# Patient Record
Sex: FEMALE | Race: BLACK OR AFRICAN AMERICAN | Employment: UNEMPLOYED
[De-identification: names, ages, dates, MRNs, and addresses within clinical notes are randomized per-mention and may not be internally consistent; named-entity substitution may affect disease eponyms.]

---

## 2017-03-06 ENCOUNTER — OFFICE VISIT (OUTPATIENT)
Dept: FAMILY MEDICINE CLINIC | Facility: CLINIC | Age: 52
End: 2017-03-06

## 2017-03-06 VITALS
BODY MASS INDEX: 24.92 KG/M2 | DIASTOLIC BLOOD PRESSURE: 114 MMHG | SYSTOLIC BLOOD PRESSURE: 174 MMHG | HEIGHT: 62 IN | HEART RATE: 73 BPM | WEIGHT: 135.4 LBS

## 2017-03-06 DIAGNOSIS — J45.909 UNCOMPLICATED ASTHMA, UNSPECIFIED ASTHMA SEVERITY: ICD-10-CM

## 2017-03-06 DIAGNOSIS — F41.9 ANXIETY: ICD-10-CM

## 2017-03-06 DIAGNOSIS — E78.5 DYSLIPIDEMIA: ICD-10-CM

## 2017-03-06 DIAGNOSIS — I25.119 CORONARY ARTERY DISEASE INVOLVING NATIVE CORONARY ARTERY OF NATIVE HEART WITH ANGINA PECTORIS (HCC): ICD-10-CM

## 2017-03-06 DIAGNOSIS — Z13.9 SCREENING: ICD-10-CM

## 2017-03-06 DIAGNOSIS — I10 ESSENTIAL HYPERTENSION: Primary | ICD-10-CM

## 2017-03-06 DIAGNOSIS — J01.10 ACUTE FRONTAL SINUSITIS, RECURRENCE NOT SPECIFIED: ICD-10-CM

## 2017-03-06 DIAGNOSIS — Z12.11 COLON CANCER SCREENING: ICD-10-CM

## 2017-03-06 PROCEDURE — 99204 OFFICE O/P NEW MOD 45 MIN: CPT | Performed by: FAMILY MEDICINE

## 2017-03-06 RX ORDER — PREDNISONE 50 MG/1
50 TABLET ORAL DAILY
Qty: 4 TABLET | Refills: 0 | Status: CANCELLED | OUTPATIENT
Start: 2017-03-06

## 2017-03-06 RX ORDER — LORAZEPAM 0.5 MG/1
0.5 TABLET ORAL 3 TIMES DAILY PRN
Qty: 60 TABLET | Refills: 1 | Status: CANCELLED | OUTPATIENT
Start: 2017-03-06

## 2017-03-06 RX ORDER — BUSPIRONE HYDROCHLORIDE 15 MG/1
15 TABLET ORAL 3 TIMES DAILY
Qty: 90 TABLET | Refills: 5 | Status: SHIPPED | OUTPATIENT
Start: 2017-03-06 | End: 2017-08-24 | Stop reason: SDUPTHER

## 2017-03-06 RX ORDER — OMEPRAZOLE 20 MG/1
20 CAPSULE, DELAYED RELEASE ORAL DAILY
Qty: 30 CAPSULE | Refills: 11 | Status: SHIPPED | OUTPATIENT
Start: 2017-03-06 | End: 2018-02-20 | Stop reason: SDUPTHER

## 2017-03-06 RX ORDER — METOPROLOL TARTRATE 50 MG/1
50 TABLET, FILM COATED ORAL 2 TIMES DAILY
Qty: 60 TABLET | Refills: 3 | Status: SHIPPED | OUTPATIENT
Start: 2017-03-06 | End: 2017-03-15 | Stop reason: ALTCHOICE

## 2017-03-06 RX ORDER — CLOPIDOGREL BISULFATE 75 MG/1
TABLET ORAL
Qty: 90 TABLET | Refills: 3 | Status: SHIPPED | OUTPATIENT
Start: 2017-03-06 | End: 2017-12-06 | Stop reason: SDUPTHER

## 2017-03-06 RX ORDER — BUPROPION HYDROCHLORIDE 300 MG/1
300 TABLET ORAL EVERY MORNING
Qty: 30 TABLET | Refills: 11 | Status: SHIPPED | OUTPATIENT
Start: 2017-03-06 | End: 2018-02-20 | Stop reason: SDUPTHER

## 2017-03-06 RX ORDER — ATORVASTATIN CALCIUM 80 MG/1
80 TABLET, FILM COATED ORAL DAILY
Qty: 30 TABLET | Refills: 3 | Status: SHIPPED | OUTPATIENT
Start: 2017-03-06 | End: 2017-09-19 | Stop reason: SDUPTHER

## 2017-03-06 RX ORDER — AMLODIPINE BESYLATE 10 MG/1
10 TABLET ORAL DAILY
Qty: 30 TABLET | Refills: 3 | Status: SHIPPED | OUTPATIENT
Start: 2017-03-06 | End: 2017-09-19 | Stop reason: SDUPTHER

## 2017-03-06 RX ORDER — HYDROCHLOROTHIAZIDE 25 MG/1
25 TABLET ORAL DAILY
COMMUNITY
End: 2017-03-06 | Stop reason: SDUPTHER

## 2017-03-06 RX ORDER — FUROSEMIDE 20 MG/1
20 TABLET ORAL DAILY
Qty: 90 TABLET | Refills: 3 | Status: CANCELLED | OUTPATIENT
Start: 2017-03-06

## 2017-03-06 RX ORDER — HYDROCHLOROTHIAZIDE 25 MG/1
25 TABLET ORAL DAILY
Qty: 30 TABLET | Refills: 3 | Status: SHIPPED | OUTPATIENT
Start: 2017-03-06 | End: 2019-05-08 | Stop reason: SDUPTHER

## 2017-03-06 RX ORDER — IBUPROFEN 800 MG/1
TABLET ORAL
Qty: 42 TABLET | Refills: 1 | Status: SHIPPED | OUTPATIENT
Start: 2017-03-06 | End: 2017-07-11 | Stop reason: SDUPTHER

## 2017-03-06 RX ORDER — CLOBETASOL PROPIONATE 0.5 MG/G
OINTMENT TOPICAL
Qty: 15 G | Refills: 2 | Status: SHIPPED | OUTPATIENT
Start: 2017-03-06 | End: 2017-07-11 | Stop reason: SDUPTHER

## 2017-03-06 RX ORDER — ALBUTEROL SULFATE 90 UG/1
2 AEROSOL, METERED RESPIRATORY (INHALATION) EVERY 6 HOURS PRN
Qty: 3 INHALER | Refills: 3 | Status: SHIPPED | OUTPATIENT
Start: 2017-03-06 | End: 2018-02-20 | Stop reason: SDUPTHER

## 2017-03-06 RX ORDER — LORATADINE 10 MG/1
10 TABLET ORAL DAILY
Qty: 30 TABLET | Refills: 0 | Status: SHIPPED | OUTPATIENT
Start: 2017-03-06 | End: 2017-03-15 | Stop reason: ALTCHOICE

## 2017-03-06 RX ORDER — LABETALOL 100 MG/1
200 TABLET, FILM COATED ORAL 2 TIMES DAILY
Qty: 90 TABLET | Refills: 3 | Status: CANCELLED | OUTPATIENT
Start: 2017-03-06

## 2017-03-15 ENCOUNTER — NURSE ONLY (OUTPATIENT)
Dept: FAMILY MEDICINE CLINIC | Age: 52
End: 2017-03-15

## 2017-03-15 ENCOUNTER — TELEPHONE (OUTPATIENT)
Dept: FAMILY MEDICINE CLINIC | Age: 52
End: 2017-03-15

## 2017-03-15 VITALS — HEART RATE: 66 BPM | SYSTOLIC BLOOD PRESSURE: 142 MMHG | DIASTOLIC BLOOD PRESSURE: 97 MMHG

## 2017-03-15 DIAGNOSIS — J01.10 ACUTE FRONTAL SINUSITIS, RECURRENCE NOT SPECIFIED: Primary | ICD-10-CM

## 2017-03-15 DIAGNOSIS — I10 ESSENTIAL HYPERTENSION: Primary | ICD-10-CM

## 2017-03-15 RX ORDER — CARVEDILOL 25 MG/1
25 TABLET ORAL 2 TIMES DAILY WITH MEALS
Qty: 60 TABLET | Refills: 3 | Status: SHIPPED | OUTPATIENT
Start: 2017-03-15 | End: 2017-08-24 | Stop reason: SDUPTHER

## 2017-03-15 RX ORDER — BENZOCAINE/MENTHOL 6 MG-10 MG
LOZENGE MUCOUS MEMBRANE
Qty: 1 EACH | Refills: 0 | Status: SHIPPED | OUTPATIENT
Start: 2017-03-15

## 2017-03-15 RX ORDER — CETIRIZINE HYDROCHLORIDE 10 MG/1
10 TABLET ORAL DAILY
Qty: 30 TABLET | Refills: 0 | Status: SHIPPED | OUTPATIENT
Start: 2017-03-15 | End: 2017-08-24 | Stop reason: SDUPTHER

## 2017-04-11 DIAGNOSIS — J01.10 ACUTE FRONTAL SINUSITIS, RECURRENCE NOT SPECIFIED: ICD-10-CM

## 2017-04-11 RX ORDER — ALCOHOL 62 ML/100ML
LIQUID TOPICAL
Qty: 30 TABLET | Refills: 0 | Status: SHIPPED | OUTPATIENT
Start: 2017-04-11 | End: 2017-05-04 | Stop reason: ALTCHOICE

## 2017-05-04 ENCOUNTER — OFFICE VISIT (OUTPATIENT)
Dept: FAMILY MEDICINE CLINIC | Age: 52
End: 2017-05-04
Payer: COMMERCIAL

## 2017-05-04 VITALS
TEMPERATURE: 98.1 F | HEART RATE: 80 BPM | OXYGEN SATURATION: 100 % | WEIGHT: 139.4 LBS | SYSTOLIC BLOOD PRESSURE: 172 MMHG | BODY MASS INDEX: 25.5 KG/M2 | DIASTOLIC BLOOD PRESSURE: 98 MMHG

## 2017-05-04 DIAGNOSIS — I10 ESSENTIAL HYPERTENSION: Primary | ICD-10-CM

## 2017-05-04 DIAGNOSIS — E78.5 DYSLIPIDEMIA: ICD-10-CM

## 2017-05-04 DIAGNOSIS — J45.909 UNCOMPLICATED ASTHMA, UNSPECIFIED ASTHMA SEVERITY: ICD-10-CM

## 2017-05-04 DIAGNOSIS — F17.200 SMOKING: ICD-10-CM

## 2017-05-04 PROCEDURE — 99214 OFFICE O/P EST MOD 30 MIN: CPT | Performed by: FAMILY MEDICINE

## 2017-05-04 PROCEDURE — 90471 IMMUNIZATION ADMIN: CPT | Performed by: FAMILY MEDICINE

## 2017-05-04 PROCEDURE — 90732 PPSV23 VACC 2 YRS+ SUBQ/IM: CPT | Performed by: FAMILY MEDICINE

## 2017-05-04 RX ORDER — LISINOPRIL 20 MG/1
20 TABLET ORAL DAILY
Qty: 30 TABLET | Refills: 3 | Status: SHIPPED | OUTPATIENT
Start: 2017-05-04 | End: 2017-05-24 | Stop reason: SDUPTHER

## 2017-05-04 RX ORDER — NICOTINE 21 MG/24HR
1 PATCH, TRANSDERMAL 24 HOURS TRANSDERMAL EVERY 24 HOURS
Qty: 30 PATCH | Refills: 3 | Status: SHIPPED | OUTPATIENT
Start: 2017-05-04 | End: 2021-02-17

## 2017-05-04 RX ORDER — METOPROLOL TARTRATE 50 MG/1
TABLET, FILM COATED ORAL
Refills: 0 | COMMUNITY
Start: 2017-04-11 | End: 2017-05-04 | Stop reason: ALTCHOICE

## 2017-05-04 RX ORDER — ACETAMINOPHEN/DIPHENHYDRAMINE 500MG-25MG
TABLET ORAL
Refills: 0 | COMMUNITY
Start: 2017-04-11 | End: 2017-05-04 | Stop reason: SDUPTHER

## 2017-05-24 ENCOUNTER — OFFICE VISIT (OUTPATIENT)
Dept: FAMILY MEDICINE CLINIC | Age: 52
End: 2017-05-24
Payer: COMMERCIAL

## 2017-05-24 VITALS
TEMPERATURE: 97.6 F | DIASTOLIC BLOOD PRESSURE: 107 MMHG | HEIGHT: 61 IN | SYSTOLIC BLOOD PRESSURE: 156 MMHG | HEART RATE: 79 BPM | RESPIRATION RATE: 18 BRPM | BODY MASS INDEX: 26.43 KG/M2 | WEIGHT: 140 LBS

## 2017-05-24 DIAGNOSIS — F41.9 ANXIETY: ICD-10-CM

## 2017-05-24 DIAGNOSIS — Z12.11 COLON CANCER SCREENING: ICD-10-CM

## 2017-05-24 DIAGNOSIS — I10 ESSENTIAL HYPERTENSION: Primary | ICD-10-CM

## 2017-05-24 DIAGNOSIS — R41.3 MEMORY IMPAIRMENT: ICD-10-CM

## 2017-05-24 LAB
CONTROL: PRESENT
HEMOCCULT STL QL: NEGATIVE

## 2017-05-24 PROCEDURE — 99214 OFFICE O/P EST MOD 30 MIN: CPT | Performed by: FAMILY MEDICINE

## 2017-05-24 RX ORDER — ESCITALOPRAM OXALATE 20 MG/1
20 TABLET ORAL DAILY
Qty: 30 TABLET | Refills: 3 | Status: SHIPPED | OUTPATIENT
Start: 2017-05-24 | End: 2017-07-26 | Stop reason: SINTOL

## 2017-05-24 RX ORDER — LISINOPRIL 30 MG/1
30 TABLET ORAL DAILY
Qty: 30 TABLET | Refills: 3 | Status: SHIPPED | OUTPATIENT
Start: 2017-05-24 | End: 2017-08-31 | Stop reason: SDUPTHER

## 2017-07-12 RX ORDER — CLOBETASOL PROPIONATE 0.5 MG/G
OINTMENT TOPICAL
Qty: 15 G | Refills: 2 | Status: SHIPPED | OUTPATIENT
Start: 2017-07-12 | End: 2017-08-24 | Stop reason: SDUPTHER

## 2017-07-12 RX ORDER — IBUPROFEN 800 MG/1
TABLET ORAL
Qty: 42 TABLET | Refills: 1 | Status: SHIPPED | OUTPATIENT
Start: 2017-07-12 | End: 2017-08-19 | Stop reason: SDUPTHER

## 2017-07-26 ENCOUNTER — OFFICE VISIT (OUTPATIENT)
Dept: FAMILY MEDICINE CLINIC | Age: 52
End: 2017-07-26
Payer: COMMERCIAL

## 2017-07-26 ENCOUNTER — HOSPITAL ENCOUNTER (OUTPATIENT)
Age: 52
Discharge: HOME | End: 2017-07-26
Payer: COMMERCIAL

## 2017-07-26 VITALS
WEIGHT: 141 LBS | TEMPERATURE: 98.8 F | OXYGEN SATURATION: 100 % | HEIGHT: 61 IN | HEART RATE: 67 BPM | DIASTOLIC BLOOD PRESSURE: 96 MMHG | SYSTOLIC BLOOD PRESSURE: 171 MMHG | RESPIRATION RATE: 17 BRPM | BODY MASS INDEX: 26.62 KG/M2

## 2017-07-26 DIAGNOSIS — F41.9 ANXIETY: ICD-10-CM

## 2017-07-26 DIAGNOSIS — M54.2 NECK PAIN: ICD-10-CM

## 2017-07-26 DIAGNOSIS — R41.3 MEMORY IMPAIRMENT: ICD-10-CM

## 2017-07-26 DIAGNOSIS — G44.40 OTHER DRUG INDUCED HEADACHE: ICD-10-CM

## 2017-07-26 DIAGNOSIS — I10 ESSENTIAL HYPERTENSION: Primary | ICD-10-CM

## 2017-07-26 LAB — TSH SERPL DL<=0.05 MIU/L-ACNC: 1.45 MIU/L (ref 0.3–5)

## 2017-07-26 PROCEDURE — 84443 ASSAY THYROID STIM HORMONE: CPT

## 2017-07-26 PROCEDURE — 36415 COLL VENOUS BLD VENIPUNCTURE: CPT

## 2017-07-26 PROCEDURE — 99214 OFFICE O/P EST MOD 30 MIN: CPT | Performed by: FAMILY MEDICINE

## 2017-07-26 RX ORDER — METOPROLOL TARTRATE 50 MG/1
TABLET, FILM COATED ORAL
COMMUNITY
Start: 2017-07-11 | End: 2017-08-24 | Stop reason: ALTCHOICE

## 2017-07-26 RX ORDER — LIDOCAINE 40 MG/G
CREAM TOPICAL
Qty: 1 TUBE | Refills: 3 | Status: SHIPPED | OUTPATIENT
Start: 2017-07-26

## 2017-08-22 RX ORDER — IBUPROFEN 800 MG/1
TABLET ORAL
Qty: 90 TABLET | Refills: 0 | Status: SHIPPED | OUTPATIENT
Start: 2017-08-22 | End: 2017-09-19 | Stop reason: SDUPTHER

## 2017-08-24 ENCOUNTER — OFFICE VISIT (OUTPATIENT)
Dept: FAMILY MEDICINE CLINIC | Age: 52
End: 2017-08-24
Payer: COMMERCIAL

## 2017-08-24 ENCOUNTER — HOSPITAL ENCOUNTER (OUTPATIENT)
Age: 52
Discharge: HOME | End: 2017-08-24
Payer: COMMERCIAL

## 2017-08-24 VITALS
OXYGEN SATURATION: 10 % | BODY MASS INDEX: 26.35 KG/M2 | TEMPERATURE: 97.5 F | SYSTOLIC BLOOD PRESSURE: 163 MMHG | RESPIRATION RATE: 20 BRPM | HEIGHT: 62 IN | WEIGHT: 143.2 LBS | DIASTOLIC BLOOD PRESSURE: 106 MMHG | HEART RATE: 78 BPM

## 2017-08-24 DIAGNOSIS — I10 ESSENTIAL HYPERTENSION: ICD-10-CM

## 2017-08-24 DIAGNOSIS — F41.9 ANXIETY: ICD-10-CM

## 2017-08-24 DIAGNOSIS — J01.10 ACUTE FRONTAL SINUSITIS, RECURRENCE NOT SPECIFIED: ICD-10-CM

## 2017-08-24 DIAGNOSIS — I10 ESSENTIAL HYPERTENSION: Primary | ICD-10-CM

## 2017-08-24 DIAGNOSIS — Z13.9 SCREENING FOR CONDITION: ICD-10-CM

## 2017-08-24 DIAGNOSIS — R21 RASH: ICD-10-CM

## 2017-08-24 DIAGNOSIS — R41.3 MEMORY IMPAIRMENT: ICD-10-CM

## 2017-08-24 LAB
CHOLESTEROL/HDL RATIO: 6.2
CHOLESTEROL: 267 MG/DL
FOLATE: 5.5 NG/ML
HDLC SERPL-MCNC: 43 MG/DL
HEPATITIS C ANTIBODY: NONREACTIVE
HIV AG/AB: NONREACTIVE
LDL CHOLESTEROL: 198 MG/DL (ref 0–130)
TRIGL SERPL-MCNC: 129 MG/DL
VITAMIN B-12: 366 PG/ML (ref 211–946)
VLDLC SERPL CALC-MCNC: ABNORMAL MG/DL (ref 1–30)

## 2017-08-24 PROCEDURE — 82607 VITAMIN B-12: CPT

## 2017-08-24 PROCEDURE — 99214 OFFICE O/P EST MOD 30 MIN: CPT | Performed by: FAMILY MEDICINE

## 2017-08-24 PROCEDURE — 87389 HIV-1 AG W/HIV-1&-2 AB AG IA: CPT

## 2017-08-24 PROCEDURE — 83036 HEMOGLOBIN GLYCOSYLATED A1C: CPT

## 2017-08-24 PROCEDURE — 80061 LIPID PANEL: CPT

## 2017-08-24 PROCEDURE — 36415 COLL VENOUS BLD VENIPUNCTURE: CPT

## 2017-08-24 PROCEDURE — 86803 HEPATITIS C AB TEST: CPT

## 2017-08-24 PROCEDURE — 82746 ASSAY OF FOLIC ACID SERUM: CPT

## 2017-08-24 RX ORDER — ALPRAZOLAM 0.25 MG/1
0.25 TABLET ORAL NIGHTLY PRN
Qty: 10 TABLET | Refills: 0 | Status: SHIPPED | OUTPATIENT
Start: 2017-08-24 | End: 2017-11-07 | Stop reason: SDUPTHER

## 2017-08-24 RX ORDER — CETIRIZINE HYDROCHLORIDE 10 MG/1
10 TABLET ORAL DAILY
Qty: 30 TABLET | Refills: 2 | Status: SHIPPED | OUTPATIENT
Start: 2017-08-24 | End: 2017-11-18 | Stop reason: SDUPTHER

## 2017-08-24 RX ORDER — CLOBETASOL PROPIONATE 0.5 MG/G
OINTMENT TOPICAL
Qty: 15 G | Refills: 2 | Status: SHIPPED | OUTPATIENT
Start: 2017-08-24 | End: 2018-02-23 | Stop reason: SDUPTHER

## 2017-08-24 RX ORDER — BUSPIRONE HYDROCHLORIDE 15 MG/1
15 TABLET ORAL 3 TIMES DAILY
Qty: 90 TABLET | Refills: 5 | Status: SHIPPED | OUTPATIENT
Start: 2017-08-24 | End: 2018-01-17 | Stop reason: SDUPTHER

## 2017-08-24 RX ORDER — CARVEDILOL 25 MG/1
25 TABLET ORAL 2 TIMES DAILY WITH MEALS
Qty: 60 TABLET | Refills: 3 | Status: SHIPPED | OUTPATIENT
Start: 2017-08-24 | End: 2017-11-07 | Stop reason: ALTCHOICE

## 2017-08-25 LAB
ESTIMATED AVERAGE GLUCOSE: 103 MG/DL
HBA1C MFR BLD: 5.2 % (ref 4–6)

## 2017-08-28 DIAGNOSIS — E78.2 MIXED HYPERLIPIDEMIA: Primary | ICD-10-CM

## 2017-08-28 RX ORDER — EZETIMIBE 10 MG/1
10 TABLET ORAL DAILY
Qty: 30 TABLET | Refills: 3 | Status: SHIPPED | OUTPATIENT
Start: 2017-08-28 | End: 2019-05-08 | Stop reason: SDUPTHER

## 2017-08-29 ENCOUNTER — OFFICE VISIT (OUTPATIENT)
Dept: BEHAVIORAL/MENTAL HEALTH CLINIC | Age: 52
End: 2017-08-29
Payer: COMMERCIAL

## 2017-08-29 DIAGNOSIS — F31.61 BIPOLAR I DISORDER, MOST RECENT EPISODE MIXED, MILD (HCC): Primary | ICD-10-CM

## 2017-08-29 PROCEDURE — 90832 PSYTX W PT 30 MINUTES: CPT | Performed by: SOCIAL WORKER

## 2017-08-31 ENCOUNTER — NURSE ONLY (OUTPATIENT)
Dept: FAMILY MEDICINE CLINIC | Age: 52
End: 2017-08-31

## 2017-08-31 VITALS — SYSTOLIC BLOOD PRESSURE: 158 MMHG | DIASTOLIC BLOOD PRESSURE: 105 MMHG | HEART RATE: 75 BPM

## 2017-08-31 DIAGNOSIS — I10 ESSENTIAL HYPERTENSION: Primary | ICD-10-CM

## 2017-08-31 RX ORDER — LISINOPRIL 40 MG/1
40 TABLET ORAL DAILY
Qty: 30 TABLET | Refills: 3 | Status: SHIPPED | OUTPATIENT
Start: 2017-08-31 | End: 2018-01-13 | Stop reason: SDUPTHER

## 2017-09-06 ENCOUNTER — INITIAL CONSULT (OUTPATIENT)
Dept: BEHAVIORAL/MENTAL HEALTH CLINIC | Age: 52
End: 2017-09-06
Payer: COMMERCIAL

## 2017-09-06 ENCOUNTER — NURSE ONLY (OUTPATIENT)
Dept: FAMILY MEDICINE CLINIC | Age: 52
End: 2017-09-06
Payer: COMMERCIAL

## 2017-09-06 VITALS — DIASTOLIC BLOOD PRESSURE: 111 MMHG | HEART RATE: 58 BPM | SYSTOLIC BLOOD PRESSURE: 172 MMHG

## 2017-09-06 DIAGNOSIS — F31.61 BIPOLAR I DISORDER, MOST RECENT EPISODE MIXED, MILD (HCC): Primary | ICD-10-CM

## 2017-09-06 DIAGNOSIS — I10 ESSENTIAL HYPERTENSION: Primary | ICD-10-CM

## 2017-09-06 PROCEDURE — 99211 OFF/OP EST MAY X REQ PHY/QHP: CPT | Performed by: FAMILY MEDICINE

## 2017-09-06 PROCEDURE — 90834 PSYTX W PT 45 MINUTES: CPT | Performed by: SOCIAL WORKER

## 2017-09-19 DIAGNOSIS — I10 ESSENTIAL HYPERTENSION: ICD-10-CM

## 2017-09-19 DIAGNOSIS — J01.10 ACUTE FRONTAL SINUSITIS, RECURRENCE NOT SPECIFIED: ICD-10-CM

## 2017-09-19 DIAGNOSIS — E78.5 DYSLIPIDEMIA: ICD-10-CM

## 2017-09-19 RX ORDER — ACETAMINOPHEN/DIPHENHYDRAMINE 500MG-25MG
TABLET ORAL
Qty: 30 TABLET | Refills: 3 | Status: SHIPPED | OUTPATIENT
Start: 2017-09-19 | End: 2018-01-17 | Stop reason: SDUPTHER

## 2017-09-19 RX ORDER — IBUPROFEN 800 MG/1
TABLET ORAL
Qty: 90 TABLET | Refills: 0 | Status: SHIPPED | OUTPATIENT
Start: 2017-09-19 | End: 2017-11-18 | Stop reason: SDUPTHER

## 2017-09-19 RX ORDER — ATORVASTATIN CALCIUM 80 MG/1
TABLET, FILM COATED ORAL
Qty: 30 TABLET | Refills: 3 | Status: SHIPPED | OUTPATIENT
Start: 2017-09-19 | End: 2018-01-17 | Stop reason: SDUPTHER

## 2017-09-19 RX ORDER — METOPROLOL TARTRATE 50 MG/1
TABLET, FILM COATED ORAL
Qty: 60 TABLET | Refills: 3 | Status: SHIPPED | OUTPATIENT
Start: 2017-09-19 | End: 2017-12-06 | Stop reason: SDUPTHER

## 2017-09-19 RX ORDER — AMLODIPINE BESYLATE 10 MG/1
TABLET ORAL
Qty: 30 TABLET | Refills: 3 | Status: SHIPPED | OUTPATIENT
Start: 2017-09-19 | End: 2018-02-20 | Stop reason: SDUPTHER

## 2017-10-06 ENCOUNTER — OFFICE VISIT (OUTPATIENT)
Dept: NEUROLOGY | Age: 52
End: 2017-10-06
Payer: COMMERCIAL

## 2017-10-06 VITALS
HEART RATE: 60 BPM | BODY MASS INDEX: 27.68 KG/M2 | WEIGHT: 150.4 LBS | SYSTOLIC BLOOD PRESSURE: 161 MMHG | HEIGHT: 62 IN | DIASTOLIC BLOOD PRESSURE: 99 MMHG

## 2017-10-06 DIAGNOSIS — R41.3 MEMORY PROBLEM: Primary | ICD-10-CM

## 2017-10-06 DIAGNOSIS — F39 AFFECTIVE DISORDER (HCC): ICD-10-CM

## 2017-10-06 DIAGNOSIS — E78.5 DYSLIPIDEMIA: ICD-10-CM

## 2017-10-06 DIAGNOSIS — Z86.73 HISTORY OF STROKE: ICD-10-CM

## 2017-10-06 PROCEDURE — 99204 OFFICE O/P NEW MOD 45 MIN: CPT | Performed by: PSYCHIATRY & NEUROLOGY

## 2017-10-06 NOTE — PROGRESS NOTES
SYSTEMS  Constitutional Weight changes: absent, Fevers : absent, Fatigue: present; Any recent hospitalizations:  absent.    HEENT Ears: normal, Eyes: glasses, Visual disturbance: absent   Reespiratory Shortness of breath: present, Cough: absent   Cardivascular Chest pain: present, Leg swelling :present   GI Constipation: absent, Diarrhea: absent, Swallowing change: absent    Urinary frequency: absent, Urinary urgency: absent, Urinary incontinence: absent   Musculoskeletal Neck pain: present, Back pain: absent, Stiffness: present, Muscle pain: absent, Joint pain: present   Dermatological Hair loss: present, Skin changes: absent   Neurological Memory loss: present, Confusion: present, Seizures: absent; Trouble walking or imbalance: present, Dizziness: absent, Weakness: present, Numbness present, Tremor: absent, Spasm: absent, Speech difficulty: absent, Headache: absent, Light sensitivity: absent   Psychiatric Anxiety: present, Depression  present, Suicidal ideations absent   Hematologic Abnormal bleeding: absent, Anemia: absent, Clotting disorder: absent, Lymph gland changes: absent     Current Outpatient Prescriptions on File Prior to Visit   Medication Sig Dispense Refill    metoprolol tartrate (LOPRESSOR) 50 MG tablet take 1 tablet by mouth twice a day 60 tablet 3    ibuprofen (ADVIL;MOTRIN) 800 MG tablet take 1 tablet by mouth every 8 hours if needed for pain 90 tablet 0    amLODIPine (NORVASC) 10 MG tablet take 1 tablet by mouth once daily 30 tablet 3    RA ASPIRIN EC 81 MG EC tablet take 1 tablet by mouth once daily 30 tablet 3    atorvastatin (LIPITOR) 80 MG tablet take 1 tablet by mouth once daily 30 tablet 3    lisinopril (PRINIVIL;ZESTRIL) 40 MG tablet Take 1 tablet by mouth daily 30 tablet 3    ezetimibe (ZETIA) 10 MG tablet Take 1 tablet by mouth daily 30 tablet 3    cetirizine (ZYRTEC ALLERGY) 10 MG tablet Take 1 tablet by mouth daily 30 tablet 2    busPIRone (BUSPAR) 15 MG tablet Take 1 tablet by mouth 3 times daily 90 tablet 5    clobetasol (TEMOVATE) 0.05 % ointment apply to affected area twice a day 15 g 2    carvedilol (COREG) 25 MG tablet Take 1 tablet by mouth 2 times daily (with meals) 60 tablet 3    lidocaine (LMX) 4 % cream Apply 2g topically as needed. 1 Tube 3    nicotine (NICODERM CQ) 14 MG/24HR Place 1 patch onto the skin every 24 hours 30 patch 3    Blood Pressure Monitor MISC Use daily to take BP 1 each 0    hydrochlorothiazide (HYDRODIURIL) 25 MG tablet Take 1 tablet by mouth daily 30 tablet 3    clopidogrel (PLAVIX) 75 MG tablet TAKE 1 TABLET BY MOUTH DAILY 90 tablet 3    omeprazole (PRILOSEC) 20 MG delayed release capsule Take 1 capsule by mouth Daily 30 capsule 11    albuterol sulfate HFA (PROVENTIL HFA) 108 (90 BASE) MCG/ACT inhaler Inhale 2 puffs into the lungs every 6 hours as needed for Wheezing 3 Inhaler 3    buPROPion (WELLBUTRIN XL) 300 MG extended release tablet Take 1 tablet by mouth every morning 30 tablet 11    mometasone-formoterol (DULERA) 100-5 MCG/ACT inhaler Inhale 2 puffs into the lungs 2 times daily 1 Inhaler 11     No current facility-administered medications on file prior to visit. Allergies: Nicole Cuenca is allergic to lipitor [atorvastatin]; prozac [fluoxetine]; and zoloft [sertraline hcl]. Past Medical History:   Diagnosis Date    Anemia     Asthma     CAD (coronary artery disease)     Depression 10/20/2014    High cholesterol     Hypertension     MI, old     Osteoarthritis     Pneumonia     Sleep apnea     Stroke ([facility] [address] 9/27/2014       Past Surgical History:   Procedure Laterality Date    APPENDECTOMY      CORONARY ANGIOPLASTY WITH STENT PLACEMENT  2004    HYSTERECTOMY      TONSILLECTOMY      TUBAL LIGATION       Social History: Nicole Cuenca  reports that she has been smoking Cigarettes. She has a 12.50 pack-year smoking history. She has never used smokeless tobacco. She reports that she drinks alcohol.  She reports that she does not use illicit drugs. Family History   Problem Relation Age of Onset    High Blood Pressure Mother        On exam: Blood pressure (!) 161/99, pulse 60, height 5' 2\" (1.575 m), weight 150 lb 6.4 oz (68.2 kg), currently breastfeeding. GENERAL  Appears comfortable and in no distress   HEENT  NC/ AT   NECK  Supple and no bruits heard   MENTAL STATUS:  Alert, oriented, intact memory, no confusion, normal speech, normal language, no hallucination or delusion   CRANIAL NERVES: II     -      Visual fields intact to confrontation  III,IV,VI -  EOMs full, no afferent defect, no                      BALBINA, no ptosis  V     -     Normal facial sensation  VII    -     Normal facial symmetry  VIII   -     Intact hearing  IX,X -     Symmetrical palate  XI    -     Symmetrical shoulder shrug  XII   -     Midline tongue, no atrophy    MOTOR FUNCTION:  significant for good strength of grade 5/5 in bilateral proximal and distal muscle groups of both upper and lower extremities with normal bulk, normal tone and no involuntary movements, no tremor   SENSORY FUNCTION:  Normal touch, normal pin, normal vibration, normal proprioception   CEREBELLAR FUNCTION:  Intact fine motor control over upper limbs   REFLEX FUNCTION:  Symmetric, no perverted reflex, no Babinski sign   STATION and GAIT  Normal station, normal gait        10/6/2017  Maximum score 5 Score 4 What is the year, season, date, day, month   Maximum score 5 Score 4 Where are we: State, county, town, hospital, floor? Maximum score 3 Score 3 Name 3 objects: ball, pen, car. Ask patient to repeat 3 objects. Maximum score 5 Score 3 Serial sevens from 100:93, 86, 79, 72, 65  But could spell word backwards   Maximum score 3 Score 1 Recall 3 objects   Maximum score 2 Score 2 Name a pencil and watch. Maximum score 1 Score 1 Repeat the following: No ifs ands or buts.      Maximum score 3 Score 3 Follow 3 stage command take a paper in your hand, fold it in

## 2017-10-06 NOTE — LETTER
shopping, money management and laundry etc.,  Patient's daughters had been helping her out. Patient's daughter also stated that patient has lost 3 sets of house keys in recent past and it is out of ordinary for her. Patient's family members also stated that her \"long-term memory is okay\". Patient has had good days and bad days and she has been having slowly progressing problems with memory and able to focus. Patient admits to having anxiety and depression and she has been on BuSpar and Wellbutrin. Patient has not had any testing done for ongoing memory problems. Denies family history of dementia. REVIEW OF SYSTEMS  Constitutional Weight changes: absent, Fevers : absent, Fatigue: present; Any recent hospitalizations:  absent.    HEENT Ears: normal, Eyes: glasses, Visual disturbance: absent   Reespiratory Shortness of breath: present, Cough: absent   Cardivascular Chest pain: present, Leg swelling :present   GI Constipation: absent, Diarrhea: absent, Swallowing change: absent    Urinary frequency: absent, Urinary urgency: absent, Urinary incontinence: absent   Musculoskeletal Neck pain: present, Back pain: absent, Stiffness: present, Muscle pain: absent, Joint pain: present   Dermatological Hair loss: present, Skin changes: absent   Neurological Memory loss: present, Confusion: present, Seizures: absent; Trouble walking or imbalance: present, Dizziness: absent, Weakness: present, Numbness present, Tremor: absent, Spasm: absent, Speech difficulty: absent, Headache: absent, Light sensitivity: absent   Psychiatric Anxiety: present, Depression  present, Suicidal ideations absent   Hematologic Abnormal bleeding: absent, Anemia: absent, Clotting disorder: absent, Lymph gland changes: absent     Current Outpatient Prescriptions on File Prior to Visit   Medication Sig Dispense Refill    metoprolol tartrate (LOPRESSOR) 50 MG tablet take 1 tablet by mouth twice a day 60 tablet 3 Past Medical History:   Diagnosis Date    Anemia     Asthma     CAD (coronary artery disease)     Depression 10/20/2014    High cholesterol     Hypertension     MI, old     Osteoarthritis     Pneumonia     Sleep apnea     Stroke ([facility] [address] 9/27/2014       Past Surgical History:   Procedure Laterality Date    APPENDECTOMY      CORONARY ANGIOPLASTY WITH STENT PLACEMENT  2004    HYSTERECTOMY      TONSILLECTOMY      TUBAL LIGATION       Social History: Marialuisa Briones  reports that she has been smoking Cigarettes. She has a 12.50 pack-year smoking history. She has never used smokeless tobacco. She reports that she drinks alcohol. She reports that she does not use illicit drugs. Family History   Problem Relation Age of Onset    High Blood Pressure Mother        On exam: Blood pressure (!) 161/99, pulse 60, height 5' 2\" (1.575 m), weight 150 lb 6.4 oz (68.2 kg), currently breastfeeding.     GENERAL  Appears comfortable and in no distress   HEENT  NC/ AT   NECK  Supple and no bruits heard   MENTAL STATUS:  Alert, oriented, intact memory, no confusion, normal speech, normal language, no hallucination or delusion   CRANIAL NERVES: II     -      Visual fields intact to confrontation  III,IV,VI -  EOMs full, no afferent defect, no                      BALBINA, no ptosis  V     -     Normal facial sensation  VII    -     Normal facial symmetry  VIII   -     Intact hearing  IX,X -     Symmetrical palate  XI    -     Symmetrical shoulder shrug  XII   -     Midline tongue, no atrophy    MOTOR FUNCTION:  significant for good strength of grade 5/5 in bilateral proximal and distal muscle groups of both upper and lower extremities with normal bulk, normal tone and no involuntary movements, no tremor   SENSORY FUNCTION:  Normal touch, normal pin, normal vibration, normal proprioception   CEREBELLAR FUNCTION:  Intact fine motor control over upper limbs   REFLEX FUNCTION:  Symmetric, no perverted reflex, no Babinski sign If you have questions, please do not hesitate to call me. I look forward to following Cindi along with you.     Sincerely,        Linette Ordonez MD

## 2017-10-06 NOTE — MR AVS SNAPSHOT
buPROPion (WELLBUTRIN XL) 300 MG extended release tablet Take 1 tablet by mouth every morning    mometasone-formoterol (DULERA) 100-5 MCG/ACT inhaler Inhale 2 puffs into the lungs 2 times daily      Allergies              Lipitor [Atorvastatin] Other (See Comments)    Muscle cramping in legs    Prozac [Fluoxetine] Other (See Comments)    Mood swings    Zoloft [Sertraline Hcl] Other (See Comments)    Anger and mood swings          Additional Information        Basic Information     Date Of Birth Sex Race Ethnicity Preferred Language    1965 Female Black Non-/Non  English      Problem List as of 10/6/2017  Date Reviewed: 9/6/2017                Essential hypertension    Neck pain    Memory impairment    Acute frontal sinusitis    Dyslipidemia    Anxiety    CAD (coronary artery disease)    Asthma    MI, old    Depression    Smoking    Dementia    [facility]    HTN (hypertension)      Immunizations as of 10/6/2017     Name Date    Pneumococcal Polysaccharide ([id number]) 5/4/2017      Preventive Care        Date Due    Tetanus Combination Vaccine (1 - Tdap) 9/6/1983    Yearly Flu Vaccine (1) 9/1/2017    Mammograms are recommended every 2 years for low/average risk patients aged 48 - 69, and every year for high risk patients per updated national guidelines. However these guidelines can be individualized by your provider. 3/30/2018    Colon Cancer Stool Test 5/24/2018    Pap Smear 11/3/2018    Cholesterol Screening 8/24/2022            [employer] Signup           Our records indicate that you have an active [employer] account. You can view your After Visit Summary by going to [email or web address] and logging in with your [employer] username and password.       If you don't have a [employer] username and password but a parent or guardian has access to your record, the parent or guardian should login with their own [address] and password and access your record to

## 2017-10-06 NOTE — LETTER
 ibuprofen (ADVIL;MOTRIN) 800 MG tablet take 1 tablet by mouth every 8 hours if needed for pain 90 tablet 0    amLODIPine (NORVASC) 10 MG tablet take 1 tablet by mouth once daily 30 tablet 3    RA ASPIRIN EC 81 MG EC tablet take 1 tablet by mouth once daily 30 tablet 3    atorvastatin (LIPITOR) 80 MG tablet take 1 tablet by mouth once daily 30 tablet 3    lisinopril (PRINIVIL;ZESTRIL) 40 MG tablet Take 1 tablet by mouth daily 30 tablet 3    ezetimibe (ZETIA) 10 MG tablet Take 1 tablet by mouth daily 30 tablet 3    cetirizine (ZYRTEC ALLERGY) 10 MG tablet Take 1 tablet by mouth daily 30 tablet 2    busPIRone (BUSPAR) 15 MG tablet Take 1 tablet by mouth 3 times daily 90 tablet 5    clobetasol (TEMOVATE) 0.05 % ointment apply to affected area twice a day 15 g 2    carvedilol (COREG) 25 MG tablet Take 1 tablet by mouth 2 times daily (with meals) 60 tablet 3    lidocaine (LMX) 4 % cream Apply 2g topically as needed. 1 Tube 3    nicotine (NICODERM CQ) 14 MG/24HR Place 1 patch onto the skin every 24 hours 30 patch 3    Blood Pressure Monitor MISC Use daily to take BP 1 each 0    hydrochlorothiazide (HYDRODIURIL) 25 MG tablet Take 1 tablet by mouth daily 30 tablet 3    clopidogrel (PLAVIX) 75 MG tablet TAKE 1 TABLET BY MOUTH DAILY 90 tablet 3    omeprazole (PRILOSEC) 20 MG delayed release capsule Take 1 capsule by mouth Daily 30 capsule 11    albuterol sulfate HFA (PROVENTIL HFA) 108 (90 BASE) MCG/ACT inhaler Inhale 2 puffs into the lungs every 6 hours as needed for Wheezing 3 Inhaler 3    buPROPion (WELLBUTRIN XL) 300 MG extended release tablet Take 1 tablet by mouth every morning 30 tablet 11    mometasone-formoterol (DULERA) 100-5 MCG/ACT inhaler Inhale 2 puffs into the lungs 2 times daily 1 Inhaler 11     No current facility-administered medications on file prior to visit. Allergies: Jose Goldman is allergic to lipitor [atorvastatin]; prozac [fluoxetine]; and zoloft [sertraline hcl]. Past Medical History:   Diagnosis Date    Anemia     Asthma     CAD (coronary artery disease)     Depression 10/20/2014    High cholesterol     Hypertension     MI, old     Osteoarthritis     Pneumonia     Sleep apnea     St[facility] ([facility] [address] 9/27/2014       Past Surgical History:   Procedure Laterality Date    APPENDECTOMY      CORONARY ANGIOPLASTY WITH STENT PLACEMENT  2004    HYSTERECTOMY      TONSILLECTOMY      TUBAL LIGATION       Social History: Lloyd Wong  reports that she has been smoking Cigarettes. She has a 12.50 pack-year smoking history. She has never used smokeless tobacco. She reports that she drinks alcohol. She reports that she does not use illicit drugs. Family History   Problem Relation Age of Onset    High Blood Pressure Mother        On exam: Blood pressure (!) 161/99, pulse 60, height 5' 2\" (1.575 m), weight 150 lb 6.4 oz (68.2 kg), currently breastfeeding.     GENERAL  Appears comfortable and in no distress   HEENT  NC/ AT   NECK  Supple and no bruits heard   MENTAL STATUS:  Alert, oriented, intact memory, no confusion, normal speech, normal language, no hallucination or delusion   CRANIAL NERVES: II     -      Visual fields intact to confrontation  III,IV,VI -  EOMs full, no afferent defect, no                      BALBINA, no ptosis  V     -     Normal facial sensation  VII    -     Normal facial symmetry  VIII   -     Intact hearing  IX,X -     Symmetrical palate  XI    -     Symmetrical shoulder shrug  XII   -     Midline tongue, no atrophy    MOTOR FUNCTION:  significant for good strength of grade 5/5 in bilateral proximal and distal muscle groups of both upper and lower extremities with normal bulk, normal tone and no involuntary movements, no tremor   SENSORY FUNCTION:  Normal touch, normal pin, normal vibration, normal proprioception   CEREBELLAR FUNCTION:  Intact fine motor control over upper limbs   REFLEX FUNCTION:  Symmetric, no perverted reflex, no Babinski sign

## 2017-10-25 ENCOUNTER — HOSPITAL ENCOUNTER (OUTPATIENT)
Dept: MRI IMAGING | Age: 52
Discharge: HOME | End: 2017-10-25
Payer: COMMERCIAL

## 2017-10-25 ENCOUNTER — HOSPITAL ENCOUNTER (OUTPATIENT)
Dept: GENERAL RADIOLOGY | Age: 52
Discharge: HOME | End: 2017-10-25
Payer: COMMERCIAL

## 2017-10-25 DIAGNOSIS — Z86.73 HISTORY OF STROKE: ICD-10-CM

## 2017-10-25 DIAGNOSIS — R41.3 MEMORY PROBLEM: ICD-10-CM

## 2017-10-25 DIAGNOSIS — M54.2 NECK PAIN: ICD-10-CM

## 2017-10-25 PROCEDURE — 72050 X-RAY EXAM NECK SPINE 4/5VWS: CPT

## 2017-10-25 PROCEDURE — 2580000003 HC RX 258: Performed by: PSYCHIATRY & NEUROLOGY

## 2017-10-25 PROCEDURE — 70553 MRI BRAIN STEM W/O & W/DYE: CPT

## 2017-10-25 PROCEDURE — 6360000004 HC RX CONTRAST MEDICATION: Performed by: PSYCHIATRY & NEUROLOGY

## 2017-10-25 PROCEDURE — A9579 GAD-BASE MR CONTRAST NOS,1ML: HCPCS | Performed by: PSYCHIATRY & NEUROLOGY

## 2017-10-25 RX ORDER — SODIUM CHLORIDE 0.9 % (FLUSH) 0.9 %
10 SYRINGE (ML) INJECTION PRN
Status: DISCONTINUED | OUTPATIENT
Start: 2017-10-25 | End: 2017-10-28 | Stop reason: HOSPADM

## 2017-10-25 RX ADMIN — Medication 10 ML: at 14:09

## 2017-10-25 RX ADMIN — GADOTERIDOL 15 ML: 279.3 INJECTION, SOLUTION INTRAVENOUS at 14:08

## 2017-11-07 ENCOUNTER — OFFICE VISIT (OUTPATIENT)
Dept: FAMILY MEDICINE CLINIC | Age: 52
End: 2017-11-07
Payer: COMMERCIAL

## 2017-11-07 VITALS
OXYGEN SATURATION: 98 % | WEIGHT: 151.6 LBS | HEART RATE: 59 BPM | DIASTOLIC BLOOD PRESSURE: 97 MMHG | SYSTOLIC BLOOD PRESSURE: 155 MMHG | BODY MASS INDEX: 27.9 KG/M2 | HEIGHT: 62 IN

## 2017-11-07 DIAGNOSIS — I63.40 CEREBROVASCULAR ACCIDENT (CVA) DUE TO EMBOLISM OF CEREBRAL ARTERY (HCC): ICD-10-CM

## 2017-11-07 DIAGNOSIS — I10 ESSENTIAL HYPERTENSION: Primary | ICD-10-CM

## 2017-11-07 DIAGNOSIS — F41.9 ANXIETY: ICD-10-CM

## 2017-11-07 DIAGNOSIS — R41.3 MEMORY PROBLEM: ICD-10-CM

## 2017-11-07 DIAGNOSIS — M50.30 DDD (DEGENERATIVE DISC DISEASE), CERVICAL: ICD-10-CM

## 2017-11-07 PROCEDURE — G8417 CALC BMI ABV UP PARAM F/U: HCPCS | Performed by: FAMILY MEDICINE

## 2017-11-07 PROCEDURE — 99214 OFFICE O/P EST MOD 30 MIN: CPT | Performed by: FAMILY MEDICINE

## 2017-11-07 PROCEDURE — G8598 ASA/ANTIPLAT THER USED: HCPCS | Performed by: FAMILY MEDICINE

## 2017-11-07 PROCEDURE — 3014F SCREEN MAMMO DOC REV: CPT | Performed by: FAMILY MEDICINE

## 2017-11-07 PROCEDURE — G8484 FLU IMMUNIZE NO ADMIN: HCPCS | Performed by: FAMILY MEDICINE

## 2017-11-07 PROCEDURE — G8427 DOCREV CUR MEDS BY ELIG CLIN: HCPCS | Performed by: FAMILY MEDICINE

## 2017-11-07 PROCEDURE — 3017F COLORECTAL CA SCREEN DOC REV: CPT | Performed by: FAMILY MEDICINE

## 2017-11-07 PROCEDURE — 4004F PT TOBACCO SCREEN RCVD TLK: CPT | Performed by: FAMILY MEDICINE

## 2017-11-07 RX ORDER — ALPRAZOLAM 0.25 MG/1
0.25 TABLET ORAL NIGHTLY PRN
Qty: 10 TABLET | Refills: 0 | Status: SHIPPED | OUTPATIENT
Start: 2017-11-07 | End: 2017-12-06 | Stop reason: SDUPTHER

## 2017-11-07 RX ORDER — SPIRONOLACTONE 50 MG/1
50 TABLET, FILM COATED ORAL DAILY
Qty: 30 TABLET | Refills: 3 | Status: SHIPPED | OUTPATIENT
Start: 2017-11-07 | End: 2017-12-06 | Stop reason: SDUPTHER

## 2017-11-07 ASSESSMENT — ENCOUNTER SYMPTOMS
ABDOMINAL PAIN: 0
COUGH: 0
BACK PAIN: 0
RHINORRHEA: 0
WHEEZING: 0
SINUS PRESSURE: 0
PHOTOPHOBIA: 0
CONSTIPATION: 0
SHORTNESS OF BREATH: 0

## 2017-11-07 NOTE — PROGRESS NOTES
Visit Information    Have you changed or started any medications since your last visit including any over-the-counter medicines, vitamins, or herbal medicines? no   Have you stopped taking any of your medications? Is so, why? -  no  Are you having any side effects from any of your medications? - no    Have you seen any other physician or provider since your last visit? Yes neuro  Have you had any other diagnostic tests since your last visit? yes   Have you been seen in the emergency room and/or had an admission in a hospital since we last saw you?  no   Have you had your routine dental cleaning in the past 6 months?  no     Do you have an active MyChart account? If no, what is the barrier?   Yes    Patient Care Team:  Sabina Driscoll MD as PCP - General (Family Medicine)    Medical History Review  Past Medical, Family, and Social History reviewed and does contribute to the patient presenting condition    Health Maintenance   Topic Date Due    DTaP/Tdap/Td vaccine (1 - Tdap) 09/06/1983    Flu vaccine (1) 09/01/2017    Breast cancer screen  03/30/2018    Colon Cancer Screen FIT/FOBT  05/24/2018    Cervical cancer screen  11/03/2018    Lipid screen  08/24/2022    Pneumococcal med risk  Completed    Hepatitis C screen  Completed    HIV screen  Completed

## 2017-11-07 NOTE — PROGRESS NOTES
Chief Complaint   Patient presents with    Results         Jerry Chandler  here today for follow up on chronic medical problems, go over labs and/or diagnostic studies, and medication refills. Results      HPI: Patient is here for follow-up on hypertension which is uncontrolled, patient is on multiple medications. Patient reported she is taking metoprolol and not coreg  as metoprolol is helping her more with palpitations. She is on multiple medications. Her blood pressure is still uncontrolled improved than before. She denies any chest pain, shortness of breath. She was started on Xanax also to help  with her anxiety and hypertension.      -She was referred to neurologist for memory problems, they're planning to do MRI and EEG. -She had cervical x-rays done, which showed degenerative disc disease with mild foraminal stenosis. Discussed with patient need referral to physical therapist              BP (!) 155/97   Pulse 59   Ht 5' 2\" (1.575 m)   Wt 151 lb 9.6 oz (68.8 kg)   LMP  (LMP Unknown)   SpO2 98% Comment: ra @ rest  Breastfeeding? No   BMI 27.73 kg/m²   Body mass index is 27.73 kg/m². Wt Readings from Last 3 Encounters:   11/07/17 151 lb 9.6 oz (68.8 kg)   10/06/17 150 lb 6.4 oz (68.2 kg)   08/24/17 143 lb 3.2 oz (65 kg)        []Negative depression screening. No flowsheet data found. []1-4 = Minimal depression   []5-9 = Mild depression   []10-14 = Moderate depression   []15-19 = Moderately severe depression   []20-27 = Severe depression    Discussed testing with the patient and all questions fully answered.     Hospital Outpatient Visit on 08/24/2017   Component Date Value Ref Range Status    Vitamin B-12 08/24/2017 366  211 - 946 pg/mL Final    Folate 08/24/2017 5.5  >4.8 ng/mL Final    Hemoglobin A1C 08/25/2017 5.2  4.0 - 6.0 % Final    Estimated Avg Glucose 08/25/2017 103  mg/dL Final    Comment: The ADA and AACC recommend providing the estimated average glucose result to permit better patient understanding of their HBA1c result. Performed at [facility] [id number] [place] [address], [address] [place] [address] [phone]      Cholesterol 08/24/2017 267* <200 mg/dL Final    Comment:    Cholesterol Guidelines:      <200  Desirable   200-240  Borderline      >240  Undesirable         HDL 08/24/2017 43  >40 mg/dL Final    Comment:    HDL Guidelines:    <40     Undesirable   40-59    Borderline    >59     Desirable         LDL Cholesterol 08/24/2017 198* 0 - 130 mg/dL Final    Comment:    LDL Guidelines:     <100    Desirable   100-129   Near to/above Desirable   130-159   Borderline      >159   Undesirable     Direct (measured) LDL and calculated LDL are not interchangeable tests.  Chol/HDL Ratio 08/24/2017 6.2* <5 Final    Triglycerides 08/24/2017 129  <150 mg/dL Final    Comment:    Triglyceride Guidelines:     <150   Desirable   150-199  Borderline   200-499  High     >499   Very high   Based on AHA Guidelines for fasting triglyceride, October 2012. Performed at [facility]: JUL[address]   [phone]      VLDL 08/24/2017 NOT REPORTED  1 - 30 mg/dL Final    HIV Ag/Ab 08/24/2017 NONREACTIVE  NR Final    Comment:       No laboratory evidence of HIV infection. If acute HIV infection is suspected,   consider testing for HIV-1 RNA. This is an FDA approved immunoassay that detects HIV-1 and HIV-2 antibodies and   HIV-1 p24 antigen to screen for infection with HIV-1 or HIV-2. Performed at [address] [phone]      Hepatitis C Ab 08/24/2017 NONREACTIVE  NR Final    Comment:       The hepatitis C procedure used in our laboratory is a Chemiluminescent test   specific for three recombinant HCV antigens. A negative anti-HCV result   indicates that the antibodies to hepatitis C virus are not present at this   time.   Individuals with reactive anti-HCV should be considered infected and infectious   until proven otherwise. Confirmation of all equivocal or reactive results is   recommended by ordering HCV RNA by PCR.   Performed at [facility] [address] [facility], [address] [place] [address] [phone]           Most recent labs reviewed:     Lab Results   Component Value Date    WBC 7.2 06/10/2016    HGB 14.8 06/10/2016    HCT 44.1 06/10/2016    MCV 91.6 06/10/2016     06/10/2016       Lab Results   Component Value Date     06/10/2016    K 3.6 06/10/2016     06/10/2016    CO2 22 06/10/2016    BUN 10 06/10/2016    CREATININE 0.81 06/10/2016    GLUCOSE 86 06/10/2016    CALCIUM 9.7 06/10/2016        Lab Results   Component Value Date    ALT 15 03/07/2016    AST 17 03/07/2016    ALKPHOS 46 03/07/2016    BILITOT 0.28 (L) 03/07/2016       Lab Results   Component Value Date    TSH 1.45 07/26/2017       Lab Results   Component Value Date    CHOL 267 (H) 08/24/2017    CHOL 221 (H) 03/07/2016    CHOL 223 (H) 09/28/2014     Lab Results   Component Value Date    TRIG 129 08/24/2017    TRIG 115 03/07/2016    TRIG 121 09/28/2014     Lab Results   Component Value Date    HDL 43 08/24/2017    HDL 38 (L) 03/07/2016    HDL 34 (L) 09/28/2014     Lab Results   Component Value Date    LDLCHOLESTEROL 198 (H) 08/24/2017    LDLCHOLESTEROL 160 (H) 03/07/2016    LDLCHOLESTEROL 165 (H) 09/28/2014     Lab Results   Component Value Date    VLDL NOT REPORTED 08/24/2017    VLDL NOT REPORTED 03/07/2016    VLDL NOT REPORTED 09/28/2014     Lab Results   Component Value Date    CHOLHDLRATIO 6.2 (H) 08/24/2017    CHOLHDLRATIO 5.8 (H) 03/07/2016    CHOLHDLRATIO 6.6 (H) 09/28/2014       Lab Results   Component Value Date    LABA1C 5.2 08/24/2017       Lab Results   Component Value Date    EJTAZUEZ07 366 08/24/2017       Lab Results   Component Value Date    FOLATE 5.5 08/24/2017       No results found for: IRON, TIBC, FERRITIN    Lab Results   Component Value Date    VITD25 <5.0 (L) 03/07/2016             Current Outpatient Prescriptions Pulse:    SpO2:      GENERAL:  Patient is a well-developed, well-nourished female  in no acute distress, alert and oriented x3, appropriate and pleasant conversation. HEAD: Normocephalic, atraumatic. EYES: Pupils equal, round and reactive to light and accommodation, extraocular   movements intact. ENT: Moist mucous membranes. No erythema is noted. NECK: Supple. No masses. No lymphadenopathy. CARDIOVASCULAR: Regular rate and rhythm. PULMONARY: Lungs are clear to auscultation bilaterally. ABDOMEN: Soft, nontender, nondistended. Positive bowel sounds. MUSCULOSKELETAL: Strength 5/5 bilaterally in all extremities. No tenderness to   palpation of the ribs, long bones, or spine. NEUROLOGIC: Cranial nerves II through XII grossly intact. No focal deficits are noted. ASSESSMENT AND PLAN      1. Essential hypertension  -Blood pressure is still uncontrolled, discontinued Coreg) continue Lopressor, started on spironolactone, discussed about the high potassium. We will check a BMP in 2 weeks. A renal scan to rule out renal artery stenosis. Follow-up in one month  - spironolactone (ALDACTONE) 50 MG tablet; Take 1 tablet by mouth daily  Dispense: 30 tablet; Refill: 3  - Basic Metabolic Panel; Future  - [place] DUPLEX ABD/PEL VASC STUDY,COMPLETE    2. DDD (degenerative disc disease), cervical  -X-ray showing degenerative disc disease, referral to physical therapy and NSAIDs for pain control  - [address]    3. Memory problem  -Continue to follow with neurologist    4. Anxiety  -Xanax as needed  - ALPRAZolam (XANAX) 0.25 MG tablet; Take 1 tablet by mouth nightly as needed for Sleep . Dispense: 10 tablet; Refill: 0    5. Cerebrovascular accident (CVA) due to embolism of cerebral artery ([facility] [address]  -Discussed with patient we'll need to control blood pressure, be compliant with medications.         Orders Placed This Encounter   Procedures    Basic Metabolic Panel     Standing Status:   Future Standing Expiration Date:   11/7/2018   [facility]     Referral Priority:   Routine     Referral Type:   Eval and Treat     Referral Reason:   Specialty Services Required     Requested Specialty:   Physical Therapy     Number of Visits Requested:   1    [place] DUPLEX ABD/PEL VASC STUDY,COMPLETE     To rule out renal artery stenosis due to uncontrolled HTN         Medications Discontinued During This Encounter   Medication Reason    carvedilol (COREG) 25 MG tablet Alternate therapy    ALPRAZolam (XANAX) 0.25 MG tablet Reorder       Fernando Donnelly received counseling on the following healthy behaviors: nutrition, exercise, medication adherence and tobacco cessation  Reviewed prior labs and health maintenance  Continue current medications, diet and exercise. Discussed use, benefit, and side effects of prescribed medications. Barriers to medication compliance addressed. Patient given educational materials - see patient instructions  Was a self-tracking handout given in paper form or via [employer]t? Yes    Requested Prescriptions     Signed Prescriptions Disp Refills    spironolactone (ALDACTONE) 50 MG tablet 30 tablet 3     Sig: Take 1 tablet by mouth daily    ALPRAZolam (XANAX) 0.25 MG tablet 10 tablet 0     Sig: Take 1 tablet by mouth nightly as needed for Sleep . All patient questions answered. Patient voiced understanding. Quality Measures    Body mass index is 27.73 kg/m². Elevated. Weight control planned discussed Healthy diet and regular exercise. BP: (!) 155/97 Blood pressure is high. Treatment plan consists of Weight Reduction, DASH Eating Plan, Dietary Sodium Restriction, Increased Physical Activity, Moderation in Alcohol Consumption and Antihypertensive Medication Increased. Lab Results   Component Value Date    LDLCHOLESTEROL 198 (H) 08/24/2017    (goal LDL reduction with dx if diabetes is 50% LDL reduction)      No flowsheet data found.   Interpretation of Total Score

## 2017-11-09 DIAGNOSIS — I10 ESSENTIAL HYPERTENSION: Primary | ICD-10-CM

## 2017-11-18 DIAGNOSIS — J01.10 ACUTE FRONTAL SINUSITIS, RECURRENCE NOT SPECIFIED: ICD-10-CM

## 2017-11-18 DIAGNOSIS — R21 RASH: ICD-10-CM

## 2017-11-20 ENCOUNTER — OFFICE VISIT (OUTPATIENT)
Dept: NEUROLOGY | Age: 52
End: 2017-11-20
Payer: COMMERCIAL

## 2017-11-20 VITALS
BODY MASS INDEX: 28.12 KG/M2 | DIASTOLIC BLOOD PRESSURE: 86 MMHG | WEIGHT: 152.8 LBS | SYSTOLIC BLOOD PRESSURE: 131 MMHG | HEART RATE: 79 BPM | HEIGHT: 62 IN

## 2017-11-20 DIAGNOSIS — R41.3 MEMORY IMPAIRMENT: Primary | ICD-10-CM

## 2017-11-20 DIAGNOSIS — Z86.73 HISTORY OF STROKE: ICD-10-CM

## 2017-11-20 PROCEDURE — G8417 CALC BMI ABV UP PARAM F/U: HCPCS | Performed by: NURSE PRACTITIONER

## 2017-11-20 PROCEDURE — G8598 ASA/ANTIPLAT THER USED: HCPCS | Performed by: NURSE PRACTITIONER

## 2017-11-20 PROCEDURE — 3014F SCREEN MAMMO DOC REV: CPT | Performed by: NURSE PRACTITIONER

## 2017-11-20 PROCEDURE — G8484 FLU IMMUNIZE NO ADMIN: HCPCS | Performed by: NURSE PRACTITIONER

## 2017-11-20 PROCEDURE — 3017F COLORECTAL CA SCREEN DOC REV: CPT | Performed by: NURSE PRACTITIONER

## 2017-11-20 PROCEDURE — 99214 OFFICE O/P EST MOD 30 MIN: CPT | Performed by: NURSE PRACTITIONER

## 2017-11-20 PROCEDURE — G8427 DOCREV CUR MEDS BY ELIG CLIN: HCPCS | Performed by: NURSE PRACTITIONER

## 2017-11-20 PROCEDURE — 4004F PT TOBACCO SCREEN RCVD TLK: CPT | Performed by: NURSE PRACTITIONER

## 2017-11-20 RX ORDER — IBUPROFEN 800 MG/1
TABLET ORAL
Qty: 90 TABLET | Refills: 0 | Status: SHIPPED | OUTPATIENT
Start: 2017-11-20 | End: 2017-11-22 | Stop reason: SDUPTHER

## 2017-11-20 NOTE — TELEPHONE ENCOUNTER
Please Approve or Refuse.        Next Visit Date:  Visit date not found    Hemoglobin A1C (%)   Date Value   08/24/2017 5.2             ( goal A1C is < 7)   No results found for: LABMICR  LDL Cholesterol (mg/dL)   Date Value   08/24/2017 198 (H)       (goal LDL is <100)   AST (U/L)   Date Value   03/07/2016 17     ALT (U/L)   Date Value   03/07/2016 15     BUN (mg/dL)   Date Value   06/10/2016 10     BP Readings from Last 3 Encounters:   11/07/17 (!) 155/97   10/06/17 (!) 161/99   09/06/17 (!) 172/111          (goal 120/80)        Patient Active Problem List:     Stroke ([facility] [address]     HTN (hypertension)     Numbness     CAD (coronary artery disease)     Asthma     MI, old     Depression     Smoking     Dementia     Anxiety     Dyslipidemia     Acute frontal sinusitis     Memory impairment     Essential hypertension     Neck pain     Memory problem     History of stroke     DDD (degenerative disc disease), cervical

## 2017-11-20 NOTE — PROGRESS NOTES
[facility]  [facility], [address] [place] [address], [address] [facility]  Dept: [phone]  Dept Fax: [phone]                            MD Ino Martinez MD Pasco Desanctis, MD Ahmed B. Loria Rook, MD Hershall Barbara, MD Christin Legions, CNP      11/20/2017    HPI:      Your patient, John Cesar returns for continuing neurologic care. Patient is a 25-year-old woman seen on October 6, 2017 for her initial rotation with Dr. Cindy Mays. She had complained of progressive memory impairments, which had gotten worse over the past year. She had a stroke in 2014 with left-sided numbness. She currently is on aspirin 81 mg and atorvastatin 80 mg for secondary stroke prevention. Patient has no difficulty with long-term memory, but significant trouble with finding words and remembering her family members names, and trouble following instructions. Patient currently lives with her daughter, but is independent in activities such as housework, shopping, money management, and laundry. Since her last visit she had an MRI of the brain showing a rounded area of increased T2 signal and decreased T1/FLAIR signal in the anterior inferior medial thalamus. It was nonspecific and may represent a prominent vascular space or glial cyst.  There was not significant atrophy or impressive small vessel ischemic disease. Patient was supposed to have an EEG, however, this has not yet been performed. Patient also had laboratory testing including a sed rate 5, vitamin B12 366, folate 5.5, and a negative, TSH 1.45, and hemoglobin A1c 5.2.                 Past Medical History:   Diagnosis Date    Anemia     Asthma     CAD (coronary artery disease)     Depression 10/20/2014    High cholesterol     Hypertension     MI, old     Osteoarthritis     Pneumonia     Sleep apnea     Stroke [facility]) 9/27/2014         Past Surgical History:   Procedure Laterality Date    APPENDECTOMY      CORONARY ANGIOPLASTY WITH STENT PLACEMENT  2004    HYSTERECTOMY      TONSILLECTOMY      TUBAL LIGATION         Family History   Problem Relation Age of Onset    High Blood Pressure Mother        Social History   Substance Use Topics    Smoking status: Current Every Day Smoker     Packs/day: 0.50     Years: 25.00     Types: Cigarettes    Smokeless tobacco: Never Used    Alcohol use 0.0 oz/week      Comment: occasionally                               REVIEW OF SYSTEMS    CONSTITUTIONAL Weight: present, Appetite: absent, Fatigue: present      HEENT Ears: normal, Visual disturbance: absent   RESPIRATORY Shortness of breath: present, Cough: absent   CARDIOVASCULAR Chest pain: present, Leg swelling :present      GI Constipation: absent, Diarrhea: absent, Swallowing change: absent       Urinary frequency: absent, Urinary urgency: present, Urinary incontinence: absent   MUSCULOSKELETAL Neck pain: present, Back pain: present, Stiffness: present, Muscle pain: present, Joint pain: present Restless legs: absent   DERMATOLOGIC Hair loss: present, Skin changes: absent   NEUROLOGIC Memory loss: present, Confusion: present, Seizures: absent Trouble walking or imbalance: absent, Dizziness: absent, Weakness: absent, Numbness: present Tremor: absent, Spasm: absent, Speech difficulty: absent, Headache: present, Light sensitivity: absent   PSYCHIATRIC Anxiety: present, Hallucination: absent, Mood disorder: present   HEMATOLOGIC Abnormal bleeding: absent, Anemia: absent, Clotting disorder: absent, Lymph gland changes: absent           Allergies   Allergen Reactions    Lipitor [Atorvastatin] Other (See Comments)     Muscle cramping in legs    Prozac [Fluoxetine] Other (See Comments)     Mood swings    Zoloft [Sertraline Hcl] Other (See Comments)     Anger and mood swings            Current Outpatient Prescriptions   Medication Sig Dispense Refill    ibuprofen (ADVIL;MOTRIN) 800 MG tablet take 1 tablet by mouth every 8 hours if needed for pain 90 tablet 0    RA ALLERGY RELIEF 10 MG tablet take 1 tablet by mouth once daily 30 tablet 2    spironolactone (ALDACTONE) 50 MG tablet Take 1 tablet by mouth daily 30 tablet 3    ALPRAZolam (XANAX) 0.25 MG tablet Take 1 tablet by mouth nightly as needed for Sleep . 10 tablet 0    metoprolol tartrate (LOPRESSOR) 50 MG tablet take 1 tablet by mouth twice a day 60 tablet 3    amLODIPine (NORVASC) 10 MG tablet take 1 tablet by mouth once daily 30 tablet 3    RA ASPIRIN EC 81 MG EC tablet take 1 tablet by mouth once daily 30 tablet 3    atorvastatin (LIPITOR) 80 MG tablet take 1 tablet by mouth once daily 30 tablet 3    lisinopril (PRINIVIL;ZESTRIL) 40 MG tablet Take 1 tablet by mouth daily 30 tablet 3    ezetimibe (ZETIA) 10 MG tablet Take 1 tablet by mouth daily 30 tablet 3    busPIRone (BUSPAR) 15 MG tablet Take 1 tablet by mouth 3 times daily 90 tablet 5    clobetasol (TEMOVATE) 0.05 % ointment apply to affected area twice a day 15 g 2    lidocaine (LMX) 4 % cream Apply 2g topically as needed.  1 Tube 3    nicotine (NICODERM CQ) 14 MG/24HR Place 1 patch onto the skin every 24 hours 30 patch 3    Blood Pressure Monitor MISC Use daily to take BP 1 each 0    hydrochlorothiazide (HYDRODIURIL) 25 MG tablet Take 1 tablet by mouth daily 30 tablet 3    clopidogrel (PLAVIX) 75 MG tablet TAKE 1 TABLET BY MOUTH DAILY 90 tablet 3    omeprazole (PRILOSEC) 20 MG delayed release capsule Take 1 capsule by mouth Daily 30 capsule 11    albuterol sulfate HFA (PROVENTIL HFA) 108 (90 BASE) MCG/ACT inhaler Inhale 2 puffs into the lungs every 6 hours as needed for Wheezing 3 Inhaler 3    buPROPion (WELLBUTRIN XL) 300 MG extended release tablet Take 1 tablet by mouth every morning 30 tablet 11    mometasone-formoterol (DULERA) 100-5 MCG/ACT inhaler Inhale 2 puffs into the lungs on atorvastatin and aspirin 81 mg for secondary stroke prevention            Signed: Josselyn Stephens, CNP

## 2017-11-20 NOTE — LETTER
November 20, 2017     Laureano Mcallister MD  [address] [facility] Her[address] GAFFNEY [facility] [address]  [address] [facility] [address]    Patient: Farrah Hong  MR Number: [id number]  YOB: 1965  Date of Visit: 11/20/2017    Dear Dr. Laureano Mcallister:        [facility]  [facility], [address] [place] [address], [address] [place] [address]  Dept: [phone]  Dept Fax: [phone]                            MD Delia Graham MD Ellie Sands, MD Ahmed B. Charlanne Sou, MD Renford Mccallum, MD Ernesto Pares, CNP      11/20/2017    HPI:      Your patient, Farrah Hong returns for continuing neurologic care. Patient is a 41-year-old woman seen on October 6, 2017 for her initial rotation with Dr. Kyree Snyder. She had complained of progressive memory impairments, which had gotten worse over the past year. She had a stroke in 2014 with left-sided numbness. She currently is on aspirin 81 mg and atorvastatin 80 mg for secondary stroke prevention. Patient has no difficulty with long-term memory, but significant trouble with finding words and remembering her family members names, and trouble following instructions. Patient currently lives with her daughter, but is independent in activities such as housework, shopping, money management, and laundry. Since her last visit she had an MRI of the brain showing a rounded area of increased T2 signal and decreased T1/FLAIR signal in the anterior inferior medial thalamus. It was nonspecific and may represent a prominent vascular space or glial cyst.  There was not significant atrophy or impressive small vessel ischemic disease. Patient was supposed to have an EEG, however, this has not yet been performed. Patient also had laboratory testing including a sed rate 5, vitamin B12 366, folate 5.5, and a negative, TSH 1.45, and hemoglobin A1c 5.2. Past Medical History:   Diagnosis Date    Anemia     Asthma     CAD (coronary artery disease)     Depression 10/20/2014    High cholesterol     Hypertension     MI, old     Osteoarthritis     Pneumonia     Sleep apnea     Stroke ([facility] [address] 9/27/2014         Past Surgical History:   Procedure Laterality Date    APPENDECTOMY      CORONARY ANGIOPLASTY WITH STENT PLACEMENT  2004    HYSTERECTOMY      TONSILLECTOMY      TUBAL LIGATION         Family History   Problem Relation Age of Onset    High Blood Pressure Mother        Social History   Substance Use Topics    Smoking status: Current Every Day Smoker     Packs/day: 0.50     Years: 25.00     Types: Cigarettes    Smokeless tobacco: Never Used    Alcohol use 0.0 oz/week      Comment: occasionally                               REVIEW OF SYSTEMS    CONSTITUTIONAL Weight: present, Appetite: absent, Fatigue: present      HEENT Ears: normal, Visual disturbance: absent   RESPIRATORY Shortness of breath: present, Cough: absent   CARDIOVASCULAR Chest pain: present, Leg swelling :present      GI Constipation: absent, Diarrhea: absent, Swallowing change: absent       Urinary frequency: absent, Urinary urgency: present, Urinary incontinence: absent   MUSCULOSKELETAL Neck pain: present, Back pain: present, Stiffness: present, Muscle pain: present, Joint pain: present Restless legs: absent   DERMATOLOGIC Hair loss: present, Skin changes: absent   NEUROLOGIC Memory loss: present, Confusion: present, Seizures: absent Trouble walking or imbalance: absent, Dizziness: absent, Weakness: absent, Numbness: present Tremor: absent, Spasm: absent, Speech difficulty: absent, Headache: present, Light sensitivity: absent   PSYCHIATRIC Anxiety: present, Hallucination: absent, Mood disorder: present   HEMATOLOGIC Abnormal bleeding: absent, Anemia: absent, Clotting disorder: absent, Lymph gland changes: absent           Allergies   Allergen Reactions  Lipitor [Atorvastatin] Other (See Comments)     Muscle cramping in legs    Prozac [Fluoxetine] Other (See Comments)     Mood swings    Zoloft [Sertraline Hcl] Other (See Comments)     Anger and mood swings            Current Outpatient Prescriptions   Medication Sig Dispense Refill    ibuprofen (ADVIL;MOTRIN) 800 MG tablet take 1 tablet by mouth every 8 hours if needed for pain 90 tablet 0    RA ALLERGY RELIEF 10 MG tablet take 1 tablet by mouth once daily 30 tablet 2    spironolactone (ALDACTONE) 50 MG tablet Take 1 tablet by mouth daily 30 tablet 3    ALPRAZolam (XANAX) 0.25 MG tablet Take 1 tablet by mouth nightly as needed for Sleep . 10 tablet 0    metoprolol tartrate (LOPRESSOR) 50 MG tablet take 1 tablet by mouth twice a day 60 tablet 3    amLODIPine (NORVASC) 10 MG tablet take 1 tablet by mouth once daily 30 tablet 3    RA ASPIRIN EC 81 MG EC tablet take 1 tablet by mouth once daily 30 tablet 3    atorvastatin (LIPITOR) 80 MG tablet take 1 tablet by mouth once daily 30 tablet 3    lisinopril (PRINIVIL;ZESTRIL) 40 MG tablet Take 1 tablet by mouth daily 30 tablet 3    ezetimibe (ZETIA) 10 MG tablet Take 1 tablet by mouth daily 30 tablet 3    busPIRone (BUSPAR) 15 MG tablet Take 1 tablet by mouth 3 times daily 90 tablet 5    clobetasol (TEMOVATE) 0.05 % ointment apply to affected area twice a day 15 g 2    lidocaine (LMX) 4 % cream Apply 2g topically as needed.  1 Tube 3    nicotine (NICODERM CQ) 14 MG/24HR Place 1 patch onto the skin every 24 hours 30 patch 3    Blood Pressure Monitor MISC Use daily to take BP 1 each 0    hydrochlorothiazide (HYDRODIURIL) 25 MG tablet Take 1 tablet by mouth daily 30 tablet 3    clopidogrel (PLAVIX) 75 MG tablet TAKE 1 TABLET BY MOUTH DAILY 90 tablet 3    omeprazole (PRILOSEC) 20 MG delayed release capsule Take 1 capsule by mouth Daily 30 capsule 11    albuterol sulfate HFA (PROVENTIL HFA) 108 (90 BASE) MCG/ACT inhaler Inhale 2 puffs into the lungs every 6 hours as needed for Wheezing 3 Inhaler 3    buPROPion (WELLBUTRIN XL) 300 MG extended release tablet Take 1 tablet by mouth every morning 30 tablet 11    mometasone-formoterol (DULERA) 100-5 MCG/ACT inhaler Inhale 2 puffs into the lungs 2 times daily 1 Inhaler 11     No current facility-administered medications for this visit. PHYSICAL EXAMINATION       There were no vitals filed for this visit. .                                                                                                    General Appearance:  Alert, cooperative, no signs of distress, appears stated age   Head:  Normocephalic, no signs of trauma   Eyes:  Conjunctiva/corneas clear;  eyelids intact   Ears:  Normal external ear and canals   Nose: Nares normal, mucosa normal, no drainage    Throat: Lips and tongue normal; teeth normal;  gums normal   Neck: Supple, intact flexion, extension and rotation;   trachea midline;  no adenopathy;   thyroid: not enlarged;   no carotid pulse abnormality   Back:   Symmetric, no curvature, ROM adequate   Lungs:   Respirations unlabored   Heart:  Regular rate and rhythm           Extremities: Extremities normal, no cyanosis, no edema   Pulses: Symmetric over head and neck   Skin: Skin color, texture normal, no rashes, no lesions                                             NEUROLOGIC EXAMINATION    Neurologic Exam     Mental Status   Oriented to person, place, and time. Oriented to person. Oriented to place. Oriented to city. Disoriented to date. Oriented to year, month, day and season. Attention: normal.   Speech: speech is normal   Level of consciousness: alert  Normal comprehension. Cranial Nerves     CN II   Visual fields full to confrontation. CN III, IV, VI   Pupils are equal, round, and reactive to light.   Extraocular motions are normal.     CN V Facial sensation intact. CN VII   Facial expression full, symmetric. CN VIII   CN VIII normal.     CN IX, X   CN IX normal.     CN XI   CN XI normal.     CN XII   CN XII normal.     Motor Exam   Muscle bulk: normal  Overall muscle tone: normal  Right arm pronator drift: absent  Left arm pronator drift: absent    Strength   Strength 5/5 throughout. Sensory Exam   Light touch normal.   Vibration normal.   Pinprick normal.     Gait, Coordination, and Reflexes     Gait  Gait: normal    Coordination   Finger to nose coordination: normal    Tremor   Resting tremor: absent    Reflexes   Right brachioradialis: 2+  Left brachioradialis: 2+  Right biceps: 2+  Left biceps: 2+  Right triceps: 2+  Left triceps: 2+  Right patellar: 2+  Left patellar: 2+  Right achilles: 2+  Left achilles: 2+  Right plantar: normal  Left plantar: normal         11/20/2017  Maximum score 5 Score 4 What is the year, season, date, day, month   Maximum score 5 Score 5 Where are we: State, county, town, hospital, floor? Maximum score 3 Score 3 Name 3 objects: apple, table,zakiya. Ask patient to repeat 3 objects. Maximum score 5 Score 0 Serial sevens from 100:93, 86, 79, 72, 65  (or) spell WORLD backwards   Maximum score 3 Score 2 Recall 3 objects   Maximum score 2 Score 2 Name a pencil and watch. Maximum score 1 Score 1 Repeat the following: No ifs ands or buts.      Maximum score 3 Score 3 Follow 3 stage command take a paper in your hand, fold it in half, and put it on the table. Maximum score 1  Score 1 Read and obey the following: \"Close your eyes\"     Maximum score 1 Score 1 Write a sentence. Maximum score 1 Score 1 Copy a design below. Max 30   Patients score 23        ASSESSMENT/PLAN:       In summary, your patient, Lasha Charlton exhibits the following, with associated plan:    1. Memory impairment, which has been progressive  1. EEG  2.  We will proceed with formal neuropsychologic testing prior to initiating medications for her memory. 3. Patient and her daughter will seek activities to stimulate her memory, such as crossword puzzles  4. She will return for reevaluation once neuropsychologic testing is completed, which should be 2-3 months. 2. History of stroke  1. She will be maintained on atorvastatin and aspirin 81 mg for secondary stroke prevention            Signed: Shahida Verdugo CNP        If you have questions, please do not hesitate to call me. I look forward to following Cindi along with you.     Sincerely,        Eduin Cruz CNP

## 2017-11-21 ENCOUNTER — TELEPHONE (OUTPATIENT)
Dept: NEUROLOGY | Age: 52
End: 2017-11-21

## 2017-11-21 NOTE — TELEPHONE ENCOUNTER
There are no providers in the local area that accept [facility] for neuropsych testing. Per [facility] OK but the patient still needs to have the EEG done. LMOM for patient.   KS

## 2017-12-06 ENCOUNTER — HOSPITAL ENCOUNTER (OUTPATIENT)
Age: 52
Discharge: HOME | End: 2017-12-06
Payer: COMMERCIAL

## 2017-12-06 ENCOUNTER — OFFICE VISIT (OUTPATIENT)
Dept: FAMILY MEDICINE CLINIC | Age: 52
End: 2017-12-06
Payer: COMMERCIAL

## 2017-12-06 VITALS
DIASTOLIC BLOOD PRESSURE: 90 MMHG | BODY MASS INDEX: 28.16 KG/M2 | RESPIRATION RATE: 20 BRPM | HEIGHT: 62 IN | SYSTOLIC BLOOD PRESSURE: 130 MMHG | HEART RATE: 77 BPM | WEIGHT: 153 LBS | OXYGEN SATURATION: 99 % | TEMPERATURE: 97.5 F

## 2017-12-06 DIAGNOSIS — I25.119 CORONARY ARTERY DISEASE INVOLVING NATIVE CORONARY ARTERY OF NATIVE HEART WITH ANGINA PECTORIS (HCC): ICD-10-CM

## 2017-12-06 DIAGNOSIS — F17.200 SMOKING: ICD-10-CM

## 2017-12-06 DIAGNOSIS — I10 ESSENTIAL HYPERTENSION: Primary | ICD-10-CM

## 2017-12-06 DIAGNOSIS — K64.2 GRADE III HEMORRHOIDS: ICD-10-CM

## 2017-12-06 DIAGNOSIS — I10 ESSENTIAL HYPERTENSION: ICD-10-CM

## 2017-12-06 DIAGNOSIS — F41.9 ANXIETY: ICD-10-CM

## 2017-12-06 DIAGNOSIS — M50.30 DDD (DEGENERATIVE DISC DISEASE), CERVICAL: ICD-10-CM

## 2017-12-06 LAB
ANION GAP SERPL CALCULATED.3IONS-SCNC: 13 MMOL/L (ref 9–17)
BUN BLDV-MCNC: 12 MG/DL (ref 6–20)
BUN/CREAT BLD: NORMAL (ref 9–20)
CALCIUM SERPL-MCNC: 9 MG/DL (ref 8.6–10.4)
CHLORIDE BLD-SCNC: 105 MMOL/L (ref 98–107)
CO2: 24 MMOL/L (ref 20–31)
CREAT SERPL-MCNC: 0.86 MG/DL (ref 0.5–0.9)
GFR AFRICAN AMERICAN: >60 ML/MIN
GFR NON-AFRICAN AMERICAN: >60 ML/MIN
GFR SERPL CREATININE-BSD FRML MDRD: NORMAL ML/MIN/{1.73_M2}
GFR SERPL CREATININE-BSD FRML MDRD: NORMAL ML/MIN/{1.73_M2}
GLUCOSE BLD-MCNC: 88 MG/DL (ref 70–99)
POTASSIUM SERPL-SCNC: 3.8 MMOL/L (ref 3.7–5.3)
SODIUM BLD-SCNC: 142 MMOL/L (ref 135–144)

## 2017-12-06 PROCEDURE — G8427 DOCREV CUR MEDS BY ELIG CLIN: HCPCS | Performed by: FAMILY MEDICINE

## 2017-12-06 PROCEDURE — G8417 CALC BMI ABV UP PARAM F/U: HCPCS | Performed by: FAMILY MEDICINE

## 2017-12-06 PROCEDURE — G8598 ASA/ANTIPLAT THER USED: HCPCS | Performed by: FAMILY MEDICINE

## 2017-12-06 PROCEDURE — 36415 COLL VENOUS BLD VENIPUNCTURE: CPT

## 2017-12-06 PROCEDURE — 3014F SCREEN MAMMO DOC REV: CPT | Performed by: FAMILY MEDICINE

## 2017-12-06 PROCEDURE — 4004F PT TOBACCO SCREEN RCVD TLK: CPT | Performed by: FAMILY MEDICINE

## 2017-12-06 PROCEDURE — G8484 FLU IMMUNIZE NO ADMIN: HCPCS | Performed by: FAMILY MEDICINE

## 2017-12-06 PROCEDURE — 99214 OFFICE O/P EST MOD 30 MIN: CPT | Performed by: FAMILY MEDICINE

## 2017-12-06 PROCEDURE — 80048 BASIC METABOLIC PNL TOTAL CA: CPT

## 2017-12-06 PROCEDURE — 3017F COLORECTAL CA SCREEN DOC REV: CPT | Performed by: FAMILY MEDICINE

## 2017-12-06 RX ORDER — METOPROLOL TARTRATE 75 MG/1
TABLET, FILM COATED ORAL
Qty: 60 TABLET | Refills: 3 | Status: SHIPPED | OUTPATIENT
Start: 2017-12-06 | End: 2018-06-05 | Stop reason: SDUPTHER

## 2017-12-06 RX ORDER — CLOPIDOGREL BISULFATE 75 MG/1
TABLET ORAL
Qty: 90 TABLET | Refills: 3 | Status: SHIPPED | OUTPATIENT
Start: 2017-12-06 | End: 2018-06-05 | Stop reason: SDUPTHER

## 2017-12-06 RX ORDER — SPIRONOLACTONE 50 MG/1
50 TABLET, FILM COATED ORAL DAILY
Qty: 60 TABLET | Refills: 3 | Status: SHIPPED | OUTPATIENT
Start: 2017-12-06 | End: 2019-05-08 | Stop reason: SDUPTHER

## 2017-12-06 RX ORDER — GABAPENTIN 100 MG/1
100 CAPSULE ORAL 2 TIMES DAILY
Qty: 60 CAPSULE | Refills: 3 | Status: SHIPPED | OUTPATIENT
Start: 2017-12-06 | End: 2018-12-17 | Stop reason: SINTOL

## 2017-12-06 RX ORDER — ALPRAZOLAM 0.25 MG/1
0.25 TABLET ORAL NIGHTLY PRN
Qty: 10 TABLET | Refills: 0 | Status: SHIPPED | OUTPATIENT
Start: 2017-12-06 | End: 2018-01-05

## 2017-12-06 RX ORDER — SPIRONOLACTONE 50 MG/1
TABLET, FILM COATED ORAL
Qty: 60 TABLET | Refills: 3 | Status: SHIPPED | OUTPATIENT
Start: 2017-12-06 | End: 2017-12-06

## 2017-12-06 RX ORDER — DIAPER,BRIEF,INFANT-TODD,DISP
EACH MISCELLANEOUS
Qty: 1 TUBE | Refills: 1 | Status: SHIPPED | OUTPATIENT
Start: 2017-12-06 | End: 2018-01-12 | Stop reason: SDUPTHER

## 2017-12-06 ASSESSMENT — ENCOUNTER SYMPTOMS
PHOTOPHOBIA: 0
COUGH: 0
ANAL BLEEDING: 1
SHORTNESS OF BREATH: 0
SINUS PAIN: 0
BLOOD IN STOOL: 0
STRIDOR: 0
WHEEZING: 0
DIARRHEA: 0
TROUBLE SWALLOWING: 0
NAUSEA: 0
EYE REDNESS: 0
CONSTIPATION: 0
ABDOMINAL PAIN: 0

## 2017-12-06 NOTE — LETTER
MEDICATION AGREEMENT     Jerry Amezcua  [birth year]      For certain conditions, multiple classes of medications may be used to help better manage your symptoms, and to improve your ability to function at home, work and in social settings. However, these medications do have risks, which will be discussed with you, including addiction and dependency. The following prescribed medications need frequent monitoring and will require you to partner and assist in your healthcare. Medication  Dose, instructions and quantity as indicated on current prescription bottle Diagnosis/Reason(s) for Taking Category     Xanax 0.25 10 tab /month  anxeity  Benzo                           Benefits and goals of Controlled Substance Medications: There are two potential goals for your treatment: (1) decreased pain and suffering (2) improved daily life functions. There are many possible treatments for your chronic condition(s), and, in addition to controlled substance medications, we will try alternatives such as physical therapy, yoga, massage, home daily exercise, meditation, relaxation techniques, injections, chiropractic manipulations, surgery, cognitive therapy, hypnosis and many medications that are not habit-forming. Use of controlled substance medications may be helpful, but they are unlikely to resolve all of your symptoms or restore all function. Risks of Controlled Substance Medications:    Opioid pain medications: These medications can lead to problems such as addiction/dependence, sedation, lightheadedness/dizziness, memory issues, falls, constipation, nausea, or vomiting. They may also impair the ability to drive or operate machinery. Additionally, these medications may lower testosterone levels, leading to loss of bone strength, stamina and sex drive.   They may cause problems with breathing, sleep apnea and reduced coughing, which are especially dangerous for patients with lung disease. Overdose or dangerous interactions with alcohol and other medications may occur, leading to death. Hyperalgesia may develop, in which patients receiving opioids for the treatment of pain may actually become more sensitive to certain painful stimuli, and in some cases, experience pain from ordinarily non-painful stimuli. Women between the ages of 14-53 who could become pregnant should carefully weigh the risks and benefits of opioids with their physicians, as these medications increase the risk of pregnancy complications, including miscarriage,  delivery and stillbirth. It is also possible for babies to be born addicted to opioids. Opioid dependence withdrawal symptoms may include; feelings of uneasiness, increased pain, irritability, belly pain, diarrhea, sweats and goose-flesh. Benzodiazepines and non-benzodiazepine sleep medications: These medications can lead to problems such as addiction/dependence, sedation, fatigue, lightheadedness, dizziness, incoordination, falls, depression, hallucinations, and impaired judgment, memory and concentration. The ability to drive and operate machinery may also be affected. Abnormal sleep-related behaviors have been reported, including sleep walking, driving, making telephone calls, eating, or having sex while not fully awake. These medications can suppress breathing and worsen sleep apnea, particularly when combined with alcohol or other sedating medications, potentially leading to death. Dependence withdrawal symptoms may include tremors, anxiety, hallucinations and seizures. Stimulants:  Common adverse effects include addiction/dependence, increased blood pressure and heart rate, decreased appetite, nausea, involuntary weight loss, insomnia, irritability, and headaches.   These risks may increase when these medications are combined with other stimulants, such as caffeine pills or energy drinks, certain weight loss which may also result in my being prevented from receiving further care from this office. · Other:____________________________________________________________________    AGREEMENT:    I have read the above and have had all of my questions answered. For chronic disease management, I know that my symptoms can be managed with many types of treatments. A chronic medication trial may be part of my treatment, but I must be an active participant in my care. Medication therapy is only one part of my symptom management plan. In some cases, there may be limited scientific evidence to support the chronic use of certain medications to improve symptoms and daily function. Furthermore, in certain circumstances, there may be scientific information that suggests that use of chronic controlled substances may actually worsen my symptoms and increase my risk of unintentional death directly related to this medication therapy. I know that if my provider feels my risk from controlled medications is greater than my benefit, I will have my controlled substance medication(s) compassionately lowered or removed altogether. I agree to a controlled substance medication trial.      I further agree to allow this office to contact family or friends if there are concerns about my safety and use of the controlled medications. I have agreed to use the following medications above as instructed by my physician and as stated in this [facility] [id number].      Patient Signature:  ______________________  Date:12/6/2017 or _____________    Provider Signature:______________________  Date:12/6/2017 or _____________

## 2017-12-06 NOTE — PROGRESS NOTES
Severe depression    Discussed testing with the patient and all questions fully answered. Hospital Outpatient Visit on 08/24/2017   Component Date Value Ref Range Status    Vitamin B-12 08/24/2017 366  211 - 946 pg/mL Final    Folate 08/24/2017 5.5  >4.8 ng/mL Final    Hemoglobin A1C 08/25/2017 5.2  4.0 - 6.0 % Final    Estimated Avg Glucose 08/25/2017 103  mg/dL Final    Comment: The ADA and AACC recommend providing the estimated average glucose result to   permit better patient understanding of their HBA1c result. Performed at [facility] [address] [address] [phone]      Cholesterol 08/24/2017 267* <200 mg/dL Final    Comment:    Cholesterol Guidelines:      <200  Desirable   200-240  Borderline      >240  Undesirable         HDL 08/24/2017 43  >40 mg/dL Final    Comment:    HDL Guidelines:    <40     Undesirable   40-59    Borderline    >59     Desirable         LDL Cholesterol 08/24/2017 198* 0 - 130 mg/dL Final    Comment:    LDL Guidelines:     <100    Desirable   100-129   Near to/above Desirable   130-159   Borderline      >159   Undesirable     Direct (measured) LDL and calculated LDL are not interchangeable tests.  Chol/HDL Ratio 08/24/2017 6.2* <5 Final    Triglycerides 08/24/2017 129  <150 mg/dL Final    Comment:    Triglyceride Guidelines:     <150   Desirable   150-199  Borderline   200-499  High     >499   Very high   Based on AHA Guidelines for fasting triglyceride, October 2012. Performed at [facility]: JULIANNE GRANT [address] [facility]   [phone]      VLDL 08/24/2017 NOT REPORTED  1 - 30 mg/dL Final    HIV Ag/Ab 08/24/2017 NONREACTIVE  NR Final    Comment:       No laboratory evidence of HIV infection. If acute HIV infection is suspected,   consider testing for HIV-1 RNA.   This is an FDA approved immunoassay that detects HIV-1 and HIV-2 antibodies and   HIV-1 p24 antigen to screen for infection with HIV-1 or HIV-2. Performed at [facility] [address] [facility], [address] [place] [address] [phone]      Hepatitis C Ab 08/24/2017 NONREACTIVE  NR Final    Comment:       The hepatitis C procedure used in our laboratory is a Chemiluminescent test   specific for three recombinant HCV antigens. A negative anti-HCV result   indicates that the antibodies to hepatitis C virus are not present at this   time. Individuals with reactive anti-HCV should be considered infected and infectious   until proven otherwise. Confirmation of all equivocal or reactive results is   recommended by ordering HCV RNA by PCR.   Performed at [facility] [address] [facility], [address] [place] [address] [phone]           Most recent labs reviewed:     Lab Results   Component Value Date    WBC 7.2 06/10/2016    HGB 14.8 06/10/2016    HCT 44.1 06/10/2016    MCV 91.6 06/10/2016     06/10/2016       Lab Results   Component Value Date     06/10/2016    K 3.6 06/10/2016     06/10/2016    CO2 22 06/10/2016    BUN 10 06/10/2016    CREATININE 0.81 06/10/2016    GLUCOSE 86 06/10/2016    CALCIUM 9.7 06/10/2016        Lab Results   Component Value Date    ALT 15 03/07/2016    AST 17 03/07/2016    ALKPHOS 46 03/07/2016    BILITOT 0.28 (L) 03/07/2016       Lab Results   Component Value Date    TSH 1.45 07/26/2017       Lab Results   Component Value Date    CHOL 267 (H) 08/24/2017    CHOL 221 (H) 03/07/2016    CHOL 223 (H) 09/28/2014     Lab Results   Component Value Date    TRIG 129 08/24/2017    TRIG 115 03/07/2016    TRIG 121 09/28/2014     Lab Results   Component Value Date    HDL 43 08/24/2017    HDL 38 (L) 03/07/2016    HDL 34 (L) 09/28/2014     Lab Results   Component Value Date    LDLCHOLESTEROL 198 (H) 08/24/2017    LDLCHOLESTEROL 160 (H) 03/07/2016    LDLCHOLESTEROL 165 (H) 09/28/2014     Lab Results   Component Value Date    VLDL NOT REPORTED 08/24/2017    VLDL NOT REPORTED 03/07/2016    VLDL NOT REPORTED 09/28/2014     Lab Results   Component Value Date    CHOLHDLRATIO 6.2 (H) 08/24/2017    CHOLHDLRATIO 5.8 (H) 03/07/2016    CHOLHDLRATIO 6.6 (H) 09/28/2014       Lab Results   Component Value Date    LABA1C 5.2 08/24/2017       Lab Results   Component Value Date    [id number] 366 08/24/2017       Lab Results   Component Value Date    FOLATE 5.5 08/24/2017       No results found for: IRON, TIBC, FERRITIN    Lab Results   Component Value Date    VITD25 <5.0 (L) 03/07/2016             Current Outpatient Prescriptions   Medication Sig Dispense Refill    clopidogrel (PLAVIX) 75 MG tablet TAKE 1 TABLET BY MOUTH DAILY 90 tablet 3    ALPRAZolam (XANAX) 0.25 MG tablet Take 1 tablet by mouth nightly as needed for Sleep . 10 tablet 0    hydrocortisone (ALA-ELIZABETH) 1 % cream Apply topically 2 times daily.  1 Tube 1    gabapentin (NEURONTIN) 100 MG capsule Take 1 capsule by mouth 2 times daily 60 capsule 3    diclofenac sodium 1 % GEL Apply 2 g topically 2 times daily 1 Tube 3    Heat Wraps (SOFTHEAT HEATING WRAP ULTRA) MISC Use daily to for neck and shoulder pain 1 each 0    metoprolol tartrate 75 MG TABS take 1 tablet by mouth twice a day 60 tablet 3    spironolactone (ALDACTONE) 50 MG tablet Take 1 tablet by mouth daily Take 1 tab daily 60 tablet 3    ibuprofen (ADVIL;MOTRIN) 800 MG tablet take 1 tablet by mouth every 8 hours if needed for pain 90 tablet 0    RA ALLERGY RELIEF 10 MG tablet take 1 tablet by mouth once daily 30 tablet 2    amLODIPine (NORVASC) 10 MG tablet take 1 tablet by mouth once daily 30 tablet 3    RA ASPIRIN EC 81 MG EC tablet take 1 tablet by mouth once daily 30 tablet 3    atorvastatin (LIPITOR) 80 MG tablet take 1 tablet by mouth once daily 30 tablet 3    lisinopril (PRINIVIL;ZESTRIL) 40 MG tablet Take 1 tablet by mouth daily 30 tablet 3    ezetimibe (ZETIA) 10 MG tablet Take 1 tablet by mouth daily 30 tablet 3    busPIRone (BUSPAR) 15 MG tablet Take 1 tablet by mouth 3 times daily 90 tablet 5    clobetasol (TEMOVATE) 0.05 % fever and unexpected weight change. HENT: Negative for congestion, nosebleeds, postnasal drip, sinus pain and trouble swallowing. Eyes: Negative for photophobia, redness and visual disturbance. Respiratory: Negative for cough, shortness of breath, wheezing and stridor. Cardiovascular: Negative for chest pain and palpitations. Gastrointestinal: Positive for anal bleeding. Negative for abdominal pain, blood in stool, constipation, diarrhea and nausea. Genitourinary: Negative for difficulty urinating, flank pain, frequency, pelvic pain and urgency. Musculoskeletal: Positive for arthralgias, neck pain and neck stiffness. Negative for gait problem, joint swelling and myalgias. Neurological: Positive for numbness. Negative for dizziness, weakness and headaches. Psychiatric/Behavioral: Negative for decreased concentration. The patient is not nervous/anxious and is not hyperactive. Physical Exam     PHYSICAL EXAM:   VITALS:   Vitals:    12/06/17 1301   BP: (!) 130/90   Pulse:    Resp:    Temp:    SpO2:      GENERAL:  Patient is a well-developed, well-nourished female  in no acute distress, alert and oriented x3, appropriate and pleasant conversation. HEAD: Normocephalic, atraumatic. EYES: Pupils equal, round and reactive to light and accommodation, extraocular   movements intact. ENT: Moist mucous membranes. No erythema is noted. NECK: Supple. No masses. No lymphadenopathy. CARDIOVASCULAR: Regular rate and rhythm. PULMONARY: Lungs are clear to auscultation bilaterally. ABDOMEN: Soft, nontender, nondistended. Positive bowel sounds. MUSCULOSKELETAL: Strength 5/5 bilaterally in all extremities. No tenderness to   palpation of the ribs, long bones, or spine. NEUROLOGIC: Cranial nerves II through XII grossly intact. No focal deficits are noted. ASSESSMENT AND PLAN      1.  Essential hypertension  -Fairly controlled, increased her Lopressor to 75 mg twice a day and continue all other medications. Blood pressure log. BMP reprinted and advised to do it today to look for any hyperkalemia. - clopidogrel (PLAVIX) 75 MG tablet; TAKE 1 TABLET BY MOUTH DAILY  Dispense: 90 tablet; Refill: 3  - metoprolol tartrate 75 MG TABS; take 1 tablet by mouth twice a day  Dispense: 60 tablet; Refill: 3  - spironolactone (ALDACTONE) 50 MG tablet; Take 1 tablet by mouth daily Take 1 tab daily  Dispense: 60 tablet; Refill: 3    2. Coronary artery disease involving native coronary artery of native heart with angina pectoris ([facility] [address]  -Stable continue same medications  - clopidogrel (PLAVIX) 75 MG tablet; TAKE 1 TABLET BY MOUTH DAILY  Dispense: 90 tablet; Refill: 3    3. Grade III hemorrhoids  -Discussed with patient to avoid constipation use Anusol as needed, may need surgery referral.  Patient refused to see surgery at this time  - hydrocortisone (ALA-ELIZABETH) 1 % cream; Apply topically 2 times daily. Dispense: 1 Tube; Refill: 1    4. DDD (degenerative disc disease), cervical  -This started on low-dose Neurontin, discussed about side effects, continue NSAIDs and also started on topical Voltaren gel. Discussed with patient to start physical therapy. - gabapentin (NEURONTIN) 100 MG capsule; Take 1 capsule by mouth 2 times daily  Dispense: 60 capsule; Refill: 3  - diclofenac sodium 1 % GEL; Apply 2 g topically 2 times daily  Dispense: 1 Tube; Refill: 3  - Heat Wraps (SOFTHEAT HEATING WRAP ULTRA) MISC; Use daily to for neck and shoulder pain  Dispense: 1 each; Refill: 0    5. Anxiety  -Medication contract signed today  - ALPRAZolam (XANAX) 0.25 MG tablet; Take 1 tablet by mouth nightly as needed for Sleep . Dispense: 10 tablet; Refill: 0    6. Smoking  -Patient has cut down smoking to half pack a day      No orders of the defined types were placed in this encounter.         Medications Discontinued During This Encounter   Medication Reason    clopidogrel (PLAVIX) 75 MG tablet Reorder    ALPRAZolam (XANAX) 0.25 MG tablet Reorder    spironolactone (ALDACTONE) 50 MG tablet Reorder    metoprolol tartrate (LOPRESSOR) 50 MG tablet Reorder    spironolactone (ALDACTONE) 50 MG tablet        Maritza received counseling on the following healthy behaviors: nutrition, exercise, medication adherence and tobacco cessation  Reviewed prior labs and health maintenance  Continue current medications, diet and exercise. Discussed use, benefit, and side effects of prescribed medications. Barriers to medication compliance addressed. Patient given educational materials - see patient instructions  Was a self-tracking handout given in paper form or via [employer]? Yes    Requested Prescriptions     Signed Prescriptions Disp Refills    clopidogrel (PLAVIX) 75 MG tablet 90 tablet 3     Sig: TAKE 1 TABLET BY MOUTH DAILY    ALPRAZolam (XANAX) 0.25 MG tablet 10 tablet 0     Sig: Take 1 tablet by mouth nightly as needed for Sleep .  hydrocortisone (ALA-ELIZABETH) 1 % cream 1 Tube 1     Sig: Apply topically 2 times daily.  gabapentin (NEURONTIN) 100 MG capsule 60 capsule 3     Sig: Take 1 capsule by mouth 2 times daily    diclofenac sodium 1 % GEL 1 Tube 3     Sig: Apply 2 g topically 2 times daily    Heat Wraps (SOFTHEAT HEATING WRAP ULTRA) MISC 1 each 0     Sig: Use daily to for neck and shoulder pain    metoprolol tartrate 75 MG TABS 60 tablet 3     Sig: take 1 tablet by mouth twice a day    spironolactone (ALDACTONE) 50 MG tablet 60 tablet 3     Sig: Take 1 tablet by mouth daily Take 1 tab daily       All patient questions answered. Patient voiced understanding. Quality Measures    Body mass index is 27.98 kg/m². Elevated. Weight control planned discussed Healthy diet and regular exercise. BP: (!) 130/90 (manual) Blood pressure is high. Treatment plan consists of Antihypertensive Medication Increased.     Lab Results   Component Value Date    LDLCHOLESTEROL 198 (H) 08/24/2017    (goal LDL reduction with dx if diabetes is 50% LDL reduction)      No flowsheet data found. Interpretation of Total Score Depression Severity: 1-4 = Minimal depression, 5-9 = Mild depression, 10-14 = Moderate depression, 15-19 = Moderately severe depression, 20-27 = Severe depression      The patient's past medical, surgical, social, and family history as well as her   current medications and allergies were reviewed as documented in today's encounter. Medications, labs, diagnostic studies, consultations and follow-up as documented in this encounter. Return in about 2 months (around 2/6/2018) for for htn, asthma, hemorrhoids . Patient was seen with total face to face time of 20 minutes. More than 50% of this visit was counseling and education. Future Appointments  Date Time Provider Jorge Jacinta   2/7/2018 11:30 AM [facility]     This note was completed by using the assistance of a speech-recognition program. However, inadvertent computerized transcription errors may be present. Although every effort was made to ensure accuracy, no guarantees can be provided that every mistake has been identified and corrected by editing.   Electronically signed by Isra Schneider MD on 12/6/2017  2:10 PM

## 2017-12-11 ENCOUNTER — TELEPHONE (OUTPATIENT)
Dept: FAMILY MEDICINE CLINIC | Age: 52
End: 2017-12-11

## 2017-12-11 NOTE — TELEPHONE ENCOUNTER
Patient called and stated that she needs a letter for social security that states the Diagnosis, the prognosis and names of doctors she has been referred to for the medical issues she has been treated for.

## 2017-12-13 NOTE — TELEPHONE ENCOUNTER
Write HTN : good prognosis   Stroke: fair  Prognosis  Memory deficits : good prognosis  Cervical degenerative disease : good prognosis   Anxeity : good prognosis .

## 2018-01-12 DIAGNOSIS — K64.2 GRADE III HEMORRHOIDS: ICD-10-CM

## 2018-01-15 RX ORDER — IBUPROFEN 800 MG/1
TABLET ORAL
Qty: 90 TABLET | Refills: 0 | Status: SHIPPED | OUTPATIENT
Start: 2018-01-15 | End: 2018-06-20 | Stop reason: SDUPTHER

## 2018-01-15 RX ORDER — DIAPER,BRIEF,INFANT-TODD,DISP
EACH MISCELLANEOUS
Qty: 28.4 G | Refills: 1 | Status: SHIPPED | OUTPATIENT
Start: 2018-01-15 | End: 2022-08-10 | Stop reason: SDUPTHER

## 2018-01-17 DIAGNOSIS — E78.5 DYSLIPIDEMIA: ICD-10-CM

## 2018-01-17 DIAGNOSIS — I10 ESSENTIAL HYPERTENSION: ICD-10-CM

## 2018-01-17 DIAGNOSIS — R21 RASH: ICD-10-CM

## 2018-01-17 DIAGNOSIS — F41.9 ANXIETY: ICD-10-CM

## 2018-01-17 DIAGNOSIS — J01.10 ACUTE FRONTAL SINUSITIS, RECURRENCE NOT SPECIFIED: ICD-10-CM

## 2018-01-17 RX ORDER — ATORVASTATIN CALCIUM 80 MG/1
TABLET, FILM COATED ORAL
Qty: 30 TABLET | Refills: 3 | Status: SHIPPED | OUTPATIENT
Start: 2018-01-17 | End: 2018-05-30 | Stop reason: SDUPTHER

## 2018-01-17 RX ORDER — ACETAMINOPHEN/DIPHENHYDRAMINE 500MG-25MG
TABLET ORAL
Qty: 30 TABLET | Refills: 3 | Status: SHIPPED | OUTPATIENT
Start: 2018-01-17 | End: 2018-05-30 | Stop reason: SDUPTHER

## 2018-01-17 RX ORDER — IBUPROFEN 800 MG/1
TABLET ORAL
Qty: 90 TABLET | Refills: 0 | Status: SHIPPED | OUTPATIENT
Start: 2018-01-17 | End: 2018-03-23 | Stop reason: SDUPTHER

## 2018-01-17 RX ORDER — LISINOPRIL 40 MG/1
TABLET ORAL
Qty: 30 TABLET | Refills: 3 | Status: SHIPPED | OUTPATIENT
Start: 2018-01-17 | End: 2018-06-05 | Stop reason: SDUPTHER

## 2018-01-17 RX ORDER — BUSPIRONE HYDROCHLORIDE 15 MG/1
TABLET ORAL
Qty: 90 TABLET | Refills: 5 | Status: SHIPPED | OUTPATIENT
Start: 2018-01-17 | End: 2018-07-23 | Stop reason: SDUPTHER

## 2018-02-20 DIAGNOSIS — J45.909 UNCOMPLICATED ASTHMA: ICD-10-CM

## 2018-02-20 DIAGNOSIS — I25.119 CORONARY ARTERY DISEASE INVOLVING NATIVE CORONARY ARTERY OF NATIVE HEART WITH ANGINA PECTORIS (HCC): ICD-10-CM

## 2018-02-20 DIAGNOSIS — I10 ESSENTIAL HYPERTENSION: ICD-10-CM

## 2018-02-21 RX ORDER — OMEPRAZOLE 20 MG/1
CAPSULE, DELAYED RELEASE ORAL
Qty: 30 CAPSULE | Refills: 11 | Status: SHIPPED | OUTPATIENT
Start: 2018-02-21 | End: 2021-03-15 | Stop reason: SDUPTHER

## 2018-02-21 RX ORDER — AMLODIPINE BESYLATE 10 MG/1
TABLET ORAL
Qty: 30 TABLET | Refills: 3 | Status: SHIPPED | OUTPATIENT
Start: 2018-02-21 | End: 2018-06-20 | Stop reason: SDUPTHER

## 2018-02-21 RX ORDER — BUPROPION HYDROCHLORIDE 300 MG/1
300 TABLET ORAL EVERY MORNING
Qty: 30 TABLET | Refills: 11 | Status: SHIPPED | OUTPATIENT
Start: 2018-02-21 | End: 2018-12-17 | Stop reason: ALTCHOICE

## 2018-02-23 DIAGNOSIS — R21 RASH: ICD-10-CM

## 2018-02-23 RX ORDER — CLOBETASOL PROPIONATE 0.5 MG/G
OINTMENT TOPICAL
Qty: 15 G | Refills: 2 | Status: SHIPPED | OUTPATIENT
Start: 2018-02-23 | End: 2019-05-08 | Stop reason: SDUPTHER

## 2018-03-24 RX ORDER — IBUPROFEN 800 MG/1
TABLET ORAL
Qty: 90 TABLET | Refills: 0 | Status: SHIPPED | OUTPATIENT
Start: 2018-03-24 | End: 2018-04-28 | Stop reason: SDUPTHER

## 2018-06-05 ENCOUNTER — OFFICE VISIT (OUTPATIENT)
Dept: FAMILY MEDICINE CLINIC | Age: 53
End: 2018-06-05
Payer: MEDICARE

## 2018-06-05 VITALS
OXYGEN SATURATION: 99 % | HEART RATE: 88 BPM | TEMPERATURE: 98.4 F | DIASTOLIC BLOOD PRESSURE: 98 MMHG | SYSTOLIC BLOOD PRESSURE: 155 MMHG | WEIGHT: 155.4 LBS | HEIGHT: 62 IN | BODY MASS INDEX: 28.6 KG/M2

## 2018-06-05 DIAGNOSIS — E78.2 MIXED HYPERLIPIDEMIA: ICD-10-CM

## 2018-06-05 DIAGNOSIS — Z12.11 SCREENING FOR COLON CANCER: ICD-10-CM

## 2018-06-05 DIAGNOSIS — Z23 NEED FOR PROPHYLACTIC VACCINATION AND INOCULATION AGAINST VARICELLA: ICD-10-CM

## 2018-06-05 DIAGNOSIS — Z12.39 BREAST CANCER SCREENING: ICD-10-CM

## 2018-06-05 DIAGNOSIS — I25.119 CORONARY ARTERY DISEASE INVOLVING NATIVE CORONARY ARTERY OF NATIVE HEART WITH ANGINA PECTORIS (HCC): ICD-10-CM

## 2018-06-05 DIAGNOSIS — I10 ESSENTIAL HYPERTENSION: Primary | ICD-10-CM

## 2018-06-05 PROBLEM — Z12.31 ENCOUNTER FOR SCREENING MAMMOGRAM FOR BREAST CANCER: Status: ACTIVE | Noted: 2018-06-05

## 2018-06-05 PROCEDURE — 99214 OFFICE O/P EST MOD 30 MIN: CPT | Performed by: FAMILY MEDICINE

## 2018-06-05 RX ORDER — METOPROLOL TARTRATE 75 MG/1
TABLET, FILM COATED ORAL
Qty: 60 TABLET | Refills: 3 | Status: SHIPPED | OUTPATIENT
Start: 2018-06-05 | End: 2018-07-16 | Stop reason: SDUPTHER

## 2018-06-05 RX ORDER — CLOPIDOGREL BISULFATE 75 MG/1
TABLET ORAL
Qty: 90 TABLET | Refills: 3 | Status: SHIPPED | OUTPATIENT
Start: 2018-06-05 | End: 2019-05-08 | Stop reason: SDUPTHER

## 2018-06-05 RX ORDER — NITROGLYCERIN 0.4 MG/1
0.4 TABLET SUBLINGUAL EVERY 5 MIN PRN
Qty: 25 TABLET | Refills: 3 | Status: ON HOLD | OUTPATIENT
Start: 2018-06-05 | End: 2022-04-16 | Stop reason: HOSPADM

## 2018-06-05 ASSESSMENT — ENCOUNTER SYMPTOMS
ABDOMINAL DISTENTION: 0
ABDOMINAL PAIN: 0
WHEEZING: 0
RHINORRHEA: 0
COUGH: 0
BACK PAIN: 0
CONSTIPATION: 0
SHORTNESS OF BREATH: 0
BLOOD IN STOOL: 0
NAUSEA: 0

## 2018-06-05 ASSESSMENT — PATIENT HEALTH QUESTIONNAIRE - PHQ9
1. LITTLE INTEREST OR PLEASURE IN DOING THINGS: 1
2. FEELING DOWN, DEPRESSED OR HOPELESS: 1
SUM OF ALL RESPONSES TO PHQ9 QUESTIONS 1 & 2: 2
SUM OF ALL RESPONSES TO PHQ QUESTIONS 1-9: 2

## 2018-06-20 DIAGNOSIS — I10 ESSENTIAL HYPERTENSION: ICD-10-CM

## 2018-06-21 RX ORDER — AMLODIPINE BESYLATE 10 MG/1
TABLET ORAL
Qty: 30 TABLET | Refills: 3 | Status: SHIPPED | OUTPATIENT
Start: 2018-06-21 | End: 2018-11-17 | Stop reason: SDUPTHER

## 2018-06-21 RX ORDER — IBUPROFEN 800 MG/1
TABLET ORAL
Qty: 90 TABLET | Refills: 0 | Status: SHIPPED | OUTPATIENT
Start: 2018-06-21 | End: 2018-07-23 | Stop reason: SDUPTHER

## 2018-07-05 PROBLEM — Z12.31 ENCOUNTER FOR SCREENING MAMMOGRAM FOR BREAST CANCER: Status: RESOLVED | Noted: 2018-06-05 | Resolved: 2018-07-05

## 2018-07-16 ENCOUNTER — NURSE ONLY (OUTPATIENT)
Dept: FAMILY MEDICINE CLINIC | Age: 53
End: 2018-07-16
Payer: MEDICARE

## 2018-07-16 VITALS — DIASTOLIC BLOOD PRESSURE: 92 MMHG | HEART RATE: 94 BPM | SYSTOLIC BLOOD PRESSURE: 130 MMHG

## 2018-07-16 DIAGNOSIS — I10 ESSENTIAL HYPERTENSION: ICD-10-CM

## 2018-07-16 DIAGNOSIS — J01.10 ACUTE FRONTAL SINUSITIS, RECURRENCE NOT SPECIFIED: ICD-10-CM

## 2018-07-16 PROCEDURE — 99211 OFF/OP EST MAY X REQ PHY/QHP: CPT | Performed by: NURSE PRACTITIONER

## 2018-07-16 RX ORDER — METOPROLOL TARTRATE 75 MG/1
TABLET, FILM COATED ORAL
Qty: 60 TABLET | Refills: 3 | Status: SHIPPED | OUTPATIENT
Start: 2018-07-16 | End: 2019-07-12 | Stop reason: SDUPTHER

## 2018-07-17 RX ORDER — ASPIRIN 81 MG/1
TABLET ORAL
Qty: 90 TABLET | Refills: 3 | Status: SHIPPED | OUTPATIENT
Start: 2018-07-17 | End: 2021-03-15 | Stop reason: SDUPTHER

## 2018-07-17 NOTE — TELEPHONE ENCOUNTER
Please Approve or Refuse. Next Visit Date:  Visit date not found    Hemoglobin A1C (%)   Date Value   08/24/2017 5.2             ( goal A1C is < 7)   No results found for: LABMICR  LDL Cholesterol (mg/dL)   Date Value   08/24/2017 198 (H)       (goal LDL is <100)   AST (U/L)   Date Value   03/07/2016 17     ALT (U/L)   Date Value   03/07/2016 15     BUN (mg/dL)   Date Value   12/06/2017 12     BP Readings from Last 3 Encounters:   07/16/18 (!) 130/92   06/05/18 (!) 155/98   12/06/17 (!) 130/90          (goal 120/80)        Patient Active Problem List:     Stroke ([facility] [address]     HTN (hypertension)     Numbness     CAD (coronary artery disease)     Asthma     MI, old     Depression     Smoking     Dementia     Anxiety     Dyslipidemia     Acute frontal sinusitis     Memory impairment     Essential hypertension     Neck pain     Memory problem     History of stroke     DDD (degenerative disc disease), cervical     Grade III hemorrhoids     Mixed hyperlipidemia    Please Approve or Refuse.   Send to Pharmacy per Pt's Request:      Next Visit Date:  8/31/18   Last Visit Date: Visit date not found    Hemoglobin A1C (%)   Date Value   08/24/2017 5.2             ( goal A1C is < 7)   BP Readings from Last 3 Encounters:   07/16/18 (!) 130/92   06/05/18 (!) 155/98   12/06/17 (!) 130/90          (goal 120/80)

## 2018-10-24 DIAGNOSIS — M54.2 NECK PAIN: ICD-10-CM

## 2018-10-25 RX ORDER — IBUPROFEN 800 MG/1
TABLET ORAL
Qty: 90 TABLET | Refills: 0 | Status: SHIPPED | OUTPATIENT
Start: 2018-10-25 | End: 2019-07-12 | Stop reason: SDUPTHER

## 2018-10-25 RX ORDER — LISINOPRIL 40 MG/1
TABLET ORAL
Qty: 30 TABLET | Refills: 3 | Status: SHIPPED | OUTPATIENT
Start: 2018-10-25 | End: 2019-09-04 | Stop reason: SDUPTHER

## 2018-10-25 NOTE — TELEPHONE ENCOUNTER
Please Approve or Refuse.   Send to Pharmacy per Pt's Request:      Next Visit Date:  Visit date not found   Last Visit Date: 6/5/2018    Hemoglobin A1C (%)   Date Value   08/24/2017 5.2             ( goal A1C is < 7)   BP Readings from Last 3 Encounters:   07/16/18 (!) 130/92   06/05/18 (!) 155/98   12/06/17 (!) 130/90          (goal 120/80)  BUN   Date Value Ref Range Status   12/06/2017 12 6 - 20 mg/dL Final     CREATININE   Date Value Ref Range Status   12/06/2017 0.86 0.50 - 0.90 mg/dL Final     Potassium   Date Value Ref Range Status   12/06/2017 3.8 3.7 - 5.3 mmol/L Final

## 2018-11-30 ENCOUNTER — TELEPHONE (OUTPATIENT)
Dept: FAMILY MEDICINE CLINIC | Age: 53
End: 2018-11-30

## 2018-11-30 DIAGNOSIS — J45.40 MODERATE PERSISTENT ASTHMA WITHOUT COMPLICATION: Primary | ICD-10-CM

## 2018-11-30 RX ORDER — FLUTICASONE FUROATE AND VILANTEROL 100; 25 UG/1; UG/1
1 POWDER RESPIRATORY (INHALATION) DAILY
Qty: 1 EACH | Refills: 3 | Status: SHIPPED | OUTPATIENT
Start: 2018-11-30 | End: 2019-05-08 | Stop reason: SDUPTHER

## 2018-12-04 DIAGNOSIS — F41.9 ANXIETY: ICD-10-CM

## 2018-12-04 RX ORDER — BUSPIRONE HYDROCHLORIDE 30 MG/1
TABLET ORAL
Qty: 45 TABLET | Refills: 3 | Status: SHIPPED | OUTPATIENT
Start: 2018-12-04 | End: 2018-12-10

## 2018-12-10 ENCOUNTER — TELEPHONE (OUTPATIENT)
Dept: FAMILY MEDICINE CLINIC | Facility: CLINIC | Age: 53
End: 2018-12-10

## 2018-12-10 DIAGNOSIS — F41.9 ANXIETY: ICD-10-CM

## 2018-12-10 RX ORDER — BUSPIRONE HYDROCHLORIDE 15 MG/1
TABLET ORAL
Qty: 90 TABLET | Refills: 5 | Status: SHIPPED | OUTPATIENT
Start: 2018-12-10 | End: 2019-05-08 | Stop reason: SDUPTHER

## 2018-12-17 ENCOUNTER — OFFICE VISIT (OUTPATIENT)
Dept: FAMILY MEDICINE CLINIC | Age: 53
End: 2018-12-17
Payer: MEDICARE

## 2018-12-17 VITALS
OXYGEN SATURATION: 100 % | HEART RATE: 79 BPM | SYSTOLIC BLOOD PRESSURE: 105 MMHG | BODY MASS INDEX: 27.53 KG/M2 | DIASTOLIC BLOOD PRESSURE: 78 MMHG | WEIGHT: 149.6 LBS | HEIGHT: 62 IN

## 2018-12-17 DIAGNOSIS — J45.20 MILD INTERMITTENT ASTHMA WITHOUT COMPLICATION: ICD-10-CM

## 2018-12-17 DIAGNOSIS — Z23 NEED FOR PROPHYLACTIC VACCINATION AND INOCULATION AGAINST INFLUENZA: ICD-10-CM

## 2018-12-17 DIAGNOSIS — F32.0 CURRENT MILD EPISODE OF MAJOR DEPRESSIVE DISORDER WITHOUT PRIOR EPISODE (HCC): ICD-10-CM

## 2018-12-17 DIAGNOSIS — I10 ESSENTIAL HYPERTENSION: Primary | ICD-10-CM

## 2018-12-17 DIAGNOSIS — J32.4 CHRONIC PANSINUSITIS: ICD-10-CM

## 2018-12-17 DIAGNOSIS — E78.2 MIXED HYPERLIPIDEMIA: ICD-10-CM

## 2018-12-17 DIAGNOSIS — R41.3 MEMORY PROBLEM: ICD-10-CM

## 2018-12-17 DIAGNOSIS — Z12.11 SCREENING FOR COLON CANCER: ICD-10-CM

## 2018-12-17 DIAGNOSIS — J01.10 ACUTE FRONTAL SINUSITIS, RECURRENCE NOT SPECIFIED: ICD-10-CM

## 2018-12-17 DIAGNOSIS — R21 RASH: ICD-10-CM

## 2018-12-17 DIAGNOSIS — Z23 NEED FOR PROPHYLACTIC VACCINATION AND INOCULATION AGAINST VARICELLA: ICD-10-CM

## 2018-12-17 PROCEDURE — G8598 ASA/ANTIPLAT THER USED: HCPCS | Performed by: FAMILY MEDICINE

## 2018-12-17 PROCEDURE — G8427 DOCREV CUR MEDS BY ELIG CLIN: HCPCS | Performed by: FAMILY MEDICINE

## 2018-12-17 PROCEDURE — 99214 OFFICE O/P EST MOD 30 MIN: CPT | Performed by: FAMILY MEDICINE

## 2018-12-17 PROCEDURE — G8419 CALC BMI OUT NRM PARAM NOF/U: HCPCS | Performed by: FAMILY MEDICINE

## 2018-12-17 PROCEDURE — G8484 FLU IMMUNIZE NO ADMIN: HCPCS | Performed by: FAMILY MEDICINE

## 2018-12-17 PROCEDURE — 3017F COLORECTAL CA SCREEN DOC REV: CPT | Performed by: FAMILY MEDICINE

## 2018-12-17 PROCEDURE — 4004F PT TOBACCO SCREEN RCVD TLK: CPT | Performed by: FAMILY MEDICINE

## 2018-12-17 RX ORDER — ALBUTEROL SULFATE 90 UG/1
AEROSOL, METERED RESPIRATORY (INHALATION)
Qty: 54 G | Refills: 3 | Status: SHIPPED | OUTPATIENT
Start: 2018-12-17 | End: 2019-04-17 | Stop reason: SDUPTHER

## 2018-12-17 RX ORDER — AMOXICILLIN 500 MG/1
1 TABLET, FILM COATED ORAL 2 TIMES DAILY
Qty: 20 TABLET | Refills: 0 | Status: SHIPPED | OUTPATIENT
Start: 2018-12-17 | End: 2018-12-27

## 2018-12-17 RX ORDER — ECHINACEA PURPUREA EXTRACT 125 MG
1 TABLET ORAL PRN
Qty: 1 BOTTLE | Refills: 3 | Status: SHIPPED | OUTPATIENT
Start: 2018-12-17 | End: 2019-09-09

## 2018-12-17 RX ORDER — CETIRIZINE HYDROCHLORIDE 10 MG/1
TABLET ORAL
Qty: 30 TABLET | Refills: 2 | Status: SHIPPED | OUTPATIENT
Start: 2018-12-17 | End: 2019-05-08 | Stop reason: SDUPTHER

## 2018-12-17 ASSESSMENT — ENCOUNTER SYMPTOMS
PHOTOPHOBIA: 0
CHEST TIGHTNESS: 0
ABDOMINAL PAIN: 0
COUGH: 0
DIARRHEA: 0
RHINORRHEA: 1
SINUS PRESSURE: 1
FACIAL SWELLING: 0
SORE THROAT: 0
TROUBLE SWALLOWING: 0
NAUSEA: 0
SHORTNESS OF BREATH: 0
BLOOD IN STOOL: 0

## 2018-12-17 NOTE — PROGRESS NOTES
Chief Complaint   Patient presents with    Hypertension    Anxiety    Depression         [address] Final  here today for follow up on chronic medical problems, go over labs and/or diagnostic studies, and medication refills. Hypertension; Anxiety; and Depression      HPI: Patient is here for follow-up on hypertension and hyperlipidemia, patient is noncompliant not seen since last 6 months. Patient does not know about her medications. Not taking all medications has not been refilled since last few months. Patient is off of Neurontin. Patient has not done blood work has not done her mammogram.    Hyperlipidemia , has not done repeat lipid panel. Depression stable denies any acute symptoms. Dementia and memory problems intermittently, after she had stroke,Reports that has improved. Patient is complaining of sinus congestion, has history of recurrent sinusitis, cannot tolerate Flonase reports she gets persistent vomiting due to Flonase. She is complaining of nasal drainage, denies any fever or chills and cough. /78   Pulse 79   Ht 5' 2\" (1.575 m)   Wt 149 lb 9.6 oz (67.9 kg)   SpO2 100% Comment: resting @ RA  BMI 27.36 kg/m² (  Body mass index is 27.36 kg/m². Wt Readings from Last 3 Encounters:   12/17/18 149 lb 9.6 oz (67.9 kg)   06/05/18 155 lb 6.4 oz (70.5 kg)   12/06/17 153 lb (69.4 kg)        []Negative depression screening. PHQ Scores 6/5/2018   PHQ2 Score 2   PHQ9 Score 2      []1-4 = Minimal depression   []5-9 = Milddepression   []10-14 = Moderate depression   []15-19 = Moderately severe depression   []20-27 = Severe depression    Discussed testing with the patient and all questions fully answered.     Hospital Outpatient Visit on 12/06/2017   Component Date Value Ref Range Status    Glucose 12/06/2017 88  70 - 99 mg/dL Final    BUN 12/06/2017 12  6 - 20 mg/dL Final    CREATININE 12/06/2017 0.86  0.50 - 0.90 mg/dL Final    Bun/Cre Ratio 12/06/2017 NOT REPORTED  9 - NOT REPORTED 03/07/2016    VLDL NOT REPORTED 09/28/2014     Lab Results   Component Value Date    CHOLHDLRATIO 6.2 (H) 08/24/2017    CHOLHDLRATIO 5.8 (H) 03/07/2016    CHOLHDLRATIO 6.6 (H) 09/28/2014       Lab Results   Component Value Date    LABA1C 5.2 08/24/2017       Lab Results   Component Value Date    [id number] 366 08/24/2017       Lab Results   Component Value Date    FOLATE 5.5 08/24/2017       No results found for: IRON, TIBC, FERRITIN    Lab Results   Component Value Date    VITD25 <5.0 (L) 03/07/2016             Current Outpatient Prescriptions   Medication Sig Dispense Refill    zoster recombinant adjuvanted vaccine (SHINGRIX) 50 MCG/0.5ML SUSR injection 50 MCG IM then repeat 2-6 months.  0.5 mL 1    albuterol sulfate HFA (VENTOLIN HFA) 108 (90 Base) MCG/ACT inhaler inhale 2 puffs by mouth every 6 hours if needed for wheezing 54 g 3    Amoxicillin 500 MG TABS Take 1 tablet by mouth 2 times daily for 10 days 20 tablet 0    sodium chloride (ALTAMIST SPRAY) 0.65 % nasal spray 1 spray by Nasal route as needed for Congestion 1 Bottle 3    cetirizine (RA ALLERGY RELIEF) 10 MG tablet take 1 tablet by mouth once daily 30 tablet 2    busPIRone (BUSPAR) 15 MG tablet take 1 tablet by mouth three times a day 90 tablet 5    fluticasone-vilanterol (BREO ELLIPTA) 100-25 MCG/INH AEPB inhaler Inhale 1 puff into the lungs daily 1 each 3    amLODIPine (NORVASC) 10 MG tablet take 1 tablet by mouth once daily 30 tablet 0    lisinopril (PRINIVIL;ZESTRIL) 40 MG tablet take 1 tablet by mouth once daily 30 tablet 3    ibuprofen (ADVIL;MOTRIN) 800 MG tablet take 1 tablet by mouth every 8 hours if needed for pain 90 tablet 0    atorvastatin (LIPITOR) 80 MG tablet take 1 tablet by mouth once daily 30 tablet 3    ASPIR-LOW 81 MG EC tablet take 1 tablet by mouth once daily 90 tablet 3    Metoprolol Tartrate 75 MG TABS take 1 tablet by mouth twice a day 60 tablet 3    clopidogrel (PLAVIX) 75 MG tablet TAKE 1 TABLET BY MOUTH DAILY 90 tablet 3    nitroGLYCERIN (NITROSTAT) 0.4 MG SL tablet Place 1 tablet under the tongue every 5 minutes as needed for Chest pain up to max of 3 total doses. If no relief after 1 dose, call 911. 25 tablet 3    clobetasol (TEMOVATE) 0.05 % ointment apply to affected area twice a day 15 g 2    omeprazole (PRILOSEC) 20 MG delayed release capsule take 1 capsule by mouth once daily 30 capsule 11    DULERA 100-5 MCG/ACT inhaler INHALE 2 PUFFS INTO THE LUNGS TWICE DAILY 13 g 11    hydrocortisone 1 % cream apply topically twice a day 28.4 g 1    Heat Wraps (SOFTHEAT HEATING WRAP ULTRA) MISC Use daily to for neck and shoulder pain 1 each 0    spironolactone (ALDACTONE) 50 MG tablet Take 1 tablet by mouth daily Take 1 tab daily 60 tablet 3    ezetimibe (ZETIA) 10 MG tablet Take 1 tablet by mouth daily 30 tablet 3    lidocaine (LMX) 4 % cream Apply 2g topically as needed. 1 Tube 3    nicotine (NICODERM CQ) 14 MG/24HR Place 1 patch onto the skin every 24 hours 30 patch 3    Blood Pressure Monitor MISC Use daily to take BP 1 each 0    hydrochlorothiazide (HYDRODIURIL) 25 MG tablet Take 1 tablet by mouth daily 30 tablet 3    diclofenac sodium 1 % GEL Apply 2 g topically 2 times daily 1 Tube 3     No current facility-administered medications for this visit. Social History     Social History    Marital status:      Spouse name: N/A    Number of children: N/A    Years of education: N/A     Occupational History    Not on file.      Social History Main Topics    Smoking status: Current Every Day Smoker     Packs/day: 0.50     Years: 25.00     Types: Cigarettes    Smokeless tobacco: Never Used    Alcohol use 0.0 oz/week      Comment: occasionally    Drug use: No    Sexual activity: Not Currently     Other Topics Concern    Not on file     Social History Narrative    No narrative on file     Ready to quit: Yes  Counseling given: Yes        Family History   Problem Relation Age of well-nourished. HENT:   Head: Normocephalic. Nose: Mucosal edema, rhinorrhea and sinus tenderness present. No nasal deformity or nasal septal hematoma. Mouth/Throat: Posterior oropharyngeal edema present. No oropharyngeal exudate. Eyes: Pupils are equal, round, and reactive to light. Conjunctivae are normal.   Neck: Normal range of motion. No thyromegaly present. Cardiovascular: Normal rate, regular rhythm and normal heart sounds. Pulmonary/Chest: Effort normal and breath sounds normal. She has no wheezes. Abdominal: Soft. Bowel sounds are normal.   Musculoskeletal: Normal range of motion. She exhibits no edema, tenderness or deformity. Neurological: She is alert and oriented to person, place, and time. No cranial nerve deficit or sensory deficit. Coordination normal.   Skin: Skin is warm. Nursing note and vitals reviewed. ASSESSMENT AND PLAN      1. Essential hypertension  Controlled, continue same medications  - TSH With Reflex Ft4; Future    2. Mixed hyperlipidemia  -Stable continue same  - CBC Auto Differential; Future    3. Mild intermittent asthma without complication  Controlled , refilled her inhalers  - CBC Auto Differential; Future    4. Chronic pansinusitis  -Chronic sinusitis, nasal saline and Allegra refilled    5. Current mild episode of major depressive disorder without prior episode ([facility] [address]  -Stable on treatment    6. Acute frontal sinusitis, recurrence not specified    - Amoxicillin 500 MG TABS; Take 1 tablet by mouth 2 times daily for 10 days  Dispense: 20 tablet; Refill: 0  - sodium chloride (ALTAMIST SPRAY) 0.65 % nasal spray; 1 spray by Nasal route as needed for Congestion  Dispense: 1 Bottle; Refill: 3  - cetirizine (RA ALLERGY RELIEF) 10 MG tablet; take 1 tablet by mouth once daily  Dispense: 30 tablet; Refill: 2    7. Memory problem  -Improving      9.  Need for prophylactic vaccination and inoculation against varicella    - zoster recombinant adjuvanted vaccine

## 2019-02-08 DIAGNOSIS — F41.9 ANXIETY: ICD-10-CM

## 2019-02-08 RX ORDER — BUSPIRONE HYDROCHLORIDE 15 MG/1
TABLET ORAL
Qty: 90 TABLET | Refills: 5 | Status: SHIPPED | OUTPATIENT
Start: 2019-02-08 | End: 2019-05-08 | Stop reason: SDUPTHER

## 2019-03-10 DIAGNOSIS — J45.909 UNCOMPLICATED ASTHMA: ICD-10-CM

## 2019-03-10 DIAGNOSIS — F41.9 ANXIETY: ICD-10-CM

## 2019-03-11 RX ORDER — BUSPIRONE HYDROCHLORIDE 15 MG/1
TABLET ORAL
Qty: 90 TABLET | Refills: 5 | Status: SHIPPED | OUTPATIENT
Start: 2019-03-11 | End: 2019-05-08 | Stop reason: SDUPTHER

## 2019-04-10 DIAGNOSIS — I25.119 CORONARY ARTERY DISEASE INVOLVING NATIVE CORONARY ARTERY OF NATIVE HEART WITH ANGINA PECTORIS (HCC): ICD-10-CM

## 2019-04-10 DIAGNOSIS — I10 ESSENTIAL HYPERTENSION: ICD-10-CM

## 2019-04-10 RX ORDER — CLOPIDOGREL BISULFATE 75 MG/1
TABLET ORAL
Qty: 90 TABLET | Refills: 3 | Status: SHIPPED | OUTPATIENT
Start: 2019-04-10 | End: 2019-05-08 | Stop reason: SDUPTHER

## 2019-04-17 DIAGNOSIS — J45.50 SEVERE PERSISTENT ASTHMA WITHOUT COMPLICATION: Primary | ICD-10-CM

## 2019-04-17 RX ORDER — ALBUTEROL SULFATE 90 UG/1
AEROSOL, METERED RESPIRATORY (INHALATION)
Qty: 54 G | Refills: 3 | Status: SHIPPED | OUTPATIENT
Start: 2019-04-17 | End: 2019-07-12 | Stop reason: SDUPTHER

## 2019-04-17 NOTE — TELEPHONE ENCOUNTER
Please Approve or Refuse.   Send to Pharmacy per Pt's Request:     Next Visit Date:  5/8/2019   Last Visit Date: 12/17/2018    Hemoglobin A1C (%)   Date Value   08/24/2017 5.2             ( goal A1C is < 7)   BP Readings from Last 3 Encounters:   12/17/18 105/78   07/16/18 (!) 130/92   06/05/18 (!) 155/98          (goal 120/80)  BUN   Date Value Ref Range Status   12/06/2017 12 6 - 20 mg/dL Final     CREATININE   Date Value Ref Range Status   12/06/2017 0.86 0.50 - 0.90 mg/dL Final     Potassium   Date Value Ref Range Status   12/06/2017 3.8 3.7 - 5.3 mmol/L Final

## 2019-05-08 ENCOUNTER — HOSPITAL ENCOUNTER (OUTPATIENT)
Age: 54
Setting detail: SPECIMEN
Discharge: HOME | End: 2019-05-08
Payer: MEDICARE

## 2019-05-08 ENCOUNTER — OFFICE VISIT (OUTPATIENT)
Dept: FAMILY MEDICINE CLINIC | Age: 54
End: 2019-05-08
Payer: MEDICARE

## 2019-05-08 VITALS
HEART RATE: 79 BPM | WEIGHT: 150.2 LBS | HEIGHT: 62 IN | SYSTOLIC BLOOD PRESSURE: 140 MMHG | BODY MASS INDEX: 27.64 KG/M2 | DIASTOLIC BLOOD PRESSURE: 90 MMHG | OXYGEN SATURATION: 99 %

## 2019-05-08 DIAGNOSIS — I10 ESSENTIAL HYPERTENSION: ICD-10-CM

## 2019-05-08 DIAGNOSIS — J45.909 UNCOMPLICATED ASTHMA: ICD-10-CM

## 2019-05-08 DIAGNOSIS — E78.2 MIXED HYPERLIPIDEMIA: ICD-10-CM

## 2019-05-08 DIAGNOSIS — J45.40 MODERATE PERSISTENT ASTHMA WITHOUT COMPLICATION: ICD-10-CM

## 2019-05-08 DIAGNOSIS — M50.30 DDD (DEGENERATIVE DISC DISEASE), CERVICAL: ICD-10-CM

## 2019-05-08 DIAGNOSIS — E78.2 MIXED HYPERLIPIDEMIA: Primary | ICD-10-CM

## 2019-05-08 DIAGNOSIS — I25.119 CORONARY ARTERY DISEASE INVOLVING NATIVE CORONARY ARTERY OF NATIVE HEART WITH ANGINA PECTORIS (HCC): ICD-10-CM

## 2019-05-08 DIAGNOSIS — F41.9 ANXIETY: ICD-10-CM

## 2019-05-08 DIAGNOSIS — Z23 NEED FOR PROPHYLACTIC VACCINATION AND INOCULATION AGAINST INFLUENZA: ICD-10-CM

## 2019-05-08 DIAGNOSIS — J45.20 MILD INTERMITTENT ASTHMA WITHOUT COMPLICATION: ICD-10-CM

## 2019-05-08 DIAGNOSIS — I25.119 CORONARY ARTERY DISEASE INVOLVING NATIVE CORONARY ARTERY OF NATIVE HEART WITH ANGINA PECTORIS (HCC): Primary | ICD-10-CM

## 2019-05-08 DIAGNOSIS — F32.0 CURRENT MILD EPISODE OF MAJOR DEPRESSIVE DISORDER WITHOUT PRIOR EPISODE (HCC): ICD-10-CM

## 2019-05-08 DIAGNOSIS — R41.3 MEMORY PROBLEM: ICD-10-CM

## 2019-05-08 DIAGNOSIS — J01.10 ACUTE FRONTAL SINUSITIS, RECURRENCE NOT SPECIFIED: ICD-10-CM

## 2019-05-08 DIAGNOSIS — R21 RASH: ICD-10-CM

## 2019-05-08 DIAGNOSIS — H92.02 LEFT EAR PAIN: ICD-10-CM

## 2019-05-08 DIAGNOSIS — J30.9 ALLERGIC SINUSITIS: ICD-10-CM

## 2019-05-08 LAB
ABSOLUTE EOS #: 0.2 K/UL (ref 0–0.4)
ABSOLUTE IMMATURE GRANULOCYTE: ABNORMAL K/UL (ref 0–0.3)
ABSOLUTE LYMPH #: 2.8 K/UL (ref 1–4.8)
ABSOLUTE MONO #: 0.7 K/UL (ref 0.1–1.3)
ALBUMIN SERPL-MCNC: 4.4 G/DL (ref 3.5–5.2)
ALBUMIN/GLOBULIN RATIO: ABNORMAL (ref 1–2.5)
ALP BLD-CCNC: 59 U/L (ref 35–104)
ALT SERPL-CCNC: 12 U/L (ref 5–33)
ANION GAP SERPL CALCULATED.3IONS-SCNC: 14 MMOL/L (ref 9–17)
AST SERPL-CCNC: 15 U/L
BASOPHILS # BLD: 1 % (ref 0–2)
BASOPHILS ABSOLUTE: 0 K/UL (ref 0–0.2)
BILIRUB SERPL-MCNC: 0.23 MG/DL (ref 0.3–1.2)
BUN BLDV-MCNC: 14 MG/DL (ref 6–20)
BUN/CREAT BLD: ABNORMAL (ref 9–20)
CALCIUM SERPL-MCNC: 8.9 MG/DL (ref 8.6–10.4)
CHLORIDE BLD-SCNC: 105 MMOL/L (ref 98–107)
CHOLESTEROL/HDL RATIO: 7.2
CHOLESTEROL: 236 MG/DL
CO2: 23 MMOL/L (ref 20–31)
CREAT SERPL-MCNC: 0.94 MG/DL (ref 0.5–0.9)
DIFFERENTIAL TYPE: ABNORMAL
EOSINOPHILS RELATIVE PERCENT: 3 % (ref 0–4)
GFR AFRICAN AMERICAN: >60 ML/MIN
GFR NON-AFRICAN AMERICAN: >60 ML/MIN
GFR SERPL CREATININE-BSD FRML MDRD: ABNORMAL ML/MIN/{1.73_M2}
GFR SERPL CREATININE-BSD FRML MDRD: ABNORMAL ML/MIN/{1.73_M2}
GLUCOSE BLD-MCNC: 89 MG/DL (ref 70–99)
HCT VFR BLD CALC: 39.9 % (ref 36–46)
HDLC SERPL-MCNC: 33 MG/DL
HEMOGLOBIN: 13.4 G/DL (ref 12–16)
IMMATURE GRANULOCYTES: ABNORMAL %
LDL CHOLESTEROL: 173 MG/DL (ref 0–130)
LYMPHOCYTES # BLD: 41 % (ref 24–44)
MCH RBC QN AUTO: 29.8 PG (ref 26–34)
MCHC RBC AUTO-ENTMCNC: 33.6 G/DL (ref 31–37)
MCV RBC AUTO: 88.7 FL (ref 80–100)
MONOCYTES # BLD: 10 % (ref 1–7)
NRBC AUTOMATED: ABNORMAL PER 100 WBC
PDW BLD-RTO: 16.2 % (ref 11.5–14.9)
PLATELET # BLD: 295 K/UL (ref 150–450)
PLATELET ESTIMATE: ABNORMAL
PMV BLD AUTO: 7.5 FL (ref 6–12)
POTASSIUM SERPL-SCNC: 3.5 MMOL/L (ref 3.7–5.3)
RBC # BLD: 4.5 M/UL (ref 4–5.2)
RBC # BLD: ABNORMAL 10*6/UL
SEG NEUTROPHILS: 45 % (ref 36–66)
SEGMENTED NEUTROPHILS ABSOLUTE COUNT: 3.1 K/UL (ref 1.3–9.1)
SODIUM BLD-SCNC: 142 MMOL/L (ref 135–144)
TOTAL PROTEIN: 7.5 G/DL (ref 6.4–8.3)
TRIGL SERPL-MCNC: 152 MG/DL
TSH SERPL DL<=0.05 MIU/L-ACNC: 2.17 MIU/L (ref 0.3–5)
VLDLC SERPL CALC-MCNC: ABNORMAL MG/DL (ref 1–30)
WBC # BLD: 6.8 K/UL (ref 3.5–11)
WBC # BLD: ABNORMAL 10*3/UL

## 2019-05-08 PROCEDURE — G8510 SCR DEP NEG, NO PLAN REQD: HCPCS | Performed by: FAMILY MEDICINE

## 2019-05-08 PROCEDURE — 36415 COLL VENOUS BLD VENIPUNCTURE: CPT

## 2019-05-08 PROCEDURE — G8427 DOCREV CUR MEDS BY ELIG CLIN: HCPCS | Performed by: FAMILY MEDICINE

## 2019-05-08 PROCEDURE — 4004F PT TOBACCO SCREEN RCVD TLK: CPT | Performed by: FAMILY MEDICINE

## 2019-05-08 PROCEDURE — 3017F COLORECTAL CA SCREEN DOC REV: CPT | Performed by: FAMILY MEDICINE

## 2019-05-08 PROCEDURE — 80053 COMPREHEN METABOLIC PANEL: CPT

## 2019-05-08 PROCEDURE — 80061 LIPID PANEL: CPT

## 2019-05-08 PROCEDURE — 99214 OFFICE O/P EST MOD 30 MIN: CPT | Performed by: FAMILY MEDICINE

## 2019-05-08 PROCEDURE — 85025 COMPLETE CBC W/AUTO DIFF WBC: CPT

## 2019-05-08 PROCEDURE — G8419 CALC BMI OUT NRM PARAM NOF/U: HCPCS | Performed by: FAMILY MEDICINE

## 2019-05-08 PROCEDURE — 84443 ASSAY THYROID STIM HORMONE: CPT

## 2019-05-08 PROCEDURE — G8598 ASA/ANTIPLAT THER USED: HCPCS | Performed by: FAMILY MEDICINE

## 2019-05-08 RX ORDER — SPIRONOLACTONE 50 MG/1
50 TABLET, FILM COATED ORAL DAILY
Qty: 60 TABLET | Refills: 3 | Status: SHIPPED | OUTPATIENT
Start: 2019-05-08 | End: 2021-02-17 | Stop reason: ALTCHOICE

## 2019-05-08 RX ORDER — BUSPIRONE HYDROCHLORIDE 15 MG/1
TABLET ORAL
Qty: 90 TABLET | Refills: 5 | Status: SHIPPED | OUTPATIENT
Start: 2019-05-08 | End: 2021-03-16 | Stop reason: SDUPTHER

## 2019-05-08 RX ORDER — EZETIMIBE 10 MG/1
10 TABLET ORAL DAILY
Qty: 30 TABLET | Refills: 3 | Status: SHIPPED | OUTPATIENT
Start: 2019-05-08 | End: 2019-09-04 | Stop reason: SDUPTHER

## 2019-05-08 RX ORDER — ROSUVASTATIN CALCIUM 20 MG/1
20 TABLET, COATED ORAL NIGHTLY
Qty: 30 TABLET | Refills: 3 | Status: SHIPPED | OUTPATIENT
Start: 2019-05-08 | End: 2019-09-04 | Stop reason: SINTOL

## 2019-05-08 RX ORDER — HYDROCHLOROTHIAZIDE 25 MG/1
25 TABLET ORAL DAILY
Qty: 30 TABLET | Refills: 3 | Status: SHIPPED | OUTPATIENT
Start: 2019-05-08 | End: 2019-07-12 | Stop reason: SDUPTHER

## 2019-05-08 RX ORDER — FLUTICASONE FUROATE AND VILANTEROL 100; 25 UG/1; UG/1
1 POWDER RESPIRATORY (INHALATION) DAILY
Qty: 1 EACH | Refills: 3 | Status: SHIPPED | OUTPATIENT
Start: 2019-05-08 | End: 2021-03-16 | Stop reason: SDUPTHER

## 2019-05-08 RX ORDER — SERTRALINE HYDROCHLORIDE 25 MG/1
25 TABLET, FILM COATED ORAL DAILY
Qty: 60 TABLET | Refills: 3 | Status: SHIPPED | OUTPATIENT
Start: 2019-05-08 | End: 2019-09-04 | Stop reason: SDUPTHER

## 2019-05-08 RX ORDER — CLOPIDOGREL BISULFATE 75 MG/1
TABLET ORAL
Qty: 90 TABLET | Refills: 3 | Status: SHIPPED | OUTPATIENT
Start: 2019-05-08 | End: 2019-09-04 | Stop reason: SDUPTHER

## 2019-05-08 RX ORDER — CETIRIZINE HYDROCHLORIDE 10 MG/1
TABLET ORAL
Qty: 30 TABLET | Refills: 2 | Status: SHIPPED | OUTPATIENT
Start: 2019-05-08 | End: 2021-03-16 | Stop reason: SDUPTHER

## 2019-05-08 RX ORDER — CLOBETASOL PROPIONATE 0.5 MG/G
OINTMENT TOPICAL
Qty: 15 G | Refills: 2 | Status: SHIPPED | OUTPATIENT
Start: 2019-05-08 | End: 2021-03-16 | Stop reason: SDUPTHER

## 2019-05-08 ASSESSMENT — ENCOUNTER SYMPTOMS
RHINORRHEA: 1
NAUSEA: 0
PHOTOPHOBIA: 0
SINUS PAIN: 0
COUGH: 0
BACK PAIN: 1
SORE THROAT: 0
WHEEZING: 0
CONSTIPATION: 0
SINUS PRESSURE: 1
SHORTNESS OF BREATH: 0
BLOOD IN STOOL: 0
ABDOMINAL PAIN: 0

## 2019-05-08 ASSESSMENT — PATIENT HEALTH QUESTIONNAIRE - PHQ9
SUM OF ALL RESPONSES TO PHQ QUESTIONS 1-9: 2
SUM OF ALL RESPONSES TO PHQ QUESTIONS 1-9: 2
1. LITTLE INTEREST OR PLEASURE IN DOING THINGS: 1
SUM OF ALL RESPONSES TO PHQ9 QUESTIONS 1 & 2: 2
2. FEELING DOWN, DEPRESSED OR HOPELESS: 1

## 2019-05-08 NOTE — PROGRESS NOTES
and all questions fully answered. Hospital Outpatient Visit on 12/06/2017   Component Date Value Ref Range Status    Glucose 12/06/2017 88  70 - 99 mg/dL Final    BUN 12/06/2017 12  6 - 20 mg/dL Final    CREATININE 12/06/2017 0.86  0.50 - 0.90 mg/dL Final    Bun/Cre Ratio 12/06/2017 NOT REPORTED  9 - 20 Final    Calcium 12/06/2017 9.0  8.6 - 10.4 mg/dL Final    Sodium 12/06/2017 142  135 - 144 mmol/L Final    Potassium 12/06/2017 3.8  3.7 - 5.3 mmol/L Final    Chloride 12/06/2017 105  98 - 107 mmol/L Final    CO2 12/06/2017 24  20 - 31 mmol/L Final    Anion Gap 12/06/2017 13  9 - 17 mmol/L Final    GFR Non- 12/06/2017 >60  >60 mL/min Final    GFR  12/06/2017 >60  >60 mL/min Final    GFR Comment 12/06/2017        Final    Comment: Average GFR for 52-63 years old:   80 mL/min/1.73sq m  Chronic Kidney Disease:   <60 mL/min/1.73sq m  Kidney failure:   <15 mL/min/1.73sq m              eGFR calculated using average adult body mass. Additional eGFR calculator   available at:        [email or web address]        Performed at [facility]: JULIANNE GRANT [address] [place] [address]   [phone]      GFR Staging 12/06/2017 NOT REPORTED   Final         Most recent labs reviewed:     Lab Results   Component Value Date    WBC 6.8 05/08/2019    HGB 13.4 05/08/2019    HCT 39.9 05/08/2019    MCV 88.7 05/08/2019     05/08/2019       @BRIEFLAB(NA,K,CL,CO2,BUN,CREATININE,GLUCOSE,CALCIUM)@     Lab Results   Component Value Date    ALT 12 05/08/2019    AST 15 05/08/2019    ALKPHOS 59 05/08/2019    BILITOT 0.23 (L) 05/08/2019       Lab Results   Component Value Date    TSH 2.17 05/08/2019       Lab Results   Component Value Date    CHOL 236 (H) 05/08/2019    CHOL 267 (H) 08/24/2017    CHOL 221 (H) 03/07/2016     Lab Results   Component Value Date    TRIG 152 (H) 05/08/2019    TRIG 129 08/24/2017    TRIG 115 03/07/2016     Lab Results or via [address]? Yes    Requested Prescriptions     Signed Prescriptions Disp Refills    sertraline (ZOLOFT) 25 MG tablet 60 tablet 3     Sig: Take 1 tablet by mouth daily       All patient questions answered. Patient voiced understanding. Quality Measures    Body mass index is 27.47 kg/m². Elevated. Weight control planned discussed Healthy diet and regular exercise. BP: (!) 140/90 Blood pressure is high. Treatment plan consists of Weight Reduction, DASH Eating Plan, Dietary Sodium Restriction, Increased Physical Activity and No treatment change needed. Lab Results   Component Value Date    LHRFRLKMSWLMYM 893 (H) 05/08/2019    (goal LDL reduction with dx if diabetes is 50% LDL reduction)      PHQ Scores 5/8/2019 6/5/2018   PHQ2 Score 2 2   PHQ9 Score 2 2     Interpretation of Total Score Depression Severity: 1-4 = Minimal depression, 5-9 = Mild depression, 10-14 = Moderate depression, 15-19 = Moderately severe depression, 20-27 = Severe depression    The patient'spast medical, surgical, social, and family history as well as her   current medications and allergies were reviewed as documented in today's encounter. Medications, labs, diagnostic studies, consultations andfollow-up as documented in this encounter. Return in about 1 month (around 6/5/2019) for lab work, htn, depression , BP log . Patient wasseen with total face to face time of 25 minutes. More than 50% of this visit was counseling and education. Future Appointments   Date Time Provider Jorge Workman   6/10/2019 10:30 AM Sachin Costa MD [id number]     This note was completed by using the assistance of a speech-recognition program. However, inadvertent computerized transcription errors may be present. Althoughevery effort was made to ensure accuracy, no guarantees can be provided that every mistake has been identified and corrected by editing.   Electronically signed by Sachin Costa MD on 5/8/2019  1:54 PM

## 2019-05-09 DIAGNOSIS — J45.40 MODERATE PERSISTENT ASTHMA WITHOUT COMPLICATION: Primary | ICD-10-CM

## 2019-05-09 RX ORDER — FLUTICASONE FUROATE AND VILANTEROL 100; 25 UG/1; UG/1
1 POWDER RESPIRATORY (INHALATION) DAILY
Qty: 1 EACH | Refills: 3 | Status: SHIPPED | OUTPATIENT
Start: 2019-05-09 | End: 2019-07-12 | Stop reason: SDUPTHER

## 2019-07-12 ENCOUNTER — OFFICE VISIT (OUTPATIENT)
Dept: FAMILY MEDICINE CLINIC | Age: 54
End: 2019-07-12
Payer: MEDICARE

## 2019-07-12 VITALS
HEART RATE: 75 BPM | WEIGHT: 152.2 LBS | SYSTOLIC BLOOD PRESSURE: 173 MMHG | HEIGHT: 62 IN | DIASTOLIC BLOOD PRESSURE: 119 MMHG | OXYGEN SATURATION: 100 % | BODY MASS INDEX: 28.01 KG/M2

## 2019-07-12 DIAGNOSIS — M54.2 NECK PAIN: ICD-10-CM

## 2019-07-12 DIAGNOSIS — I10 ESSENTIAL HYPERTENSION: Primary | ICD-10-CM

## 2019-07-12 DIAGNOSIS — J45.40 MODERATE PERSISTENT ASTHMA WITHOUT COMPLICATION: ICD-10-CM

## 2019-07-12 PROCEDURE — 99213 OFFICE O/P EST LOW 20 MIN: CPT | Performed by: FAMILY MEDICINE

## 2019-07-12 RX ORDER — METOPROLOL TARTRATE 75 MG/1
100 TABLET, FILM COATED ORAL 2 TIMES DAILY
Qty: 1 TABLET | Refills: 1 | Status: SHIPPED | OUTPATIENT
Start: 2019-07-12 | End: 2019-09-04 | Stop reason: SDUPTHER

## 2019-07-12 RX ORDER — HYDROCHLOROTHIAZIDE 25 MG/1
25 TABLET ORAL DAILY
Qty: 30 TABLET | Refills: 3 | Status: SHIPPED | OUTPATIENT
Start: 2019-07-12 | End: 2021-03-16 | Stop reason: SDUPTHER

## 2019-07-12 RX ORDER — METOPROLOL TARTRATE 75 MG/1
TABLET, FILM COATED ORAL
Qty: 60 TABLET | Refills: 1 | Status: SHIPPED | OUTPATIENT
Start: 2019-07-12 | End: 2019-07-12

## 2019-07-12 RX ORDER — IBUPROFEN 800 MG/1
TABLET ORAL
Qty: 90 TABLET | Refills: 0 | Status: SHIPPED | OUTPATIENT
Start: 2019-07-12 | End: 2019-09-26 | Stop reason: SDUPTHER

## 2019-07-12 RX ORDER — ALBUTEROL SULFATE 90 UG/1
AEROSOL, METERED RESPIRATORY (INHALATION)
Qty: 54 G | Refills: 3 | Status: SHIPPED | OUTPATIENT
Start: 2019-07-12 | End: 2021-03-16 | Stop reason: SDUPTHER

## 2019-07-12 RX ORDER — FLUTICASONE FUROATE AND VILANTEROL 100; 25 UG/1; UG/1
1 POWDER RESPIRATORY (INHALATION) DAILY
Qty: 1 EACH | Refills: 3 | Status: SHIPPED | OUTPATIENT
Start: 2019-07-12 | End: 2019-09-09 | Stop reason: SDUPTHER

## 2019-07-12 ASSESSMENT — ENCOUNTER SYMPTOMS
BLOOD IN STOOL: 0
EYE PAIN: 0
PHOTOPHOBIA: 0
VOMITING: 0
RHINORRHEA: 0
ABDOMINAL PAIN: 0
WHEEZING: 0
CONSTIPATION: 0
COLOR CHANGE: 0
DIARRHEA: 0
SINUS PAIN: 0
BACK PAIN: 0
CHEST TIGHTNESS: 0
APNEA: 0
SHORTNESS OF BREATH: 0
RESPIRATORY NEGATIVE: 1
SINUS PRESSURE: 0
NAUSEA: 0
COUGH: 0
TROUBLE SWALLOWING: 0
SORE THROAT: 0

## 2019-07-12 NOTE — PROGRESS NOTES
nosebleeds, postnasal drip, rhinorrhea, sinus pressure, sinus pain, sneezing, sore throat and trouble swallowing. Eyes: Negative for photophobia, pain and visual disturbance. Respiratory: Negative. Negative for apnea, cough, chest tightness, shortness of breath and wheezing. Cardiovascular: Negative. Negative for chest pain, palpitations and leg swelling. Gastrointestinal: Negative for abdominal pain, blood in stool, constipation, diarrhea, nausea and vomiting. Endocrine: Negative for cold intolerance, heat intolerance, polyphagia and polyuria. Genitourinary: Negative for difficulty urinating, dysuria, frequency, genital sores and urgency. Musculoskeletal: Positive for neck pain. Negative for arthralgias, back pain, gait problem, myalgias and neck stiffness. Chronic, non traumatic, unchanged, relief with motrin. Skin: Negative for color change and rash. Neurological: Negative for weakness, light-headedness, numbness and headaches. Psychiatric/Behavioral: Negative for agitation, behavioral problems, decreased concentration, dysphoric mood and sleep disturbance. The patient is nervous/anxious. The patient is not hyperactive. Prior to Visit Medications    Medication Sig Taking?  Authorizing Provider   albuterol sulfate HFA (VENTOLIN HFA) 108 (90 Base) MCG/ACT inhaler inhale 2 puffs by mouth every 6 hours if needed for wheezing Yes ELLY Yadav CNP   fluticasone-vilanterol (BREO ELLIPTA) 100-25 MCG/INH AEPB inhaler Inhale 1 puff into the lungs daily Yes ELLY Yadav CNP   ibuprofen (ADVIL;MOTRIN) 800 MG tablet take 1 tablet by mouth every 8 hours if needed for pain Yes ELLY Yadav CNP   hydrochlorothiazide (HYDRODIURIL) 25 MG tablet Take 1 tablet by mouth daily Yes ELLY Yadav CNP   Metoprolol Tartrate 75 MG TABS Take 100 mg by mouth 2 times daily Yes ELLY Yadav CNP   sertraline (ZOLOFT) 25 MG tablet Take 1 tablet

## 2019-09-04 ENCOUNTER — OFFICE VISIT (OUTPATIENT)
Dept: FAMILY MEDICINE CLINIC | Age: 54
End: 2019-09-04
Payer: MEDICARE

## 2019-09-04 VITALS
WEIGHT: 151.2 LBS | HEART RATE: 76 BPM | BODY MASS INDEX: 27.82 KG/M2 | DIASTOLIC BLOOD PRESSURE: 110 MMHG | OXYGEN SATURATION: 98 % | HEIGHT: 62 IN | SYSTOLIC BLOOD PRESSURE: 188 MMHG

## 2019-09-04 DIAGNOSIS — R41.3 MEMORY PROBLEM: ICD-10-CM

## 2019-09-04 DIAGNOSIS — I25.119 CORONARY ARTERY DISEASE INVOLVING NATIVE CORONARY ARTERY OF NATIVE HEART WITH ANGINA PECTORIS (HCC): ICD-10-CM

## 2019-09-04 DIAGNOSIS — I63.40 CEREBROVASCULAR ACCIDENT (CVA) DUE TO EMBOLISM OF CEREBRAL ARTERY (HCC): ICD-10-CM

## 2019-09-04 DIAGNOSIS — F32.0 CURRENT MILD EPISODE OF MAJOR DEPRESSIVE DISORDER WITHOUT PRIOR EPISODE (HCC): ICD-10-CM

## 2019-09-04 DIAGNOSIS — F33.1 MODERATE EPISODE OF RECURRENT MAJOR DEPRESSIVE DISORDER (HCC): ICD-10-CM

## 2019-09-04 DIAGNOSIS — E78.2 MIXED HYPERLIPIDEMIA: ICD-10-CM

## 2019-09-04 DIAGNOSIS — F41.9 ANXIETY: ICD-10-CM

## 2019-09-04 DIAGNOSIS — I10 ESSENTIAL HYPERTENSION: Primary | ICD-10-CM

## 2019-09-04 PROBLEM — J01.10 ACUTE FRONTAL SINUSITIS: Status: RESOLVED | Noted: 2017-03-06 | Resolved: 2019-09-04

## 2019-09-04 PROBLEM — H92.02 LEFT EAR PAIN: Status: RESOLVED | Noted: 2019-05-08 | Resolved: 2019-09-04

## 2019-09-04 PROCEDURE — G8427 DOCREV CUR MEDS BY ELIG CLIN: HCPCS | Performed by: FAMILY MEDICINE

## 2019-09-04 PROCEDURE — 4004F PT TOBACCO SCREEN RCVD TLK: CPT | Performed by: FAMILY MEDICINE

## 2019-09-04 PROCEDURE — 99214 OFFICE O/P EST MOD 30 MIN: CPT | Performed by: FAMILY MEDICINE

## 2019-09-04 PROCEDURE — 3017F COLORECTAL CA SCREEN DOC REV: CPT | Performed by: FAMILY MEDICINE

## 2019-09-04 PROCEDURE — G8419 CALC BMI OUT NRM PARAM NOF/U: HCPCS | Performed by: FAMILY MEDICINE

## 2019-09-04 PROCEDURE — G8598 ASA/ANTIPLAT THER USED: HCPCS | Performed by: FAMILY MEDICINE

## 2019-09-04 RX ORDER — EZETIMIBE 10 MG/1
10 TABLET ORAL DAILY
Qty: 30 TABLET | Refills: 3 | Status: SHIPPED | OUTPATIENT
Start: 2019-09-04 | End: 2021-08-31 | Stop reason: SDUPTHER

## 2019-09-04 RX ORDER — ALPRAZOLAM 0.25 MG/1
0.25 TABLET ORAL 3 TIMES DAILY PRN
Qty: 10 TABLET | Refills: 0 | Status: SHIPPED | OUTPATIENT
Start: 2019-09-04 | End: 2019-10-04

## 2019-09-04 RX ORDER — AMLODIPINE BESYLATE 10 MG/1
10 TABLET ORAL DAILY
Qty: 30 TABLET | Refills: 3 | Status: SHIPPED | OUTPATIENT
Start: 2019-09-04 | End: 2020-02-17

## 2019-09-04 RX ORDER — LISINOPRIL 40 MG/1
TABLET ORAL
Qty: 30 TABLET | Refills: 3 | Status: SHIPPED | OUTPATIENT
Start: 2019-09-04 | End: 2020-02-17

## 2019-09-04 RX ORDER — SERTRALINE HYDROCHLORIDE 25 MG/1
25 TABLET, FILM COATED ORAL DAILY
Qty: 60 TABLET | Refills: 3 | Status: SHIPPED | OUTPATIENT
Start: 2019-09-04 | End: 2021-02-17 | Stop reason: ALTCHOICE

## 2019-09-04 RX ORDER — METOPROLOL TARTRATE 75 MG/1
100 TABLET, FILM COATED ORAL 2 TIMES DAILY
Qty: 1 TABLET | Refills: 1 | Status: SHIPPED | OUTPATIENT
Start: 2019-09-04 | End: 2019-11-18 | Stop reason: SDUPTHER

## 2019-09-04 RX ORDER — CLOPIDOGREL BISULFATE 75 MG/1
TABLET ORAL
Qty: 90 TABLET | Refills: 3 | Status: SHIPPED | OUTPATIENT
Start: 2019-09-04 | End: 2021-03-16 | Stop reason: SDUPTHER

## 2019-09-04 ASSESSMENT — ENCOUNTER SYMPTOMS
ABDOMINAL PAIN: 0
BACK PAIN: 1
SINUS PRESSURE: 0
RHINORRHEA: 0
PHOTOPHOBIA: 0
COUGH: 0
CONSTIPATION: 0
SHORTNESS OF BREATH: 0
WHEEZING: 0
ABDOMINAL DISTENTION: 0
VOMITING: 0

## 2019-09-04 NOTE — LETTER
MEDICATION AGREEMENT     Ember Hamm  [birth year]      For certain conditions, multiple classes of medications may be used to help better manage your symptoms, and to improve your ability to function at home, work and in social settings. However, these medications do have risks, which will be discussed with you, including addiction and dependency. The following prescribed medications need frequent monitoring and will require you to partner and assist in your healthcare. Medication  Dose, instructions and quantity as indicated on current prescription bottle Diagnosis/Reason(s) for Taking Category   Xanax 0.25 , 10 tab / 1 month  anxiety , depression                             Benefits and goals of Controlled Substance Medications: There are two potential goals for your treatment: (1) decreased pain and suffering (2) improved daily life functions. There are many possible treatments for your chronic condition(s), and, in addition to controlled substance medications, we will try alternatives such as physical therapy, yoga, massage, home daily exercise, meditation, relaxation techniques, injections, chiropractic manipulations, surgery, cognitive therapy, hypnosis and many medications that are not habit-forming. Use of controlled substance medications may be helpful, but they are unlikely to resolve all of your symptoms or restore all function. Risks of Controlled Substance Medications:    Opioid pain medications: These medications can lead to problems such as addiction/dependence, sedation, lightheadedness/dizziness, memory issues, falls, constipation, nausea, or vomiting. They may also impair the ability to drive or operate machinery. Additionally, these medications may lower testosterone levels, leading to loss of bone strength, stamina and sex drive.   They may cause problems with breathing, sleep apnea and reduced coughing, which are especially dangerous for patients with lung

## 2019-09-09 ENCOUNTER — OFFICE VISIT (OUTPATIENT)
Dept: FAMILY MEDICINE CLINIC | Age: 54
End: 2019-09-09
Payer: MEDICARE

## 2019-09-09 VITALS
BODY MASS INDEX: 27.79 KG/M2 | WEIGHT: 151 LBS | HEART RATE: 72 BPM | HEIGHT: 62 IN | OXYGEN SATURATION: 100 % | SYSTOLIC BLOOD PRESSURE: 110 MMHG | DIASTOLIC BLOOD PRESSURE: 80 MMHG

## 2019-09-09 DIAGNOSIS — E78.2 MIXED HYPERLIPIDEMIA: ICD-10-CM

## 2019-09-09 DIAGNOSIS — F41.9 ANXIETY: ICD-10-CM

## 2019-09-09 DIAGNOSIS — Z79.899 MEDICATION MANAGEMENT: ICD-10-CM

## 2019-09-09 DIAGNOSIS — Z12.11 SCREENING FOR COLON CANCER: ICD-10-CM

## 2019-09-09 DIAGNOSIS — Z23 NEED FOR INFLUENZA VACCINATION: ICD-10-CM

## 2019-09-09 DIAGNOSIS — I10 ESSENTIAL HYPERTENSION: Primary | ICD-10-CM

## 2019-09-09 DIAGNOSIS — R41.3 MEMORY PROBLEM: ICD-10-CM

## 2019-09-09 DIAGNOSIS — Z12.39 BREAST CANCER SCREENING: ICD-10-CM

## 2019-09-09 DIAGNOSIS — F32.0 CURRENT MILD EPISODE OF MAJOR DEPRESSIVE DISORDER WITHOUT PRIOR EPISODE (HCC): ICD-10-CM

## 2019-09-09 PROCEDURE — G8419 CALC BMI OUT NRM PARAM NOF/U: HCPCS | Performed by: FAMILY MEDICINE

## 2019-09-09 PROCEDURE — 3017F COLORECTAL CA SCREEN DOC REV: CPT | Performed by: FAMILY MEDICINE

## 2019-09-09 PROCEDURE — G8598 ASA/ANTIPLAT THER USED: HCPCS | Performed by: FAMILY MEDICINE

## 2019-09-09 PROCEDURE — G8427 DOCREV CUR MEDS BY ELIG CLIN: HCPCS | Performed by: FAMILY MEDICINE

## 2019-09-09 PROCEDURE — 4004F PT TOBACCO SCREEN RCVD TLK: CPT | Performed by: FAMILY MEDICINE

## 2019-09-09 PROCEDURE — 99214 OFFICE O/P EST MOD 30 MIN: CPT | Performed by: FAMILY MEDICINE

## 2019-09-09 ASSESSMENT — ENCOUNTER SYMPTOMS
ABDOMINAL PAIN: 0
BACK PAIN: 0
SHORTNESS OF BREATH: 0
ABDOMINAL DISTENTION: 0
PHOTOPHOBIA: 0
CONSTIPATION: 0
CHEST TIGHTNESS: 0
WHEEZING: 0
RHINORRHEA: 1
SINUS PRESSURE: 0
SORE THROAT: 0

## 2019-09-09 NOTE — PROGRESS NOTES
Chief Complaint   Patient presents with    Hypertension         Will Friedman  here today for follow up on chronic medical problems, go over labs and/or diagnostic studies, and medication refills. Hypertension      HPI: Patient is here for follow-up on hypertension  and medication check. Hypertension controlled today, patient brought all her medications checked with the nurse. Also referred to pharmacy. Patient reports she is relaxed today and took all her medications. Her blood pressure is running normal.  Anxiety and depression has improved. Patient reports she needs to move from her house. Hyperlipidemia uncontrolled started on new medication. Patient has memory issues likely due to stroke reports it gets worse when she is depressed and stressed, she has been referred in the past neurologist did not follow with them. Patient is complaining of sinus congestion is taking Claritin, since last 2 days. Does not like Flonase. She denies any fever chills any sore throat. /80   Pulse 72   Ht 5' 2\" (1.575 m)   Wt 151 lb (68.5 kg)   SpO2 100%   BMI 27.62 kg/m²    Body mass index is 27.62 kg/m². Wt Readings from Last 3 Encounters:   09/09/19 151 lb (68.5 kg)   09/04/19 151 lb 3.2 oz (68.6 kg)   07/12/19 152 lb 3.2 oz (69 kg)        []Negative depression screening. PHQ Scores 5/8/2019 6/5/2018   PHQ2 Score 2 2   PHQ9 Score 2 2      [x]1-4 = Minimal depression   []5-9 = Milddepression   []10-14 = Moderate depression   []15-19 = Moderately severe depression   []20-27 = Severe depression    Discussed testing with the patient and all questions fully answered.     Hospital Outpatient Visit on 05/08/2019   Component Date Value Ref Range Status    Glucose 05/08/2019 89  70 - 99 mg/dL Final    BUN 05/08/2019 14  6 - 20 mg/dL Final    CREATININE 05/08/2019 0.94* 0.50 - 0.90 mg/dL Final    Bun/Cre Ratio 05/08/2019 NOT REPORTED  9 - 20 Final    Calcium 05/08/2019 8.9  8.6 - 10.4 mg/dL Final Guidelines for fasting triglyceride, October 2012.          VLDL 05/08/2019 NOT REPORTED  1 - 30 mg/dL Final    TSH 05/08/2019 2.17  0.30 - 5.00 mIU/L Final    WBC 05/08/2019 6.8  3.5 - 11.0 k/uL Final    RBC 05/08/2019 4.50  4.0 - 5.2 m/uL Final    Hemoglobin 05/08/2019 13.4  12.0 - 16.0 g/dL Final    Hematocrit 05/08/2019 39.9  36 - 46 % Final    MCV 05/08/2019 88.7  80 - 100 fL Final    MCH 05/08/2019 29.8  26 - 34 pg Final    MCHC 05/08/2019 33.6  31 - 37 g/dL Final    RDW 05/08/2019 16.2* 11.5 - 14.9 % Final    Platelets 18/48/7351 295  150 - 450 k/uL Final    MPV 05/08/2019 7.5  6.0 - 12.0 fL Final    NRBC Automated 05/08/2019 NOT REPORTED  per 100 WBC Final    Differential Type 05/08/2019 NOT REPORTED   Final    Seg Neutrophils 05/08/2019 45  36 - 66 % Final    Lymphocytes 05/08/2019 41  24 - 44 % Final    Monocytes 05/08/2019 10* 1 - 7 % Final    Eosinophils % 05/08/2019 3  0 - 4 % Final    Basophils 05/08/2019 1  0 - 2 % Final    Immature Granulocytes 05/08/2019 NOT REPORTED  0 % Final    Segs Absolute 05/08/2019 3.10  1.3 - 9.1 k/uL Final    Absolute Lymph # 05/08/2019 2.80  1.0 - 4.8 k/uL Final    Absolute Mono # 05/08/2019 0.70  0.1 - 1.3 k/uL Final    Absolute Eos # 05/08/2019 0.20  0.0 - 0.4 k/uL Final    Basophils Absolute 05/08/2019 0.00  0.0 - 0.2 k/uL Final    Absolute Immature Granulocyte 05/08/2019 NOT REPORTED  0.00 - 0.30 k/uL Final    WBC Morphology 05/08/2019 NOT REPORTED   Final    RBC Morphology 05/08/2019 NOT REPORTED   Final    Platelet Estimate 94/64/0751 NOT REPORTED   Final         Most recent labs reviewed:     Lab Results   Component Value Date    WBC 6.8 05/08/2019    HGB 13.4 05/08/2019    HCT 39.9 05/08/2019    MCV 88.7 05/08/2019     05/08/2019       @BRIEFLAB(NA,K,CL,CO2,BUN,CREATININE,GLUCOSE,CALCIUM)@     Lab Results   Component Value Date    ALT 12 05/08/2019    AST 15 05/08/2019    ALKPHOS 59 05/08/2019    BILITOT 0.23 (L) 05/08/2019 speech-recognition program. However, inadvertent computerized transcription errors may be present. Althoughevery effort was made to ensure accuracy, no guarantees can be provided that every mistake has been identified and corrected by editing.   Electronically signed by Jorge Lawton MD on 9/9/2019  11:54 AM

## 2019-09-10 ENCOUNTER — TELEPHONE (OUTPATIENT)
Dept: PHARMACY | Age: 54
End: 2019-09-10

## 2019-09-16 ENCOUNTER — TELEPHONE (OUTPATIENT)
Dept: PHARMACY | Age: 54
End: 2019-09-16

## 2019-09-16 NOTE — TELEPHONE ENCOUNTER
Called patient to set up a visit for medication education and review for this patient. Especially antihypertensive medications. This is for a one-time consultation unless our staff feel that further visits may be helpful    Left voicemail for patient to call the clinic back as soon as possible.      Reema Guallpa, Pharm D, [facility]  9/16/2019 2:58 PM

## 2019-09-17 ENCOUNTER — TELEPHONE (OUTPATIENT)
Dept: PHARMACY | Age: 54
End: 2019-09-17

## 2019-09-19 ENCOUNTER — TELEPHONE (OUTPATIENT)
Dept: PHARMACY | Age: 54
End: 2019-09-19

## 2019-09-26 ENCOUNTER — TELEPHONE (OUTPATIENT)
Dept: PHARMACY | Age: 54
End: 2019-09-26

## 2019-09-26 DIAGNOSIS — M54.2 NECK PAIN: ICD-10-CM

## 2019-09-26 RX ORDER — IBUPROFEN 800 MG/1
TABLET ORAL
Qty: 90 TABLET | Refills: 0 | Status: SHIPPED | OUTPATIENT
Start: 2019-09-26 | End: 2021-03-16 | Stop reason: SDUPTHER

## 2019-09-26 NOTE — TELEPHONE ENCOUNTER
Please Approve or Refuse.   Send to Pharmacy per Pt's Request:      Next Visit Date:  11/11/2019   Last Visit Date: 9/9/2019    Hemoglobin A1C (%)   Date Value   08/24/2017 5.2             ( goal A1C is < 7)   BP Readings from Last 3 Encounters:   09/09/19 110/80   09/04/19 (!) 188/110   07/12/19 (!) 173/119          (goal 120/80)  BUN   Date Value Ref Range Status   05/08/2019 14 6 - 20 mg/dL Final     CREATININE   Date Value Ref Range Status   05/08/2019 0.94 (H) 0.50 - 0.90 mg/dL Final     Potassium   Date Value Ref Range Status   05/08/2019 3.5 (L) 3.7 - 5.3 mmol/L Final

## 2019-10-08 ENCOUNTER — TELEPHONE (OUTPATIENT)
Dept: PHARMACY | Age: 54
End: 2019-10-08

## 2019-10-21 RX ORDER — LORATADINE 10 MG/1
TABLET ORAL
Qty: 30 TABLET | Refills: 0 | Status: SHIPPED | OUTPATIENT
Start: 2019-10-21 | End: 2021-02-17 | Stop reason: ALTCHOICE

## 2019-10-28 ENCOUNTER — TELEPHONE (OUTPATIENT)
Dept: PHARMACY | Age: 54
End: 2019-10-28

## 2019-11-18 DIAGNOSIS — I10 ESSENTIAL HYPERTENSION: ICD-10-CM

## 2019-11-18 RX ORDER — METOPROLOL TARTRATE 50 MG/1
TABLET, FILM COATED ORAL
Qty: 120 TABLET | Refills: 0 | Status: SHIPPED | OUTPATIENT
Start: 2019-11-18 | End: 2021-03-15 | Stop reason: SDUPTHER

## 2020-02-17 RX ORDER — LISINOPRIL 40 MG/1
TABLET ORAL
Qty: 30 TABLET | Refills: 3 | Status: SHIPPED | OUTPATIENT
Start: 2020-02-17 | End: 2021-03-16 | Stop reason: SDUPTHER

## 2020-02-17 RX ORDER — AMLODIPINE BESYLATE 10 MG/1
10 TABLET ORAL DAILY
Qty: 30 TABLET | Refills: 3 | Status: SHIPPED | OUTPATIENT
Start: 2020-02-17 | End: 2021-03-16 | Stop reason: SDUPTHER

## 2020-03-23 ENCOUNTER — TELEPHONE (OUTPATIENT)
Dept: FAMILY MEDICINE CLINIC | Age: 55
End: 2020-03-23

## 2020-04-24 ENCOUNTER — TELEPHONE (OUTPATIENT)
Dept: FAMILY MEDICINE CLINIC | Age: 55
End: 2020-04-24

## 2021-02-17 ENCOUNTER — APPOINTMENT (OUTPATIENT)
Dept: GENERAL RADIOLOGY | Age: 56
End: 2021-02-17
Payer: MEDICARE

## 2021-02-17 ENCOUNTER — HOSPITAL ENCOUNTER (EMERGENCY)
Age: 56
Discharge: HOME | End: 2021-02-17
Attending: EMERGENCY MEDICINE
Payer: MEDICARE

## 2021-02-17 VITALS
HEIGHT: 62 IN | DIASTOLIC BLOOD PRESSURE: 90 MMHG | HEART RATE: 68 BPM | WEIGHT: 146.01 LBS | SYSTOLIC BLOOD PRESSURE: 175 MMHG | RESPIRATION RATE: 16 BRPM | OXYGEN SATURATION: 100 % | BODY MASS INDEX: 26.87 KG/M2 | TEMPERATURE: 99.2 F

## 2021-02-17 DIAGNOSIS — R07.9 CHEST PAIN, UNSPECIFIED TYPE: Primary | ICD-10-CM

## 2021-02-17 DIAGNOSIS — I10 ESSENTIAL HYPERTENSION: ICD-10-CM

## 2021-02-17 DIAGNOSIS — R50.9 FEVER, UNSPECIFIED FEVER CAUSE: ICD-10-CM

## 2021-02-17 LAB
ABSOLUTE EOS #: 0.21 K/UL (ref 0–0.44)
ABSOLUTE IMMATURE GRANULOCYTE: 0.01 K/UL (ref 0–0.3)
ABSOLUTE LYMPH #: 3.75 K/UL (ref 1.1–3.7)
ABSOLUTE MONO #: 0.69 K/UL (ref 0.1–1.2)
ANION GAP SERPL CALCULATED.3IONS-SCNC: 12 MMOL/L (ref 9–17)
BASOPHILS # BLD: 1 % (ref 0–2)
BASOPHILS ABSOLUTE: 0.06 K/UL (ref 0–0.2)
BUN BLDV-MCNC: 10 MG/DL (ref 6–20)
BUN/CREAT BLD: 12 (ref 9–20)
CALCIUM SERPL-MCNC: 9.4 MG/DL (ref 8.6–10.4)
CHLORIDE BLD-SCNC: 104 MMOL/L (ref 98–107)
CO2: 23 MMOL/L (ref 20–31)
CREAT SERPL-MCNC: 0.82 MG/DL (ref 0.5–0.9)
DIFFERENTIAL TYPE: ABNORMAL
EOSINOPHILS RELATIVE PERCENT: 3 % (ref 1–4)
GFR AFRICAN AMERICAN: >60 ML/MIN
GFR NON-AFRICAN AMERICAN: >60 ML/MIN
GFR SERPL CREATININE-BSD FRML MDRD: NORMAL ML/MIN/{1.73_M2}
GFR SERPL CREATININE-BSD FRML MDRD: NORMAL ML/MIN/{1.73_M2}
GLUCOSE BLD-MCNC: 88 MG/DL (ref 70–99)
HCT VFR BLD CALC: 43.1 % (ref 36.3–47.1)
HEMOGLOBIN: 14 G/DL (ref 11.9–15.1)
IMMATURE GRANULOCYTES: 0 %
LYMPHOCYTES # BLD: 51 % (ref 24–43)
MCH RBC QN AUTO: 29.6 PG (ref 25.2–33.5)
MCHC RBC AUTO-ENTMCNC: 32.5 G/DL (ref 28.4–34.8)
MCV RBC AUTO: 91.1 FL (ref 82.6–102.9)
MONOCYTES # BLD: 10 % (ref 3–12)
MYOGLOBIN: 22 NG/ML (ref 25–58)
MYOGLOBIN: <21 NG/ML (ref 25–58)
NRBC AUTOMATED: 0 PER 100 WBC
PDW BLD-RTO: 14.4 % (ref 11.8–14.4)
PLATELET # BLD: 292 K/UL (ref 138–453)
PLATELET ESTIMATE: ABNORMAL
PMV BLD AUTO: 9.9 FL (ref 8.1–13.5)
POTASSIUM SERPL-SCNC: 4.1 MMOL/L (ref 3.7–5.3)
RBC # BLD: 4.73 M/UL (ref 3.95–5.11)
RBC # BLD: ABNORMAL 10*6/UL
SEG NEUTROPHILS: 35 % (ref 36–65)
SEGMENTED NEUTROPHILS ABSOLUTE COUNT: 2.56 K/UL (ref 1.5–8.1)
SODIUM BLD-SCNC: 139 MMOL/L (ref 135–144)
TROPONIN INTERP: ABNORMAL
TROPONIN INTERP: ABNORMAL
TROPONIN T: ABNORMAL NG/ML
TROPONIN T: ABNORMAL NG/ML
TROPONIN, HIGH SENSITIVITY: 6 NG/L (ref 0–14)
TROPONIN, HIGH SENSITIVITY: 7 NG/L (ref 0–14)
WBC # BLD: 7.3 K/UL (ref 3.5–11.3)
WBC # BLD: ABNORMAL 10*3/UL

## 2021-02-17 PROCEDURE — U0005 INFEC AGEN DETEC AMPLI PROBE: HCPCS

## 2021-02-17 PROCEDURE — 71045 X-RAY EXAM CHEST 1 VIEW: CPT

## 2021-02-17 PROCEDURE — 80048 BASIC METABOLIC PNL TOTAL CA: CPT

## 2021-02-17 PROCEDURE — U0003 INFECTIOUS AGENT DETECTION BY NUCLEIC ACID (DNA OR RNA); SEVERE ACUTE RESPIRATORY SYNDROME CORONAVIRUS 2 (SARS-COV-2) (CORONAVIRUS DISEASE [COVID-19]), AMPLIFIED PROBE TECHNIQUE, MAKING USE OF HIGH THROUGHPUT TECHNOLOGIES AS DESCRIBED BY CMS-2020-01-R: HCPCS

## 2021-02-17 PROCEDURE — 93005 ELECTROCARDIOGRAM TRACING: CPT | Performed by: EMERGENCY MEDICINE

## 2021-02-17 PROCEDURE — 6360000002 HC RX W HCPCS: Performed by: EMERGENCY MEDICINE

## 2021-02-17 PROCEDURE — 99283 EMERGENCY DEPT VISIT LOW MDM: CPT

## 2021-02-17 PROCEDURE — 83874 ASSAY OF MYOGLOBIN: CPT

## 2021-02-17 PROCEDURE — 96374 THER/PROPH/DIAG INJ IV PUSH: CPT

## 2021-02-17 PROCEDURE — 6370000000 HC RX 637 (ALT 250 FOR IP): Performed by: EMERGENCY MEDICINE

## 2021-02-17 PROCEDURE — 85025 COMPLETE CBC W/AUTO DIFF WBC: CPT

## 2021-02-17 PROCEDURE — 84484 ASSAY OF TROPONIN QUANT: CPT

## 2021-02-17 RX ORDER — MAGNESIUM HYDROXIDE/ALUMINUM HYDROXICE/SIMETHICONE 120; 1200; 1200 MG/30ML; MG/30ML; MG/30ML
30 SUSPENSION ORAL
Status: COMPLETED | OUTPATIENT
Start: 2021-02-17 | End: 2021-02-17

## 2021-02-17 RX ORDER — HYDRALAZINE HYDROCHLORIDE 20 MG/ML
20 INJECTION INTRAMUSCULAR; INTRAVENOUS ONCE
Status: COMPLETED | OUTPATIENT
Start: 2021-02-17 | End: 2021-02-17

## 2021-02-17 RX ORDER — ASPIRIN 81 MG/1
324 TABLET, CHEWABLE ORAL ONCE
Status: COMPLETED | OUTPATIENT
Start: 2021-02-17 | End: 2021-02-17

## 2021-02-17 RX ORDER — ACETAMINOPHEN 325 MG/1
650 TABLET ORAL ONCE
Status: COMPLETED | OUTPATIENT
Start: 2021-02-17 | End: 2021-02-17

## 2021-02-17 RX ADMIN — ASPIRIN 324 MG: 81 TABLET, CHEWABLE ORAL at 20:29

## 2021-02-17 RX ADMIN — ALUMINUM HYDROXIDE, MAGNESIUM HYDROXIDE, AND SIMETHICONE 30 ML: 200; 200; 20 SUSPENSION ORAL at 21:53

## 2021-02-17 RX ADMIN — HYDRALAZINE HYDROCHLORIDE 20 MG: 20 INJECTION INTRAMUSCULAR; INTRAVENOUS at 21:36

## 2021-02-17 RX ADMIN — ACETAMINOPHEN 650 MG: 325 TABLET ORAL at 20:32

## 2021-02-17 ASSESSMENT — ENCOUNTER SYMPTOMS
COUGH: 0
CONSTIPATION: 0
ABDOMINAL PAIN: 0
FACIAL SWELLING: 0
VOMITING: 0
DIARRHEA: 0
EYE REDNESS: 0
SHORTNESS OF BREATH: 1
EYE DISCHARGE: 0
COLOR CHANGE: 0

## 2021-02-17 ASSESSMENT — PAIN SCALES - GENERAL
PAINLEVEL_OUTOF10: 8
PAINLEVEL_OUTOF10: 4

## 2021-02-17 ASSESSMENT — PAIN DESCRIPTION - PAIN TYPE: TYPE: ACUTE PAIN

## 2021-02-17 ASSESSMENT — PAIN DESCRIPTION - DESCRIPTORS: DESCRIPTORS: PRESSURE

## 2021-02-17 ASSESSMENT — PAIN DESCRIPTION - LOCATION: LOCATION: CHEST

## 2021-02-18 ENCOUNTER — CARE COORDINATION (OUTPATIENT)
Dept: CARE COORDINATION | Age: 56
End: 2021-02-18

## 2021-02-18 LAB
SARS-COV-2: NORMAL
SARS-COV-2: NOT DETECTED
SOURCE: NORMAL

## 2021-02-18 NOTE — ED PROVIDER NOTES
[address] [place] [address] ED  EMERGENCY DEPARTMENT ENCOUNTER      Pt Name: Ivette Dhaliwal  MRN: [id number]  [place] 1965  Date of evaluation: 2/17/2021  Provider: Matheus Figueroa MD    CHIEF COMPLAINT       Chief Complaint   Patient presents with    Chest Pain    Shortness of Breath    Fever         HISTORY OF PRESENT ILLNESS  (Location/Symptom, Timing/Onset, Context/Setting, Quality, Duration, Modifying Factors, Severity.)   Ivette Dhaliwal is a 63 y.o. female who presents to the emergency department for chest pain fever and shortness of breath. The chest pain started about 2:00 this morning, about 18 hours ago. It is in the middle of her chest.  She thought her blood pressure was high. She is on blood pressure medication but has not missed any doses. She states her temperature was 101 degrees. She rated the pain as a 4. Nursing Notes were reviewed.     ALLERGIES     Lipitor [atorvastatin], Prozac [fluoxetine], and Zoloft [sertraline hcl]    CURRENT MEDICATIONS       Previous Medications    ALBUTEROL SULFATE HFA (VENTOLIN HFA) 108 (90 BASE) MCG/ACT INHALER    inhale 2 puffs by mouth every 6 hours if needed for wheezing    AMLODIPINE (NORVASC) 10 MG TABLET    TAKE 1 TABLET BY MOUTH DAILY    ASPIR-LOW 81 MG EC TABLET    take 1 tablet by mouth once daily    BLOOD PRESSURE MONITOR MISC    Use daily to take BP    BUSPIRONE (BUSPAR) 15 MG TABLET    take 1 tablet by mouth three times a day    CETIRIZINE (RA ALLERGY RELIEF) 10 MG TABLET    take 1 tablet by mouth once daily    CLOBETASOL (TEMOVATE) 0.05 % OINTMENT    apply to affected area twice a day    CLOPIDOGREL (PLAVIX) 75 MG TABLET    TAKE 1 TABLET BY MOUTH DAILY    DICLOFENAC SODIUM 1 % GEL    Apply 2 g topically 2 times daily    EZETIMIBE (ZETIA) 10 MG TABLET    Take 1 tablet by mouth daily    FLUTICASONE-VILANTEROL (BREO ELLIPTA) 100-25 MCG/INH AEPB INHALER    Inhale 1 puff into the lungs daily    HEAT WRAPS (SOFTHEAT HEATING WRAP ULTRA) MISC    Use daily to for neck and shoulder pain    HYDROCHLOROTHIAZIDE (HYDRODIURIL) 25 MG TABLET    Take 1 tablet by mouth daily    HYDROCORTISONE 1 % CREAM    apply topically twice a day    IBUPROFEN (ADVIL;MOTRIN) 800 MG TABLET    take 1 tablet by mouth every 8 hours if needed for pain    LIDOCAINE (LMX) 4 % CREAM    Apply 2g topically as needed. LISINOPRIL (PRINIVIL;ZESTRIL) 40 MG TABLET    TAKE 1 TABLET BY MOUTH EVERY DAY    METOPROLOL TARTRATE (LOPRESSOR) 50 MG TABLET    TAKE 2 TABLETS BY MOUTH TWICE DAILY    NITROGLYCERIN (NITROSTAT) 0.4 MG SL TABLET    Place 1 tablet under the tongue every 5 minutes as needed for Chest pain up to max of 3 total doses. If no relief after 1 dose, call 911. OMEPRAZOLE (PRILOSEC) 20 MG DELAYED RELEASE CAPSULE    take 1 capsule by mouth once daily       PAST MEDICAL HISTORY         Diagnosis Date    Anemia     Asthma     CAD (coronary artery disease)     Depression 10/20/2014    High cholesterol     Hypertension     MI, old     Osteoarthritis     Pneumonia     Sleep apnea     Stroke ([facility] [address] 9/27/2014       SURGICAL HISTORY           Procedure Laterality Date    APPENDECTOMY      CORONARY ANGIOPLASTY WITH STENT PLACEMENT  2004    HYSTERECTOMY      TONSILLECTOMY      TUBAL LIGATION           FAMILY HISTORY           Problem Relation Age of Onset    High Blood Pressure Mother      Family Status   Relation Name Status    Mother  (Not Specified)        SOCIAL HISTORY      reports that she has been smoking cigarettes. She has a 12.50 pack-year smoking history. She has never used smokeless tobacco. She reports current alcohol use. She reports that she does not use drugs. REVIEW OF SYSTEMS    (2-9 systems for level 4, 10 or more for level 5)     Review of Systems   Constitutional: Positive for fever. Negative for chills and fatigue. HENT: Negative for congestion, ear discharge and facial swelling. Eyes: Negative for discharge and redness.    Respiratory: Positive for shows sinus rhythm without acute change    RADIOLOGY:   Non-plain film images such as CT, Ultrasound and MRI are read by the radiologist. Jessie Aguilar radiographic images are visualized and preliminarily interpreted by the emergency physician with the below findings:    Interpretation per the Radiologist below, if available at the time of this note:    Xr Chest Portable    Result Date: 2/17/2021  EXAMINATION: [address] 2/17/2021 7:55 pm COMPARISON: Cyn 10, 2016. HISTORY: ORDERING SYSTEM PROVIDED HISTORY: Chest Pain TECHNOLOGIST PROVIDED HISTORY: Chest Pain Reason for Exam: Pt states chest pain x 1 day Acuity: Acute Type of Exam: Initial FINDINGS: A single upright frontal view chest radiograph was obtained. The heart size, mediastinal contour, and pleural spaces are within normal limits. The lungs are clear. There is no focal consolidation or pneumothorax. The pulmonary vascular pattern is within normal limits. No significant thoracic osseous abnormality. Clear lungs. No acute cardiopulmonary abnormality. LABS:  Labs Reviewed   CBC WITH AUTO DIFFERENTIAL - Abnormal; Notable for the following components:       Result Value    Seg Neutrophils 35 (*)     Lymphocytes 51 (*)     Absolute Lymph # 3.75 (*)     All other components within normal limits   TROP/MYOGLOBIN - Abnormal; Notable for the following components:    Myoglobin 22 (*)     All other components within normal limits   TROP/MYOGLOBIN - Abnormal; Notable for the following components:    Myoglobin <21 (*)     All other components within normal limits   BASIC METABOLIC PANEL   [id number]       All other labs were within normal range or not returned as of this dictation.     EMERGENCY DEPARTMENT COURSE and DIFFERENTIAL DIAGNOSIS/MDM:   Vitals:    Vitals:    02/17/21 2120 02/17/21 2135 02/17/21 2150 02/17/21 2224   BP: (!) 152/134 (!) 171/107 (!) 185/107 (!) 175/90   Pulse: 54 68 64 68   Resp: 15 20 16 16   Temp:       SpO2: 99% 98% 100% Weight:       Height:           Orders Placed This Encounter   Medications    aspirin chewable tablet 324 mg    acetaminophen (TYLENOL) tablet 650 mg    hydrALAZINE (APRESOLINE) injection 20 mg    aluminum & magnesium hydroxide-simethicone (MAALOX) 200-200-20 MG/5ML suspension 30 mL       Medical Decision Making: Her work-up including 2 sets of cardiac markers is negative. She reports a fever so coronavirus test is ordered and pending. She was given IV hydralazine with excellent improvement in her blood pressure. She is discharged home and will see her doctor in a day or 2 for blood pressure recheck. Treatment diagnosis and follow-up were discussed with the patient. CONSULTS:  None    PROCEDURES:  None    FINAL IMPRESSION      1. Chest pain, unspecified type    2. Essential hypertension    3.  Fever, unspecified fever cause          DISPOSITION/PLAN   DISPOSITION Decision To Discharge 02/17/2021 10:29:58 PM      PATIENT REFERRED TO:   Bety Mccarthy MD  [address] [facility] [address]  [address] [facility] [phone]    In 2 days      [facility] ED  [address] [facility]  [phone]    If symptoms worsen      DISCHARGE MEDICATIONS:     New Prescriptions    No medications on file         (Please note that portions of this note were completed with a voice recognition program.  Efforts were made to edit the dictations but occasionally words are mis-transcribed.)    Aston Bustamante MD  Attending Emergency Physician           Aston Bustamante MD  02/17/21 [id number]

## 2021-02-19 ENCOUNTER — TELEPHONE (OUTPATIENT)
Dept: FAMILY MEDICINE CLINIC | Age: 56
End: 2021-02-19

## 2021-02-19 LAB
EKG ATRIAL RATE: 58 BPM
EKG P AXIS: 51 DEGREES
EKG P-R INTERVAL: 152 MS
EKG Q-T INTERVAL: 448 MS
EKG QRS DURATION: 74 MS
EKG QTC CALCULATION (BAZETT): 439 MS
EKG R AXIS: 40 DEGREES
EKG T AXIS: 40 DEGREES
EKG VENTRICULAR RATE: 58 BPM

## 2021-02-19 NOTE — TELEPHONE ENCOUNTER
[facility] ([facility]) ED Follow up Call    Reason for ED visit:  CHEST PAIN         FU appts/Provider:    No future appointments. VOICEMAIL DOCUMENTATION - ERASE IF NOT USED  Hi, this message is for  [facility]   This is BETTIE from [employer] office. Just calling to see how you are doing after your recent visit to the Emergency Room. [employer] wants to make sure you were able to fill any prescriptions and that you understand your discharge instructions. Please return our call if you need to make a follow up appointment with your provider or have any further needs. Our phone number is [phone]*. Have a great day.

## 2021-03-15 DIAGNOSIS — R21 RASH: ICD-10-CM

## 2021-03-15 DIAGNOSIS — M54.2 NECK PAIN: ICD-10-CM

## 2021-03-15 DIAGNOSIS — F41.9 ANXIETY: ICD-10-CM

## 2021-03-15 DIAGNOSIS — J01.10 ACUTE FRONTAL SINUSITIS, RECURRENCE NOT SPECIFIED: ICD-10-CM

## 2021-03-15 DIAGNOSIS — I10 ESSENTIAL HYPERTENSION: ICD-10-CM

## 2021-03-15 DIAGNOSIS — J45.40 MODERATE PERSISTENT ASTHMA WITHOUT COMPLICATION: ICD-10-CM

## 2021-03-15 DIAGNOSIS — I25.119 CORONARY ARTERY DISEASE INVOLVING NATIVE CORONARY ARTERY OF NATIVE HEART WITH ANGINA PECTORIS (HCC): ICD-10-CM

## 2021-03-15 DIAGNOSIS — E78.2 MIXED HYPERLIPIDEMIA: ICD-10-CM

## 2021-03-15 NOTE — TELEPHONE ENCOUNTER
Please Approve or Refuse.   Send to Pharmacy per Pt's Request:     Next Visit Date:  3/22/2021   Last Visit Date: 11/11/2019    Hemoglobin A1C (%)   Date Value   08/24/2017 5.2             ( goal A1C is < 7)   BP Readings from Last 3 Encounters:   02/17/21 (!) 175/90   09/09/19 110/80   09/04/19 (!) 188/110          (goal 120/80)  BUN   Date Value Ref Range Status   02/17/2021 10 6 - 20 mg/dL Final     CREATININE   Date Value Ref Range Status   02/17/2021 0.82 0.50 - 0.90 mg/dL Final     Potassium   Date Value Ref Range Status   02/17/2021 4.1 3.7 - 5.3 mmol/L Final

## 2021-03-16 RX ORDER — FLUTICASONE FUROATE AND VILANTEROL TRIFENATATE 100; 25 UG/1; UG/1
1 POWDER RESPIRATORY (INHALATION) DAILY
Qty: 1 EACH | Refills: 0 | Status: SHIPPED | OUTPATIENT
Start: 2021-03-16 | End: 2021-03-24 | Stop reason: ALTCHOICE

## 2021-03-16 RX ORDER — ASPIRIN 81 MG/1
TABLET ORAL
Qty: 15 TABLET | Refills: 0 | Status: SHIPPED | OUTPATIENT
Start: 2021-03-16 | End: 2021-03-24 | Stop reason: SDUPTHER

## 2021-03-16 RX ORDER — BUSPIRONE HYDROCHLORIDE 15 MG/1
TABLET ORAL
Qty: 90 TABLET | Refills: 0 | Status: SHIPPED | OUTPATIENT
Start: 2021-03-16 | End: 2021-03-24 | Stop reason: SDUPTHER

## 2021-03-16 RX ORDER — HYDROCHLOROTHIAZIDE 25 MG/1
25 TABLET ORAL DAILY
Qty: 15 TABLET | Refills: 0 | Status: SHIPPED | OUTPATIENT
Start: 2021-03-16 | End: 2021-03-24 | Stop reason: SDUPTHER

## 2021-03-16 RX ORDER — IBUPROFEN 800 MG/1
TABLET ORAL
Qty: 15 TABLET | Refills: 0 | Status: SHIPPED | OUTPATIENT
Start: 2021-03-16 | End: 2021-03-30 | Stop reason: SDUPTHER

## 2021-03-16 RX ORDER — OMEPRAZOLE 20 MG/1
CAPSULE, DELAYED RELEASE ORAL
Qty: 30 CAPSULE | Refills: 0 | Status: SHIPPED | OUTPATIENT
Start: 2021-03-16 | End: 2021-04-16

## 2021-03-16 RX ORDER — ALBUTEROL SULFATE 90 UG/1
AEROSOL, METERED RESPIRATORY (INHALATION)
Qty: 54 G | Refills: 0 | Status: SHIPPED | OUTPATIENT
Start: 2021-03-16 | End: 2021-03-24 | Stop reason: SDUPTHER

## 2021-03-16 RX ORDER — AMLODIPINE BESYLATE 10 MG/1
10 TABLET ORAL DAILY
Qty: 15 TABLET | Refills: 0 | Status: SHIPPED | OUTPATIENT
Start: 2021-03-16 | End: 2021-03-24 | Stop reason: SDUPTHER

## 2021-03-16 RX ORDER — CETIRIZINE HYDROCHLORIDE 10 MG/1
TABLET ORAL
Qty: 15 TABLET | Refills: 0 | Status: SHIPPED | OUTPATIENT
Start: 2021-03-16 | End: 2022-08-10 | Stop reason: SDUPTHER

## 2021-03-16 RX ORDER — CLOPIDOGREL BISULFATE 75 MG/1
TABLET ORAL
Qty: 15 TABLET | Refills: 0 | Status: SHIPPED | OUTPATIENT
Start: 2021-03-16 | End: 2021-03-24 | Stop reason: SDUPTHER

## 2021-03-16 RX ORDER — CLOBETASOL PROPIONATE 0.5 MG/G
OINTMENT TOPICAL
Qty: 15 G | Refills: 2 | Status: SHIPPED | OUTPATIENT
Start: 2021-03-16 | End: 2021-08-31 | Stop reason: SDUPTHER

## 2021-03-16 RX ORDER — METOPROLOL TARTRATE 50 MG/1
TABLET, FILM COATED ORAL
Qty: 30 TABLET | Refills: 0 | Status: SHIPPED | OUTPATIENT
Start: 2021-03-16 | End: 2021-03-24 | Stop reason: SDUPTHER

## 2021-03-16 RX ORDER — LISINOPRIL 40 MG/1
TABLET ORAL
Qty: 15 TABLET | Refills: 0 | Status: SHIPPED | OUTPATIENT
Start: 2021-03-16 | End: 2021-03-24 | Stop reason: SDUPTHER

## 2021-03-24 ENCOUNTER — HOSPITAL ENCOUNTER (OUTPATIENT)
Age: 56
Setting detail: SPECIMEN
Discharge: HOME | End: 2021-03-24
Payer: MEDICARE

## 2021-03-24 ENCOUNTER — OFFICE VISIT (OUTPATIENT)
Dept: FAMILY MEDICINE CLINIC | Age: 56
End: 2021-03-24
Payer: MEDICARE

## 2021-03-24 VITALS
OXYGEN SATURATION: 98 % | SYSTOLIC BLOOD PRESSURE: 138 MMHG | BODY MASS INDEX: 27.79 KG/M2 | HEART RATE: 56 BPM | WEIGHT: 151 LBS | DIASTOLIC BLOOD PRESSURE: 95 MMHG | TEMPERATURE: 96.3 F | HEIGHT: 62 IN

## 2021-03-24 DIAGNOSIS — Z53.20 REFUSAL OF STATIN MEDICATION BY PATIENT: ICD-10-CM

## 2021-03-24 DIAGNOSIS — F32.0 CURRENT MILD EPISODE OF MAJOR DEPRESSIVE DISORDER WITHOUT PRIOR EPISODE (HCC): ICD-10-CM

## 2021-03-24 DIAGNOSIS — E78.2 MIXED HYPERLIPIDEMIA: Primary | ICD-10-CM

## 2021-03-24 DIAGNOSIS — E78.2 MIXED HYPERLIPIDEMIA: ICD-10-CM

## 2021-03-24 DIAGNOSIS — I25.119 CORONARY ARTERY DISEASE INVOLVING NATIVE CORONARY ARTERY OF NATIVE HEART WITH ANGINA PECTORIS (HCC): ICD-10-CM

## 2021-03-24 DIAGNOSIS — Z12.31 ENCOUNTER FOR SCREENING MAMMOGRAM FOR MALIGNANT NEOPLASM OF BREAST: ICD-10-CM

## 2021-03-24 DIAGNOSIS — Z12.39 BREAST CANCER SCREENING, HIGH RISK PATIENT: ICD-10-CM

## 2021-03-24 DIAGNOSIS — I10 ESSENTIAL HYPERTENSION: ICD-10-CM

## 2021-03-24 DIAGNOSIS — J45.40 MODERATE PERSISTENT ASTHMA WITHOUT COMPLICATION: ICD-10-CM

## 2021-03-24 DIAGNOSIS — F41.9 ANXIETY: ICD-10-CM

## 2021-03-24 DIAGNOSIS — Z12.11 COLON CANCER SCREENING: ICD-10-CM

## 2021-03-24 LAB
ABSOLUTE EOS #: 0.3 K/UL (ref 0–0.4)
ABSOLUTE IMMATURE GRANULOCYTE: ABNORMAL K/UL (ref 0–0.3)
ABSOLUTE LYMPH #: 3.1 K/UL (ref 1–4.8)
ABSOLUTE MONO #: 0.9 K/UL (ref 0.1–1.3)
ALBUMIN SERPL-MCNC: 4.6 G/DL (ref 3.5–5.2)
ALBUMIN/GLOBULIN RATIO: ABNORMAL (ref 1–2.5)
ALP BLD-CCNC: 58 U/L (ref 35–104)
ALT SERPL-CCNC: 17 U/L (ref 5–33)
ANION GAP SERPL CALCULATED.3IONS-SCNC: 10 MMOL/L (ref 9–17)
AST SERPL-CCNC: 18 U/L
BASOPHILS # BLD: 0 % (ref 0–2)
BASOPHILS ABSOLUTE: 0 K/UL (ref 0–0.2)
BILIRUB SERPL-MCNC: 0.29 MG/DL (ref 0.3–1.2)
BUN BLDV-MCNC: 11 MG/DL (ref 6–20)
BUN/CREAT BLD: ABNORMAL (ref 9–20)
CALCIUM SERPL-MCNC: 9.5 MG/DL (ref 8.6–10.4)
CHLORIDE BLD-SCNC: 104 MMOL/L (ref 98–107)
CHOLESTEROL/HDL RATIO: 6.8
CHOLESTEROL: 243 MG/DL
CO2: 26 MMOL/L (ref 20–31)
CREAT SERPL-MCNC: 0.74 MG/DL (ref 0.5–0.9)
DIFFERENTIAL TYPE: ABNORMAL
EOSINOPHILS RELATIVE PERCENT: 3 % (ref 0–4)
ESTIMATED AVERAGE GLUCOSE: 117 MG/DL
GFR AFRICAN AMERICAN: >60 ML/MIN
GFR NON-AFRICAN AMERICAN: >60 ML/MIN
GFR SERPL CREATININE-BSD FRML MDRD: ABNORMAL ML/MIN/{1.73_M2}
GFR SERPL CREATININE-BSD FRML MDRD: ABNORMAL ML/MIN/{1.73_M2}
GLUCOSE BLD-MCNC: 86 MG/DL (ref 70–99)
HBA1C MFR BLD: 5.7 % (ref 4–6)
HCT VFR BLD CALC: 42.3 % (ref 36–46)
HDLC SERPL-MCNC: 36 MG/DL
HEMOGLOBIN: 13.9 G/DL (ref 12–16)
IMMATURE GRANULOCYTES: ABNORMAL %
LDL CHOLESTEROL: 171 MG/DL (ref 0–130)
LYMPHOCYTES # BLD: 36 % (ref 24–44)
MCH RBC QN AUTO: 29.6 PG (ref 26–34)
MCHC RBC AUTO-ENTMCNC: 32.8 G/DL (ref 31–37)
MCV RBC AUTO: 90.2 FL (ref 80–100)
MONOCYTES # BLD: 11 % (ref 1–7)
NRBC AUTOMATED: ABNORMAL PER 100 WBC
PDW BLD-RTO: 15.2 % (ref 11.5–14.9)
PLATELET # BLD: 297 K/UL (ref 150–450)
PLATELET ESTIMATE: ABNORMAL
PMV BLD AUTO: 7.8 FL (ref 6–12)
POTASSIUM SERPL-SCNC: 4 MMOL/L (ref 3.7–5.3)
RBC # BLD: 4.69 M/UL (ref 4–5.2)
RBC # BLD: ABNORMAL 10*6/UL
SEG NEUTROPHILS: 50 % (ref 36–66)
SEGMENTED NEUTROPHILS ABSOLUTE COUNT: 4.2 K/UL (ref 1.3–9.1)
SODIUM BLD-SCNC: 140 MMOL/L (ref 135–144)
TOTAL PROTEIN: 7.7 G/DL (ref 6.4–8.3)
TRIGL SERPL-MCNC: 178 MG/DL
TSH SERPL DL<=0.05 MIU/L-ACNC: 2.18 MIU/L (ref 0.3–5)
VLDLC SERPL CALC-MCNC: ABNORMAL MG/DL (ref 1–30)
WBC # BLD: 8.5 K/UL (ref 3.5–11)
WBC # BLD: ABNORMAL 10*3/UL

## 2021-03-24 PROCEDURE — 80061 LIPID PANEL: CPT

## 2021-03-24 PROCEDURE — 99214 OFFICE O/P EST MOD 30 MIN: CPT | Performed by: FAMILY MEDICINE

## 2021-03-24 PROCEDURE — 36415 COLL VENOUS BLD VENIPUNCTURE: CPT

## 2021-03-24 PROCEDURE — 84443 ASSAY THYROID STIM HORMONE: CPT

## 2021-03-24 PROCEDURE — 83036 HEMOGLOBIN GLYCOSYLATED A1C: CPT

## 2021-03-24 PROCEDURE — 80053 COMPREHEN METABOLIC PANEL: CPT

## 2021-03-24 PROCEDURE — 85025 COMPLETE CBC W/AUTO DIFF WBC: CPT

## 2021-03-24 RX ORDER — LISINOPRIL 40 MG/1
TABLET ORAL
Qty: 90 TABLET | Refills: 1 | Status: SHIPPED | OUTPATIENT
Start: 2021-03-24 | End: 2021-11-02

## 2021-03-24 RX ORDER — HYDROCHLOROTHIAZIDE 25 MG/1
25 TABLET ORAL DAILY
Qty: 90 TABLET | Refills: 1 | Status: SHIPPED | OUTPATIENT
Start: 2021-03-24 | End: 2021-08-25

## 2021-03-24 RX ORDER — CLOPIDOGREL BISULFATE 75 MG/1
TABLET ORAL
Qty: 90 TABLET | Refills: 0 | Status: SHIPPED | OUTPATIENT
Start: 2021-03-24 | Stop reason: SDUPTHER

## 2021-03-24 RX ORDER — METOPROLOL TARTRATE 100 MG/1
100 TABLET ORAL 2 TIMES DAILY
Qty: 180 TABLET | Refills: 1 | Status: SHIPPED | OUTPATIENT
Start: 2021-03-24 | End: 2021-08-31

## 2021-03-24 RX ORDER — BUSPIRONE HYDROCHLORIDE 15 MG/1
TABLET ORAL
Qty: 90 TABLET | Refills: 0 | Status: SHIPPED | OUTPATIENT
Start: 2021-03-24

## 2021-03-24 RX ORDER — METOPROLOL TARTRATE 50 MG/1
TABLET, FILM COATED ORAL
Qty: 90 TABLET | Refills: 1 | Status: SHIPPED | OUTPATIENT
Start: 2021-03-24 | End: 2021-03-24 | Stop reason: SDUPTHER

## 2021-03-24 RX ORDER — AMLODIPINE BESYLATE 10 MG/1
10 TABLET ORAL DAILY
Qty: 90 TABLET | Refills: 0 | Status: SHIPPED | OUTPATIENT
Start: 2021-03-24 | End: 2021-06-29 | Stop reason: SDUPTHER

## 2021-03-24 RX ORDER — ASPIRIN 81 MG/1
TABLET ORAL
Qty: 90 TABLET | Refills: 1 | Status: ON HOLD | OUTPATIENT
Start: 2021-03-24 | End: 2022-04-16 | Stop reason: HOSPADM

## 2021-03-24 RX ORDER — ALBUTEROL SULFATE 90 UG/1
AEROSOL, METERED RESPIRATORY (INHALATION)
Qty: 54 G | Refills: 0 | Status: SHIPPED | OUTPATIENT
Start: 2021-03-24 | End: 2021-11-15

## 2021-03-24 ASSESSMENT — ENCOUNTER SYMPTOMS
SHORTNESS OF BREATH: 0
COUGH: 0
CONSTIPATION: 0
SINUS PAIN: 0
WHEEZING: 0
NAUSEA: 0
SORE THROAT: 0
ABDOMINAL DISTENTION: 0
CHEST TIGHTNESS: 0
ABDOMINAL PAIN: 0
PHOTOPHOBIA: 0
BACK PAIN: 0
BLOOD IN STOOL: 0
SINUS PRESSURE: 0

## 2021-03-24 ASSESSMENT — PATIENT HEALTH QUESTIONNAIRE - PHQ9
1. LITTLE INTEREST OR PLEASURE IN DOING THINGS: 1
2. FEELING DOWN, DEPRESSED OR HOPELESS: 1

## 2021-03-24 NOTE — PROGRESS NOTES
[]Negative depression screening. PHQ Scores 3/24/2021 5/8/2019 6/5/2018   PHQ2 Score 2 2 2   PHQ9 Score 2 2 2      [x]1-4 = Minimal depression   []5-9 = Milddepression   []10-14 = Moderate depression   []15-19 = Moderately severe depression   []20-27 = Severe depression    Discussed testing with the patient and all questions fully answered. Admission on 02/17/2021, Discharged on 02/17/2021   Component Date Value Ref Range Status    Glucose 02/17/2021 88  70 - 99 mg/dL Final    BUN 02/17/2021 10  6 - 20 mg/dL Final    CREATININE 02/17/2021 0.82  0.50 - 0.90 mg/dL Final    Bun/Cre Ratio 02/17/2021 12  9 - 20 Final    Calcium 02/17/2021 9.4  8.6 - 10.4 mg/dL Final    Sodium 02/17/2021 139  135 - 144 mmol/L Final    Potassium 02/17/2021 4.1  3.7 - 5.3 mmol/L Final    Chloride 02/17/2021 104  98 - 107 mmol/L Final    CO2 02/17/2021 23  20 - 31 mmol/L Final    Anion Gap 02/17/2021 12  9 - 17 mmol/L Final    GFR Non- 02/17/2021 >60  >60 mL/min Final    GFR  02/17/2021 >60  >60 mL/min Final    GFR Comment 02/17/2021        Final    Comment: Average GFR for 52-63 years old:   80 mL/min/1.73sq m  Chronic Kidney Disease:   <60 mL/min/1.73sq m  Kidney failure:   <15 mL/min/1.73sq m              eGFR calculated using average adult body mass.  Additional eGFR calculator available at:        [email or web address]            GFR Staging 02/17/2021 NOT REPORTED   Final    WBC 02/17/2021 7.3  3.5 - 11.3 k/uL Final    RBC 02/17/2021 4.73  3.95 - 5.11 m/uL Final    Hemoglobin 02/17/2021 14.0  11.9 - 15.1 g/dL Final    Hematocrit 02/17/2021 43.1  36.3 - 47.1 % Final    MCV 02/17/2021 91.1  82.6 - 102.9 fL Final    MCH 02/17/2021 29.6  25.2 - 33.5 pg Final    MCHC 02/17/2021 32.5  28.4 - 34.8 g/dL Final    RDW 02/17/2021 14.4  11.8 - 14.4 % Final    Platelets 66/67/5243 292  138 - 453 k/uL Final    MPV 02/17/2021 9.9  8.1 - 13.5 fL Final    NRBC Automated 02/17/2021 0.0  0.0 per 100 WBC Final    Differential Type 02/17/2021 NOT REPORTED   Final    Seg Neutrophils 02/17/2021 35* 36 - 65 % Final    Lymphocytes 02/17/2021 51* 24 - 43 % Final    Monocytes 02/17/2021 10  3 - 12 % Final    Eosinophils % 02/17/2021 3  1 - 4 % Final    Basophils 02/17/2021 1  0 - 2 % Final    Immature Granulocytes 02/17/2021 0  0 % Final    Segs Absolute 02/17/2021 2.56  1.50 - 8.10 k/uL Final    Absolute Lymph # 02/17/2021 3.75* 1.10 - 3.70 k/uL Final    Absolute Mono # 02/17/2021 0.69  0.10 - 1.20 k/uL Final    Absolute Eos # 02/17/2021 0.21  0.00 - 0.44 k/uL Final    Basophils Absolute 02/17/2021 0.06  0.00 - 0.20 k/uL Final    Absolute Immature Granulocyte 02/17/2021 0.01  0.00 - 0.30 k/uL Final    WBC Morphology 02/17/2021 NOT REPORTED   Final    RBC Morphology 02/17/2021 NOT REPORTED   Final    Platelet Estimate 29/90/7745 NOT REPORTED   Final    Ventricular Rate 02/17/2021 58  BPM Final    Atrial Rate 02/17/2021 58  BPM Final    P-R Interval 02/17/2021 152  ms Final    QRS Duration 02/17/2021 74  ms Final    Q-T Interval 02/17/2021 448  ms Final    QTc Calculation (Bazett) 02/17/2021 439  ms Final    P Axis 02/17/2021 51  degrees Final    R Axis 02/17/2021 40  degrees Final    T Axis 02/17/2021 40  degrees Final    Troponin, High Sensitivity 02/17/2021 7  0 - 14 ng/L Final    Comment:       High Sensitivity Troponin values cannot be compared with other Troponin methodologies. Patients with high levels of Biotin oral intake (i.e >5mg/day) may have falsely decreased   Troponin levels. Samples collected within 8 hours of biotin intake may require additional   information for diagnosis.       Troponin T 02/17/2021 NOT REPORTED  <0.03 ng/mL Final    Troponin Interp 02/17/2021 NOT REPORTED   Final    Myoglobin 02/17/2021 22* 25 - 58 ng/mL Final    Troponin, High Sensitivity 02/17/2021 6  0 - 14 ng/L Final    Comment:       High Sensitivity Troponin values cannot be compared with other Troponin methodologies. Patients with high levels of Biotin oral intake (i.e >5mg/day) may have falsely decreased   Troponin levels. Samples collected within 8 hours of biotin intake may require additional   information for diagnosis.  Troponin T 02/17/2021 NOT REPORTED  <0.03 ng/mL Final    Troponin Interp 02/17/2021 NOT REPORTED   Final    Myoglobin 02/17/2021 <21* 25 - 58 ng/mL Final    SARS-CoV-2 02/17/2021        Final    Source 02/17/2021 . NASOPHARYNGEAL SWAB   Final    SARS-CoV-2 02/17/2021 Not Detected  Not Detected Final    Comment:       The specimen is NEGATIVE for SARS-CoV-2, the novel coronavirus associated with COVID-19. A negative result does not rule out COVID-19. Sunday SARS-CoV-2 for use on the Sunday 6800/8800 [employer] is a real-time RT-PCR test intended   for the qualitative detection of nucleic acids from SARS-CoV-2  in clinician-collected nasal, nasopharyngeal, and oropharyngeal swab specimens from   individuals who meet COVID-19 clinical and/or epidemiological criteria. Sunday SARS-CoV-2 is for use only under Emergency Use Authorization (EUA) in laboratories   certified under [address] (CLIA), 42 U. S.C. §321I,   that meet requirements to perform high or moderate complexity tests. An individual without symptoms of COVID-19 and who is not shedding SARS-CoV-2 virus would   expect to have a negative (not detected) result in this assay.   Fact sheet for Healthcare Providers: Tammie  Fact sheet for Patients: https://www.                           fda.gov/media/109594/download        METHODOLOGY: RT-PCR           Most recent labs reviewed:     Lab Results   Component Value Date    WBC 7.3 02/17/2021    HGB 14.0 02/17/2021    HCT 43.1 02/17/2021    MCV 91.1 02/17/2021     02/17/2021       @BRIEFLAB(NA,K,CL,CO2,BUN,CREATININE,GLUCOSE,CALCIUM)@ Lab Results   Component Value Date    ALT 12 05/08/2019    AST 15 05/08/2019    ALKPHOS 59 05/08/2019    BILITOT 0.23 (L) 05/08/2019       Lab Results   Component Value Date    TSH 2.17 05/08/2019       Lab Results   Component Value Date    CHOL 236 (H) 05/08/2019    CHOL 267 (H) 08/24/2017    CHOL 221 (H) 03/07/2016     Lab Results   Component Value Date    TRIG 152 (H) 05/08/2019    TRIG 129 08/24/2017    TRIG 115 03/07/2016     Lab Results   Component Value Date    HDL 33 (L) 05/08/2019    HDL 43 08/24/2017    HDL 38 (L) 03/07/2016     Lab Results   Component Value Date    LDLCHOLESTEROL 173 (H) 05/08/2019    LDLCHOLESTEROL 198 (H) 08/24/2017    LDLCHOLESTEROL 160 (H) 03/07/2016     Lab Results   Component Value Date    VLDL NOT REPORTED 05/08/2019    VLDL NOT REPORTED 08/24/2017    VLDL NOT REPORTED 03/07/2016     Lab Results   Component Value Date    CHOLHDLRATIO 7.2 (H) 05/08/2019    CHOLHDLRATIO 6.2 (H) 08/24/2017    CHOLHDLRATIO 5.8 (H) 03/07/2016       Lab Results   Component Value Date    LABA1C 5.2 08/24/2017       Lab Results   Component Value Date    [id number] 366 08/24/2017       Lab Results   Component Value Date    FOLATE 5.5 08/24/2017       No results found for: IRON, TIBC, FERRITIN    Lab Results   Component Value Date    VITD25 <5.0 (L) 03/07/2016             Current Outpatient Medications   Medication Sig Dispense Refill    mometasone-formoterol (DULERA) 100-5 MCG/ACT inhaler Inhale 2 puffs into the lungs 2 times daily 1 Inhaler 3    hydroCHLOROthiazide (HYDRODIURIL) 25 MG tablet Take 1 tablet by mouth daily 90 tablet 1    lisinopril (PRINIVIL;ZESTRIL) 40 MG tablet TAKE 1 TABLET BY MOUTH EVERY DAY 90 tablet 1    aspirin (ASPIR-LOW) 81 MG EC tablet take 1 tablet by mouth once daily 90 tablet 1    clopidogrel (PLAVIX) 75 MG tablet TAKE 1 TABLET BY MOUTH DAILY 90 tablet 0    busPIRone (BUSPAR) 15 MG tablet take 1 tablet by mouth three times a day 90 tablet 0    albuterol sulfate HFA Non-medical: Not on file   Tobacco Use    Smoking status: Current Every Day Smoker     Packs/day: 0.50     Years: 25.00     Pack years: 12.50     Types: Cigarettes    Smokeless tobacco: Never Used   Substance and Sexual Activity    Alcohol use: Yes     Alcohol/week: 0.0 standard drinks     Comment: occasionally    Drug use: No    Sexual activity: Not Currently   Lifestyle    Physical activity     Days per week: Not on file     Minutes per session: Not on file    Stress: Not on file   Relationships    Social connections     Talks on phone: Not on file     Gets together: Not on file     Attends Alevism service: Not on file     Active member of club or organization: Not on file     Attends meetings of clubs or organizations: Not on file     Relationship status: Not on file    Intimate partner violence     Fear of current or ex partner: Not on file     Emotionally abused: Not on file     Physically abused: Not on file     Forced sexual activity: Not on file   Other Topics Concern    Not on file   Social History Narrative    Not on file     Ready to quit: Not Answered  Counseling given: Not Answered        Family History   Problem Relation Age of Onset    High Blood Pressure Mother              -rest of complaints with corresponding details per ROS    The patient's past medical, surgical, social, and family history as well as her current medications and allergies were reviewed as documented intoday's encounter. Review of Systems   Constitutional: Negative for activity change, appetite change, diaphoresis, fatigue and unexpected weight change. HENT: Negative for congestion, drooling, postnasal drip, sinus pressure, sinus pain and sore throat. Eyes: Negative for photophobia and visual disturbance. Respiratory: Negative for cough, chest tightness, shortness of breath and wheezing. Cardiovascular: Negative for chest pain, palpitations and leg swelling.    Gastrointestinal: Negative for abdominal distention, abdominal pain, blood in stool, constipation and nausea. Endocrine: Negative for polyuria. Genitourinary: Negative for difficulty urinating, frequency, urgency and vaginal pain. Musculoskeletal: Positive for arthralgias. Negative for back pain, gait problem, myalgias and neck pain. Neurological: Positive for numbness. Negative for dizziness, speech difficulty, weakness and headaches. Psychiatric/Behavioral: Negative for agitation, decreased concentration, hallucinations, self-injury and sleep disturbance. The patient is nervous/anxious. The patient is not hyperactive. Physical Exam  Vitals signs and nursing note reviewed. Constitutional:       Appearance: Normal appearance. HENT:      Head: Normocephalic and atraumatic. Nose: Nose normal. No congestion. Mouth/Throat:      Mouth: Mucous membranes are moist.   Eyes:      Extraocular Movements: Extraocular movements intact. Pupils: Pupils are equal, round, and reactive to light. Neck:      Musculoskeletal: Normal range of motion. Cardiovascular:      Rate and Rhythm: Normal rate and regular rhythm. Heart sounds: Normal heart sounds. Pulmonary:      Effort: Pulmonary effort is normal.      Breath sounds: Normal breath sounds. No wheezing or rhonchi. Abdominal:      General: Bowel sounds are normal. There is no distension. Palpations: Abdomen is soft. There is no mass. Tenderness: There is no abdominal tenderness. There is no guarding or rebound. Musculoskeletal:      Right shoulder: She exhibits normal range of motion and no tenderness. Lumbar back: She exhibits pain. She exhibits normal range of motion, no tenderness and no bony tenderness. Lymphadenopathy:      Cervical: No cervical adenopathy. Skin:     General: Skin is cool. Neurological:      Mental Status: She is alert and oriented to person, place, and time. Cranial Nerves: Cranial nerves are intact.  No cranial nerve deficit or dysarthria. Sensory: Sensation is intact. Motor: No weakness. Coordination: Romberg sign negative. Psychiatric:         Attention and Perception: Attention and perception normal.         Mood and Affect: Mood is anxious. Speech: Speech normal. Speech is not rapid and pressured, delayed or slurred. Behavior: Behavior normal.         Thought Content: Thought content normal. Thought content is not paranoid. Cognition and Memory: Cognition normal. Cognition is not impaired. Memory is not impaired. ASSESSMENT AND PLAN      1. Mixed hyperlipidemia  Uncontrolled, patient not ready to start any medications repeat lipid panel.  - CBC Auto Differential; Future  - Comprehensive Metabolic Panel; Future  - TSH without Reflex; Future    2. Essential hypertension  Fairly controlled, nurse visit in 1 week for BP check, refill all medications be compliant.  - CBC Auto Differential; Future  - Comprehensive Metabolic Panel; Future  - hydroCHLOROthiazide (HYDRODIURIL) 25 MG tablet; Take 1 tablet by mouth daily  Dispense: 90 tablet; Refill: 1  - lisinopril (PRINIVIL;ZESTRIL) 40 MG tablet; TAKE 1 TABLET BY MOUTH EVERY DAY  Dispense: 90 tablet; Refill: 1  - aspirin (ASPIR-LOW) 81 MG EC tablet; take 1 tablet by mouth once daily  Dispense: 90 tablet; Refill: 1  - clopidogrel (PLAVIX) 75 MG tablet; TAKE 1 TABLET BY MOUTH DAILY  Dispense: 90 tablet; Refill: 0  - amLODIPine (NORVASC) 10 MG tablet; Take 1 tablet by mouth daily  Dispense: 90 tablet; Refill: 0  - metoprolol tartrate (LOPRESSOR) 100 MG tablet; Take 1 tablet by mouth 2 times daily TAKE 2 TABLETS BY MOUTH TWICE DAILY  Dispense: 180 tablet; Refill: 1    3. Coronary artery disease involving native coronary artery of native heart with angina pectoris (HCC)  Stable continue same medications  - CBC Auto Differential; Future  - Comprehensive Metabolic Panel;  Future  - clopidogrel (PLAVIX) 75 MG tablet; TAKE 1 TABLET BY screening mammogram     Order Specific Question:   Reason for exam:     Answer:   screening    CBC Auto Differential     Standing Status:   Future     Standing Expiration Date:   3/25/2022    Comprehensive Metabolic Panel     Fasting 8 hrs     Standing Status:   Future     Standing Expiration Date:   3/24/2022    Hemoglobin A1C     Standing Status:   Future     Standing Expiration Date:   3/24/2022    Lipid Panel     Standing Status:   Future     Standing Expiration Date:   3/24/2022     Order Specific Question:   Is Patient Fasting?/# of Hours     Answer:   yes, 8-10 hours    TSH without Reflex     Standing Status:   Future     Standing Expiration Date:   3/24/2022         Medications Discontinued During This Encounter   Medication Reason    fluticasone-vilanterol (BREO ELLIPTA) 100-25 MCG/INH AEPB inhaler Alternate therapy    amLODIPine (NORVASC) 10 MG tablet REORDER    albuterol sulfate HFA (VENTOLIN HFA) 108 (90 Base) MCG/ACT inhaler REORDER    busPIRone (BUSPAR) 15 MG tablet REORDER    clopidogrel (PLAVIX) 75 MG tablet REORDER    hydroCHLOROthiazide (HYDRODIURIL) 25 MG tablet REORDER    metoprolol tartrate (LOPRESSOR) 50 MG tablet REORDER    lisinopril (PRINIVIL;ZESTRIL) 40 MG tablet REORDER    aspirin (ASPIR-LOW) 81 MG EC tablet REORDER    metoprolol tartrate (LOPRESSOR) 50 MG tablet REORDER       Maritza received counseling on the following healthy behaviors: nutrition, exercise, medication adherence and tobacco cessation  Reviewed prior labs and health maintenance  Continue current medications, diet and exercise. Discussed use, benefit, and side effects of prescribed medications. Barriers to medication compliance addressed. Patient given educational materials - see patient instructions  Was a self-tracking handout given in paper form or via FunCapt[employer]?  Yes    Requested Prescriptions     Signed Prescriptions Disp Refills    mometasone-formoterol (DULERA) 100-5 MCG/ACT inhaler 1 Inhaler 3 Sig: Inhale 2 puffs into the lungs 2 times daily    hydroCHLOROthiazide (HYDRODIURIL) 25 MG tablet 90 tablet 1     Sig: Take 1 tablet by mouth daily    lisinopril (PRINIVIL;ZESTRIL) 40 MG tablet 90 tablet 1     Sig: TAKE 1 TABLET BY MOUTH EVERY DAY    aspirin (ASPIR-LOW) 81 MG EC tablet 90 tablet 1     Sig: take 1 tablet by mouth once daily    clopidogrel (PLAVIX) 75 MG tablet 90 tablet 0     Sig: TAKE 1 TABLET BY MOUTH DAILY    busPIRone (BUSPAR) 15 MG tablet 90 tablet 0     Sig: take 1 tablet by mouth three times a day    albuterol sulfate HFA (VENTOLIN HFA) 108 (90 Base) MCG/ACT inhaler 54 g 0     Sig: inhale 2 puffs by mouth every 6 hours if needed for wheezing    amLODIPine (NORVASC) 10 MG tablet 90 tablet 0     Sig: Take 1 tablet by mouth daily    metoprolol tartrate (LOPRESSOR) 100 MG tablet 180 tablet 1     Sig: Take 1 tablet by mouth 2 times daily TAKE 2 TABLETS BY MOUTH TWICE DAILY       All patient questions answered. Patient voiced understanding. Quality Measures    Body mass index is 27.62 kg/m². Elevated. Weight control planned discussed Healthy diet and regular exercise. BP: (!) 138/95 Blood pressure is high. Treatment plan consists of Weight Reduction, DASH Eating Plan, Dietary Sodium Restriction, Increased Physical Activity, Moderation in Alcohol Consumption, Antihypertensive Medication Increased and No treatment change needed.     Lab Results   Component Value Date    [id number] 089 (H) 05/08/2019    (goal LDL reduction with dx if diabetes is 50% LDL reduction)      PHQ Scores 3/24/2021 5/8/2019 6/5/2018   PHQ2 Score 2 2 2   PHQ9 Score 2 2 2     Interpretation of Total Score Depression Severity: 1-4 = Minimal depression, 5-9 = Mild depression, 10-14 = Moderate depression, 15-19 = Moderately severe depression, 20-27 = Severe depression    The patient'spast medical, surgical, social, and family history as well as her   current medications and allergies were reviewed as documented in today's encounter. Medications, labs, diagnostic studies, consultations andfollow-up as documented in this encounter. No follow-ups on file. Patient wasseen with total face to face time of 30 minutes. More than 50% of this visit was counseling and education. Future Appointments   Date Time Provider Jorge Workman   4/5/2021 10:45 AM SCHEDULE, [facility]     This note was completed by using the assistance of a speech-recognition program. However, inadvertent computerized transcription errors may be present. Althoughevery effort was made to ensure accuracy, no guarantees can be provided that every mistake has been identified and corrected by editing.   Electronically signed by Zohra Powers MD on 3/24/2021  10:05 AM

## 2021-03-29 DIAGNOSIS — M54.2 NECK PAIN: ICD-10-CM

## 2021-03-29 NOTE — TELEPHONE ENCOUNTER
Please Approve or Refuse.   Send to Pharmacy per Pt's Request:      Next Visit Date:  4/5/2021   Last Visit Date: 3/24/2021    Hemoglobin A1C (%)   Date Value   03/24/2021 5.7   08/24/2017 5.2             ( goal A1C is < 7)   BP Readings from Last 3 Encounters:   03/24/21 (!) 138/95   02/17/21 (!) 175/90   09/09/19 110/80          (goal 120/80)  BUN   Date Value Ref Range Status   03/24/2021 11 6 - 20 mg/dL Final     CREATININE   Date Value Ref Range Status   03/24/2021 0.74 0.50 - 0.90 mg/dL Final     Potassium   Date Value Ref Range Status   03/24/2021 4.0 3.7 - 5.3 mmol/L Final

## 2021-03-30 RX ORDER — IBUPROFEN 800 MG/1
TABLET ORAL
Qty: 60 TABLET | Refills: 0 | Status: SHIPPED | OUTPATIENT
Start: 2021-03-30 | Stop reason: SDUPTHER

## 2021-03-31 ENCOUNTER — TELEPHONE (OUTPATIENT)
Dept: FAMILY MEDICINE CLINIC | Age: 56
End: 2021-03-31

## 2021-03-31 NOTE — TELEPHONE ENCOUNTER
PHARMACY CALLED ASKING IF YOU WANTED RX OMEGA 3 THAT YOU SENT ON 03/24/2021 TO BE OTC STRENGTH OR IF YOU WANTED THE GENERIC BRAND WHICH WOULD BE RX?

## 2021-04-16 DIAGNOSIS — I25.119 CORONARY ARTERY DISEASE INVOLVING NATIVE CORONARY ARTERY OF NATIVE HEART WITH ANGINA PECTORIS (HCC): ICD-10-CM

## 2021-04-16 RX ORDER — OMEPRAZOLE 20 MG/1
CAPSULE, DELAYED RELEASE ORAL
Qty: 90 CAPSULE | Refills: 1 | Status: SHIPPED | OUTPATIENT
Start: 2021-04-16 | End: 2021-11-15

## 2021-04-16 NOTE — TELEPHONE ENCOUNTER
"However, when we use ([employer], [employer], Please Approve or Refuse.   Send to Pharmacy per Pt's Request:      Next Visit Date:  Visit date not found   Last Visit Date: 3/24/2021    Hemoglobin A1C (%)   Date Value   03/24/2021 5.7   08/24/2017 5.2             ( goal A1C is < 7)   BP Readings from Last 3 Encounters:   03/24/21 (!) 138/95   02/17/21 (!) 175/90   09/09/19 110/80          (goal 120/80)  BUN   Date Value Ref Range Status   03/24/2021 11 6 - 20 mg/dL Final     CREATININE   Date Value Ref Range Status   03/24/2021 0.74 0.50 - 0.90 mg/dL Final     Potassium   Date Value Ref Range Status   03/24/2021 4.0 3.7 - 5.3 mmol/L Final "

## 2021-04-22 ENCOUNTER — TELEPHONE (OUTPATIENT)
Dept: FAMILY MEDICINE CLINIC | Age: 56
End: 2021-04-22

## 2021-04-22 NOTE — TELEPHONE ENCOUNTER
Attempted to contact patient to schedule Medicare Annual Wellness visit, no answer so voicemail was left. If patient calls please schedule visit.

## 2021-06-10 ENCOUNTER — TELEPHONE (OUTPATIENT)
Dept: FAMILY MEDICINE CLINIC | Age: 56
End: 2021-06-10

## 2021-06-29 DIAGNOSIS — I10 ESSENTIAL HYPERTENSION: ICD-10-CM

## 2021-06-29 RX ORDER — AMLODIPINE BESYLATE 10 MG/1
10 TABLET ORAL DAILY
Qty: 90 TABLET | Refills: 0 | Status: SHIPPED | OUTPATIENT
Start: 2021-06-29 | End: 2021-10-07

## 2021-06-29 NOTE — TELEPHONE ENCOUNTER
Please Approve or Refuse.   Send to Pharmacy per Pt's Request:      Next Visit Date:  8/31/2021   Last Visit Date: 3/24/2021    Hemoglobin A1C (%)   Date Value   03/24/2021 5.7   08/24/2017 5.2             ( goal A1C is < 7)   BP Readings from Last 3 Encounters:   03/24/21 (!) 138/95   02/17/21 (!) 175/90   09/09/19 110/80          (goal 120/80)  BUN   Date Value Ref Range Status   03/24/2021 11 6 - 20 mg/dL Final     CREATININE   Date Value Ref Range Status   03/24/2021 0.74 0.50 - 0.90 mg/dL Final     Potassium   Date Value Ref Range Status   03/24/2021 4.0 3.7 - 5.3 mmol/L Final

## 2021-07-02 DIAGNOSIS — I25.119 CORONARY ARTERY DISEASE INVOLVING NATIVE CORONARY ARTERY OF NATIVE HEART WITH ANGINA PECTORIS (HCC): ICD-10-CM

## 2021-07-02 DIAGNOSIS — I10 ESSENTIAL HYPERTENSION: ICD-10-CM

## 2021-07-02 DIAGNOSIS — M54.2 NECK PAIN: ICD-10-CM

## 2021-07-02 RX ORDER — IBUPROFEN 800 MG/1
TABLET ORAL
Qty: 60 TABLET | Refills: 0 | Status: SHIPPED | OUTPATIENT
Start: 2021-07-02 | End: 2021-08-25

## 2021-07-02 RX ORDER — CLOPIDOGREL BISULFATE 75 MG/1
TABLET ORAL
Qty: 90 TABLET | Refills: 0 | Status: SHIPPED | OUTPATIENT
Start: 2021-07-02 | End: 2021-10-07

## 2021-07-26 NOTE — TELEPHONE ENCOUNTER
LM for patient to schedule mammogram and to call us if we can help get it scheduled if not I gave her schedulings number to call.

## 2021-08-24 DIAGNOSIS — M54.2 NECK PAIN: ICD-10-CM

## 2021-08-24 DIAGNOSIS — I10 ESSENTIAL HYPERTENSION: ICD-10-CM

## 2021-08-25 RX ORDER — HYDROCHLOROTHIAZIDE 25 MG/1
25 TABLET ORAL DAILY
Qty: 15 TABLET | Refills: 0 | Status: ON HOLD | OUTPATIENT
Start: 2021-08-25 | End: 2022-04-16 | Stop reason: HOSPADM

## 2021-08-25 RX ORDER — IBUPROFEN 800 MG/1
TABLET ORAL
Qty: 60 TABLET | Refills: 0 | Status: SHIPPED | OUTPATIENT
Start: 2021-08-25 | End: 2021-11-02

## 2021-08-31 ENCOUNTER — OFFICE VISIT (OUTPATIENT)
Dept: FAMILY MEDICINE CLINIC | Age: 56
End: 2021-08-31
Payer: MEDICARE

## 2021-08-31 VITALS
HEIGHT: 62 IN | DIASTOLIC BLOOD PRESSURE: 88 MMHG | TEMPERATURE: 97.8 F | SYSTOLIC BLOOD PRESSURE: 138 MMHG | OXYGEN SATURATION: 98 % | WEIGHT: 144 LBS | BODY MASS INDEX: 26.5 KG/M2 | HEART RATE: 53 BPM

## 2021-08-31 DIAGNOSIS — F32.0 CURRENT MILD EPISODE OF MAJOR DEPRESSIVE DISORDER WITHOUT PRIOR EPISODE (HCC): ICD-10-CM

## 2021-08-31 DIAGNOSIS — R21 RASH: ICD-10-CM

## 2021-08-31 DIAGNOSIS — E78.2 MIXED HYPERLIPIDEMIA: ICD-10-CM

## 2021-08-31 DIAGNOSIS — R00.1 SINUS BRADYCARDIA: ICD-10-CM

## 2021-08-31 DIAGNOSIS — R73.03 PREDIABETES: ICD-10-CM

## 2021-08-31 DIAGNOSIS — I10 ESSENTIAL HYPERTENSION: Primary | ICD-10-CM

## 2021-08-31 DIAGNOSIS — I25.119 CORONARY ARTERY DISEASE INVOLVING NATIVE CORONARY ARTERY OF NATIVE HEART WITH ANGINA PECTORIS (HCC): ICD-10-CM

## 2021-08-31 PROCEDURE — 99214 OFFICE O/P EST MOD 30 MIN: CPT | Performed by: FAMILY MEDICINE

## 2021-08-31 RX ORDER — LOVASTATIN 10 MG/1
10 TABLET ORAL NIGHTLY
Qty: 30 TABLET | Refills: 3 | Status: SHIPPED | OUTPATIENT
Start: 2021-08-31 | End: 2021-09-01

## 2021-08-31 RX ORDER — EZETIMIBE 10 MG/1
10 TABLET ORAL DAILY
Qty: 90 TABLET | Refills: 3 | Status: SHIPPED | OUTPATIENT
Start: 2021-08-31

## 2021-08-31 RX ORDER — METOPROLOL TARTRATE 75 MG/1
75 TABLET, FILM COATED ORAL 2 TIMES DAILY
Qty: 60 TABLET | Refills: 1 | Status: ON HOLD | OUTPATIENT
Start: 2021-08-31 | End: 2022-04-16 | Stop reason: HOSPADM

## 2021-08-31 RX ORDER — CLOBETASOL PROPIONATE 0.5 MG/G
OINTMENT TOPICAL
Qty: 15 G | Refills: 2 | Status: ON HOLD | OUTPATIENT
Start: 2021-08-31 | End: 2022-04-25 | Stop reason: HOSPADM

## 2021-08-31 ASSESSMENT — ENCOUNTER SYMPTOMS
SINUS PRESSURE: 0
COUGH: 0
ABDOMINAL DISTENTION: 0
CHEST TIGHTNESS: 0
PHOTOPHOBIA: 0
WHEEZING: 0
SHORTNESS OF BREATH: 0
BACK PAIN: 1
VOMITING: 0
ABDOMINAL PAIN: 0
RHINORRHEA: 0
CONSTIPATION: 0

## 2021-08-31 NOTE — PROGRESS NOTES
Visit Information    Have you changed or started any medications since your last visit including any over-the-counter medicines, vitamins, or herbal medicines? no   Are you having any side effects from any of your medications? -  no  Have you stopped taking any of your medications? Is so, why? -  no    Have you seen any other physician or provider since your last visit? No  Have you had any other diagnostic tests since your last visit? No  Have you been seen in the emergency room and/or had an admission to a hospital since we last saw you? No  Have you had your routine dental cleaning in the past 6 months? no    Have you activated your [employer] account? If not, what are your barriers?  Yes     Patient Care Team:  Saundra Guan MD as PCP - General (Family Medicine)  Saundra Guan MD as PCP - [address] [place] [address] Dr Johnson Provider    Medical History Review  Past Medical, Family, and Social History reviewed and does contribute to the patient presenting condition    Health Maintenance   Topic Date Due    COVID-19 Vaccine (1) Never done    Breast cancer screen  03/30/2018    Colon Cancer Screen FIT/FOBT  05/24/2018    Cervical cancer screen  11/03/2018    Annual Wellness Visit (AWV)  Never done    Shingles Vaccine (1 of 2) 03/24/2022 (Originally 9/6/2014)    Flu vaccine (1) 09/01/2021    A1C test (Diabetic or Prediabetic)  03/24/2022    Potassium monitoring  03/24/2022    Creatinine monitoring  03/24/2022    DTaP/Tdap/Td vaccine (2 - Td or Tdap) 01/01/2024    Lipid screen  03/24/2026    Pneumococcal 0-64 years Vaccine (2 of 2 - PPSV23) 09/06/2029    Hepatitis C screen  Completed    HIV screen  Completed    Hepatitis A vaccine  Aged Out    Hepatitis B vaccine  Aged Out    Hib vaccine  Aged Out    Meningococcal (ACWY) vaccine  Aged Out

## 2021-09-01 RX ORDER — LOVASTATIN 10 MG/1
TABLET ORAL
Qty: 90 TABLET | Refills: 1 | Status: SHIPPED | OUTPATIENT
Start: 2021-09-01

## 2021-09-09 ENCOUNTER — APPOINTMENT (OUTPATIENT)
Dept: CT IMAGING | Age: 56
End: 2021-09-09
Payer: MEDICARE

## 2021-09-09 ENCOUNTER — APPOINTMENT (OUTPATIENT)
Dept: GENERAL RADIOLOGY | Age: 56
End: 2021-09-09
Payer: MEDICARE

## 2021-09-09 ENCOUNTER — HOSPITAL ENCOUNTER (EMERGENCY)
Age: 56
Discharge: HOME | End: 2021-09-09
Attending: EMERGENCY MEDICINE
Payer: MEDICARE

## 2021-09-09 VITALS
WEIGHT: 142.8 LBS | HEIGHT: 62 IN | TEMPERATURE: 98.6 F | HEART RATE: 47 BPM | RESPIRATION RATE: 14 BRPM | OXYGEN SATURATION: 100 % | DIASTOLIC BLOOD PRESSURE: 90 MMHG | SYSTOLIC BLOOD PRESSURE: 138 MMHG | BODY MASS INDEX: 26.28 KG/M2

## 2021-09-09 DIAGNOSIS — E04.1 THYROID NODULE: ICD-10-CM

## 2021-09-09 DIAGNOSIS — R07.9 CHEST PAIN, UNSPECIFIED TYPE: Primary | ICD-10-CM

## 2021-09-09 DIAGNOSIS — N17.9 AKI (ACUTE KIDNEY INJURY) (HCC): ICD-10-CM

## 2021-09-09 LAB
ABSOLUTE EOS #: 0.17 K/UL (ref 0–0.44)
ABSOLUTE IMMATURE GRANULOCYTE: 0.03 K/UL (ref 0–0.3)
ABSOLUTE LYMPH #: 3.44 K/UL (ref 1.1–3.7)
ABSOLUTE MONO #: 0.73 K/UL (ref 0.1–1.2)
ANION GAP SERPL CALCULATED.3IONS-SCNC: 16 MMOL/L (ref 9–17)
BASOPHILS # BLD: 1 % (ref 0–2)
BASOPHILS ABSOLUTE: 0.05 K/UL (ref 0–0.2)
BNP INTERPRETATION: NORMAL
BUN BLDV-MCNC: 21 MG/DL (ref 6–20)
BUN/CREAT BLD: 15 (ref 9–20)
CALCIUM SERPL-MCNC: 10.2 MG/DL (ref 8.6–10.4)
CHLORIDE BLD-SCNC: 100 MMOL/L (ref 98–107)
CO2: 22 MMOL/L (ref 20–31)
CREAT SERPL-MCNC: 1.37 MG/DL (ref 0.5–0.9)
DIFFERENTIAL TYPE: ABNORMAL
EOSINOPHILS RELATIVE PERCENT: 2 % (ref 1–4)
GFR AFRICAN AMERICAN: 48 ML/MIN
GFR NON-AFRICAN AMERICAN: 40 ML/MIN
GFR SERPL CREATININE-BSD FRML MDRD: ABNORMAL ML/MIN/{1.73_M2}
GFR SERPL CREATININE-BSD FRML MDRD: ABNORMAL ML/MIN/{1.73_M2}
GLUCOSE BLD-MCNC: 83 MG/DL (ref 70–99)
HCT VFR BLD CALC: 46.8 % (ref 36.3–47.1)
HEMOGLOBIN: 15.1 G/DL (ref 11.9–15.1)
IMMATURE GRANULOCYTES: 0 %
LYMPHOCYTES # BLD: 45 % (ref 24–43)
MCH RBC QN AUTO: 29.2 PG (ref 25.2–33.5)
MCHC RBC AUTO-ENTMCNC: 32.3 G/DL (ref 28.4–34.8)
MCV RBC AUTO: 90.3 FL (ref 82.6–102.9)
MONOCYTES # BLD: 10 % (ref 3–12)
NRBC AUTOMATED: 0 PER 100 WBC
PDW BLD-RTO: 14.3 % (ref 11.8–14.4)
PLATELET # BLD: 370 K/UL (ref 138–453)
PLATELET ESTIMATE: ABNORMAL
PMV BLD AUTO: 9.4 FL (ref 8.1–13.5)
POTASSIUM SERPL-SCNC: 3.7 MMOL/L (ref 3.7–5.3)
PRO-BNP: 54 PG/ML
RBC # BLD: 5.18 M/UL (ref 3.95–5.11)
RBC # BLD: ABNORMAL 10*6/UL
SEG NEUTROPHILS: 42 % (ref 36–65)
SEGMENTED NEUTROPHILS ABSOLUTE COUNT: 3.2 K/UL (ref 1.5–8.1)
SODIUM BLD-SCNC: 138 MMOL/L (ref 135–144)
TROPONIN INTERP: NORMAL
TROPONIN INTERP: NORMAL
TROPONIN T: NORMAL NG/ML
TROPONIN T: NORMAL NG/ML
TROPONIN, HIGH SENSITIVITY: 10 NG/L (ref 0–14)
TROPONIN, HIGH SENSITIVITY: 7 NG/L (ref 0–14)
WBC # BLD: 7.6 K/UL (ref 3.5–11.3)
WBC # BLD: ABNORMAL 10*3/UL

## 2021-09-09 PROCEDURE — 6360000004 HC RX CONTRAST MEDICATION: Performed by: EMERGENCY MEDICINE

## 2021-09-09 PROCEDURE — 83880 ASSAY OF NATRIURETIC PEPTIDE: CPT

## 2021-09-09 PROCEDURE — 96375 TX/PRO/DX INJ NEW DRUG ADDON: CPT

## 2021-09-09 PROCEDURE — 93005 ELECTROCARDIOGRAM TRACING: CPT | Performed by: EMERGENCY MEDICINE

## 2021-09-09 PROCEDURE — 2580000003 HC RX 258: Performed by: EMERGENCY MEDICINE

## 2021-09-09 PROCEDURE — 74174 CTA ABD&PLVS W/CONTRAST: CPT

## 2021-09-09 PROCEDURE — 2500000003 HC RX 250 WO HCPCS: Performed by: EMERGENCY MEDICINE

## 2021-09-09 PROCEDURE — 84484 ASSAY OF TROPONIN QUANT: CPT

## 2021-09-09 PROCEDURE — 6360000002 HC RX W HCPCS: Performed by: EMERGENCY MEDICINE

## 2021-09-09 PROCEDURE — 85025 COMPLETE CBC W/AUTO DIFF WBC: CPT

## 2021-09-09 PROCEDURE — 71045 X-RAY EXAM CHEST 1 VIEW: CPT

## 2021-09-09 PROCEDURE — 6370000000 HC RX 637 (ALT 250 FOR IP): Performed by: EMERGENCY MEDICINE

## 2021-09-09 PROCEDURE — 80048 BASIC METABOLIC PNL TOTAL CA: CPT

## 2021-09-09 PROCEDURE — 96374 THER/PROPH/DIAG INJ IV PUSH: CPT

## 2021-09-09 PROCEDURE — 99283 EMERGENCY DEPT VISIT LOW MDM: CPT

## 2021-09-09 RX ORDER — 0.9 % SODIUM CHLORIDE 0.9 %
80 INTRAVENOUS SOLUTION INTRAVENOUS ONCE
Status: COMPLETED | OUTPATIENT
Start: 2021-09-09 | End: 2021-09-09

## 2021-09-09 RX ORDER — SODIUM CHLORIDE 0.9 % (FLUSH) 0.9 %
10 SYRINGE (ML) INJECTION ONCE
Status: COMPLETED | OUTPATIENT
Start: 2021-09-09 | End: 2021-09-09

## 2021-09-09 RX ORDER — 0.9 % SODIUM CHLORIDE 0.9 %
1000 INTRAVENOUS SOLUTION INTRAVENOUS ONCE
Status: COMPLETED | OUTPATIENT
Start: 2021-09-09 | End: 2021-09-09

## 2021-09-09 RX ORDER — ASPIRIN 81 MG/1
324 TABLET, CHEWABLE ORAL ONCE
Status: COMPLETED | OUTPATIENT
Start: 2021-09-09 | End: 2021-09-09

## 2021-09-09 RX ORDER — ONDANSETRON 2 MG/ML
4 INJECTION INTRAMUSCULAR; INTRAVENOUS ONCE
Status: COMPLETED | OUTPATIENT
Start: 2021-09-09 | End: 2021-09-09

## 2021-09-09 RX ADMIN — ONDANSETRON 4 MG: 2 INJECTION INTRAMUSCULAR; INTRAVENOUS at 17:53

## 2021-09-09 RX ADMIN — SODIUM CHLORIDE 80 ML: 9 INJECTION, SOLUTION INTRAVENOUS at 20:50

## 2021-09-09 RX ADMIN — SODIUM CHLORIDE 1000 ML: 9 INJECTION, SOLUTION INTRAVENOUS at 17:53

## 2021-09-09 RX ADMIN — ASPIRIN 324 MG: 81 TABLET, CHEWABLE ORAL at 17:54

## 2021-09-09 RX ADMIN — SODIUM CHLORIDE, PRESERVATIVE FREE 10 ML: 5 INJECTION INTRAVENOUS at 20:50

## 2021-09-09 RX ADMIN — FAMOTIDINE 20 MG: 10 INJECTION, SOLUTION INTRAVENOUS at 17:53

## 2021-09-09 RX ADMIN — IOPAMIDOL 100 ML: 755 INJECTION, SOLUTION INTRAVENOUS at 20:49

## 2021-09-09 ASSESSMENT — ENCOUNTER SYMPTOMS
NAUSEA: 1
VOMITING: 0
BACK PAIN: 1
SHORTNESS OF BREATH: 1
ABDOMINAL PAIN: 0

## 2021-09-09 ASSESSMENT — HEART SCORE: ECG: 0

## 2021-09-09 ASSESSMENT — PAIN SCALES - GENERAL: PAINLEVEL_OUTOF10: 8

## 2021-09-09 NOTE — ED PROVIDER NOTES
Currently     Alcohol/week: 0.0 standard drinks     Comment: occasionally    Drug use: Yes     Types: Marijuana    Sexual activity: Not Currently   Other Topics Concern    Not on file   Social History Narrative    Not on file     Social Determinants of Health     Financial Resource Strain:     Difficulty of Paying Living Expenses:    Food Insecurity:     Worried About Running Out of Food in the Last Year:     [address] in the Last Year:    Transportation Needs:     Lack of Transportation (Medical):  Lack of Transportation (Non-Medical):    Physical Activity:     Days of Exercise per Week:     Minutes of Exercise per Session:    Stress:     Feeling of Stress :    Social Connections:     Frequency of Communication with Friends and Family:     Frequency of Social Gatherings with Friends and Family:     Attends Confucianist Services:     Active Member of Clubs or Organizations:     Attends Club or Organization Meetings:     Marital Status:    Intimate Partner Violence:     Fear of Current or Ex-Partner:     Emotionally Abused:     Physically Abused:     Sexually Abused:        Family History   Problem Relation Age of Onset    High Blood Pressure Mother        Allergies:    Lipitor [atorvastatin], Prozac [fluoxetine], and Zoloft [sertraline hcl]    Home Medications:  Prior to Admission medications    Medication Sig Start Date End Date Taking?  Authorizing Provider   lovastatin (MEVACOR) 10 MG tablet TAKE 1 TABLET BY MOUTH EVERY NIGHT 9/1/21   Norene Schirmer, MD   Ginkgo Biloba (GNP GINGKO BILOBA EXTRACT PO) Take by mouth daily    Historical Provider, MD   clobetasol (TEMOVATE) 0.05 % ointment apply to affected area twice a day 8/31/21   Norene Schirmer, MD   metoprolol 75 MG TABS Take 75 mg by mouth 2 times daily TAKE 2 TABLETS BY MOUTH TWICE DAILY 8/31/21   Norene Schirmer, MD   ezetimibe (ZETIA) 10 MG tablet Take 1 tablet by mouth daily 8/31/21   Norene Schirmer, MD   ibuprofen (ADVIL;MOTRIN) 800 MG tablet TAKE 1 TABLET BY MOUTH EVERY 8 HOURS AS NEEDED FOR PAIN 8/25/21   Shelva Cabot, MD   hydroCHLOROthiazide (HYDRODIURIL) 25 MG tablet TAKE 1 TABLET BY MOUTH DAILY 8/25/21   Shelva Cabot, MD   clopidogrel (PLAVIX) 75 MG tablet TAKE 1 TABLET BY MOUTH DAILY 7/2/21   Shelva Cabot, MD   amLODIPine (NORVASC) 10 MG tablet Take 1 tablet by mouth daily 6/29/21   Shelva Cabot, MD   omeprazole (PRILOSEC) 20 MG delayed release capsule TAKE 1 CAPSULE BY MOUTH EVERY DAY 4/16/21   Shelva Cabot, MD   ibuprofen (ADVIL;MOTRIN) 800 MG tablet take 1 tablet by mouth every 8 hours if needed for pain 3/30/21   Shelva Cabot, MD   mometasone-formoterol [facility]) 100-5 MCG/ACT inhaler Inhale 2 puffs into the lungs 2 times daily 3/24/21   Shelva Cabot, MD   lisinopril (PRINIVIL;ZESTRIL) 40 MG tablet TAKE 1 TABLET BY MOUTH EVERY DAY 3/24/21   Shelva Cabot, MD   aspirin (ASPIR-LOW) 81 MG EC tablet take 1 tablet by mouth once daily 3/24/21   Shelva Cabot, MD   busPIRone (BUSPAR) 15 MG tablet take 1 tablet by mouth three times a day 3/24/21   Shelva Cabot, MD   albuterol sulfate HFA (VENTOLIN HFA) 108 (90 Base) MCG/ACT inhaler inhale 2 puffs by mouth every 6 hours if needed for wheezing 3/24/21   Shelva Cabot, MD   Omega-3 Fatty Acids (OMEGA-3 EPA FISH OIL) 1205 MG CAPS Take 1 tablet by mouth daily 3/24/21   Shelva Cabot, MD   clopidogrel (PLAVIX) 75 MG tablet TAKE 1 TABLET BY MOUTH DAILY 3/24/21   Shelva Cabot, MD   cetirizine (ZYRTEC) 10 MG tablet take 1 tablet by mouth once daily 3/16/21   Shelva Cabot, MD   diclofenac sodium 1 % GEL Apply 2 g topically 2 times daily  Patient not taking: Reported on 3/24/2021 5/8/19   Shelva Cabot, MD   nitroGLYCERIN (NITROSTAT) 0.4 MG SL tablet Place 1 tablet under the tongue every 5 minutes as needed for Chest pain up to max of 3 total doses.  If no relief after 1 dose, call 911. 6/5/18   Shelva Cabot, MD   hydrocortisone 1 % cream apply topically twice a day 1/15/18   Shelva Cabot, MD Heat Wraps (SOFTHEAT HEATING WRAP ULTRA) MISC Use daily to for neck and shoulder pain 12/6/17   Shakeel Barcenas MD   lidocaine (LMX) 4 % cream Apply 2g topically as needed. 7/26/17   Shakeel Barcenas MD   Blood Pressure Monitor MISC Use daily to take BP 3/15/17   Shakeel Barcenas MD       REVIEW OF SYSTEMS    (2-9 systems for level 4, 10 or more for level 5)    Review of Systems   Constitutional: Negative for diaphoresis. Respiratory: Positive for shortness of breath. Cardiovascular: Positive for chest pain. Gastrointestinal: Positive for nausea. Negative for abdominal pain and vomiting. Genitourinary: Negative for frequency. Musculoskeletal: Positive for back pain and myalgias. Negative for neck pain. Neurological: Positive for dizziness and light-headedness. Negative for syncope, weakness and headaches. PHYSICAL EXAM   (up to 7 for level 4, 8 or more for level 5)    VITALS:   Vitals:    09/09/21 1613 09/09/21 1815 09/09/21 1930   BP: 129/80 139/79 131/85   Pulse: 55     Resp: 16     Temp: 98.6 °F (37 °C)     TempSrc: Oral     SpO2: 97%     Weight: 142 lb 12.8 oz (64.8 kg)     Height: 5' 2\" (1.575 m)         Physical Exam  Vitals and nursing note reviewed. Constitutional:       General: She is not in acute distress. Appearance: She is well-developed. She is not diaphoretic. HENT:      Head: Normocephalic and atraumatic. Eyes:      Conjunctiva/sclera: Conjunctivae normal.   Cardiovascular:      Rate and Rhythm: Normal rate and regular rhythm. Pulses: Normal pulses. Radial pulses are 2+ on the right side and 2+ on the left side. Dorsalis pedis pulses are 2+ on the right side and 2+ on the left side. Heart sounds: Normal heart sounds. Pulmonary:      Effort: Pulmonary effort is normal. No respiratory distress. Breath sounds: Normal breath sounds. No wheezing, rhonchi or rales. Abdominal:      General: There is no distension.       Palpations: Abdomen is soft.      Tenderness: There is no abdominal tenderness. There is no guarding or rebound. Musculoskeletal:         General: Normal range of motion. Cervical back: Normal range of motion. Right lower leg: No edema. Left lower leg: No edema. Skin:     General: Skin is warm and dry. Neurological:      Mental Status: She is alert and oriented to person, place, and time.    Psychiatric:         Behavior: Behavior normal.         DIFFERENTIAL  DIAGNOSIS   PLAN (LABS / IMAGING / EKG):  Orders Placed This Encounter   Procedures    XR CHEST 1 VIEW    CTA CHEST ABDOMEN PELVIS W CONTRAST    CBC with DIFF    Basic Metabolic Panel    Troponin    Brain Natriuretic Peptide    Troponin    EKG 12 Lead    Insert peripheral IV       MEDICATIONS ORDERED:  Orders Placed This Encounter   Medications    aspirin chewable tablet 324 mg    ondansetron (ZOFRAN) injection 4 mg    famotidine (PEPCID) injection 20 mg    0.9 % sodium chloride bolus    0.9 % sodium chloride bolus    sodium chloride flush 0.9 % injection 10 mL    iopamidol (ISOVUE-370) 76 % injection 100 mL     DIAGNOSTIC RESULTS / EMERGENCYDEPARTMENT COURSE / MDM   LABS:  Labs Reviewed   CBC WITH AUTO DIFFERENTIAL - Abnormal; Notable for the following components:       Result Value    RBC 5.18 (*)     Lymphocytes 45 (*)     All other components within normal limits   BASIC METABOLIC PANEL - Abnormal; Notable for the following components:    BUN 21 (*)     CREATININE 1.37 (*)     GFR Non- 40 (*)     GFR  48 (*)     All other components within normal limits   TROPONIN   BRAIN NATRIURETIC PEPTIDE   TROPONIN       RADIOLOGY:  XR CHEST 1 VIEW    Result Date: 9/9/2021  EXAMINATION: ONE XRAY VIEW OF THE CHEST 9/9/2021 6:23 pm COMPARISON: February 17, 2021 HISTORY: ORDERING SYSTEM PROVIDED HISTORY: CP TECHNOLOGIST PROVIDED HISTORY: CP Reason for Exam: chest pain Acuity: Acute Type of Exam: Initial FINDINGS: The lungs are bilateral renal arteries are patent. There is mild-to-moderate stenosis of both renal arteries at their origins. Moderate amount of calcified noncalcified plaque is seen within the infrarenal abdominal aorta. The inferior mesenteric artery is patent but diminutive. Organs: There are 2 low-attenuation lesions in the liver which demonstrate hypervascularity on the arterial phase images. These are incompletely evaluated on this study. No intrahepatic biliary ductal dilatation. The liver, spleen, pancreas, gallbladder, and adrenal glands are without acute process. Both kidneys are symmetric in size and enhancement. No evidence of hydronephrosis or hydroureter. GI/Bowel: The stomach is partially collapsed, limiting evaluation but grossly unremarkable. The small and large bowel are normal in caliber. No areas of focal wall thickening or obstruction are identified. The appendix is not visualized. Peritoneum/Retroperitoneum: No lymphadenopathy identified. There is no free intraperitoneal air, free fluid, or focal fluid collection identified. Bones/Soft Tissues: There is a small fat containing umbilical hernia. Soft tissues and osseous structures are without acute process. CTA PELVIS: Aorta/Iliacs: There is atherosclerotic calcification of the common, internal, and external iliac arteries without flow limiting stenosis. There is 1.3 x 0.6 cm aneurysmal dilatation of anterior division of the left internal iliac artery at its origin, axial image number 179. No areas of flow limiting stenosis are identified. There is mild stenosis of the left common femoral artery. The visualized proximal deep and superficial femoral arteries are patent. No active extravasation. Other: The bladder, uterus, and adnexa are without acute process. Calcified fibroids are seen within the uterus. Bones/Soft Tissues: Soft tissues and osseous structures are without acute process.      No aneurysm, dissection, or acute aortic injury involving the thoracic or abdominal aorta. Mild bibasilar dependent atelectasis. There is an incidental 2.7 cm nodule in the right thyroid. Dedicated thyroid ultrasound recommended for further evaluation on a nonemergent basis. There are 2 indeterminate hypervascular lesions in the liver, incompletely characterized due to the phasing of the scan, possibly flash filling hemangiomas. Follow-up liver protocol CT recommended. 1.3 cm aneurysmal dilatation of the left internal iliac artery anterior division at its origin. EKG    EKG Interpretation    Interpreted by emergency department physician    Rhythm: normal sinus   Rate: normal  Axis: normal  Ectopy: none  Conduction: normal  ST Segments: no acute change  T Waves: no acute change  Q Waves: none    Clinical Impression: non-specific EKG. Unchanged from 02/2021    All EKG's are interpreted by the Emergency Department Physician whoeither signs or Co-signs this chart in the absence of a cardiologist.    EMERGENCY DEPARTMENT COURSE:  ED Course as of Sep 09 2140   Thu Sep 09, 2021   1808 CBC with DIFF(!):    WBC 7.6   RBC 5.18(!)   Hemoglobin Quant 15.1   Hematocrit 46.8   MCV 90.3   MCH 29.2   MCHC 32.3   RDW 14.3   Platelet Count 611   MPV 9.4   NRBC Automated 0.0   Differential Type NOT REPORTED   Seg Neutrophils 42   Lymphocytes 45(!)   Monocytes 10   Eosinophils % 2   Basophils 1   Immature Granulocytes 0   Segs Absolute 3.20   Absolute Lymph # 3.44   Absolute Mono # 0.73   Absolute Eos # 0.17   Basophils Absolute 0.05   Absolute Immature Granulocyte 0.03   WBC Morphology NOT REPO. .. [AO]   1833 sadia   Basic Metabolic Panel(!):    Glucose 83   BUN 21(!)   Creatinine 1.37(!)   Bun/Cre Ratio 15   Calcium 10.2   Sodium 138   Potassium 3.7   Chloride 100   CO2 22   Anion Gap 16   GFR Non- 40(!)   GFR  48(!)   GFR Comment        GFR Staging NOT REPORTED [AO]   1833 Troponin, High Sensitivity: 10 [AO]   1833 Pro-BNP: 54 [AO]   1843 XR CHEST 1 VIEW [AO]    Troponin, High Sensitivity: 7 [AO]    Patient difficult stick, had to wait for US guided PIV placement that was adequate for CT scan     [AO]      ED Course User Index  [AO] Yogi Issa 1721, DO       MDM  Number of Diagnoses or Management Options  LV (acute kidney injury) ([facility] [address]  Chest pain, unspecified type  Thyroid nodule  Diagnosis management comments: History: 0  EC  Patient Age: 1  *Risk factors for Atherosclerotic disease: Cigarette smoking;Coronary Artery Disease;Hypertension; Hypercholesterolemia  Risk Factors: 2  Troponin: 0  Heart Score Total: 3       Amount and/or Complexity of Data Reviewed  Clinical lab tests: ordered and reviewed  Tests in the radiology section of CPT®: ordered and reviewed  Review and summarize past medical records: yes    Patient Progress  Patient progress: stable      PROCEDURES:  Procedures     CONSULTS:  None    CRITICAL CARE:  NONE    FINAL IMPRESSION     1. Chest pain, unspecified type    2. LV (acute kidney injury) ([facility] [address]    3. Thyroid nodule        DISPOSITION / PLAN   DISPOSITION        Evaluation and treatment course in the ED, and plan of care upon discharge was discussed in length with the patient. Patient had no further questions prior to being discharged and was instructed to return to the ED for new or worsening symptoms. Any changes to existing medications or new prescriptions were reviewed with patient and they expressed understanding of how to correctly take their medications and the possible side effects. PATIENT REFERRED TO:  Saundra Guan MD  [address] [facility] [id number]  [address] [place] [address] ED  [address] [facility]  [phone]    As needed, If symptoms worsen      DISCHARGE MEDICATIONS:  New Prescriptions    No medications on file       Margi Joshua, [place]  Emergency Medicine Physician    (Please note that portions of this note were completed with a voice recognition program.  Efforts were made to edit the dictations but occasionally words are mis-transcribed.)        Yogi Issa 1721, DO  09/09/21 1363

## 2021-09-10 ENCOUNTER — TELEPHONE (OUTPATIENT)
Dept: FAMILY MEDICINE CLINIC | Age: 56
End: 2021-09-10

## 2021-09-10 LAB
EKG ATRIAL RATE: 53 BPM
EKG P AXIS: 54 DEGREES
EKG P-R INTERVAL: 164 MS
EKG Q-T INTERVAL: 454 MS
EKG QRS DURATION: 78 MS
EKG QTC CALCULATION (BAZETT): 426 MS
EKG R AXIS: 39 DEGREES
EKG T AXIS: 33 DEGREES
EKG VENTRICULAR RATE: 53 BPM

## 2021-09-10 PROCEDURE — 93010 ELECTROCARDIOGRAM REPORT: CPT | Performed by: INTERNAL MEDICINE

## 2021-09-15 ENCOUNTER — VIRTUAL VISIT (OUTPATIENT)
Dept: FAMILY MEDICINE CLINIC | Age: 56
End: 2021-09-15
Payer: MEDICARE

## 2021-09-15 DIAGNOSIS — I10 ESSENTIAL HYPERTENSION: ICD-10-CM

## 2021-09-15 DIAGNOSIS — R07.9 CHEST PAIN, UNSPECIFIED TYPE: Primary | ICD-10-CM

## 2021-09-15 DIAGNOSIS — E78.5 DYSLIPIDEMIA: ICD-10-CM

## 2021-09-15 DIAGNOSIS — E04.1 THYROID NODULE: ICD-10-CM

## 2021-09-15 DIAGNOSIS — I25.119 CORONARY ARTERY DISEASE INVOLVING NATIVE CORONARY ARTERY OF NATIVE HEART WITH ANGINA PECTORIS (HCC): ICD-10-CM

## 2021-09-15 PROCEDURE — 99214 OFFICE O/P EST MOD 30 MIN: CPT | Performed by: FAMILY MEDICINE

## 2021-09-15 ASSESSMENT — ENCOUNTER SYMPTOMS
COLOR CHANGE: 0
BLOOD IN STOOL: 0
VOMITING: 0
WHEEZING: 0
CHEST TIGHTNESS: 0
COUGH: 0
SINUS PRESSURE: 0
PHOTOPHOBIA: 0
DIARRHEA: 0
ABDOMINAL PAIN: 0
ABDOMINAL DISTENTION: 0
ANAL BLEEDING: 0
SHORTNESS OF BREATH: 0

## 2021-09-15 NOTE — PROGRESS NOTES
[address] [address]  Phone: [phone], Fax: [phone]    TELEHEALTH EVALUATION -- Audio/Visual (During [facility] public health emergency)    Patient ID verified by me prior to start of this visit    Estevan Enrique (:  1965) has requested an audio/video evaluation for the following concern(s):  Chief Complaint   Patient presents with    Chest Pain      HPI:  Estevan Enrique is an established patient of Martin Leal MD   Patient has a history of chest pain, was in the ER. Patient reports the chest pain was for 5 days and it was not going away. She does have chest pain in the past also and has underlying coronary artery disease history. Patient had blood work done check for tropes which was negative. EKG that showed septal infarct, no new changes. Patient has not seen cardiologist since long time. Chest pain is persistent radiating to the back, aggravated on exertion. Hyperlipidemia uncontrolled, has not done blood work which was ordered previously. Patient had CTA chest done to rule out dissection which was negative. There was incidental finding of thyroid nodule which needs evaluation by ultrasound thyroid. Her TSH is normal.      Blood pressure is fairly controlled, patient has been noncompliant with medications in the past and with appointments. Patient takes over-the-counter ginkgo biloba, discussed about the side effects including high risk for bleeding along with the Plavix and aspirin. Patient will discontinue.        CTA chest :    No aneurysm, dissection, or acute aortic injury involving the thoracic or   abdominal aorta.       Mild bibasilar dependent atelectasis.       There is an incidental 2.7 cm nodule in the right thyroid.  Dedicated thyroid   ultrasound recommended for further evaluation on a nonemergent basis.       There are 2 indeterminate hypervascular lesions in the liver, incompletely characterized due to the phasing of the scan, possibly flash filling   hemangiomas.  Follow-up liver protocol CT recommended.              []Negative depression screening. [x]1-4 = Minimal depression   []5-9 = Mild depression   []10-14 = Moderate depression   []15-19 = Moderately severe depression   []20-27 = Severe depression  PHQ Scores 3/24/2021 5/8/2019 6/5/2018   PHQ2 Score 2 2 2   PHQ9 Score 2 2 2     Review of Systems   Constitutional: Negative for activity change, diaphoresis, fatigue and unexpected weight change. HENT: Negative for congestion, postnasal drip and sinus pressure. Eyes: Negative for photophobia and visual disturbance. Respiratory: Negative for cough, chest tightness, shortness of breath and wheezing. Cardiovascular: Positive for chest pain and palpitations. Negative for leg swelling. Gastrointestinal: Negative for abdominal distention, abdominal pain, anal bleeding, blood in stool, diarrhea and vomiting. Endocrine: Negative for polyuria. Genitourinary: Negative for difficulty urinating, flank pain, frequency, urgency and vaginal pain. Musculoskeletal: Positive for arthralgias and gait problem. Negative for joint swelling, neck pain and neck stiffness. Skin: Negative for color change and rash. Neurological: Positive for numbness. Negative for dizziness, speech difficulty, weakness and light-headedness. Psychiatric/Behavioral: Negative for agitation, behavioral problems, decreased concentration, dysphoric mood, hallucinations and sleep disturbance. The patient is nervous/anxious.         Patient Active Problem List    Diagnosis Date Noted    Thyroid nodule 09/15/2021    Prediabetes 08/31/2021    Sinus bradycardia 08/31/2021    Refusal of statin medication by patient 03/24/2021    Allergic sinusitis 05/08/2019    Current mild episode of major depressive disorder without prior episode ([facility] [address] 12/17/2018    Mixed hyperlipidemia 06/05/2018    Grade III hemorrhoids tablet take 1 tablet by mouth three times a day 90 tablet 0    albuterol sulfate HFA (VENTOLIN HFA) 108 (90 Base) MCG/ACT inhaler inhale 2 puffs by mouth every 6 hours if needed for wheezing 54 g 0    Omega-3 Fatty Acids (OMEGA-3 EPA FISH OIL) 1205 MG CAPS Take 1 tablet by mouth daily 90 capsule 1    cetirizine (ZYRTEC) 10 MG tablet take 1 tablet by mouth once daily 15 tablet 0    diclofenac sodium 1 % GEL Apply 2 g topically 2 times daily 1 Tube 3    nitroGLYCERIN (NITROSTAT) 0.4 MG SL tablet Place 1 tablet under the tongue every 5 minutes as needed for Chest pain up to max of 3 total doses. If no relief after 1 dose, call 911. 25 tablet 3    hydrocortisone 1 % cream apply topically twice a day 28.4 g 1    Heat Wraps (SOFTHEAT HEATING WRAP ULTRA) MISC Use daily to for neck and shoulder pain 1 each 0    lidocaine (LMX) 4 % cream Apply 2g topically as needed. 1 Tube 3    Blood Pressure Monitor MISC Use daily to take BP 1 each 0     No current facility-administered medications for this visit.        Allergies   Allergen Reactions    Lipitor [Atorvastatin] Other (See Comments)     Muscle cramping in legs    Prozac [Fluoxetine] Other (See Comments)     Mood swings    Zoloft [Sertraline Hcl] Other (See Comments)     Anger and mood swings         Social History     Tobacco Use    Smoking status: Current Every Day Smoker     Packs/day: 0.50     Years: 25.00     Pack years: 12.50     Types: Cigarettes    Smokeless tobacco: Never Used   Vaping Use    Vaping Use: Never used   Substance Use Topics    Alcohol use: Not Currently     Alcohol/week: 0.0 standard drinks     Comment: occasionally    Drug use: Yes     Types: Marijuana        PHYSICAL EXAMINATION:  Vital Signs: (As obtained by patient/caregiver or practitioner observation)    Constitutional: [x] Appears well-developed and well-nourished [x] No apparent distress      [] Abnormal-   Mental status  [x] Alert and awake  [x] Oriented to person/place/time [x]Able to follow commands      Eyes:  EOM    [x]  Normal  [] Abnormal-  Sclera  [x]  Normal  [] Abnormal -         Discharge [x]  None visible  [] Abnormal -    HENT:   [x] Normocephalic, atraumatic. [] Abnormal   [x] Mouth/Throat: Mucous membranes are moist.     External Ears [x] Normal  [] Abnormal-     Neck: [x] No visualized mass     Pulmonary/Chest: [x] Respiratory effort normal.  [x] No visualized signs of difficulty breathing or respiratory distress        [] Abnormal     Musculoskeletal:   [x] Normal gait with no signs of ataxia         [x] Normal range of motion of neck        [] Abnormal-     Neurological:        [x] No Facial Asymmetry (Cranial nerve 7 motor function) (limited exam to video visit)          [x] No gaze palsy        [] Abnormal-     Skin:        [x] No significant exanthematous lesions or discoloration noted on facial skin         [] Abnormal-     Psychiatric:       [x] Normal Affect [x] No Hallucinations        [x] Abnormal- Anxious, with pressured speech    Other pertinent observable physical exam findings-   Lab Results   Component Value Date    WBC 7.6 09/09/2021    HGB 15.1 09/09/2021    HCT 46.8 09/09/2021    MCV 90.3 09/09/2021     09/09/2021     Lab Results   Component Value Date     09/09/2021    K 3.7 09/09/2021     09/09/2021    CO2 22 09/09/2021    BUN 21 09/09/2021    CREATININE 1.37 09/09/2021    GLUCOSE 83 09/09/2021    CALCIUM 10.2 09/09/2021        Due to this being a TeleHealth encounter, evaluation of the following organ systems is limited: Vitals/Constitutional/EENT/Resp/CV/GI//MS/Neuro/Skin/Heme-Lymph-Imm. ASSESSMENT/PLAN:  1. Chest pain, unspecified type  Chronic chest pain, will do stress test no recent stress test.  Referred to cardiologist.  - Amb External Referral To Cardiology  - CARDIAC STRESS TEST EXERCISE ONLY; Future    2. Essential hypertension  Fairly controlled be compliant with medications    3.  Coronary artery disease involving native coronary artery of native heart with angina pectoris [facility])  Referral placed to cardiologist stress test ordered  - Amb External Referral To Cardiology  - CARDIAC STRESS TEST EXERCISE ONLY; Future    4. Dyslipidemia  Discussed with patient to repeat blood work    5. Thyroid nodule  Incidental finding and TSH is normal ultrasound thyroid. - US THYROID; Future    Controlled Substance Monitoring:  Acute and Chronic Pain Monitoring:   RX Monitoring 3/21/2021   Attestation The Prescription Monitoring Report for this patient was reviewed today. Periodic Controlled Substance Monitoring No signs of potential drug abuse or diversion identified. Orders Placed This Encounter   Procedures    US THYROID     This procedure can be scheduled via [employer]. Access your [employer] account by visiting [email or web address]. Standing Status:   Future     Standing Expiration Date:   9/15/2022     Order Specific Question:   Reason for exam:     Answer:   incedental thyroid nodule    Amb External Referral To Cardiology     Referral Priority:   Routine     Referral Type:   Consult for Advice and Opinion     Referral Reason:   Specialty Services Required     Referred to Provider:   Armani Wells DO     Requested Specialty:   Cardiology     Number of Visits Requested:   1    CARDIAC STRESS TEST EXERCISE ONLY     Standing Status:   Future     Standing Expiration Date:   9/15/2022     Order Specific Question:   Reason for Exam?     Answer:   Chest pain      No orders of the defined types were placed in this encounter. There are no discontinued medications. Arnol Porras received counseling on the following healthy behaviors: nutrition, exercise, medication adherence and tobacco cessation  Reviewed prior labs and health maintenance. Continue current medications, diet and exercise. Discussed use, benefit, and side effects of prescribed medications. Barriers to medication compliance addressed.    Patient given educational materials - see patient instructions. All patient questions answered. Patient voiced understanding. Return in about 4 weeks (around 10/13/2021) for vv . America Keith is a 61 y.o. female patient  being evaluated by a Virtual Visit (video visit) encounter to address concerns as mentioned above. A caregiver was present when appropriate. Due to this being a TeleHealth encounter (During [facility]-79 public health emergency), evaluation of the following organ systems was limited:Vitals/Constitutional/EENT/Resp/CV/GI//MS/Neuro/Skin/Heme-Lymph-Imm. Services were provided through a video synchronous discussion virtually to substitute for in-person clinic visit. This is a telehealth visit that was performed with the originating site at Patient Location: home and provider Location of [place], [place]. Verbal consent to participate in video visit was obtained. Patient ID verified by me prior to start of this visit  I discussed with the patient the nature of our telehealth visits via interactive/real-time audio/video that:  - I would evaluate the patient and recommend diagnostics and treatments based on my assessment  - Our sessions are not being recorded and that personal health information is protected  - Our team would provide follow up care in person if/when the patient needs it. Pursuant to the emergency declaration under the [address] [place] [address] authority and the [employer] Act, this Virtual Visit was conducted with patient's (and/or legal guardian's) consent, to reduce the patient's risk of exposure to COVID-19 and provide necessary medical care. The patient (and/or legal guardian) has also been advised to contact this office for worsening conditions or problems, and seek emergency medical treatment and/or call 911 if deemed necessary.     This note was completed by using the assistance of a speech-recognition program. However, inadvertent computerized transcription errors may be present. Although every effort was made to ensure accuracy, no guarantees can be provided that every mistake has been identified and corrected by editing.   Electronically signed by Param Stubbs MD on 9/15/21 at 3:16 PM EDT

## 2021-10-06 DIAGNOSIS — I10 ESSENTIAL HYPERTENSION: ICD-10-CM

## 2021-10-07 DIAGNOSIS — I10 ESSENTIAL HYPERTENSION: ICD-10-CM

## 2021-10-07 DIAGNOSIS — I25.119 CORONARY ARTERY DISEASE INVOLVING NATIVE CORONARY ARTERY OF NATIVE HEART WITH ANGINA PECTORIS (HCC): ICD-10-CM

## 2021-10-07 RX ORDER — CLOPIDOGREL BISULFATE 75 MG/1
TABLET ORAL
Qty: 90 TABLET | Refills: 0 | Status: SHIPPED | OUTPATIENT
Start: 2021-10-07

## 2021-10-07 RX ORDER — AMLODIPINE BESYLATE 10 MG/1
10 TABLET ORAL DAILY
Qty: 90 TABLET | Refills: 0 | Status: ON HOLD | OUTPATIENT
Start: 2021-10-07 | End: 2022-04-16 | Stop reason: HOSPADM

## 2021-10-07 NOTE — TELEPHONE ENCOUNTER
Please Approve or Refuse.   Send to Pharmacy per Pt's Request:     Next Visit Date:  10/13/2021   Last Visit Date: 9/15/2021    Hemoglobin A1C (%)   Date Value   03/24/2021 5.7   08/24/2017 5.2             ( goal A1C is < 7)   BP Readings from Last 3 Encounters:   09/09/21 (!) 138/90   08/31/21 138/88   03/24/21 (!) 138/95          (goal 120/80)  BUN   Date Value Ref Range Status   09/09/2021 21 (H) 6 - 20 mg/dL Final     CREATININE   Date Value Ref Range Status   09/09/2021 1.37 (H) 0.50 - 0.90 mg/dL Final     Potassium   Date Value Ref Range Status   09/09/2021 3.7 3.7 - 5.3 mmol/L Final

## 2021-11-01 DIAGNOSIS — I10 ESSENTIAL HYPERTENSION: ICD-10-CM

## 2021-11-01 DIAGNOSIS — M54.2 NECK PAIN: ICD-10-CM

## 2021-11-02 RX ORDER — LISINOPRIL 40 MG/1
TABLET ORAL
Qty: 90 TABLET | Refills: 1 | Status: ON HOLD | OUTPATIENT
Start: 2021-11-02 | End: 2022-04-16 | Stop reason: HOSPADM

## 2021-11-02 RX ORDER — IBUPROFEN 800 MG/1
TABLET ORAL
Qty: 60 TABLET | Refills: 0 | Status: ON HOLD | OUTPATIENT
Start: 2021-11-02 | End: 2022-04-16 | Stop reason: HOSPADM

## 2021-11-13 DIAGNOSIS — J45.40 MODERATE PERSISTENT ASTHMA WITHOUT COMPLICATION: ICD-10-CM

## 2021-11-13 DIAGNOSIS — I25.119 CORONARY ARTERY DISEASE INVOLVING NATIVE CORONARY ARTERY OF NATIVE HEART WITH ANGINA PECTORIS (HCC): ICD-10-CM

## 2021-11-15 RX ORDER — ALBUTEROL SULFATE 90 UG/1
AEROSOL, METERED RESPIRATORY (INHALATION)
Qty: 54 G | Refills: 0 | Status: SHIPPED | OUTPATIENT
Start: 2021-11-15

## 2021-11-15 RX ORDER — OMEPRAZOLE 20 MG/1
CAPSULE, DELAYED RELEASE ORAL
Qty: 90 CAPSULE | Refills: 1 | Status: SHIPPED | OUTPATIENT
Start: 2021-11-15

## 2021-11-30 ENCOUNTER — TELEPHONE (OUTPATIENT)
Dept: FAMILY MEDICINE CLINIC | Age: 56
End: 2021-11-30

## 2021-12-01 ENCOUNTER — HOSPITAL ENCOUNTER (OUTPATIENT)
Age: 56
Setting detail: SPECIMEN
Discharge: HOME | End: 2021-12-01
Payer: MEDICARE

## 2021-12-01 DIAGNOSIS — E78.2 MIXED HYPERLIPIDEMIA: ICD-10-CM

## 2021-12-01 DIAGNOSIS — R73.03 PREDIABETES: ICD-10-CM

## 2021-12-01 LAB
CHOLESTEROL, FASTING: 269 MG/DL
CHOLESTEROL/HDL RATIO: 7.5
ESTIMATED AVERAGE GLUCOSE: 111 MG/DL
HBA1C MFR BLD: 5.5 % (ref 4–6)
HDLC SERPL-MCNC: 36 MG/DL
LDL CHOLESTEROL: 201 MG/DL (ref 0–130)
TRIGLYCERIDE, FASTING: 160 MG/DL
VLDLC SERPL CALC-MCNC: ABNORMAL MG/DL (ref 1–30)

## 2021-12-01 PROCEDURE — 83036 HEMOGLOBIN GLYCOSYLATED A1C: CPT

## 2021-12-01 PROCEDURE — 36415 COLL VENOUS BLD VENIPUNCTURE: CPT

## 2021-12-01 PROCEDURE — 80061 LIPID PANEL: CPT

## 2021-12-30 NOTE — TELEPHONE ENCOUNTER
PSYCHIATRY DISCHARGE SUMMARY    Patient: Lillie Lorenz Date: 2021   : 1986 Attending: Ward Whitehead MD   35 year old female      Time spent on discharge:Over thirty minutes spent in final exam and discharge day activities.    Reason for Hospitalization: Lillie Lorenz carries a diagnosis of depression, ADHD, Borderline Personality Disorder. There is history of cocaine and opiate abuse.  She is admitted for concern for suicidal thoughts to overdose on her home medications.  She tells me that she was unable to see her children during the holidays because of ongoing pandemic and she had to spend the holidays all by herself . This lead to worsening of her depression and resurfacing of her suicidal thoughts. She is unable to come up with any protective factors.     She has had at least 4 suicide attempts in the past with most recent in 2019 when she overdosed on pills and cut herself. She denies psychotic symptoms at this time. She also denies homicidal thoughts. Regarding her medications, she will be resumed on clonidine for anxiety (dose will be optimized for anxiety), Vyvanse for ADD, Zoloft for depression. Seroquel and trazodone for moods stability and insomnia.  Metabolic side effects of Seroquel were discussed with her.  For depression, I may consider optimizing dose of Zoloft.  For her anxiety. and chronic pain, she is on Lyrica which she finds helpful.    Admitting Diagnosis:   Major Depressive Disorder   ADD  Borderline personality disorder  PTSD  History of stimulant abuse    Medical History:  Active Hospital Problems    Diagnosis    • Mood disorder (CMS/[facility])      Consultations: History and Physical Examination refer to consultation.    Laboratory Tests:  Lab Results   Component Value Date    WBC 5.6 2021    HGB 9.3 (L) 2021    HCT 31.8 (L) 2021     2021    SODIUM 138 2021    POTASSIUM 3.9 2021    CHLORIDE 106 2021    CO2 26 2021    GLUCOSE  Patient was scheduled for an appointment today and did not show. Today's appointment is her 3rd no show for the year. Per policy, 3 no shows for the year is grounds for dismissal, however we are to get the approval from the provider to dismiss. What would you like to do at this time? 126 (H) 12/27/2021    BUN 12 12/27/2021    CREATININE 0.90 12/27/2021    CALCIUM 9.0 12/27/2021    ALBUMIN 3.0 (L) 12/27/2021    MG 2.0 12/13/2021    BILIRUBIN 0.2 12/27/2021    ALKPT 95 12/27/2021    AST 12 12/27/2021    GPT 16 12/27/2021     Pending Labs: none     REGINALD - Cocaine: Negative      Vitals:    12/30/21 0833   BP: 138/81   Pulse:    Resp:    Temp:        Hospital Course: Lillie Lorenz was admitted Voluntarily and seen by medical doctor for history and physical exam. Lab work was drawn with results shown above. The patient was encouraged to attend group therapy sessions to gain psychoeducational benefit. Necessary PRN's for agitation, insomnia, pain, nausea/vomiting, bowel prep were ordered.     Lillie Lorenz was started on higher dose of Seroquel to help with depression and anxiety and promote sleep.  She was reminded about the metabolic side effects of Seroquel.  Dose of clonidine was also slightly optimized to help with racing thoughts.  Her Vyvanse was also slightly optimized to help with focus/attention. We talked about the risks versus benefits of controlled substances.  She acknowledged understanding.  Otherwise, she was tolerating her medications well and showed drastic improvement in her moods.  She agreed to step down to  PHP after discharge.  Patient has tendency to over utilize inpatient services so encouraged her that before inpatient she should consider other avenues when in crisis such as PHP/IOP and her outpatient providers.  She acknowledged understanding.    At the time of discharge, patient denied AH/VH. Mood was improved. Was tolerating medications well. Denied any thoughts of harming self or others and promised to seek help immediately if they start to have such thoughts.     Mental Status Exam:   General appearance: in no acute distress  Speech   Rate: normal  Volume: normal  Latency: normal  Mood/affect: neutral  Psychomotor: normal  Associations: intact  Thought process:  intact  Thought content: unremarkable. Denies SI/HI.   Perceptual disorders/hallucinations: none  Orientation: oriented to person, place, time, and general circumstances  Attention: ability to maintain attention   Concentration: Normal  Language: Normal  Fund of knowledge: average  Memory: no apparent impairments in short term memory and no apparent impairments in long term memory   Insight: limited   Judgement: fair    Post Hospitalization Plan:   AFTERCARE PLAN     Your scheduled [facility] appointments are listed below.    · To cancel the appointment, please call the program at least (2) hours before the scheduled start time. If calling outside of business hours, you may leave a message.   · If you have an unexcused absence for your first scheduled program appointment, your future appointments WILL be cancelled to permit the next person on the waiting list to be scheduled.  You may need to wait until another opening occurs, which may delay your treatment. Should this occur, please contact Scheduling at [phone] to obtain a reassessment appointment for future services with [facility].     Mental Health [facility] ([facility])  [address] [place] [address], [address]  [address]  [phone]  Start date:  Monday, January 3rd at 9:00am  The program meets Monday through Friday from 9:00 a.m. to 2:45 p.m.     Rides for PHP - MH has been arranged through [facility] [phone].  Please be ready by 7:30am (for your 8:30am registration) on Monday 1/3/22. Every day after please be ready by 8:00am.  Your rides home have been scheduled for 3:00pm through 1/20/22     Therapy:  Appointment: Tuesday, January 4th, 2022 at 11:30am. This is an in person appointment.   Donna Mckee LPC  [facility]  [address]  [place] [address]  [phone]  Please provide 24 hour notice of cancellation or reschedule.           Medication Management:  Appointment: Thursday,  January 6th, 2022 at 10:00am. This is an in person visit.   JACKIE Lam  [facility]  [address]  [place] [address]  [phone]  Please provide 24 hour notice of cancellation or reschedule.           Primary Medical Physician:  Appointment: Please follow up as needed for medical concerns. No release of information signed.   Verena Lorenz RN, JACKIE  [facility]  [address] [place] [address] [phone]     TRANSPORTATION  If you do not have transportation, transportation to and from your appointment can be arranged through [employer].:     To schedule transportation:  Call [phone] at least two business days in advance, unless your trip is urgent. If you call less than two business days prior to your appointment and the trip is not urgent, we might ask you to reschedule your appointment. Members and medical facilities may also conveniently schedule transportation online.     If your ride is late:   Contact [employer]s “Where's My Ride” line at [phone] if:  · You have been waiting for your ride for more than 15 minutes after your scheduled pick-up time   · You had a pre-scheduled return ride and you have waited for more than 15 minutes   You called the transportation provider after your appointment and have waited for more than one hour           Remote Recovery Resources  [place]: Hotline  771.613.4715  [place]: Hotline  457.358.3834  [place]: Hotline  690.686.8645  [facility]:  7-598-145-2449     https://virtual-na.org  https://www.[employer]-online.com/meetings/lntqwl-hv-tvdylrat  https://aachats.org/ab-naecsxma-vyfiro  https://www.LotamerecMedypaly.org  https://www.ca-online.org  https://www.ngmoco.[place]   https://heroinanonymous.org      Legal Name: Lillie Lorenz     Discharge on: 12/30/2021    Discharge To: Self    Patient has a substance use disorder diagnosis listed: No.    - Next Level of Care Recommended: Mental Health [facility]  Program.     - Patient Response: Accepts     PDMP reviewed and no concerns at this time    Discharge Medications:      Summary of your Discharge Medications      Take these Medications      Details   albuterol 108 (90 Base) MCG/ACT inhaler   Inhale 2 puffs into the lungs every 4 hours as needed for Wheezing.     cloNIDine 0.2 MG tablet  Commonly known as: CATAPRES   Take 1 tablet by mouth 2 times daily.     ferrous sulfate 325 (65 FE) MG tablet   Take 1 tablet by mouth 2 times daily (with meals).     Hair/Skin/Nails Cap  Notes to patient: supplement   Take 1 capsule by mouth daily.     lisdexamfetamine 70 MG capsule  Commonly known as: VYVANSE   Take 1 capsule by mouth every morning.  Comment: f90.9     lisinopril 20 MG tablet  Commonly known as: ZESTRIL   Take 1 tablet by mouth daily.     loratadine 10 MG tablet  Commonly known as: CLARITIN   Take 1 tablet by mouth daily (before breakfast).     Mirena (52 MG) (52 MG) 20 MCG/DAY intrauterine device  Notes to patient: BC   Generic drug: levonorgestrel  1 each by Intrauterine route 1 time. 10/20/21     montelukast 10 MG tablet  Commonly known as: Singulair   Take 1 tablet by mouth nightly.     pantoprazole 40 MG tablet  Commonly known as: PROTONIX   Take 1 tablet by mouth 2 times daily (before meals).     pregabalin 50 MG capsule  Commonly known as: LYRICA   Take 1 capsule by mouth 2 times daily.     QUEtiapine 100 MG tablet  Commonly known as: SEROquel   Take 5 tablets by mouth at bedtime.     sertraline 50 MG tablet  Commonly known as: ZOLOFT   Take 1 tablet by mouth daily.     topiramate 50 MG tablet  Commonly known as: TOPAMAX   Take 1 tablet by mouth at bedtime     traZODone 100 MG tablet  Commonly known as: DESYREL   Take 1 tablet by mouth at bedtime.            Tobacco Best Practice  Nicotine replacement therapy not ordered due to: Patient is not interested     Patient is discharged with directions to take two or more scheduled antipsychotic agents:  No        Diet: Regular expect foods, if any, in allergy list.    Activity Level: As tolerated    Legal: Voluntary     Final Diagnosis for this level of Care:  Major Depressive Disorder   ADD  Borderline personality disorder  PTSD  History of stimulant abuse    This information is confidential and disclosure without patient consent or statutory authorization is prohibited by law.    Ward Whitehead MD  Inpatient Psychiatry

## 2022-04-03 ENCOUNTER — APPOINTMENT (OUTPATIENT)
Dept: GENERAL RADIOLOGY | Age: 57
DRG: 233 | End: 2022-04-03
Payer: MEDICARE

## 2022-04-03 ENCOUNTER — HOSPITAL ENCOUNTER (INPATIENT)
Age: 57
LOS: 13 days | Discharge: HOME HEALTH | DRG: 233 | End: 2022-04-16
Attending: EMERGENCY MEDICINE | Admitting: INTERNAL MEDICINE
Payer: MEDICARE

## 2022-04-03 DIAGNOSIS — I21.4 NSTEMI (NON-ST ELEVATED MYOCARDIAL INFARCTION) (HCC): Primary | ICD-10-CM

## 2022-04-03 PROBLEM — I10 ESSENTIAL HYPERTENSION: Chronic | Status: ACTIVE | Noted: 2017-07-26

## 2022-04-03 PROBLEM — E78.2 MIXED HYPERLIPIDEMIA: Chronic | Status: ACTIVE | Noted: 2018-06-05

## 2022-04-03 LAB
ABSOLUTE EOS #: 0.14 K/UL (ref 0–0.44)
ABSOLUTE IMMATURE GRANULOCYTE: <0.03 K/UL (ref 0–0.3)
ABSOLUTE LYMPH #: 2.88 K/UL (ref 1.1–3.7)
ABSOLUTE MONO #: 0.58 K/UL (ref 0.1–1.2)
ANION GAP SERPL CALCULATED.3IONS-SCNC: 15 MMOL/L (ref 9–17)
BASOPHILS # BLD: 1 % (ref 0–2)
BASOPHILS ABSOLUTE: 0.05 K/UL (ref 0–0.2)
BUN BLDV-MCNC: 11 MG/DL (ref 6–20)
CALCIUM SERPL-MCNC: 9.6 MG/DL (ref 8.6–10.4)
CHLORIDE BLD-SCNC: 107 MMOL/L (ref 98–107)
CO2: 21 MMOL/L (ref 20–31)
CREAT SERPL-MCNC: 0.92 MG/DL (ref 0.5–0.9)
EOSINOPHILS RELATIVE PERCENT: 2 % (ref 1–4)
GFR AFRICAN AMERICAN: >60 ML/MIN
GFR NON-AFRICAN AMERICAN: >60 ML/MIN
GFR SERPL CREATININE-BSD FRML MDRD: ABNORMAL ML/MIN/{1.73_M2}
GLUCOSE BLD-MCNC: 80 MG/DL (ref 70–99)
HCT VFR BLD CALC: 42.7 % (ref 36.3–47.1)
HCT VFR BLD CALC: 43.9 % (ref 36.3–47.1)
HEMOGLOBIN: 13.9 G/DL (ref 11.9–15.1)
HEMOGLOBIN: 14.1 G/DL (ref 11.9–15.1)
IMMATURE GRANULOCYTES: 0 %
LYMPHOCYTES # BLD: 42 % (ref 24–43)
MCH RBC QN AUTO: 29.3 PG (ref 25.2–33.5)
MCH RBC QN AUTO: 29.4 PG (ref 25.2–33.5)
MCHC RBC AUTO-ENTMCNC: 32.1 G/DL (ref 28.4–34.8)
MCHC RBC AUTO-ENTMCNC: 32.6 G/DL (ref 28.4–34.8)
MCV RBC AUTO: 90.3 FL (ref 82.6–102.9)
MCV RBC AUTO: 91.1 FL (ref 82.6–102.9)
MONOCYTES # BLD: 9 % (ref 3–12)
NRBC AUTOMATED: 0 PER 100 WBC
NRBC AUTOMATED: 0 PER 100 WBC
PARTIAL THROMBOPLASTIN TIME: 26.7 SEC (ref 20.5–30.5)
PDW BLD-RTO: 15.2 % (ref 11.8–14.4)
PDW BLD-RTO: 15.3 % (ref 11.8–14.4)
PLATELET # BLD: 309 K/UL (ref 138–453)
PLATELET # BLD: 321 K/UL (ref 138–453)
PMV BLD AUTO: 9.3 FL (ref 8.1–13.5)
PMV BLD AUTO: 9.6 FL (ref 8.1–13.5)
POTASSIUM SERPL-SCNC: 4 MMOL/L (ref 3.7–5.3)
PRO-BNP: 217 PG/ML
RBC # BLD: 4.73 M/UL (ref 3.95–5.11)
RBC # BLD: 4.82 M/UL (ref 3.95–5.11)
RBC # BLD: ABNORMAL 10*6/UL
REASON FOR REJECTION: NORMAL
SEG NEUTROPHILS: 46 % (ref 36–65)
SEGMENTED NEUTROPHILS ABSOLUTE COUNT: 3.15 K/UL (ref 1.5–8.1)
SODIUM BLD-SCNC: 143 MMOL/L (ref 135–144)
TROPONIN, HIGH SENSITIVITY: 70 NG/L (ref 0–14)
TROPONIN, HIGH SENSITIVITY: 80 NG/L (ref 0–14)
TSH SERPL DL<=0.05 MIU/L-ACNC: 1.33 UIU/ML (ref 0.3–5)
WBC # BLD: 6.8 K/UL (ref 3.5–11.3)
WBC # BLD: 7.8 K/UL (ref 3.5–11.3)
ZZ NTE CLEAN UP: ORDERED TEST: NORMAL
ZZ NTE WITH NAME CLEAN UP: SPECIMEN SOURCE: NORMAL

## 2022-04-03 PROCEDURE — 85025 COMPLETE CBC W/AUTO DIFF WBC: CPT

## 2022-04-03 PROCEDURE — 83880 ASSAY OF NATRIURETIC PEPTIDE: CPT

## 2022-04-03 PROCEDURE — A4216 STERILE WATER/SALINE, 10 ML: HCPCS | Performed by: STUDENT IN AN ORGANIZED HEALTH CARE EDUCATION/TRAINING PROGRAM

## 2022-04-03 PROCEDURE — 2580000003 HC RX 258: Performed by: STUDENT IN AN ORGANIZED HEALTH CARE EDUCATION/TRAINING PROGRAM

## 2022-04-03 PROCEDURE — 2500000003 HC RX 250 WO HCPCS: Performed by: STUDENT IN AN ORGANIZED HEALTH CARE EDUCATION/TRAINING PROGRAM

## 2022-04-03 PROCEDURE — 6360000002 HC RX W HCPCS: Performed by: STUDENT IN AN ORGANIZED HEALTH CARE EDUCATION/TRAINING PROGRAM

## 2022-04-03 PROCEDURE — 80048 BASIC METABOLIC PNL TOTAL CA: CPT

## 2022-04-03 PROCEDURE — 85027 COMPLETE CBC AUTOMATED: CPT

## 2022-04-03 PROCEDURE — 6370000000 HC RX 637 (ALT 250 FOR IP): Performed by: STUDENT IN AN ORGANIZED HEALTH CARE EDUCATION/TRAINING PROGRAM

## 2022-04-03 PROCEDURE — 99285 EMERGENCY DEPT VISIT HI MDM: CPT

## 2022-04-03 PROCEDURE — 93005 ELECTROCARDIOGRAM TRACING: CPT | Performed by: STUDENT IN AN ORGANIZED HEALTH CARE EDUCATION/TRAINING PROGRAM

## 2022-04-03 PROCEDURE — 85730 THROMBOPLASTIN TIME PARTIAL: CPT

## 2022-04-03 PROCEDURE — 2060000000 HC ICU INTERMEDIATE R&B

## 2022-04-03 PROCEDURE — 2500000003 HC RX 250 WO HCPCS

## 2022-04-03 PROCEDURE — 96375 TX/PRO/DX INJ NEW DRUG ADDON: CPT

## 2022-04-03 PROCEDURE — 6360000002 HC RX W HCPCS

## 2022-04-03 PROCEDURE — 96374 THER/PROPH/DIAG INJ IV PUSH: CPT

## 2022-04-03 PROCEDURE — 71045 X-RAY EXAM CHEST 1 VIEW: CPT

## 2022-04-03 PROCEDURE — 84443 ASSAY THYROID STIM HORMONE: CPT

## 2022-04-03 PROCEDURE — 84484 ASSAY OF TROPONIN QUANT: CPT

## 2022-04-03 PROCEDURE — 6370000000 HC RX 637 (ALT 250 FOR IP)

## 2022-04-03 RX ORDER — ACETAMINOPHEN 500 MG
1000 TABLET ORAL ONCE
Status: COMPLETED | OUTPATIENT
Start: 2022-04-03 | End: 2022-04-03

## 2022-04-03 RX ORDER — CALCIUM CARBONATE 200(500)MG
500 TABLET,CHEWABLE ORAL 3 TIMES DAILY PRN
Status: DISCONTINUED | OUTPATIENT
Start: 2022-04-03 | End: 2022-04-16 | Stop reason: HOSPADM

## 2022-04-03 RX ORDER — KETOROLAC TROMETHAMINE 30 MG/ML
30 INJECTION, SOLUTION INTRAMUSCULAR; INTRAVENOUS ONCE
Status: COMPLETED | OUTPATIENT
Start: 2022-04-03 | End: 2022-04-03

## 2022-04-03 RX ORDER — SODIUM CHLORIDE 9 MG/ML
INJECTION, SOLUTION INTRAVENOUS PRN
Status: DISCONTINUED | OUTPATIENT
Start: 2022-04-03 | End: 2022-04-16 | Stop reason: HOSPADM

## 2022-04-03 RX ORDER — NICOTINE 21 MG/24HR
1 PATCH, TRANSDERMAL 24 HOURS TRANSDERMAL DAILY
Status: DISCONTINUED | OUTPATIENT
Start: 2022-04-04 | End: 2022-04-16 | Stop reason: HOSPADM

## 2022-04-03 RX ORDER — SODIUM CHLORIDE 0.9 % (FLUSH) 0.9 %
5-40 SYRINGE (ML) INJECTION PRN
Status: DISCONTINUED | OUTPATIENT
Start: 2022-04-03 | End: 2022-04-09

## 2022-04-03 RX ORDER — HEPARIN SODIUM 1000 [USP'U]/ML
30 INJECTION, SOLUTION INTRAVENOUS; SUBCUTANEOUS PRN
Status: DISCONTINUED | OUTPATIENT
Start: 2022-04-03 | End: 2022-04-09

## 2022-04-03 RX ORDER — ONDANSETRON 2 MG/ML
4 INJECTION INTRAMUSCULAR; INTRAVENOUS EVERY 6 HOURS PRN
Status: DISCONTINUED | OUTPATIENT
Start: 2022-04-03 | End: 2022-04-08

## 2022-04-03 RX ORDER — HEPARIN SODIUM 1000 [USP'U]/ML
60 INJECTION, SOLUTION INTRAVENOUS; SUBCUTANEOUS ONCE
Status: COMPLETED | OUTPATIENT
Start: 2022-04-03 | End: 2022-04-03

## 2022-04-03 RX ORDER — ONDANSETRON 2 MG/ML
4 INJECTION INTRAMUSCULAR; INTRAVENOUS ONCE
Status: COMPLETED | OUTPATIENT
Start: 2022-04-03 | End: 2022-04-03

## 2022-04-03 RX ORDER — HEPARIN SODIUM 1000 [USP'U]/ML
60 INJECTION, SOLUTION INTRAVENOUS; SUBCUTANEOUS PRN
Status: DISCONTINUED | OUTPATIENT
Start: 2022-04-03 | End: 2022-04-09

## 2022-04-03 RX ORDER — ACETAMINOPHEN 325 MG/1
650 TABLET ORAL EVERY 6 HOURS PRN
Status: DISCONTINUED | OUTPATIENT
Start: 2022-04-03 | End: 2022-04-16 | Stop reason: HOSPADM

## 2022-04-03 RX ORDER — HYDRALAZINE HYDROCHLORIDE 20 MG/ML
10 INJECTION INTRAMUSCULAR; INTRAVENOUS ONCE
Status: COMPLETED | OUTPATIENT
Start: 2022-04-03 | End: 2022-04-03

## 2022-04-03 RX ORDER — AMLODIPINE BESYLATE 10 MG/1
10 TABLET ORAL DAILY
Status: DISCONTINUED | OUTPATIENT
Start: 2022-04-03 | End: 2022-04-16 | Stop reason: HOSPADM

## 2022-04-03 RX ORDER — POLYETHYLENE GLYCOL 3350 17 G/17G
17 POWDER, FOR SOLUTION ORAL DAILY PRN
Status: DISCONTINUED | OUTPATIENT
Start: 2022-04-03 | End: 2022-04-16 | Stop reason: HOSPADM

## 2022-04-03 RX ORDER — ACETAMINOPHEN 650 MG/1
650 SUPPOSITORY RECTAL EVERY 6 HOURS PRN
Status: DISCONTINUED | OUTPATIENT
Start: 2022-04-03 | End: 2022-04-16 | Stop reason: HOSPADM

## 2022-04-03 RX ORDER — SODIUM CHLORIDE 0.9 % (FLUSH) 0.9 %
5-40 SYRINGE (ML) INJECTION EVERY 12 HOURS SCHEDULED
Status: DISCONTINUED | OUTPATIENT
Start: 2022-04-03 | End: 2022-04-16 | Stop reason: HOSPADM

## 2022-04-03 RX ORDER — CLOPIDOGREL BISULFATE 75 MG/1
75 TABLET ORAL DAILY
Status: DISCONTINUED | OUTPATIENT
Start: 2022-04-03 | End: 2022-04-08

## 2022-04-03 RX ORDER — ASPIRIN 81 MG/1
162 TABLET, CHEWABLE ORAL ONCE
Status: COMPLETED | OUTPATIENT
Start: 2022-04-03 | End: 2022-04-03

## 2022-04-03 RX ORDER — ONDANSETRON 4 MG/1
4 TABLET, ORALLY DISINTEGRATING ORAL EVERY 8 HOURS PRN
Status: DISCONTINUED | OUTPATIENT
Start: 2022-04-03 | End: 2022-04-09

## 2022-04-03 RX ADMIN — ASPIRIN 162 MG: 81 TABLET, CHEWABLE ORAL at 18:31

## 2022-04-03 RX ADMIN — KETOROLAC TROMETHAMINE 30 MG: 30 INJECTION, SOLUTION INTRAMUSCULAR at 18:31

## 2022-04-03 RX ADMIN — Medication 12 UNITS/KG/HR: at 20:16

## 2022-04-03 RX ADMIN — HEPARIN SODIUM 3810 UNITS: 1000 INJECTION INTRAVENOUS; SUBCUTANEOUS at 20:14

## 2022-04-03 RX ADMIN — HYDRALAZINE HYDROCHLORIDE 10 MG: 20 INJECTION INTRAMUSCULAR; INTRAVENOUS at 18:31

## 2022-04-03 RX ADMIN — FAMOTIDINE 20 MG: 10 INJECTION INTRAVENOUS at 19:55

## 2022-04-03 RX ADMIN — CLOPIDOGREL 75 MG: 75 TABLET, FILM COATED ORAL at 20:53

## 2022-04-03 RX ADMIN — ACETAMINOPHEN 1000 MG: 500 TABLET ORAL at 19:55

## 2022-04-03 RX ADMIN — ANTACID TABLETS 500 MG: 500 TABLET, CHEWABLE ORAL at 22:21

## 2022-04-03 RX ADMIN — AMLODIPINE BESYLATE 10 MG: 10 TABLET ORAL at 20:53

## 2022-04-03 RX ADMIN — ONDANSETRON 4 MG: 2 INJECTION INTRAMUSCULAR; INTRAVENOUS at 18:31

## 2022-04-03 RX ADMIN — SODIUM CHLORIDE, PRESERVATIVE FREE 10 ML: 5 INJECTION INTRAVENOUS at 22:49

## 2022-04-03 ASSESSMENT — PAIN DESCRIPTION - PAIN TYPE: TYPE: ACUTE PAIN

## 2022-04-03 ASSESSMENT — ENCOUNTER SYMPTOMS
ALLERGIC/IMMUNOLOGIC NEGATIVE: 1
SHORTNESS OF BREATH: 1
CHEST TIGHTNESS: 1
ABDOMINAL PAIN: 0
SORE THROAT: 0
BACK PAIN: 0
NAUSEA: 0
DIARRHEA: 0
SHORTNESS OF BREATH: 0
COUGH: 0
CONSTIPATION: 0
VOMITING: 0
RHINORRHEA: 0
WHEEZING: 0

## 2022-04-03 ASSESSMENT — PAIN SCALES - GENERAL
PAINLEVEL_OUTOF10: 5
PAINLEVEL_OUTOF10: 5
PAINLEVEL_OUTOF10: 7

## 2022-04-03 ASSESSMENT — PAIN DESCRIPTION - ORIENTATION: ORIENTATION: MID

## 2022-04-03 ASSESSMENT — PAIN DESCRIPTION - LOCATION: LOCATION: BACK;CHEST

## 2022-04-03 NOTE — ED NOTES
Pt arrived to ED alert and oriented x4. Pt c/o chest pain and SOB. Pt reports that her chest pain has been ongoing for 3 days, states that it is in the middle of her chest.  Pt reports that before she had chest pain she had mid, upper back pain, states that she no longer has the back pain. Pt reports that she intermittently feels SOB when her chest is hurting. Pt also reports nausea and decrease in appetite. Pt reports \"I just don't feel right\". Pt denies having been around anyone suspected to have COVID-19 or anyone that has been sick, denies recent travel outside the [facility] or [address]. Pt placed on cardiac monitor, continuous pulse ox, and BP cuff. RR even and unlabored. NAD noted. Whiteboard updated. Will continue with plan of care.        Paola Dawson RN  04/03/22 Qamar Upton

## 2022-04-03 NOTE — ED PROVIDER NOTES
[address] [facility]  ED  Emergency Department Encounter  Emergency Medicine Resident     Pt Name: Graeme Guillermo  MRN: [id number]  [place] 1965  Date of evaluation: 4/3/22  PCP:  Yousuf Taylor MD    CHIEF COMPLAINT       Chief Complaint   Patient presents with    Chest Pain     Midsternal x2 days    Shortness of Breath       HISTORY OFPRESENT ILLNESS  (Location/Symptom, Timing/Onset, Context/Setting, Quality, Duration, Modifying Trinda Patiño.)      Graeme Guillermo is a 61 y.o. female with past medical history of asthma, hypertension, hyperlipidemia, coronary artery disease status post MI x2 with stent placement, CVA who presents with intermittent chest pain described as \"flutters\" of midsternal pressure that have been going on for the past 3 days. Patient reports that pain is gotten more progressive and is now associated with diaphoresis, nausea and shortness of breath. Patient states symptoms are similar to what she experienced with her previous heart attack. She also reports aching mid and lower back pain around the same time that her chest pain started. She denies any injury to her back. She denies fever, chills, rhinorrhea, congestion, shortness of breath when she is not having chest pain, abdominal pain, vomiting, diarrhea or urinary symptoms. Patient takes 81 mg of aspirin and Plavix daily which she has been taking. She has not seen her cardiologist in several years. She states she has a stress test ordered but has not underwent this yet. Patient also reports high blood pressure for which she has been taking her occasions regularly. He states her blood pressure is usually controlled, however its been higher for her over the past few days, approximately 160s. Patient is currently 205/124.     PAST MEDICAL / SURGICAL / SOCIAL / FAMILY HISTORY      has a past medical history of Anemia, Asthma, CAD (coronary artery disease), Depression, High cholesterol, Hypertension, MI, old, Osteoarthritis, Pneumonia, Sleep apnea, and Stroke ([facility] [address] has a past surgical history that includes Hysterectomy; Coronary angioplasty with stent (2004); Tubal ligation; Tonsillectomy; and Appendectomy. Social:  reports that she has been smoking cigarettes. She has a 12.50 pack-year smoking history. She has never used smokeless tobacco. She reports previous alcohol use. She reports current drug use. Drug: Marijuana [address]). Family Hx:   Family History   Problem Relation Age of Onset    High Blood Pressure Mother         Allergies:  Lipitor [atorvastatin], Prozac [fluoxetine], and Zoloft [sertraline hcl]    Home Medications:  Prior to Admission medications    Medication Sig Start Date End Date Taking?  Authorizing Provider   albuterol sulfate  (90 Base) MCG/ACT inhaler INHALE 2 PUFFS BY MOUTH EVERY 6 HOURS AS NEEDED FOR WHEEZING 11/15/21   Shahida Adams MD   omeprazole (PRILOSEC) 20 MG delayed release capsule TAKE 1 CAPSULE BY MOUTH EVERY DAY 11/15/21   Shahida Adams MD   lisinopril (PRINIVIL;ZESTRIL) 40 MG tablet TAKE 1 TABLET BY MOUTH EVERY DAY 11/2/21   Shahida Adams MD   ibuprofen (ADVIL;MOTRIN) 800 MG tablet TAKE 1 TABLET BY MOUTH EVERY 8 HOURS AS NEEDED FOR PAIN 11/2/21   Shahida Adams MD   amLODIPine (NORVASC) 10 MG tablet TAKE 1 TABLET BY MOUTH DAILY 10/7/21   Shahida Adams MD   amLODIPine (NORVASC) 10 MG tablet TAKE 1 TABLET BY MOUTH DAILY 10/7/21   Shahida Adams MD   clopidogrel (PLAVIX) 75 MG tablet TAKE 1 TABLET BY MOUTH DAILY 10/7/21   Shahida Adams MD   lovastatin (MEVACOR) 10 MG tablet TAKE 1 TABLET BY MOUTH EVERY NIGHT 9/1/21   Shahida Adams MD   Ginkgo Biloba (GNP GINGKO BILOBA EXTRACT PO) Take by mouth daily    Historical Provider, MD   clobetasol (TEMOVATE) 0.05 % ointment apply to affected area twice a day 8/31/21   Shahida Adams MD   metoprolol 75 MG TABS Take 75 mg by mouth 2 times daily TAKE 2 TABLETS BY MOUTH TWICE DAILY 8/31/21   Shahida Adams MD   ezetimibe (Mehdi Balls) 10 MG tablet Take 1 tablet by mouth daily 8/31/21   Janina Sheriff MD   hydroCHLOROthiazide (HYDRODIURIL) 25 MG tablet TAKE 1 TABLET BY MOUTH DAILY 8/25/21   Janina Sheriff MD   ibuprofen (ADVIL;MOTRIN) 800 MG tablet take 1 tablet by mouth every 8 hours if needed for pain 3/30/21   Janina Sheriff MD   mometasone-formoterol [facility]) 100-5 MCG/ACT inhaler Inhale 2 puffs into the lungs 2 times daily 3/24/21   Janina Sheriff MD   aspirin (ASPIR-LOW) 81 MG EC tablet take 1 tablet by mouth once daily 3/24/21   Janina Sheriff MD   busPIRone (BUSPAR) 15 MG tablet take 1 tablet by mouth three times a day 3/24/21   Janina Sheriff MD   Omega-3 Fatty Acids (OMEGA-3 EPA FISH OIL) 1205 MG CAPS Take 1 tablet by mouth daily 3/24/21   Janina Sheriff MD   clopidogrel (PLAVIX) 75 MG tablet TAKE 1 TABLET BY MOUTH DAILY 3/24/21   Janina Sheriff MD   cetirizine (ZYRTEC) 10 MG tablet take 1 tablet by mouth once daily 3/16/21   Janina Sheriff MD   diclofenac sodium 1 % GEL Apply 2 g topically 2 times daily 5/8/19   Janina Sheriff MD   nitroGLYCERIN (NITROSTAT) 0.4 MG SL tablet Place 1 tablet under the tongue every 5 minutes as needed for Chest pain up to max of 3 total doses. If no relief after 1 dose, call 911. 6/5/18   Janina Sheriff MD   hydrocortisone 1 % cream apply topically twice a day 1/15/18   Janina Sheriff MD   Heat Wraps (SOFTHEAT HEATING WRAP ULTRA) MISC Use daily to for neck and shoulder pain 12/6/17   Janina Sheriff MD   lidocaine (LMX) 4 % cream Apply 2g topically as needed. 7/26/17   Janina Sheriff MD   Blood Pressure Monitor MISC Use daily to take BP 3/15/17   Janina Sheriff MD       REVIEW OFSYSTEMS    (2-9 systems for level 4, 10 or more for level 5)      Review of Systems   Constitutional: Negative for chills and fever. HENT: Negative for congestion, rhinorrhea and sore throat. Eyes: Negative for visual disturbance. Respiratory: Negative for cough and shortness of breath.     Cardiovascular: Negative for chest pain and leg swelling. Gastrointestinal: Negative for abdominal pain, constipation, diarrhea, nausea and vomiting. Genitourinary: Negative for dysuria, frequency and hematuria. Musculoskeletal: Negative for arthralgias, back pain and neck pain. Skin: Negative for rash. Neurological: Negative for weakness, light-headedness, numbness and headaches. Psychiatric/Behavioral: Negative for confusion. All other systems reviewed and are negative. PHYSICAL EXAM   (up to 7 for level 4, 8 or more forlevel 5)      INITIAL VITALS:   Vitals:    04/03/22 1811 04/03/22 1817 04/03/22 1818 04/03/22 1831   BP: (!) 196/109   (!) 175/115   Pulse: 64      Resp: 21      Temp:   98.7 °F (37.1 °C)    TempSrc:   Oral    SpO2:  99%     Weight:  140 lb (63.5 kg)     Height:  5' 1\" (1.549 m)          Physical Exam  Vitals and nursing note reviewed. Constitutional:       General: She is not in acute distress. Appearance: She is not toxic-appearing. Comments: Adult female sitting in stretcher no acute distress. Speaks in full sentences and answers questions appropriately   HENT:      Head: Normocephalic and atraumatic. Nose: Nose normal.      Mouth/Throat:      Mouth: Mucous membranes are moist.      Pharynx: Oropharynx is clear. Eyes:      Conjunctiva/sclera: Conjunctivae normal.      Pupils: Pupils are equal, round, and reactive to light. Cardiovascular:      Rate and Rhythm: Normal rate and regular rhythm. Pulses: Normal pulses. Heart sounds: No murmur heard. No friction rub. No gallop. Pulmonary:      Effort: Pulmonary effort is normal. No respiratory distress. Breath sounds: Normal breath sounds. No wheezing, rhonchi or rales. Abdominal:      General: There is no distension. Palpations: Abdomen is soft. Tenderness: There is no abdominal tenderness. Musculoskeletal:      Cervical back: Neck supple. No rigidity. Right lower leg: No edema.       Left lower leg: No edema. Comments: Mild tenderness to palpation of midline thoracic region around T8-10 with no bony step-offs or deformities   Skin:     General: Skin is warm and dry. Capillary Refill: Capillary refill takes less than 2 seconds. Findings: No rash. Neurological:      General: No focal deficit present. Mental Status: She is alert. Psychiatric:         Mood and Affect: Mood normal.         Behavior: Behavior normal.         DIFFERENTIAL  DIAGNOSIS     DDX: ACS, coronary artery disease, hypertensive urgency, MI, electrolyte abnormality, dehydration, pulmonary embolism, aortic dissection, aortic aneurysm, pneumonia, pleural effusion, pulmonary edema, viral illness, musculoskeletal pain    Initial MDM/Plan: 61 y.o. female with past medical history of asthma, hypertension, hyperlipidemia, coronary artery disease status post MI x2 with stent placement, CVA who presents with chest pain associated with nausea, diaphoresis, shortness of breath intermittent over the past 3 days that is similar to her previous heart attack. On physical exam, patient is nontoxic-appearing. Cardiopulmonary exam is benign. She has no leg swelling. Concern for ACS. Will obtain cardiac work-up including labs, EKG and chest x-ray. Anticipate admission. Will treat patient's hypertension with dose of hydralazine. If blood pressure does not improve, she will warrant Cardene drip. History: 1  EC  Patient Age: 1  *Risk factors for Atherosclerotic disease: Hypertension;  Hypercholesterolemia; Cigarette smoking; Coronary Artery Disease  Risk Factors: 2  Troponin: 2  Heart Score Total: 6      DIAGNOSTIC RESULTS / EMERGENCYDEPARTMENT COURSE / MDM     LABS:  Results for orders placed or performed during the hospital encounter of 22   CBC with Auto Differential   Result Value Ref Range    WBC 6.8 3.5 - 11.3 k/uL    RBC 4.82 3.95 - 5.11 m/uL    Hemoglobin 14.1 11.9 - 15.1 g/dL    Hematocrit 43.9 36.3 - 47.1 %    MCV 91.1 82.6 - 102.9 fL    MCH 29.3 25.2 - 33.5 pg    MCHC 32.1 28.4 - 34.8 g/dL    RDW 15.2 (H) 11.8 - 14.4 %    Platelets 597 606 - 093 k/uL    MPV 9.6 8.1 - 13.5 fL    NRBC Automated 0.0 0.0 per 100 WBC    Seg Neutrophils 46 36 - 65 %    Lymphocytes 42 24 - 43 %    Monocytes 9 3 - 12 %    Eosinophils % 2 1 - 4 %    Basophils 1 0 - 2 %    Immature Granulocytes 0 0 %    Segs Absolute 3.15 1.50 - 8.10 k/uL    Absolute Lymph # 2.88 1.10 - 3.70 k/uL    Absolute Mono # 0.58 0.10 - 1.20 k/uL    Absolute Eos # 0.14 0.00 - 0.44 k/uL    Basophils Absolute 0.05 0.00 - 0.20 k/uL    Absolute Immature Granulocyte <0.03 0.00 - 0.30 k/uL    RBC Morphology ANISOCYTOSIS PRESENT    Basic Metabolic Panel   Result Value Ref Range    Glucose 80 70 - 99 mg/dL    BUN 11 6 - 20 mg/dL    CREATININE 0.92 (H) 0.50 - 0.90 mg/dL    Calcium 9.6 8.6 - 10.4 mg/dL    Sodium 143 135 - 144 mmol/L    Potassium 4.0 3.7 - 5.3 mmol/L    Chloride 107 98 - 107 mmol/L    CO2 21 20 - 31 mmol/L    Anion Gap 15 9 - 17 mmol/L    GFR Non-African American >60 >60 mL/min    GFR African American >60 >60 mL/min    GFR Comment         Troponin   Result Value Ref Range    Troponin, High Sensitivity 80 (HH) 0 - 14 ng/L   Brain Natriuretic Peptide   Result Value Ref Range    Pro- <300 pg/mL   Troponin   Result Value Ref Range    Troponin, High Sensitivity 70 (HH) 0 - 14 ng/L   SPECIMEN REJECTION   Result Value Ref Range    Specimen Source . BLOOD     Ordered Test CBC, PTT     Reason for Rejection Unable to perform testing: Specimen clotted.     CBC   Result Value Ref Range    WBC 7.8 3.5 - 11.3 k/uL    RBC 4.73 3.95 - 5.11 m/uL    Hemoglobin 13.9 11.9 - 15.1 g/dL    Hematocrit 42.7 36.3 - 47.1 %    MCV 90.3 82.6 - 102.9 fL    MCH 29.4 25.2 - 33.5 pg    MCHC 32.6 28.4 - 34.8 g/dL    RDW 15.3 (H) 11.8 - 14.4 %    Platelets 454 676 - 155 k/uL    MPV 9.3 8.1 - 13.5 fL    NRBC Automated 0.0 0.0 per 100 WBC   APTT   Result Value Ref Range    PTT 26.7 20.5 - 30.5 sec RADIOLOGY:  XR CHEST PORTABLE    Result Date: 4/3/2022  EXAMINATION: ONE XRAY VIEW OF THE CHEST 4/3/2022 6:50 pm COMPARISON: Chest radiograph of 09/09/2021 HISTORY: ORDERING SYSTEM PROVIDED HISTORY: chest pain TECHNOLOGIST PROVIDED HISTORY: chest pain Reason for Exam: upr,chest pain,sob FINDINGS: The lungs are without acute focal process. There is no effusion or pneumothorax. The cardiomediastinal silhouette is without acute process. The osseous structures are without acute process. No acute process. EKG  Normal sinus rhythm, rate: 60  DC: 162  QRS: 77  QT/QTc: 440/440  No ST elevation or depression  No T wave abnormality      All EKG's are interpreted by the Emergency Department Physician who either signs or Co-signs this chart in the absence of a cardiologist.      MEDICATIONS ORDERED:  Orders Placed This Encounter   Medications    hydrALAZINE (APRESOLINE) injection 10 mg    aspirin chewable tablet 162 mg    ondansetron (ZOFRAN) injection 4 mg    ketorolac (TORADOL) injection 30 mg    heparin (porcine) injection 3,810 Units    heparin (porcine) injection 3,810 Units    heparin (porcine) injection 1,910 Units    heparin 25,000 units in 0.9% sodium chloride 250 mL infusion    acetaminophen (TYLENOL) tablet 1,000 mg    famotidine (PEPCID) 20 mg in sodium chloride (PF) 10 mL injection       PROCEDURES:  None      CONSULTS:  IP CONSULT TO CARDIOLOGY  IP CONSULT TO INTERNAL MEDICINE      EMERGENCY DEPARTMENT COURSE:  ED Course as of 04/03/22 2107   Sun Apr 03, 2022   1836 WBC: 6.8 [KA]     1907 Troponin, High Sensitivity(!!): 80  Will repeat, consult cardiology and start heparin [KA]     1907 Pro-BNP: [place] Creatinine(!): 0.92  Improved from baseline [KA]     2007 Discussed the patient with internal medicine who accepts her for admission at this time.   Also discussed patient with cardiology who agrees with heparin drip and recommends making the patient n.p.o. midnight for likely catheterization in the morning [KA]     2047 Troponin, High Sensitivity(!!): 70 [KA]      ED Course User Index  [KA] Lelia George DO          FINAL IMPRESSION      1. NSTEMI (non-ST elevated myocardial infarction) ([facility] [address]          DISPOSITION / PLAN     DISPOSITION Admitted 04/03/2022 08:20:46 PM      PATIENT REFERRED TO:  No follow-up provider specified.     DISCHARGE MEDICATIONS:  New Prescriptions    No medications on file       Lelia George DO  Emergency Medicine Resident    (Please note that portions of this note were completed with a voice recognition program.Efforts were made to edit the dictations but occasionally words are mis-transcribed.)           Lelia George DO  Resident  04/04/22 0752

## 2022-04-03 NOTE — ED NOTES
Labeled blood specimens sent to lab via tube system.     [x] Lavender   [] on ice   [x] Blue   [x] Green/yellow  [] Green/black [] on ice  [] Pink  [x] Red  [] Yellow  [] Blood Cultures      Elliott Paris RN  04/03/22 1636

## 2022-04-03 NOTE — ED PROVIDER NOTES
[facility] ED     Emergency Department     Faculty Attestation    I performed a history and physical examination of the patient and discussed management with the resident. I reviewed the residents note and agree with the documented findings and plan of care. Any areas of disagreement are noted on the chart. I was personally present for the key portions of any procedures. I have documented in the chart those procedures where I was not present during the key portions. I have reviewed the emergency nurses triage note. I agree with the chief complaint, past medical history, past surgical history, allergies, medications, social and family history as documented unless otherwise noted below. For Physician Assistant/ Nurse Practitioner cases/documentation I have personally evaluated this patient and have completed at least one if not all key elements of the E/M (history, physical exam, and MDM). Additional findings are as noted. This patient was evaluated in the Emergency Department for symptoms described in the history of present illness. He/she was evaluated in the context of the global COVID-19 pandemic, which necessitated consideration that the patient might be at risk for infection with the SARS-CoV-2 virus that causes COVID-19. Institutional protocols and algorithms that pertain to the evaluation of patients at risk for COVID-19 are in a state of rapid change based on information released by regulatory bodies including the CDC and federal and state organizations. These policies and algorithms were followed during the patient's care in the ED. Patient with chest pain anterior associate with shortness of breath diaphoresis and lightheadedness. Intermittent. History of prior MIs with stents feels like prior. Did have some mid back pain but this was not with the chest pain, back pain is not ripping tearing or migrating and has not returned. On exam well-appearing nontoxic watching TV. Lungs clear heart normal.  Equal radial pedal pulses bilaterally abdomen soft nontender no pulsatile mass. Will check labs EKG chest x-ray anticipate admission    EKG interpretation: Sinus rhythm 60. Normal intervals. Normal axis. No acute ST or T changes.   Similar to 9/9/2021    Critical Care     none    Baljinder Vital MD, Florian Garcia  Attending Emergency  Physician             Baljinder Vital MD  04/03/22 Radha Garcia

## 2022-04-04 LAB
ANION GAP SERPL CALCULATED.3IONS-SCNC: 8 MMOL/L (ref 9–17)
BUN BLDV-MCNC: 14 MG/DL (ref 6–20)
CALCIUM SERPL-MCNC: 9 MG/DL (ref 8.6–10.4)
CHLORIDE BLD-SCNC: 106 MMOL/L (ref 98–107)
CO2: 23 MMOL/L (ref 20–31)
CREAT SERPL-MCNC: 0.93 MG/DL (ref 0.5–0.9)
EKG ATRIAL RATE: 60 BPM
EKG P AXIS: 56 DEGREES
EKG P-R INTERVAL: 162 MS
EKG Q-T INTERVAL: 440 MS
EKG QRS DURATION: 76 MS
EKG QTC CALCULATION (BAZETT): 440 MS
EKG R AXIS: 33 DEGREES
EKG T AXIS: 24 DEGREES
EKG VENTRICULAR RATE: 60 BPM
GFR AFRICAN AMERICAN: >60 ML/MIN
GFR NON-AFRICAN AMERICAN: >60 ML/MIN
GFR SERPL CREATININE-BSD FRML MDRD: ABNORMAL ML/MIN/{1.73_M2}
GLUCOSE BLD-MCNC: 99 MG/DL (ref 70–99)
LV EF: 55 %
LVEF MODALITY: NORMAL
PARTIAL THROMBOPLASTIN TIME: 41.1 SEC (ref 20.5–30.5)
PARTIAL THROMBOPLASTIN TIME: 46.3 SEC (ref 20.5–30.5)
PARTIAL THROMBOPLASTIN TIME: 58.9 SEC (ref 20.5–30.5)
POTASSIUM SERPL-SCNC: 3.6 MMOL/L (ref 3.7–5.3)
SODIUM BLD-SCNC: 137 MMOL/L (ref 135–144)

## 2022-04-04 PROCEDURE — B2111ZZ FLUOROSCOPY OF MULTIPLE CORONARY ARTERIES USING LOW OSMOLAR CONTRAST: ICD-10-PCS | Performed by: INTERNAL MEDICINE

## 2022-04-04 PROCEDURE — 99222 1ST HOSP IP/OBS MODERATE 55: CPT | Performed by: NURSE PRACTITIONER

## 2022-04-04 PROCEDURE — 6360000002 HC RX W HCPCS

## 2022-04-04 PROCEDURE — 2580000003 HC RX 258: Performed by: STUDENT IN AN ORGANIZED HEALTH CARE EDUCATION/TRAINING PROGRAM

## 2022-04-04 PROCEDURE — C1894 INTRO/SHEATH, NON-LASER: HCPCS

## 2022-04-04 PROCEDURE — 99223 1ST HOSP IP/OBS HIGH 75: CPT | Performed by: INTERNAL MEDICINE

## 2022-04-04 PROCEDURE — 6370000000 HC RX 637 (ALT 250 FOR IP): Performed by: STUDENT IN AN ORGANIZED HEALTH CARE EDUCATION/TRAINING PROGRAM

## 2022-04-04 PROCEDURE — 6360000004 HC RX CONTRAST MEDICATION

## 2022-04-04 PROCEDURE — 6360000002 HC RX W HCPCS: Performed by: STUDENT IN AN ORGANIZED HEALTH CARE EDUCATION/TRAINING PROGRAM

## 2022-04-04 PROCEDURE — 6370000000 HC RX 637 (ALT 250 FOR IP)

## 2022-04-04 PROCEDURE — 93306 TTE W/DOPPLER COMPLETE: CPT

## 2022-04-04 PROCEDURE — 36415 COLL VENOUS BLD VENIPUNCTURE: CPT

## 2022-04-04 PROCEDURE — 2709999900 HC NON-CHARGEABLE SUPPLY

## 2022-04-04 PROCEDURE — 80048 BASIC METABOLIC PNL TOTAL CA: CPT

## 2022-04-04 PROCEDURE — B2151ZZ FLUOROSCOPY OF LEFT HEART USING LOW OSMOLAR CONTRAST: ICD-10-PCS | Performed by: INTERNAL MEDICINE

## 2022-04-04 PROCEDURE — 85730 THROMBOPLASTIN TIME PARTIAL: CPT

## 2022-04-04 PROCEDURE — 93458 L HRT ARTERY/VENTRICLE ANGIO: CPT

## 2022-04-04 PROCEDURE — C1887 CATHETER, GUIDING: HCPCS

## 2022-04-04 PROCEDURE — 93010 ELECTROCARDIOGRAM REPORT: CPT | Performed by: INTERNAL MEDICINE

## 2022-04-04 PROCEDURE — 4A023N7 MEASUREMENT OF CARDIAC SAMPLING AND PRESSURE, LEFT HEART, PERCUTANEOUS APPROACH: ICD-10-PCS | Performed by: INTERNAL MEDICINE

## 2022-04-04 PROCEDURE — 2060000000 HC ICU INTERMEDIATE R&B

## 2022-04-04 RX ORDER — ASPIRIN 81 MG/1
81 TABLET, CHEWABLE ORAL DAILY
Status: DISCONTINUED | OUTPATIENT
Start: 2022-04-04 | End: 2022-04-10

## 2022-04-04 RX ORDER — LOVASTATIN 20 MG/1
10 TABLET ORAL NIGHTLY
Status: DISCONTINUED | OUTPATIENT
Start: 2022-04-04 | End: 2022-04-16 | Stop reason: HOSPADM

## 2022-04-04 RX ORDER — LISINOPRIL 20 MG/1
40 TABLET ORAL DAILY
Status: DISCONTINUED | OUTPATIENT
Start: 2022-04-04 | End: 2022-04-13

## 2022-04-04 RX ORDER — METOPROLOL TARTRATE 50 MG/1
75 TABLET, FILM COATED ORAL 2 TIMES DAILY
Status: DISCONTINUED | OUTPATIENT
Start: 2022-04-04 | End: 2022-04-09

## 2022-04-04 RX ORDER — SUMATRIPTAN 50 MG/1
50 TABLET, FILM COATED ORAL ONCE
Status: COMPLETED | OUTPATIENT
Start: 2022-04-04 | End: 2022-04-04

## 2022-04-04 RX ORDER — MAGNESIUM SULFATE 1 G/100ML
1000 INJECTION INTRAVENOUS ONCE
Status: COMPLETED | OUTPATIENT
Start: 2022-04-04 | End: 2022-04-04

## 2022-04-04 RX ORDER — HYDROCHLOROTHIAZIDE 25 MG/1
25 TABLET ORAL DAILY
Status: DISCONTINUED | OUTPATIENT
Start: 2022-04-04 | End: 2022-04-16 | Stop reason: HOSPADM

## 2022-04-04 RX ORDER — POTASSIUM CHLORIDE 7.45 MG/ML
40 INJECTION INTRAVENOUS ONCE
Status: COMPLETED | OUTPATIENT
Start: 2022-04-04 | End: 2022-04-04

## 2022-04-04 RX ORDER — POTASSIUM CHLORIDE 20 MEQ/1
10 TABLET, EXTENDED RELEASE ORAL ONCE
Status: DISCONTINUED | OUTPATIENT
Start: 2022-04-04 | End: 2022-04-12

## 2022-04-04 RX ORDER — LISINOPRIL 20 MG/1
40 TABLET ORAL DAILY
Status: DISCONTINUED | OUTPATIENT
Start: 2022-04-04 | End: 2022-04-04

## 2022-04-04 RX ADMIN — HEPARIN SODIUM 1910 UNITS: 1000 INJECTION, SOLUTION INTRAVENOUS; SUBCUTANEOUS at 03:23

## 2022-04-04 RX ADMIN — METOPROLOL TARTRATE 75 MG: 50 TABLET, FILM COATED ORAL at 10:02

## 2022-04-04 RX ADMIN — Medication 16 UNITS/KG/HR: at 23:36

## 2022-04-04 RX ADMIN — SODIUM CHLORIDE: 9 INJECTION, SOLUTION INTRAVENOUS at 04:08

## 2022-04-04 RX ADMIN — LISINOPRIL 40 MG: 20 TABLET ORAL at 02:06

## 2022-04-04 RX ADMIN — METOPROLOL TARTRATE 75 MG: 50 TABLET, FILM COATED ORAL at 02:07

## 2022-04-04 RX ADMIN — METOPROLOL TARTRATE 75 MG: 50 TABLET, FILM COATED ORAL at 21:08

## 2022-04-04 RX ADMIN — CLOPIDOGREL 75 MG: 75 TABLET, FILM COATED ORAL at 10:02

## 2022-04-04 RX ADMIN — SODIUM CHLORIDE: 9 INJECTION, SOLUTION INTRAVENOUS at 04:01

## 2022-04-04 RX ADMIN — MAGNESIUM SULFATE HEPTAHYDRATE 1000 MG: 1 INJECTION, SOLUTION INTRAVENOUS at 21:16

## 2022-04-04 RX ADMIN — POTASSIUM CHLORIDE 40 MEQ: 7.46 INJECTION, SOLUTION INTRAVENOUS at 04:14

## 2022-04-04 RX ADMIN — SUMATRIPTAN SUCCINATE 50 MG: 50 TABLET ORAL at 21:10

## 2022-04-04 RX ADMIN — ASPIRIN 81 MG: 81 TABLET, CHEWABLE ORAL at 10:04

## 2022-04-04 RX ADMIN — AMLODIPINE BESYLATE 10 MG: 10 TABLET ORAL at 10:02

## 2022-04-04 RX ADMIN — LOVASTATIN 10 MG: 20 TABLET ORAL at 21:31

## 2022-04-04 RX ADMIN — HEPARIN SODIUM 3810 UNITS: 1000 INJECTION, SOLUTION INTRAVENOUS; SUBCUTANEOUS at 19:27

## 2022-04-04 RX ADMIN — SODIUM CHLORIDE, PRESERVATIVE FREE 10 ML: 5 INJECTION INTRAVENOUS at 10:03

## 2022-04-04 RX ADMIN — ACETAMINOPHEN 650 MG: 325 TABLET ORAL at 18:02

## 2022-04-04 ASSESSMENT — PAIN DESCRIPTION - LOCATION: LOCATION: HAND

## 2022-04-04 ASSESSMENT — PAIN DESCRIPTION - PAIN TYPE: TYPE: ACUTE PAIN

## 2022-04-04 ASSESSMENT — PAIN DESCRIPTION - DESCRIPTORS: DESCRIPTORS: POUNDING;HEADACHE

## 2022-04-04 ASSESSMENT — PAIN SCALES - GENERAL
PAINLEVEL_OUTOF10: 9
PAINLEVEL_OUTOF10: 9
PAINLEVEL_OUTOF10: 0
PAINLEVEL_OUTOF10: 3
PAINLEVEL_OUTOF10: 1
PAINLEVEL_OUTOF10: 0

## 2022-04-04 ASSESSMENT — HEART SCORE: ECG: 0

## 2022-04-04 ASSESSMENT — PAIN DESCRIPTION - FREQUENCY: FREQUENCY: CONTINUOUS

## 2022-04-04 NOTE — OP NOTE
[facility]    CARDIAC CATHETERIZATION    Date:   4/4/2022  Patient name:  Rock Jorgensen  Date of admission:  4/3/2022  6:04 PM  MRN:   [id number]  YOB: 1965    Operators:  Primary:   Dennis Clemens MD (Attending Physician)        Procedure performed:     [x] Left Heart Catheterization. [] Graft Angiography. [x] Left Ventriculography. [] Right Heart Catheterization. [x] Coronary Angiography. [] Aortic Valve Studies. [] PCI:      [] Other:       Pre Procedure Conscious Sedation Data:  ASA Class:    [] I [x] II [] III [] IV    Mallampati Class:  [] I [x] II [] III [] IV      Indication:  [] STEMI      [] + Stress test  [x] ACS      [] + EKG Changes  [] Non Q MI       [] Significant Risk Factors  [] Recurrent Angina             [] Diabetes Mellitus    [] New LBBB      [] Other.  [] CHF / Low EF changes     [] Abnormal CTA / Ca Score      Procedure:  Access:  [] Femoral  [x] Radial  artery       [x] Right  [] Left    Procedure: After informed consent was obtained with explanation of the risks and benefits, patient was brought to the cath lab. The access area was prepped and draped in sterile fashion. 1% lidocaine was used for local block. The artery was cannulated with 6  Fr sheath with brisk arterial blood return. The side port was frequently flushed and aspirated with normal saline. Estimated Blood Loss:  [x] Minimal < 25 cc [] Moderate 25-50 cc  [] >50 cc    Findings:      LMCA: Distal 30-40% stenosis     LAD: Mid stent stenosis 80%     LCx: Mid area 90% after the OM take off   OM1: in stent stenosis 90%     RCA: Proximal 75% stenosis   Mid area ulcerated plaques with 60% stenosis   PL branch ostial / proximal 90% stenosis     The LV gram was performed in the LAWTON 30 position. LVEF: 50 %.        Conclusions:     Multivessel CAD with left main disease    PLower normal LV systolic function       Recommendations:    CT surgery consult        Electronically signed on 4/4/2022 at 3:23 PM by:    Marlo Aj MD  Fellow, [address]    I have reviewed the case / procedure with resident / fellow  I have examined the patient personally  Patient agree with treatment plan as discussed before, final arrangement based on my evaluation and exam.    Risk and benefit of procedure planned were explained in details. Procedure was performed by me personally, with all aspect of the procedure being done using standard protocol. Note was modified based on my own assessment and treatment.     Dajuan Pope MD  [facility]

## 2022-04-04 NOTE — ED NOTES
The following labs labeled with pt sticker and tubed to lab:     [x] Blue     [x] Lavender   [] on ice  [x] Green/yellow  [] Green/black [] on ice  [] Yellow  [] Red  [] Pink      [] COVID-19 swab    [] Rapid  [] PCR  [] Flu swab  [] Peds Viral Panel     [] Urine Sample  [] Pelvic Cultures  [] Blood Cultures            Yaa Alcantara RN  04/03/22 2022

## 2022-04-04 NOTE — PLAN OF CARE
Problem: Falls - Risk of:  Goal: Will remain free from falls  Description: Will remain free from falls  Outcome: Ongoing  Goal: Absence of physical injury  Description: Absence of physical injury  Outcome: Ongoing     Problem: Cardiac Output - Decreased:  Goal: Hemodynamic stability will improve  Description: Hemodynamic stability will improve  Outcome: Ongoing     Problem: Pain:  Goal: Pain level will decrease  Description: Pain level will decrease  Outcome: Ongoing  Goal: Control of acute pain  Description: Control of acute pain  Outcome: Ongoing  Goal: Control of chronic pain  Description: Control of chronic pain  Outcome: Ongoing     Problem: Tissue Perfusion - Cardiopulmonary, Altered:  Goal: Absence of angina  Description: Absence of angina  Outcome: Ongoing  Goal: Circulatory function within specified parameters  Description: Circulatory function within specified parameters  Outcome: Ongoing  Goal: Hemodynamic stability will improve  Description: Hemodynamic stability will improve  Outcome: Ongoing     Problem: Tobacco Use:  Goal: Will participate in inpatient tobacco-use cessation counseling  Description: Will participate in inpatient tobacco-use cessation counseling  Outcome: Ongoing

## 2022-04-04 NOTE — PROGRESS NOTES
[facility]  Documentation Note                Admission Dx: NSTEMI (non-ST elevated myocardial infarction) ([facility] [place] [address] [I21.4]    Past Medical History:   has a past medical history of Anemia, Asthma, CAD (coronary artery disease), Depression, High cholesterol, Hypertension, MI, old, Osteoarthritis, Pneumonia, Sleep apnea, and Stroke ([facility] [place] [address] Previous Testing:     STRESS 6/10/16: No ischemia/infarct. EF 51%. ECHO 1/21/16: EF 55%. Previous office/hospital visit:   None? ?      [facility]

## 2022-04-04 NOTE — PROGRESS NOTES
[facility]  Internal Medicine Teaching Residency Program  Inpatient Daily Progress Note  ______________________________________________________________________________    Patient: Trista Diaz  YOB: 1965   RYP:[id number]    Acct: [de-identified]     Room: 2023/2023-01  Admit date: 4/3/2022  Today's date: 04/04/22  Number of days in the hospital: 1    SUBJECTIVE   Admitting Diagnosis: NSTEMI (non-ST elevated myocardial infarction) ([facility] [address]  CC: Typical chest pain  Pt examined at bedside. Chart & results reviewed. No significant event overnight. Chest pain is improved. Blood pressure is 125/70. Pulse 62. Labs reviewed and evaluated. Normal CBC and normal BMP. Troponin 80-70. Possible cardiac cath today. ROS:  Constitutional:  negative for chills, fevers, sweats  Respiratory:  negative for cough, dyspnea on exertion, hemoptysis, shortness of breath, wheezing  Cardiovascular:  negative for chest pain, chest pressure/discomfort, lower extremity edema, palpitations  Gastrointestinal:  negative for abdominal pain, constipation, diarrhea, nausea, vomiting  Neurological:  negative for dizziness, headache  BRIEF HISTORY     The patient is a pleasant 61 y.o. female with a past medical history significant for essential hypertension, coronary artery disease status post PCI on DAPT, hyperlipidemia, chronic active smoking, history of stroke, MDD, memory problem presents with a chief complaint of typical chest pain. Chest pain started 3 to 4 days ago, dull aching in nature, 5-6 out of 10 in intensity, radiating to the left shoulder, increased with exertion and relieved with rest.  Also admits to exertional shortness of breath without orthopnea or PND. Patient denies palpitation syncope nausea vomiting diarrhea headache.     Evaluation in the ER, she was found to have elevated blood pressure 175/110. Heart rate 78 regular. SPO2 99% on room air.   Heart auscultation showed normal first and second heart sound without murmur gallop. Chest is clear to auscultation. Abdomen soft and nontender. Labs reviewed normal BMP. No leukocytosis hemoglobin 14. EKG showed normal sinus rhythm with some ST-T wave changes in inferior lead. Troponin is elevated 80. proBNP 217. Chest x-ray is negative for acute abnormality. Cardiology has been consulted in the ER for the concern of NSTEMI. Patient is started on heparin drip as per ACS protocol and will probably have cardiac cath tomorrow.     Of note, she has a past medical history of coronary artery disease status post PCI in ? Followed by another PCI in ? Questionable medication compliance. She has been actively smoking and is currently half pack per day. Denies alcohol intake. Admits to marijuana use.       OBJECTIVE     Vital Signs:  BP (!) 149/81   Pulse (!) 48   Temp 98.1 °F (36.7 °C) (Oral)   Resp 12   Ht 5' 1\" (1.549 m)   Wt 140 lb (63.5 kg)   SpO2 99%   BMI 26.45 kg/m²     Temp (24hrs), Av.3 °F (36.8 °C), Min:97 °F (36.1 °C), Max:99.3 °F (37.4 °C)    In: -   Out: 600 [Urine:600]    Physical Exam:  Constitutional: This is a well developed, well nourished, 25-29.9 - Overweight 61y.o. year old female who is alert, oriented, cooperative and in no apparent distress. Head:normocephalic and atraumatic. EENT:  PERRLA. No conjunctival injections. Septum was midline, mucosa was without erythema, exudates or cobblestoning. No thrush was noted. Neck: Supple without thyromegaly. No elevated JVP. Trachea was midline. Respiratory: Chest was symmetrical without dullness to percussion. Breath sounds bilaterally were clear to auscultation. There were no wheezes, rhonchi or rales. There is no intercostal retraction or use of accessory muscles. No egophony noted. Cardiovascular: Regular without murmur, clicks, gallops or rubs. Abdomen: Slightly rounded and soft without organomegaly.  No rebound, the last 72 hours. APTT:   Recent Labs     04/03/22 2005 04/04/22  0205 04/04/22  0914   APTT 26.7 41.1* 58.9*     CARDIAC ENZYMES: No results for input(s): CKMB, CKMBINDEX, TROPONINI in the last 72 hours. Invalid input(s): CKTOTAL;3  FASTING LIPID PANEL:  Lab Results   Component Value Date    CHOL 243 (H) 03/24/2021    HDL 36 (L) 12/01/2021    TRIG 178 (H) 03/24/2021     LIVER PROFILE: No results for input(s): AST, ALT, ALB, BILIDIR, BILITOT, ALKPHOS in the last 72 hours. MICROBIOLOGY:   Lab Results   Component Value Date/Time    CULTURE ESCHERICHIA COLI >268777 CFU/ML (A) 07/15/2014 04:40 PM    CULTURE  07/15/2014 04:40 PM     Performed at [address] [place] [address], [address] [place] [address] [phone]       Imaging:    XR CHEST PORTABLE    Result Date: 4/3/2022  No acute process. ASSESSMENT & PLAN     IMPRESSION  This is a 61 y.o. female with a past medical history of hypertension hyperlipidemia coronary artery disease status post PCI, history of stroke and memory problem who presented with typical chest pain and exertional shortness of breath and found to have ST-T wave changes in the inferior leads and elevated troponin 80. Patient admitted to inpatient status for the management of NSTEMI.     Principal Problem:    NSTEMI (non-ST elevated myocardial infarction) ([facility] [address]  Plan:   Chest pain is typical in nature. Medication compliance is questionable. No record of recent stress test found. Loaded with ASA and clopidogrel. Started on heparin drip as per ACS protocol. Echocardiography to be ordered. Cardiology on board. Will probably be undergoing cardiac cath today. N.p.o. midnight.     Active Problems:    CAD (coronary artery disease) with multiple stents  Plan:   Continue ASA and Plavix. Will resume home medication including beta-blocker.       Essential hypertension:  Resume lisinopril 40 mg daily, amlodipine 10 mg once daily.   Metoprolol 25 mg twice daily.       Smoker  Plan:   Counseled on smoking cessation. We will start on nicotine patch.       Mixed hyperlipidemia  Plan:   Previously refused atorvastatin due to statin induced muscle weakness currently on lovastatin.       Thyroid nodule  Plan:   CTA on 09/2021 showed incidental 2.7 cm nodule in the right thyroid. TSH 1.33.     DVT ppx: On heparin drip  GI ppx: Not needed     PT/OT/SW consulted  Discharge Planning: Ongoing    Valentin Hobson MD  Internal Medicine Resident, PGY-1  [address], [place]  4/4/2022, 12:18 PM      I have discussed the care of Fortunato Gomez , including pertinent history and exam findings,    today with the resident. I have seen and examined the patient and the key elements of all parts of the encounter have been performed by me . I agree with the assessment, plan and orders as documented by the resident. Principal Problem:    NSTEMI (non-ST elevated myocardial infarction) ([facility] [address]  Active Problems:    CAD (coronary artery disease) with multiple stents    Asthma    Smoker    Anxiety    Essential hypertension    Memory problem    Mixed hyperlipidemia    Thyroid nodule  Resolved Problems:    * No resolved hospital problems. *        Overall  course ;                                   are improving over time.         Patient has NSTEMI  History of coronary artery disease s/p stent  Chronic smoker  Admitted with chest pain, plan for cardiac cath today          Electronically signed by Pinky Trevizo MD

## 2022-04-04 NOTE — PROGRESS NOTES
[facility]  Occupational Therapy Not Seen Note    DATE: 2022    NAME: Crow Riddle  MRN: [id number]   : 1965      Patient not seen this date for Occupational Therapy due to:    Patient independent with ADLs and functional tasks with no acute OT needs. Will defer OT evaluation at this time. Please reorder OT if future needs arise.          Electronically signed by AMITA Tesfaye OTR/L on 2022 at 1:16 PM

## 2022-04-04 NOTE — CONSULTS
Attestation signed by      Attending Physician Statement:    I have discussed the care of  Rock Jorgensen , including pertinent history and exam findings, with the Cardiology fellow/resident. I have seen and examined the patient and the key elements of all parts of the encounter have been performed by me. I agree with the assessment, plan and orders as documented by the fellow/resident, after I modified exam findings and plan of treatments, and the final version is my approved version of the assessment. Additional Comments:   Patient seen and examined, NSTEMI. Currently chest pain free, on IV heparin, discussed proceeding with coronary angiography and possible PCI, benefits, risks and alternatives explained and agree to proceed. [facility]    Consult / H&P               Today's Date: 4/4/2022  Patient Name: Rock Jorgensen  Date of admission: 4/3/2022  6:04 PM  Patient's age: 61 y.o., 1965  Admission Dx: NSTEMI (non-ST elevated myocardial infarction) ([facility] [address] [I21.4]    Reason for Consult:  Cardiac evaluation    Requesting Physician: Jyothi Saxena MD    CHIEF COMPLAINT:  Chest pain       History Obtained From:  patient    HISTORY OF PRESENT ILLNESS:    58-year-old female with history of hypertension, dyslipidemia, CAD s/p PCI presented with complaint of chest pain. According to patient has been started around 3 to 4 days ago. Pressure-like chest pain with radiation to left shoulder. KG showed normal sinus rhythm without significant ST-T wave changes. Troponin on arrival was 80. Patient was given aspirin and was started on low-dose heparin drip ACS protocol. Past Medical History:   has a past medical history of Anemia, Asthma, CAD (coronary artery disease), Depression, High cholesterol, Hypertension, MI, old, Osteoarthritis, Pneumonia, Sleep apnea, and Stroke ([facility] [address]     Past Surgical History:   has a past surgical history that includes Hysterectomy; Coronary angioplasty with stent (2004); Tubal ligation; Tonsillectomy; and Appendectomy. Home Medications:    Prior to Admission medications    Medication Sig Start Date End Date Taking?  Authorizing Provider   albuterol sulfate  (90 Base) MCG/ACT inhaler INHALE 2 PUFFS BY MOUTH EVERY 6 HOURS AS NEEDED FOR WHEEZING 11/15/21   Anny Marquez MD   omeprazole (PRILOSEC) 20 MG delayed release capsule TAKE 1 CAPSULE BY MOUTH EVERY DAY 11/15/21   Anny Marquez MD   lisinopril (PRINIVIL;ZESTRIL) 40 MG tablet TAKE 1 TABLET BY MOUTH EVERY DAY 11/2/21   Anny Marquez MD   ibuprofen (ADVIL;MOTRIN) 800 MG tablet TAKE 1 TABLET BY MOUTH EVERY 8 HOURS AS NEEDED FOR PAIN 11/2/21   Anny Marquez MD   amLODIPine (NORVASC) 10 MG tablet TAKE 1 TABLET BY MOUTH DAILY 10/7/21   Anny Marquez MD   amLODIPine (NORVASC) 10 MG tablet TAKE 1 TABLET BY MOUTH DAILY 10/7/21   Anny Marquez MD   clopidogrel (PLAVIX) 75 MG tablet TAKE 1 TABLET BY MOUTH DAILY 10/7/21   Anny Marquez MD   lovastatin (MEVACOR) 10 MG tablet TAKE 1 TABLET BY MOUTH EVERY NIGHT 9/1/21   Anny Marquez MD   Ginkgo Biloba (GNP GINGKO BILOBA EXTRACT PO) Take by mouth daily    Historical Provider, MD   clobetasol (TEMOVATE) 0.05 % ointment apply to affected area twice a day 8/31/21   Anny Marquez MD   metoprolol 75 MG TABS Take 75 mg by mouth 2 times daily TAKE 2 TABLETS BY MOUTH TWICE DAILY 8/31/21   Anny Marquez MD   ezetimibe (ZETIA) 10 MG tablet Take 1 tablet by mouth daily 8/31/21   Anny Marquez MD   hydroCHLOROthiazide (HYDRODIURIL) 25 MG tablet TAKE 1 TABLET BY MOUTH DAILY 8/25/21   Anny Marquez MD   ibuprofen (ADVIL;MOTRIN) 800 MG tablet take 1 tablet by mouth every 8 hours if needed for pain 3/30/21   Anny Marquez MD   mometasone-formoterol [facility]) 100-5 MCG/ACT inhaler Inhale 2 puffs into the lungs 2 times daily 3/24/21   Anny Marquez MD   aspirin (ASPIR-LOW) 81 MG EC tablet take 1 tablet by mouth once daily 3/24/21   Anny Marquez MD busPIRone (BUSPAR) 15 MG tablet take 1 tablet by mouth three times a day 3/24/21   Emeka Lloyd MD   Omega-3 Fatty Acids (OMEGA-3 EPA FISH OIL) 1205 MG CAPS Take 1 tablet by mouth daily 3/24/21   Emeka Lloyd MD   clopidogrel (PLAVIX) 75 MG tablet TAKE 1 TABLET BY MOUTH DAILY 3/24/21   Emeka Lloyd MD   cetirizine (ZYRTEC) 10 MG tablet take 1 tablet by mouth once daily 3/16/21   Emeka Lloyd MD   diclofenac sodium 1 % GEL Apply 2 g topically 2 times daily 5/8/19   Emeka Lloyd MD   nitroGLYCERIN (NITROSTAT) 0.4 MG SL tablet Place 1 tablet under the tongue every 5 minutes as needed for Chest pain up to max of 3 total doses. If no relief after 1 dose, call 911. 6/5/18   Emeka Lloyd MD   hydrocortisone 1 % cream apply topically twice a day 1/15/18   Emeka Lloyd MD   Heat Wraps (SOFTHEAT HEATING WRAP ULTRA) MISC Use daily to for neck and shoulder pain 12/6/17   Emeka Lloyd MD   lidocaine (LMX) 4 % cream Apply 2g topically as needed.  7/26/17   Emeka Lloyd MD   Blood Pressure Monitor MISC Use daily to take BP 3/15/17   Emeka Lloyd MD      Current Facility-Administered Medications: [Held by provider] hydroCHLOROthiazide (HYDRODIURIL) tablet 25 mg, 25 mg, Oral, Daily  metoprolol tartrate (LOPRESSOR) tablet 75 mg, 75 mg, Oral, BID  lisinopril (PRINIVIL;ZESTRIL) tablet 40 mg, 40 mg, Oral, Daily  potassium chloride (KLOR-CON M) extended release tablet 10 mEq, 10 mEq, Oral, Once  aspirin chewable tablet 81 mg, 81 mg, Oral, Daily  heparin (porcine) injection 3,810 Units, 60 Units/kg, IntraVENous, PRN  heparin (porcine) injection 1,910 Units, 30 Units/kg, IntraVENous, PRN  heparin 25,000 units in 0.9% sodium chloride 250 mL infusion, 5-30 Units/kg/hr, IntraVENous, Continuous  sodium chloride flush 0.9 % injection 5-40 mL, 5-40 mL, IntraVENous, 2 times per day  sodium chloride flush 0.9 % injection 5-40 mL, 5-40 mL, IntraVENous, PRN  0.9 % sodium chloride infusion, , IntraVENous, PRN  ondansetron muscles  Resp Auscultation: Good respiratory effort. No for increased work of breathing. On auscultation: clear to auscultation bilaterally  Cardiovascular: The apical impulse is not displaced  Heart tones are crisp and normal. regular S1 and S2.  Jugular venous pulsation Normal  The carotid upstroke is normal in amplitude and contour without delay or bruit  Peripheral pulses are symmetrical and full   Abdomen:   No masses or tenderness  Bowel sounds present  Extremities:   No Cyanosis or Clubbing   Lower extremity edema: No   Skin: Warm and dry  Neurological:  Alert and oriented. Moves all extremities well  No abnormalities of mood, affect, memory, mentation, or behavior are noted        CBC:   Recent Labs     04/03/22 1825 04/03/22 2005   WBC 6.8 7.8   HGB 14.1 13.9   HCT 43.9 42.7    321     BMP:   Recent Labs     04/03/22 1825 04/04/22 0205    137   K 4.0 3.6*   CO2 21 23   BUN 11 14   CREATININE 0.92* 0.93*   LABGLOM >60 >60   GLUCOSE 80 99     BNP: No results for input(s): BNP in the last 72 hours. PT/INR: No results for input(s): PROTIME, INR in the last 72 hours. APTT:  Recent Labs     04/03/22 2005 04/04/22 0205   APTT 26.7 41.1*     CARDIAC ENZYMES:No results for input(s): CKTOTAL, CKMB, CKMBINDEX, TROPONINI in the last 72 hours. FASTING LIPID PANEL:  Lab Results   Component Value Date    HDL 36 12/01/2021    TRIG 178 03/24/2021     LIVER PROFILE:No results for input(s): AST, ALT, LABALBU in the last 72 hours.     IMPRESSION:    Patient Active Problem List   Diagnosis    Stroke ([facility] [address]    HTN (hypertension)    CAD (coronary artery disease) with multiple stents    Asthma    Depression    Smoker    Dementia ([facility] [address]    Anxiety    Dyslipidemia    Essential hypertension    Neck pain    Memory problem    History of stroke    DDD (degenerative disc disease), cervical    Grade III hemorrhoids    Mixed hyperlipidemia    Current mild episode of major depressive disorder without prior episode ([facility] [address]    Allergic sinusitis    Refusal of statin medication by patient    Prediabetes    Sinus bradycardia    Thyroid nodule    NSTEMI (non-ST elevated myocardial infarction) ([facility])       Assessment       1. Chest pain   2 Elevated troponin likely NSTEMI type I  3 coronary artery disease s/p PCI at [facility] around 12 yrs ago. 4 hypertension  5 active smoker    RECOMMENDATIONS:  Continue aspirin, beta-blocker low-dose heparin. Patient is allergic to statin. Obtain 2D echo. Plan for cardiac cath. Need cardiac cath for risk stratification. Risk and benefits of cardiac catheterization were discussed in detail. Risk of bleeding, requiring blood transfusion, vascular complication requiring surgery, renal insufficieny with need of dialysis, CVA, MI, death and anesthesia complications including intubation were discussed. Patient agrees to proceed and verbalizes understanding. Discussed with patient and Nurse.     Electronically signed by Karen Becker MD on 4/4/2022 at 6:27 [address] [place] [address] [facility]      [phone]

## 2022-04-04 NOTE — H&P
[address]     Department of Internal Medicine - Staff Internal Medicine Teaching Service          ADMISSION NOTE/HISTORY AND PHYSICAL EXAMINATION   Date: 4/3/2022  Patient Name: Brittnee Melendez  Date of admission: 4/3/2022  6:04 PM  YOB: 1965  PCP: Gabe Boxer, MD  History Obtained From:  patient, electronic medical record    CHIEF COMPLAINT     Chief complaint: Typical chest pain and shortness of breath    HISTORY OF PRESENTING ILLNESS     The patient is a pleasant 61 y.o. female with a past medical history significant for essential hypertension, coronary artery disease status post PCI on DAPT, hyperlipidemia, chronic active smoking, history of stroke, MDD, memory problem presents with a chief complaint of typical chest pain. Chest pain started 3 to 4 days ago, dull aching in nature, 5-6 out of 10 in intensity, radiating to the left shoulder, increased with exertion and relieved with rest.  Also admits to exertional shortness of breath without orthopnea or PND. Patient denies palpitation syncope nausea vomiting diarrhea headache. Evaluation in the ER, she was found to have elevated blood pressure 175/110. Heart rate 78 regular. SPO2 99% on room air. Heart auscultation showed normal first and second heart sound without murmur gallop. Chest is clear to auscultation. Abdomen soft and nontender. Labs reviewed normal BMP. No leukocytosis hemoglobin 14. EKG showed normal sinus rhythm with some ST-T wave changes in inferior lead. Troponin is elevated 80. proBNP 217. Chest x-ray is negative for acute abnormality. Cardiology has been consulted in the ER for the concern of NSTEMI. Patient is started on heparin drip as per ACS protocol and will probably have cardiac cath tomorrow. Of note, she has a past medical history of coronary artery disease status post PCI in 2004? Followed by another PCI in 2009? Questionable medication compliance.   She has been actively smoking and is currently half pack per day. Denies alcohol intake. Admits to marijuana use. Review of Systems:  General ROS: Completed and except as mentioned above were negative   HEENT ROS: Completed and except as mentioned above were negative   Allergy and Immunology ROS:  Completed and except as mentioned above were negative  Hematological and Lymphatic ROS:  Completed and except as mentioned above were negative  Respiratory ROS:  Completed and except as mentioned above were negative  Cardiovascular ROS:  Completed and except as mentioned above were negative  Gastrointestinal ROS: Completed and except as mentioned above were negative  Genito-Urinary ROS:  Completed and except as mentioned above were negative  Musculoskeletal ROS:  Completed and except as mentioned above were negative  Neurological ROS:  Completed and except as mentioned above were negative  Skin & Dermatological ROS:  Completed and except as mentioned above were negative  Psychological ROS:  Completed and except as mentioned above were negative    PAST MEDICAL HISTORY     Past Medical History:   Diagnosis Date    Anemia     Asthma     CAD (coronary artery disease)     Depression 10/20/2014    High cholesterol     Hypertension     MI, old     Osteoarthritis     Pneumonia     Sleep apnea     Stroke ([facility] [address] 9/27/2014       PAST SURGICAL HISTORY     Past Surgical History:   Procedure Laterality Date    APPENDECTOMY      CORONARY ANGIOPLASTY WITH STENT PLACEMENT  2004    HYSTERECTOMY      TONSILLECTOMY      TUBAL LIGATION         ALLERGIES     Lipitor [atorvastatin], Prozac [fluoxetine], and Zoloft [sertraline hcl]    MEDICATIONS PRIOR TO ADMISSION     Prior to Admission medications    Medication Sig Start Date End Date Taking?  Authorizing Provider   albuterol sulfate  (90 Base) MCG/ACT inhaler INHALE 2 PUFFS BY MOUTH EVERY 6 HOURS AS NEEDED FOR WHEEZING 11/15/21   Jerry Almonte MD   omeprazole (PRILOSEC) 20 MG delayed release capsule TAKE 1 CAPSULE BY MOUTH EVERY DAY 11/15/21   Bernice Bingham MD   lisinopril (PRINIVIL;ZESTRIL) 40 MG tablet TAKE 1 TABLET BY MOUTH EVERY DAY 11/2/21   Bernice Bingham MD   ibuprofen (ADVIL;MOTRIN) 800 MG tablet TAKE 1 TABLET BY MOUTH EVERY 8 HOURS AS NEEDED FOR PAIN 11/2/21   Bernice Bingham MD   amLODIPine (NORVASC) 10 MG tablet TAKE 1 TABLET BY MOUTH DAILY 10/7/21   Bernice Bingham MD   amLODIPine (NORVASC) 10 MG tablet TAKE 1 TABLET BY MOUTH DAILY 10/7/21   Bernice Bingham MD   clopidogrel (PLAVIX) 75 MG tablet TAKE 1 TABLET BY MOUTH DAILY 10/7/21   Bernice Bingham MD   lovastatin (MEVACOR) 10 MG tablet TAKE 1 TABLET BY MOUTH EVERY NIGHT 9/1/21   Bernice Bingham MD   Ginkgo Biloba (GNP GINGKO BILOBA EXTRACT PO) Take by mouth daily    Historical Provider, MD   clobetasol (TEMOVATE) 0.05 % ointment apply to affected area twice a day 8/31/21   Bernice Bingham MD   metoprolol 75 MG TABS Take 75 mg by mouth 2 times daily TAKE 2 TABLETS BY MOUTH TWICE DAILY 8/31/21   Bernice Bingham MD   ezetimibe (ZETIA) 10 MG tablet Take 1 tablet by mouth daily 8/31/21   Bernice Bingham MD   hydroCHLOROthiazide (HYDRODIURIL) 25 MG tablet TAKE 1 TABLET BY MOUTH DAILY 8/25/21   Bernice Bingham MD   ibuprofen (ADVIL;MOTRIN) 800 MG tablet take 1 tablet by mouth every 8 hours if needed for pain 3/30/21   Bernice Bingham MD   mometasone-formoterol [facility]) 100-5 MCG/ACT inhaler Inhale 2 puffs into the lungs 2 times daily 3/24/21   Bernice Bingham MD   aspirin (ASPIR-LOW) 81 MG EC tablet take 1 tablet by mouth once daily 3/24/21   Bernice Bingham MD   busPIRone (BUSPAR) 15 MG tablet take 1 tablet by mouth three times a day 3/24/21   Bernice Bingham MD   Omega-3 Fatty Acids (OMEGA-3 EPA FISH OIL) 1205 MG CAPS Take 1 tablet by mouth daily 3/24/21   Bernice Bingham MD   clopidogrel (PLAVIX) 75 MG tablet TAKE 1 TABLET BY MOUTH DAILY 3/24/21   Bernice Bingham MD   cetirizine (ZYRTEC) 10 MG tablet take 1 tablet by mouth once daily 3/16/21 Shelva Cabot, MD   diclofenac sodium 1 % GEL Apply 2 g topically 2 times daily 19   Shelva Cabot, MD   nitroGLYCERIN (NITROSTAT) 0.4 MG SL tablet Place 1 tablet under the tongue every 5 minutes as needed for Chest pain up to max of 3 total doses. If no relief after 1 dose, call 911. 18   Shelva Cabot, MD   hydrocortisone 1 % cream apply topically twice a day 1/15/18   Shelva Cabot, MD   Heat Wraps (SOFTHEAT HEATING WRAP ULTRA) MISC Use daily to for neck and shoulder pain 17   Shelva Cabot, MD   lidocaine (LMX) 4 % cream Apply 2g topically as needed. 17   Shelva Cabot, MD   Blood Pressure Monitor MISC Use daily to take BP 3/15/17   Shelva Cabot, MD       SOCIAL HISTORY     Tobacco: 40-year pack smoking history  Alcohol: Denies alcohol intake  Illicits: Admits marijuana use    FAMILY HISTORY     Family History   Problem Relation Age of Onset    High Blood Pressure Mother        PHYSICAL EXAM     Vitals: BP (!) 198/96   Pulse 77   Temp 98.7 °F (37.1 °C) (Oral)   Resp 20   Ht 5' 1\" (1.549 m)   Wt 140 lb (63.5 kg)   SpO2 100%   BMI 26.45 kg/m²   Tmax: Temp (24hrs), Av.7 °F (37.1 °C), Min:98.7 °F (37.1 °C), Max:98.7 °F (37.1 °C)    Last Body weight:   Wt Readings from Last 3 Encounters:   22 140 lb (63.5 kg)   21 142 lb 12.8 oz (64.8 kg)   21 144 lb (65.3 kg)     Body Mass Index : Body mass index is 26.45 kg/m². PHYSICAL EXAMINATION:  Constitutional: This is a well developed, well nourished, 25-29.9 - Overweight 61y.o. year old female who is alert, oriented, cooperative and in no apparent distress. Head:normocephalic and atraumatic. EENT:  PERRLA. No conjunctival injections. Septum was midline, mucosa was without erythema, exudates or cobblestoning. No thrush was noted. Neck: Supple without thyromegaly. No elevated JVP. Trachea was midline. Respiratory: Chest was symmetrical without dullness to percussion.   Breath sounds bilaterally were clear to auscultation. There were no wheezes, rhonchi or rales. There is no intercostal retraction or use of accessory muscles. No egophony noted. Cardiovascular: Regular without murmur, clicks, gallops or rubs. Abdomen: Slightly rounded and soft without organomegaly. No rebound, rigidity or guarding was appreciated. Lymphatic: No lymphadenopathy. Musculoskeletal: Normal curvature of the spine. No gross muscle weakness. Extremities:  No lower extremity edema, ulcerations, tenderness, varicosities or erythema. Muscle size, tone and strength are normal.  No involuntary movements are noted. Skin:  Warm and dry. Good color, turgor and pigmentation. No lesions or scars.   No cyanosis or clubbing  Neurological/Psychiatric: The patient's general behavior, level of consciousness, thought content and emotional status is normal.          INVESTIGATIONS     Laboratory Testing:     Recent Results (from the past 24 hour(s))   CBC with Auto Differential    Collection Time: 04/03/22  6:25 PM   Result Value Ref Range    WBC 6.8 3.5 - 11.3 k/uL    RBC 4.82 3.95 - 5.11 m/uL    Hemoglobin 14.1 11.9 - 15.1 g/dL    Hematocrit 43.9 36.3 - 47.1 %    MCV 91.1 82.6 - 102.9 fL    MCH 29.3 25.2 - 33.5 pg    MCHC 32.1 28.4 - 34.8 g/dL    RDW 15.2 (H) 11.8 - 14.4 %    Platelets 345 385 - 381 k/uL    MPV 9.6 8.1 - 13.5 fL    NRBC Automated 0.0 0.0 per 100 WBC    Seg Neutrophils 46 36 - 65 %    Lymphocytes 42 24 - 43 %    Monocytes 9 3 - 12 %    Eosinophils % 2 1 - 4 %    Basophils 1 0 - 2 %    Immature Granulocytes 0 0 %    Segs Absolute 3.15 1.50 - 8.10 k/uL    Absolute Lymph # 2.88 1.10 - 3.70 k/uL    Absolute Mono # 0.58 0.10 - 1.20 k/uL    Absolute Eos # 0.14 0.00 - 0.44 k/uL    Basophils Absolute 0.05 0.00 - 0.20 k/uL    Absolute Immature Granulocyte <0.03 0.00 - 0.30 k/uL    RBC Morphology ANISOCYTOSIS PRESENT    Basic Metabolic Panel    Collection Time: 04/03/22  6:25 PM   Result Value Ref Range    Glucose 80 70 - 99 mg/dL    BUN 11 6 - 20 mg/dL    CREATININE 0.92 (H) 0.50 - 0.90 mg/dL    Calcium 9.6 8.6 - 10.4 mg/dL    Sodium 143 135 - 144 mmol/L    Potassium 4.0 3.7 - 5.3 mmol/L    Chloride 107 98 - 107 mmol/L    CO2 21 20 - 31 mmol/L    Anion Gap 15 9 - 17 mmol/L    GFR Non-African American >60 >60 mL/min    GFR African American >60 >60 mL/min    GFR Comment         Troponin    Collection Time: 04/03/22  6:25 PM   Result Value Ref Range    Troponin, High Sensitivity 80 (HH) 0 - 14 ng/L   Brain Natriuretic Peptide    Collection Time: 04/03/22  6:25 PM   Result Value Ref Range    Pro- <300 pg/mL   Troponin    Collection Time: 04/03/22  7:36 PM   Result Value Ref Range    Troponin, High Sensitivity 70 (HH) 0 - 14 ng/L   SPECIMEN REJECTION    Collection Time: 04/03/22  7:36 PM   Result Value Ref Range    Specimen Source . BLOOD     Ordered Test CBC, PTT     Reason for Rejection Unable to perform testing: Specimen clotted. CBC    Collection Time: 04/03/22  8:05 PM   Result Value Ref Range    WBC 7.8 3.5 - 11.3 k/uL    RBC 4.73 3.95 - 5.11 m/uL    Hemoglobin 13.9 11.9 - 15.1 g/dL    Hematocrit 42.7 36.3 - 47.1 %    MCV 90.3 82.6 - 102.9 fL    MCH 29.4 25.2 - 33.5 pg    MCHC 32.6 28.4 - 34.8 g/dL    RDW 15.3 (H) 11.8 - 14.4 %    Platelets 902 408 - 643 k/uL    MPV 9.3 8.1 - 13.5 fL    NRBC Automated 0.0 0.0 per 100 WBC   APTT    Collection Time: 04/03/22  8:05 PM   Result Value Ref Range    PTT 26.7 20.5 - 30.5 sec   TSH with Reflex    Collection Time: 04/03/22  8:05 PM   Result Value Ref Range    TSH 1.33 0.30 - 5.00 uIU/mL       Imaging:   XR CHEST PORTABLE    Result Date: 4/3/2022  No acute process.        ASSESSMENT & PLAN     ASSESSMENT / PLAN:     IMPRESSION  This is a 61 y.o. female with a past medical history of hypertension hyperlipidemia coronary artery disease status post PCI, history of stroke and memory problem who presented with typical chest pain and exertional shortness of breath and found to have ST-T wave changes in the inferior leads and elevated troponin 80. Patient admitted to inpatient status for the management of NSTEMI. Principal Problem:    NSTEMI (non-ST elevated myocardial infarction) [facility])  Plan:   Chest pain is typical in nature. Medication compliance is questionable. No record of recent stress test found. Loaded with ASA and clopidogrel. Started on heparin drip as per ACS protocol. Echocardiography to be ordered. Cardiology on board. Will probably be undergoing cardiac cath tomorrow. N.p.o. midnight. Trend troponin x3. Active Problems:    CAD (coronary artery disease) with multiple stents  Plan:   Continue ASA and Plavix. Will resume home medication including beta-blocker. Essential hypertension:  Resume lisinopril 40 mg daily, amlodipine 10 mg once daily. Metoprolol 25 mg twice daily. Smoker  Plan:   Counseled on smoking cessation. We will start on nicotine patch. Mixed hyperlipidemia  Plan:   Previously refused atorvastatin due to statin induced muscle weakness currently on lovastatin. Thyroid nodule  Plan:   CTA on 09/2021 showed incidental 2.7 cm nodule in the right thyroid. Will order thyroid ultrasound and TSH. DVT ppx: On heparin drip  GI ppx: Not needed    PT/OT/SW consulted  Discharge Planning: Ongoing    Oksana Hagan MD  Internal Medicine Resident, PGY-1  [facility];  [place], [place]  4/3/2022, 9:58 PM

## 2022-04-04 NOTE — PROGRESS NOTES
[facility]   Department of Internal Medicine - Staff Internal Medicine Teaching Service        Senior Note      Date: 4/3/2022  Patient Name: Elsa Erwin  MRN: [id number]  Date of Admission: 4/3/2022  6:04 PM  YOB: 1965  Primary Care Physician: Fauzia Escobar MD  Attending Physician: Elaina Ledesma MD   Room: 17/17    Brief Summary:-    This is a 61 y.o. female admitted 4/3/2022 for NSTEMI (non-ST elevated myocardial infarction) ([facility] [address] [I21.4]. See H&P of admitting/intern resident for more details. Chief Complaint : Intermittent chest pain, diaphoresis, dizziness since 2 days         Significant Past Medical History :      Coronary artery disease with multiple stents greater than 10 years back no documentation available in epic currently.  Mixed hyperlipidemia on lovastatin with history of muscle weakness to atorvastatin   Uncontrolled hypertension   Active smoker cigarettes per day greater than 25 years   History of  documented noncompliance with home medications in the past   History of stroke   History of KEENAN documented in the past   Marijuana smoker in the past   History of asthma   History of thyroid nodule   History of major depressive disorder   History of memory problems    HPI:     Patient indicates she is having intermittent chest pain, radiating to back, 9 out of 10, exertional associated with diaphoresis and dizziness, intermittent shortness of breath on exertion since the last 2 days. Documented coronary artery disease multiple stents in the past greater than 10 years back. Patient continues to smoke 10 cigarettes/day, underlying history of dyslipidemia on lovastatin along with documented noncompliance with home medications. EKG showing concerning feature of T wave inversion in lead II and lead III. Troponins elevated at 80-> 70.     Noted to be hypertensive on admission with blood pressure of 196/109 patient missed her home medications this morning. Did receive hydralazine blood pressure has been controlled currently with systolic of 294. Diagnosed with NSTEMI currently continued on IV heparin cardiologist been consulted patient need to be kept n.p.o. for cardiac cath tomorrow. Has normal 2D echo in 2016 without any wall motion abnormalities. Patient also complains of intermittent bilateral lower leg swelling, negative for orthopnea, negative for PND    Denies any complaints like headache, denies bowel and bladder problems, denies abdominal pain      Review of Systems   Constitutional: Positive for diaphoresis. Negative for activity change, appetite change, chills, fatigue and fever. HENT: Negative. Respiratory: Positive for chest tightness and shortness of breath. Negative for wheezing. Cardiovascular: Positive for chest pain and leg swelling. Endocrine: Negative. Genitourinary: Negative. Musculoskeletal: Negative. Skin: Negative. Allergic/Immunologic: Negative. Neurological: Positive for light-headedness. Hematological: Negative. Psychiatric/Behavioral: Negative. Social history :   Smoking : Current active smoker     Alcohol :  Intermittent alcohol     Recreational drug : Marijuana smoker       Vitals : Alert oriented x3, sinus rhythm, saturating 100% on room air, hypertensive on admission    Brief Examination Findings: Chest wall tenderness positive  Physical Exam  Constitutional:       General: She is awake. She is not in acute distress. Appearance: Normal appearance. She is well-developed. HENT:      Head: Normocephalic and atraumatic. Nose: Nose normal.      Mouth/Throat:      Mouth: Mucous membranes are moist.   Eyes:      Conjunctiva/sclera: Conjunctivae normal.   Cardiovascular:      Rate and Rhythm: Normal rate. Pulses: Normal pulses.       Heart sounds: Normal heart sounds, S1 normal and S2 normal.   Pulmonary:      Effort: Pulmonary effort is normal.      Breath sounds: Normal breath sounds and air entry. Chest:      Chest wall: Tenderness present. Musculoskeletal:      Right knee: Normal.      Left knee: Normal.   Skin:     General: Skin is moist.   Neurological:      General: No focal deficit present. Mental Status: She is alert and oriented to person, place, and time. Mental status is at baseline. Psychiatric:         Attention and Perception: Attention normal.         Mood and Affect: Mood normal.         Behavior: Behavior normal. Behavior is cooperative. Thought Content: Thought content normal.          Initial Labs : Troponins noted elevated at 80-->70    BMP:   Recent Labs     04/03/22  1825      K 4.0      CO2 21   BUN 11   CREATININE 0.92*   GLUCOSE 80     CBC: )  Recent Labs     04/03/22 1825 04/03/22 2005   WBC 6.8 7.8   HGB 14.1 13.9   HCT 43.9 42.7    321          Imaging :     Chest x-ray 4/3/2022-no pneumothorax, no concern for dissection, concern for mild atelectasis. Assessment & Plan :    Principal Problem:    NSTEMI (non-ST elevated myocardial infarction) ([facility] [address]  Active Problems:    CAD (coronary artery disease) with multiple stents    Asthma    Anxiety    Essential hypertension    Memory problem    Mixed hyperlipidemia    Thyroid nodule  Resolved Problems:    * No resolved hospital problems. *      NSTEMI-trend troponin, IV heparin low-dose protocol, cardiac cath in the morning, keep n.p.o. overnight, cardiology on board. Coronary artery disease s/p multiple cath-aspirin high-dose, lovastatin, resume beta-blocker and Plavix as tolerated. Asthma albuterol as needed  Essential hypertension-reconcile home medications and resume  Next hyperlipidemia continue lovastatin patient is high-intensity statin induced muscle weakness. previously refused atorvastatin  History of thyroid nodule-Follow-up TSH  Active smoking-Counseled on smoking cessation.       Resume Home medications as tolerated by the patient. Diet : N.p.o. overnight for Tomorrow  GI prophylaxis : None indicated currently  DVT prophylaxis : On IV heparin    PT & OT: Consulted   : Likely in 2 days to home Juan Cedeno MD  Internal Medicine Resident, [facility];  [place], [place]  4/3/2022, 8:58 PM

## 2022-04-04 NOTE — CONSULTS
[facility] Cardiothoracic Surgery  Consult    Patient's Name/Date of Birth: Yovany Vargas / 1965 (1 y.o.)    Date: April 4, 2022     Chief Complaint: NSTEMI    HPI: Yovany Vargas is a 61 y.o.  female who presented over the weekend with onset of chest pain while resting. Patient presented to the emergency department patient noted to have bumped troponins. Patient set up for cardiac catheterization yesterday. After cardiac catheterization patient noted to have multivessel CAD therefore cardiothoracic surgery was consulted. Patient has history of cardiac disease with stents placed 10+ years ago. Patient still smokes cigarettes. Patient does not have any other medical concerns. Strong family history of heart disease. Currently resting in bed patient does not have any chest pain or shortness of breath. Family updated regarding plan for potential bypass surgery. Patient had some amnesia yesterday during the consult due to sedation from cardiac catheterization. Spoke with family members regarding plan who agree. Is patient on any AC or AP medication prior to CTS consult? Plavix.   Last dose morning of 4/4/2022    ROS:   CONSTITUTIONAL: Mild confusion due to sedation  Respiratory: negative shortness of breath  Cardiovascular: negative chest pain  Gastrointestinal: negative abdominal pain  Genitourinary:negative urinary retention or dysuria  Hematologic/lymphatic:  therapeutic Plavix  Musculoskeletal:negative for weakness in extremities  Neurological: Mild confusion due to sedation no unilateral weakness  Endocrine:  prediabetic  Psychiatric: negative thoughts of hurting self or others  Past Medical History:   Diagnosis Date    Anemia     Asthma     CAD (coronary artery disease)     Depression 10/20/2014    High cholesterol     Hypertension     MI, old     Osteoarthritis     Pneumonia     Sleep apnea     Stroke ([facility] [address] 9/27/2014     Past Surgical History:   Procedure Laterality Date    APPENDECTOMY      CORONARY ANGIOPLASTY WITH STENT PLACEMENT  2004    HYSTERECTOMY      TONSILLECTOMY      TUBAL LIGATION       Allergies   Allergen Reactions    Lipitor [Atorvastatin] Other (See Comments)     Muscle cramping in legs    Prozac [Fluoxetine] Other (See Comments)     Mood swings    Zoloft [Sertraline Hcl] Other (See Comments)     Anger and mood swings      Family History   Problem Relation Age of Onset    High Blood Pressure Mother      Social History     Socioeconomic History    Marital status:      Spouse name: Not on file    Number of children: Not on file    Years of education: Not on file    Highest education level: Not on file   Occupational History    Not on file   Tobacco Use    Smoking status: Current Every Day Smoker     Packs/day: 0.50     Years: 25.00     Pack years: 12.50     Types: Cigarettes    Smokeless tobacco: Never Used   Vaping Use    Vaping Use: Never used   Substance and Sexual Activity    Alcohol use: Not Currently     Alcohol/week: 0.0 standard drinks     Comment: occasionally    Drug use: Yes     Types: Marijuana Reny Leora)    Sexual activity: Not Currently   Other Topics Concern    Not on file   Social History Narrative    Not on file     Social Determinants of Health     Financial Resource Strain:     Difficulty of Paying Living Expenses: Not on file   Food Insecurity:     Worried About Running Out of Food in the Last Year: Not on file    Diana of Food in the Last Year: Not on file   Transportation Needs:     Lack of Transportation (Medical): Not on file    Lack of Transportation (Non-Medical):  Not on file   Physical Activity:     Days of Exercise per Week: Not on file    Minutes of Exercise per Session: Not on file   Stress:     Feeling of Stress : Not on file   Social Connections:     Frequency of Communication with Friends and Family: Not on file    Frequency of Social Gatherings with Friends and Family: Not on file  Attends Restoration Services: Not on file    Active Member of Clubs or Organizations: Not on file    Attends Club or Organization Meetings: Not on file    Marital Status: Not on file   Intimate Partner Violence:     Fear of Current or Ex-Partner: Not on file    Emotionally Abused: Not on file    Physically Abused: Not on file    Sexually Abused: Not on file   Housing Stability:     Unable to Pay for Housing in the Last Year: Not on file    Number of [place] in the Last Year: Not on file    Unstable Housing in the Last Year: Not on file       Current Facility-Administered Medications   Medication Dose Route Frequency Provider Last Rate Last Admin    [Held by provider] hydroCHLOROthiazide (HYDRODIURIL) tablet 25 mg  25 mg Oral Daily Delwyn Hatchet, MD        metoprolol tartrate (LOPRESSOR) tablet 75 mg  75 mg Oral BID Delwyn Hatchet, MD   75 mg at 04/04/22 1002    lisinopril (PRINIVIL;ZESTRIL) tablet 40 mg  40 mg Oral Daily Tung Rachel MD   40 mg at 04/04/22 0206    potassium chloride (KLOR-CON M) extended release tablet 10 mEq  10 mEq Oral Once Delwyn Hatchet, MD        aspirin chewable tablet 81 mg  81 mg Oral Daily Kamran Rutledge MD   81 mg at 04/04/22 1004    lovastatin (MEVACOR) tablet 10 mg  10 mg Oral Nightly Edwina Reid MD        heparin (porcine) injection 3,810 Units  60 Units/kg IntraVENous PRN Edwina Reid MD        heparin (porcine) injection 1,910 Units  30 Units/kg IntraVENous PRN Edwina Reid MD   1,910 Units at 04/04/22 0323    heparin 25,000 units in 0.9% sodium chloride 250 mL infusion  5-30 Units/kg/hr IntraVENous Continuous Edwina Reid MD 8.9 mL/hr at 04/04/22 1000 14 Units/kg/hr at 04/04/22 1000    sodium chloride flush 0.9 % injection 5-40 mL  5-40 mL IntraVENous 2 times per day Edwina Reid MD   10 mL at 04/04/22 1003    sodium chloride flush 0.9 % injection 5-40 mL  5-40 mL IntraVENous PRN Edwina Reid MD        0.9 % sodium chloride infusion   IntraVENous PRN Brianne Pulido MD 20 mL/hr at 04/04/22 0408 New Bag at 04/04/22 0408    ondansetron (ZOFRAN-ODT) disintegrating tablet 4 mg  4 mg Oral Q8H PRN Brianne Pulido MD        Or    ondansetron (ZOFRAN) injection 4 mg  4 mg IntraVENous Q6H PRN Brianne Pulido MD        polyethylene glycol (GLYCOLAX) packet 17 g  17 g Oral Daily PRN Brianne Pulido MD        acetaminophen (TYLENOL) tablet 650 mg  650 mg Oral Q6H PRN Brianne Pulido MD        Or    acetaminophen (TYLENOL) suppository 650 mg  650 mg Rectal Q6H PRN Brianne Pulido MD        [Held by provider] clopidogrel (PLAVIX) tablet 75 mg  75 mg Oral Daily Brianne Pulido MD   75 mg at 04/04/22 1002    amLODIPine (NORVASC) tablet 10 mg  10 mg Oral Daily Brianne Pulido MD   10 mg at 04/04/22 1002    calcium carbonate (TUMS) chewable tablet 500 mg  500 mg Oral TID PRN Sandy Wang MD   500 mg at 04/03/22 2221    nicotine (NICODERM CQ) 14 MG/24HR 1 patch  1 patch TransDERmal Daily Terrell Menchaca MD           Physical Exam:  Vitals:    04/04/22 1600   BP: 139/86   Pulse: 54   Resp: 15   Temp: 98.1 °F (36.7 °C)   SpO2: 99%     Weight: Weight: 140 lb (63.5 kg)    Weight: 140 lb (63.5 kg)        General: Alert and Oriented x3. Sitting up in bed. No apparent distress. HEENT:  Normocephalic and atraumatic. PERRL. EOMI. Lips and oral mucosa moist and without lesions. Neck:  Supple. Trachea midline. Chest:  No abnormality. Equal and symmetric expansion with respiration. Lungs:  Clear to auscultation. Cardiac:  Regular rate and rhythm without murmurs, rubs or gallops. Abdomen:  Soft, non-tender, normoactive bowel sounds. No masses or organomegaly. Extremities:  No cyanosis, clubbing, or edema. Intact pulses in all four extremities. Musculoskeletal:  Intact range of motion of peripheral joints. Normal muscular strength. Neurologic:  Cranial nerves are grossly intact.  Non-focal sensory deficits on exam.  Psychiatric: Mood and affect are appropriate. Imaging Studies:    Cardiac Cath:LMCA: Distal 30-40% stenosis     LAD: Mid stent stenosis 80%     LCx: Mid area 90% after the OM take off   OM1: in stent stenosis 90%     RCA: Proximal 75% stenosis   Mid area ulcerated plaques with 60% stenosis   PL branch ostial / proximal 90% stenosis      The LV gram was performed in the LAWTON 30 position. LVEF: 50 %.      Echo: pending echocardiogram for review      Prior Labs reviewed:   Noted bumped troponins. Normal renal function. Assessment   Patient Active Problem List   Diagnosis    Stroke ([facility] [address]    HTN (hypertension)    CAD (coronary artery disease) with multiple stents    Asthma    Depression    Smoker    Dementia ([facility] [address]    Anxiety    Dyslipidemia    Essential hypertension    Neck pain    Memory problem    History of stroke    DDD (degenerative disc disease), cervical    Grade III hemorrhoids    Mixed hyperlipidemia    Current mild episode of major depressive disorder without prior episode (HCC)    Allergic sinusitis    Refusal of statin medication by patient    Prediabetes    Sinus bradycardia    Thyroid nodule    NSTEMI (non-ST elevated myocardial infarction) ([facility] [address]       Risks Reviewed w/pt  Could not review risk with patient due to her sedated state of mind    PLAN:  Pending review of carotid ultrasound, vein mapping, upper arterial mapping.   Pending review of echocardiogram.  We will likely obtain a TEG study Thursday and plan for CABG surgery tentatively Friday with Dr. Tressa Martinez    Time spent total 45 min    [address] [facility], APRN - NP, CNP  Phone: [phone]

## 2022-04-04 NOTE — PLAN OF CARE
I went bedside to talk to patient. She was still heavily sedated and unable to comprehend current situation. Consult to be completed tomorrow AM. Pt to begin holding plavix now.    No plans for surgery in next 24 hrs

## 2022-04-04 NOTE — PROGRESS NOTES
Physical Therapy        Physical Therapy Cancel Note      DATE: 2022    NAME: Marie Galeano  MRN: [id number]   : 1965      Patient not seen this date for Physical Therapy due to:    Patient independent with functional mobility. Will defer PT evaluation at this time. Please reorder PT if future needs arise. Discussed with RN and pt, who both agree that pt is getting around independently in her room; pt denies PT needs.       Electronically signed by Theodore Blancas PT on 2022 at 10:33 AM

## 2022-04-05 ENCOUNTER — TELEPHONE (OUTPATIENT)
Dept: FAMILY MEDICINE CLINIC | Age: 57
End: 2022-04-05

## 2022-04-05 ENCOUNTER — APPOINTMENT (OUTPATIENT)
Dept: ULTRASOUND IMAGING | Age: 57
DRG: 233 | End: 2022-04-05
Payer: MEDICARE

## 2022-04-05 ENCOUNTER — APPOINTMENT (OUTPATIENT)
Dept: CT IMAGING | Age: 57
DRG: 233 | End: 2022-04-05
Payer: MEDICARE

## 2022-04-05 LAB
ABSOLUTE EOS #: 0.11 K/UL (ref 0–0.44)
ABSOLUTE IMMATURE GRANULOCYTE: <0.03 K/UL (ref 0–0.3)
ABSOLUTE LYMPH #: 2.93 K/UL (ref 1.1–3.7)
ABSOLUTE MONO #: 0.67 K/UL (ref 0.1–1.2)
ANION GAP SERPL CALCULATED.3IONS-SCNC: 11 MMOL/L (ref 9–17)
BASOPHILS # BLD: 1 % (ref 0–2)
BASOPHILS ABSOLUTE: 0.06 K/UL (ref 0–0.2)
BUN BLDV-MCNC: 15 MG/DL (ref 6–20)
CALCIUM SERPL-MCNC: 9.2 MG/DL (ref 8.6–10.4)
CHLORIDE BLD-SCNC: 103 MMOL/L (ref 98–107)
CO2: 21 MMOL/L (ref 20–31)
CREAT SERPL-MCNC: 0.87 MG/DL (ref 0.5–0.9)
EOSINOPHILS RELATIVE PERCENT: 2 % (ref 1–4)
GFR AFRICAN AMERICAN: >60 ML/MIN
GFR NON-AFRICAN AMERICAN: >60 ML/MIN
GFR SERPL CREATININE-BSD FRML MDRD: NORMAL ML/MIN/{1.73_M2}
GLUCOSE BLD-MCNC: 97 MG/DL (ref 70–99)
HCT VFR BLD CALC: 38.7 % (ref 36.3–47.1)
HCT VFR BLD CALC: 40.7 % (ref 36.3–47.1)
HEMOGLOBIN: 12.9 G/DL (ref 11.9–15.1)
HEMOGLOBIN: 13.3 G/DL (ref 11.9–15.1)
IMMATURE GRANULOCYTES: 0 %
LYMPHOCYTES # BLD: 42 % (ref 24–43)
MAGNESIUM: 2.7 MG/DL (ref 1.6–2.6)
MCH RBC QN AUTO: 29.4 PG (ref 25.2–33.5)
MCH RBC QN AUTO: 29.7 PG (ref 25.2–33.5)
MCHC RBC AUTO-ENTMCNC: 32.7 G/DL (ref 28.4–34.8)
MCHC RBC AUTO-ENTMCNC: 33.3 G/DL (ref 28.4–34.8)
MCV RBC AUTO: 89 FL (ref 82.6–102.9)
MCV RBC AUTO: 90 FL (ref 82.6–102.9)
MONOCYTES # BLD: 10 % (ref 3–12)
NRBC AUTOMATED: 0 PER 100 WBC
NRBC AUTOMATED: 0 PER 100 WBC
PARTIAL THROMBOPLASTIN TIME: 36.9 SEC (ref 20.5–30.5)
PARTIAL THROMBOPLASTIN TIME: 46.7 SEC (ref 20.5–30.5)
PARTIAL THROMBOPLASTIN TIME: 84.6 SEC (ref 20.5–30.5)
PDW BLD-RTO: 14.9 % (ref 11.8–14.4)
PDW BLD-RTO: 15.1 % (ref 11.8–14.4)
PLATELET # BLD: 286 K/UL (ref 138–453)
PLATELET # BLD: 295 K/UL (ref 138–453)
PMV BLD AUTO: 9.4 FL (ref 8.1–13.5)
PMV BLD AUTO: 9.9 FL (ref 8.1–13.5)
POTASSIUM SERPL-SCNC: 3.8 MMOL/L (ref 3.7–5.3)
RBC # BLD: 4.35 M/UL (ref 3.95–5.11)
RBC # BLD: 4.52 M/UL (ref 3.95–5.11)
RBC # BLD: ABNORMAL 10*6/UL
SEG NEUTROPHILS: 45 % (ref 36–65)
SEGMENTED NEUTROPHILS ABSOLUTE COUNT: 3.15 K/UL (ref 1.5–8.1)
SODIUM BLD-SCNC: 135 MMOL/L (ref 135–144)
WBC # BLD: 6.9 K/UL (ref 3.5–11.3)
WBC # BLD: 8.4 K/UL (ref 3.5–11.3)

## 2022-04-05 PROCEDURE — 6370000000 HC RX 637 (ALT 250 FOR IP): Performed by: STUDENT IN AN ORGANIZED HEALTH CARE EDUCATION/TRAINING PROGRAM

## 2022-04-05 PROCEDURE — 6360000002 HC RX W HCPCS: Performed by: STUDENT IN AN ORGANIZED HEALTH CARE EDUCATION/TRAINING PROGRAM

## 2022-04-05 PROCEDURE — 2580000003 HC RX 258: Performed by: STUDENT IN AN ORGANIZED HEALTH CARE EDUCATION/TRAINING PROGRAM

## 2022-04-05 PROCEDURE — 85730 THROMBOPLASTIN TIME PARTIAL: CPT

## 2022-04-05 PROCEDURE — 76536 US EXAM OF HEAD AND NECK: CPT

## 2022-04-05 PROCEDURE — 93931 UPPER EXTREMITY STUDY: CPT

## 2022-04-05 PROCEDURE — 36415 COLL VENOUS BLD VENIPUNCTURE: CPT

## 2022-04-05 PROCEDURE — 6370000000 HC RX 637 (ALT 250 FOR IP)

## 2022-04-05 PROCEDURE — 83735 ASSAY OF MAGNESIUM: CPT

## 2022-04-05 PROCEDURE — 99232 SBSQ HOSP IP/OBS MODERATE 35: CPT | Performed by: INTERNAL MEDICINE

## 2022-04-05 PROCEDURE — 85027 COMPLETE CBC AUTOMATED: CPT

## 2022-04-05 PROCEDURE — 93880 EXTRACRANIAL BILAT STUDY: CPT

## 2022-04-05 PROCEDURE — 71250 CT THORAX DX C-: CPT

## 2022-04-05 PROCEDURE — 2060000000 HC ICU INTERMEDIATE R&B

## 2022-04-05 PROCEDURE — 93970 EXTREMITY STUDY: CPT

## 2022-04-05 PROCEDURE — 80048 BASIC METABOLIC PNL TOTAL CA: CPT

## 2022-04-05 PROCEDURE — 85025 COMPLETE CBC W/AUTO DIFF WBC: CPT

## 2022-04-05 PROCEDURE — 6370000000 HC RX 637 (ALT 250 FOR IP): Performed by: NURSE PRACTITIONER

## 2022-04-05 RX ORDER — POTASSIUM CHLORIDE 7.45 MG/ML
10 INJECTION INTRAVENOUS PRN
Status: DISCONTINUED | OUTPATIENT
Start: 2022-04-05 | End: 2022-04-16 | Stop reason: HOSPADM

## 2022-04-05 RX ORDER — ACETAMINOPHEN, ASPIRIN AND CAFFEINE 250; 250; 65 MG/1; MG/1; MG/1
1 TABLET, FILM COATED ORAL EVERY 8 HOURS PRN
Status: DISCONTINUED | OUTPATIENT
Start: 2022-04-05 | End: 2022-04-16 | Stop reason: HOSPADM

## 2022-04-05 RX ORDER — MAGNESIUM SULFATE 1 G/100ML
1000 INJECTION INTRAVENOUS PRN
Status: DISCONTINUED | OUTPATIENT
Start: 2022-04-05 | End: 2022-04-16 | Stop reason: HOSPADM

## 2022-04-05 RX ORDER — POTASSIUM CHLORIDE 20 MEQ/1
40 TABLET, EXTENDED RELEASE ORAL PRN
Status: DISCONTINUED | OUTPATIENT
Start: 2022-04-05 | End: 2022-04-16 | Stop reason: HOSPADM

## 2022-04-05 RX ADMIN — HEPARIN SODIUM 1910 UNITS: 1000 INJECTION, SOLUTION INTRAVENOUS; SUBCUTANEOUS at 18:56

## 2022-04-05 RX ADMIN — SODIUM CHLORIDE, PRESERVATIVE FREE 10 ML: 5 INJECTION INTRAVENOUS at 09:00

## 2022-04-05 RX ADMIN — AMLODIPINE BESYLATE 10 MG: 10 TABLET ORAL at 09:00

## 2022-04-05 RX ADMIN — POTASSIUM CHLORIDE 40 MEQ: 1500 TABLET, EXTENDED RELEASE ORAL at 12:36

## 2022-04-05 RX ADMIN — ASPIRIN 81 MG: 81 TABLET, CHEWABLE ORAL at 09:00

## 2022-04-05 RX ADMIN — ACETAMINOPHEN 650 MG: 325 TABLET ORAL at 06:29

## 2022-04-05 RX ADMIN — LOVASTATIN 10 MG: 20 TABLET ORAL at 20:00

## 2022-04-05 RX ADMIN — LISINOPRIL 40 MG: 20 TABLET ORAL at 09:00

## 2022-04-05 RX ADMIN — SODIUM CHLORIDE, PRESERVATIVE FREE 10 ML: 5 INJECTION INTRAVENOUS at 20:00

## 2022-04-05 RX ADMIN — HEPARIN SODIUM 1910 UNITS: 1000 INJECTION, SOLUTION INTRAVENOUS; SUBCUTANEOUS at 12:56

## 2022-04-05 ASSESSMENT — PAIN SCALES - GENERAL
PAINLEVEL_OUTOF10: 0
PAINLEVEL_OUTOF10: 9
PAINLEVEL_OUTOF10: 0
PAINLEVEL_OUTOF10: 0

## 2022-04-05 NOTE — TELEPHONE ENCOUNTER
----- Message from Susan Watt sent at 4/5/2022 12:04 PM EDT -----  Subject: Message to Provider    QUESTIONS  Information for Provider? Patient request a call from her Doctor about Her   heart condtions. She wants her doctor and not the RN to call back  ---------------------------------------------------------------------------  --------------  [address]  What is the best way for the office to contact you? OK to leave message on   voicemail  Preferred Call Back Phone Number?  [phone]  ---------------------------------------------------------------------------  --------------  SCRIPT ANSWERS  undefined

## 2022-04-05 NOTE — FLOWSHEET NOTE
SPIRITUAL CARE DEPARTMENT - [facility]  PROGRESS NOTE    Shift date: 4/4/22  Shift day: Monday   Shift # 2    Room # 2023/2023-01   Name: Rachael Calvo            Age: 61 y.o. Gender: female          Pentecostal: [address] [place] [address] of Rastafari: Unknown    Referral: Routine Visit    Admit Date & Time: 4/3/2022  6:04 PM    PATIENT/EVENT DESCRIPTION:  Rachael Calvo is a 61 y.o. female in room 2023. Patient has NSTEMI. SPIRITUAL ASSESSMENT/INTERVENTION:   rounded on CAR-2.  met and conversed with patient. Patient declined spiritual care and prayers as of now and wanted to watch tv and go back to sleep. If  present tomorrow and assigned CAR,  will provide spiritual care, active listening to patient. SPIRITUAL CARE FOLLOW-UP PLAN:  Chaplains will remain available to offer spiritual and emotional support as needed. Electronically signed by Ean Pandey Resident, on 4/4/2022 at 9:06 PM.  [facility]  [phone]       04/04/22 2104   Encounter Summary   Services provided to: Patient   Referral/Consult From: [address] [facility] Parent; Family members  (Sister)   Place of [facility] day   ([facility])   Continue Visiting   (4/4/22)   Complexity of Encounter Moderate   Length of Encounter 15 minutes   Spiritual Assessment Completed Yes   Routine   Type Initial   Assessment Sleeping;Coping   Intervention Sustaining presence/ Ministry of presence; Active listening   Outcome Refused/declined  (Wanted to go back to sleep)

## 2022-04-05 NOTE — TELEPHONE ENCOUNTER
Please schedule patient for hospital follow-up patient has missed multiple appointments. I can do virtual visit tomorrow.   Please schedule transitional care

## 2022-04-05 NOTE — PROGRESS NOTES
[facility]  Internal Medicine Teaching Residency Program  Inpatient Daily Progress Note  ______________________________________________________________________________    Patient: Ainsley Van Wert  YOB: 1965   YAM:[id number]    Acct: [de-identified]     Room: 2023/2023-01  Admit date: 4/3/2022  Today's date: 04/05/22  Number of days in the hospital: 2    SUBJECTIVE   Admitting Diagnosis: NSTEMI (non-ST elevated myocardial infarction) ([facility] [address]  CC: Typical chest pain  Patient seen and examined at bedside. Chart and results reviewed. She complained of headache overnight. Chest pain is improved. Blood pressure is better controlled 135/80. Pulse 61. Saturating well on room air. Labs reviewed and evaluated. No significant change. Cardiac cath showed multivessel coronary artery disease, need evaluation for CABG. CT surgery on board. ROS:  Constitutional:  negative for chills, fevers, sweats  Respiratory:  negative for cough, dyspnea on exertion, hemoptysis, shortness of breath, wheezing  Cardiovascular:  negative for chest pain, chest pressure/discomfort, lower extremity edema, palpitations  Gastrointestinal:  negative for abdominal pain, constipation, diarrhea, nausea, vomiting  Neurological:  negative for dizziness, headache  BRIEF HISTORY     The patient is a pleasant 61 y.o. female with a past medical history significant for essential hypertension, coronary artery disease status post PCI on DAPT, hyperlipidemia, chronic active smoking, history of stroke, MDD, memory problem presents with a chief complaint of typical chest pain. Chest pain started 3 to 4 days ago, dull aching in nature, 5-6 out of 10 in intensity, radiating to the left shoulder, increased with exertion and relieved with rest.  Also admits to exertional shortness of breath without orthopnea or PND.   Patient denies palpitation syncope nausea vomiting diarrhea headache.     Evaluation in the ER, she was found to have elevated blood pressure 175/110. Heart rate 78 regular. SPO2 99% on room air. Heart auscultation showed normal first and second heart sound without murmur gallop. Chest is clear to auscultation. Abdomen soft and nontender. Labs reviewed normal BMP. No leukocytosis hemoglobin 14. EKG showed normal sinus rhythm with some ST-T wave changes in inferior lead. Troponin is elevated 80. proBNP 217. Chest x-ray is negative for acute abnormality. Cardiology has been consulted in the ER for the concern of NSTEMI. Patient is started on heparin drip as per ACS protocol and will probably have cardiac cath tomorrow.     Of note, she has a past medical history of coronary artery disease status post PCI in ? Followed by another PCI in ? Questionable medication compliance. She has been actively smoking and is currently half pack per day. Denies alcohol intake. Admits to marijuana use.     Patient underwent cardiac cath on 2022. Findings:  LMCA: Distal 30-40% stenosis   LAD: Mid stent stenosis 80%   LCx: Mid area 90% after the OM take off   OM1: in stent stenosis 90%   RCA: Proximal 75% stenosis   Mid area ulcerated plaques with 60% stenosis   PL branch ostial / proximal 90% stenosis     The LV gram was performed in the LAWTON 30 position. LVEF: 50 %. Conclusions:   Multivessel CAD with left main disease    PLower normal LV systolic function   Recommendations:  CT surgery consult  OBJECTIVE     Vital Signs:  BP (!) 134/118   Pulse 66   Temp 98.8 °F (37.1 °C) (Oral)   Resp 17   Ht 5' 1\" (1.549 m)   Wt 140 lb (63.5 kg)   SpO2 99%   BMI 26.45 kg/m²     Temp (24hrs), Av.5 °F (36.9 °C), Min:98.1 °F (36.7 °C), Max:99 °F (37.2 °C)    In: 200   Out: -     Physical Exam:  Constitutional: This is a well developed, well nourished, 25-29.9 - Overweight 61y.o. year old female who is alert, oriented, cooperative and in no apparent distress. Head:normocephalic and atraumatic. EENT:  PERRLA. No conjunctival injections. Septum was midline, mucosa was without erythema, exudates or cobblestoning. No thrush was noted. Neck: Supple without thyromegaly. No elevated JVP. Trachea was midline. Respiratory: Chest was symmetrical without dullness to percussion. Breath sounds bilaterally were clear to auscultation. There were no wheezes, rhonchi or rales. There is no intercostal retraction or use of accessory muscles. No egophony noted. Cardiovascular: Regular without murmur, clicks, gallops or rubs. Abdomen: Slightly rounded and soft without organomegaly. No rebound, rigidity or guarding was appreciated. Lymphatic: No lymphadenopathy. Musculoskeletal: Normal curvature of the spine. No gross muscle weakness. Extremities:  No lower extremity edema, ulcerations, tenderness, varicosities or erythema. Muscle size, tone and strength are normal.  No involuntary movements are noted. Skin:  Warm and dry. Good color, turgor and pigmentation. No lesions or scars.   No cyanosis or clubbing  Neurological/Psychiatric: The patient's general behavior, level of consciousness, thought content and emotional status is normal.        Medications:  Scheduled Medications:    [Held by provider] hydroCHLOROthiazide  25 mg Oral Daily    metoprolol tartrate  75 mg Oral BID    lisinopril  40 mg Oral Daily    potassium chloride  10 mEq Oral Once    aspirin  81 mg Oral Daily    lovastatin  10 mg Oral Nightly    sodium chloride flush  5-40 mL IntraVENous 2 times per day    [Held by provider] clopidogrel  75 mg Oral Daily    amLODIPine  10 mg Oral Daily    nicotine  1 patch TransDERmal Daily     Continuous Infusions:    heparin (PORCINE) Infusion 16 Units/kg/hr (04/05/22 1252)    sodium chloride 20 mL/hr at 04/04/22 0408     PRN Medicationsaspirin-acetaminophen-caffeine, 1 tablet, Q8H PRN  magnesium sulfate, 1,000 mg, PRN  potassium chloride, 10 mEq, PRN  potassium chloride, 40 mEq, PRN Or  potassium alternative oral replacement, 40 mEq, PRN   Or  potassium chloride, 10 mEq, PRN  heparin (porcine), 60 Units/kg, PRN  heparin (porcine), 30 Units/kg, PRN  sodium chloride flush, 5-40 mL, PRN  sodium chloride, , PRN  ondansetron, 4 mg, Q8H PRN   Or  ondansetron, 4 mg, Q6H PRN  polyethylene glycol, 17 g, Daily PRN  acetaminophen, 650 mg, Q6H PRN   Or  acetaminophen, 650 mg, Q6H PRN  calcium carbonate, 500 mg, TID PRN        Diagnostic Labs:  CBC:   Recent Labs     04/03/22 1825 04/03/22 2005 04/05/22  0118   WBC 6.8 7.8 8.4   RBC 4.82 4.73 4.35   HGB 14.1 13.9 12.9   HCT 43.9 42.7 38.7   MCV 91.1 90.3 89.0   RDW 15.2* 15.3* 15.1*    321 295     BMP:   Recent Labs     04/03/22 1825 04/04/22  0205 04/05/22  0118    137 135   K 4.0 3.6* 3.8    106 103   CO2 21 23 21   BUN 11 14 15   CREATININE 0.92* 0.93* 0.87     BNP: No results for input(s): BNP in the last 72 hours. PT/INR: No results for input(s): PROTIME, INR in the last 72 hours. APTT:   Recent Labs     04/04/22  1833 04/05/22  0118 04/05/22  1137   APTT 46.3* 84.6* 36.9*     CARDIAC ENZYMES: No results for input(s): CKMB, CKMBINDEX, TROPONINI in the last 72 hours. Invalid input(s): CKTOTAL;3  FASTING LIPID PANEL:  Lab Results   Component Value Date    CHOL 243 (H) 03/24/2021    HDL 36 (L) 12/01/2021    TRIG 178 (H) 03/24/2021     LIVER PROFILE: No results for input(s): AST, ALT, ALB, BILIDIR, BILITOT, ALKPHOS in the last 72 hours. MICROBIOLOGY:   Lab Results   Component Value Date/Time    CULTURE ESCHERICHIA COLI >[id number] CFU/ML (A) 07/15/2014 04:40 PM    CULTURE  07/15/2014 04:40 PM     Performed at [facility], [address] [place] [address] [phone]       Imaging:    XR CHEST PORTABLE    Result Date: 4/3/2022  No acute process.        ASSESSMENT & PLAN     IMPRESSION  This is a 61 y.o. female with a past medical history of hypertension hyperlipidemia coronary artery disease status post PCI, history of stroke and memory problem who presented with typical chest pain and exertional shortness of breath and found to have ST-T wave changes in the inferior leads and elevated troponin 80. Patient admitted to inpatient status for the management of NSTEMI.     Principal Problem:    NSTEMI (non-ST elevated myocardial infarction) ([address]  Plan:   Chest pain is typical in nature. Medication compliance is questionable. No record of recent stress test found. Loaded with ASA and clopidogrel. Started on heparin drip as per ACS protocol. Underwent cardiac cath on 4/4/2022. Multivessel coronary artery disease with left main disease. Recommend CT surgery evaluation for CABG. Echocardiography on 4/5/2022:  Ejection fraction 55%. No definite regional wall motion abnormality or valvular abnormality noted.     Active Problems:  CAD (coronary artery disease) with multiple stents  Plan:   Continue ASA and Plavix. Will resume home medication including beta-blocker.     Essential hypertension:  Resume lisinopril 40 mg daily, amlodipine 10 mg once daily. Metoprolol 25 mg twice daily. Smoker  Plan:   Counseled on smoking cessation. We will start on nicotine patch.       Mixed hyperlipidemia  Plan:   Previously refused atorvastatin due to statin induced muscle weakness currently on lovastatin.       Thyroid nodule  Plan:   CT chest showed 3 cm incidental right thyroid nodule. Recommend thyroid US. TSH 1.33.     DVT ppx: On heparin drip  GI ppx: Not needed     PT/OT/SW consulted  Discharge Planning: Ongoing    Kalen Dawson MD  Internal Medicine Resident, PGY-1  [facility]; [facility]  4/5/2022, 1:08 PM      I have discussed the care of Marissa Eye , including pertinent history and exam findings,    today with the resident. I have seen and examined the patient and the key elements of all parts of the encounter have been performed by me .    I agree with the assessment, plan and orders as documented by the resident. Principal Problem:    NSTEMI (non-ST elevated myocardial infarction) ([address]  Active Problems:    CAD (coronary artery disease) with multiple stents    Asthma    Smoker    Anxiety    Essential hypertension    Memory problem    Mixed hyperlipidemia    Thyroid nodule  Resolved Problems:    * No resolved hospital problems. *        Overall  course ;                                   are improving over time.         Doing better   Will needs CABG EVALUATION           Electronically signed by Jose Tuttle MD

## 2022-04-05 NOTE — PROGRESS NOTES
0830- Lab Phlebotomist at bedside for Ptt draw for Heparin gtt. Patient refuses lab draw. [address] [facility] Thoracic team at bedside, patients' daughter on Skype call also present, reviewed plan of care, upcoming testing, and answered questions regarding possible CABG. Patient refusing lab draw for Ptt. Cardio Thoracic team aware. Okay with non-titratable rate. Will page out to Cardiology team.    [phone] patient now agreeable to lab draw. Called phlebotomist and asked if they are available to draw her. Received new order for Ptt draw now and Q6hrs after that.

## 2022-04-05 NOTE — PROGRESS NOTES
[facility]  Progress Note                   Date:   4/5/2022  Patient name: Yovany Vargas  Date of admission:  4/3/2022  6:04 PM  MRN:   [id number]  YOB: 1965  PCP: Pepe Martin MD    Reason for Admission: NSTEMI (non-ST elevated myocardial infarction) ([facility] [place] [facility] [I21.4]    Subjective:       Clinical Changes /Abnormalities:  Patient seen and examined sitting up in chair in room with daughter on phone. Denies chest pain or SOB. Report occasional HA she states is from the pink metoprolol pill vs the white one she takes at home. Heparin drip on. SR on tele HR 62    Review of Systems    Medications:   Scheduled Meds:   [Held by provider] hydroCHLOROthiazide  25 mg Oral Daily    metoprolol tartrate  75 mg Oral BID    lisinopril  40 mg Oral Daily    potassium chloride  10 mEq Oral Once    aspirin  81 mg Oral Daily    lovastatin  10 mg Oral Nightly    sodium chloride flush  5-40 mL IntraVENous 2 times per day    [Held by provider] clopidogrel  75 mg Oral Daily    amLODIPine  10 mg Oral Daily    nicotine  1 patch TransDERmal Daily     Continuous Infusions:   heparin (PORCINE) Infusion 14 Units/kg/hr (04/05/22 0150)    sodium chloride 20 mL/hr at 04/04/22 0408     CBC:   Recent Labs     04/03/22 1825 04/03/22 2005 04/05/22  0118   WBC 6.8 7.8 8.4   HGB 14.1 13.9 12.9    321 295     BMP:    Recent Labs     04/03/22 1825 04/04/22  0205 04/05/22  0118    137 135   K 4.0 3.6* 3.8    106 103   CO2 21 23 21   BUN 11 14 15   CREATININE 0.92* 0.93* 0.87   GLUCOSE 80 99 97     Hepatic:No results for input(s): AST, ALT, ALB, BILITOT, ALKPHOS in the last 72 hours. Troponin:   Recent Labs     04/03/22 1825 04/03/22  1936   TROPHS 80* 70*     BNP: No results for input(s): BNP in the last 72 hours. Lipids: No results for input(s): CHOL, HDL in the last 72 hours. Invalid input(s): LDLCALCU  INR: No results for input(s): INR in the last 72 hours.     DIAGNOSTIC DATA  CATH 4/4/2022  Findings:        LMCA: Distal 30-40% stenosis     LAD: Mid stent stenosis 80%     LCx: Mid area 90% after the OM take off   OM1: in stent stenosis 90%     RCA: Proximal 75% stenosis   Mid area ulcerated plaques with 60% stenosis   PL branch ostial / proximal 90% stenosis      The LV gram was performed in the LAWTON 30 position. LVEF: 50 %.       Conclusions:      Multivessel CAD with left main disease    PLower normal LV systolic function         Recommendations:     CT surgery consult    ECHO 4/4/2022  Summary  Normal LV size , mildly increased wall thickness. No obvious wall motion abnormality seen. Normal LV systolic function with LVEF >55%. Normal RV size and function. LA and RA appears normal in size. No obvious significant structural valvular abnormality noted. No significant valvular stenosis or regurgitation noted. Normal aortic root dimension. No significant pericardial effusion noted. No obvious intra-cardiac mass or shunt noted. IVC normal diameter and inspiratory variation indicating normal RA filling  pressure. Objective:   Vitals: /78   Pulse 61   Temp 99 °F (37.2 °C) (Oral)   Resp 22   Ht 5' 1\" (1.549 m)   Wt 140 lb (63.5 kg)   SpO2 99%   BMI 26.45 kg/m²   General appearance: alert and cooperative with exam  HEENT: Head: Normocephalic, no lesions, without obvious abnormality. Neck:no JVD, trachea midline, no adenopathy  Lungs: Clear to auscultation  Heart: Regular rate and rhythm, s1/s2 auscultated, no murmurs  Abdomen: soft, non-tender, bowel sounds active  Extremities: no edema  Neurologic: not done  Right radial artery site:  CDI without ecchymosis or hematoma. Soft + pulse. Assessment / Acute Cardiac Problems:   1. MV-CAD per CATH 4/4/2022  CTS consult   2   NSTEMI type I  3   CAD s/p PCI at [facility] around 12 yrs ago.    4   HTN  5   active smoker    Patient Active Problem List:     Stroke [facility])     HTN (hypertension)     CAD (coronary artery disease) with multiple stents     Asthma     Depression     Smoker     Dementia ([facility] [address]     Anxiety     Dyslipidemia     Essential hypertension     Neck pain     Memory problem     History of stroke     DDD (degenerative disc disease), cervical     Grade III hemorrhoids     Mixed hyperlipidemia     Current mild episode of major depressive disorder without prior episode (HCC)     Allergic sinusitis     Refusal of statin medication by patient     Prediabetes     Sinus bradycardia     Thyroid nodule     NSTEMI (non-ST elevated myocardial infarction) ([facility] [address]      Plan of Treatment:   1. MV-CAD s/p CATH 4/4/2022  CTS consult with work up in process. 2. Continue ASA, Norvasc, ACE, statin, BB, IV Heparin. 3. Keep K > 4.0 and Mag > 2.0 K 3.8 today will order [address] lab. Replace K today. Replace Mag if < 2.0 per modified SS.   4. Rest of care managed per Primary Team.      Electronically signed by ELLY Castro NP on 4/5/2022 at 10:93 [address] [facility].  [phone]

## 2022-04-05 NOTE — CARE COORDINATION
Case Management Initial Discharge Plan  [address]. [address] [facility] Final,             Met with:patient to discuss discharge plans. Information verified: address, contacts, phone number, , insurance Yes  Insurance Provider: [facility]    Emergency Contact/Next of Noni Hutchison name & number: James Roche [phone] and sister Helio Mac [phone]  Who are involved in patient's support system? family    PCP: Bernice Bingham MD  Date of last visit: juan a month ago      Discharge Planning    Living Arrangements:  [address] has 1 story   stairs to climb to get into front door    Patient able to perform ADL's:Independent    Current Services (outpatient & in home) none  DME equipment: nebulizer  DME provider:     Is patient receiving oral anticoagulation therapy? No    If indicated:   Physician managing anticoagulation treatment:   Where does patient obtain lab work for ATC treatment? Does patient have any issues/concerns obtaining medications? No  If yes, what are patient's concerns? Is there a preferred Pharmacy after hours or on weekends? Yes    If yes, which pharmacy? [employer] Needed:  [address] [facility]    Patient agreeable to home care: if needed  Freedom of choice provided:  no    Prior SNF/Rehab Placement and Facility: none  Agreeable to SNF/Rehab: No  [place] of choice provided: n/a     Evaluation: no    Expected Discharge date:       Patient expects to be discharged to:        If home: is the family and/or caregiver wiling & able to provide support at home? yes  Who will be providing this support? family*    Follow Up Appointment: Best Day/ Time:      Transportation provider: daughter  Transportation arrangements needed for discharge: No    Readmission Risk              Risk of Unplanned Readmission:  11             Does patient have a readmission risk score greater than 14?: No  If yes, follow-up appointment must be made within 7 days of discharge. Goals of Care: Heart revasularized      Educated pt on transitional options, provided freedom of choice and are agreeable with plan      Discharge Plan: Home with family support. [place] IMM given and signed. Original to pt, copy in chart    1100 Upon initial interview,pt had bad H/A  And didn't know full plan of care. Since then through report, OHS is tentatively scheduled for Friday. Follow needs after OHS, probable HC or SNF.           Electronically signed by Pennie Rand RN on 4/5/22 at 12:24 PM EDT

## 2022-04-06 ENCOUNTER — ANESTHESIA EVENT (OUTPATIENT)
Dept: OPERATING ROOM | Age: 57
DRG: 233 | End: 2022-04-06
Payer: MEDICARE

## 2022-04-06 LAB
ANION GAP SERPL CALCULATED.3IONS-SCNC: 9 MMOL/L (ref 9–17)
BUN BLDV-MCNC: 18 MG/DL (ref 6–20)
CALCIUM SERPL-MCNC: 8.7 MG/DL (ref 8.6–10.4)
CHLORIDE BLD-SCNC: 109 MMOL/L (ref 98–107)
CO2: 21 MMOL/L (ref 20–31)
CREAT SERPL-MCNC: 0.89 MG/DL (ref 0.5–0.9)
GFR AFRICAN AMERICAN: >60 ML/MIN
GFR NON-AFRICAN AMERICAN: >60 ML/MIN
GFR SERPL CREATININE-BSD FRML MDRD: ABNORMAL ML/MIN/{1.73_M2}
GLUCOSE BLD-MCNC: 103 MG/DL (ref 70–99)
PARTIAL THROMBOPLASTIN TIME: 64.6 SEC (ref 20.5–30.5)
PARTIAL THROMBOPLASTIN TIME: 77.1 SEC (ref 20.5–30.5)
POTASSIUM SERPL-SCNC: 4 MMOL/L (ref 3.7–5.3)
SODIUM BLD-SCNC: 139 MMOL/L (ref 135–144)

## 2022-04-06 PROCEDURE — 85730 THROMBOPLASTIN TIME PARTIAL: CPT

## 2022-04-06 PROCEDURE — 6360000002 HC RX W HCPCS: Performed by: NURSE PRACTITIONER

## 2022-04-06 PROCEDURE — 6370000000 HC RX 637 (ALT 250 FOR IP): Performed by: STUDENT IN AN ORGANIZED HEALTH CARE EDUCATION/TRAINING PROGRAM

## 2022-04-06 PROCEDURE — 99232 SBSQ HOSP IP/OBS MODERATE 35: CPT | Performed by: INTERNAL MEDICINE

## 2022-04-06 PROCEDURE — 80048 BASIC METABOLIC PNL TOTAL CA: CPT

## 2022-04-06 PROCEDURE — 36415 COLL VENOUS BLD VENIPUNCTURE: CPT

## 2022-04-06 PROCEDURE — 6370000000 HC RX 637 (ALT 250 FOR IP)

## 2022-04-06 PROCEDURE — 2060000000 HC ICU INTERMEDIATE R&B

## 2022-04-06 PROCEDURE — 2580000003 HC RX 258: Performed by: STUDENT IN AN ORGANIZED HEALTH CARE EDUCATION/TRAINING PROGRAM

## 2022-04-06 RX ADMIN — ENOXAPARIN SODIUM 60 MG: 100 INJECTION SUBCUTANEOUS at 19:21

## 2022-04-06 RX ADMIN — ASPIRIN 81 MG: 81 TABLET, CHEWABLE ORAL at 08:51

## 2022-04-06 RX ADMIN — LOVASTATIN 10 MG: 20 TABLET ORAL at 19:21

## 2022-04-06 RX ADMIN — SODIUM CHLORIDE, PRESERVATIVE FREE 10 ML: 5 INJECTION INTRAVENOUS at 19:21

## 2022-04-06 RX ADMIN — ENOXAPARIN SODIUM 60 MG: 100 INJECTION SUBCUTANEOUS at 12:00

## 2022-04-06 RX ADMIN — SODIUM CHLORIDE, PRESERVATIVE FREE 10 ML: 5 INJECTION INTRAVENOUS at 09:30

## 2022-04-06 RX ADMIN — AMLODIPINE BESYLATE 10 MG: 10 TABLET ORAL at 08:51

## 2022-04-06 RX ADMIN — LISINOPRIL 40 MG: 20 TABLET ORAL at 08:50

## 2022-04-06 NOTE — TELEPHONE ENCOUNTER
[address] Transitions Initial Follow Up Call    Outreach made within 2 business days of discharge: Yes    Patient: Carlos Najera Patient : 1965   MRN: <[id number]>  Reason for Admission: There are no discharge diagnoses documented for the most recent discharge. Discharge Date: 21       Spoke with: [facility]    Discharge department/facility: [facility] Interactive Patient Contact:  Was patient able to fill all prescriptions:   Was patient instructed to bring all medications to the follow-up visit:   Is patient taking all medications as directed in the discharge summary? Does patient understand their discharge instructions:   Does patient have questions or concerns that need addressed prior to 7-14 day follow up office visit:     Scheduled appointment with PCP within 7-14 days    Follow Up  No future appointments.     Ramandeep Redman MA

## 2022-04-06 NOTE — PROGRESS NOTES
[facility]  Progress Note                   Date:   4/6/2022  Patient name: Trista Diaz  Date of admission:  4/3/2022  6:04 PM  MRN:   [id number]  YOB: 1965  PCP: Olga Quezada MD    Reason for Admission: NSTEMI (non-ST elevated myocardial infarction) ([facility] [address] [I21.4]    Subjective:       Clinical Changes /Abnormalities:  Patient seen and examined sitting up in chair in room Denies chest pain or SOB. Heparin drip on. SR on tele HR 62    Review of Systems    Medications:   Scheduled Meds:   [Held by provider] hydroCHLOROthiazide  25 mg Oral Daily    metoprolol tartrate  75 mg Oral BID    lisinopril  40 mg Oral Daily    potassium chloride  10 mEq Oral Once    aspirin  81 mg Oral Daily    lovastatin  10 mg Oral Nightly    sodium chloride flush  5-40 mL IntraVENous 2 times per day    [Held by provider] clopidogrel  75 mg Oral Daily    amLODIPine  10 mg Oral Daily    nicotine  1 patch TransDERmal Daily     Continuous Infusions:   heparin (PORCINE) Infusion 18 Units/kg/hr (04/05/22 1855)    sodium chloride 20 mL/hr at 04/04/22 0408     CBC:   Recent Labs     04/03/22  2005 04/05/22  0118 04/05/22  1745   WBC 7.8 8.4 6.9   HGB 13.9 12.9 13.3    295 286     BMP:    Recent Labs     04/04/22  0205 04/05/22  0118 04/06/22  0638    135 139   K 3.6* 3.8 4.0    103 109*   CO2 23 21 21   BUN 14 15 18   CREATININE 0.93* 0.87 0.89   GLUCOSE 99 97 103*     Hepatic:No results for input(s): AST, ALT, ALB, BILITOT, ALKPHOS in the last 72 hours. Troponin:   Recent Labs     04/03/22  1825 04/03/22  1936   TROPHS 80* 70*     BNP: No results for input(s): BNP in the last 72 hours. Lipids: No results for input(s): CHOL, HDL in the last 72 hours. Invalid input(s): LDLCALCU  INR: No results for input(s): INR in the last 72 hours.     DIAGNOSTIC DATA  CATH 4/4/2022  Findings:        LMCA: Distal 30-40% stenosis     LAD: Mid stent stenosis 80%     LCx: Mid area 90% after the OM take off   OM1: in stent stenosis 90%     RCA: Proximal 75% stenosis   Mid area ulcerated plaques with 60% stenosis   PL branch ostial / proximal 90% stenosis      The LV gram was performed in the LAWTON 30 position. LVEF: 50 %.       Conclusions:      Multivessel CAD with left main disease    PLower normal LV systolic function         Recommendations:     CT surgery consult    ECHO 4/4/2022  Summary  Normal LV size , mildly increased wall thickness. No obvious wall motion abnormality seen. Normal LV systolic function with LVEF >55%. Normal RV size and function. LA and RA appears normal in size. No obvious significant structural valvular abnormality noted. No significant valvular stenosis or regurgitation noted. Normal aortic root dimension. No significant pericardial effusion noted. No obvious intra-cardiac mass or shunt noted. IVC normal diameter and inspiratory variation indicating normal RA filling  pressure. Objective:   Vitals: /87   Pulse 63   Temp 98.8 °F (37.1 °C) (Oral)   Resp 16   Ht 5' 1\" (1.549 m)   Wt 140 lb (63.5 kg)   SpO2 99%   BMI 26.45 kg/m²   General appearance: alert and cooperative with exam  HEENT: Head: Normocephalic, no lesions, without obvious abnormality. Neck:no JVD, trachea midline, no adenopathy  Lungs: Clear to auscultation  Heart: Regular rate and rhythm, s1/s2 auscultated, no murmurs  Abdomen: soft, non-tender, bowel sounds active  Extremities: no edema  Neurologic: not done  Right radial artery site:  CDI without ecchymosis or hematoma. Soft + pulse. Assessment / Acute Cardiac Problems:   1. MV-CAD per CATH 4/4/2022  CTS consult   2   NSTEMI type I  3   CAD s/p PCI at [facility] around 12 yrs ago.    4   HTN  5   active smoker    Patient Active Problem List:     Stroke [facility])     HTN (hypertension)     CAD (coronary artery disease) with multiple stents     Asthma     Depression     Smoker     Dementia ([address]     Anxiety Dyslipidemia     Essential hypertension     Neck pain     Memory problem     History of stroke     DDD (degenerative disc disease), cervical     Grade III hemorrhoids     Mixed hyperlipidemia     Current mild episode of major depressive disorder without prior episode (HCC)     Allergic sinusitis     Refusal of statin medication by patient     Prediabetes     Sinus bradycardia     Thyroid nodule     NSTEMI (non-ST elevated myocardial infarction) ([facility] [address]      Plan of Treatment:   1. MV-CAD s/p CATH 4/4/2022  CTS consult with work up in process. 2. Continue ASA, Norvasc, ACE, statin, BB,  Plavix on hold. 3. Pt requesting to come off heparin gtt. Will d/c and start on lovenox 1mg/kg BID   4.  Keep K > 4.0 and Mag > 2.0     Electronically signed by ELLY Jane - CNP on 4/6/2022 at 10:42 AM  [address]  [phone]

## 2022-04-06 NOTE — PROGRESS NOTES
[facility]  Internal Medicine Teaching Residency Program  Inpatient Daily Progress Note  ______________________________________________________________________________    Patient: Graeme Guillermo  YOB: 1965   QYX:[id number]    Acct: [de-identified]     Room: 2023/2023-01  Admit date: 4/3/2022  Today's date: 04/06/22  Number of days in the hospital: 3    SUBJECTIVE   Admitting Diagnosis: NSTEMI (non-ST elevated myocardial infarction) ([facility] [address]  CC: Typical chest pain  Patient seen and examined at bedside. Chart results reviewed. No significant event overnight. No chest pain noted overnight. Blood pressure is 110/80. Pulse 62 regular. Saturating well on room air. Labs reviewed and evaluated. Normal BMP and normal CBC. Ultrasound thyroid showed 2.9 cm slight heterogeneous lobe cyst.  TSH is normal.  Plan  CABG on Friday? ROS:  Constitutional:  negative for chills, fevers, sweats  Respiratory:  negative for cough, dyspnea on exertion, hemoptysis, shortness of breath, wheezing  Cardiovascular:  negative for chest pain, chest pressure/discomfort, lower extremity edema, palpitations  Gastrointestinal:  negative for abdominal pain, constipation, diarrhea, nausea, vomiting  Neurological:  negative for dizziness, headache  BRIEF HISTORY     The patient is a pleasant 61 y.o. female with a past medical history significant for essential hypertension, coronary artery disease status post PCI on DAPT, hyperlipidemia, chronic active smoking, history of stroke, MDD, memory problem presents with a chief complaint of typical chest pain. Chest pain started 3 to 4 days ago, dull aching in nature, 5-6 out of 10 in intensity, radiating to the left shoulder, increased with exertion and relieved with rest.  Also admits to exertional shortness of breath without orthopnea or PND.   Patient denies palpitation syncope nausea vomiting diarrhea headache.     Evaluation in the ER, she was found to have elevated blood pressure 175/110. Heart rate 78 regular. SPO2 99% on room air. Heart auscultation showed normal first and second heart sound without murmur gallop. Chest is clear to auscultation. Abdomen soft and nontender. Labs reviewed normal BMP. No leukocytosis hemoglobin 14. EKG showed normal sinus rhythm with some ST-T wave changes in inferior lead. Troponin is elevated 80. proBNP 217. Chest x-ray is negative for acute abnormality. Cardiology has been consulted in the ER for the concern of NSTEMI. Patient is started on heparin drip as per ACS protocol and will probably have cardiac cath tomorrow.     Of note, she has a past medical history of coronary artery disease status post PCI in ? Followed by another PCI in ? Questionable medication compliance. She has been actively smoking and is currently half pack per day. Denies alcohol intake. Admits to marijuana use.     Patient underwent cardiac cath on 2022. Findings:  LMCA: Distal 30-40% stenosis   LAD: Mid stent stenosis 80%   LCx: Mid area 90% after the OM take off   OM1: in stent stenosis 90%   RCA: Proximal 75% stenosis   Mid area ulcerated plaques with 60% stenosis   PL branch ostial / proximal 90% stenosis     The LV gram was performed in the LAWTON 30 position. LVEF: 50 %. Conclusions:   Multivessel CAD with left main disease    PLower normal LV systolic function   Recommendations:  CT surgery consult and recommend CABG. OBJECTIVE     Vital Signs:  /81   Pulse 73   Temp 99 °F (37.2 °C) (Oral)   Resp 16   Ht 5' 1\" (1.549 m)   Wt 140 lb (63.5 kg)   SpO2 99%   BMI 26.45 kg/m²     Temp (24hrs), Av.7 °F (37.1 °C), Min:98 °F (36.7 °C), Max:99 °F (37.2 °C)    In: 200   Out: -     Physical Exam:  Constitutional: This is a well developed, well nourished, 25-29.9 - Overweight 61y.o. year old female who is alert, oriented, cooperative and in no apparent distress.   Head:normocephalic and atraumatic. EENT:  PERRLA. No conjunctival injections. Septum was midline, mucosa was without erythema, exudates or cobblestoning. No thrush was noted. Neck: Supple without thyromegaly. No elevated JVP. Trachea was midline. Respiratory: Chest was symmetrical without dullness to percussion. Breath sounds bilaterally were clear to auscultation. There were no wheezes, rhonchi or rales. There is no intercostal retraction or use of accessory muscles. No egophony noted. Cardiovascular: Regular without murmur, clicks, gallops or rubs. Abdomen: Slightly rounded and soft without organomegaly. No rebound, rigidity or guarding was appreciated. Lymphatic: No lymphadenopathy. Musculoskeletal: Normal curvature of the spine. No gross muscle weakness. Extremities:  No lower extremity edema, ulcerations, tenderness, varicosities or erythema. Muscle size, tone and strength are normal.  No involuntary movements are noted. Skin:  Warm and dry. Good color, turgor and pigmentation. No lesions or scars.   No cyanosis or clubbing  Neurological/Psychiatric: The patient's general behavior, level of consciousness, thought content and emotional status is normal.        Medications:  Scheduled Medications:    enoxaparin  1 mg/kg SubCUTAneous BID    [Held by provider] hydroCHLOROthiazide  25 mg Oral Daily    metoprolol tartrate  75 mg Oral BID    lisinopril  40 mg Oral Daily    potassium chloride  10 mEq Oral Once    aspirin  81 mg Oral Daily    lovastatin  10 mg Oral Nightly    sodium chloride flush  5-40 mL IntraVENous 2 times per day    [Held by provider] clopidogrel  75 mg Oral Daily    amLODIPine  10 mg Oral Daily    nicotine  1 patch TransDERmal Daily     Continuous Infusions:    sodium chloride 20 mL/hr at 04/04/22 0408     PRN Medicationsaspirin-acetaminophen-caffeine, 1 tablet, Q8H PRN  magnesium sulfate, 1,000 mg, PRN  potassium chloride, 10 mEq, PRN  potassium chloride, 40 mEq, PRN Or  potassium alternative oral replacement, 40 mEq, PRN   Or  potassium chloride, 10 mEq, PRN  heparin (porcine), 60 Units/kg, PRN  heparin (porcine), 30 Units/kg, PRN  sodium chloride flush, 5-40 mL, PRN  sodium chloride, , PRN  ondansetron, 4 mg, Q8H PRN   Or  ondansetron, 4 mg, Q6H PRN  polyethylene glycol, 17 g, Daily PRN  acetaminophen, 650 mg, Q6H PRN   Or  acetaminophen, 650 mg, Q6H PRN  calcium carbonate, 500 mg, TID PRN        Diagnostic Labs:  CBC:   Recent Labs     04/03/22 2005 04/05/22  0118 04/05/22  1745   WBC 7.8 8.4 6.9   RBC 4.73 4.35 4.52   HGB 13.9 12.9 13.3   HCT 42.7 38.7 40.7   MCV 90.3 89.0 90.0   RDW 15.3* 15.1* 14.9*    295 286     BMP:   Recent Labs     04/04/22  0205 04/05/22  0118 04/06/22  0638    135 139   K 3.6* 3.8 4.0    103 109*   CO2 23 21 21   BUN 14 15 18   CREATININE 0.93* 0.87 0.89     BNP: No results for input(s): BNP in the last 72 hours. PT/INR: No results for input(s): PROTIME, INR in the last 72 hours. APTT:   Recent Labs     04/05/22  1745 04/06/22  0005 04/06/22  0638   APTT 46.7* 64.6* 77.1*     CARDIAC ENZYMES: No results for input(s): CKMB, CKMBINDEX, TROPONINI in the last 72 hours. Invalid input(s): CKTOTAL;3  FASTING LIPID PANEL:  Lab Results   Component Value Date    CHOL 243 (H) 03/24/2021    HDL 36 (L) 12/01/2021    TRIG 178 (H) 03/24/2021     LIVER PROFILE: No results for input(s): AST, ALT, ALB, BILIDIR, BILITOT, ALKPHOS in the last 72 hours. MICROBIOLOGY:   Lab Results   Component Value Date/Time    CULTURE ESCHERICHIA COLI >[id number] CFU/ML (A) 07/15/2014 04:40 PM    CULTURE  07/15/2014 04:40 PM     Performed at [facility], [address] [place] [address] [phone]       Imaging:    XR CHEST PORTABLE    Result Date: 4/3/2022  No acute process.        ASSESSMENT & PLAN     IMPRESSION  This is a 61 y.o. female with a past medical history of hypertension hyperlipidemia coronary artery disease status post PCI, history of stroke and memory problem who presented with typical chest pain and exertional shortness of breath and found to have ST-T wave changes in the inferior leads and elevated troponin 80. Patient admitted to inpatient status for the management of NSTEMI. She underwent cardiac cath and found to have multivessel coronary artery disease. CT surgery taken on board and recommend CABG.    Principal Problem:    NSTEMI (non-ST elevated myocardial infarction) ([address]  Plan:   Chest pain is typical in nature. Medication compliance is questionable. No record of recent stress test found. Loaded with ASA and clopidogrel. Started on Lovenox 1 mg/kg twice daily as per ACS protocol. Underwent cardiac cath on 4/4/2022. Multivessel coronary artery disease with left main disease. Recommend CT surgery evaluation. Echocardiography on 4/5/2022:  Ejection fraction 55%. No definite regional wall motion abnormality or valvular abnormality noted. CT surgery recommend CABG. Possible surgery on upcoming Friday. Active Problems:  CAD (coronary artery disease) with multiple stents  Plan:   Continue ASA and Plavix. Will resume home medication including beta-blocker.     Essential hypertension:  Resume lisinopril 40 mg daily, amlodipine 10 mg once daily. Metoprolol 25 mg twice daily. Smoker  Plan:   Counseled on smoking cessation. We will start on nicotine patch.     Mixed hyperlipidemia  Plan:   Previously refused atorvastatin due to statin induced muscle weakness currently on lovastatin.     Thyroid nodule  Plan:   CT chest showed 3 cm incidental right thyroid nodule. Subsequent thyroid ultrasound showed 2.9 cm slight heterogeneous cystic. No follow-up study recommended. TSH 1.33.     DVT ppx: On Lovenox. GI ppx: Not needed     PT/OT/SW consulted  Discharge Planning: Ongoing    Wenda Alpers, MD  Internal Medicine Resident, PGY-1  [address], [place]  4/6/2022, 12:48 PM      I have discussed the care of [employer] , including pertinent history and exam findings,    today with the resident. I have seen and examined the patient and the key elements of all parts of the encounter have been performed by me . I agree with the assessment, plan and orders as documented by the resident. Principal Problem:    NSTEMI (non-ST elevated myocardial infarction) ([facility] [address]  Active Problems:    CAD (coronary artery disease) with multiple stents    Asthma    Smoker    Anxiety    Essential hypertension    Memory problem    Mixed hyperlipidemia    Thyroid nodule  Resolved Problems:    * No resolved hospital problems. *        Overall  course ;                                   are improving over time.         Plan for CABG on Friday           Electronically signed by Michelle Sullivan MD

## 2022-04-07 LAB
ANION GAP SERPL CALCULATED.3IONS-SCNC: 12 MMOL/L (ref 9–17)
BUN BLDV-MCNC: 15 MG/DL (ref 6–20)
CALCIUM SERPL-MCNC: 8.9 MG/DL (ref 8.6–10.4)
CHLORIDE BLD-SCNC: 108 MMOL/L (ref 98–107)
CO2: 20 MMOL/L (ref 20–31)
CREAT SERPL-MCNC: 0.79 MG/DL (ref 0.5–0.9)
GFR AFRICAN AMERICAN: >60 ML/MIN
GFR NON-AFRICAN AMERICAN: >60 ML/MIN
GFR SERPL CREATININE-BSD FRML MDRD: ABNORMAL ML/MIN/{1.73_M2}
GLUCOSE BLD-MCNC: 93 MG/DL (ref 70–99)
HCT VFR BLD CALC: 39.7 % (ref 36.3–47.1)
HEMOGLOBIN: 12.2 G/DL (ref 11.9–15.1)
MAGNESIUM: 2.3 MG/DL (ref 1.6–2.6)
MCH RBC QN AUTO: 29.5 PG (ref 25.2–33.5)
MCHC RBC AUTO-ENTMCNC: 30.7 G/DL (ref 28.4–34.8)
MCV RBC AUTO: 95.9 FL (ref 82.6–102.9)
NRBC AUTOMATED: 0 PER 100 WBC
PDW BLD-RTO: 15 % (ref 11.8–14.4)
PLATELET # BLD: 284 K/UL (ref 138–453)
PMV BLD AUTO: 9.8 FL (ref 8.1–13.5)
POTASSIUM SERPL-SCNC: 4.1 MMOL/L (ref 3.7–5.3)
RBC # BLD: 4.14 M/UL (ref 3.95–5.11)
SARS-COV-2, RAPID: NOT DETECTED
SODIUM BLD-SCNC: 140 MMOL/L (ref 135–144)
SPECIMEN DESCRIPTION: NORMAL
WBC # BLD: 6.8 K/UL (ref 3.5–11.3)

## 2022-04-07 PROCEDURE — 6370000000 HC RX 637 (ALT 250 FOR IP)

## 2022-04-07 PROCEDURE — 6370000000 HC RX 637 (ALT 250 FOR IP): Performed by: STUDENT IN AN ORGANIZED HEALTH CARE EDUCATION/TRAINING PROGRAM

## 2022-04-07 PROCEDURE — 87641 MR-STAPH DNA AMP PROBE: CPT

## 2022-04-07 PROCEDURE — 6360000002 HC RX W HCPCS: Performed by: NURSE PRACTITIONER

## 2022-04-07 PROCEDURE — 86900 BLOOD TYPING SEROLOGIC ABO: CPT

## 2022-04-07 PROCEDURE — 99232 SBSQ HOSP IP/OBS MODERATE 35: CPT | Performed by: INTERNAL MEDICINE

## 2022-04-07 PROCEDURE — 36415 COLL VENOUS BLD VENIPUNCTURE: CPT

## 2022-04-07 PROCEDURE — 83036 HEMOGLOBIN GLYCOSYLATED A1C: CPT

## 2022-04-07 PROCEDURE — 2580000003 HC RX 258: Performed by: STUDENT IN AN ORGANIZED HEALTH CARE EDUCATION/TRAINING PROGRAM

## 2022-04-07 PROCEDURE — 86850 RBC ANTIBODY SCREEN: CPT

## 2022-04-07 PROCEDURE — 86920 COMPATIBILITY TEST SPIN: CPT

## 2022-04-07 PROCEDURE — 83735 ASSAY OF MAGNESIUM: CPT

## 2022-04-07 PROCEDURE — 85027 COMPLETE CBC AUTOMATED: CPT

## 2022-04-07 PROCEDURE — 86901 BLOOD TYPING SEROLOGIC RH(D): CPT

## 2022-04-07 PROCEDURE — 87635 SARS-COV-2 COVID-19 AMP PRB: CPT

## 2022-04-07 PROCEDURE — 80048 BASIC METABOLIC PNL TOTAL CA: CPT

## 2022-04-07 PROCEDURE — 2060000000 HC ICU INTERMEDIATE R&B

## 2022-04-07 RX ORDER — SENNA PLUS 8.6 MG/1
1 TABLET ORAL ONCE
Status: DISCONTINUED | OUTPATIENT
Start: 2022-04-07 | End: 2022-04-16 | Stop reason: HOSPADM

## 2022-04-07 RX ORDER — ALPRAZOLAM 0.25 MG/1
0.25 TABLET ORAL 2 TIMES DAILY PRN
Status: DISCONTINUED | OUTPATIENT
Start: 2022-04-07 | End: 2022-04-13

## 2022-04-07 RX ADMIN — ALPRAZOLAM 0.25 MG: 0.25 TABLET ORAL at 15:12

## 2022-04-07 RX ADMIN — ENOXAPARIN SODIUM 60 MG: 100 INJECTION SUBCUTANEOUS at 20:21

## 2022-04-07 RX ADMIN — SODIUM CHLORIDE, PRESERVATIVE FREE 10 ML: 5 INJECTION INTRAVENOUS at 20:22

## 2022-04-07 RX ADMIN — AMLODIPINE BESYLATE 10 MG: 10 TABLET ORAL at 09:26

## 2022-04-07 RX ADMIN — ACETAMINOPHEN 650 MG: 325 TABLET ORAL at 12:00

## 2022-04-07 RX ADMIN — ASPIRIN 81 MG: 81 TABLET, CHEWABLE ORAL at 09:27

## 2022-04-07 RX ADMIN — LISINOPRIL 40 MG: 20 TABLET ORAL at 09:27

## 2022-04-07 RX ADMIN — ENOXAPARIN SODIUM 60 MG: 100 INJECTION SUBCUTANEOUS at 09:27

## 2022-04-07 RX ADMIN — LOVASTATIN 10 MG: 20 TABLET ORAL at 20:22

## 2022-04-07 RX ADMIN — SODIUM CHLORIDE, PRESERVATIVE FREE 10 ML: 5 INJECTION INTRAVENOUS at 09:27

## 2022-04-07 ASSESSMENT — PAIN SCALES - GENERAL: PAINLEVEL_OUTOF10: 3

## 2022-04-07 ASSESSMENT — LIFESTYLE VARIABLES: SMOKING_STATUS: 1

## 2022-04-07 NOTE — PROGRESS NOTES
[facility]  Internal Medicine Teaching Residency Program  Inpatient Daily Progress Note  ______________________________________________________________________________    Patient: Naida Carrier  YOB: 1965   BLT:[id number]    Acct: [de-identified]     Room: [address] [place] [address]  Admit date: 4/3/2022  Today's date: 04/07/22  Number of days in the hospital: 4    SUBJECTIVE   Admitting Diagnosis: NSTEMI (non-ST elevated myocardial infarction) ([facility]  CC: Typical chest pain  Today patient seen and examined at bedside. Chart results reviewed. No significant event overnight. Patient chest pain-free. Blood pressure is 130/80. Pulse 69. Saturating on room air. Labs reviewed and evaluated. No significant change. ROS:  Constitutional:  negative for chills, fevers, sweats  Respiratory:  negative for cough, dyspnea on exertion, hemoptysis, shortness of breath, wheezing  Cardiovascular:  negative for chest pain, chest pressure/discomfort, lower extremity edema, palpitations  Gastrointestinal:  negative for abdominal pain, constipation, diarrhea, nausea, vomiting  Neurological:  negative for dizziness, headache  BRIEF HISTORY     The patient is a pleasant 61 y.o. female with a past medical history significant for essential hypertension, coronary artery disease status post PCI on DAPT, hyperlipidemia, chronic active smoking, history of stroke, MDD, memory problem presents with a chief complaint of typical chest pain. Chest pain started 3 to 4 days ago, dull aching in nature, 5-6 out of 10 in intensity, radiating to the left shoulder, increased with exertion and relieved with rest.  Also admits to exertional shortness of breath without orthopnea or PND. Patient denies palpitation syncope nausea vomiting diarrhea headache.     Evaluation in the ER, she was found to have elevated blood pressure 175/110. Heart rate 78 regular. SPO2 99% on room air.   Heart auscultation showed normal first and second heart sound without murmur gallop. Chest is clear to auscultation. Abdomen soft and nontender. Labs reviewed normal BMP. No leukocytosis hemoglobin 14. EKG showed normal sinus rhythm with some ST-T wave changes in inferior lead. Troponin is elevated 80. proBNP 217. Chest x-ray is negative for acute abnormality. Cardiology has been consulted in the ER for the concern of NSTEMI. Patient is started on heparin drip as per ACS protocol and will probably have cardiac cath tomorrow.     Of note, she has a past medical history of coronary artery disease status post PCI in ? Followed by another PCI in ? Questionable medication compliance. She has been actively smoking and is currently half pack per day. Denies alcohol intake. Admits to marijuana use.     Patient underwent cardiac cath on 2022. Findings:  LMCA: Distal 30-40% stenosis   LAD: Mid stent stenosis 80%   LCx: Mid area 90% after the OM take off   OM1: in stent stenosis 90%   RCA: Proximal 75% stenosis   Mid area ulcerated plaques with 60% stenosis   PL branch ostial / proximal 90% stenosis     The LV gram was performed in the LAWTON 30 position. LVEF: 50 %. Conclusions:   Multivessel CAD with left main disease    PLower normal LV systolic function   Recommendations:  CT surgery consult and recommend CABG. OBJECTIVE     Vital Signs:  /89   Pulse 90   Temp 98.6 °F (37 °C) (Oral)   Resp 16   Ht 5' 1\" (1.549 m)   Wt 140 lb (63.5 kg)   SpO2 98%   BMI 26.45 kg/m²     Temp (24hrs), Av.5 °F (36.9 °C), Min:98.2 °F (36.8 °C), Max:98.9 °F (37.2 °C)    In: 510   Out: -     Physical Exam:  Constitutional: This is a well developed, well nourished, 25-29.9 - Overweight 61y.o. year old female who is alert, oriented, cooperative and in no apparent distress. Head:normocephalic and atraumatic. EENT:  PERRLA. No conjunctival injections.    Septum was midline, mucosa was without erythema, exudates or cobblestoning. No thrush was noted. Neck: Supple without thyromegaly. No elevated JVP. Trachea was midline. Respiratory: Chest was symmetrical without dullness to percussion. Breath sounds bilaterally were clear to auscultation. There were no wheezes, rhonchi or rales. There is no intercostal retraction or use of accessory muscles. No egophony noted. Cardiovascular: Regular without murmur, clicks, gallops or rubs. Abdomen: Slightly rounded and soft without organomegaly. No rebound, rigidity or guarding was appreciated. Lymphatic: No lymphadenopathy. Musculoskeletal: Normal curvature of the spine. No gross muscle weakness. Extremities:  No lower extremity edema, ulcerations, tenderness, varicosities or erythema. Muscle size, tone and strength are normal.  No involuntary movements are noted. Skin:  Warm and dry. Good color, turgor and pigmentation. No lesions or scars.   No cyanosis or clubbing  Neurological/Psychiatric: The patient's general behavior, level of consciousness, thought content and emotional status is normal.        Medications:  Scheduled Medications:    enoxaparin  1 mg/kg SubCUTAneous BID    [Held by provider] hydroCHLOROthiazide  25 mg Oral Daily    metoprolol tartrate  75 mg Oral BID    lisinopril  40 mg Oral Daily    potassium chloride  10 mEq Oral Once    aspirin  81 mg Oral Daily    lovastatin  10 mg Oral Nightly    sodium chloride flush  5-40 mL IntraVENous 2 times per day    [Held by provider] clopidogrel  75 mg Oral Daily    amLODIPine  10 mg Oral Daily    nicotine  1 patch TransDERmal Daily     Continuous Infusions:    sodium chloride 20 mL/hr at 04/04/22 0408     PRN Medicationsaspirin-acetaminophen-caffeine, 1 tablet, Q8H PRN  magnesium sulfate, 1,000 mg, PRN  potassium chloride, 10 mEq, PRN  potassium chloride, 40 mEq, PRN   Or  potassium alternative oral replacement, 40 mEq, PRN   Or  potassium chloride, 10 mEq, PRN  heparin (porcine), 60 Units/kg, PRN  heparin (porcine), 30 Units/kg, PRN  sodium chloride flush, 5-40 mL, PRN  sodium chloride, , PRN  ondansetron, 4 mg, Q8H PRN   Or  ondansetron, 4 mg, Q6H PRN  polyethylene glycol, 17 g, Daily PRN  acetaminophen, 650 mg, Q6H PRN   Or  acetaminophen, 650 mg, Q6H PRN  calcium carbonate, 500 mg, TID PRN        Diagnostic Labs:  CBC:   Recent Labs     04/05/22  0118 04/05/22  1745 04/07/22  0510   WBC 8.4 6.9 6.8   RBC 4.35 4.52 4.14   HGB 12.9 13.3 12.2   HCT 38.7 40.7 39.7   MCV 89.0 90.0 95.9   RDW 15.1* 14.9* 15.0*    286 284     BMP:   Recent Labs     04/05/22  0118 04/06/22  0638 04/07/22  0510    139 140   K 3.8 4.0 4.1    109* 108*   CO2 21 21 20   BUN 15 18 15   CREATININE 0.87 0.89 0.79     BNP: No results for input(s): BNP in the last 72 hours. PT/INR: No results for input(s): PROTIME, INR in the last 72 hours. APTT:   Recent Labs     04/05/22 1745 04/06/22  0005 04/06/22  0638   APTT 46.7* 64.6* 77.1*     CARDIAC ENZYMES: No results for input(s): CKMB, CKMBINDEX, TROPONINI in the last 72 hours. Invalid input(s): CKTOTAL;3  FASTING LIPID PANEL:  Lab Results   Component Value Date    CHOL 243 (H) 03/24/2021    HDL 36 (L) 12/01/2021    TRIG 178 (H) 03/24/2021     LIVER PROFILE: No results for input(s): AST, ALT, ALB, BILIDIR, BILITOT, ALKPHOS in the last 72 hours. MICROBIOLOGY:   Lab Results   Component Value Date/Time    CULTURE ESCHERICHIA COLI >113615 CFU/ML (A) 07/15/2014 04:40 PM    CULTURE  07/15/2014 04:40 PM     Performed at [address] [place] [address] [phone]       Imaging:    XR CHEST PORTABLE    Result Date: 4/3/2022  No acute process.        ASSESSMENT & PLAN     IMPRESSION  This is a 61 y.o. female with a past medical history of hypertension hyperlipidemia coronary artery disease status post PCI, history of stroke and memory problem who presented with typical chest pain and exertional shortness of breath and found to have ST-T wave changes in the inferior leads and elevated troponin 80. Patient admitted to inpatient status for the management of NSTEMI. She underwent cardiac cath and found to have multivessel coronary artery disease. CT surgery taken on board and recommend CABG.    Principal Problem:    NSTEMI (non-ST elevated myocardial infarction) ([address]  Plan:   Chest pain is typical in nature. Medication compliance is questionable. No record of recent stress test found. Loaded with ASA and clopidogrel. Started on Lovenox 1 mg/kg twice daily as per ACS protocol. Underwent cardiac cath on 4/4/2022. Multivessel coronary artery disease with left main disease. Echocardiography on 4/5/2022:  Ejection fraction 55%. No definite regional wall motion abnormality or valvular abnormality noted. CT surgery recommend CABG. Possible surgery on upcoming Friday. Active Problems:  CAD (coronary artery disease) with multiple stents  Plan:   Continue ASA and Plavix. Will resume home medication including beta-blocker.     Essential hypertension:  Resume lisinopril 40 mg daily, amlodipine 10 mg once daily. Metoprolol 25 mg twice daily. Smoker  Plan:   Counseled on smoking cessation. We will start on nicotine patch.     Mixed hyperlipidemia  Plan:   Previously refused atorvastatin due to statin induced muscle weakness currently on lovastatin.     Thyroid nodule  Plan:   CT chest showed 3 cm incidental right thyroid nodule. Subsequent thyroid ultrasound showed 2.9 cm slight heterogeneous cystic. No follow-up study recommended. TSH 1.33.     DVT ppx: On Lovenox. GI ppx: Not needed     PT/OT/SW consulted  Discharge Planning: Ongoing    Acacia Wise MD  Internal Medicine Resident, PGY-1  [address], [place]  4/7/2022, 12:19 PM    I have discussed the care of Edd Landon , including pertinent history and exam findings,    today with the resident.   I have seen and examined the patient and the key elements of all parts of the encounter have been performed by me . I agree with the assessment, plan and orders as documented by the resident. Principal Problem:    NSTEMI (non-ST elevated myocardial infarction) ([address]  Active Problems:    CAD (coronary artery disease) with multiple stents    Asthma    Smoker    Anxiety    Essential hypertension    Memory problem    Mixed hyperlipidemia    Thyroid nodule  Resolved Problems:    * No resolved hospital problems. *        Overall  course ;                                   are improving over time.         Plan for CABG tomorrow          Electronically signed by Mariaelena Christianson MD

## 2022-04-07 NOTE — PROGRESS NOTES
[facility]  Progress Note                   Date:   4/7/2022  Patient name: Edd Landon  Date of admission:  4/3/2022  6:04 PM  MRN:   [id number]  YOB: 1965  PCP: Param Stubbs MD    Reason for Admission: NSTEMI (non-ST elevated myocardial infarction) ([facility] [I21.4]    Subjective:       Clinical Changes /Abnormalities:  Patient seen and examined sitting up in chair in room after discussion with RN. Denies chest pain or SOB. No CV concerns overnight. SR on tele HR 69    Review of Systems    Medications:   Scheduled Meds:   enoxaparin  1 mg/kg SubCUTAneous BID    [Held by provider] hydroCHLOROthiazide  25 mg Oral Daily    metoprolol tartrate  75 mg Oral BID    lisinopril  40 mg Oral Daily    potassium chloride  10 mEq Oral Once    aspirin  81 mg Oral Daily    lovastatin  10 mg Oral Nightly    sodium chloride flush  5-40 mL IntraVENous 2 times per day    [Held by provider] clopidogrel  75 mg Oral Daily    amLODIPine  10 mg Oral Daily    nicotine  1 patch TransDERmal Daily     Continuous Infusions:   sodium chloride 20 mL/hr at 04/04/22 0408     CBC:   Recent Labs     04/05/22  0118 04/05/22  1745 04/07/22  0510   WBC 8.4 6.9 6.8   HGB 12.9 13.3 12.2    286 284     BMP:    Recent Labs     04/05/22  0118 04/06/22  0638 04/07/22  0510    139 140   K 3.8 4.0 4.1    109* 108*   CO2 21 21 20   BUN 15 18 15   CREATININE 0.87 0.89 0.79   GLUCOSE 97 103* 93     Hepatic:No results for input(s): AST, ALT, ALB, BILITOT, ALKPHOS in the last 72 hours. Troponin:   No results for input(s): TROPHS in the last 72 hours. BNP: No results for input(s): BNP in the last 72 hours. Lipids: No results for input(s): CHOL, HDL in the last 72 hours. Invalid input(s): LDLCALCU  INR: No results for input(s): INR in the last 72 hours.     DIAGNOSTIC DATA  CATH 4/4/2022  Findings:        LMCA: Distal 30-40% stenosis     LAD: Mid stent stenosis 80%     LCx: Mid area 90% Dyslipidemia     Essential hypertension     Neck pain     Memory problem     History of stroke     DDD (degenerative disc disease), cervical     Grade III hemorrhoids     Mixed hyperlipidemia     Current mild episode of major depressive disorder without prior episode (HCC)     Allergic sinusitis     Refusal of statin medication by patient     Prediabetes     Sinus bradycardia     Thyroid nodule     NSTEMI (non-ST elevated myocardial infarction) ([facility]      Plan of Treatment:   1. MV-CAD s/p CATH 4/4/2022  CTS consult with plan for possible OR tomorrow. 2. Continue ASA, Norvasc, ACE, statin, BB,  Plavix on hold. 3. Continue SC lovenox 1mg/kg BID   4. Keep K > 4.0 and Mag > 2.0  K 4.1 today  Will order [address] lab.    5. Rest of care managed by primary Team.    Electronically signed by ELLY Varma NP on 4/7/2022 at 9:49 AM  [id number] [address].  [phone]

## 2022-04-07 NOTE — PROGRESS NOTES
Patient transferred down to 1024 via wheelchair. Writer and aide with patient. All belongings were double checked not to be left behind and were with patient. Receiving nurse in room for transfer. Family with patient during transfer.

## 2022-04-07 NOTE — ANESTHESIA PRE PROCEDURE
Department of Anesthesiology  Preprocedure Note       Name:  Graeme Guillermo   Age:  61 y.o.  :  1965                                          MRN:  [id number]         Date:  2022      Surgeon: Kayleen Condon):  Alonso French MD    Procedure: Procedure(s):  CABG CORONARY ARTERY BYPASS X3  ON PUMP, SWAN MARCIAL, KIT, POSSIBLE LEFT RADIAL VEIN HARVEST    Medications prior to admission:   Prior to Admission medications    Medication Sig Start Date End Date Taking?  Authorizing Provider   albuterol sulfate  (90 Base) MCG/ACT inhaler INHALE 2 PUFFS BY MOUTH EVERY 6 HOURS AS NEEDED FOR WHEEZING 11/15/21   Yousuf Taylor MD   omeprazole (PRILOSEC) 20 MG delayed release capsule TAKE 1 CAPSULE BY MOUTH EVERY DAY 11/15/21   Yousuf Taylor MD   lisinopril (PRINIVIL;ZESTRIL) 40 MG tablet TAKE 1 TABLET BY MOUTH EVERY DAY 21   Yousuf Taylor MD   ibuprofen (ADVIL;MOTRIN) 800 MG tablet TAKE 1 TABLET BY MOUTH EVERY 8 HOURS AS NEEDED FOR PAIN 21   Yousuf Taylor MD   amLODIPine (NORVASC) 10 MG tablet TAKE 1 TABLET BY MOUTH DAILY 10/7/21   Yousuf Taylor MD   amLODIPine (NORVASC) 10 MG tablet TAKE 1 TABLET BY MOUTH DAILY 10/7/21   Yousuf Taylor MD   clopidogrel (PLAVIX) 75 MG tablet TAKE 1 TABLET BY MOUTH DAILY 10/7/21   Yousuf Taylor MD   lovastatin (MEVACOR) 10 MG tablet TAKE 1 TABLET BY MOUTH EVERY NIGHT 21   Yousuf Taylor MD   Ginkgo Biloba (GNP GINGKO BILOBA EXTRACT PO) Take by mouth daily    Historical Provider, MD   clobetasol (TEMOVATE) 0.05 % ointment apply to affected area twice a day 21   Yousuf Taylor MD   metoprolol 75 MG TABS Take 75 mg by mouth 2 times daily TAKE 2 TABLETS BY MOUTH TWICE DAILY 21   Yousuf Taylor MD   ezetimibe (ZETIA) 10 MG tablet Take 1 tablet by mouth daily 21   Yousuf Taylor MD   hydroCHLOROthiazide (HYDRODIURIL) 25 MG tablet TAKE 1 TABLET BY MOUTH DAILY 21   Yousuf Taylor MD   ibuprofen (ADVIL;MOTRIN) 800 MG tablet take 1 tablet by mouth every 8 hours if needed for pain 3/30/21   Brian Wolfe MD   mometasone-formoterol [facility]) 100-5 MCG/ACT inhaler Inhale 2 puffs into the lungs 2 times daily 3/24/21   Brian Wolfe MD   aspirin (ASPIR-LOW) 81 MG EC tablet take 1 tablet by mouth once daily 3/24/21   Brian Wolfe MD   busPIRone (BUSPAR) 15 MG tablet take 1 tablet by mouth three times a day 3/24/21   Brian Wolfe MD   Omega-3 Fatty Acids (OMEGA-3 EPA FISH OIL) 1205 MG CAPS Take 1 tablet by mouth daily 3/24/21   Brian Wolfe MD   clopidogrel (PLAVIX) 75 MG tablet TAKE 1 TABLET BY MOUTH DAILY 3/24/21   Brian Wolfe MD   cetirizine (ZYRTEC) 10 MG tablet take 1 tablet by mouth once daily 3/16/21   Brian Wolfe MD   diclofenac sodium 1 % GEL Apply 2 g topically 2 times daily 5/8/19   Brian Wolfe MD   nitroGLYCERIN (NITROSTAT) 0.4 MG SL tablet Place 1 tablet under the tongue every 5 minutes as needed for Chest pain up to max of 3 total doses. If no relief after 1 dose, call 911. 6/5/18   Brian Wolfe MD   hydrocortisone 1 % cream apply topically twice a day 1/15/18   Brian Wolfe MD   Heat Wraps (SOFTHEAT HEATING WRAP ULTRA) MISC Use daily to for neck and shoulder pain 12/6/17   Brian Wolfe MD   lidocaine (LMX) 4 % cream Apply 2g topically as needed.  7/26/17   Brian Wolfe MD   Blood Pressure Monitor MISC Use daily to take BP 3/15/17   Brian Wolfe MD       Current medications:    Current Facility-Administered Medications   Medication Dose Route Frequency Provider Last Rate Last Admin    enoxaparin (LOVENOX) injection 60 mg  1 mg/kg SubCUTAneous BID ELLY Russell - CNP   60 mg at 04/07/22 0927    aspirin-acetaminophen-caffeine (EXCEDRIN MIGRAINE) per tablet 1 tablet  1 tablet Oral Q8H PRN González Alarcon MD        magnesium sulfate 1000 mg in dextrose 5% 100 mL IVPB  1,000 mg IntraVENous PRN Allie Maryanne, APRN - NP        potassium chloride 10 mEq/100 mL IVPB (Peripheral Line)  10 mEq IntraVENous PRN Davenport L Reda Ni - NP        potassium chloride (KLOR-CON M) extended release tablet 40 mEq  40 mEq Oral PRN Milburn Lot, APRN - NP   40 mEq at 04/05/22 1236    Or    potassium bicarb-citric acid (EFFER-K) effervescent tablet 40 mEq  40 mEq Oral PRN Milburn Lot, APRN - NP        Or    potassium chloride 10 mEq/100 mL IVPB (Peripheral Line)  10 mEq IntraVENous PRN Milburn Lot, APRN - NP        [Held by provider] hydroCHLOROthiazide (HYDRODIURIL) tablet 25 mg  25 mg Oral Daily Leopoldo Antu, MD        metoprolol tartrate (LOPRESSOR) tablet 75 mg  75 mg Oral BID Leopoldo Antu, MD   75 mg at 04/04/22 2108    lisinopril (PRINIVIL;ZESTRIL) tablet 40 mg  40 mg Oral Daily Vinh Pena MD   40 mg at 04/07/22 6072    potassium chloride (KLOR-CON M) extended release tablet 10 mEq  10 mEq Oral Once Leopoldo Antu, MD        aspirin chewable tablet 81 mg  81 mg Oral Daily Ralph Rudd MD   81 mg at 04/07/22 0927    lovastatin (MEVACOR) tablet 10 mg  10 mg Oral Nightly John Stahl MD   10 mg at 04/06/22 1921    heparin (porcine) injection 3,810 Units  60 Units/kg IntraVENous PRN John Stahl MD   3,810 Units at 04/04/22 1927    heparin (porcine) injection 1,910 Units  30 Units/kg IntraVENous PRN John Stahl MD   1,910 Units at 04/05/22 1856    sodium chloride flush 0.9 % injection 5-40 mL  5-40 mL IntraVENous 2 times per day John Stahl MD   10 mL at 04/07/22 0927    sodium chloride flush 0.9 % injection 5-40 mL  5-40 mL IntraVENous PRN John Stahl MD        0.9 % sodium chloride infusion   IntraVENous PRN John Stahl MD 20 mL/hr at 04/04/22 0408 New Bag at 04/04/22 0408    ondansetron (ZOFRAN-ODT) disintegrating tablet 4 mg  4 mg Oral Q8H PRN John Stahl MD        Or    ondansetron (ZOFRAN) injection 4 mg  4 mg IntraVENous Q6H PRN Viet Jackson MD        polyethylene glycol (GLYCOLAX) packet 17 g  17 g Oral Daily PRN John Stahl MD        acetaminophen (TYLENOL) tablet 650 mg  650 mg Oral Q6H PRN Hermann Cortez MD   650 mg at 04/07/22 1200    Or    acetaminophen (TYLENOL) suppository 650 mg  650 mg Rectal Q6H PRN Hermann Cortez MD        [Held by provider] clopidogrel (PLAVIX) tablet 75 mg  75 mg Oral Daily Hermann Cortez MD   75 mg at 04/04/22 1002    amLODIPine (NORVASC) tablet 10 mg  10 mg Oral Daily Hermann Cortez MD   10 mg at 04/07/22 0926    calcium carbonate (TUMS) chewable tablet 500 mg  500 mg Oral TID PRN Tay Marquez MD   500 mg at 04/03/22 2221    nicotine (NICODERM CQ) 14 MG/24HR 1 patch  1 patch TransDERmal Daily Kalen Dawson MD           Allergies: Allergies   Allergen Reactions    Lipitor [Atorvastatin] Other (See Comments)     Muscle cramping in legs    Prozac [Fluoxetine] Other (See Comments)     Mood swings    Zoloft [Sertraline Hcl] Other (See Comments)     Anger and mood swings        Problem List:    Patient Active Problem List   Diagnosis Code    Stroke ([address] I63.9    HTN (hypertension) I10    CAD (coronary artery disease) with multiple stents I25.10    Asthma J45.909    Depression F32. A    Smoker F17.200    Dementia ([facility]) F03.90    Anxiety F41.9    Dyslipidemia E78.5    Essential hypertension I10    Neck pain M54.2    Memory problem R41.3    History of stroke Z86.73    DDD (degenerative disc disease), cervical M50.30    Grade III hemorrhoids K64.2    Mixed hyperlipidemia E78.2    Current mild episode of major depressive disorder without prior episode ([facility]) F32.0    Allergic sinusitis J30.9    Refusal of statin medication by patient Z53.29    Prediabetes R73.03    Sinus bradycardia R00.1    Thyroid nodule E04.1    NSTEMI (non-ST elevated myocardial infarction) ([facility]) I21.4       Past Medical History:        Diagnosis Date    Anemia     Asthma     CAD (coronary artery disease)     Depression 10/20/2014    High cholesterol     Hypertension     MI, old     Osteoarthritis     Pneumonia     Sleep apnea     Stroke [facility]) 9/27/2014       Past Surgical History:        Procedure Laterality Date    APPENDECTOMY      CORONARY ANGIOPLASTY WITH STENT PLACEMENT  2004    HYSTERECTOMY      TONSILLECTOMY      TUBAL LIGATION         Social History:    Social History     Tobacco Use    Smoking status: Current Every Day Smoker     Packs/day: 0.50     Years: 25.00     Pack years: 12.50     Types: Cigarettes    Smokeless tobacco: Never Used   Substance Use Topics    Alcohol use: Not Currently     Alcohol/week: 0.0 standard drinks     Comment: occasionally                                Ready to quit: Not Answered  Counseling given: Not Answered      Vital Signs (Current):   Vitals:    04/07/22 0700 04/07/22 0710 04/07/22 1100 04/07/22 1156   BP:  130/87 (!) 150/91 138/89   Pulse: 70 69 69 90   Resp: 14 18 18 16   Temp:  98.2 °F (36.8 °C) 98.2 °F (36.8 °C) 98.6 °F (37 °C)   TempSrc:  Oral Oral Oral   SpO2:    98%   Weight:       Height:                                                  BP Readings from Last 3 Encounters:   04/07/22 138/89   09/09/21 (!) 138/90   08/31/21 138/88       NPO Status:                                                                                 BMI:   Wt Readings from Last 3 Encounters:   04/03/22 140 lb (63.5 kg)   09/09/21 142 lb 12.8 oz (64.8 kg)   08/31/21 144 lb (65.3 kg)     Body mass index is 26.45 kg/m².     CBC:   Lab Results   Component Value Date    WBC 6.8 04/07/2022    RBC 4.14 04/07/2022    HGB 12.2 04/07/2022    HCT 39.7 04/07/2022    MCV 95.9 04/07/2022    RDW 15.0 04/07/2022     04/07/2022       CMP:   Lab Results   Component Value Date     04/07/2022    K 4.1 04/07/2022     04/07/2022    CO2 20 04/07/2022    BUN 15 04/07/2022    CREATININE 0.79 04/07/2022    GFRAA >60 04/07/2022    LABGLOM >60 04/07/2022    GLUCOSE 93 04/07/2022    PROT 7.7 03/24/2021    CALCIUM 8.9 04/07/2022    BILITOT 0.29 03/24/2021    ALKPHOS 58 03/24/2021    AST 18 03/24/2021 ALT 17 03/24/2021       POC Tests: No results for input(s): POCGLU, POCNA, POCK, POCCL, POCBUN, POCHEMO, POCHCT in the last 72 hours. Coags:   Lab Results   Component Value Date    PROTIME 10.6 06/10/2016    INR 1.0 06/10/2016    APTT 77.1 04/06/2022       HCG (If Applicable):   Lab Results   Component Value Date    PREGTESTUR negative 07/15/2014        ABGs: No results found for: PHART, PO2ART, MFX7LCZ, XRE9SBE, BEART, X6LVDNSE     Type & Screen (If Applicable):  No results found for: LABABO, LABRH    Drug/Infectious Status (If Applicable):  Lab Results   Component Value Date    HEPCAB NONREACTIVE 08/24/2017       COVID-19 Screening (If Applicable):   Lab Results   Component Value Date    COVID19 Not Detected 02/17/2021           Anesthesia Evaluation  Patient summary reviewed no history of anesthetic complications:   Airway: Mallampati: II  TM distance: >3 FB   Neck ROM: full  Mouth opening: > = 3 FB Dental:          Pulmonary:   (+) sleep apnea:  decreased breath sounds,  asthma: current smoker    (-) pneumonia                           Cardiovascular:    (+) hypertension: severe, past MI:, CAD:, dysrhythmias:,         Rhythm: regular  Rate: normal           Beta Blocker:  Dose within 24 Hrs         Neuro/Psych:   (+) CVA:, psychiatric history:            GI/Hepatic/Renal: Neg GI/Hepatic/Renal ROS            Endo/Other:    (+) Diabetes, . ROS comment: Thyroid nodule Abdominal:             Vascular: Other Findings:           Anesthesia Plan      general     ASA 4       Induction: intravenous. arterial line, CVP, BIS, central line and KIT  MIPS: Postoperative ventilation. Anesthetic plan and risks discussed with patient. Use of blood products discussed with patient whom consented to blood products. Plan discussed with CRNA.             Sinus rhythm with marked sinus arrhythmia  Otherwise normal ECG  When compared with ECG of 19-SEP-2010 20:35,  No significant change was found Specimen Collected: 04/03/22 17:59            CONCLUSIONS     Summary  Normal LV size , mildly increased wall thickness. No obvious wall motion abnormality seen. Normal LV systolic function with LVEF >55%. Normal RV size and function. LA and RA appears normal in size. No obvious significant structural valvular abnormality noted. No significant valvular stenosis or regurgitation noted. Normal aortic root dimension. No significant pericardial effusion noted. No obvious intra-cardiac mass or shunt noted.   IVC normal diameter and inspiratory variation indicating normal RA filling    Chart review  Bianca Irizarry MD   4/7/2022

## 2022-04-07 NOTE — PROGRESS NOTES
Occupational [address]  Occupational Therapy Not Seen Note    DATE: 2022    NAME: Marlo Arreola  MRN: [id number]   : 1965      Patient not seen this date for Occupational Therapy due to: Other:  Pt is currently Independent at this time. However, OT will continue to monitor Pt status for possible Cardiac Intervention / CABG, after which OT will complete Eval (as appropriate).         Electronically signed by AMITA Galloway OTR/L on 2022 at 9:07 AM

## 2022-04-08 ENCOUNTER — ANESTHESIA (OUTPATIENT)
Dept: OPERATING ROOM | Age: 57
DRG: 233 | End: 2022-04-08
Payer: MEDICARE

## 2022-04-08 ENCOUNTER — APPOINTMENT (OUTPATIENT)
Dept: GENERAL RADIOLOGY | Age: 57
DRG: 233 | End: 2022-04-08
Payer: MEDICARE

## 2022-04-08 VITALS
RESPIRATION RATE: 16 BRPM | SYSTOLIC BLOOD PRESSURE: 145 MMHG | OXYGEN SATURATION: 100 % | DIASTOLIC BLOOD PRESSURE: 122 MMHG | TEMPERATURE: 96.2 F

## 2022-04-08 LAB
ABO/RH: NORMAL
ACTION: NORMAL
ALBUMIN SERPL-MCNC: 4.1 G/DL (ref 3.5–5.2)
ALBUMIN/GLOBULIN RATIO: 5.1 (ref 1–2.5)
ALLEN TEST: ABNORMAL
ALP BLD-CCNC: 22 U/L (ref 35–104)
ALT SERPL-CCNC: 13 U/L (ref 5–33)
ANION GAP SERPL CALCULATED.3IONS-SCNC: 12 MMOL/L (ref 9–17)
ANION GAP SERPL CALCULATED.3IONS-SCNC: 8 MMOL/L (ref 9–17)
ANION GAP SERPL CALCULATED.3IONS-SCNC: 8 MMOL/L (ref 9–17)
ANION GAP SERPL CALCULATED.3IONS-SCNC: 9 MMOL/L (ref 9–17)
ANION GAP: 10 MMOL/L (ref 7–16)
ANION GAP: 10 MMOL/L (ref 7–16)
ANTIBODY SCREEN: NEGATIVE
ARM BAND NUMBER: NORMAL
AST SERPL-CCNC: 27 U/L
BILIRUB SERPL-MCNC: 0.96 MG/DL (ref 0.3–1.2)
BILIRUBIN URINE: NEGATIVE
BLD PROD TYP BPU: NORMAL
BLD PROD TYP BPU: NORMAL
BPU ID: NORMAL
BPU ID: NORMAL
BUN BLDV-MCNC: 11 MG/DL (ref 6–20)
BUN BLDV-MCNC: 15 MG/DL (ref 6–20)
CALCIUM IONIZED: 1.2 MMOL/L (ref 1.13–1.33)
CALCIUM IONIZED: 1.22 MMOL/L (ref 1.13–1.33)
CALCIUM IONIZED: 1.41 MMOL/L (ref 1.13–1.33)
CALCIUM SERPL-MCNC: 8.3 MG/DL (ref 8.6–10.4)
CALCIUM SERPL-MCNC: 8.3 MG/DL (ref 8.6–10.4)
CALCIUM SERPL-MCNC: 8.8 MG/DL (ref 8.6–10.4)
CALCIUM SERPL-MCNC: 9.2 MG/DL (ref 8.6–10.4)
CHLORIDE BLD-SCNC: 104 MMOL/L (ref 98–107)
CHLORIDE BLD-SCNC: 110 MMOL/L (ref 98–107)
CHLORIDE BLD-SCNC: 116 MMOL/L (ref 98–107)
CHLORIDE BLD-SCNC: 117 MMOL/L (ref 98–107)
CO2: 20 MMOL/L (ref 20–31)
CO2: 21 MMOL/L (ref 20–31)
CO2: 22 MMOL/L (ref 20–31)
CO2: 23 MMOL/L (ref 20–31)
COLOR: YELLOW
COMMENT UA: NORMAL
CREAT SERPL-MCNC: 0.75 MG/DL (ref 0.5–0.9)
CREAT SERPL-MCNC: 0.81 MG/DL (ref 0.5–0.9)
CREAT SERPL-MCNC: 0.82 MG/DL (ref 0.5–0.9)
CREAT SERPL-MCNC: 0.84 MG/DL (ref 0.5–0.9)
CROSSMATCH RESULT: NORMAL
CROSSMATCH RESULT: NORMAL
CULTURE: NORMAL
DATE AND TIME: NORMAL
DISPENSE STATUS BLOOD BANK: NORMAL
DISPENSE STATUS BLOOD BANK: NORMAL
ESTIMATED AVERAGE GLUCOSE: 111 MG/DL
EXPIRATION DATE: NORMAL
FIBRINOGEN: 118 MG/DL (ref 140–420)
FIBRINOGEN: 238 MG/DL (ref 140–420)
FIO2: 100
FIO2: 50
FIO2: 50
FIO2: 60
GFR AFRICAN AMERICAN: >60 ML/MIN
GFR NON-AFRICAN AMERICAN: 59 ML/MIN
GFR NON-AFRICAN AMERICAN: >60 ML/MIN
GFR SERPL CREATININE-BSD FRML MDRD: >60 ML/MIN
GFR SERPL CREATININE-BSD FRML MDRD: ABNORMAL ML/MIN/{1.73_M2}
GFR SERPL CREATININE-BSD FRML MDRD: NORMAL ML/MIN/{1.73_M2}
GLUCOSE BLD-MCNC: 103 MG/DL (ref 74–100)
GLUCOSE BLD-MCNC: 104 MG/DL (ref 70–99)
GLUCOSE BLD-MCNC: 106 MG/DL (ref 74–100)
GLUCOSE BLD-MCNC: 109 MG/DL (ref 74–100)
GLUCOSE BLD-MCNC: 112 MG/DL (ref 65–105)
GLUCOSE BLD-MCNC: 114 MG/DL (ref 65–105)
GLUCOSE BLD-MCNC: 117 MG/DL (ref 74–100)
GLUCOSE BLD-MCNC: 118 MG/DL (ref 70–99)
GLUCOSE BLD-MCNC: 119 MG/DL (ref 65–105)
GLUCOSE BLD-MCNC: 120 MG/DL (ref 74–100)
GLUCOSE BLD-MCNC: 126 MG/DL (ref 74–100)
GLUCOSE BLD-MCNC: 134 MG/DL (ref 74–100)
GLUCOSE BLD-MCNC: 135 MG/DL (ref 65–105)
GLUCOSE BLD-MCNC: 160 MG/DL (ref 65–105)
GLUCOSE BLD-MCNC: 160 MG/DL (ref 65–105)
GLUCOSE BLD-MCNC: 162 MG/DL (ref 70–99)
GLUCOSE BLD-MCNC: 92 MG/DL (ref 74–100)
GLUCOSE BLD-MCNC: 95 MG/DL (ref 70–99)
GLUCOSE URINE: NEGATIVE
HBA1C MFR BLD: 5.5 % (ref 4–6)
HCT VFR BLD CALC: 20.6 % (ref 36.3–47.1)
HCT VFR BLD CALC: 26.8 % (ref 36.3–47.1)
HCT VFR BLD CALC: 30.3 % (ref 36.3–47.1)
HCT VFR BLD CALC: 31.7 % (ref 36.3–47.1)
HEMOGLOBIN: 10.3 G/DL (ref 11.9–15.1)
HEMOGLOBIN: 6.8 G/DL (ref 11.9–15.1)
HEMOGLOBIN: 8.5 G/DL (ref 11.9–15.1)
HEMOGLOBIN: 9.6 G/DL (ref 11.9–15.1)
INR BLD: 0.9
INR BLD: 1.5
INR BLD: 1.5
KETONES, URINE: NEGATIVE
LEUKOCYTE ESTERASE, URINE: NEGATIVE
MAGNESIUM: 2.6 MG/DL (ref 1.6–2.6)
MAGNESIUM: 3.1 MG/DL (ref 1.6–2.6)
MAGNESIUM: 3.8 MG/DL (ref 1.6–2.6)
MCH RBC QN AUTO: 29.1 PG (ref 25.2–33.5)
MCH RBC QN AUTO: 29.8 PG (ref 25.2–33.5)
MCH RBC QN AUTO: 30 PG (ref 25.2–33.5)
MCHC RBC AUTO-ENTMCNC: 31.7 G/DL (ref 28.4–34.8)
MCHC RBC AUTO-ENTMCNC: 32.5 G/DL (ref 28.4–34.8)
MCHC RBC AUTO-ENTMCNC: 33 G/DL (ref 28.4–34.8)
MCV RBC AUTO: 90.7 FL (ref 82.6–102.9)
MCV RBC AUTO: 91.6 FL (ref 82.6–102.9)
MCV RBC AUTO: 91.8 FL (ref 82.6–102.9)
MODE: ABNORMAL
MODE: ABNORMAL
MRSA, DNA, NASAL: NEGATIVE
NEGATIVE BASE EXCESS, ART: 1 (ref 0–2)
NEGATIVE BASE EXCESS, ART: 1 (ref 0–2)
NEGATIVE BASE EXCESS, ART: 2 (ref 0–2)
NEGATIVE BASE EXCESS, ART: 3 (ref 0–2)
NEGATIVE BASE EXCESS, ART: 3 (ref 0–2)
NEGATIVE BASE EXCESS, ART: 5 (ref 0–2)
NITRITE, URINE: NEGATIVE
NOTIFY: NORMAL
NRBC AUTOMATED: 0 PER 100 WBC
O2 DEVICE/FLOW/%: ABNORMAL
PARTIAL THROMBOPLASTIN TIME: 28.2 SEC (ref 20.5–30.5)
PARTIAL THROMBOPLASTIN TIME: 40.8 SEC (ref 20.5–30.5)
PARTIAL THROMBOPLASTIN TIME: 44.8 SEC (ref 20.5–30.5)
PDW BLD-RTO: 14.7 % (ref 11.8–14.4)
PDW BLD-RTO: 15.9 % (ref 11.8–14.4)
PDW BLD-RTO: 16.3 % (ref 11.8–14.4)
PH UA: 7.5 (ref 5–8)
PLATELET # BLD: 106 K/UL (ref 138–453)
PLATELET # BLD: 106 K/UL (ref 138–453)
PLATELET # BLD: 115 K/UL (ref 138–453)
PLATELET # BLD: 115 K/UL (ref 138–453)
PLATELET # BLD: 98 K/UL (ref 138–453)
PMV BLD AUTO: 10 FL (ref 8.1–13.5)
PMV BLD AUTO: 9.5 FL (ref 8.1–13.5)
PMV BLD AUTO: 9.8 FL (ref 8.1–13.5)
POC BUN: 10 MG/DL (ref 8–26)
POC BUN: 10 MG/DL (ref 8–26)
POC BUN: 13 MG/DL (ref 8–26)
POC CHLORIDE: 114 MMOL/L (ref 98–107)
POC CHLORIDE: 115 MMOL/L (ref 98–107)
POC CREATININE: 0.88 MG/DL (ref 0.51–1.19)
POC CREATININE: 0.94 MG/DL (ref 0.51–1.19)
POC CREATININE: 0.98 MG/DL (ref 0.51–1.19)
POC HCO3: 21.1 MMOL/L (ref 21–28)
POC HCO3: 21.5 MMOL/L (ref 21–28)
POC HCO3: 21.9 MMOL/L (ref 21–28)
POC HCO3: 22 MMOL/L (ref 21–28)
POC HCO3: 22.8 MMOL/L (ref 21–28)
POC HCO3: 22.9 MMOL/L (ref 21–28)
POC HCO3: 23.5 MMOL/L (ref 21–28)
POC HCO3: 23.5 MMOL/L (ref 21–28)
POC HCO3: 24.3 MMOL/L (ref 21–28)
POC HEMATOCRIT: 17 % (ref 36–46)
POC HEMATOCRIT: 19 % (ref 36–46)
POC HEMATOCRIT: 20 % (ref 36–46)
POC HEMATOCRIT: 20 % (ref 36–46)
POC HEMATOCRIT: 21 % (ref 36–46)
POC HEMATOCRIT: 28 % (ref 36–46)
POC HEMATOCRIT: 35 % (ref 36–46)
POC HEMOGLOBIN: 11.9 G/DL (ref 12–16)
POC HEMOGLOBIN: 5.8 G/DL (ref 12–16)
POC HEMOGLOBIN: 6.3 G/DL (ref 12–16)
POC HEMOGLOBIN: 6.7 G/DL (ref 12–16)
POC HEMOGLOBIN: 6.9 G/DL (ref 12–16)
POC HEMOGLOBIN: 7.1 G/DL (ref 12–16)
POC HEMOGLOBIN: 9.5 G/DL (ref 12–16)
POC IONIZED CALCIUM: 0.88 MMOL/L (ref 1.15–1.33)
POC IONIZED CALCIUM: 0.93 MMOL/L (ref 1.15–1.33)
POC IONIZED CALCIUM: 0.95 MMOL/L (ref 1.15–1.33)
POC IONIZED CALCIUM: 1.15 MMOL/L (ref 1.15–1.33)
POC IONIZED CALCIUM: 1.21 MMOL/L (ref 1.15–1.33)
POC IONIZED CALCIUM: 1.27 MMOL/L (ref 1.15–1.33)
POC IONIZED CALCIUM: 1.47 MMOL/L (ref 1.15–1.33)
POC LACTIC ACID: 1.58 MMOL/L (ref 0.56–1.39)
POC LACTIC ACID: 2.1 MMOL/L (ref 0.56–1.39)
POC O2 SATURATION: 100 % (ref 94–98)
POC O2 SATURATION: 92 % (ref 94–98)
POC O2 SATURATION: 96 % (ref 94–98)
POC O2 SATURATION: 99 % (ref 94–98)
POC PCO2: 27.3 MM HG (ref 35–48)
POC PCO2: 31 MM HG (ref 35–48)
POC PCO2: 32.1 MM HG (ref 35–48)
POC PCO2: 39.4 MM HG (ref 35–48)
POC PCO2: 41.3 MM HG (ref 35–48)
POC PCO2: 47.1 MM HG (ref 35–48)
POC PCO2: 48.5 MM HG (ref 35–48)
POC PCO2: 49 MM HG (ref 35–48)
POC PCO2: 56.1 MM HG (ref 35–48)
POC PH: 7.25 (ref 7.35–7.45)
POC PH: 7.26 (ref 7.35–7.45)
POC PH: 7.27 (ref 7.35–7.45)
POC PH: 7.3 (ref 7.35–7.45)
POC PH: 7.32 (ref 7.35–7.45)
POC PH: 7.38 (ref 7.35–7.45)
POC PH: 7.45 (ref 7.35–7.45)
POC PH: 7.46 (ref 7.35–7.45)
POC PH: 7.53 (ref 7.35–7.45)
POC PO2: 152.1 MM HG (ref 83–108)
POC PO2: 303.9 MM HG (ref 83–108)
POC PO2: 304.2 MM HG (ref 83–108)
POC PO2: 419.5 MM HG (ref 83–108)
POC PO2: 541.3 MM HG (ref 83–108)
POC PO2: 570.7 MM HG (ref 83–108)
POC PO2: 576.2 MM HG (ref 83–108)
POC PO2: 68.9 MM HG (ref 83–108)
POC PO2: 97.5 MM HG (ref 83–108)
POC POTASSIUM: 3.9 MMOL/L (ref 3.5–4.5)
POC POTASSIUM: 4.4 MMOL/L (ref 3.5–4.5)
POC POTASSIUM: 5.5 MMOL/L (ref 3.5–4.5)
POC POTASSIUM: 5.7 MMOL/L (ref 3.5–4.5)
POC POTASSIUM: 5.9 MMOL/L (ref 3.5–4.5)
POC SODIUM: 144 MMOL/L (ref 138–146)
POC SODIUM: 146 MMOL/L (ref 138–146)
POC SODIUM: 147 MMOL/L (ref 138–146)
POC TCO2: 23 MMOL/L (ref 22–30)
POC TCO2: 23 MMOL/L (ref 22–30)
POC TCO2: 24 MMOL/L (ref 22–30)
POSITIVE BASE EXCESS, ART: 0 (ref 0–3)
POTASSIUM SERPL-SCNC: 4.1 MMOL/L (ref 3.7–5.3)
POTASSIUM SERPL-SCNC: 4.1 MMOL/L (ref 3.7–5.3)
POTASSIUM SERPL-SCNC: 4.2 MMOL/L (ref 3.7–5.3)
POTASSIUM SERPL-SCNC: 4.5 MMOL/L (ref 3.7–5.3)
PROTEIN UA: NEGATIVE
PROTHROMBIN TIME: 15.2 SEC (ref 9.1–12.3)
PROTHROMBIN TIME: 15.8 SEC (ref 9.1–12.3)
PROTHROMBIN TIME: 9.5 SEC (ref 9.1–12.3)
RBC # BLD: 2.27 M/UL (ref 3.95–5.11)
RBC # BLD: 3.3 M/UL (ref 3.95–5.11)
RBC # BLD: 3.46 M/UL (ref 3.95–5.11)
READ BACK: YES
SAMPLE SITE: ABNORMAL
SODIUM BLD-SCNC: 136 MMOL/L (ref 135–144)
SODIUM BLD-SCNC: 141 MMOL/L (ref 135–144)
SODIUM BLD-SCNC: 145 MMOL/L (ref 135–144)
SODIUM BLD-SCNC: 148 MMOL/L (ref 135–144)
SPECIFIC GRAVITY UA: 1.01 (ref 1–1.03)
SPECIMEN DESCRIPTION: NORMAL
SPECIMEN DESCRIPTION: NORMAL
TOTAL PROTEIN: 4.9 G/DL (ref 6.4–8.3)
TRANSFUSION STATUS: NORMAL
TRANSFUSION STATUS: NORMAL
TURBIDITY: CLEAR
UNIT DIVISION: 0
UNIT DIVISION: 0
URINE HGB: NEGATIVE
UROBILINOGEN, URINE: NORMAL
WBC # BLD: 13.3 K/UL (ref 3.5–11.3)
WBC # BLD: 6.9 K/UL (ref 3.5–11.3)
WBC # BLD: 9.7 K/UL (ref 3.5–11.3)

## 2022-04-08 PROCEDURE — 84132 ASSAY OF SERUM POTASSIUM: CPT

## 2022-04-08 PROCEDURE — 85390 FIBRINOLYSINS SCREEN I&R: CPT

## 2022-04-08 PROCEDURE — 6370000000 HC RX 637 (ALT 250 FOR IP): Performed by: THORACIC SURGERY (CARDIOTHORACIC VASCULAR SURGERY)

## 2022-04-08 PROCEDURE — 6370000000 HC RX 637 (ALT 250 FOR IP): Performed by: NURSE PRACTITIONER

## 2022-04-08 PROCEDURE — 81003 URINALYSIS AUTO W/O SCOPE: CPT

## 2022-04-08 PROCEDURE — P9016 RBC LEUKOCYTES REDUCED: HCPCS

## 2022-04-08 PROCEDURE — P9073 PLATELETS PHERESIS PATH REDU: HCPCS

## 2022-04-08 PROCEDURE — 6360000002 HC RX W HCPCS: Performed by: NURSE ANESTHETIST, CERTIFIED REGISTERED

## 2022-04-08 PROCEDURE — 82330 ASSAY OF CALCIUM: CPT

## 2022-04-08 PROCEDURE — C1729 CATH, DRAINAGE: HCPCS | Performed by: THORACIC SURGERY (CARDIOTHORACIC VASCULAR SURGERY)

## 2022-04-08 PROCEDURE — 3600000018 HC SURGERY OHS ADDTL 15MIN: Performed by: THORACIC SURGERY (CARDIOTHORACIC VASCULAR SURGERY)

## 2022-04-08 PROCEDURE — 06BQ4ZZ EXCISION OF LEFT SAPHENOUS VEIN, PERCUTANEOUS ENDOSCOPIC APPROACH: ICD-10-PCS | Performed by: THORACIC SURGERY (CARDIOTHORACIC VASCULAR SURGERY)

## 2022-04-08 PROCEDURE — 2500000003 HC RX 250 WO HCPCS: Performed by: NURSE PRACTITIONER

## 2022-04-08 PROCEDURE — 86850 RBC ANTIBODY SCREEN: CPT

## 2022-04-08 PROCEDURE — 82803 BLOOD GASES ANY COMBINATION: CPT

## 2022-04-08 PROCEDURE — 85027 COMPLETE CBC AUTOMATED: CPT

## 2022-04-08 PROCEDURE — 6370000000 HC RX 637 (ALT 250 FOR IP): Performed by: STUDENT IN AN ORGANIZED HEALTH CARE EDUCATION/TRAINING PROGRAM

## 2022-04-08 PROCEDURE — 7100000000 HC PACU RECOVERY - FIRST 15 MIN

## 2022-04-08 PROCEDURE — 94640 AIRWAY INHALATION TREATMENT: CPT

## 2022-04-08 PROCEDURE — 2720000010 HC SURG SUPPLY STERILE: Performed by: THORACIC SURGERY (CARDIOTHORACIC VASCULAR SURGERY)

## 2022-04-08 PROCEDURE — 86901 BLOOD TYPING SEROLOGIC RH(D): CPT

## 2022-04-08 PROCEDURE — 85018 HEMOGLOBIN: CPT

## 2022-04-08 PROCEDURE — 86927 PLASMA FRESH FROZEN: CPT

## 2022-04-08 PROCEDURE — 33508 ENDOSCOPIC VEIN HARVEST: CPT | Performed by: THORACIC SURGERY (CARDIOTHORACIC VASCULAR SURGERY)

## 2022-04-08 PROCEDURE — 85576 BLOOD PLATELET AGGREGATION: CPT

## 2022-04-08 PROCEDURE — 2580000003 HC RX 258: Performed by: THORACIC SURGERY (CARDIOTHORACIC VASCULAR SURGERY)

## 2022-04-08 PROCEDURE — 3700000000 HC ANESTHESIA ATTENDED CARE: Performed by: THORACIC SURGERY (CARDIOTHORACIC VASCULAR SURGERY)

## 2022-04-08 PROCEDURE — 36430 TRANSFUSION BLD/BLD COMPNT: CPT

## 2022-04-08 PROCEDURE — 3600000008 HC SURGERY OHS BASE: Performed by: THORACIC SURGERY (CARDIOTHORACIC VASCULAR SURGERY)

## 2022-04-08 PROCEDURE — B24BZZ4 ULTRASONOGRAPHY OF HEART WITH AORTA, TRANSESOPHAGEAL: ICD-10-PCS | Performed by: ANESTHESIOLOGY

## 2022-04-08 PROCEDURE — 80053 COMPREHEN METABOLIC PANEL: CPT

## 2022-04-08 PROCEDURE — 76998 US GUIDE INTRAOP: CPT | Performed by: THORACIC SURGERY (CARDIOTHORACIC VASCULAR SURGERY)

## 2022-04-08 PROCEDURE — 86920 COMPATIBILITY TEST SPIN: CPT

## 2022-04-08 PROCEDURE — 85347 COAGULATION TIME ACTIVATED: CPT

## 2022-04-08 PROCEDURE — 83605 ASSAY OF LACTIC ACID: CPT

## 2022-04-08 PROCEDURE — 2580000003 HC RX 258: Performed by: NURSE ANESTHETIST, CERTIFIED REGISTERED

## 2022-04-08 PROCEDURE — 33533 CABG ARTERIAL SINGLE: CPT | Performed by: THORACIC SURGERY (CARDIOTHORACIC VASCULAR SURGERY)

## 2022-04-08 PROCEDURE — 71045 X-RAY EXAM CHEST 1 VIEW: CPT

## 2022-04-08 PROCEDURE — 93005 ELECTROCARDIOGRAM TRACING: CPT | Performed by: THORACIC SURGERY (CARDIOTHORACIC VASCULAR SURGERY)

## 2022-04-08 PROCEDURE — 94761 N-INVAS EAR/PLS OXIMETRY MLT: CPT

## 2022-04-08 PROCEDURE — 84520 ASSAY OF UREA NITROGEN: CPT

## 2022-04-08 PROCEDURE — 6360000002 HC RX W HCPCS

## 2022-04-08 PROCEDURE — 36415 COLL VENOUS BLD VENIPUNCTURE: CPT

## 2022-04-08 PROCEDURE — 80051 ELECTROLYTE PANEL: CPT

## 2022-04-08 PROCEDURE — 84295 ASSAY OF SERUM SODIUM: CPT

## 2022-04-08 PROCEDURE — 02100Z9 BYPASS CORONARY ARTERY, ONE ARTERY FROM LEFT INTERNAL MAMMARY, OPEN APPROACH: ICD-10-PCS | Performed by: THORACIC SURGERY (CARDIOTHORACIC VASCULAR SURGERY)

## 2022-04-08 PROCEDURE — 3700000001 HC ADD 15 MINUTES (ANESTHESIA): Performed by: THORACIC SURGERY (CARDIOTHORACIC VASCULAR SURGERY)

## 2022-04-08 PROCEDURE — 2100000001 HC CVICU R&B

## 2022-04-08 PROCEDURE — 83735 ASSAY OF MAGNESIUM: CPT

## 2022-04-08 PROCEDURE — C9113 INJ PANTOPRAZOLE SODIUM, VIA: HCPCS | Performed by: NURSE PRACTITIONER

## 2022-04-08 PROCEDURE — 82374 ASSAY BLOOD CARBON DIOXIDE: CPT

## 2022-04-08 PROCEDURE — 6360000002 HC RX W HCPCS: Performed by: THORACIC SURGERY (CARDIOTHORACIC VASCULAR SURGERY)

## 2022-04-08 PROCEDURE — 86900 BLOOD TYPING SEROLOGIC ABO: CPT

## 2022-04-08 PROCEDURE — 85385 FIBRINOGEN ANTIGEN: CPT

## 2022-04-08 PROCEDURE — 85014 HEMATOCRIT: CPT

## 2022-04-08 PROCEDURE — 33518 CABG ARTERY-VEIN TWO: CPT | Performed by: THORACIC SURGERY (CARDIOTHORACIC VASCULAR SURGERY)

## 2022-04-08 PROCEDURE — 82565 ASSAY OF CREATININE: CPT

## 2022-04-08 PROCEDURE — 85384 FIBRINOGEN ACTIVITY: CPT

## 2022-04-08 PROCEDURE — 80048 BASIC METABOLIC PNL TOTAL CA: CPT

## 2022-04-08 PROCEDURE — 6360000002 HC RX W HCPCS: Performed by: NURSE PRACTITIONER

## 2022-04-08 PROCEDURE — 85730 THROMBOPLASTIN TIME PARTIAL: CPT

## 2022-04-08 PROCEDURE — P9012 CRYOPRECIPITATE EACH UNIT: HCPCS

## 2022-04-08 PROCEDURE — 85049 AUTOMATED PLATELET COUNT: CPT

## 2022-04-08 PROCEDURE — 82947 ASSAY GLUCOSE BLOOD QUANT: CPT

## 2022-04-08 PROCEDURE — 2700000000 HC OXYGEN THERAPY PER DAY

## 2022-04-08 PROCEDURE — 37799 UNLISTED PX VASCULAR SURGERY: CPT

## 2022-04-08 PROCEDURE — 5A1221Z PERFORMANCE OF CARDIAC OUTPUT, CONTINUOUS: ICD-10-PCS | Performed by: THORACIC SURGERY (CARDIOTHORACIC VASCULAR SURGERY)

## 2022-04-08 PROCEDURE — 7100000001 HC PACU RECOVERY - ADDTL 15 MIN

## 2022-04-08 PROCEDURE — 85610 PROTHROMBIN TIME: CPT

## 2022-04-08 PROCEDURE — P9017 PLASMA 1 DONOR FRZ W/IN 8 HR: HCPCS

## 2022-04-08 PROCEDURE — 2580000003 HC RX 258: Performed by: NURSE PRACTITIONER

## 2022-04-08 PROCEDURE — 2500000003 HC RX 250 WO HCPCS: Performed by: NURSE ANESTHETIST, CERTIFIED REGISTERED

## 2022-04-08 PROCEDURE — 2709999900 HC NON-CHARGEABLE SUPPLY: Performed by: THORACIC SURGERY (CARDIOTHORACIC VASCULAR SURGERY)

## 2022-04-08 PROCEDURE — 94002 VENT MGMT INPAT INIT DAY: CPT

## 2022-04-08 PROCEDURE — P9045 ALBUMIN (HUMAN), 5%, 250 ML: HCPCS | Performed by: NURSE ANESTHETIST, CERTIFIED REGISTERED

## 2022-04-08 PROCEDURE — 021109W BYPASS CORONARY ARTERY, TWO ARTERIES FROM AORTA WITH AUTOLOGOUS VENOUS TISSUE, OPEN APPROACH: ICD-10-PCS | Performed by: THORACIC SURGERY (CARDIOTHORACIC VASCULAR SURGERY)

## 2022-04-08 PROCEDURE — P9041 ALBUMIN (HUMAN),5%, 50ML: HCPCS | Performed by: NURSE PRACTITIONER

## 2022-04-08 RX ORDER — PROPOFOL 10 MG/ML
INJECTION, EMULSION INTRAVENOUS
Status: COMPLETED
Start: 2022-04-08 | End: 2022-04-08

## 2022-04-08 RX ORDER — ACETAMINOPHEN 325 MG/1
650 TABLET ORAL EVERY 4 HOURS PRN
Status: DISCONTINUED | OUTPATIENT
Start: 2022-04-08 | End: 2022-04-09

## 2022-04-08 RX ORDER — CHLORHEXIDINE GLUCONATE 0.12 MG/ML
15 RINSE ORAL ONCE
Status: DISCONTINUED | OUTPATIENT
Start: 2022-04-08 | End: 2022-04-08 | Stop reason: HOSPADM

## 2022-04-08 RX ORDER — SODIUM CHLORIDE 0.9 % (FLUSH) 0.9 %
10 SYRINGE (ML) INJECTION PRN
Status: DISCONTINUED | OUTPATIENT
Start: 2022-04-08 | End: 2022-04-09

## 2022-04-08 RX ORDER — SODIUM CHLORIDE 9 MG/ML
INJECTION, SOLUTION INTRAVENOUS CONTINUOUS
Status: DISCONTINUED | OUTPATIENT
Start: 2022-04-09 | End: 2022-04-16 | Stop reason: HOSPADM

## 2022-04-08 RX ORDER — SODIUM CHLORIDE 9 MG/ML
5 INJECTION, SOLUTION INTRAVENOUS CONTINUOUS
Status: DISCONTINUED | OUTPATIENT
Start: 2022-04-08 | End: 2022-04-09

## 2022-04-08 RX ORDER — VANCOMYCIN HYDROCHLORIDE 1 G/200ML
1000 INJECTION, SOLUTION INTRAVENOUS EVERY 12 HOURS
Status: COMPLETED | OUTPATIENT
Start: 2022-04-08 | End: 2022-04-10

## 2022-04-08 RX ORDER — ASPIRIN 81 MG/1
81 TABLET ORAL DAILY
Status: DISCONTINUED | OUTPATIENT
Start: 2022-04-08 | End: 2022-04-16 | Stop reason: HOSPADM

## 2022-04-08 RX ORDER — PROPOFOL 10 MG/ML
10 INJECTION, EMULSION INTRAVENOUS CONTINUOUS
Status: DISCONTINUED | OUTPATIENT
Start: 2022-04-08 | End: 2022-04-10

## 2022-04-08 RX ORDER — HEPARIN SODIUM 1000 [USP'U]/ML
INJECTION, SOLUTION INTRAVENOUS; SUBCUTANEOUS
Status: DISPENSED
Start: 2022-04-08 | End: 2022-04-08

## 2022-04-08 RX ORDER — ONDANSETRON 2 MG/ML
4 INJECTION INTRAMUSCULAR; INTRAVENOUS EVERY 6 HOURS PRN
Status: DISCONTINUED | OUTPATIENT
Start: 2022-04-08 | End: 2022-04-16 | Stop reason: HOSPADM

## 2022-04-08 RX ORDER — CLOPIDOGREL BISULFATE 75 MG/1
75 TABLET ORAL DAILY
Status: DISCONTINUED | OUTPATIENT
Start: 2022-04-09 | End: 2022-04-16 | Stop reason: HOSPADM

## 2022-04-08 RX ORDER — VANCOMYCIN HYDROCHLORIDE 1 G/200ML
1000 INJECTION, SOLUTION INTRAVENOUS
Status: COMPLETED | OUTPATIENT
Start: 2022-04-08 | End: 2022-04-08

## 2022-04-08 RX ORDER — FENTANYL CITRATE 50 UG/ML
50 INJECTION, SOLUTION INTRAMUSCULAR; INTRAVENOUS
Status: DISCONTINUED | OUTPATIENT
Start: 2022-04-08 | End: 2022-04-16 | Stop reason: HOSPADM

## 2022-04-08 RX ORDER — SODIUM CHLORIDE 9 MG/ML
25 INJECTION, SOLUTION INTRAVENOUS PRN
Status: DISCONTINUED | OUTPATIENT
Start: 2022-04-08 | End: 2022-04-16 | Stop reason: HOSPADM

## 2022-04-08 RX ORDER — PROTAMINE SULFATE 10 MG/ML
INJECTION, SOLUTION INTRAVENOUS
Status: COMPLETED
Start: 2022-04-08 | End: 2022-04-08

## 2022-04-08 RX ORDER — PANTOPRAZOLE SODIUM 40 MG/1
40 TABLET, DELAYED RELEASE ORAL DAILY
Status: DISCONTINUED | OUTPATIENT
Start: 2022-04-08 | End: 2022-04-16 | Stop reason: HOSPADM

## 2022-04-08 RX ORDER — DIPHENHYDRAMINE HCL 25 MG
25 TABLET ORAL NIGHTLY PRN
Status: DISCONTINUED | OUTPATIENT
Start: 2022-04-09 | End: 2022-04-16 | Stop reason: HOSPADM

## 2022-04-08 RX ORDER — PHENYLEPHRINE HCL IN 0.9% NACL 1 MG/10 ML
SYRINGE (ML) INTRAVENOUS PRN
Status: DISCONTINUED | OUTPATIENT
Start: 2022-04-08 | End: 2022-04-08 | Stop reason: SDUPTHER

## 2022-04-08 RX ORDER — SODIUM CHLORIDE 9 MG/ML
25 INJECTION, SOLUTION INTRAVENOUS PRN
Status: DISCONTINUED | OUTPATIENT
Start: 2022-04-08 | End: 2022-04-09

## 2022-04-08 RX ORDER — ALBUMIN, HUMAN INJ 5% 5 %
25 SOLUTION INTRAVENOUS PRN
Status: DISCONTINUED | OUTPATIENT
Start: 2022-04-08 | End: 2022-04-16 | Stop reason: HOSPADM

## 2022-04-08 RX ORDER — INSULIN GLARGINE 100 [IU]/ML
0.15 INJECTION, SOLUTION SUBCUTANEOUS NIGHTLY
Status: DISCONTINUED | OUTPATIENT
Start: 2022-04-09 | End: 2022-04-13

## 2022-04-08 RX ORDER — AMIODARONE HYDROCHLORIDE 200 MG/1
200 TABLET ORAL 3 TIMES DAILY
Status: DISCONTINUED | OUTPATIENT
Start: 2022-04-08 | End: 2022-04-09

## 2022-04-08 RX ORDER — SODIUM CHLORIDE 0.9 % (FLUSH) 0.9 %
5-40 SYRINGE (ML) INJECTION EVERY 12 HOURS SCHEDULED
Status: DISCONTINUED | OUTPATIENT
Start: 2022-04-08 | End: 2022-04-16 | Stop reason: HOSPADM

## 2022-04-08 RX ORDER — PROTAMINE SULFATE 10 MG/ML
INJECTION, SOLUTION INTRAVENOUS PRN
Status: DISCONTINUED | OUTPATIENT
Start: 2022-04-08 | End: 2022-04-08 | Stop reason: SDUPTHER

## 2022-04-08 RX ORDER — CALCIUM CHLORIDE 100 MG/ML
INJECTION INTRAVENOUS; INTRAVENTRICULAR PRN
Status: DISCONTINUED | OUTPATIENT
Start: 2022-04-08 | End: 2022-04-08 | Stop reason: SDUPTHER

## 2022-04-08 RX ORDER — SODIUM CHLORIDE 9 MG/ML
INJECTION, SOLUTION INTRAVENOUS PRN
Status: DISCONTINUED | OUTPATIENT
Start: 2022-04-08 | End: 2022-04-09

## 2022-04-08 RX ORDER — ASPIRIN 81 MG/1
81 TABLET ORAL
Status: ACTIVE | OUTPATIENT
Start: 2022-04-08 | End: 2022-04-08

## 2022-04-08 RX ORDER — FENTANYL CITRATE 0.05 MG/ML
INJECTION, SOLUTION INTRAMUSCULAR; INTRAVENOUS PRN
Status: DISCONTINUED | OUTPATIENT
Start: 2022-04-08 | End: 2022-04-08 | Stop reason: SDUPTHER

## 2022-04-08 RX ORDER — LIDOCAINE HYDROCHLORIDE 10 MG/ML
INJECTION, SOLUTION INFILTRATION; PERINEURAL PRN
Status: DISCONTINUED | OUTPATIENT
Start: 2022-04-08 | End: 2022-04-08 | Stop reason: SDUPTHER

## 2022-04-08 RX ORDER — ESMOLOL HYDROCHLORIDE 10 MG/ML
INJECTION INTRAVENOUS PRN
Status: DISCONTINUED | OUTPATIENT
Start: 2022-04-08 | End: 2022-04-08 | Stop reason: SDUPTHER

## 2022-04-08 RX ORDER — MAGNESIUM SULFATE IN WATER 40 MG/ML
2000 INJECTION, SOLUTION INTRAVENOUS PRN
Status: DISCONTINUED | OUTPATIENT
Start: 2022-04-08 | End: 2022-04-16 | Stop reason: HOSPADM

## 2022-04-08 RX ORDER — MEPERIDINE HYDROCHLORIDE 50 MG/ML
25 INJECTION INTRAMUSCULAR; INTRAVENOUS; SUBCUTANEOUS
Status: ACTIVE | OUTPATIENT
Start: 2022-04-08 | End: 2022-04-08

## 2022-04-08 RX ORDER — ATORVASTATIN CALCIUM 20 MG/1
20 TABLET, FILM COATED ORAL NIGHTLY
Status: DISCONTINUED | OUTPATIENT
Start: 2022-04-09 | End: 2022-04-16 | Stop reason: HOSPADM

## 2022-04-08 RX ORDER — SODIUM CHLORIDE 9 MG/ML
INJECTION, SOLUTION INTRAVENOUS CONTINUOUS PRN
Status: DISCONTINUED | OUTPATIENT
Start: 2022-04-08 | End: 2022-04-08 | Stop reason: SDUPTHER

## 2022-04-08 RX ORDER — AMIODARONE HYDROCHLORIDE 200 MG/1
200 TABLET ORAL 3 TIMES DAILY
Status: DISCONTINUED | OUTPATIENT
Start: 2022-04-08 | End: 2022-04-16 | Stop reason: HOSPADM

## 2022-04-08 RX ORDER — OXYCODONE HYDROCHLORIDE AND ACETAMINOPHEN 5; 325 MG/1; MG/1
2 TABLET ORAL EVERY 4 HOURS PRN
Status: DISCONTINUED | OUTPATIENT
Start: 2022-04-08 | End: 2022-04-16 | Stop reason: HOSPADM

## 2022-04-08 RX ORDER — ONDANSETRON 4 MG/1
4 TABLET, ORALLY DISINTEGRATING ORAL EVERY 8 HOURS PRN
Status: DISCONTINUED | OUTPATIENT
Start: 2022-04-08 | End: 2022-04-16 | Stop reason: HOSPADM

## 2022-04-08 RX ORDER — SODIUM CHLORIDE 0.9 % (FLUSH) 0.9 %
10 SYRINGE (ML) INJECTION PRN
Status: DISCONTINUED | OUTPATIENT
Start: 2022-04-08 | End: 2022-04-16 | Stop reason: HOSPADM

## 2022-04-08 RX ORDER — SODIUM CHLORIDE, SODIUM LACTATE, POTASSIUM CHLORIDE, CALCIUM CHLORIDE 600; 310; 30; 20 MG/100ML; MG/100ML; MG/100ML; MG/100ML
INJECTION, SOLUTION INTRAVENOUS CONTINUOUS PRN
Status: DISCONTINUED | OUTPATIENT
Start: 2022-04-08 | End: 2022-04-08 | Stop reason: SDUPTHER

## 2022-04-08 RX ORDER — HEPARIN SODIUM 1000 [USP'U]/ML
INJECTION, SOLUTION INTRAVENOUS; SUBCUTANEOUS PRN
Status: DISCONTINUED | OUTPATIENT
Start: 2022-04-08 | End: 2022-04-08 | Stop reason: SDUPTHER

## 2022-04-08 RX ORDER — OXYCODONE HYDROCHLORIDE AND ACETAMINOPHEN 5; 325 MG/1; MG/1
1 TABLET ORAL EVERY 4 HOURS PRN
Status: DISCONTINUED | OUTPATIENT
Start: 2022-04-08 | End: 2022-04-16 | Stop reason: HOSPADM

## 2022-04-08 RX ORDER — METOPROLOL TARTRATE 5 MG/5ML
INJECTION INTRAVENOUS PRN
Status: DISCONTINUED | OUTPATIENT
Start: 2022-04-08 | End: 2022-04-08 | Stop reason: SDUPTHER

## 2022-04-08 RX ORDER — MIDAZOLAM HYDROCHLORIDE 1 MG/ML
INJECTION INTRAMUSCULAR; INTRAVENOUS PRN
Status: DISCONTINUED | OUTPATIENT
Start: 2022-04-08 | End: 2022-04-08 | Stop reason: SDUPTHER

## 2022-04-08 RX ORDER — MAGNESIUM HYDROXIDE 1200 MG/15ML
LIQUID ORAL CONTINUOUS PRN
Status: COMPLETED | OUTPATIENT
Start: 2022-04-08 | End: 2022-04-08

## 2022-04-08 RX ORDER — HYDRALAZINE HYDROCHLORIDE 20 MG/ML
5 INJECTION INTRAMUSCULAR; INTRAVENOUS EVERY 5 MIN PRN
Status: DISCONTINUED | OUTPATIENT
Start: 2022-04-08 | End: 2022-04-16 | Stop reason: HOSPADM

## 2022-04-08 RX ORDER — ROCURONIUM BROMIDE 10 MG/ML
INJECTION, SOLUTION INTRAVENOUS PRN
Status: DISCONTINUED | OUTPATIENT
Start: 2022-04-08 | End: 2022-04-08 | Stop reason: SDUPTHER

## 2022-04-08 RX ORDER — PROTAMINE SULFATE 10 MG/ML
50 INJECTION, SOLUTION INTRAVENOUS
Status: COMPLETED | OUTPATIENT
Start: 2022-04-08 | End: 2022-04-08

## 2022-04-08 RX ORDER — PROPOFOL 10 MG/ML
INJECTION, EMULSION INTRAVENOUS PRN
Status: DISCONTINUED | OUTPATIENT
Start: 2022-04-08 | End: 2022-04-08 | Stop reason: SDUPTHER

## 2022-04-08 RX ORDER — NOREPINEPHRINE BIT/0.9 % NACL 16MG/250ML
.01-3.3 INFUSION BOTTLE (ML) INTRAVENOUS CONTINUOUS PRN
Status: DISCONTINUED | OUTPATIENT
Start: 2022-04-08 | End: 2022-04-13

## 2022-04-08 RX ORDER — ALBUMIN, HUMAN INJ 5% 5 %
SOLUTION INTRAVENOUS PRN
Status: DISCONTINUED | OUTPATIENT
Start: 2022-04-08 | End: 2022-04-08 | Stop reason: SDUPTHER

## 2022-04-08 RX ORDER — IPRATROPIUM BROMIDE AND ALBUTEROL SULFATE 2.5; .5 MG/3ML; MG/3ML
1 SOLUTION RESPIRATORY (INHALATION)
Status: DISCONTINUED | OUTPATIENT
Start: 2022-04-08 | End: 2022-04-16 | Stop reason: HOSPADM

## 2022-04-08 RX ORDER — NICOTINE POLACRILEX 4 MG
15 LOZENGE BUCCAL PRN
Status: DISCONTINUED | OUTPATIENT
Start: 2022-04-08 | End: 2022-04-16 | Stop reason: HOSPADM

## 2022-04-08 RX ORDER — FENTANYL CITRATE 50 UG/ML
25 INJECTION, SOLUTION INTRAMUSCULAR; INTRAVENOUS
Status: DISCONTINUED | OUTPATIENT
Start: 2022-04-08 | End: 2022-04-16 | Stop reason: HOSPADM

## 2022-04-08 RX ORDER — POTASSIUM CHLORIDE 29.8 MG/ML
20 INJECTION INTRAVENOUS PRN
Status: DISCONTINUED | OUTPATIENT
Start: 2022-04-08 | End: 2022-04-16 | Stop reason: HOSPADM

## 2022-04-08 RX ORDER — METOPROLOL TARTRATE 5 MG/5ML
2.5 INJECTION INTRAVENOUS EVERY 10 MIN PRN
Status: DISCONTINUED | OUTPATIENT
Start: 2022-04-08 | End: 2022-04-13

## 2022-04-08 RX ORDER — NITROGLYCERIN 20 MG/100ML
INJECTION INTRAVENOUS
Status: DISPENSED
Start: 2022-04-08 | End: 2022-04-09

## 2022-04-08 RX ORDER — DEXTROSE MONOHYDRATE 25 G/50ML
12.5 INJECTION, SOLUTION INTRAVENOUS PRN
Status: DISCONTINUED | OUTPATIENT
Start: 2022-04-08 | End: 2022-04-16 | Stop reason: HOSPADM

## 2022-04-08 RX ORDER — SODIUM CHLORIDE 0.9 % (FLUSH) 0.9 %
10 SYRINGE (ML) INJECTION EVERY 12 HOURS SCHEDULED
Status: DISCONTINUED | OUTPATIENT
Start: 2022-04-08 | End: 2022-04-16 | Stop reason: HOSPADM

## 2022-04-08 RX ORDER — SODIUM CHLORIDE 0.9 % (FLUSH) 0.9 %
5-40 SYRINGE (ML) INJECTION PRN
Status: DISCONTINUED | OUTPATIENT
Start: 2022-04-08 | End: 2022-04-16 | Stop reason: HOSPADM

## 2022-04-08 RX ORDER — DEXTROSE MONOHYDRATE 50 MG/ML
100 INJECTION, SOLUTION INTRAVENOUS PRN
Status: DISCONTINUED | OUTPATIENT
Start: 2022-04-08 | End: 2022-04-16 | Stop reason: HOSPADM

## 2022-04-08 RX ORDER — PAPAVERINE HYDROCHLORIDE 30 MG/ML
INJECTION INTRAMUSCULAR; INTRAVENOUS
Status: DISPENSED
Start: 2022-04-08 | End: 2022-04-08

## 2022-04-08 RX ADMIN — SODIUM CHLORIDE: 9 INJECTION, SOLUTION INTRAVENOUS at 09:30

## 2022-04-08 RX ADMIN — FENTANYL CITRATE 250 MCG: 50 INJECTION INTRAVENOUS at 08:55

## 2022-04-08 RX ADMIN — SODIUM CHLORIDE, POTASSIUM CHLORIDE, SODIUM LACTATE AND CALCIUM CHLORIDE: 600; 310; 30; 20 INJECTION, SOLUTION INTRAVENOUS at 14:08

## 2022-04-08 RX ADMIN — FENTANYL CITRATE 100 MCG: 50 INJECTION INTRAVENOUS at 10:03

## 2022-04-08 RX ADMIN — PROPOFOL 20 MCG/KG/MIN: 10 INJECTION, EMULSION INTRAVENOUS at 18:26

## 2022-04-08 RX ADMIN — FENTANYL CITRATE 50 MCG: 50 INJECTION, SOLUTION INTRAMUSCULAR; INTRAVENOUS at 15:29

## 2022-04-08 RX ADMIN — VANCOMYCIN HYDROCHLORIDE 1000 MG: 1 INJECTION, SOLUTION INTRAVENOUS at 09:40

## 2022-04-08 RX ADMIN — Medication 50 MCG: at 11:26

## 2022-04-08 RX ADMIN — ROCURONIUM BROMIDE 20 MG: 10 INJECTION INTRAVENOUS at 13:41

## 2022-04-08 RX ADMIN — EPINEPHRINE 0.01 MCG/KG/MIN: 1 INJECTION PARENTERAL at 12:52

## 2022-04-08 RX ADMIN — FENTANYL CITRATE 100 MCG: 50 INJECTION INTRAVENOUS at 13:37

## 2022-04-08 RX ADMIN — FENTANYL CITRATE 100 MCG: 50 INJECTION INTRAVENOUS at 10:04

## 2022-04-08 RX ADMIN — SODIUM BICARBONATE 50 MEQ: 84 INJECTION, SOLUTION INTRAVENOUS at 17:03

## 2022-04-08 RX ADMIN — SODIUM CHLORIDE, POTASSIUM CHLORIDE, SODIUM LACTATE AND CALCIUM CHLORIDE: 600; 310; 30; 20 INJECTION, SOLUTION INTRAVENOUS at 08:45

## 2022-04-08 RX ADMIN — LIDOCAINE HYDROCHLORIDE 50 MG: 10 INJECTION, SOLUTION INFILTRATION; PERINEURAL at 08:55

## 2022-04-08 RX ADMIN — CALCIUM CHLORIDE 0.5 G: 100 INJECTION, SOLUTION INTRAVENOUS; INTRAVENTRICULAR at 13:57

## 2022-04-08 RX ADMIN — PROTAMINE SULFATE 50 MG: 10 INJECTION, SOLUTION INTRAVENOUS at 14:47

## 2022-04-08 RX ADMIN — MUPIROCIN: 20 OINTMENT TOPICAL at 20:07

## 2022-04-08 RX ADMIN — WATER 2000 MG: 100 INJECTION, SOLUTION INTRAVENOUS at 13:49

## 2022-04-08 RX ADMIN — SODIUM CHLORIDE, PRESERVATIVE FREE 40 MG: 5 INJECTION INTRAVENOUS at 21:29

## 2022-04-08 RX ADMIN — FENTANYL CITRATE 100 MCG: 50 INJECTION INTRAVENOUS at 14:10

## 2022-04-08 RX ADMIN — AMINOCAPROIC ACID 10 G/HR: 250 INJECTION, SOLUTION INTRAVENOUS at 09:48

## 2022-04-08 RX ADMIN — HEPARIN SODIUM 18000 UNITS: 1000 INJECTION, SOLUTION INTRAVENOUS; SUBCUTANEOUS at 10:59

## 2022-04-08 RX ADMIN — METOPROLOL TARTRATE 3 MG: 1 INJECTION, SOLUTION INTRAVENOUS at 09:27

## 2022-04-08 RX ADMIN — PROPOFOL 50 MG: 10 INJECTION, EMULSION INTRAVENOUS at 09:25

## 2022-04-08 RX ADMIN — WATER 2000 MG: 100 INJECTION, SOLUTION INTRAVENOUS at 09:49

## 2022-04-08 RX ADMIN — PROPOFOL 50 MG: 10 INJECTION, EMULSION INTRAVENOUS at 10:03

## 2022-04-08 RX ADMIN — FENTANYL CITRATE 250 MCG: 50 INJECTION INTRAVENOUS at 09:54

## 2022-04-08 RX ADMIN — FENTANYL CITRATE 100 MCG: 50 INJECTION INTRAVENOUS at 11:29

## 2022-04-08 RX ADMIN — SODIUM CHLORIDE, PRESERVATIVE FREE 10 ML: 5 INJECTION INTRAVENOUS at 19:52

## 2022-04-08 RX ADMIN — METOPROLOL TARTRATE 2 MG: 1 INJECTION, SOLUTION INTRAVENOUS at 10:04

## 2022-04-08 RX ADMIN — HYDRALAZINE HYDROCHLORIDE 5 MG: 20 INJECTION INTRAMUSCULAR; INTRAVENOUS at 15:12

## 2022-04-08 RX ADMIN — ROCURONIUM BROMIDE 50 MG: 10 INJECTION INTRAVENOUS at 10:28

## 2022-04-08 RX ADMIN — FENTANYL CITRATE 50 MCG: 50 INJECTION, SOLUTION INTRAMUSCULAR; INTRAVENOUS at 19:39

## 2022-04-08 RX ADMIN — ROCURONIUM BROMIDE 30 MG: 10 INJECTION INTRAVENOUS at 13:53

## 2022-04-08 RX ADMIN — PROTAMINE SULFATE 250 MG: 10 INJECTION, SOLUTION INTRAVENOUS at 13:30

## 2022-04-08 RX ADMIN — PROPOFOL 25 MCG/KG/MIN: 10 INJECTION, EMULSION INTRAVENOUS at 20:25

## 2022-04-08 RX ADMIN — ALBUMIN (HUMAN) 12.5 G: 12.5 INJECTION, SOLUTION INTRAVENOUS at 13:54

## 2022-04-08 RX ADMIN — PROPOFOL 100 MG: 10 INJECTION, EMULSION INTRAVENOUS at 08:55

## 2022-04-08 RX ADMIN — POTASSIUM CHLORIDE 20 MEQ: 400 INJECTION, SOLUTION INTRAVENOUS at 18:24

## 2022-04-08 RX ADMIN — FENTANYL CITRATE 100 MCG: 50 INJECTION INTRAVENOUS at 10:58

## 2022-04-08 RX ADMIN — MIDAZOLAM HYDROCHLORIDE 2 MG: 1 INJECTION, SOLUTION INTRAMUSCULAR; INTRAVENOUS at 11:30

## 2022-04-08 RX ADMIN — ROCURONIUM BROMIDE 20 MG: 10 INJECTION INTRAVENOUS at 14:29

## 2022-04-08 RX ADMIN — ROCURONIUM BROMIDE 100 MG: 10 INJECTION INTRAVENOUS at 08:55

## 2022-04-08 RX ADMIN — FENTANYL CITRATE 100 MCG: 50 INJECTION INTRAVENOUS at 14:05

## 2022-04-08 RX ADMIN — FENTANYL CITRATE 50 MCG: 50 INJECTION, SOLUTION INTRAMUSCULAR; INTRAVENOUS at 21:55

## 2022-04-08 RX ADMIN — FENTANYL CITRATE 150 MCG: 50 INJECTION INTRAVENOUS at 09:27

## 2022-04-08 RX ADMIN — SODIUM CHLORIDE, POTASSIUM CHLORIDE, SODIUM LACTATE AND CALCIUM CHLORIDE: 600; 310; 30; 20 INJECTION, SOLUTION INTRAVENOUS at 10:55

## 2022-04-08 RX ADMIN — OXYCODONE HYDROCHLORIDE AND ACETAMINOPHEN 2 TABLET: 5; 325 TABLET ORAL at 20:07

## 2022-04-08 RX ADMIN — ESMOLOL HYDROCHLORIDE 30 MG: 10 INJECTION, SOLUTION INTRAVENOUS at 09:04

## 2022-04-08 RX ADMIN — FENTANYL CITRATE 250 MCG: 50 INJECTION INTRAVENOUS at 09:24

## 2022-04-08 RX ADMIN — LOVASTATIN 10 MG: 20 TABLET ORAL at 19:52

## 2022-04-08 RX ADMIN — AMIODARONE HYDROCHLORIDE 200 MG: 200 TABLET ORAL at 19:52

## 2022-04-08 RX ADMIN — ROCURONIUM BROMIDE 30 MG: 10 INJECTION INTRAVENOUS at 11:29

## 2022-04-08 RX ADMIN — VANCOMYCIN HYDROCHLORIDE 1000 MG: 1 INJECTION, SOLUTION INTRAVENOUS at 23:14

## 2022-04-08 RX ADMIN — PROPOFOL 20 MG: 10 INJECTION, EMULSION INTRAVENOUS at 13:38

## 2022-04-08 RX ADMIN — SODIUM CHLORIDE: 9 INJECTION, SOLUTION INTRAVENOUS at 14:20

## 2022-04-08 RX ADMIN — FENTANYL CITRATE 100 MCG: 50 INJECTION INTRAVENOUS at 10:05

## 2022-04-08 RX ADMIN — COAGULATION FACTOR VIIA (RECOMBINANT) 4000 MCG: KIT at 19:22

## 2022-04-08 RX ADMIN — SODIUM CHLORIDE, POTASSIUM CHLORIDE, SODIUM LACTATE AND CALCIUM CHLORIDE: 600; 310; 30; 20 INJECTION, SOLUTION INTRAVENOUS at 11:09

## 2022-04-08 RX ADMIN — ASPIRIN 81 MG: 81 TABLET, CHEWABLE ORAL at 08:06

## 2022-04-08 RX ADMIN — HEPARIN SODIUM 5000 UNITS: 1000 INJECTION, SOLUTION INTRAVENOUS; SUBCUTANEOUS at 10:42

## 2022-04-08 RX ADMIN — FENTANYL CITRATE 100 MCG: 50 INJECTION INTRAVENOUS at 10:55

## 2022-04-08 RX ADMIN — FENTANYL CITRATE 200 MCG: 50 INJECTION INTRAVENOUS at 10:11

## 2022-04-08 RX ADMIN — IPRATROPIUM BROMIDE AND ALBUTEROL SULFATE 1 AMPULE: .5; 3 SOLUTION RESPIRATORY (INHALATION) at 20:41

## 2022-04-08 RX ADMIN — POTASSIUM CHLORIDE 20 MEQ: 400 INJECTION, SOLUTION INTRAVENOUS at 16:08

## 2022-04-08 RX ADMIN — Medication 50 MCG: at 11:28

## 2022-04-08 RX ADMIN — SODIUM CHLORIDE 3 UNITS/HR: 9 INJECTION, SOLUTION INTRAVENOUS at 20:24

## 2022-04-08 RX ADMIN — ALBUMIN (HUMAN) 25 G: 12.5 INJECTION, SOLUTION INTRAVENOUS at 16:16

## 2022-04-08 RX ADMIN — HYDRALAZINE HYDROCHLORIDE 5 MG: 20 INJECTION INTRAMUSCULAR; INTRAVENOUS at 15:24

## 2022-04-08 RX ADMIN — PROTAMINE SULFATE 50 MG: 10 INJECTION, SOLUTION INTRAVENOUS at 13:37

## 2022-04-08 RX ADMIN — ALBUMIN (HUMAN) 25 G: 12.5 INJECTION, SOLUTION INTRAVENOUS at 17:23

## 2022-04-08 RX ADMIN — MIDAZOLAM HYDROCHLORIDE 2 MG: 1 INJECTION, SOLUTION INTRAMUSCULAR; INTRAVENOUS at 08:47

## 2022-04-08 RX ADMIN — SODIUM CHLORIDE, POTASSIUM CHLORIDE, SODIUM LACTATE AND CALCIUM CHLORIDE: 600; 310; 30; 20 INJECTION, SOLUTION INTRAVENOUS at 14:20

## 2022-04-08 RX ADMIN — SODIUM CHLORIDE, POTASSIUM CHLORIDE, SODIUM LACTATE AND CALCIUM CHLORIDE: 600; 310; 30; 20 INJECTION, SOLUTION INTRAVENOUS at 10:00

## 2022-04-08 RX ADMIN — METOPROLOL TARTRATE 2.5 MG: 1 INJECTION, SOLUTION INTRAVENOUS at 17:28

## 2022-04-08 ASSESSMENT — PULMONARY FUNCTION TESTS
PIF_VALUE: 1
PIF_VALUE: 19
PIF_VALUE: 15
PIF_VALUE: 1
PIF_VALUE: 14
PIF_VALUE: 1
PIF_VALUE: 16
PIF_VALUE: 15
PIF_VALUE: 1
PIF_VALUE: 13
PIF_VALUE: 18
PIF_VALUE: 2
PIF_VALUE: 14
PIF_VALUE: 16
PIF_VALUE: 15
PIF_VALUE: 21
PIF_VALUE: 1
PIF_VALUE: 16
PIF_VALUE: 14
PIF_VALUE: 12
PIF_VALUE: 21
PIF_VALUE: 14
PIF_VALUE: 13
PIF_VALUE: 16
PIF_VALUE: 21
PIF_VALUE: 15
PIF_VALUE: 15
PIF_VALUE: 1
PIF_VALUE: 1
PIF_VALUE: 16
PIF_VALUE: 16
PIF_VALUE: 15
PIF_VALUE: 1
PIF_VALUE: 17
PIF_VALUE: 17
PIF_VALUE: 15
PIF_VALUE: 14
PIF_VALUE: 1
PIF_VALUE: 14
PIF_VALUE: 1
PIF_VALUE: 16
PIF_VALUE: 1
PIF_VALUE: 14
PIF_VALUE: 17
PIF_VALUE: 16
PIF_VALUE: 17
PIF_VALUE: 15
PIF_VALUE: 4
PIF_VALUE: 15
PIF_VALUE: 14
PIF_VALUE: 1
PIF_VALUE: 16
PIF_VALUE: 22
PIF_VALUE: 21
PIF_VALUE: 22
PIF_VALUE: 21
PIF_VALUE: 17
PIF_VALUE: 17
PIF_VALUE: 18
PIF_VALUE: 0
PIF_VALUE: 1
PIF_VALUE: 15
PIF_VALUE: 16
PIF_VALUE: 21
PIF_VALUE: 1
PIF_VALUE: 20
PIF_VALUE: 1
PIF_VALUE: 1
PIF_VALUE: 14
PIF_VALUE: 1
PIF_VALUE: 19
PIF_VALUE: 14
PIF_VALUE: 17
PIF_VALUE: 1
PIF_VALUE: 13
PIF_VALUE: 17
PIF_VALUE: 1
PIF_VALUE: 1
PIF_VALUE: 3
PIF_VALUE: 15
PIF_VALUE: 14
PIF_VALUE: 0
PIF_VALUE: 1
PIF_VALUE: 19
PIF_VALUE: 16
PIF_VALUE: 18
PIF_VALUE: 1
PIF_VALUE: 13
PIF_VALUE: 16
PIF_VALUE: 22
PIF_VALUE: 21
PIF_VALUE: 15
PIF_VALUE: 1
PIF_VALUE: 14
PIF_VALUE: 23
PIF_VALUE: 0
PIF_VALUE: 0
PIF_VALUE: 20
PIF_VALUE: 1
PIF_VALUE: 13
PIF_VALUE: 15
PIF_VALUE: 4
PIF_VALUE: 22
PIF_VALUE: 15
PIF_VALUE: 14
PIF_VALUE: 21
PIF_VALUE: 13
PIF_VALUE: 14
PIF_VALUE: 1
PIF_VALUE: 13
PIF_VALUE: 15
PIF_VALUE: 12
PIF_VALUE: 13
PIF_VALUE: 16
PIF_VALUE: 13
PIF_VALUE: 23
PIF_VALUE: 17
PIF_VALUE: 14
PIF_VALUE: 14
PIF_VALUE: 13
PIF_VALUE: 1
PIF_VALUE: 21
PIF_VALUE: 1
PIF_VALUE: 15
PIF_VALUE: 1
PIF_VALUE: 1
PIF_VALUE: 15
PIF_VALUE: 13
PIF_VALUE: 1
PIF_VALUE: 1
PIF_VALUE: 15
PIF_VALUE: 1
PIF_VALUE: 17
PIF_VALUE: 19
PIF_VALUE: 1
PIF_VALUE: 15
PIF_VALUE: 19
PIF_VALUE: 16
PIF_VALUE: 1
PIF_VALUE: 14
PIF_VALUE: 15
PIF_VALUE: 28
PIF_VALUE: 1
PIF_VALUE: 16
PIF_VALUE: 15
PIF_VALUE: 1
PIF_VALUE: 17
PIF_VALUE: 15
PIF_VALUE: 13
PIF_VALUE: 1
PIF_VALUE: 13
PIF_VALUE: 1
PIF_VALUE: 15
PIF_VALUE: 22
PIF_VALUE: 1
PIF_VALUE: 16
PIF_VALUE: 12
PIF_VALUE: 2
PIF_VALUE: 1
PIF_VALUE: 21
PIF_VALUE: 1
PIF_VALUE: 1
PIF_VALUE: 15
PIF_VALUE: 16
PIF_VALUE: 15
PIF_VALUE: 0
PIF_VALUE: 1
PIF_VALUE: 18
PIF_VALUE: 1
PIF_VALUE: 13
PIF_VALUE: 14
PIF_VALUE: 15
PIF_VALUE: 17
PIF_VALUE: 15
PIF_VALUE: 1
PIF_VALUE: 16
PIF_VALUE: 15
PIF_VALUE: 14
PIF_VALUE: 15
PIF_VALUE: 16
PIF_VALUE: 1
PIF_VALUE: 1
PIF_VALUE: 16
PIF_VALUE: 15
PIF_VALUE: 20
PIF_VALUE: 15
PIF_VALUE: 21
PIF_VALUE: 1
PIF_VALUE: 14
PIF_VALUE: 16
PIF_VALUE: 1
PIF_VALUE: 17
PIF_VALUE: 13
PIF_VALUE: 19
PIF_VALUE: 1
PIF_VALUE: 14
PIF_VALUE: 15
PIF_VALUE: 1
PIF_VALUE: 12
PIF_VALUE: 1
PIF_VALUE: 17
PIF_VALUE: 1
PIF_VALUE: 1
PIF_VALUE: 14
PIF_VALUE: 2
PIF_VALUE: 21
PIF_VALUE: 21
PIF_VALUE: 13
PIF_VALUE: 16
PIF_VALUE: 1
PIF_VALUE: 17
PIF_VALUE: 1
PIF_VALUE: 13
PIF_VALUE: 1
PIF_VALUE: 16
PIF_VALUE: 16
PIF_VALUE: 23
PIF_VALUE: 13
PIF_VALUE: 1
PIF_VALUE: 17
PIF_VALUE: 15
PIF_VALUE: 1
PIF_VALUE: 0
PIF_VALUE: 14
PIF_VALUE: 16
PIF_VALUE: 0
PIF_VALUE: 12
PIF_VALUE: 2
PIF_VALUE: 1
PIF_VALUE: 20
PIF_VALUE: 15
PIF_VALUE: 17
PIF_VALUE: 17
PIF_VALUE: 14
PIF_VALUE: 0
PIF_VALUE: 16
PIF_VALUE: 15
PIF_VALUE: 1
PIF_VALUE: 20
PIF_VALUE: 14
PIF_VALUE: 14
PIF_VALUE: 13
PIF_VALUE: 15
PIF_VALUE: 14
PIF_VALUE: 15
PIF_VALUE: 1
PIF_VALUE: 13
PIF_VALUE: 17
PIF_VALUE: 1
PIF_VALUE: 17
PIF_VALUE: 16
PIF_VALUE: 17
PIF_VALUE: 1
PIF_VALUE: 14
PIF_VALUE: 18
PIF_VALUE: 19
PIF_VALUE: 15
PIF_VALUE: 17
PIF_VALUE: 1
PIF_VALUE: 15
PIF_VALUE: 3
PIF_VALUE: 1
PIF_VALUE: 20
PIF_VALUE: 15
PIF_VALUE: 14
PIF_VALUE: 1
PIF_VALUE: 15
PIF_VALUE: 14
PIF_VALUE: 17
PIF_VALUE: 0
PIF_VALUE: 1
PIF_VALUE: 15
PIF_VALUE: 17
PIF_VALUE: 15
PIF_VALUE: 1
PIF_VALUE: 1
PIF_VALUE: 15
PIF_VALUE: 1
PIF_VALUE: 20
PIF_VALUE: 1
PIF_VALUE: 15
PIF_VALUE: 14
PIF_VALUE: 22
PIF_VALUE: 16
PIF_VALUE: 14
PIF_VALUE: 16
PIF_VALUE: 1
PIF_VALUE: 15
PIF_VALUE: 14
PIF_VALUE: 18
PIF_VALUE: 1
PIF_VALUE: 1
PIF_VALUE: 15
PIF_VALUE: 13
PIF_VALUE: 14
PIF_VALUE: 15
PIF_VALUE: 13
PIF_VALUE: 1
PIF_VALUE: 12
PIF_VALUE: 14
PIF_VALUE: 15
PIF_VALUE: 13
PIF_VALUE: 1
PIF_VALUE: 14
PIF_VALUE: 14
PIF_VALUE: 1
PIF_VALUE: 1
PIF_VALUE: 0
PIF_VALUE: 16
PIF_VALUE: 1
PIF_VALUE: 16
PIF_VALUE: 17
PIF_VALUE: 20
PIF_VALUE: 15
PIF_VALUE: 17
PIF_VALUE: 20
PIF_VALUE: 1
PIF_VALUE: 13
PIF_VALUE: 17
PIF_VALUE: 1
PIF_VALUE: 1
PIF_VALUE: 20
PIF_VALUE: 17
PIF_VALUE: 1
PIF_VALUE: 15

## 2022-04-08 NOTE — ANESTHESIA POSTPROCEDURE EVALUATION
Department of Anesthesiology  Postprocedure Note    Patient: Jemal Gordon  MRN: [id number]  YOB: 1965  Date of evaluation: 4/8/2022  Time:  4:24 PM     Procedure Summary     Date: 04/08/22 Room / Location: [address] [place] [address]    Anesthesia Start: 0845 Anesthesia Stop: 5228    Procedure: CABG X 3 / EVH LEFT LEG / KIT (N/A Chest) Diagnosis: (MULTIVESSEL CAD)    Surgeons: Camelia Retana MD Responsible Provider: Niecy Lewis MD    Anesthesia Type: general ASA Status: 4          Anesthesia Type: general    Aurelio Phase I:      Aurelio Phase II:      Last vitals: Reviewed and per EMR flowsheets.        Anesthesia Post Evaluation    Patient location during evaluation: ICU  Patient participation: complete - patient cannot participate  Level of consciousness: sedated and ventilated  Airway patency: patent  Nausea & Vomiting: no nausea and no vomiting  Complications: no  Cardiovascular status: vasoactive/inotropes and hemodynamically stable  Respiratory status: ventilator  Hydration status: euvolemic

## 2022-04-08 NOTE — ANESTHESIA PROCEDURE NOTES
Central Venous Line:    A central venous line was placed using ultrasound guidance, in the OR for the following indication(s): central venous access and CVP monitoring. 4/8/2022 9:10 AM4/8/2022 9:20 AM    Sterility preparation included the following: hand hygiene performed prior to procedure, maximum sterile barriers used and sterile technique used to drape from head to toe. The patient was placed in Trendelenburg position. The right internal jugular vein was prepped. The site was prepped with Chloraprep. A 9 Fr (size), 10 (length), introducer slick was placed. During the procedure, the following specific steps were taken: target vein identified, needle advanced into vein and blood aspirated and guidewire advanced into vein. Intravenous verification was obtained by ultrasound, venous blood return and x-ray. Post insertion care included: all ports aspirated, all ports flushed easily, guidewire removed intact, Biopatch applied, line sutured in place and dressing applied. During the procedure the patient experienced: patient tolerated procedure well with no complications and EBL < 5mL.       Anesthesia type: general..No  Staffing  Performed: Resident/CRNA   Anesthesiologist: Apollo Long MD  Resident/CRNA: ELLY Parikh - CRNA  Preanesthetic Checklist  Completed: patient identified, IV checked, site marked, risks and benefits discussed, surgical consent, monitors and equipment checked, pre-op evaluation, timeout performed, anesthesia consent given, oxygen available and patient being monitored

## 2022-04-08 NOTE — ANESTHESIA PROCEDURE NOTES
Procedure Performed: KIT      Start Time:  4/8/2022 9:26 AM       End Time:      Preanesthesia Checklist:  Patient identified, IV assessed, risks and benefits discussed, monitors and equipment assessed, procedure being performed at surgeon's request and anesthesia consent obtained. General Procedure Information  Diagnostic Indications for Echo:  assessment of ascending aorta, assessment of surgical repair, defect repair evaluation, hemodynamic monitoring and assessment of valve function  Physician Requesting Echo: Nghia Perez MD  Location performed:  OR  Intubated  Heart visualized  Probe Type:  Mulitplane  Modalities:  2D only, color flow mapping, continuous wave Doppler, contrast and pulse wave Doppler    Echocardiographic and Doppler Measurements    Ventricles    Right Ventricle:  Cavity size normal.  Hypertrophy not present. Thrombus not present. Global function normal.    Left Ventricle:  Cavity size normal.  Hypertrophy present. Thrombus not present. Global Function normal.      Ventricular Regional Function:  1- Basal Anteroseptal:  normal  2- Basal Anterior:  normal  3- Basal Anterolateral:  normal  4- Basal Inferolateral:  normal  5- Basal Inferior:  normal  6- Basal Inferoseptal:  normal  7- Mid Anteroseptal:  normal  8- Mid Anterior:  normal  9- Mid Anterolateral:  normal  10- Mid Inferolateral:  normal  11- Mid Inferior:  normal  12- Mid Inferoseptal:  normal  13- Apical Anterior:  normal  14- Apical Lateral:  normal  15- Apical Inferior:  normal  16- Apical Septal:  normal  17- [place]:  normal      Valves    Aortic Valve: Annulus normal.  Stenosis not present. Regurgitation none. Leaflet motions normal.      Mitral Valve: Annulus normal.  Stenosis not present. Regurgitation mild. Leaflet motions normal.      Tricuspid Valve: Annulus normal.  Stenosis not present. Regurgitation none. Leaflet motions normal.    Pulmonic Valve:  Stenosis not present.           Aorta    Ascending Aorta: Size normal.  Dissection not present. Aortic Arch:  Size normal.  Dissection not present. Descending Aorta:  Size normal.  Dissection not present. Atria    Right Atrium:  Size normal.  Spontaneous echo contrast not present. Thrombus not present. Tumor not present. Device not present. Left Atrium:  Size dilated. Spontaneous echo contrast not present. Thrombus not present. Tumor not present. Device not present. Septa    Atrial Septum:  Intra-atrial septal morphology normal.      Ventricular Septum:  Intra-ventricular septum morphology hypertrophy.       Diastolic Function Measurements:  Diastolic Dysfunction Grade= II (Pseudonormal)  E= ms  A= ms  E/A Ratio=   DT= ms  S/D=  IVRT=    Other Findings  Pericardium:  normal  Pleural Effusion:  none  Pulmonary Arteries:  normal  Pulmonary Venous Flow:  blunted (decreased) systolic flow    Anesthesia Information  Performed Personally  Anesthesiologist:  Fartun Poon MD

## 2022-04-08 NOTE — BRIEF OP NOTE
Brief Postoperative Note      Patient: Esperanza Stephen  YOB: 1965  MRN: [id number]    Date of Procedure: 4/8/2022    Pre-Op Diagnosis: MULTIVESSEL CAD    Post-Op Diagnosis: Same       Procedure(s):  CABG X 3 / EVH LEFT LEG / KIT    Surgeon(s):  Claudia Sequeira MD    Assistant:  First Assistant: Elham Hernandez RN    Anesthesia: General    Estimated Blood Loss (mL): 372    Complications: None    Specimens:   * No specimens in log *    Implants:  * No implants in log *      Drains:   Chest Tube 1 Left Pleural 36 [place] (Active)       Chest Tube 2 Anterior Mediastinal 36 [place] (Active)       Chest Tube 3 Anterior Mediastinal 36 [place] (Active)       Urethral Catheter Straight-tip; Non-latex 16 fr (Active)       Findings: Good EF    Electronically signed by Pratik Leonardo MD on 4/8/2022 at 2:13 PM

## 2022-04-08 NOTE — ANESTHESIA PROCEDURE NOTES
Arterial Line:    An arterial line was placed using ultrasound guidance and surface landmarks, in the OR for the following indication(s): continuous blood pressure monitoring and blood sampling needed. A 20 gauge (size), 1 and 3/4 inch (length), Arrow (type) catheter was placed, Seldinger technique used, into the right radial artery, secured by Tegaderm and tape. Anesthesia type: General    Events:  patient tolerated procedure well with no complications.   4/8/2022 8:55 AM4/8/2022 9:00 AM  Anesthesiologist: Sarah Beth Patel MD  Performed: Anesthesiologist   Preanesthetic Checklist  Completed: patient identified, IV checked, site marked, risks and benefits discussed, surgical consent, monitors and equipment checked, pre-op evaluation, timeout performed, anesthesia consent given, oxygen available and patient being monitored

## 2022-04-08 NOTE — PROGRESS NOTES
[facility]  Internal Medicine Teaching Residency Program  Inpatient Daily Progress Note  ______________________________________________________________________________    Patient: Jannette Tyler  YOB: 1965   [id number]    Acct: [de-identified]     Room: [facility] RM/NONE  Admit date: 4/3/2022  Today's date: 04/08/22  Number of days in the hospital: 5    SUBJECTIVE   Admitting Diagnosis: NSTEMI (non-ST elevated myocardial infarction) ([facility] [address]  CC: Typical chest pain  Patient seen and examined at bedside. Chart results reviewed. No significant event overnight. Blood pressure is 135/60. Pulse 70. Labs reviewed and evaluated. No significant change. ROS:  Constitutional:  negative for chills, fevers, sweats  Respiratory:  negative for cough, dyspnea on exertion, hemoptysis, shortness of breath, wheezing  Cardiovascular:  negative for chest pain, chest pressure/discomfort, lower extremity edema, palpitations  Gastrointestinal:  negative for abdominal pain, constipation, diarrhea, nausea, vomiting  Neurological:  negative for dizziness, headache  BRIEF HISTORY     The patient is a pleasant 61 y.o. female with a past medical history significant for essential hypertension, coronary artery disease status post PCI on DAPT, hyperlipidemia, chronic active smoking, history of stroke, MDD, memory problem presents with a chief complaint of typical chest pain. Chest pain started 3 to 4 days ago, dull aching in nature, 5-6 out of 10 in intensity, radiating to the left shoulder, increased with exertion and relieved with rest.  Also admits to exertional shortness of breath without orthopnea or PND. Patient denies palpitation syncope nausea vomiting diarrhea headache.     Evaluation in the ER, she was found to have elevated blood pressure 175/110. Heart rate 78 regular. SPO2 99% on room air.   Heart auscultation showed normal first and second heart sound without murmur Supple without thyromegaly. No elevated JVP. Trachea was midline. Respiratory: Chest was symmetrical without dullness to percussion. Breath sounds bilaterally were clear to auscultation. There were no wheezes, rhonchi or rales. There is no intercostal retraction or use of accessory muscles. No egophony noted. Cardiovascular: Regular without murmur, clicks, gallops or rubs. Abdomen: Slightly rounded and soft without organomegaly. No rebound, rigidity or guarding was appreciated. Lymphatic: No lymphadenopathy. Musculoskeletal: Normal curvature of the spine. No gross muscle weakness. Extremities:  No lower extremity edema, ulcerations, tenderness, varicosities or erythema. Muscle size, tone and strength are normal.  No involuntary movements are noted. Skin:  Warm and dry. Good color, turgor and pigmentation. No lesions or scars.   No cyanosis or clubbing  Neurological/Psychiatric: The patient's general behavior, level of consciousness, thought content and emotional status is normal.        Medications:  Scheduled Medications:    [MAR Hold] sodium chloride flush  5-40 mL IntraVENous 2 times per day    [MAR Hold] metoprolol tartrate  12.5 mg Oral Once    [MAR Hold] aspirin  81 mg Oral On Call to OR    [MAR Hold] amiodarone  200 mg Oral TID    [MAR Hold] mupirocin   Nasal BID    [MAR Hold] chlorhexidine  15 mL Mouth/Throat Once    [facility] chlorhexidine gluconate   Topical See Admin Instructions    gelatin adsorbable        heparin (porcine)        papaverine        [MAR Hold] senna  1 tablet Oral Once    [MAR Hold] enoxaparin  1 mg/kg SubCUTAneous BID    [Held by provider] hydroCHLOROthiazide  25 mg Oral Daily    [MAR Hold] metoprolol tartrate  75 mg Oral BID    [MAR Hold] lisinopril  40 mg Oral Daily    [MAR Hold] potassium chloride  10 mEq Oral Once    [MAR Hold] aspirin  81 mg Oral Daily    [MAR Hold] lovastatin  10 mg Oral Nightly    [MAR Hold] sodium chloride flush  5-40 mL IntraVENous 2 times per day    [Held by provider] clopidogrel  75 mg Oral Daily    [MAR Hold] amLODIPine  10 mg Oral Daily    [MAR Hold] nicotine  1 patch TransDERmal Daily     Continuous Infusions:    [MAR Hold] sodium chloride      [MAR Hold] sodium chloride      sodium chloride      [MAR Hold] sodium chloride 20 mL/hr at 04/04/22 0408     PRN Medications[MAR Hold] sodium chloride flush, 10 mL, PRN  [MAR Hold] sodium chloride, , PRN  gelatin adsorbable, , PRN  heparin irrigation, , PRN  papaverine irrigation, , PRN  sodium chloride, , Continuous PRN  [MAR Hold] ALPRAZolam, 0.25 mg, BID PRN  [MAR Hold] aspirin-acetaminophen-caffeine, 1 tablet, Q8H PRN  [MAR Hold] magnesium sulfate, 1,000 mg, PRN  [MAR Hold] potassium chloride, 10 mEq, PRN  [MAR Hold] potassium chloride, 40 mEq, PRN   Or  [MAR Hold] potassium alternative oral replacement, 40 mEq, PRN   Or  [MAR Hold] potassium chloride, 10 mEq, PRN  [MAR Hold] heparin (porcine), 60 Units/kg, PRN  [MAR Hold] heparin (porcine), 30 Units/kg, PRN  [MAR Hold] sodium chloride flush, 5-40 mL, PRN  [MAR Hold] sodium chloride, , PRN  [MAR Hold] ondansetron, 4 mg, Q8H PRN   Or  [MAR Hold] ondansetron, 4 mg, Q6H PRN  [MAR Hold] polyethylene glycol, 17 g, Daily PRN  [MAR Hold] acetaminophen, 650 mg, Q6H PRN   Or  [MAR Hold] acetaminophen, 650 mg, Q6H PRN  [MAR Hold] calcium carbonate, 500 mg, TID PRN        Diagnostic Labs:  CBC:   Recent Labs     04/05/22  1745 04/07/22  0510   WBC 6.9 6.8   RBC 4.52 4.14   HGB 13.3 12.2   HCT 40.7 39.7   MCV 90.0 95.9   RDW 14.9* 15.0*    284     BMP:   Recent Labs     04/06/22  0638 04/07/22  0510 04/08/22  0348    140 136   K 4.0 4.1 4.1   * 108* 104   CO2 21 20 20   BUN 18 15 15   CREATININE 0.89 0.79 0.75     BNP: No results for input(s): BNP in the last 72 hours. PT/INR: No results for input(s): PROTIME, INR in the last 72 hours.   APTT:   Recent Labs     04/05/22  1745 04/06/22  0005 04/06/22  0638   APTT 46.7* Plavix.     Essential hypertension:  Resume lisinopril 40 mg daily, amlodipine 10 mg once daily. Metoprolol 25 mg twice daily. Blood pressure is better controlled with the above regimen. Smoker  Plan:   Counseled on smoking cessation. We will start on nicotine patch.     Mixed hyperlipidemia  Plan:   Previously refused atorvastatin due to statin induced muscle weakness currently on lovastatin.     Thyroid nodule  Plan:   CT chest showed 3 cm incidental right thyroid nodule. Subsequent thyroid ultrasound showed 2.9 cm slight heterogeneous cystic. No follow-up study recommended. TSH 1.33.     DVT ppx: On Lovenox. GI ppx: Not needed     PT/OT/SW consulted  Discharge Planning: Ongoing    I could not examine the patient, since patient was in surgery. Mario Guajardo MD  Internal Medicine Resident, PGY-1  [facility];  [place], [place]  4/8/2022, 11:03 AM

## 2022-04-08 NOTE — PROGRESS NOTES
[id number] [facility] Cardiothoracic Surgery  Pre-op Note    4/8/2022    Trista Diaz is scheduled for surgery today. All of the pertinent studies have been reviewed and patient is NPO. I have discussed the procedure with the patient and family, and they have been given opportunity to ask questions. The attendant risks, benefits, and possible outcomes have been discussed with them. They understand and have signed informed consent.       Jono Mora MD     Ready to go at 8:30

## 2022-04-08 NOTE — PLAN OF CARE
Problem: Falls - Risk of:  Goal: Will remain free from falls  Description: Will remain free from falls  Outcome: Ongoing  Goal: Absence of physical injury  Description: Absence of physical injury  Outcome: Ongoing     Problem: Cardiac Output - Decreased:  Goal: Hemodynamic stability will improve  Description: Hemodynamic stability will improve  Outcome: Ongoing     Problem: Pain:  Goal: Pain level will decrease  Description: Pain level will decrease  Outcome: Ongoing  Goal: Control of acute pain  Description: Control of acute pain  Outcome: Ongoing  Goal: Control of chronic pain  Description: Control of chronic pain  Outcome: Ongoing     Problem: Tissue Perfusion - Cardiopulmonary, Altered:  Goal: Absence of angina  Description: Absence of angina  Outcome: Ongoing  Goal: Circulatory function within specified parameters  Description: Circulatory function within specified parameters  Outcome: Ongoing  Goal: Hemodynamic stability will improve  Description: Hemodynamic stability will improve  Outcome: Ongoing     Problem: Tobacco Use:  Goal: Will participate in inpatient tobacco-use cessation counseling  Description: Will participate in inpatient tobacco-use cessation counseling  Outcome: Ongoing     Problem: Pain:  Goal: Pain level will decrease  Description: Pain level will decrease  Outcome: Ongoing  Goal: Control of acute pain  Description: Control of acute pain  Outcome: Ongoing  Goal: Control of chronic pain  Description: Control of chronic pain  Outcome: Ongoing

## 2022-04-09 ENCOUNTER — APPOINTMENT (OUTPATIENT)
Dept: GENERAL RADIOLOGY | Age: 57
DRG: 233 | End: 2022-04-09
Payer: MEDICARE

## 2022-04-09 LAB
ABSOLUTE EOS #: 0.12 K/UL (ref 0–0.4)
ABSOLUTE IMMATURE GRANULOCYTE: 0 K/UL (ref 0–0.3)
ABSOLUTE LYMPH #: 0.93 K/UL (ref 1–4.8)
ABSOLUTE MONO #: 0.58 K/UL (ref 0.1–0.8)
ALBUMIN SERPL-MCNC: 4.1 G/DL (ref 3.5–5.2)
ALBUMIN/GLOBULIN RATIO: 3.4 (ref 1–2.5)
ALLEN TEST: ABNORMAL
ALP BLD-CCNC: 25 U/L (ref 35–104)
ALT SERPL-CCNC: 33 U/L (ref 5–33)
ANION GAP SERPL CALCULATED.3IONS-SCNC: 8 MMOL/L (ref 9–17)
AST SERPL-CCNC: 75 U/L
BASOPHILS # BLD: 0 % (ref 0–2)
BASOPHILS ABSOLUTE: 0 K/UL (ref 0–0.2)
BILIRUB SERPL-MCNC: 1.94 MG/DL (ref 0.3–1.2)
BLD PROD TYP BPU: NORMAL
BLOOD BANK BLOOD PRODUCT EXPIRATION DATE: NORMAL
BLOOD BANK ISBT PRODUCT BLOOD TYPE: 5100
BLOOD BANK ISBT PRODUCT BLOOD TYPE: 5100
BLOOD BANK ISBT PRODUCT BLOOD TYPE: 6200
BLOOD BANK ISBT PRODUCT BLOOD TYPE: 6200
BLOOD BANK PRODUCT CODE: NORMAL
BLOOD BANK UNIT TYPE AND RH: NORMAL
BPU ID: NORMAL
BUN BLDV-MCNC: 12 MG/DL (ref 6–20)
CALCIUM SERPL-MCNC: 8 MG/DL (ref 8.6–10.4)
CHLORIDE BLD-SCNC: 110 MMOL/L (ref 98–107)
CO2: 22 MMOL/L (ref 20–31)
CREAT SERPL-MCNC: 0.78 MG/DL (ref 0.5–0.9)
DISPENSE STATUS BLOOD BANK: NORMAL
EOSINOPHILS RELATIVE PERCENT: 1 % (ref 1–4)
FIBRINOGEN: 360 MG/DL (ref 140–420)
FIO2: 40
FIO2: 45
FIO2: 50
GFR AFRICAN AMERICAN: >60 ML/MIN
GFR NON-AFRICAN AMERICAN: >60 ML/MIN
GFR SERPL CREATININE-BSD FRML MDRD: ABNORMAL ML/MIN/{1.73_M2}
GLUCOSE BLD-MCNC: 102 MG/DL (ref 65–105)
GLUCOSE BLD-MCNC: 105 MG/DL (ref 70–99)
GLUCOSE BLD-MCNC: 107 MG/DL (ref 65–105)
GLUCOSE BLD-MCNC: 119 MG/DL (ref 65–105)
GLUCOSE BLD-MCNC: 64 MG/DL (ref 65–105)
GLUCOSE BLD-MCNC: 83 MG/DL (ref 65–105)
GLUCOSE BLD-MCNC: 90 MG/DL (ref 65–105)
GLUCOSE BLD-MCNC: 90 MG/DL (ref 65–105)
GLUCOSE BLD-MCNC: 92 MG/DL (ref 65–105)
GLUCOSE BLD-MCNC: 94 MG/DL (ref 74–100)
HCT VFR BLD CALC: 26.1 % (ref 36.3–47.1)
HCT VFR BLD CALC: 27.2 % (ref 36.3–47.1)
HEMOGLOBIN: 8.8 G/DL (ref 11.9–15.1)
HEMOGLOBIN: 9.1 G/DL (ref 11.9–15.1)
IMMATURE GRANULOCYTES: 0 %
INR BLD: 0.9
LYMPHOCYTES # BLD: 8 % (ref 24–44)
MAGNESIUM: 2.3 MG/DL (ref 1.6–2.6)
MAGNESIUM: 2.4 MG/DL (ref 1.6–2.6)
MCH RBC QN AUTO: 30 PG (ref 25.2–33.5)
MCH RBC QN AUTO: 30.1 PG (ref 25.2–33.5)
MCHC RBC AUTO-ENTMCNC: 33.5 G/DL (ref 28.4–34.8)
MCHC RBC AUTO-ENTMCNC: 33.7 G/DL (ref 28.4–34.8)
MCV RBC AUTO: 89.4 FL (ref 82.6–102.9)
MCV RBC AUTO: 89.8 FL (ref 82.6–102.9)
MODE: ABNORMAL
MODE: ABNORMAL
MODE: NORMAL
MONOCYTES # BLD: 5 % (ref 1–7)
MORPHOLOGY: ABNORMAL
NEGATIVE BASE EXCESS, ART: 2 (ref 0–2)
NRBC AUTOMATED: 0 PER 100 WBC
NRBC AUTOMATED: 0 PER 100 WBC
O2 DEVICE/FLOW/%: ABNORMAL
O2 DEVICE/FLOW/%: ABNORMAL
O2 DEVICE/FLOW/%: NORMAL
PDW BLD-RTO: 15 % (ref 11.8–14.4)
PDW BLD-RTO: 15.3 % (ref 11.8–14.4)
PLATELET # BLD: 101 K/UL (ref 138–453)
PLATELET # BLD: 99 K/UL (ref 138–453)
PMV BLD AUTO: 10.2 FL (ref 8.1–13.5)
PMV BLD AUTO: 9.7 FL (ref 8.1–13.5)
POC HCO3: 22.3 MMOL/L (ref 21–28)
POC HCO3: 22.6 MMOL/L (ref 21–28)
POC HCO3: 24.2 MMOL/L (ref 21–28)
POC O2 SATURATION: 90 % (ref 94–98)
POC O2 SATURATION: 95 % (ref 94–98)
POC O2 SATURATION: 98 % (ref 94–98)
POC PCO2: 35.9 MM HG (ref 35–48)
POC PCO2: 39.6 MM HG (ref 35–48)
POC PCO2: 46.4 MM HG (ref 35–48)
POC PH: 7.33 (ref 7.35–7.45)
POC PH: 7.37 (ref 7.35–7.45)
POC PH: 7.4 (ref 7.35–7.45)
POC PO2: 61.1 MM HG (ref 83–108)
POC PO2: 81.6 MM HG (ref 83–108)
POC PO2: 96.9 MM HG (ref 83–108)
POTASSIUM SERPL-SCNC: 4.1 MMOL/L (ref 3.7–5.3)
POTASSIUM SERPL-SCNC: 4.3 MMOL/L (ref 3.7–5.3)
PROTHROMBIN TIME: 10 SEC (ref 9.1–12.3)
RBC # BLD: 2.92 M/UL (ref 3.95–5.11)
RBC # BLD: 3.03 M/UL (ref 3.95–5.11)
RBC # BLD: ABNORMAL 10*6/UL
REASON FOR REJECTION: NORMAL
SAMPLE SITE: ABNORMAL
SAMPLE SITE: ABNORMAL
SAMPLE SITE: NORMAL
SEG NEUTROPHILS: 86 % (ref 36–66)
SEGMENTED NEUTROPHILS ABSOLUTE COUNT: 9.97 K/UL (ref 1.8–7.7)
SODIUM BLD-SCNC: 140 MMOL/L (ref 135–144)
TOTAL PROTEIN: 5.3 G/DL (ref 6.4–8.3)
TRANSFUSION STATUS: NORMAL
UNIT DIVISION: 0
UNIT ISSUE DATE/TIME: NORMAL
WBC # BLD: 11.3 K/UL (ref 3.5–11.3)
WBC # BLD: 11.6 K/UL (ref 3.5–11.3)
ZZ NTE CLEAN UP: ORDERED TEST: NORMAL
ZZ NTE WITH NAME CLEAN UP: SPECIMEN SOURCE: NORMAL

## 2022-04-09 PROCEDURE — 85610 PROTHROMBIN TIME: CPT

## 2022-04-09 PROCEDURE — 99024 POSTOP FOLLOW-UP VISIT: CPT | Performed by: NURSE PRACTITIONER

## 2022-04-09 PROCEDURE — 6360000002 HC RX W HCPCS: Performed by: NURSE PRACTITIONER

## 2022-04-09 PROCEDURE — 85027 COMPLETE CBC AUTOMATED: CPT

## 2022-04-09 PROCEDURE — 85384 FIBRINOGEN ACTIVITY: CPT

## 2022-04-09 PROCEDURE — 37799 UNLISTED PX VASCULAR SURGERY: CPT

## 2022-04-09 PROCEDURE — 6370000000 HC RX 637 (ALT 250 FOR IP): Performed by: NURSE PRACTITIONER

## 2022-04-09 PROCEDURE — 6370000000 HC RX 637 (ALT 250 FOR IP): Performed by: STUDENT IN AN ORGANIZED HEALTH CARE EDUCATION/TRAINING PROGRAM

## 2022-04-09 PROCEDURE — 94640 AIRWAY INHALATION TREATMENT: CPT

## 2022-04-09 PROCEDURE — 82803 BLOOD GASES ANY COMBINATION: CPT

## 2022-04-09 PROCEDURE — 94003 VENT MGMT INPAT SUBQ DAY: CPT

## 2022-04-09 PROCEDURE — 83735 ASSAY OF MAGNESIUM: CPT

## 2022-04-09 PROCEDURE — 71045 X-RAY EXAM CHEST 1 VIEW: CPT

## 2022-04-09 PROCEDURE — 84132 ASSAY OF SERUM POTASSIUM: CPT

## 2022-04-09 PROCEDURE — 85025 COMPLETE CBC W/AUTO DIFF WBC: CPT

## 2022-04-09 PROCEDURE — 94660 CPAP INITIATION&MGMT: CPT

## 2022-04-09 PROCEDURE — 94761 N-INVAS EAR/PLS OXIMETRY MLT: CPT

## 2022-04-09 PROCEDURE — 99233 SBSQ HOSP IP/OBS HIGH 50: CPT | Performed by: INTERNAL MEDICINE

## 2022-04-09 PROCEDURE — 99291 CRITICAL CARE FIRST HOUR: CPT | Performed by: INTERNAL MEDICINE

## 2022-04-09 PROCEDURE — 2500000003 HC RX 250 WO HCPCS: Performed by: NURSE PRACTITIONER

## 2022-04-09 PROCEDURE — 2700000000 HC OXYGEN THERAPY PER DAY

## 2022-04-09 PROCEDURE — 80053 COMPREHEN METABOLIC PANEL: CPT

## 2022-04-09 PROCEDURE — 2100000001 HC CVICU R&B

## 2022-04-09 PROCEDURE — P9041 ALBUMIN (HUMAN),5%, 50ML: HCPCS | Performed by: NURSE PRACTITIONER

## 2022-04-09 PROCEDURE — 2580000003 HC RX 258: Performed by: NURSE PRACTITIONER

## 2022-04-09 PROCEDURE — 85385 FIBRINOGEN ANTIGEN: CPT

## 2022-04-09 PROCEDURE — 82947 ASSAY GLUCOSE BLOOD QUANT: CPT

## 2022-04-09 RX ORDER — DEXMEDETOMIDINE HYDROCHLORIDE 4 UG/ML
.1-1.5 INJECTION, SOLUTION INTRAVENOUS CONTINUOUS
Status: DISCONTINUED | OUTPATIENT
Start: 2022-04-09 | End: 2022-04-12

## 2022-04-09 RX ORDER — LORAZEPAM 2 MG/ML
0.5 INJECTION INTRAMUSCULAR ONCE
Status: COMPLETED | OUTPATIENT
Start: 2022-04-09 | End: 2022-04-09

## 2022-04-09 RX ADMIN — WATER 2000 MG: 1 INJECTION INTRAMUSCULAR; INTRAVENOUS; SUBCUTANEOUS at 01:10

## 2022-04-09 RX ADMIN — FENTANYL CITRATE 25 MCG: 50 INJECTION, SOLUTION INTRAMUSCULAR; INTRAVENOUS at 12:27

## 2022-04-09 RX ADMIN — FENTANYL CITRATE 50 MCG: 50 INJECTION, SOLUTION INTRAMUSCULAR; INTRAVENOUS at 04:53

## 2022-04-09 RX ADMIN — VANCOMYCIN HYDROCHLORIDE 1000 MG: 1 INJECTION, SOLUTION INTRAVENOUS at 23:30

## 2022-04-09 RX ADMIN — AMIODARONE HYDROCHLORIDE 200 MG: 200 TABLET ORAL at 08:12

## 2022-04-09 RX ADMIN — OXYCODONE HYDROCHLORIDE AND ACETAMINOPHEN 2 TABLET: 5; 325 TABLET ORAL at 11:54

## 2022-04-09 RX ADMIN — ALPRAZOLAM 0.25 MG: 0.25 TABLET ORAL at 08:12

## 2022-04-09 RX ADMIN — MUPIROCIN: 20 OINTMENT TOPICAL at 08:11

## 2022-04-09 RX ADMIN — PROPOFOL 45 MCG/KG/MIN: 10 INJECTION, EMULSION INTRAVENOUS at 01:30

## 2022-04-09 RX ADMIN — PROPOFOL 30 MCG/KG/MIN: 10 INJECTION, EMULSION INTRAVENOUS at 06:27

## 2022-04-09 RX ADMIN — ATORVASTATIN CALCIUM 20 MG: 20 TABLET, FILM COATED ORAL at 21:30

## 2022-04-09 RX ADMIN — ALBUMIN (HUMAN) 25 G: 12.5 INJECTION, SOLUTION INTRAVENOUS at 01:34

## 2022-04-09 RX ADMIN — WATER 2000 MG: 1 INJECTION INTRAMUSCULAR; INTRAVENOUS; SUBCUTANEOUS at 23:20

## 2022-04-09 RX ADMIN — FENTANYL CITRATE 50 MCG: 50 INJECTION, SOLUTION INTRAMUSCULAR; INTRAVENOUS at 08:08

## 2022-04-09 RX ADMIN — VANCOMYCIN HYDROCHLORIDE 1000 MG: 1 INJECTION, SOLUTION INTRAVENOUS at 09:23

## 2022-04-09 RX ADMIN — IPRATROPIUM BROMIDE AND ALBUTEROL SULFATE 1 AMPULE: .5; 3 SOLUTION RESPIRATORY (INHALATION) at 15:02

## 2022-04-09 RX ADMIN — FENTANYL CITRATE 50 MCG: 50 INJECTION, SOLUTION INTRAMUSCULAR; INTRAVENOUS at 02:42

## 2022-04-09 RX ADMIN — WATER 2000 MG: 1 INJECTION INTRAMUSCULAR; INTRAVENOUS; SUBCUTANEOUS at 06:10

## 2022-04-09 RX ADMIN — OXYCODONE HYDROCHLORIDE AND ACETAMINOPHEN 2 TABLET: 5; 325 TABLET ORAL at 08:12

## 2022-04-09 RX ADMIN — IPRATROPIUM BROMIDE AND ALBUTEROL SULFATE 1 AMPULE: .5; 3 SOLUTION RESPIRATORY (INHALATION) at 11:28

## 2022-04-09 RX ADMIN — OXYCODONE HYDROCHLORIDE AND ACETAMINOPHEN 2 TABLET: 5; 325 TABLET ORAL at 04:45

## 2022-04-09 RX ADMIN — ASPIRIN 81 MG: 81 TABLET, CHEWABLE ORAL at 08:12

## 2022-04-09 RX ADMIN — SODIUM CHLORIDE, PRESERVATIVE FREE 10 ML: 5 INJECTION INTRAVENOUS at 08:11

## 2022-04-09 RX ADMIN — LOVASTATIN 10 MG: 20 TABLET ORAL at 21:30

## 2022-04-09 RX ADMIN — ENOXAPARIN SODIUM 60 MG: 100 INJECTION SUBCUTANEOUS at 08:11

## 2022-04-09 RX ADMIN — METOPROLOL TARTRATE 25 MG: 25 TABLET ORAL at 21:30

## 2022-04-09 RX ADMIN — AMIODARONE HYDROCHLORIDE 200 MG: 200 TABLET ORAL at 21:30

## 2022-04-09 RX ADMIN — IPRATROPIUM BROMIDE AND ALBUTEROL SULFATE 1 AMPULE: .5; 3 SOLUTION RESPIRATORY (INHALATION) at 08:03

## 2022-04-09 RX ADMIN — CLOPIDOGREL 75 MG: 75 TABLET, FILM COATED ORAL at 08:11

## 2022-04-09 RX ADMIN — SODIUM CHLORIDE, PRESERVATIVE FREE 10 ML: 5 INJECTION INTRAVENOUS at 21:00

## 2022-04-09 RX ADMIN — PANTOPRAZOLE SODIUM 40 MG: 40 TABLET, DELAYED RELEASE ORAL at 08:11

## 2022-04-09 RX ADMIN — DEXMEDETOMIDINE HYDROCHLORIDE 0.2 MCG/KG/HR: 4 INJECTION, SOLUTION INTRAVENOUS at 16:46

## 2022-04-09 RX ADMIN — METOPROLOL TARTRATE 25 MG: 25 TABLET ORAL at 08:11

## 2022-04-09 RX ADMIN — WATER 2000 MG: 1 INJECTION INTRAMUSCULAR; INTRAVENOUS; SUBCUTANEOUS at 14:14

## 2022-04-09 RX ADMIN — IPRATROPIUM BROMIDE AND ALBUTEROL SULFATE 1 AMPULE: .5; 3 SOLUTION RESPIRATORY (INHALATION) at 19:26

## 2022-04-09 RX ADMIN — FENTANYL CITRATE 50 MCG: 50 INJECTION, SOLUTION INTRAMUSCULAR; INTRAVENOUS at 00:07

## 2022-04-09 RX ADMIN — ENOXAPARIN SODIUM 60 MG: 100 INJECTION SUBCUTANEOUS at 22:13

## 2022-04-09 RX ADMIN — OXYCODONE HYDROCHLORIDE AND ACETAMINOPHEN 2 TABLET: 5; 325 TABLET ORAL at 00:03

## 2022-04-09 RX ADMIN — SODIUM CHLORIDE: 9 INJECTION, SOLUTION INTRAVENOUS at 01:12

## 2022-04-09 RX ADMIN — LORAZEPAM 0.5 MG: 2 INJECTION INTRAMUSCULAR; INTRAVENOUS at 15:14

## 2022-04-09 RX ADMIN — OXYCODONE HYDROCHLORIDE AND ACETAMINOPHEN 1 TABLET: 5; 325 TABLET ORAL at 22:04

## 2022-04-09 ASSESSMENT — PAIN SCALES - GENERAL: PAINLEVEL_OUTOF10: 6

## 2022-04-09 ASSESSMENT — PULMONARY FUNCTION TESTS
PIF_VALUE: 13
PIF_VALUE: 11
PIF_VALUE: 15
PIF_VALUE: 25
PIF_VALUE: 22
PIF_VALUE: 17
PIF_VALUE: 21
PIF_VALUE: 12

## 2022-04-09 NOTE — PROGRESS NOTES
04/09/22 1327   Vent Information   $Ventilation Off Vent         Order obtained for extubation. SpO2 of 98% on 45% FiO2. Patient extubated and placed on 6 liters/min via nasal cannula. Post extubation SpO2 is 94% with HR  97 bpm and RR 24 breaths/min. Patient had strong cough that was non-productive. Extubation Well tolerated by patient. .   Breath Sounds: clear    SLOANE SUAREZ RCP   1:33 PM

## 2022-04-09 NOTE — PROGRESS NOTES
Call placed to  regarding ABG, lab results, current outputs and vital signs. Per Dr. Kathy Alford get a repeat ABG and labs later tonight. Update him with any abnormal results. Writer will keep team updated as needed, VSS and recorded in chart.

## 2022-04-09 NOTE — PLAN OF CARE
Problem: Falls - Risk of:  Goal: Will remain free from falls  Description: Will remain free from falls  Outcome: Ongoing  Goal: Absence of physical injury  Description: Absence of physical injury  Outcome: Ongoing     Problem: Cardiac Output - Decreased:  Goal: Hemodynamic stability will improve  Description: Hemodynamic stability will improve  Outcome: Ongoing     Problem: Pain:  Goal: Pain level will decrease  Description: Pain level will decrease  Outcome: Ongoing  Goal: Control of acute pain  Description: Control of acute pain  Outcome: Ongoing  Goal: Control of chronic pain  Description: Control of chronic pain  Outcome: Ongoing     Problem: Tissue Perfusion - Cardiopulmonary, Altered:  Goal: Absence of angina  Description: Absence of angina  Outcome: Ongoing  Goal: Circulatory function within specified parameters  Description: Circulatory function within specified parameters  Outcome: Ongoing  Goal: Hemodynamic stability will improve  Description: Hemodynamic stability will improve  Outcome: Ongoing     Problem: Tobacco Use:  Goal: Will participate in inpatient tobacco-use cessation counseling  Description: Will participate in inpatient tobacco-use cessation counseling  Outcome: Ongoing     Problem: Pain:  Goal: Pain level will decrease  Description: Pain level will decrease  Outcome: Ongoing  Goal: Control of acute pain  Description: Control of acute pain  Outcome: Ongoing  Goal: Control of chronic pain  Description: Control of chronic pain  Outcome: Ongoing     Problem: OXYGENATION/RESPIRATORY FUNCTION  Goal: Patient will maintain patent airway  4/8/2022 2311 by Oumar Ryedr RN  Outcome: Ongoing  4/8/2022 1740 by Marcell Gandara RCP  Outcome: Ongoing  Goal: Patient will achieve/maintain normal respiratory rate/effort  Description: Respiratory rate and effort will be within normal limits for the patient  4/8/2022 2311 by Oumar Ryder RN  Outcome: Ongoing  4/8/2022 1740 by Marcell Gandara RCP  Outcome: Ongoing     Problem: MECHANICAL VENTILATION  Goal: Patient will maintain patent airway  4/8/2022 2311 by Maximino Condon RN  Outcome: Ongoing  4/8/2022 1740 by Osmani Guallpa RCP  Outcome: Ongoing  Goal: Oral health is maintained or improved  4/8/2022 2311 by Maximino Condon RN  Outcome: Ongoing  4/8/2022 1740 by Osmani Guallpa RCP  Outcome: Ongoing  Goal: ET tube will be managed safely  4/8/2022 2311 by Maximino Condon RN  Outcome: Ongoing  4/8/2022 1740 by Osmani Guallpa RCP  Outcome: Ongoing  Goal: Ability to express needs and understand communication  4/8/2022 2311 by Maximino Condon RN  Outcome: Ongoing  4/8/2022 1740 by Osmani Guallpa RCP  Outcome: Ongoing  Goal: Mobility/activity is maintained at optimum level for patient  4/8/2022 2311 by Maximino Condon RN  Outcome: Ongoing  4/8/2022 1740 by Osmani Guallpa RCP  Outcome: Ongoing     Problem: SKIN INTEGRITY  Goal: Skin integrity is maintained or improved  4/8/2022 2311 by Maximino Condon RN  Outcome: Ongoing  4/8/2022 1740 by Osmani Guallpa RCP  Outcome: Ongoing     Problem: Non-Violent Restraints  Goal: Removal from restraints as soon as assessed to be safe  Outcome: Ongoing  Goal: No harm/injury to patient while restraints in use  Outcome: Ongoing  Goal: Patient's dignity will be maintained  Outcome: Ongoing     Problem: Skin Integrity:  Goal: Will show no infection signs and symptoms  Description: Will show no infection signs and symptoms  Outcome: Ongoing  Goal: Absence of new skin breakdown  Description: Absence of new skin breakdown  Outcome: Ongoing

## 2022-04-09 NOTE — PLAN OF CARE
Problem: OXYGENATION/RESPIRATORY FUNCTION  Goal: Patient will maintain patent airway  4/9/2022 0821 by Timmy James RCP  Outcome: Ongoing  4/8/2022 2311 by Mireya Jones RN  Outcome: Ongoing  Goal: Patient will achieve/maintain normal respiratory rate/effort  Description: Respiratory rate and effort will be within normal limits for the patient  4/9/2022 0821 by Timmy James RCP  Outcome: Ongoing  4/8/2022 2311 by Mireya Jones RN  Outcome: Ongoing     Problem: MECHANICAL VENTILATION  Goal: Patient will maintain patent airway  4/9/2022 0821 by Timmy James RCP  Outcome: Ongoing  4/8/2022 2311 by Mireya Jones RN  Outcome: Ongoing  Goal: Oral health is maintained or improved  4/9/2022 0821 by Timmy James RCP  Outcome: Ongoing  4/8/2022 2311 by Mireya Jones RN  Outcome: Ongoing  Goal: ET tube will be managed safely  4/9/2022 0821 by Timmy James RCP  Outcome: Ongoing  4/8/2022 2311 by Mireya Jones RN  Outcome: Ongoing  Goal: Ability to express needs and understand communication  4/9/2022 0821 by Timmy James RCP  Outcome: Ongoing  4/8/2022 2311 by Mireya Jones RN  Outcome: Ongoing  Goal: Mobility/activity is maintained at optimum level for patient  4/9/2022 0821 by Timmy James RCP  Outcome: Ongoing  4/8/2022 2311 by Mireya Jones RN  Outcome: Ongoing     Problem: SKIN INTEGRITY  Goal: Skin integrity is maintained or improved  4/9/2022 0821 by Timmy James RCP  Outcome: Ongoing  4/8/2022 2311 by Mireya Jones RN  Outcome: Ongoing

## 2022-04-09 NOTE — CONSULTS
PULMONARY & CRITICAL CARE MEDICINE CONSULT NOTE     Patient:  Vivien Hensley  MRN: [id number]  Admit date: 4/3/2022  Primary Care Physician: Jeremiah Ann MD  Consulting Physician: Arley Esposito MD    HISTORY     CHIEF COMPLAINT/REASON FOR CONSULT:  Post CABG  Vent management     HISTORY OF PRESENT ILLNESS:  This patient 54-year-old female with past medical history of smoking, CAD s/p stent, strong family history of heart disease, in the past initially presented with chest pain. Found to have elevated troponin. Patient required cardiac catheterization found to have multivessel disease. CT surgery was consulted and she was taken  to the OR, underwent CABG x3. Post CABG patient remained intubated and sedated. Pulmonology was consulted for vent management. Upon my evaluation, patient is intubated and sedated. She is on SIMV mode 24/380/5/50 ABG this morning showed 7.4/35/96/24. She is sedated with propofol at 20. She is off of pressor support. Post CABG patient had acute drop in hemoglobin requiring multiple transfusion. Chest x-ray showed left pleural effusion. She still has a chest tube in place both in the pleural and mediastinal.    PAST MEDICAL HISTORY:        Diagnosis Date    Anemia     Asthma     CAD (coronary artery disease)     Depression 10/20/2014    High cholesterol     Hypertension     MI, old     Osteoarthritis     Pneumonia     Sleep apnea     Stroke ([facility] [address] 9/27/2014     PAST SURGICAL HISTORY:        Procedure Laterality Date    APPENDECTOMY      CORONARY ANGIOPLASTY WITH STENT PLACEMENT  2004    HYSTERECTOMY      TONSILLECTOMY      TUBAL LIGATION       FAMILY HISTORY:       Problem Relation Age of Onset    High Blood Pressure Mother      SOCIAL HISTORY:   TOBACCO:   reports that she has been smoking cigarettes. She has a 12.50 pack-year smoking history. She has never used smokeless tobacco.  ETOH:  reports previous alcohol use. DRUGS: reports current drug use.  Drug: Marijuana ([place]). ALLERGIES:    Allergies   Allergen Reactions    Lipitor [Atorvastatin] Other (See Comments)     Muscle cramping in legs    Prozac [Fluoxetine] Other (See Comments)     Mood swings    Zoloft [Sertraline Hcl] Other (See Comments)     Anger and mood swings          HOME MEDICATIONS:  Prior to Admission medications    Medication Sig Start Date End Date Taking?  Authorizing Provider   albuterol sulfate  (90 Base) MCG/ACT inhaler INHALE 2 PUFFS BY MOUTH EVERY 6 HOURS AS NEEDED FOR WHEEZING 11/15/21   Bernice Bingham MD   omeprazole (PRILOSEC) 20 MG delayed release capsule TAKE 1 CAPSULE BY MOUTH EVERY DAY 11/15/21   Bernice Bingham MD   lisinopril (PRINIVIL;ZESTRIL) 40 MG tablet TAKE 1 TABLET BY MOUTH EVERY DAY 11/2/21   Bernice Bingham MD   ibuprofen (ADVIL;MOTRIN) 800 MG tablet TAKE 1 TABLET BY MOUTH EVERY 8 HOURS AS NEEDED FOR PAIN 11/2/21   Bernice Bingham MD   amLODIPine (NORVASC) 10 MG tablet TAKE 1 TABLET BY MOUTH DAILY 10/7/21   Bernice Bingham MD   amLODIPine (NORVASC) 10 MG tablet TAKE 1 TABLET BY MOUTH DAILY 10/7/21   Bernice Bingham MD   clopidogrel (PLAVIX) 75 MG tablet TAKE 1 TABLET BY MOUTH DAILY 10/7/21   Bernice Bingham MD   lovastatin (MEVACOR) 10 MG tablet TAKE 1 TABLET BY MOUTH EVERY NIGHT 9/1/21   Bernice Bingham MD   Ginkgo Biloba (GNP GINGKO BILOBA EXTRACT PO) Take by mouth daily    Historical Provider, MD   clobetasol (TEMOVATE) 0.05 % ointment apply to affected area twice a day 8/31/21   Bernice Bingham MD   metoprolol 75 MG TABS Take 75 mg by mouth 2 times daily TAKE 2 TABLETS BY MOUTH TWICE DAILY 8/31/21   Bernice Bingham MD   ezetimibe (ZETIA) 10 MG tablet Take 1 tablet by mouth daily 8/31/21   Bernice Bingham MD   hydroCHLOROthiazide (HYDRODIURIL) 25 MG tablet TAKE 1 TABLET BY MOUTH DAILY 8/25/21   Bernice Bingham MD   ibuprofen (ADVIL;MOTRIN) 800 MG tablet take 1 tablet by mouth every 8 hours if needed for pain 3/30/21   Bernice Bingham MD   mometasone-formoterol (Minta Led) 100-5 MCG/ACT inhaler Inhale 2 puffs into the lungs 2 times daily 3/24/21   Avtar Mabry MD   aspirin (ASPIR-LOW) 81 MG EC tablet take 1 tablet by mouth once daily 3/24/21   Avtar Mabry MD   busPIRone (BUSPAR) 15 MG tablet take 1 tablet by mouth three times a day 3/24/21   Avtar Mabry MD   Omega-3 Fatty Acids (OMEGA-3 EPA FISH OIL) 1205 MG CAPS Take 1 tablet by mouth daily 3/24/21   Avtar Mabry MD   clopidogrel (PLAVIX) 75 MG tablet TAKE 1 TABLET BY MOUTH DAILY 3/24/21   Avtar Mabry MD   cetirizine (ZYRTEC) 10 MG tablet take 1 tablet by mouth once daily 3/16/21   Avtar Mabry MD   diclofenac sodium 1 % GEL Apply 2 g topically 2 times daily 19   Avtar Mabry MD   nitroGLYCERIN (NITROSTAT) 0.4 MG SL tablet Place 1 tablet under the tongue every 5 minutes as needed for Chest pain up to max of 3 total doses. If no relief after 1 dose, call 911. 18   Avtar Mabry MD   hydrocortisone 1 % cream apply topically twice a day 1/15/18   Avtar Mabry MD   Heat Wraps (SOFTHEAT HEATING WRAP ULTRA) MISC Use daily to for neck and shoulder pain 17   Avtar Mabry MD   lidocaine (LMX) 4 % cream Apply 2g topically as needed.  17   Avtar Mbary MD   Blood Pressure Monitor MISC Use daily to take BP 3/15/17   Avtar aMbry MD     IMMUNIZATIONS:  Most Recent Immunizations   Administered Date(s) Administered    COVID-19, Pfizer Purple top, DILUTE for use, 12+ yrs, 30mcg/0.3mL dose 2021    Pneumococcal Polysaccharide ([id number]) 2017    Tdap (Boostrix, Adacel) 2014       REVIEW OF SYSTEMS:  Unable to obtain as patient is intubated and sedated    PHYSICAL EXAMINATION     VITAL SIGNS:   LAST-  /78   Pulse 102   Temp 95.2 °F (35.1 °C)   Resp 24   Ht 5' 1\" (1.549 m)   Wt 145 lb 8.1 oz (66 kg)   SpO2 99%   BMI 27.49 kg/m²   8-24 HR RANGE-  TEMP Temp  Av.2 °F (34.6 °C)  Min: 89.4 °F (31.9 °C)  Max: 99.2 °F (77.5 °C)   BP Systolic (45CPZ), RJC:652 , Min:98 , Max:155 Diastolic (85LCY), ETW:34, Min:60, Max:122     PULSE Pulse  Av.4  Min: 86  Max: 102   RR Resp  Av  Min: 24  Max: 24   O2 SAT SpO2  Av.6 %  Min: 97 %  Max: 100 %   OXYGEN DELIVERY No data recorded     SYSTEMIC EXAMINATION:   General appearance -intubated and sedated  Mental status -patient is intubated and sedated  Eyes - pupils equal and reactive,   Ears - not examined  Nose -moist mucosa  Mouth -orally intubated  Neck - supple, no significant adenopathy, carotids upstroke normal bilaterally, no bruits  Lymphatics - no palpable lymphadenopathy, no hepatosplenomegaly  Chest -decreased breath sounds left lateral exam, clear to auscultation on anterior chest.  Chest tube present both in the mediastinum and pleural space. Heart - normal rate, regular rhythm, normal S1, S2, no murmurs, rubs, clicks or gallops  Abdomen -soft, nondistended bowel sounds positive.   Neurological -patient is intubated and sedated  Musculoskeletal - no joint tenderness, deformity or swelling  Extremities - peripheral pulses normal, no pedal edema, no clubbing or cyanosis  Skin - normal coloration and turgor, no rashes, no suspicious skin lesions noted    DATA REVIEW     Medications: Current Inpatient  Scheduled Meds:   sodium chloride flush  5-40 mL IntraVENous 2 times per day    sodium chloride flush  5-40 mL IntraVENous 2 times per day    sodium chloride flush  10 mL IntraVENous 2 times per day    aspirin  81 mg Oral Daily    clopidogrel  75 mg Oral Daily    amiodarone  200 mg Oral TID    mupirocin   Nasal BID    metoprolol tartrate  25 mg Oral BID    atorvastatin  20 mg Oral Nightly    pantoprazole  40 mg Oral Daily    pantoprazole (PROTONIX) 40 mg injection  40 mg IntraVENous Daily    ceFAZolin (ANCEF) IVPB  2,000 mg IntraVENous Q8H    vancomycin (VANCOCIN) IV  1,000 mg IntraVENous Q12H    ipratropium-albuterol  1 ampule Inhalation Q4H WA    insulin glargine  0.15 Units/kg SubCUTAneous Nightly    senna  1 tablet Oral Once    enoxaparin  1 mg/kg SubCUTAneous BID    [Held by provider] hydroCHLOROthiazide  25 mg Oral Daily    [Held by provider] lisinopril  40 mg Oral Daily    potassium chloride  10 mEq Oral Once    aspirin  81 mg Oral Daily    lovastatin  10 mg Oral Nightly    sodium chloride flush  5-40 mL IntraVENous 2 times per day    [Held by provider] amLODIPine  10 mg Oral Daily    nicotine  1 patch TransDERmal Daily     Continuous Infusions:   sodium chloride      sodium chloride 75 mL/hr at 04/09/22 0646    sodium chloride      sodium chloride      propofol 15 mcg/kg/min (04/09/22 0646)    insulin 0.69 Units/hr (04/09/22 0646)    dextrose      norepinephrine      EPINEPHrine      sodium chloride      sodium chloride      sodium chloride      sodium chloride      sodium chloride      sodium chloride 20 mL/hr at 04/04/22 0408     INPUT/OUTPUT:  In: 7069.7 [I.V.:3671.8; Blood:2998.7; NG/GT:100]  Out: 6995 [Urine:4850]    LABS:-  ABGs:   No results found for: PH, PCO2, PO2, HCO3, O2SAT  No results for input(s): PHART, PO2ART, SFJ8PDK, DZW3EFL, BEART, I3VBXRVS in the last 72 hours.   Lab Results   Component Value Date    POCPH 7.365 04/09/2022    POCPCO2 39.6 04/09/2022    POCPO2 61.1 (L) 04/09/2022    POCHCO3 22.6 04/09/2022    NPFJ5JQF 90 (L) 04/09/2022     CBC:   Recent Labs     04/08/22 2127 04/09/22 0214 04/09/22  0457   WBC 13.3* 11.3 11.6*   HGB 10.3* 8.8* 9.1*   HCT 31.7* 26.1* 27.2*   MCV 91.6 89.4 89.8   *  115* 99* 101*   LYMPHOPCT  --   --  8*   RBC 3.46* 2.92* 3.03*   MCH 29.8 30.1 30.0   MCHC 32.5 33.7 33.5   RDW 14.7* 15.0* 15.3*     BMP:   Recent Labs     04/08/22 1714 04/08/22 1714 04/08/22 2128 04/09/22 0214 04/09/22  0457   *  --  141  --  140   K 4.1   < > 4.5 4.1 4.3   *  --  110*  --  110*   CO2 22  --  23  --  22   BUN 11  --  11  --  12   CREATININE 0.82  --  0.81  --  0.78   GLUCOSE 118*  --  162*  --  105*    < > = values in this interval not displayed. Liver Function Test:   Recent Labs     04/09/22  0457   PROT 5.3*   LABALBU 4.1   ALT 33   AST 75*   ALKPHOS 25*   BILITOT 1.94*     Amylase/Lipase:  No results for input(s): AMYLASE, LIPASE in the last 72 hours. Coagulation Profile:   Recent Labs     04/08/22  1458 04/08/22  1458 04/08/22  1714 04/08/22  2127 04/09/22  0457   INR 1.5   < > 1.5 0.9 0.9   PROTIME 15.8*   < > 15.2* 9.5 10.0   APTT 44.8*  --  40.8* 28.2  --     < > = values in this interval not displayed. Cardiac Enzymes:  No results for input(s): CKTOTAL, CKMB, CKMBINDEX, TROPONINI in the last 72 hours.   Lactic Acid:  No results found for: LACTA  BNP:   Lab Results   Component Value Date    BNP NOT REPORTED 07/28/2014     D-Dimer:  Lab Results   Component Value Date    DDIMER 0.38 07/28/2014     Others:   Lab Results   Component Value Date    TSH 1.33 04/03/2022     Lab Results   Component Value Date    SOLANGE NEGATIVE 03/07/2016    SEDRATE 5 03/07/2016    CRP 1.6 03/07/2016     No results found for: Castillo Welch  No results found for: IRON, TIBC, FERRITIN  No results found for: SPEP, UPEP  No results found for: PSA, CEA, , HI7205,   Microbiology:    Pathology:    Radiology:  ECHO Complete 2D W Doppler W Color    Result Date: 4/5/2022  Transthoracic Echocardiography Report (TTE)  Patient Name [facility]     Date of Study               04/04/2022               [facility]   Date of      1965  Gender                      Female  Birth   Age          61 year(s)  Race                        Black   Room Number  2023        Height:                     61 inch, 154.94 cm   Corporate ID [id number]    Weight:                     140 pounds, 63.5 kg  #   Patient Acct [de-identified]   BSA:          1.62 m^2      BMI:      26.45  #                                                              kg/m^2   MR #         [id number]     Sonographer                 Dionisio Anderson   Accession #  [id number]  Interpreting Physician Stephane Foreman   Fellow                   Referring Nurse                           Practitioner   Interpreting             Referring Physician         May Dennis MD  Fellow  Type of Study   TTE procedure:2D Echocardiogram, M-Mode, Doppler, Color Doppler. Procedure Date Date: 04/04/2022 Start: 01:34 PM Study Location: [facility] Technical Quality: Fair visualization Indications:Non-STEMI, Coronary artery disease and Hypertension. History / Tech. Comments: Procedure explained to patient. Study done at the bedside. Smoker Patient Status: Inpatient Height: 61 inches Weight: 140 pounds BSA: 1.62 m^2 BMI: 26.45 kg/m^2 HR: 48 bpm Allergies   - *Unlisted:(lipitor, prozac, prozac). CONCLUSIONS Summary Normal LV size , mildly increased wall thickness. No obvious wall motion abnormality seen. Normal LV systolic function with LVEF >55%. Normal RV size and function. LA and RA appears normal in size. No obvious significant structural valvular abnormality noted. No significant valvular stenosis or regurgitation noted. Normal aortic root dimension. No significant pericardial effusion noted. No obvious intra-cardiac mass or shunt noted. IVC normal diameter and inspiratory variation indicating normal RA filling pressure. Signature ----------------------------------------------------------------------------  Electronically signed by Stephane Foreman(Interpreting physician) on  04/05/2022 11:06 AM ---------------------------------------------------------------------------- ----------------------------------------------------------------------------  Electronically signed by Leila Chavarria(Sonographer) on 04/04/2022  03:09 PM ---------------------------------------------------------------------------- FINDINGS Left Atrium Left atrium is normal in size. Left Ventricle Left ventricle is normal in size. Global left ventricular systolic function is normal. Estimated ejection fraction is 55 % .  Calculated EF via 3D Heart Pulmonic:                                           Peak Velocity: 0.91 m/s                                          Peak Gradient: 3.31 mmHg  Diastology / Tissue Doppler Septal Wall E' velocity:0.07 m/s Septal Wall E/E':10.1 Lateral Wall E' velocity:0.08 m/s Lateral Wall E/E':9.5    CT CHEST WO CONTRAST    Result Date: 4/5/2022  EXAMINATION: CT OF THE CHEST WITHOUT CONTRAST 4/5/2022 10:48 am TECHNIQUE: CT of the chest was performed without the administration of intravenous contrast. Multiplanar reformatted images are provided for review. Dose modulation, iterative reconstruction, and/or weight based adjustment of the mA/kV was utilized to reduce the radiation dose to as low as reasonably achievable. COMPARISON: None. HISTORY: ORDERING SYSTEM PROVIDED HISTORY: pre op cabg TECHNOLOGIST PROVIDED HISTORY: pre op cabg Reason for Exam: pre op cabg FINDINGS: Mediastinum: There is an approximately 3 cm hypodense lesion of right lobe of the thyroid. Coronary artery calcifications are noted. Lungs/pleura: There is a calcified granuloma noted right middle lobe. Lungs otherwise clear. There are no pleural effusions. Upper Abdomen: Unremarkable Soft Tissues/Bones: Unremarkable     incidental 3 cm hypodense lesion of the right lobe of the thyroid. See below for recommendation. Calcified granuloma in the right middle lobe. Examination otherwise unremarkable. RECOMMENDATIONS: 3 cm incidental right thyroid nodule. Recommend thyroid US. Reference: J Am Carlitos Radiol. 2015 Feb;12(2): 143-50     US HEAD NECK SOFT TISSUE THYROID    Result Date: 4/5/2022  EXAMINATION: THYROID ULTRASOUND 4/5/2022 COMPARISON: CT 04/05/2022, 09/08/2021 HISTORY: ORDERING SYSTEM PROVIDED HISTORY: 3 cm calcified granuloma on CT scan chest. Normal TSH. TECHNOLOGIST PROVIDED HISTORY: 3 cm calcified granuloma on CT scan chest.  Normal TSH.  FINDINGS: Right thyroid lobe:  25.4 x 20.5 x 54.9 mm Left thyroid lobe:  16.6 x 13.7 x 46.5 mm Isthmus:  3.6 mm Thyroid Gland:  Thyroid gland is mildly heterogeneous without increased vascularity. Nodules: Corresponding to the prior CT is a 2.9 x 2.6 x 2.9 cm almost completely cystic nodule in the right lobe with minimal internal debris (TR 1-2). There are couple of tiny hypoechoic/cystic nodules in the left lobe for which no follow-up imaging is recommended. Cervical lymphadenopathy: No abnormal lymph nodes in the imaged portions of the neck. Slightly heterogeneous thyroid gland with a 2.9 cm right thyroid lobe cyst corresponding to the prior imaging studies. No FNA or follow-up imaging is recommended based on TI rads criteria. RECOMMENDATIONS: ACR TI-RADS recommendations: TR5 (>= 7 points):  FNA if >= 1 cm; follow-up if 0.5-0.9 cm in 1, 2, 3, 4, and 5 years TR4 (4-6 points):  FNA if >= 1.5 cm; follow-up if 1.0-1.4 cm in 1, 2, 3, and 5 years TR3 (3 points):  FNA if >= 2.5 cm; follow-up if 1.5-2.4 cm in 1, 3, and 5 years TR2 (2 points):  No FNA or follow-up TR1 (0 points):  No FNA or follow-up ACR TI-RADS recommends that no more than two nodules with the highest ACR TI-RADS point total should be biopsied and no more than four nodules should be followed. XR CHEST PORTABLE    Result Date: 4/8/2022  EXAMINATION: ONE XRAY VIEW OF THE CHEST 4/8/2022 3:08 pm COMPARISON: 04/03/2022 HISTORY: ORDERING SYSTEM PROVIDED HISTORY: Post op open heart surgery TECHNOLOGIST PROVIDED HISTORY: Post op open heart surgery FINDINGS: Interval sternotomy. Cardiomediastinal silhouette is unchanged in size. Endotracheal tube terminates 3 cm above the maria r. Enteric tube is below the diaphragm. There are mediastinal drains and a left chest tube. Right IJ central venous catheter terminates over the SVC. No pleural effusion or pneumothorax. No pulmonary consolidation. No acute osseous abnormality. 1. Lines and tubes in expected position as above. 2. No acute pulmonary abnormality.      XR CHEST PORTABLE    Result Date: 4/3/2022  EXAMINATION: ONE XRAY VIEW OF THE CHEST 4/3/2022 6:50 pm COMPARISON: Chest radiograph of 09/09/2021 HISTORY: ORDERING SYSTEM PROVIDED HISTORY: chest pain TECHNOLOGIST PROVIDED HISTORY: chest pain Reason for Exam: upr,chest pain,sob FINDINGS: The lungs are without acute focal process. There is no effusion or pneumothorax. The cardiomediastinal silhouette is without acute process. The osseous structures are without acute process. No acute process. VL DUP CAROTID BILATERAL    Result Date: 4/6/2022    [facility]  Vascular Carotid Procedure   Patient Name Moo Enrique     Date of Study         04/05/2022               [facility]   Date of      1965  Gender                Female  Birth   Age          61 year(s)  Race                  Black   Room Number  2023        Height:               61 inch, 154.94 cm   Corporate ID [id number]    Weight:               140 pounds, 63.5 kg  #   Patient Acct [de-identified]   BSA:       1.62 m^2   BMI:         26.45 kg/m^2  #   MR #         [id number]     Juvenal Rdz [facility]   Accession #  [id number]  Interpreting          Delos Reyes,Arthur                           Physician   Referring                Referring Physician   Chandana Dowd. ELLY Dukes-MANUELA  Nurse  Practitioner  Procedure Type of Study:   Cerebral: Carotid, Carotid Scan Bilateral.  Indications for Study:Pre-op OHS. Patient Status: In Patient. Comments:Basic Classification of ICA Stenosis: PSV - Peak Systolic Velocity Normal: No plaque or calcification identified, no elevation of PSV Mild: <50% spectral broadening without increased PSV Moderate: 50 - 69% PSV >125 - <230 cm/sec Severe: 70 - 99% PSV >230 cm/sec Critical: 80 - 99% PSV >230cm/sec and/or End Diastolic Velocities >715TY/SLV. Conclusions   Summary   Mild < 50% stenosis of the internal carotid arteries bilaterally. Patent vertebral arteries bilaterally with antegrade flow.    Signature ----------------------------------------------------------------  Electronically signed by Serena Vital RVT(Sonographer) on  04/05/2022 03:41 PM  ----------------------------------------------------------------   ----------------------------------------------------------------  Electronically signed by Harvy Manners Reyes,Arthur(Interpreting  physician) on 04/06/2022 05:23 PM  ----------------------------------------------------------------  Findings:   Right Impression:                       Left Impression:  Intimal thickening right common carotid Intimal thickening left common  artery. The carotid bulb has a smooth   carotid artery. heterogeneous plaque causing a <50%     The carotid bulb has a smooth  stenosis. homogeneous plaque causing a <50%                                          stenosis. The internal carotid artery has a  smooth heterogeneous plaque causing a  <50% stenosis based on velocities. The internal carotid artery has a  The external carotid artery is normal.  smooth homogeneous plaque causing                                          a <50% stenosis based on  The vertebral artery is patent with     velocities. antegrade flow. The external carotid artery has a                                          smooth homogeneous plaque causing                                          a <50% stenosis. The vertebral artery is patent                                          with antegrade flow. Risk Factors   - The patient's risk factor(s) include: chronic lung disease,     dyslipidemia, lack of physical activity and treated arterial     hypertension.   - Current - Every day. - The patient's last creatinine was 0.9 mg/dl. Allergies   - Allergy:*Unlisted(Drug).  Comments:lipitor, prozac, prozac Velocities are measured in cm/s ; Diameters are measured in cm Carotid Right Measurements +------------+-------+-------+--------+-------+------------+---------------+ ! Location    ! PSV    ! EDV    ! Angle   ! RI     !%Stenosis   ! Tortuosity     ! +------------+-------+-------+--------+-------+------------+---------------+ ! Prox CCA    !55.9   !15.5   !60      !0.72   !            !               ! +------------+-------+-------+--------+-------+------------+---------------+ ! Mid CCA     !72.7   !22.4   !60      !0.69   !            !               ! +------------+-------+-------+--------+-------+------------+---------------+ ! Dist CCA    !90.7   !29.2   ! 60      !0.68   !            !               ! +------------+-------+-------+--------+-------+------------+---------------+ ! Bulb        !35.7   !10.7   ! 60      !0.7    ! !               ! +------------+-------+-------+--------+-------+------------+---------------+ ! Prox ICA    !59.8   !24.7   !60      !0.59   !            !               ! +------------+-------+-------+--------+-------+------------+---------------+ ! Mid ICA     !94.4   !35.1   ! 60      !0.63   !            !               ! +------------+-------+-------+--------+-------+------------+---------------+ ! Dist ICA    ! 79     !24.1   ! 60      !0.69   !            !               ! +------------+-------+-------+--------+-------+------------+---------------+ ! Prox ECA    !82.6   !16.8   !60      !0.8    ! !               ! +------------+-------+-------+--------+-------+------------+---------------+ ! Vertebral   !90.1   ! 25.5   !60      !0.72   !            !               ! +------------+-------+-------+--------+-------+------------+---------------+   - There is antegrade vertebral flow noted on the right side. - Additional Measurements:ICAPSV/CCAPSV 1.69. ICAEDV/CCAEDV 2.26. Carotid Left Measurements +------------+-------+-------+--------+-------+------------+---------------+ ! Location    ! PSV    ! EDV    ! Angle   ! RI     !%Stenosis   ! Tortuosity ! +------------+-------+-------+--------+-------+------------+---------------+ ! Prox CCA    !91.9   !21.1   ! 60      !0.77   !            !               ! +------------+-------+-------+--------+-------+------------+---------------+ ! Mid CCA     !86.4   !19.9   !60      !0.77   !            !               ! +------------+-------+-------+--------+-------+------------+---------------+ ! Dist CCA    !74.5   !21.1   ! 60      !0.72   !            !               ! +------------+-------+-------+--------+-------+------------+---------------+ ! Bulb        !62.7   !19.9   !60      !0.68   !            !               ! +------------+-------+-------+--------+-------+------------+---------------+ ! Prox ICA    ! 50.9   !22.4   !60      !0.56   !            !               ! +------------+-------+-------+--------+-------+------------+---------------+ ! Mid ICA     !90.1   !32.9   !60      !0.63   !            !               ! +------------+-------+-------+--------+-------+------------+---------------+ ! Dist ICA    !57.8   !21.7   !60      !0.62   !            !               ! +------------+-------+-------+--------+-------+------------+---------------+ ! Prox ECA    !98.8   !20.5   !60      !0.79   !            !               ! +------------+-------+-------+--------+-------+------------+---------------+ ! Vertebral   !40.4   !10.8   !60      !0.73   !            !               ! +------------+-------+-------+--------+-------+------------+---------------+   - There is antegrade vertebral flow noted on the left side. - Additional Measurements:ICAPSV/CCAPSV 0.98. ICAEDV/CCAEDV 1.56.     VL DUP UPPER EXTREMITY ARTERIES BILATERAL    Result Date: 4/6/2022    [facility]  Vascular Upper Extremities Arterial Duplex Procedure   Patient Name Dyana Guevara     Date of Study         04/05/2022               [facility]   Date of      1965  Gender                Female  Birth   Age          61 year(s)  Race Black   Room Number  1313        Height:               61 inch, 154.94 cm   Corporate ID [id number]    Weight:               140 pounds, 63.5 kg  #   Patient Acct [de-identified]   BSA:       1.62 m^2   BMI:         26.45 kg/m^2  #   MR #         [id number]     Yimi Irizarry, RVT   Accession #  [id number]  Interpreting          [address] [facility]   Referring                Referring Physician   Carol Serrano. ELLY Akhtar-NP  Nurse  Practitioner  Procedure Type of Study:   Extremities Arteries: Upper Extremities Arterial Duplex, Arterial Scan  Upper Bilateral.  Indications for Study:Pre-op [facility]. Patient Status: In Patient. Conclusions   Summary   Normal arterial scan of the upper extremities bilaterally. Arterial sizes  are listed in the table above. Signature   ----------------------------------------------------------------  Electronically signed by Efrain Rosas RVT(Sonographer) on  04/05/2022 03:49 PM  ----------------------------------------------------------------   ----------------------------------------------------------------  Electronically signed by Suzon Ham Reyes,Arthur(Interpreting  physician) on 04/06/2022 05:25 PM  ----------------------------------------------------------------  Risk Factors   - The patient's risk factor(s) include: chronic lung disease,     dyslipidemia, lack of physical activity and treated arterial     hypertension.   - Current - Every day. - The patient's last creatinine was 0.9 mg/dl. Allergies   - Allergy:*Unlisted(Drug). Comments:lipitor, prozac, prozac Velocities are measured in cm/s ; Diameters are measured in cm Right Upper Extremities Duplex Measurements +----------------+----+-----+-------------------+---------+----------------+ ! Location        ! PSV ! Ratio! Wave Description   ! AP Diam  !Trans Diam      ! +----------------+----+-----+-------------------+---------+----------------+ ! Dist Brachial   !62.7!     !                    ! !                ! +----------------+----+-----+-------------------+---------+----------------+ ! Prox Radial     !44. 1!0.7  ! Triphasic          !0.31     !0.4             ! +----------------+----+-----+-------------------+---------+----------------+ ! Mid Radial      !49. 7!1.13 ! Triphasic          !0.27     !0.37            ! +----------------+----+-----+-------------------+---------+----------------+ ! Dist Radial     !    ! !Triphasic          !0.26     !0.26            ! +----------------+----+-----+-------------------+---------+----------------+ ! Prox Ulnar      !35.1!0.56 ! Triphasic          !0.34     !0.45            ! +----------------+----+-----+-------------------+---------+----------------+ ! Mid Ulnar       !60.4!1.72 ! Triphasic          !0.18     !0.24            ! +----------------+----+-----+-------------------+---------+----------------+ ! Dist Ulnar      !68.8!1.14 ! Triphasic          !0.17     !0.16            ! +----------------+----+-----+-------------------+---------+----------------+ Left Upper Extremities Duplex Measurements +----------------+----+-----+-------------------+---------+----------------+ ! Location        ! PSV ! Ratio! Wave Description   ! AP Diam  !Trans Diam      ! +----------------+----+-----+-------------------+---------+----------------+ ! Dist Brachial   !63.9!     !                   !         !                ! +----------------+----+-----+-------------------+---------+----------------+ ! Prox Radial     !64.4!1.01 ! Triphasic          !0.31     !0.28            ! +----------------+----+-----+-------------------+---------+----------------+ ! Mid Radial      !69.8!1.08 ! Triphasic          !19       !0.26            ! +----------------+----+-----+-------------------+---------+----------------+ ! Dist Radial     !52. 1!0.75 ! Triphasic          !0.2      !0.2             ! +----------------+----+-----+-------------------+---------+----------------+ ! Prox Ulnar      !63. 9!1 !Triphasic          !0.2      !0.21            ! +----------------+----+-----+-------------------+---------+----------------+ ! Mid Ulnar       !64.9!1.02 ! Triphasic          !0.23     !0.2             ! +----------------+----+-----+-------------------+---------+----------------+ ! Dist Ulnar      !48.2!0.74 ! Biphasic           !0.18     !0.29            ! +----------------+----+-----+-------------------+---------+----------------+    VL VEIN MAPPING LOWER BILATERAL    Result Date: 4/6/2022    [facility]  Vascular Lower Extremity Vein Mapping Procedure   Patient Name  Ramón Greenwood     Date of Study           04/05/2022                [facility]   Date of Birth 1965  Gender                  Female   Age           61 year(s)  Race                    Black   Room Number   2023        Height:                 61 inch, 154.94 cm   Corporate ID  [id number]    Weight:                 140 pounds, 63.5 kg  #   Patient Acct  [de-identified]   BSA:        1.62 m^2    BMI:       26.45 kg/m^2  #   MR #          [id number]     Sonographer             Mauri Mireles [facility]   Accession #   [id number]  Interpreting Physician  Mee Quintana   Referring                 Referring Physician     Jessi King,  Nurse  Practitioner  Procedure Type of Study:   Veins: Lower Extremity Vein Mapping. Indications for Study:Pre-op [facility]. Patient Status: In Patient. Conclusions   Summary   No DVT in the bilateral common femoral veins. Vein sizes are listed in the table above.    Signature   ----------------------------------------------------------------  Electronically signed by Mauri Mireles RVT(Sonographer) on  04/05/2022 03:29 PM  ----------------------------------------------------------------   ----------------------------------------------------------------  Electronically signed by Carlen Craze Reyes,Arthur(Interpreting  physician) on 04/06/2022 05:18 PM  ----------------------------------------------------------------  Findings:   Right during the hospital encounter of 04/03/22    ECHO Complete 2D W Doppler W Color    Narrative  Transthoracic Echocardiography Report (TTE)    Patient Name [facility]     Date of Study               04/04/2022  [facility]    Date of      1965  Gender                      Female  Birth    Age          61 year(s)  Race                        Black    Room Number  2023        Height:                     61 inch, 154.94 cm    Corporate ID [id number]    Weight:                     140 pounds, 63.5 kg  #    Patient Acct [de-identified]   BSA:          1.62 m^2      BMI:      26.45  #                                                              kg/m^2    MR #         [id number]     Sonographer                 Elmira Leung    Accession #  [id number]  Interpreting Physician      Emily Gonzalez 60    Fellow                   Referring Nurse  Practitioner    Interpreting             Referring Physician         Cleve Mora MD  Fellow    Type of Study    TTE procedure:2D Echocardiogram, M-Mode, Doppler, Color Doppler. Procedure Date  Date: 04/04/2022 Start: 01:34 PM    Study Location: [facility]  Technical Quality: Fair visualization    Indications:Non-STEMI, Coronary artery disease and Hypertension. History / Tech. Comments:  Procedure explained to patient. Study done at the bedside. Smoker    Patient Status: Inpatient    Height: 61 inches Weight: 140 pounds BSA: 1.62 m^2 BMI: 26.45 kg/m^2    HR: 48 bpm    Allergies  - *Unlisted:(lipitor, prozac, prozac). CONCLUSIONS    Summary  Normal LV size , mildly increased wall thickness. No obvious wall motion abnormality seen. Normal LV systolic function with LVEF >55%. Normal RV size and function. LA and RA appears normal in size. No obvious significant structural valvular abnormality noted. No significant valvular stenosis or regurgitation noted. Normal aortic root dimension. No significant pericardial effusion noted.   No obvious intra-cardiac mass or shunt noted.  IVC normal diameter and inspiratory variation indicating normal RA filling  pressure. Signature  ----------------------------------------------------------------------------  Electronically signed by Stephane Foreman(Interpreting physician) on  04/05/2022 11:06 AM  ----------------------------------------------------------------------------    ----------------------------------------------------------------------------  Electronically signed by Leila Franco(Sonographer) on 04/04/2022  03:09 PM  ----------------------------------------------------------------------------  FINDINGS  Left Atrium  Left atrium is normal in size. Left Ventricle  Left ventricle is normal in size. Global left ventricular systolic function  is normal. Estimated ejection fraction is 55 % . Calculated EF via 3D Heart Model is 53 %. Mild left ventricular hypertrophy. No obvious wall motion abnormality seen. Right Atrium  Right atrium is normal in size. Right Ventricle  Normal right ventricular size and function. Mitral Valve  Normal mitral valve structure. Mild mitral regurgitation. No mitral stenosis. Aortic Valve  Aortic valve is sclerotic but opens well. Aortic valve is trileaflet. Trivial aortic insufficiency. No aortic stenosis. Tricuspid Valve  Normal tricuspid valve leaflets. Trivial tricuspid regurgitation. No tricuspid stenosis. Insignificant tricuspid regurgitation, unable to estimate RVSP. Pulmonic Valve  Pulmonic valve is normal in structure and function. Trivial pulmonic insufficiency. No evidence of pulmonic stenosis. Pericardial Effusion  No pericardial effusion. Miscellaneous  Normal aortic root dimension. E/E' average = 9.8.  IVC normal diameter & inspiratory collapse indicating normal RA filling  pressure .     M-mode / 2D Measurements & Calculations:    LVIDd:3.9 cm(3.7 - 5.6 cm)       Diastolic QAHTBX:900 ml  ZPNZO:2.7 cm(2.2 - 4.0 cm)       Systolic RYHGVU:55 ml  GMZR:7.0 cm(0.6 - 1.1 cm)        Aortic Root:2.3 cm(2.0 - 3.7 cm)  LVPWd:1.1 cm(0.6 - 1.1 cm)       LA Dimension: 2.9 cm(1.9 - 4.0 cm)  Fractional Shortenin.64 %    LA volume/Index: 47.27 ml /29m^2  Calculated LVEF (%): 53.15 %     LVOT:1.7 cm  RVDd:3 cm    Mitral:                                 Aortic    Valve Area (P1/2-Time): 2.82 cm^2       Peak Velocity: 1.74 m/s  Peak E-Wave: 0.71 m/s                   Mean Velocity: 1.14 m/s  Peak A-Wave: 0.50 m/s                   Peak Gradient: 12.11 mmHg  E/A Ratio: 1.42                         Mean Gradient: 6 mmHg  Peak Gradient: 2.02 mmHg  Mean Gradient: 1 mmHg  Deceleration Time: 159 msec             Area (continuity): 1.49 cm^2  P1/2t: 78 msec                          AV VTI: 36.9 cm    Area (continuity): 1.77 cm^2  Mean Velocity: 0.48 m/s    Pulmonic:    Peak Velocity: 0.91 m/s  Peak Gradient: 3.31 mmHg    Diastology / Tissue Doppler  Septal Wall E' velocity:0.07 m/s  Septal Wall E/E':10.1  Lateral Wall E' velocity:0.08 m/s  Lateral Wall E/E':9.5    No results found for this or any previous visit. Cardiac Catheterization:   No results found for this or any previous visit. ASSESSMENT AND PLAN   Assessment:  //Status post CABG x3 on 2022  //Postop respiratory failure, on mechanical ventilation  //Chronic smoker  //Hypertension  //Postop acute blood loss anemia s/p multiple transfusion    Plan:  I personally examined the patient; reviewed interval history, interpreted all available radiographic and laboratory data at the time of service. Currently ventilated on SIMV mode. ABGs looks normal.  We will start CPAP mode  Patient remained hemodynamically stable, off of pressors. Monitor endotracheal secretions   CXR reviewed  showed left pleural effusion, patient has chest tube in place with bloody output. Managed by CT surgery  Continue pulmonary toilet.   Continue to monitor I/O with a goal of even/negative fluid balance  Stress ulcer prophylaxis  Continue to monitor CBC, coagulation profile, transfuse as indicated  On full dose Lovenox  Antimicrobials reviewed; on Ancef and vancomycin as per CT surgery  Glycemic control appropriate  Skin/wound care reviewed with the nursing staff       Plan discussed with Dr. Maria Guadalupe Fried MD.  Internal Medicine Resident  [address] [facility]   4/9/2022, 9:18 AM          Please note that this chart was generated using voice recognition Dragon dictation software. Although every effort was made to ensure the accuracy of this automated transcription, some errors in transcription may have occurred.

## 2022-04-09 NOTE — PROGRESS NOTES
[facility]  Internal Medicine Teaching Residency Program  Inpatient Daily Progress Note  ______________________________________________________________________________    Patient: Rogelio Larios  YOB: 1965   AQJ:[id number]    Acct: [de-identified]     Room: [address] [place] [address]  Admit date: 4/3/2022  Today's date: 04/09/22  Number of days in the hospital: 6    SUBJECTIVE   Admitting Diagnosis: NSTEMI (non-ST elevated myocardial infarction) ([facility] [address]  CC: Typical chest pain  Patient seen and examined at bedside. Chart results reviewed. Patient was intubated overnight. Currently sedated on propofol. She received a transfusion secondary to bleeding post CABG. Blood pressure is 119/78. .  Pulse 97. Labs reviewed and evaluated. No significant change. ROS:  Constitutional:  negative for chills, fevers, sweats  Respiratory:  negative for cough, dyspnea on exertion, hemoptysis, shortness of breath, wheezing  Cardiovascular:  negative for chest pain, chest pressure/discomfort, lower extremity edema, palpitations  Gastrointestinal:  negative for abdominal pain, constipation, diarrhea, nausea, vomiting  Neurological:  negative for dizziness, headache  BRIEF HISTORY     The patient is a pleasant 61 y.o. female with a past medical history significant for essential hypertension, coronary artery disease status post PCI on DAPT, hyperlipidemia, chronic active smoking, history of stroke, MDD, memory problem presents with a chief complaint of typical chest pain. Chest pain started 3 to 4 days ago, dull aching in nature, 5-6 out of 10 in intensity, radiating to the left shoulder, increased with exertion and relieved with rest.  Also admits to exertional shortness of breath without orthopnea or PND. Patient denies palpitation syncope nausea vomiting diarrhea headache.     Evaluation in the ER, she was found to have elevated blood pressure 175/110. Heart rate 78 regular.   SPO2 99% on room air. Heart auscultation showed normal first and second heart sound without murmur gallop. Chest is clear to auscultation. Abdomen soft and nontender. Labs reviewed normal BMP. No leukocytosis hemoglobin 14. EKG showed normal sinus rhythm with some ST-T wave changes in inferior lead. Troponin is elevated 80. proBNP 217. Chest x-ray is negative for acute abnormality. Cardiology has been consulted in the ER for the concern of NSTEMI. Patient is started on heparin drip as per ACS protocol and will probably have cardiac cath tomorrow.     Of note, she has a past medical history of coronary artery disease status post PCI in ? Followed by another PCI in ? Questionable medication compliance. She has been actively smoking and is currently half pack per day. Denies alcohol intake. Admits to marijuana use.     Patient underwent cardiac cath on 2022. Findings:  LMCA: Distal 30-40% stenosis   LAD: Mid stent stenosis 80%   LCx: Mid area 90% after the OM take off   OM1: in stent stenosis 90%   RCA: Proximal 75% stenosis   Mid area ulcerated plaques with 60% stenosis   PL branch ostial / proximal 90% stenosis     The LV gram was performed in the LAWTON 30 position. LVEF: 50 %. Conclusions:   Multivessel CAD with left main disease    PLower normal LV systolic function   Recommendations:  CT surgery consult and recommend CABG. OBJECTIVE     Vital Signs:  /78   Pulse 97   Temp 95.2 °F (35.1 °C)   Resp 12   Ht 5' 1\" (1.549 m)   Wt 145 lb 8.1 oz (66 kg)   SpO2 98%   BMI 27.49 kg/m²     Temp (24hrs), Av.2 °F (35.7 °C), Min:94.8 °F (34.9 °C), Max:96.9 °F (36.1 °C)    In: 7069.7   Out: 5925 [Urine:4850]    Physical Exam:  Constitutional: Intubated and sedated  EENT:  PERRLA. No conjunctival injections. Orally intubated. Neck: Supple without thyromegaly. No elevated JVP. Trachea was midline.   Respiratory: Decreased breath sounds left lateral exam.  Cardiovascular: Regular without murmur, clicks, gallops or rubs. Abdomen: Slightly rounded and soft without organomegaly. No rebound, rigidity or guarding was appreciated. Lymphatic: No lymphadenopathy. Musculoskeletal: Normal curvature of the spine. No gross muscle weakness. Extremities:  No lower extremity edema, ulcerations, tenderness, varicosities or erythema. Muscle size, tone and strength are normal.  No involuntary movements are noted. Skin:  Warm and dry. Good color, turgor and pigmentation. No lesions or scars. No cyanosis or clubbing  Neurological/Psychiatric: Intubated and sedated.         Medications:  Scheduled Medications:    insulin lispro  0-6 Units SubCUTAneous TID     insulin lispro  0-3 Units SubCUTAneous Nightly    sodium chloride flush  5-40 mL IntraVENous 2 times per day    sodium chloride flush  5-40 mL IntraVENous 2 times per day    sodium chloride flush  10 mL IntraVENous 2 times per day    aspirin  81 mg Oral Daily    clopidogrel  75 mg Oral Daily    amiodarone  200 mg Oral TID    metoprolol tartrate  25 mg Oral BID    atorvastatin  20 mg Oral Nightly    pantoprazole  40 mg Oral Daily    pantoprazole (PROTONIX) 40 mg injection  40 mg IntraVENous Daily    ceFAZolin (ANCEF) IVPB  2,000 mg IntraVENous Q8H    vancomycin (VANCOCIN) IV  1,000 mg IntraVENous Q12H    ipratropium-albuterol  1 ampule Inhalation Q4H WA    insulin glargine  0.15 Units/kg SubCUTAneous Nightly    senna  1 tablet Oral Once    enoxaparin  1 mg/kg SubCUTAneous BID    [Held by provider] hydroCHLOROthiazide  25 mg Oral Daily    [Held by provider] lisinopril  40 mg Oral Daily    potassium chloride  10 mEq Oral Once    aspirin  81 mg Oral Daily    lovastatin  10 mg Oral Nightly    sodium chloride flush  5-40 mL IntraVENous 2 times per day    [Held by provider] amLODIPine  10 mg Oral Daily    nicotine  1 patch TransDERmal Daily     Continuous Infusions:    sodium chloride      sodium chloride 75 mL/hr at 04/09/22 0646    propofol 15 mcg/kg/min (04/09/22 0646)    dextrose      norepinephrine      EPINEPHrine      sodium chloride 20 mL/hr at 04/04/22 0408     PRN Medicationssodium chloride flush, 5-40 mL, PRN  sodium chloride, 25 mL, PRN  sodium chloride flush, 10 mL, PRN  ondansetron, 4 mg, Q8H PRN   Or  ondansetron, 4 mg, Q6H PRN  oxyCODONE-acetaminophen, 1 tablet, Q4H PRN   Or  oxyCODONE-acetaminophen, 2 tablet, Q4H PRN  fentanNYL, 25 mcg, Q1H PRN   Or  fentanNYL, 50 mcg, Q1H PRN  hydrALAZINE, 5 mg, Q5 Min PRN  metoprolol, 2.5 mg, Q10 Min PRN  sodium bicarbonate, 50 mEq, Q30 Min PRN  diphenhydrAMINE, 25 mg, Nightly PRN  potassium chloride, 20 mEq, PRN  magnesium sulfate, 2,000 mg, PRN  calcium chloride IVPB, 1,000 mg, PRN   Or  calcium chloride IVPB, 2,000 mg, PRN  albumin human, 25 g, PRN  glucose, 15 g, PRN  dextrose, 12.5 g, PRN  glucagon (rDNA), 1 mg, PRN  dextrose, 100 mL/hr, PRN  norepinephrine, 0.01-3.3 mcg/kg/min, Continuous PRN  EPINEPHrine, 0.1-0.3 mcg/kg/min, Continuous PRN  magnesium hydroxide, 30 mL, Daily PRN  ALPRAZolam, 0.25 mg, BID PRN  aspirin-acetaminophen-caffeine, 1 tablet, Q8H PRN  magnesium sulfate, 1,000 mg, PRN  potassium chloride, 10 mEq, PRN  potassium chloride, 40 mEq, PRN   Or  potassium alternative oral replacement, 40 mEq, PRN   Or  potassium chloride, 10 mEq, PRN  sodium chloride, , PRN  polyethylene glycol, 17 g, Daily PRN  acetaminophen, 650 mg, Q6H PRN   Or  acetaminophen, 650 mg, Q6H PRN  calcium carbonate, 500 mg, TID PRN        Diagnostic Labs:  CBC:   Recent Labs     04/08/22  2127 04/09/22  0214 04/09/22  0457   WBC 13.3* 11.3 11.6*   RBC 3.46* 2.92* 3.03*   HGB 10.3* 8.8* 9.1*   HCT 31.7* 26.1* 27.2*   MCV 91.6 89.4 89.8   RDW 14.7* 15.0* 15.3*   *  115* 99* 101*     BMP:   Recent Labs     04/08/22  1714 04/08/22  1714 04/08/22 2128 04/09/22  0214 04/09/22  0457   *  --  141  --  140   K 4.1   < > 4.5 4.1 4.3   *  --  110*  --  110*   CO2 22  --  23 --  22   BUN 11  --  11  --  12   CREATININE 0.82  --  0.81  --  0.78    < > = values in this interval not displayed. BNP: No results for input(s): BNP in the last 72 hours. PT/INR:   Recent Labs     04/08/22 1714 04/08/22 2127 04/09/22 0457   PROTIME 15.2* 9.5 10.0   INR 1.5 0.9 0.9     APTT:   Recent Labs     04/08/22  1458 04/08/22 1714 04/08/22 2127   APTT 44.8* 40.8* 28.2     CARDIAC ENZYMES: No results for input(s): CKMB, CKMBINDEX, TROPONINI in the last 72 hours. Invalid input(s): CKTOTAL;3  FASTING LIPID PANEL:  Lab Results   Component Value Date    CHOL 243 (H) 03/24/2021    HDL 36 (L) 12/01/2021    TRIG 178 (H) 03/24/2021     LIVER PROFILE:   Recent Labs     04/08/22 1714 04/09/22 0457   AST 27 75*   ALT 13 33   BILITOT 0.96 1.94*   ALKPHOS 22* 25*      MICROBIOLOGY:   Lab Results   Component Value Date/Time    CULTURE NO SAMPLE RECEIVED 04/07/2022 03:53 PM       Imaging:    XR CHEST PORTABLE    Result Date: 4/3/2022  No acute process. ASSESSMENT & PLAN     IMPRESSION  This is a 61 y.o. female with a past medical history of hypertension hyperlipidemia coronary artery disease status post PCI, history of stroke and memory problem who presented with typical chest pain and exertional shortness of breath and found to have ST-T wave changes in the inferior leads and elevated troponin 80. Patient admitted to inpatient status for the management of NSTEMI. She underwent cardiac cath and found to have multivessel coronary artery disease. CT surgery taken on board and recommend CABG.    Principal Problem:    NSTEMI (non-ST elevated myocardial infarction) ([address]  Plan:   Chest pain is typical in nature. Medication compliance is questionable. No record of recent stress test found. Loaded with ASA and clopidogrel. Started on Lovenox 1 mg/kg twice daily as per ACS protocol. Underwent cardiac cath on 4/4/2022. Multivessel coronary artery disease with left main disease. Echocardiography on 4/5/2022:  Ejection fraction 55%. No definite regional wall motion abnormality or valvular abnormality noted. CT surgery recommend CABG. surgery today. Active Problems:  CAD (coronary artery disease) with multiple stents  Plan: On ASA and Plavix.     Essential hypertension:  Resume lisinopril 40 mg daily, amlodipine 10 mg once daily. Metoprolol 25 mg twice daily. Blood pressure is better controlled with the above regimen. Smoker  Plan:   Counseled on smoking cessation. We will start on nicotine patch.     Mixed hyperlipidemia  Plan:   Previously refused atorvastatin due to statin induced muscle weakness currently on lovastatin.     Thyroid nodule  Plan:   CT chest showed 3 cm incidental right thyroid nodule. Subsequent thyroid ultrasound showed 2.9 cm slight heterogeneous cystic. No follow-up study recommended. TSH 1.33.       DVT ppx: On Lovenox. GI ppx: Not needed     PT/OT/SW consulted  Discharge Planning: Ongoing    Alok León MD  Internal Medicine Resident, PGY-1  [address], [place]  4/9/2022, 1:43 PM    Attestation and add on       I have discussed the care of Jannette Tyler , including pertinent history and exam findings,      4/09/22    with the resident. I have seen and examined the patient and the key elements of all parts of the encounter have been performed by me . I agree with the assessment, plan and orders as documented by the resident. Principal Problem:    NSTEMI (non-ST elevated myocardial infarction) ([facility] [address]  Active Problems:    CAD (coronary artery disease) with multiple stents    Asthma    Smoker    Anxiety    Essential hypertension    Memory problem    Mixed hyperlipidemia    Thyroid nodule    Multiple vessel coronary artery disease s/p CABG   Resolved Problems:    * No resolved hospital problems. *        ''''''''''       MD LA NENA VásquezLakeland Regional Hospital  [address] [facility], [address] [place] [address]. Phone [phone]   Fax: [phone]  Answering Service: [phone]

## 2022-04-09 NOTE — PROGRESS NOTES
Call received from Dr. Tay Brambila. Updated on patient gtts, vitals, blood products and outputs. Verbal order for writer to obtain labs after bit and give another unit PRBC.

## 2022-04-09 NOTE — PROGRESS NOTES
pantoprazole (PROTONIX) 40 mg injection  40 mg IntraVENous Daily    ceFAZolin (ANCEF) IVPB  2,000 mg IntraVENous Q8H    vancomycin (VANCOCIN) IV  1,000 mg IntraVENous Q12H    ipratropium-albuterol  1 ampule Inhalation Q4H WA    insulin glargine  0.15 Units/kg SubCUTAneous Nightly    senna  1 tablet Oral Once    enoxaparin  1 mg/kg SubCUTAneous BID    [Held by provider] hydroCHLOROthiazide  25 mg Oral Daily    [Held by provider] lisinopril  40 mg Oral Daily    potassium chloride  10 mEq Oral Once    aspirin  81 mg Oral Daily    lovastatin  10 mg Oral Nightly    sodium chloride flush  5-40 mL IntraVENous 2 times per day    [Held by provider] amLODIPine  10 mg Oral Daily    nicotine  1 patch TransDERmal Daily     Continuous Infusions:   sodium chloride      sodium chloride 75 mL/hr at 04/09/22 0646    sodium chloride      sodium chloride      propofol 15 mcg/kg/min (04/09/22 0646)    insulin 1.08 Units/hr (04/09/22 0925)    dextrose      norepinephrine      EPINEPHrine      sodium chloride      sodium chloride      sodium chloride      sodium chloride      sodium chloride      sodium chloride 20 mL/hr at 04/04/22 0408     PRN Meds:sodium chloride flush, sodium chloride, acetaminophen, sodium chloride flush, sodium chloride, sodium chloride flush, sodium chloride, ondansetron **OR** ondansetron, oxyCODONE-acetaminophen **OR** oxyCODONE-acetaminophen, fentanNYL **OR** fentanNYL, hydrALAZINE, metoprolol, sodium bicarbonate, diphenhydrAMINE, potassium chloride, magnesium sulfate, calcium chloride IVPB **OR** calcium chloride IVPB, albumin human, glucose, dextrose, glucagon (rDNA), dextrose, norepinephrine, EPINEPHrine, magnesium hydroxide, sodium chloride, sodium chloride, sodium chloride, sodium chloride, ALPRAZolam, aspirin-acetaminophen-caffeine, magnesium sulfate, potassium chloride, potassium chloride **OR** potassium alternative oral replacement **OR** potassium chloride, heparin (porcine), heparin (porcine), sodium chloride flush, sodium chloride, ondansetron **OR** [DISCONTINUED] ondansetron, polyethylene glycol, acetaminophen **OR** acetaminophen, calcium carbonate    Beta- Blocker: Yes-hold today  Aspirin: Yes-hold today  Lovenox: No  GI: Yes  Plavix: Yes-hold today  Coumadin: No  Statin: Yes  ACE: No    Daily Nursing Care:  Please keep SCDS in place as DVT prophylaxis  If not intubated, Please have patient use IS and acapella 10X/hr or during commercial breaks  Please continue BM management  Daily labs and CXR  Daily PT/OT and ambulation  Continue with Case Management for DC planning      Assessment/ Plan:    Wean to extubate this AM-pt doing much better  Monitor strict Is and Os  Pt had multiple blood products Yday due to bleeding  Bleeding controlled  Pulmonary consulted to assist with optimizing lungs  Pt will need aggressive IS onces extubated.    Please hold AP medications today       [facility], APRN - NP

## 2022-04-09 NOTE — PROGRESS NOTES
Physical Therapy Cancel Note      DATE: 2022    NAME: Yovany Vargas  MRN: [id number]   : 1965      Patient not seen this date for Physical Therapy due to:     Other: pt remains intubated; check 4/10/22 per RN      Electronically signed by Robbin Shore, PT , DPT, CMPT  on 2022 at 8:45 AM

## 2022-04-09 NOTE — PROGRESS NOTES
[facility]   Progress Note                 Date:   4/8/2022  Patient name:  Nabila Berman  Date of admission:  4/3/2022  6:04 PM  MRN:   [id number]  YOB: 1965  PCP:    Shakeel Barcenas MD    Reason for Admission: NSTEMI (non-ST elevated myocardial infarction) ([facility] [address] [I21.4]      Subjective:       Clinical Changes / Abnormalities:     Patient seen and examined. Underwent CABGx3 yesterday. Remains intubated. I/O last 3 completed shifts: In: 5648 [P.O.:610;  I.V.:3000; Blood:2038]  Out: 5015 [Urine:3370; Blood:475; Chest Tube:1170]  I/O this shift:  In: 1191.1 [I.V.:81.2; Blood:960.7; NG/GT:50; IV Piggyback:99.2]  Out: 1135 [Urine:875; Chest Tube:260]          Medications:   Scheduled Meds:    sodium chloride flush  5-40 mL IntraVENous 2 times per day    sodium chloride flush  5-40 mL IntraVENous 2 times per day    amiodarone  200 mg Oral TID    sodium chloride flush  10 mL IntraVENous 2 times per day    aspirin  81 mg Oral Daily    [START ON 4/9/2022] clopidogrel  75 mg Oral Daily    amiodarone  200 mg Oral TID    mupirocin   Nasal BID    metoprolol tartrate  25 mg Oral BID    [START ON 4/9/2022] atorvastatin  20 mg Oral Nightly    pantoprazole  40 mg Oral Daily    pantoprazole (PROTONIX) 40 mg injection  40 mg IntraVENous Daily    ceFAZolin (ANCEF) IVPB  2,000 mg IntraVENous Q8H    vancomycin (VANCOCIN) IV  1,000 mg IntraVENous Q12H    ipratropium-albuterol  1 ampule Inhalation Q4H WA    [START ON 4/9/2022] insulin glargine  0.15 Units/kg SubCUTAneous Nightly    senna  1 tablet Oral Once    enoxaparin  1 mg/kg SubCUTAneous BID    [Held by provider] hydroCHLOROthiazide  25 mg Oral Daily    metoprolol tartrate  75 mg Oral BID    lisinopril  40 mg Oral Daily    potassium chloride  10 mEq Oral Once    aspirin  81 mg Oral Daily    lovastatin  10 mg Oral Nightly    sodium chloride flush  5-40 mL IntraVENous 2 times per day    amLODIPine  10 mg Oral Daily    nicotine  1 patch TransDERmal Daily     Continuous Infusions:    sodium chloride      [START ON 4/9/2022] sodium chloride      sodium chloride      nitroGLYCERIN Stopped (04/08/22 2300)    sodium chloride      propofol 20 mcg/kg/min (04/08/22 2300)    insulin 2.16 Units/hr (04/08/22 2200)    dextrose      norepinephrine      EPINEPHrine      sodium chloride      sodium chloride      sodium chloride      sodium chloride      sodium chloride      sodium chloride 20 mL/hr at 04/04/22 0408     CBC:   Recent Labs     04/08/22  1458 04/08/22  1458 04/08/22  1614 04/08/22 1714 04/08/22 2127   WBC 6.9  --   --  9.7 13.3*   HGB 9.6*   < > 8.5* 6.8* 10.3*   PLT 98*  --   --  106*  106* 115*  115*    < > = values in this interval not displayed. BMP:    Recent Labs     04/08/22  1449 04/08/22  1458 04/08/22  1643 04/08/22 1714 04/08/22 2128   NA  --  145*  --  148* 141   K  --  4.2  --  4.1 4.5   CL  --  116*  --  117* 110*   CO2  --  21  --  22 23   BUN  --  11  --  11 11   CREATININE   < > 0.84 0.98 0.82 0.81   GLUCOSE  --  104*  --  118* 162*    < > = values in this interval not displayed. Hepatic:   Recent Labs     04/08/22 1714   AST 27   ALT 13   BILITOT 0.96   ALKPHOS 22*     Troponin: No results for input(s): TROPHS in the last 72 hours. BNP: No results for input(s): BNP in the last 72 hours. Lipids: No results for input(s): CHOL, HDL in the last 72 hours. Invalid input(s): LDLCALCU  INR:   Recent Labs     04/08/22  1458 04/08/22 1714 04/08/22 2127   INR 1.5 1.5 0.9       Objective:   Vitals: /73   Pulse 101   Temp 95.2 °F (35.1 °C)   Resp 21   Ht 5' 1\" (1.549 m)   Wt 141 lb 5 oz (64.1 kg)   SpO2 99%   BMI 26.70 kg/m²   General appearance: Intubated  HEENT: Head: Normal, normocephalic, atraumatic.   Neck: no JVD, trachea midline  Lungs: Mechanically ventilated  Heart: Regular rate and rhythm, S1, S2 normal, no murmur  Abdomen: Soft, non-tender  Extremities: No edema          Assessment:   1. 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   HTN  3.  HL  4.  active smoker  5. Post op anemia s/p 3  Units pRBCs, 1 unit cryoprecipitate, 1 unit plt and plasma        Plan / Recommendations:   1. Continue ASA and plavix  2. Continue amiodarone 200 mg TID  3. High intensity statin  4. Hold lisinopril and norvasc as SBP in 120's. Gradually re-introduce if BP increases  5. Metoprolol 25 mg BID  6. Good glycemic index  7. Incentive spirometry once extubated  8. Post op management per CT surgery        Thank you for allowing us to participate in [address] [place] [address]. Will follow with you. Electronically signed on 04/08/22 at 11:15 PM by:    Kayli Moss MD, MD   Fellow, [address]    Attending note,   Patient seen and examined agree with above. Intubated and sedated. Possible extubation today. She had acute drop in hemoglobin and received multiple units of blood transfusion. Followed by CT surgery. Off pressors. Continue current medications. We will follow.

## 2022-04-09 NOTE — PROGRESS NOTES
Call received from Dr. Hillary Dow regarding patient outputs. Writer informed Dr. Hillary Dow that RN will get stat labs around 2100 as well as an ABG. Dr. Hillary Dow would like updated with lab results.

## 2022-04-10 ENCOUNTER — APPOINTMENT (OUTPATIENT)
Dept: GENERAL RADIOLOGY | Age: 57
DRG: 233 | End: 2022-04-10
Payer: MEDICARE

## 2022-04-10 ENCOUNTER — ANESTHESIA (OUTPATIENT)
Dept: SURGICAL ICU | Age: 57
DRG: 233 | End: 2022-04-10
Payer: MEDICARE

## 2022-04-10 ENCOUNTER — ANESTHESIA EVENT (OUTPATIENT)
Dept: SURGICAL ICU | Age: 57
DRG: 233 | End: 2022-04-10
Payer: MEDICARE

## 2022-04-10 PROBLEM — I25.10 MULTIPLE VESSEL CORONARY ARTERY DISEASE: Status: ACTIVE | Noted: 2022-04-10

## 2022-04-10 LAB
ABSOLUTE EOS #: 0 K/UL (ref 0–0.4)
ABSOLUTE IMMATURE GRANULOCYTE: 0 K/UL (ref 0–0.3)
ABSOLUTE LYMPH #: 1.28 K/UL (ref 1–4.8)
ABSOLUTE MONO #: 0.99 K/UL (ref 0.1–0.8)
ACTION: NORMAL
ACTION: NORMAL
ALLEN TEST: ABNORMAL
ANION GAP SERPL CALCULATED.3IONS-SCNC: 13 MMOL/L (ref 9–17)
BASOPHILS # BLD: 0 % (ref 0–2)
BASOPHILS ABSOLUTE: 0 K/UL (ref 0–0.2)
BUN BLDV-MCNC: 13 MG/DL (ref 6–20)
CALCIUM SERPL-MCNC: 8.1 MG/DL (ref 8.6–10.4)
CHLORIDE BLD-SCNC: 112 MMOL/L (ref 98–107)
CO2: 20 MMOL/L (ref 20–31)
CREAT SERPL-MCNC: 0.77 MG/DL (ref 0.5–0.9)
DATE AND TIME: NORMAL
DATE AND TIME: NORMAL
EKG ATRIAL RATE: 92 BPM
EKG P AXIS: 79 DEGREES
EKG P-R INTERVAL: 160 MS
EKG Q-T INTERVAL: 408 MS
EKG QRS DURATION: 68 MS
EKG QTC CALCULATION (BAZETT): 504 MS
EKG R AXIS: 75 DEGREES
EKG T AXIS: 79 DEGREES
EKG VENTRICULAR RATE: 92 BPM
EOSINOPHILS RELATIVE PERCENT: 0 % (ref 1–4)
FIO2: 100
FIO2: 90
FIO2: 90
GFR AFRICAN AMERICAN: >60 ML/MIN
GFR NON-AFRICAN AMERICAN: >60 ML/MIN
GFR SERPL CREATININE-BSD FRML MDRD: ABNORMAL ML/MIN/{1.73_M2}
GLUCOSE BLD-MCNC: 101 MG/DL (ref 65–105)
GLUCOSE BLD-MCNC: 103 MG/DL (ref 74–100)
GLUCOSE BLD-MCNC: 119 MG/DL (ref 65–105)
GLUCOSE BLD-MCNC: 129 MG/DL (ref 74–100)
GLUCOSE BLD-MCNC: 139 MG/DL (ref 70–99)
GLUCOSE BLD-MCNC: 140 MG/DL (ref 74–100)
GLUCOSE BLD-MCNC: 96 MG/DL (ref 65–105)
HCT VFR BLD CALC: 25.7 % (ref 36.3–47.1)
HCT VFR BLD CALC: 27.5 % (ref 36.3–47.1)
HEMOGLOBIN: 8.6 G/DL (ref 11.9–15.1)
HEMOGLOBIN: 9 G/DL (ref 11.9–15.1)
IMMATURE GRANULOCYTES: 0 %
INR BLD: 1.2
LACTIC ACID, WHOLE BLOOD: 2.2 MMOL/L (ref 0.7–2.1)
LYMPHOCYTES # BLD: 9 % (ref 24–44)
MAGNESIUM: 2.4 MG/DL (ref 1.6–2.6)
MCH RBC QN AUTO: 29.9 PG (ref 25.2–33.5)
MCHC RBC AUTO-ENTMCNC: 32.7 G/DL (ref 28.4–34.8)
MCV RBC AUTO: 91.4 FL (ref 82.6–102.9)
MODE: ABNORMAL
MONOCYTES # BLD: 7 % (ref 1–7)
MORPHOLOGY: ABNORMAL
MORPHOLOGY: ABNORMAL
NEGATIVE BASE EXCESS, ART: 2 (ref 0–2)
NEGATIVE BASE EXCESS, ART: 4 (ref 0–2)
NEGATIVE BASE EXCESS, ART: 5 (ref 0–2)
NOTIFY: NORMAL
NOTIFY: NORMAL
NRBC AUTOMATED: 0 PER 100 WBC
O2 DEVICE/FLOW/%: ABNORMAL
PDW BLD-RTO: 15.7 % (ref 11.8–14.4)
PLATELET # BLD: ABNORMAL K/UL (ref 138–453)
PLATELET, FLUORESCENCE: 105 K/UL (ref 138–453)
PLATELET, IMMATURE FRACTION: 5.7 % (ref 1.1–10.3)
POC HCO3: 20.9 MMOL/L (ref 21–28)
POC HCO3: 20.9 MMOL/L (ref 21–28)
POC HCO3: 21 MMOL/L (ref 21–28)
POC HCO3: 21.3 MMOL/L (ref 21–28)
POC HCO3: 22.3 MMOL/L (ref 21–28)
POC HCO3: 23.8 MMOL/L (ref 21–28)
POC HEMATOCRIT: 22 % (ref 36–46)
POC HEMOGLOBIN: 7.6 G/DL (ref 12–16)
POC IONIZED CALCIUM: 1.16 MMOL/L (ref 1.15–1.33)
POC LACTIC ACID: 1.83 MMOL/L (ref 0.56–1.39)
POC O2 SATURATION: 85 % (ref 94–98)
POC O2 SATURATION: 89 % (ref 94–98)
POC O2 SATURATION: 92 % (ref 94–98)
POC O2 SATURATION: 93 % (ref 94–98)
POC O2 SATURATION: 97 % (ref 94–98)
POC O2 SATURATION: 98 % (ref 94–98)
POC PCO2: 33.8 MM HG (ref 35–48)
POC PCO2: 35.7 MM HG (ref 35–48)
POC PCO2: 36.5 MM HG (ref 35–48)
POC PCO2: 38.7 MM HG (ref 35–48)
POC PCO2: 41.4 MM HG (ref 35–48)
POC PCO2: 61 MM HG (ref 35–48)
POC PH: 7.2 (ref 7.35–7.45)
POC PH: 7.31 (ref 7.35–7.45)
POC PH: 7.35 (ref 7.35–7.45)
POC PH: 7.38 (ref 7.35–7.45)
POC PH: 7.39 (ref 7.35–7.45)
POC PH: 7.4 (ref 7.35–7.45)
POC PO2: 121.1 MM HG (ref 83–108)
POC PO2: 49.9 MM HG (ref 83–108)
POC PO2: 62.3 MM HG (ref 83–108)
POC PO2: 69.9 MM HG (ref 83–108)
POC PO2: 73.9 MM HG (ref 83–108)
POC PO2: 91.8 MM HG (ref 83–108)
POC POTASSIUM: 4 MMOL/L (ref 3.5–4.5)
POTASSIUM SERPL-SCNC: 4.1 MMOL/L (ref 3.7–5.3)
PROTHROMBIN TIME: 12.2 SEC (ref 9.1–12.3)
RBC # BLD: 3.01 M/UL (ref 3.95–5.11)
READ BACK: YES
READ BACK: YES
SAMPLE SITE: ABNORMAL
SEG NEUTROPHILS: 84 % (ref 36–66)
SEGMENTED NEUTROPHILS ABSOLUTE COUNT: 11.93 K/UL (ref 1.8–7.7)
SODIUM BLD-SCNC: 145 MMOL/L (ref 135–144)
WBC # BLD: 14.2 K/UL (ref 3.5–11.3)

## 2022-04-10 PROCEDURE — 83735 ASSAY OF MAGNESIUM: CPT

## 2022-04-10 PROCEDURE — 93010 ELECTROCARDIOGRAM REPORT: CPT | Performed by: INTERNAL MEDICINE

## 2022-04-10 PROCEDURE — 80048 BASIC METABOLIC PNL TOTAL CA: CPT

## 2022-04-10 PROCEDURE — 87040 BLOOD CULTURE FOR BACTERIA: CPT

## 2022-04-10 PROCEDURE — 82803 BLOOD GASES ANY COMBINATION: CPT

## 2022-04-10 PROCEDURE — 0B9D8ZX DRAINAGE OF RIGHT MIDDLE LUNG LOBE, VIA NATURAL OR ARTIFICIAL OPENING ENDOSCOPIC, DIAGNOSTIC: ICD-10-PCS | Performed by: INTERNAL MEDICINE

## 2022-04-10 PROCEDURE — 71045 X-RAY EXAM CHEST 1 VIEW: CPT

## 2022-04-10 PROCEDURE — 85055 RETICULATED PLATELET ASSAY: CPT

## 2022-04-10 PROCEDURE — 37799 UNLISTED PX VASCULAR SURGERY: CPT

## 2022-04-10 PROCEDURE — 99233 SBSQ HOSP IP/OBS HIGH 50: CPT | Performed by: INTERNAL MEDICINE

## 2022-04-10 PROCEDURE — 6360000002 HC RX W HCPCS: Performed by: NURSE PRACTITIONER

## 2022-04-10 PROCEDURE — 2500000003 HC RX 250 WO HCPCS: Performed by: NURSE ANESTHETIST, CERTIFIED REGISTERED

## 2022-04-10 PROCEDURE — 6360000002 HC RX W HCPCS: Performed by: STUDENT IN AN ORGANIZED HEALTH CARE EDUCATION/TRAINING PROGRAM

## 2022-04-10 PROCEDURE — 74018 RADEX ABDOMEN 1 VIEW: CPT

## 2022-04-10 PROCEDURE — 6370000000 HC RX 637 (ALT 250 FOR IP): Performed by: STUDENT IN AN ORGANIZED HEALTH CARE EDUCATION/TRAINING PROGRAM

## 2022-04-10 PROCEDURE — 82330 ASSAY OF CALCIUM: CPT

## 2022-04-10 PROCEDURE — 99291 CRITICAL CARE FIRST HOUR: CPT | Performed by: INTERNAL MEDICINE

## 2022-04-10 PROCEDURE — 6360000002 HC RX W HCPCS: Performed by: NURSE ANESTHETIST, CERTIFIED REGISTERED

## 2022-04-10 PROCEDURE — 2720000010 HC SURG SUPPLY STERILE

## 2022-04-10 PROCEDURE — 94640 AIRWAY INHALATION TREATMENT: CPT

## 2022-04-10 PROCEDURE — 84132 ASSAY OF SERUM POTASSIUM: CPT

## 2022-04-10 PROCEDURE — 85025 COMPLETE CBC W/AUTO DIFF WBC: CPT

## 2022-04-10 PROCEDURE — 94003 VENT MGMT INPAT SUBQ DAY: CPT

## 2022-04-10 PROCEDURE — 31646 BRNCHSC W/THER ASPIR SBSQ: CPT | Performed by: INTERNAL MEDICINE

## 2022-04-10 PROCEDURE — 31500 INSERT EMERGENCY AIRWAY: CPT | Performed by: NURSE ANESTHETIST, CERTIFIED REGISTERED

## 2022-04-10 PROCEDURE — 85610 PROTHROMBIN TIME: CPT

## 2022-04-10 PROCEDURE — 2100000001 HC CVICU R&B

## 2022-04-10 PROCEDURE — 2700000000 HC OXYGEN THERAPY PER DAY

## 2022-04-10 PROCEDURE — C9113 INJ PANTOPRAZOLE SODIUM, VIA: HCPCS | Performed by: NURSE PRACTITIONER

## 2022-04-10 PROCEDURE — 6370000000 HC RX 637 (ALT 250 FOR IP): Performed by: NURSE PRACTITIONER

## 2022-04-10 PROCEDURE — 89220 SPUTUM SPECIMEN COLLECTION: CPT

## 2022-04-10 PROCEDURE — 87205 SMEAR GRAM STAIN: CPT

## 2022-04-10 PROCEDURE — 83605 ASSAY OF LACTIC ACID: CPT

## 2022-04-10 PROCEDURE — 87070 CULTURE OTHR SPECIMN AEROBIC: CPT

## 2022-04-10 PROCEDURE — 0BH18EZ INSERTION OF ENDOTRACHEAL AIRWAY INTO TRACHEA, VIA NATURAL OR ARTIFICIAL OPENING ENDOSCOPIC: ICD-10-PCS | Performed by: ANESTHESIOLOGY

## 2022-04-10 PROCEDURE — 99024 POSTOP FOLLOW-UP VISIT: CPT | Performed by: NURSE PRACTITIONER

## 2022-04-10 PROCEDURE — P9041 ALBUMIN (HUMAN),5%, 50ML: HCPCS | Performed by: NURSE PRACTITIONER

## 2022-04-10 PROCEDURE — 87077 CULTURE AEROBIC IDENTIFY: CPT

## 2022-04-10 PROCEDURE — 2580000003 HC RX 258: Performed by: NURSE PRACTITIONER

## 2022-04-10 PROCEDURE — 36415 COLL VENOUS BLD VENIPUNCTURE: CPT

## 2022-04-10 PROCEDURE — 87186 SC STD MICRODIL/AGAR DIL: CPT

## 2022-04-10 PROCEDURE — 85018 HEMOGLOBIN: CPT

## 2022-04-10 PROCEDURE — 5A1945Z RESPIRATORY VENTILATION, 24-96 CONSECUTIVE HOURS: ICD-10-PCS | Performed by: INTERNAL MEDICINE

## 2022-04-10 PROCEDURE — 2500000003 HC RX 250 WO HCPCS: Performed by: NURSE PRACTITIONER

## 2022-04-10 PROCEDURE — 85014 HEMATOCRIT: CPT

## 2022-04-10 PROCEDURE — 94761 N-INVAS EAR/PLS OXIMETRY MLT: CPT

## 2022-04-10 PROCEDURE — 94660 CPAP INITIATION&MGMT: CPT

## 2022-04-10 RX ORDER — ALBUTEROL SULFATE 2.5 MG/3ML
2.5 SOLUTION RESPIRATORY (INHALATION) EVERY 6 HOURS PRN
Status: DISCONTINUED | OUTPATIENT
Start: 2022-04-10 | End: 2022-04-16 | Stop reason: HOSPADM

## 2022-04-10 RX ORDER — PROPOFOL 10 MG/ML
10 INJECTION, EMULSION INTRAVENOUS CONTINUOUS
Status: DISCONTINUED | OUTPATIENT
Start: 2022-04-10 | End: 2022-04-12

## 2022-04-10 RX ORDER — ACETYLCYSTEINE 200 MG/ML
600 SOLUTION ORAL; RESPIRATORY (INHALATION) EVERY 4 HOURS PRN
Status: DISCONTINUED | OUTPATIENT
Start: 2022-04-10 | End: 2022-04-16 | Stop reason: HOSPADM

## 2022-04-10 RX ORDER — LEVOFLOXACIN 5 MG/ML
750 INJECTION, SOLUTION INTRAVENOUS EVERY 24 HOURS
Status: DISCONTINUED | OUTPATIENT
Start: 2022-04-10 | End: 2022-04-11

## 2022-04-10 RX ORDER — PROPOFOL 10 MG/ML
INJECTION, EMULSION INTRAVENOUS PRN
Status: DISCONTINUED | OUTPATIENT
Start: 2022-04-10 | End: 2022-04-12 | Stop reason: HOSPADM

## 2022-04-10 RX ORDER — KETOROLAC TROMETHAMINE 30 MG/ML
15 INJECTION, SOLUTION INTRAMUSCULAR; INTRAVENOUS EVERY 6 HOURS PRN
Status: DISCONTINUED | OUTPATIENT
Start: 2022-04-10 | End: 2022-04-11

## 2022-04-10 RX ORDER — LORAZEPAM 2 MG/ML
0.5 INJECTION INTRAMUSCULAR ONCE
Status: COMPLETED | OUTPATIENT
Start: 2022-04-10 | End: 2022-04-10

## 2022-04-10 RX ORDER — KETOROLAC TROMETHAMINE 30 MG/ML
30 INJECTION, SOLUTION INTRAMUSCULAR; INTRAVENOUS ONCE
Status: COMPLETED | OUTPATIENT
Start: 2022-04-10 | End: 2022-04-10

## 2022-04-10 RX ORDER — ROCURONIUM BROMIDE 10 MG/ML
INJECTION, SOLUTION INTRAVENOUS PRN
Status: DISCONTINUED | OUTPATIENT
Start: 2022-04-10 | End: 2022-04-12 | Stop reason: HOSPADM

## 2022-04-10 RX ADMIN — ALBUMIN (HUMAN) 25 G: 12.5 INJECTION, SOLUTION INTRAVENOUS at 13:45

## 2022-04-10 RX ADMIN — POTASSIUM CHLORIDE 20 MEQ: 400 INJECTION, SOLUTION INTRAVENOUS at 14:35

## 2022-04-10 RX ADMIN — ROCURONIUM BROMIDE 50 MG: 10 INJECTION INTRAVENOUS at 12:02

## 2022-04-10 RX ADMIN — FENTANYL CITRATE 50 MCG: 50 INJECTION, SOLUTION INTRAMUSCULAR; INTRAVENOUS at 17:40

## 2022-04-10 RX ADMIN — AMIODARONE HYDROCHLORIDE 200 MG: 200 TABLET ORAL at 20:51

## 2022-04-10 RX ADMIN — KETOROLAC TROMETHAMINE 30 MG: 30 INJECTION, SOLUTION INTRAMUSCULAR at 09:12

## 2022-04-10 RX ADMIN — PROPOFOL 50 MCG/KG/MIN: 10 INJECTION, EMULSION INTRAVENOUS at 12:52

## 2022-04-10 RX ADMIN — IPRATROPIUM BROMIDE AND ALBUTEROL SULFATE 1 AMPULE: .5; 3 SOLUTION RESPIRATORY (INHALATION) at 21:16

## 2022-04-10 RX ADMIN — FENTANYL CITRATE 50 MCG: 50 INJECTION, SOLUTION INTRAMUSCULAR; INTRAVENOUS at 06:43

## 2022-04-10 RX ADMIN — SODIUM CHLORIDE, PRESERVATIVE FREE 10 ML: 5 INJECTION INTRAVENOUS at 20:49

## 2022-04-10 RX ADMIN — FENTANYL CITRATE 50 MCG: 50 INJECTION, SOLUTION INTRAMUSCULAR; INTRAVENOUS at 07:44

## 2022-04-10 RX ADMIN — PROPOFOL 100 MG: 10 INJECTION, EMULSION INTRAVENOUS at 12:02

## 2022-04-10 RX ADMIN — METOPROLOL TARTRATE 2.5 MG: 1 INJECTION, SOLUTION INTRAVENOUS at 08:06

## 2022-04-10 RX ADMIN — METOPROLOL TARTRATE 25 MG: 25 TABLET ORAL at 20:50

## 2022-04-10 RX ADMIN — ACETAMINOPHEN 650 MG: 325 TABLET ORAL at 00:24

## 2022-04-10 RX ADMIN — FENTANYL CITRATE 50 MCG: 50 INJECTION, SOLUTION INTRAMUSCULAR; INTRAVENOUS at 12:15

## 2022-04-10 RX ADMIN — ALBUTEROL SULFATE 2.5 MG: 2.5 SOLUTION RESPIRATORY (INHALATION) at 01:51

## 2022-04-10 RX ADMIN — FENTANYL CITRATE 50 MCG: 50 INJECTION, SOLUTION INTRAMUSCULAR; INTRAVENOUS at 00:34

## 2022-04-10 RX ADMIN — FENTANYL CITRATE 50 MCG: 50 INJECTION, SOLUTION INTRAMUSCULAR; INTRAVENOUS at 12:30

## 2022-04-10 RX ADMIN — IPRATROPIUM BROMIDE AND ALBUTEROL SULFATE 1 AMPULE: .5; 3 SOLUTION RESPIRATORY (INHALATION) at 15:22

## 2022-04-10 RX ADMIN — POTASSIUM CHLORIDE 20 MEQ: 400 INJECTION, SOLUTION INTRAVENOUS at 05:33

## 2022-04-10 RX ADMIN — IPRATROPIUM BROMIDE AND ALBUTEROL SULFATE 1 AMPULE: .5; 3 SOLUTION RESPIRATORY (INHALATION) at 07:43

## 2022-04-10 RX ADMIN — LEVOFLOXACIN 750 MG: 5 INJECTION, SOLUTION INTRAVENOUS at 09:17

## 2022-04-10 RX ADMIN — PROPOFOL 15 MCG/KG/MIN: 10 INJECTION, EMULSION INTRAVENOUS at 22:23

## 2022-04-10 RX ADMIN — AMIODARONE HYDROCHLORIDE 200 MG: 200 TABLET ORAL at 14:00

## 2022-04-10 RX ADMIN — LORAZEPAM 0.5 MG: 2 INJECTION INTRAMUSCULAR; INTRAVENOUS at 10:37

## 2022-04-10 RX ADMIN — LOVASTATIN 10 MG: 20 TABLET ORAL at 20:50

## 2022-04-10 RX ADMIN — ATORVASTATIN CALCIUM 20 MG: 20 TABLET, FILM COATED ORAL at 20:51

## 2022-04-10 RX ADMIN — SODIUM CHLORIDE: 9 INJECTION, SOLUTION INTRAVENOUS at 22:26

## 2022-04-10 RX ADMIN — OXYCODONE HYDROCHLORIDE AND ACETAMINOPHEN 2 TABLET: 5; 325 TABLET ORAL at 14:00

## 2022-04-10 RX ADMIN — Medication 600 MG: at 11:18

## 2022-04-10 RX ADMIN — IPRATROPIUM BROMIDE AND ALBUTEROL SULFATE 1 AMPULE: .5; 3 SOLUTION RESPIRATORY (INHALATION) at 11:18

## 2022-04-10 RX ADMIN — SODIUM CHLORIDE, PRESERVATIVE FREE 40 MG: 5 INJECTION INTRAVENOUS at 09:17

## 2022-04-10 RX ADMIN — WATER 2000 MG: 1 INJECTION INTRAMUSCULAR; INTRAVENOUS; SUBCUTANEOUS at 05:28

## 2022-04-10 ASSESSMENT — PULMONARY FUNCTION TESTS
PIF_VALUE: 15
PIF_VALUE: 26
PIF_VALUE: 16
PIF_VALUE: 27
PIF_VALUE: 27
PIF_VALUE: 28
PIF_VALUE: 29
PIF_VALUE: 21
PIF_VALUE: 26
PIF_VALUE: 26
PIF_VALUE: 16
PIF_VALUE: 15
PIF_VALUE: 26

## 2022-04-10 ASSESSMENT — PAIN SCALES - GENERAL
PAINLEVEL_OUTOF10: 10
PAINLEVEL_OUTOF10: 8

## 2022-04-10 NOTE — PLAN OF CARE
CT surgery to take over as primary for post CABG care. Medicine service continue to follow. Petrona Marr MD  Internal Medicine Resident, PGY- [facility];  [place], [place]  4/10/2022, 8:54 AM

## 2022-04-10 NOTE — PROGRESS NOTES
Occupational [address]  Occupational Therapy Not Seen Note    DATE: 4/10/2022    NAME: Elsa Erwin  MRN: [id number]   : 1965      Patient not seen this date for Occupational Therapy due to:    Patient is not appropriate for active participation in OT evaluation/treatment at this time d/t poor resp status per RN. Next Scheduled Treatment: Attempt on  as appropriate.     Electronically signed by Donna Sosa OT on 4/10/2022 at 7:49 AM

## 2022-04-10 NOTE — PROGRESS NOTES
[facility]  Internal Medicine Teaching Residency Program  Inpatient Daily Progress Note  ______________________________________________________________________________    Patient: Edd Landon  YOB: 1965   ZLZ:[id number]    Acct: [de-identified]     Room: [address] [place] [address]  Admit date: 4/3/2022  Today's date: 04/10/22  Number of days in the hospital: 7    SUBJECTIVE   Admitting Diagnosis: NSTEMI (non-ST elevated myocardial infarction) ([facility] [address]  CC: Typical chest pain    Patient seen and examined at bedside. Chart results reviewed. Overnight concerns of hypoxia. Chest x-ray this morning showed new mild airspace opacity at the base of right upper lobe with a pulmonary infiltrate or aspiration. Patient is currently on BiPAP saturating at 100  Suctioning in progress. Patient is tired. she is anxious on Precedex drip. Chest tubes in place draining serosanguineous bloody fluid. Pulmonary , cardiology and CT surgery are following the patient. Labs showed mild acidosis infusion has been stopped    ROS:  Constitutional:  negative for chills, fevers, sweats  Respiratory:  negative for cough, dyspnea on exertion, hemoptysis, shortness of breath, wheezing  Cardiovascular:  negative for chest pain, chest pressure/discomfort, lower extremity edema, palpitations  Gastrointestinal:  negative for abdominal pain, constipation, diarrhea, nausea, vomiting  Neurological:  negative for dizziness, headache    BRIEF HISTORY     The patient is a pleasant 61 y.o. female with a past medical history significant for essential hypertension, coronary artery disease status post PCI on DAPT, hyperlipidemia, chronic active smoking, history of stroke, MDD, memory problem presents with a chief complaint of typical chest pain.   Chest pain started 3 to 4 days ago, dull aching in nature, 5-6 out of 10 in intensity, radiating to the left shoulder, increased with exertion and relieved with rest. Also admits to exertional shortness of breath without orthopnea or PND. Patient denies palpitation syncope nausea vomiting diarrhea headache.     Evaluation in the ER, she was found to have elevated blood pressure 175/110. Heart rate 78 regular. SPO2 99% on room air. Heart auscultation showed normal first and second heart sound without murmur gallop. Chest is clear to auscultation. Abdomen soft and nontender. Labs reviewed normal BMP. No leukocytosis hemoglobin 14. EKG showed normal sinus rhythm with some ST-T wave changes in inferior lead. Troponin is elevated 80. proBNP 217. Chest x-ray is negative for acute abnormality. Cardiology has been consulted in the ER for the concern of NSTEMI. Patient is started on heparin drip as per ACS protocol and will probably have cardiac cath tomorrow.     Of note, she has a past medical history of coronary artery disease status post PCI in 2004? Followed by another PCI in 2009? Questionable medication compliance. She has been actively smoking and is currently half pack per day. Denies alcohol intake. Admits to marijuana use.     Patient underwent cardiac cath on 4/4/2022. Findings:  LMCA: Distal 30-40% stenosis   LAD: Mid stent stenosis 80%   LCx: Mid area 90% after the OM take off   OM1: in stent stenosis 90%   RCA: Proximal 75% stenosis   Mid area ulcerated plaques with 60% stenosis   PL branch ostial / proximal 90% stenosis     The LV gram was performed in the LAWTON 30 position. LVEF: 50 %. Conclusions:   Multivessel CAD with left main disease    PLower normal LV systolic function   Recommendations:  CT surgery consult and recommend CABG. 4/10/-s/p CABG, chest tubes in place, on BiPAP    OBJECTIVE     Vital Signs:  /74   Pulse 117   Temp 95.2 °F (35.1 °C)   Resp (!) 37   Ht 5' 1\" (1.549 m)   Wt 145 lb 8.1 oz (66 kg)   SpO2 91%   BMI 27.49 kg/m²     No data recorded.     In: 968.8   Out: 1930 [Urine:1500]    Physical Exam:  Constitutional:, Precedex drip, feeling anxious on BiPAP  EENT:  PERRLA. No conjunctival injections. Neck: Supple without thyromegaly. No elevated JVP. Trachea was midline. Respiratory: Decreased breath sounds in the left-sided chest,  Cardiovascular: Chest tubes in place regular without murmur, clicks, gallops or rubs. Abdomen: Slightly rounded and soft without organomegaly. No rebound, rigidity or guarding was appreciated. Lymphatic: No lymphadenopathy. Musculoskeletal: Normal curvature of the spine. No gross muscle weakness. Extremities: Compression stockings on bilateral lower extremity no lower extremity edema, ulcerations, tenderness, varicosities or erythema. Muscle size, tone and strength are normal.  No involuntary movements are noted. Skin:  Warm and dry. Good color, turgor and pigmentation. No lesions or scars. No cyanosis or clubbing  Neurological/Psychiatric: Able to respond to commands. Anxious, alert.         Medications:  Scheduled Medications:    levofloxacin  750 mg IntraVENous Q24H    insulin lispro  0-6 Units SubCUTAneous TID     insulin lispro  0-3 Units SubCUTAneous Nightly    sodium chloride flush  5-40 mL IntraVENous 2 times per day    sodium chloride flush  5-40 mL IntraVENous 2 times per day    sodium chloride flush  10 mL IntraVENous 2 times per day    aspirin  81 mg Oral Daily    clopidogrel  75 mg Oral Daily    amiodarone  200 mg Oral TID    metoprolol tartrate  25 mg Oral BID    atorvastatin  20 mg Oral Nightly    pantoprazole  40 mg Oral Daily    pantoprazole (PROTONIX) 40 mg injection  40 mg IntraVENous Daily    ipratropium-albuterol  1 ampule Inhalation Q4H WA    insulin glargine  0.15 Units/kg SubCUTAneous Nightly    senna  1 tablet Oral Once    enoxaparin  1 mg/kg SubCUTAneous BID    [Held by provider] hydroCHLOROthiazide  25 mg Oral Daily    [Held by provider] lisinopril  40 mg Oral Daily    potassium chloride  10 mEq Oral Once  lovastatin  10 mg Oral Nightly    sodium chloride flush  5-40 mL IntraVENous 2 times per day    [Held by provider] amLODIPine  10 mg Oral Daily    nicotine  1 patch TransDERmal Daily     Continuous Infusions:    dexmedetomidine 0.3 mcg/kg/hr (04/10/22 9159)    sodium chloride      sodium chloride Stopped (04/10/22 0533)    propofol Stopped (04/09/22 1479)    dextrose      norepinephrine      EPINEPHrine      sodium chloride 20 mL/hr at 04/04/22 0408     PRN Medicationsalbuterol, 2.5 mg, Q6H PRN  acetylcysteine, 600 mg, Q4H PRN  sodium chloride flush, 5-40 mL, PRN  sodium chloride, 25 mL, PRN  sodium chloride flush, 10 mL, PRN  ondansetron, 4 mg, Q8H PRN   Or  ondansetron, 4 mg, Q6H PRN  oxyCODONE-acetaminophen, 1 tablet, Q4H PRN   Or  oxyCODONE-acetaminophen, 2 tablet, Q4H PRN  fentanNYL, 25 mcg, Q1H PRN   Or  fentanNYL, 50 mcg, Q1H PRN  hydrALAZINE, 5 mg, Q5 Min PRN  metoprolol, 2.5 mg, Q10 Min PRN  sodium bicarbonate, 50 mEq, Q30 Min PRN  diphenhydrAMINE, 25 mg, Nightly PRN  potassium chloride, 20 mEq, PRN  magnesium sulfate, 2,000 mg, PRN  albumin human, 25 g, PRN  glucose, 15 g, PRN  dextrose, 12.5 g, PRN  glucagon (rDNA), 1 mg, PRN  dextrose, 100 mL/hr, PRN  norepinephrine, 0.01-3.3 mcg/kg/min, Continuous PRN  EPINEPHrine, 0.1-0.3 mcg/kg/min, Continuous PRN  magnesium hydroxide, 30 mL, Daily PRN  ALPRAZolam, 0.25 mg, BID PRN  aspirin-acetaminophen-caffeine, 1 tablet, Q8H PRN  magnesium sulfate, 1,000 mg, PRN  potassium chloride, 10 mEq, PRN  potassium chloride, 40 mEq, PRN   Or  potassium alternative oral replacement, 40 mEq, PRN   Or  potassium chloride, 10 mEq, PRN  sodium chloride, , PRN  polyethylene glycol, 17 g, Daily PRN  acetaminophen, 650 mg, Q6H PRN   Or  acetaminophen, 650 mg, Q6H PRN  calcium carbonate, 500 mg, TID PRN        Diagnostic Labs:  CBC:   Recent Labs     04/09/22  0214 04/09/22  0457 04/10/22  0242   WBC 11.3 11.6* 14.2*   RBC 2.92* 3.03* 3.01*   HGB 8.8* 9.1* 9.0*   HCT 26.1* 27.2* 27.5*   MCV 89.4 89.8 91.4   RDW 15.0* 15.3* 15.7*   PLT 99* 101* See Reflexed IPF Result     BMP:   Recent Labs     04/08/22  2128 04/08/22  2128 04/09/22  0214 04/09/22  0457 04/10/22  0242     --   --  140 145*   K 4.5   < > 4.1 4.3 4.1   *  --   --  110* 112*   CO2 23  --   --  22 20   BUN 11  --   --  12 13   CREATININE 0.81  --   --  0.78 0.77    < > = values in this interval not displayed. BNP: No results for input(s): BNP in the last 72 hours. PT/INR:   Recent Labs     04/08/22  2127 04/09/22  0457 04/10/22  0242   PROTIME 9.5 10.0 12.2   INR 0.9 0.9 1.2     APTT:   Recent Labs     04/08/22 1458 04/08/22 1714 04/08/22 2127   APTT 44.8* 40.8* 28.2     CARDIAC ENZYMES: No results for input(s): CKMB, CKMBINDEX, TROPONINI in the last 72 hours. Invalid input(s): CKTOTAL;3  FASTING LIPID PANEL:  Lab Results   Component Value Date    CHOL 243 (H) 03/24/2021    HDL 36 (L) 12/01/2021    TRIG 178 (H) 03/24/2021     LIVER PROFILE:   Recent Labs     04/08/22 1714 04/09/22 0457   AST 27 75*   ALT 13 33   BILITOT 0.96 1.94*   ALKPHOS 22* 25*      MICROBIOLOGY:   Lab Results   Component Value Date/Time    CULTURE NO GROWTH <24 HRS 04/10/2022 02:49 AM       Imaging:    XR CHEST PORTABLE    Result Date: 4/3/2022  No acute process. ASSESSMENT & PLAN     IMPRESSION  This is a 61 y.o. female with a past medical history of hypertension hyperlipidemia coronary artery disease status post PCI, history of stroke and memory problem who presented with typical chest pain and exertional shortness of breath and found to have ST-T wave changes in the inferior leads and elevated troponin 80. Patient admitted to inpatient status for the management of NSTEMI. She underwent cardiac cath and found to have multivessel coronary artery disease. CT surgery taken on board and s/p  CABG.        Principal Problem:    NSTEMI (non-ST elevated myocardial infarction) ([facility] [address]  Active Problems:    Multiple vessel coronary artery disease s/p CABG     CAD (coronary artery disease) with multiple stents    Asthma    Smoker    Anxiety    Essential hypertension    Memory problem    Mixed hyperlipidemia    Thyroid nodule  Resolved Problems:    * No resolved hospital problems. *    NSTEMI with multivessel coronary artery disease s/p CABG-continue chest tube drainage,  -Patient is anxious functional with Precedex wean as tolerated,  -Chest x-ray concerning for new opacity at the pneumonia/mucous plug/postoperative atelectasis  -Continue with BiPAP, watch for hypoxia and airway patency, pulmonary on board  -History of asthma continue with her home medications, DuoNeb to continue, respiratory to follow for evaluation and treating with ACS. -Cardio on board  -Aspirin, Plavix continue      Principal Problem: Active Problems:  CAD (coronary artery disease) with multiple stents s/p CABG   Plan: On ASA and Plavix. - CT surgery and cardiology following for post CABG care     Essential hypertension:  -Currently amlodipine hydrochlorothiazide hydration    Smoker  Plan:   Counseled on smoking cessation. Continue  on nicotine patch.     Mixed hyperlipidemia  Plan:   Previously refused atorvastatin due to statin induced muscle weakness currently on lovastatin.     Thyroid nodule  Plan:   CT chest showed 3 cm incidental right thyroid nodule. Subsequent thyroid ultrasound showed 2.9 cm slight heterogeneous cystic. No follow-up study recommended. TSH 1.33.       DVT ppx: On Lovenox. GI ppx: Not needed     PT/OT/SW consulted  Discharge Planning: on going as per CT surgery. Yani Ruiz MD  Internal Medicine Resident, PGY-2  [facility]; [place], [place]  4/10/2022, 8:53 AM      Attestation and add on       I have discussed the care of Kanchan Farrar , including pertinent history and exam findings,      4/10/22    with the resident.   I have seen and examined the patient and the key elements of all parts of the

## 2022-04-10 NOTE — PROGRESS NOTES
[facility]   Progress Note                 Date:   4/10/2022  Patient name:  Brett Casillas  Date of admission:  4/3/2022  6:04 PM  MRN:   [id number]  YOB: 1965  PCP:    Bethany Mullins MD    Reason for Admission: NSTEMI (non-ST elevated myocardial infarction) ([facility] [address] [I21.4]      Subjective:       Clinical Changes / Abnormalities:     Patient seen and examined. Underwent CABGx3 on 4/8. Alonso Billing Yesterday. Currently on BiPAP. I/O last 3 completed shifts: In: 7572.5 [I.V.:3977.7; Blood:2998.7; NG/GT:100; IV Piggyback:496.1]  Out: 8170 [Urine:5675; Blood:475; Chest Tube:2020]  I/O this shift:   In: 466 [I.V.:238.3; IV Piggyback:227.8]  Out: 755 [Urine:675; Chest Tube:80]          Medications:   Scheduled Meds:    insulin lispro  0-6 Units SubCUTAneous TID WC    insulin lispro  0-3 Units SubCUTAneous Nightly    sodium chloride flush  5-40 mL IntraVENous 2 times per day    sodium chloride flush  5-40 mL IntraVENous 2 times per day    sodium chloride flush  10 mL IntraVENous 2 times per day    aspirin  81 mg Oral Daily    clopidogrel  75 mg Oral Daily    amiodarone  200 mg Oral TID    metoprolol tartrate  25 mg Oral BID    atorvastatin  20 mg Oral Nightly    pantoprazole  40 mg Oral Daily    pantoprazole (PROTONIX) 40 mg injection  40 mg IntraVENous Daily    ipratropium-albuterol  1 ampule Inhalation Q4H WA    insulin glargine  0.15 Units/kg SubCUTAneous Nightly    senna  1 tablet Oral Once    enoxaparin  1 mg/kg SubCUTAneous BID    [Held by provider] hydroCHLOROthiazide  25 mg Oral Daily    [Held by provider] lisinopril  40 mg Oral Daily    potassium chloride  10 mEq Oral Once    aspirin  81 mg Oral Daily    lovastatin  10 mg Oral Nightly    sodium chloride flush  5-40 mL IntraVENous 2 times per day    [Held by provider] amLODIPine  10 mg Oral Daily    nicotine  1 patch TransDERmal Daily     Continuous Infusions:    dexmedetomidine 0.3 mcg/kg/hr (04/10/22 8997)   Nimesh sodium chloride      sodium chloride Stopped (04/10/22 0533)    propofol Stopped (04/09/22 1139)    dextrose      norepinephrine      EPINEPHrine      sodium chloride 20 mL/hr at 04/04/22 0408     CBC:   Recent Labs     04/09/22  0214 04/09/22  0457 04/10/22  0242   WBC 11.3 11.6* 14.2*   HGB 8.8* 9.1* 9.0*   PLT 99* 101* See Reflexed IPF Result     BMP:    Recent Labs     04/08/22  2128 04/08/22  2128 04/09/22  0214 04/09/22  0457 04/10/22  0242     --   --  140 145*   K 4.5   < > 4.1 4.3 4.1   *  --   --  110* 112*   CO2 23  --   --  22 20   BUN 11  --   --  12 13   CREATININE 0.81  --   --  0.78 0.77   GLUCOSE 162*  --   --  105* 139*    < > = values in this interval not displayed. Hepatic:   Recent Labs     04/08/22 1714 04/09/22 0457   AST 27 75*   ALT 13 33   BILITOT 0.96 1.94*   ALKPHOS 22* 25*     Troponin: No results for input(s): TROPHS in the last 72 hours. BNP: No results for input(s): BNP in the last 72 hours. Lipids: No results for input(s): CHOL, HDL in the last 72 hours. Invalid input(s): LDLCALCU  INR:   Recent Labs     04/08/22  2127 04/09/22  0457 04/10/22  0242   INR 0.9 0.9 1.2       Objective:   Vitals: /74   Pulse 108   Temp 95.2 °F (35.1 °C)   Resp (!) 34   Ht 5' 1\" (1.549 m)   Wt 145 lb 8.1 oz (66 kg)   SpO2 92%   BMI 27.49 kg/m²   General appearance: Intubated  HEENT: Head: Normal, normocephalic, atraumatic. Neck: no JVD, trachea midline  Lungs: Mechanically ventilated  Heart: Regular rate and rhythm, S1, S2 normal, no murmur  Abdomen: Soft, non-tender  Extremities: No edema          Assessment:   1. 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   HTN  3.  HL  4.  active smoker  5. Post op anemia s/p 3  Units pRBCs, 1 unit cryoprecipitate, 1 unit plt and plasma        Plan / Recommendations:   1. Continue ASA and plavix  2. On full dose lovenox? Recommend discontinuing   3. Continue amiodarone 200 mg TID  4.  High intensity statin  5. Hold lisinopril and norvasc as SBP in 120's. Gradually re-introduce if BP increases  6. Metoprolol 25 mg BID. Titrate up as tolerated by BP  7. Good glycemic index  8. Incentive spirometry once extubated  9. Post op management per CT surgery        Thank you for allowing us to participate in [address] [place] [address]. Will follow with you. Electronically signed on 04/10/22 at 6:41 AM by:    Phi Lemus MD, MD   Fellow, [address]    Attending note,   The patient was seen and examined,agree with above. Extubated. Short of breath. No chest pains. Hemodynamically stable. On antibiotics for possible aspiration pneumonia. Continue current medications PT OT.

## 2022-04-10 NOTE — PROGRESS NOTES
[facility] Cardiothoracic Surgery  Progress Note    4/10/2022 10:22 AM  Surgeon: Surgeon CTS: Dr. Kathy Alford MD   POD # 2  S/P: CABGx3     Subjective:  Ms. Venkatesh Fernández   Moderate  acute distress noted with tachypnea. Mild fatigue. Objective:  /77   Pulse 118   Temp 95.2 °F (35.1 °C)   Resp (!) 32   Ht 5' 1\" (1.549 m)   Wt 145 lb 8.1 oz (66 kg)   SpO2 95%   BMI 27.49 kg/m²   Chest: pacing wires: yes, chest tubes:yes, air leak no, 3 +  CV: no murmur noted, Normal S1, S2,   Lungs: clear to auscultation, no wheezes, rales, or rhonchi  Abd: normal bowel sounds   Lower Extremities: Trace edema  Saph Incison: no sign of drainage or infection  Sternal Incison: dressing applied-no excessive drainage or signs of infection noted  CXR-left sided atelectasis noted. Small effusion. No pneumothorax present. Labs: Labs reviewed today  CBC:   Recent Labs     04/09/22  0214 04/09/22  0214 04/09/22  0457 04/10/22  0242 04/10/22  0929   WBC 11.3  --  11.6* 14.2*  --    HGB 8.8*   < > 9.1* 9.0* 8.6*   HCT 26.1*   < > 27.2* 27.5* 25.7*   MCV 89.4  --  89.8 91.4  --    PLT 99*  --  101* See Reflexed IPF Result  --     < > = values in this interval not displayed. BMP:   Recent Labs     04/08/22  2128 04/08/22  2128 04/09/22  0214 04/09/22  0457 04/10/22  0242     --   --  140 145*   K 4.5   < > 4.1 4.3 4.1   *  --   --  110* 112*   CO2 23  --   --  22 20   BUN 11  --   --  12 13   CREATININE 0.81  --   --  0.78 0.77    < > = values in this interval not displayed. I/O: I/O last 3 completed shifts: In: 3000.5 [I.V.:1216; Blood:960.7; NG/GT:100; IV Piggyback:723.9]  Out: 3910 [Urine:2980;  Chest Tube:930]  Scheduled Meds:   levofloxacin  750 mg IntraVENous Q24H    LORazepam  0.5 mg IntraVENous Once    insulin lispro  0-6 Units SubCUTAneous TID WC    insulin lispro  0-3 Units SubCUTAneous Nightly    sodium chloride flush  5-40 mL IntraVENous 2 times per day    sodium chloride flush  5-40 mL IntraVENous 2 times per day    sodium chloride flush  10 mL IntraVENous 2 times per day    aspirin  81 mg Oral Daily    clopidogrel  75 mg Oral Daily    amiodarone  200 mg Oral TID    metoprolol tartrate  25 mg Oral BID    atorvastatin  20 mg Oral Nightly    pantoprazole  40 mg Oral Daily    pantoprazole (PROTONIX) 40 mg injection  40 mg IntraVENous Daily    ipratropium-albuterol  1 ampule Inhalation Q4H WA    insulin glargine  0.15 Units/kg SubCUTAneous Nightly    senna  1 tablet Oral Once    [Held by provider] hydroCHLOROthiazide  25 mg Oral Daily    [Held by provider] lisinopril  40 mg Oral Daily    potassium chloride  10 mEq Oral Once    lovastatin  10 mg Oral Nightly    sodium chloride flush  5-40 mL IntraVENous 2 times per day    [Held by provider] amLODIPine  10 mg Oral Daily    nicotine  1 patch TransDERmal Daily     Continuous Infusions:   dexmedetomidine 0.3 mcg/kg/hr (04/10/22 8973)    sodium chloride      sodium chloride Stopped (04/10/22 7989)    propofol Stopped (04/09/22 6469)    dextrose      norepinephrine      EPINEPHrine      sodium chloride 20 mL/hr at 04/04/22 0408     PRN Meds:albuterol, acetylcysteine, ketorolac, sodium chloride flush, sodium chloride, sodium chloride flush, ondansetron **OR** ondansetron, oxyCODONE-acetaminophen **OR** oxyCODONE-acetaminophen, fentanNYL **OR** fentanNYL, hydrALAZINE, metoprolol, sodium bicarbonate, diphenhydrAMINE, potassium chloride, magnesium sulfate, albumin human, glucose, dextrose, glucagon (rDNA), dextrose, norepinephrine, EPINEPHrine, magnesium hydroxide, ALPRAZolam, aspirin-acetaminophen-caffeine, magnesium sulfate, potassium chloride, potassium chloride **OR** potassium alternative oral replacement **OR** potassium chloride, sodium chloride, polyethylene glycol, acetaminophen **OR** acetaminophen, calcium carbonate    Beta- Blocker: Yes-hold today  Aspirin: Yes-hold today  Lovenox: No DC today  GI: Yes  Plavix: Yes-hold today  Coumadin: No  Statin: Yes  ACE: No    Daily Nursing Care:  Please keep SCDS in place as DVT prophylaxis  If not intubated, Please have patient use IS and acapella 10X/hr or during commercial breaks  Please continue BM management  Daily labs and CXR  Daily PT/OT and ambulation  Continue with Case Management for DC planning      Assessment/ Plan:  Keep ICU status  Pt doing well throughout day Yday worrisome this AM of aspiration  Please continue to suction patient around the clock and send off sputum for culture  Start Levaquin for pneumonia and WBC elevation  Monitor strict Is and Os-no signs of bleeding hemos stable  Pulmonary aware of patient-eval for potential Bronch this AM  Please keep bipap in place repeat ABG hourly  Pulmonary consulted to assist with optimizing lungs potential bronch. Pt will need aggressive IS onces extubated.    Please hold AP medications today       [address], APRN - NP

## 2022-04-10 NOTE — PROGRESS NOTES
04/10/22 1233   Cough/Sputum   Sputum How Obtained Endotracheal   Cough None   Sputum Amount Small   Sputum Color Tan   Tenacity Thick       Sputum sample sent to lab for culture.

## 2022-04-10 NOTE — OP NOTE
[address] [id number]                                OPERATIVE REPORT    PATIENT NAME: Karin Lazo                  :        1965  MED REC NO:   [id number]                             ROOM:       1024  ACCOUNT NO:   [de-identified]                           ADMIT DATE: 2022  PROVIDER:     Jono Mora MD    DATE OF PROCEDURE:  2022    PREOPERATIVE DIAGNOSIS:  Multivessel coronary artery disease. POSTOPERATIVE DIAGNOSIS:  Multivessel coronary artery disease. OPERATIONS PERFORMED:  1. CABG x3; LIMA to LAD, saphenous vein to OM1, saphenous vein to main  RCA. 2.  Left leg endo vein harvest.  3.  Epiaortic ultrasound. 4.  Cardiopulmonary bypass. 5.  Platelet gel. SURGEON:  Jono Mora MD    ASSISTANT:  Nicolasa Olson CSA    EBL:  400 mL. SPECIMEN:  None. DRAINS:  Three mediastinal chest tubes. OPERATIVE PROCEDURE:  The patient had the risks, benefits, and  intentions fully discussed. Risks included, but not limited to  bleeding, infection, stroke, renal failure, damage to heart or lungs,  possible death. She understood the risks and was willing to proceed. Taken back to the operating room, placed in supine position. General  anesthesia was induced. The patient had A-line, Crawford, central line,  KIT probe placed by Anesthesia. Time-out was performed. She was  washed, prepped and draped in a normal sterile fashion. Incision was  made in the midsternal line, continued in depth with electrocautery. Hemostasis was obtained. Sternum was opened using oscillating sternal  saw. The pericardium was opened, heart inspected. The Rultract  retractor was placed on the left hemithorax. Endothoracic fascia was  opened. LIMA was taken down off the chest wall. The LIMA was small,  but did have good flow.   Side branches were clipped and the artery was  treated with intraarterial papaverine, placed in papaverine-soaked  Ray-Vlad in the left chest.  At this juncture, the patient had the left  greater saphenous vein harvested in the following fashion:  A 1-inch  incision was made at the knee. Endoscope was passed proximally and  distally. Greater saphenous vein was harvested. Greater saphenous vein  was removed using a stab and grab technique. Side branches were clipped  and tied using 4-0 silk ties. The vein was of good length and average  quality. At this time, a total dose of heparin was given. Epiaortic  ultrasound was performed of the aorta and there was minimal  calcification at the cannulation site. At this time, the patient had  the aortic cannula. Cannulation sutures were placed _________ 2-0  Ethibond was placed on the right atrial appendage, 2-0 Ethibond was  placed on the retrograde cardioplegia cannula, and a 4-0 pledgeted  Prolene was placed for the antegrade needle. At this juncture, the aortic cannula was placed in the aortic root. The  venous cannula was placed in the IVC. The antegrade and retrograde  cardioplegia cannulas were placed _______. The patient had been placed  on cardiopulmonary bypass and the heart was emptied. With the heart empty and recording memory, the OM1 distal to the stent  and the mid-LAD. Crossclamp was applied after the aorta was cleaned of  fibrofatty tissue. The heart was arrested using antegrade and  retrograde blood cardioplegia. At this juncture, first target the right coronary artery opened using a  15-blade and a [place] blade. Landaverde scissors were used to open the  artery further in each direction. Saphenous vein anastomosed to the  coronary artery using 7-0 Prolene running suture. __________ blood  cardioplegia was given. The aorta was opened using a 4.0 mm punch. The  proximal anastomosis was completed using a 6-0 Prolene running suture  after the cardioplegia had been completed. Next, the OM1 identified  distal to stent.   The OM1 was opened using a [place] blade and Landaverde  scissors. The saphenous vein was anastomosed to the OM1 using a 7-0  Prolene running suture. Once this was completed, the patient had the  vein measured back to appropriate length and orientation. The vein was  cut. Antegrade blood cardioplegia was completed and the aorta was  opened using a 4.0 mm punch. The patient had the LIMA retrieved from  the left chest, cleaned of fibrofatty tissue. The LIMA was spatulated. The mid-LAD was opened using a 15-blade and [place] blade. Landaverde  scissors were used to open the artery further in each direction. The  patient had the _____. Once the Landaverde scissors _____ and the LIMA was  anastomosed to the LAD using a 7-0 Prolene running suture, probed well  both proximally and distally. Then, 6-0 Prolene was used to tack the  pedicles to the surface of the heart. Hot shot blood was given in the  antegrade fashion. The heart began in bradycardic fashion and at this  time the patient was slowly weaned from cardiopulmonary bypass. The  retrograde cardioplegia cannula was removed. Sites were oversewn using  a 5-0 Prolene figure-of-eight suture. The patient had the venous  cannula removed once off cardiopulmonary bypass. Protamine was begun. The antegrade needle was removed. The site was oversewn using a 5-0  Prolene suture. Additional protamine was given. The aortic canula was  removed. Aortic cannulation site was oversewn using a 4-0 pledgeted  Prolene. Two ventricular pacing wires were already in place and then  three mediastinal chest tubes were placed. They were sewn in place  using 0 silk suture. Platelet gel was applied to the cut surfaces. The  patient had any bleeding points cauterized or oversewn. The sternum was  then closed using eight sternal wires. Suprasternal fascia was closed  using a #1 running Vicryl suture; 2-0, 3-0, and 4-0 Vicryl for the  superficial layers.   The patient had 4 x 4s, tape, and dressings applied  for the chest and the legs. The patient tolerated the procedure, taken  in critical condition to the intensive care unit with the chest tubes on  suction. This procedure could not have been performed without the assistance of  Tim Enrique, who was invaluable assisting at the chest and also  harvesting vein.         Makenna Griggs MD    D: 04/09/2022 14:45:47       T: 04/09/2022 14:50:24     [id number]  Job#: [id number]     Doc#: [id number]    CC:

## 2022-04-10 NOTE — PLAN OF CARE
Problem: OXYGENATION/RESPIRATORY FUNCTION  Goal: Patient will maintain patent airway  4/10/2022 1236 by Iqbal Leaks, RCP  Outcome: Ongoing  4/10/2022 1235 by Rasheed Hale, RCP  Outcome: Ongoing  4/10/2022 0158 by Mao Hernandez RN  Outcome: Ongoing  Goal: Patient will achieve/maintain normal respiratory rate/effort  Description: Respiratory rate and effort will be within normal limits for the patient  4/10/2022 1236 by Rasheed Leaks, RCP  Outcome: Ongoing  4/10/2022 1235 by Rasheed Hale, RCP  Outcome: Ongoing  4/10/2022 0158 by Mao Hernandez RN  Outcome: Ongoing     Problem: MECHANICAL VENTILATION  Goal: Patient will maintain patent airway  4/10/2022 1236 by Rasheed Hale, RCP  Outcome: Ongoing  4/10/2022 1235 by Rasheed Hale, RCP  Outcome: Ongoing  4/10/2022 0158 by Mao Hernandez RN  Outcome: Ongoing  Goal: Oral health is maintained or improved  Outcome: Ongoing  Goal: ET tube will be managed safely  Outcome: Ongoing  Goal: Ability to express needs and understand communication  Outcome: Ongoing  Goal: Mobility/activity is maintained at optimum level for patient  Outcome: Ongoing     Problem: Skin Integrity:  Goal: Will show no infection signs and symptoms  Description: Will show no infection signs and symptoms  4/10/2022 1236 by Rasheed Hale, RCP  Outcome: Ongoing  4/10/2022 1235 by Rasheed Hale, RCP  Outcome: Ongoing  4/10/2022 0158 by Mao Hernandez RN  Outcome: Ongoing  Goal: Absence of new skin breakdown  Description: Absence of new skin breakdown  4/10/2022 1236 by Rasheed Hale, RCP  Outcome: Ongoing  4/10/2022 1235 by Rasheed Hale, RCP  Outcome: Ongoing  4/10/2022 0158 by Mao Hernandez RN  Outcome: Ongoing

## 2022-04-10 NOTE — PROCEDURES
Procedure Note:     Description of procedure      [X] Fiberoptic Bronchoscopy   [X]  Bronchoalveolar lavage   []  Bronchial washing   []  Bronchial brushing   []  Transbronchial biopsy   []  Endobronchial biopsy   []  Transbronchial needle aspiration   []  Endobronchial ultrasound-guided   []  Pretracheal node   []  Subcarinal node   []  Right hilar node   []  Left hilar node   []  AP window node   []  Mass   []  Interventional procedure  []  With fluoroscopy   []  Balloon dilatation   []  Stent placement   []  Argon plasma coagulation   []  Nd:YAG laser  []  Inspection only   []  With fluoroscopic guidance. []  With ultrasound guidance. Indication for Bronchoscopy     []  Nodule(s). [] Atelectasis  [] Hemoptysis  [X] Pneumonia/Pulmonary infiltrate  [X] Respiratory failure/Hypoxemia  [] Airway Stenosis/Obstruction  []  Endotracheal mass/obstruction  [] Endobronchial mass/obstruction  [] Adenopathy. [] Immunocompromised host.     [] Lung cancer  [] Metastatic disease.  [] Lung transplant diagnostic/surveillance. [] Bronchiectasis. [X] Therapeutic, to clear airway secretions    Consent    [] Performed emergently  [X] Details of the procedure were explained in detail which included risks and benefits. An opportunity was provided to ask questions. Consent was obtained from the following person(s):   [] Patient   [] Sibling   [] Spouse              [] Parent   [X] Child   [] Guardian    Documentation Review     [X] Patient admission history and physical examination as well as interval hospital course documentation reviewed prior to initiating procedure and/or procedural sedation    Time out     [X] The process of patient care was interrupted for all participants to confirm the correct patient, correct procedure, correct site and the availability of appropriate equipment. Please see nursing documentation for those present.     Quick Look     [X] Prior to initiating procedural sedation a \"quick look\" revealed pulse oximetry and vital signs appropriate to proceed      Sedation: Patient already intubated & mechanically ventilated; pushed of fentanyl given. See MAR for administration time and dose. Airway   [X] Via mouth (ET).     [] Via nares. Monitoring  [X] Arterial Pressure   [X] NIBP     [X] ECG   [X] Oxygen supplemented as needed   [X] Pulse oximetry  [X] End-tidal capnography       Endobronchial findings    Distal Trachea: Normal mucosa  Nina  Normal mucosa and Sharp    Right Main Stem Bronchus  Normal mucosa  Right Upper Lobe Bronchi Normal mucosa  Right Middle Lobe Bronchi  Mild mucus plugging  Right Lower Lobe Bronchi (including the Superior segment)  Collapse with mucus plugging    Left Main Stem Bronchus Normal mucosa  Left Upper Lobe Bronchus, Upper Division mild mucus plugging  Left Upper Lobe Bronchus, Lingula  mild mucus plugging  Left Lower Lobe Bronchus (including the Superior segment)  Collapse with mucus plugging    [X] The bronchoscope was withdrawn without difficulty. [X] The patient tolerated the procedure well.        Patient condition: stable    Estimated blood loss: None    Complications: None    Chest radiograph:None     Specimens Obtained:     Specimen Bronchoalveolar Lavage Bronchial Wash Bronchial [place] Transbronchial Biopsy Endobronchial Biopsy    mL Instilled/Lavaged       Segment        RUL        RML  30/15       RLL        DENISE        Lingula        LLL          Labs requested:    Bronchoalveolar lavage for: Gram's stain and culture    Final Impression: B/L aspiration    Eric Castellanos MD  Pulmonary and Critical Care Medicine  04/10/22  12:32 PM

## 2022-04-10 NOTE — PROGRESS NOTES
1155- Consent signed for RSI d/t respiratory distress  1202- 100 mg Propofol & 50 mg Rocuronium given  1204- 7.5 ETT inserted by CRNA, 22 @ the lip, color change present

## 2022-04-10 NOTE — PROGRESS NOTES
Physical Therapy        Physical Therapy Cancel Note      DATE: 4/10/2022    NAME: Rachael Calvo  MRN: [id number]   : 1965      Patient not seen this date for Physical Therapy due to:    Patient is not appropriate for PT evaluation/treatment at this time d/t respiratory concerns. Will check back on .       Electronically signed by Raj Grace PT on 4/10/2022 at 7:49 AM

## 2022-04-10 NOTE — PROGRESS NOTES
Comprehensive Nutrition Assessment    Type and Reason for Visit:  Initial (LOS, vent)    Nutrition Recommendations/Plan: Start nutrition support as able; suggest Peptide Based formula goal 35 mL/hr while on propofol at current rate. Will follow/monitor care plans. Nutrition Assessment:  Pt admitted with chest pain, NSTEMI. PMH includes: HTN, HLD, CAD s/p stent, Stroke. S/p CABG on 4/8/22. Noted pt was reintubated today. No nutrition at present. Meds/labs reviewed. Malnutrition Assessment:  Malnutrition Status:  Insufficient data    Context:  Acute Illness       Estimated Daily Nutrient Needs:  Energy (kcal):  7427-4509 kcal/day; Weight Used for Energy Requirements:  Current     Protein (g):  70 g pro/day; Weight Used for Protein Requirements:  Ideal (1.5)        Fluid (ml/day):  2000 mL/day; Method Used for Fluid Requirements:  ml/Kg (30)      Nutrition Related Findings:  meds/labs reviewed      Wounds:  Surgical Incision (LLE, sternum)       Current Nutrition Therapies:    Diet NPO  Additional Calorie Sources:   Propofol at 19.8 mL/hr =523 kcal/day    Anthropometric Measures:  · Height: 5' 1\" (154.9 cm)  · Current Body Weight: 145 lb 8.1 oz (66 kg)   · Admission Body Weight: 141 lb (64 kg)    · Usual Body Weight: 142 lb (64.4 kg) (9/9/21)     · Ideal Body Weight: 105 lbs; % Ideal Body Weight 138.6 %   · BMI: 27.5  · BMI Categories: Overweight (BMI 25.0-29. 9)       Nutrition Diagnosis:   · Inadequate oral intake related to impaired respiratory function as evidenced by NPO or clear liquid status due to medical condition    Nutrition Interventions:   Food and/or Nutrient Delivery:  Start nutrition support as able; suggest Peptide Based formula goal 35 mL/hr while on propofol at current rate.   Nutrition Education/Counseling:  No recommendation at this time   Coordination of Nutrition Care:  Continue to monitor while inpatient    Goals:  meet % of estimated nutrient needs       Nutrition Monitoring and Evaluation:   Behavioral-Environmental Outcomes:  None Identified   Food/Nutrient Intake Outcomes:  Diet Advancement/Tolerance  Physical Signs/Symptoms Outcomes:  Biochemical Data,Nutrition Focused Physical Findings,Weight,Skin     Discharge Planning:     Too soon to determine     Electronically signed by Jer Aiken RD, LD on 4/10/22 at 4:29 PM EDT    Contact: [phone]

## 2022-04-10 NOTE — PROGRESS NOTES
[facility]  Speech Language Pathology    Date: 4/10/2022  Patient Name: America Keith  YOB: 1965   AGE: 61 y.o. MRN: [id number]        Patient Not Available for Speech Therapy     Due to:  [] Testing  [] Hemodialysis  [] Cancelled by RN  [] Surgery   [] Intubation/Sedation/Pain Medication  [x] Medical instability  [] Other:     Spoke with RN. Pt not appropriate for BSE at this time due to poor respiratory status. Will re-attempt as able and appropriate.      Next scheduled treatment: 4/11/22      Camila Cohn CCC-SLP   Speech-Language Pathologist

## 2022-04-10 NOTE — PROGRESS NOTES
BRONCHOSCOPE RIGID INTUB 4 REG 5/2.2 MM 23.6 IN [facility] DISP    Bedside bronchoscopy was performed by STERLING Mackay

## 2022-04-10 NOTE — PLAN OF CARE
Nutrition Problem #1: Inadequate oral intake  Intervention: Food and/or Nutrient Delivery:  (Start nutrition support as able; suggest Peptide Based formula goal 35 mL/hr while on propofol at current rate.)  Nutritional Goals: meet % of estimated nutrient needs

## 2022-04-10 NOTE — PROGRESS NOTES
PULMONARY & CRITICAL CARE MEDICINE PROGRESS NOTE     Patient:  Carlos Najera  MRN: [id number]  Admit date: 4/3/2022  Primary Care Physician: Janes Frederick MD  Consulting Physician: Gabino Yeager MD  CODE Status: Full Code  LOS: 7     SUBJECTIVE     CHIEF COMPLAINT/REASON FOR INITIAL CONSULT: Vent management    BRIEF HOSPITAL COURSE:   The patient is a 61 y.o. female with past medical history of smoking, CAD s/p stent, strong family history of heart disease, in the past initially presented with chest pain. Found to have elevated troponin. Patient required cardiac catheterization found to have multivessel disease. CT surgery was consulted and she was taken  to the OR, underwent CABG x3. Post CABG patient remained intubated and sedated. Pulmonology was consulted for vent management. Upon my evaluation, patient is intubated and sedated. She is on SIMV mode 24/380/5/50 ABG this morning showed 7.4/35/96/24. She is sedated with propofol at 20. She is off of pressor support. Post CABG patient had acute drop in hemoglobin requiring multiple transfusion. Chest x-ray showed left pleural effusion.   She still has a chest tube in place both in the pleural and mediastinal.    INTERVAL HISTORY:  04/10/22  Patient was extubated by CT surgery yesterday afternoon  Patient reportedly aspirated on apple sauce/medications last night  She required placement of BiPAP overnight  Oxygen requirement worsened through the night, this morning she has an FiO2 of 90% and was in moderate respiratory distress  Decision was made to reintubate her and start back on invasive mechanical ventilation  Bedside bronchoscopy showed moderate amount of mucus plugging in bilateral lower lobes    REVIEW OF SYSTEMS:  Unobtainable from patient due to sedation/mechanical ventilation    OBJECTIVE     VENTILATOR SETTINGS:  Vent Information  $Ventilation: (S) $Subsequent Day (re-intubattion)  Skin Assessment: Clean, dry, & intact  Equipment ID: [id number]  Equipment Changed: HME  Vent Type: Servo i  Vent Mode: (S) PRVC  Vt Ordered: 380 mL  Pressure Ordered: 5  Rate Set: 20 bmp  Pressure Support: 8 cmH20  FiO2 : 100 %  SpO2: (!) 84 %  SpO2/FiO2 ratio: 84  Sensitivity: 5  PEEP/CPAP: 10  I Time/ I Time %: 0.9 s  Humidification Source: HME  Nitric Oxide/Epoprostenol In Use?: No  Mask Type: Full face mask  Mask Size: Small     PaO2/FiO2 RATIO:  Recent Labs     04/10/22  1115   POCPO2 62.3*      FiO2 : 100 %     VITAL SIGNS:   LAST:  /74   Pulse 126   Temp 95.2 °F (35.1 °C)   Resp 20   Ht 5' 1\" (1.549 m)   Wt 145 lb 8.1 oz (66 kg)   SpO2 (!) 84%   BMI 27.49 kg/m²   8-24 HR RANGE:  TEMP No data recorded   BP Systolic (54ERG), KVB:692 , Min:99 , WILFREDO:009      Diastolic (45CEK), NAC:60, Min:66, Max:86     PULSE Pulse  Av.1  Min: 86  Max: 126   RR Resp  Av.2  Min: 20  Max: 39   O2 SAT SpO2  Av.1 %  Min: 84 %  Max: 100 %   OXYGEN DELIVERY No data recorded        Physical Exam  Constitutional:       General: She is not in acute distress. Appearance: She is ill-appearing. Interventions: She is sedated and intubated. HENT:      Head: Normocephalic and atraumatic. Eyes:      Conjunctiva/sclera: Conjunctivae normal.      Pupils: Pupils are equal, round, and reactive to light. Neck:      Thyroid: No thyromegaly. Vascular: No JVD. Cardiovascular:      Rate and Rhythm: Normal rate and regular rhythm. Pulses: Normal pulses. Heart sounds: Normal heart sounds, S1 normal and S2 normal. No murmur heard. Pulmonary:      Effort: She is intubated. Breath sounds: Decreased breath sounds present. Abdominal:      General: Bowel sounds are normal. There is no distension. Palpations: Abdomen is soft. There is no mass. Musculoskeletal:      Cervical back: Neck supple. Right lower leg: No edema. Left lower leg: No edema. Lymphadenopathy:      Cervical: No cervical adenopathy.    Skin:     Coloration: Skin is not jaundiced or pale. Findings: No rash. Nails: There is no clubbing. Neurological:      General: No focal deficit present. DATA REVIEW     Medications:  Scheduled Meds:   levofloxacin  750 mg IntraVENous Q24H    insulin lispro  0-6 Units SubCUTAneous TID WC    insulin lispro  0-3 Units SubCUTAneous Nightly    sodium chloride flush  5-40 mL IntraVENous 2 times per day    sodium chloride flush  5-40 mL IntraVENous 2 times per day    sodium chloride flush  10 mL IntraVENous 2 times per day    aspirin  81 mg Oral Daily    clopidogrel  75 mg Oral Daily    amiodarone  200 mg Oral TID    metoprolol tartrate  25 mg Oral BID    atorvastatin  20 mg Oral Nightly    pantoprazole  40 mg Oral Daily    pantoprazole (PROTONIX) 40 mg injection  40 mg IntraVENous Daily    ipratropium-albuterol  1 ampule Inhalation Q4H WA    insulin glargine  0.15 Units/kg SubCUTAneous Nightly    senna  1 tablet Oral Once    [Held by provider] hydroCHLOROthiazide  25 mg Oral Daily    [Held by provider] lisinopril  40 mg Oral Daily    potassium chloride  10 mEq Oral Once    lovastatin  10 mg Oral Nightly    sodium chloride flush  5-40 mL IntraVENous 2 times per day    [Held by provider] amLODIPine  10 mg Oral Daily    nicotine  1 patch TransDERmal Daily     Continuous Infusions:   dexmedetomidine 0.3 mcg/kg/hr (04/10/22 0607)    sodium chloride      sodium chloride Stopped (04/10/22 0533)    propofol Stopped (04/09/22 1139)    dextrose      norepinephrine      EPINEPHrine      sodium chloride 20 mL/hr at 04/04/22 0408       INPUT/OUTPUT:  In: 968.8 [I.V.:544.2]  Out: 1930 [Urine:1500]  Date 04/10/22 0000 - 04/10/22 2359   Shift 5966-8241 8220-7838 7803-2885 24 Hour Total   INTAKE   I.V.(mL/kg) 238.3(3.6)   238.3(3.6)   IV Piggyback(mL/kg) 227. 8(3.5)   227. 8(3.5)   Shift Total(mL/kg) 466(7.1)   466(7.1)   OUTPUT   Urine(mL/kg/hr) 500(0.9)   500   Chest Tube 80   80   Shift Total(mL/kg) 580(8.8)   580(8.8)   Weight (kg) 66 66 66 66        LABS:  ABGs:   Recent Labs     04/09/22  0953 04/09/22  1300 04/10/22  0748 04/10/22  0916 04/10/22  1115   POCPH 7.402 7.326* 7.393 7.376 7.399   POCPCO2 35.9 46.4 36.5 35.7 33.8*   POCPO2 96.9 81.6* 49.9* 91.8 62.3*   POCHCO3 22.3 24.2 22.3 20.9* 20.9*   OOAF8GXO 98 95 85* 97 92*     CBC:   Recent Labs     04/08/22  1714 04/08/22  1714 04/08/22  2127 04/09/22  0214 04/09/22  0457 04/10/22  0242 04/10/22  0929   WBC 9.7  --  13.3* 11.3 11.6* 14.2*  --    HGB 6.8*   < > 10.3* 8.8* 9.1* 9.0* 8.6*   HCT 20.6*   < > 31.7* 26.1* 27.2* 27.5* 25.7*   MCV 90.7  --  91.6 89.4 89.8 91.4  --    *  106*  --  115*  115* 99* 101* See Reflexed IPF Result  --    LYMPHOPCT  --   --   --   --  8* 9*  --    RBC 2.27*  --  3.46* 2.92* 3.03* 3.01*  --    MCH 30.0  --  29.8 30.1 30.0 29.9  --    MCHC 33.0  --  32.5 33.7 33.5 32.7  --    RDW 16.3*  --  14.7* 15.0* 15.3* 15.7*  --     < > = values in this interval not displayed. CRP:   No results for input(s): CRP in the last 72 hours. LDH:   No results for input(s): LDH in the last 72 hours. BMP:   Recent Labs     04/08/22  1449 04/08/22  1458 04/08/22  1458 04/08/22  1643 04/08/22  1714 04/08/22 2128 04/09/22  0214 04/09/22  0457 04/10/22  0242   NA  --  145*  --   --  148* 141  --  140 145*   K  --  4.2   < >  --  4.1 4.5 4.1 4.3 4.1   CL  --  116*  --   --  117* 110*  --  110* 112*   CO2  --  21  --   --  22 23 -- 22 20   BUN  --  11  --   --  11 11  --  12 13   CREATININE   < > 0.84  --  0.98 0.82 0.81  --  0.78 0.77   GLUCOSE  --  104*  --   --  118* 162*  --  105* 139*    < > = values in this interval not displayed.      Liver Function Test:   Recent Labs     04/08/22 1714 04/09/22  0457   PROT 4.9* 5.3*   LABALBU 4.1 4.1   ALT 13 33   AST 27 75*   ALKPHOS 22* 25*   BILITOT 0.96 1.94*     Coagulation Profile:   Recent Labs     04/08/22  1458 04/08/22  1714 04/08/22 2127 04/09/22  0457 04/10/22  0242   INR 1.5 1.5 0.9 0.9 1.2   PROTIME 15.8* 15.2* 9.5 10.0 12.2   APTT 44.8* 40.8* 28.2  --   --      D-Dimer:  No results for input(s): DDIMER in the last 72 hours. Lactic Acid:  No results for input(s): LACTA in the last 72 hours. Cardiac Enzymes:  No results for input(s): CKTOTAL, CKMB, CKMBINDEX, TROPONINI in the last 72 hours. Invalid input(s): TROPONIN, HSTROP  BNP/ProBNP:   No results for input(s): BNP, PROBNP in the last 72 hours. Triglycerides:  No results for input(s): TRIG in the last 72 hours. Microbiology:  Urine Culture:  No components found for: CURINE  Blood Culture:  No components found for: CBLOOD, CFUNGUSBL  Sputum Culture:  No components found for: [facility]  Recent Labs     04/10/22  0249 04/10/22  0249 04/10/22  0925   SPECDESC . BLOOD   < > .EXPECTORATED SPUTUM   SPECIAL R AC 1ML  --   --    CULTURE NO GROWTH <24 HRS   < > PENDING    < > = values in this interval not displayed. Recent Labs     04/10/22  0238 04/10/22  0249 04/10/22  0925   SPECDESC . BLOOD . BLOOD . EXPECTORATED SPUTUM   SPECIAL L WRIST 1ML R AC 1ML  --    CULTURE NO GROWTH <24 HRS NO GROWTH <24 HRS PENDING        Pathology:    Radiology Reports:  XR CHEST PORTABLE   Final Result   Interval progression detailed above. XR CHEST PORTABLE   Final Result   Interim removal of the endotracheal and NG tubes. Chest tubes unchanged. New mild airspace opacity at the base of the right upper lobe. Pulmonary   hemorrhage or aspiration not excluded. Improved aeration of the left lung and possibly decreased left pleural   effusion. XR CHEST PORTABLE   Final Result      XR CHEST PORTABLE   Final Result   1. Lines and tubes in expected position as above. 2. No acute pulmonary abnormality.          VL DUP UPPER EXTREMITY ARTERIES BILATERAL   Final Result      VL DUP CAROTID BILATERAL   Final Result      VL VEIN MAPPING LOWER BILATERAL   Final Result      US HEAD NECK SOFT TISSUE THYROID   Final Result   Slightly heterogeneous thyroid gland with a 2.9 cm right thyroid lobe cyst   corresponding to the prior imaging studies. No FNA or follow-up imaging is   recommended based on TI rads criteria. RECOMMENDATIONS:   ACR TI-RADS recommendations:      TR5 (>= 7 points):  FNA if >= 1 cm; follow-up if 0.5-0.9 cm in 1, 2, 3, 4,   and 5 years      TR4 (4-6 points):  FNA if >= 1.5 cm; follow-up if 1.0-1.4 cm in 1, 2, 3, and   5 years      TR3 (3 points):  FNA if >= 2.5 cm; follow-up if 1.5-2.4 cm in 1, 3, and 5   years      TR2 (2 points):  No FNA or follow-up      TR1 (0 points):  No FNA or follow-up      ACR TI-RADS recommends that no more than two nodules with the highest ACR   TI-RADS point total should be biopsied and no more than four nodules should   be followed. CT CHEST WO CONTRAST   Final Result   incidental 3 cm hypodense lesion of the right lobe of the thyroid. See below   for recommendation. Calcified granuloma in the right middle lobe. Examination otherwise unremarkable. RECOMMENDATIONS:   3 cm incidental right thyroid nodule. Recommend thyroid US. Reference: J Am Carlitos Radiol. 2015 Feb;12(2): 143-50         XR CHEST PORTABLE   Final Result   No acute process. XR CHEST PORTABLE    (Results Pending)        Echocardiogram:   Results for orders placed during the hospital encounter of 04/03/22    ECHO Complete 2D W Doppler W Color    CONCLUSIONS    Summary  Normal LV size , mildly increased wall thickness. No obvious wall motion abnormality seen. Normal LV systolic function with LVEF >55%. Normal RV size and function. LA and RA appears normal in size. No obvious significant structural valvular abnormality noted. No significant valvular stenosis or regurgitation noted. Normal aortic root dimension. No significant pericardial effusion noted. No obvious intra-cardiac mass or shunt noted. IVC normal diameter and inspiratory variation indicating normal RA filling  pressure. ASSESSMENT AND PLAN     Assessment:    // Acute hypoxic respiratory failure, s/p reintubation  // Bilateral aspiration pneumonia  // S/p bronchoscopic clearance of bilateral mucus plugging (4/10/2022)  // Coronary disease, s/p CABG x3 (4/8/2022)  // Postoperative blood loss anemia, s/p multiple transfusion  // Thyroid nodule    Plan:    I personally interviewed/examined the patient; reviewed interval history, interpreted all available radiographic and laboratory data at the time of service. Patient was extubated yesterday afternoon  She reportedly aspirated on applesauce last evening and had to be started on BiPAP  Respiratory status worsened overnight  She required reintubation and was put back on mechanical ventilation  S/p bronchoscopic inspection and clearance of bilateral mucous plugging  Remains hemodynamically stable  Continue antiplatelet/anticoagulation/antiarrhythmics as per cardiology recommendations  Chest tube management as per CT surgery  Continue lung protective mechanical ventilation   Appropriate changes made in ventilator settings  Wean FiO2/PEEP as tolerated  Monitor endotracheal secretions   Obtain X-ray chest as needed   Continue pulmonary toilet, aspiration precautions and bronchodilators  Continue to monitor I/O with a goal of even/negative fluid balance  Recommend to resume tube feeds  Stress ulcer prophylaxis  Chemical DVT prophylaxis; Lovenox  Antimicrobials reviewed; received vancomycin/Ancef perioperatively, currently on Levaquin  Glycemic control appropriate  Skin/wound care reviewed with the nursing staff  Physical/occupational therapy    The patient remains critically ill with illness/injury that acutely impairs one or more vital organ systems, such that there is a high probability of imminent or life threatening deterioration in the patient's condition.  Critical care time of 50 minutes was spent (excluding procedures), in coordination of care during bedside rounds and discussion of patient care in detail, and recommendations of the team were adopted in the plan. Necessity of all invasive devices was also confirmed. Jace De La Rosa MD  Pulmonary and Critical Care Medicine           4/10/2022, 12:38 PM    Please note that this chart was generated using voice recognition Dragon dictation software. Although every effort was made to ensure the accuracy of this automated transcription, some errors in transcription may have occurred.

## 2022-04-11 ENCOUNTER — APPOINTMENT (OUTPATIENT)
Dept: GENERAL RADIOLOGY | Age: 57
DRG: 233 | End: 2022-04-11
Payer: MEDICARE

## 2022-04-11 LAB
ABSOLUTE EOS #: 0 K/UL (ref 0–0.4)
ABSOLUTE IMMATURE GRANULOCYTE: 0 K/UL (ref 0–0.3)
ABSOLUTE LYMPH #: 1.77 K/UL (ref 1–4.8)
ABSOLUTE MONO #: 0.94 K/UL (ref 0.1–0.8)
ALLEN TEST: ABNORMAL
ANION GAP SERPL CALCULATED.3IONS-SCNC: 7 MMOL/L (ref 9–17)
BASOPHILS # BLD: 0 % (ref 0–2)
BASOPHILS ABSOLUTE: 0 K/UL (ref 0–0.2)
BLD PROD TYP BPU: NORMAL
BPU ID: NORMAL
BUN BLDV-MCNC: 21 MG/DL (ref 6–20)
CALCIUM SERPL-MCNC: 8.1 MG/DL (ref 8.6–10.4)
CHLORIDE BLD-SCNC: 109 MMOL/L (ref 98–107)
CO2: 22 MMOL/L (ref 20–31)
CREAT SERPL-MCNC: 0.82 MG/DL (ref 0.5–0.9)
DISPENSE STATUS BLOOD BANK: NORMAL
EOSINOPHILS RELATIVE PERCENT: 0 % (ref 1–4)
FIO2: 100
GFR AFRICAN AMERICAN: >60 ML/MIN
GFR NON-AFRICAN AMERICAN: >60 ML/MIN
GFR SERPL CREATININE-BSD FRML MDRD: ABNORMAL ML/MIN/{1.73_M2}
GLUCOSE BLD-MCNC: 106 MG/DL (ref 65–105)
GLUCOSE BLD-MCNC: 107 MG/DL (ref 70–99)
GLUCOSE BLD-MCNC: 83 MG/DL (ref 65–105)
GLUCOSE BLD-MCNC: 89 MG/DL (ref 74–100)
GLUCOSE BLD-MCNC: 94 MG/DL (ref 65–105)
GLUCOSE BLD-MCNC: 97 MG/DL (ref 65–105)
HCT VFR BLD CALC: 21.8 % (ref 36.3–47.1)
HCT VFR BLD CALC: 25.8 % (ref 36.3–47.1)
HCT VFR BLD CALC: 27.9 % (ref 36.3–47.1)
HEMOGLOBIN: 7.2 G/DL (ref 11.9–15.1)
HEMOGLOBIN: 8.6 G/DL (ref 11.9–15.1)
HEMOGLOBIN: 9 G/DL (ref 11.9–15.1)
IMMATURE GRANULOCYTES: 0 %
INR BLD: 1.1
LYMPHOCYTES # BLD: 15 % (ref 24–44)
MAGNESIUM: 2.7 MG/DL (ref 1.6–2.6)
MCH RBC QN AUTO: 30.1 PG (ref 25.2–33.5)
MCHC RBC AUTO-ENTMCNC: 33 G/DL (ref 28.4–34.8)
MCV RBC AUTO: 91.2 FL (ref 82.6–102.9)
MODE: ABNORMAL
MONOCYTES # BLD: 8 % (ref 1–7)
MORPHOLOGY: ABNORMAL
NEGATIVE BASE EXCESS, ART: 4 (ref 0–2)
NRBC AUTOMATED: 0 PER 100 WBC
O2 DEVICE/FLOW/%: ABNORMAL
PDW BLD-RTO: 15.6 % (ref 11.8–14.4)
PLATELET # BLD: ABNORMAL K/UL (ref 138–453)
PLATELET, FLUORESCENCE: 111 K/UL (ref 138–453)
PLATELET, IMMATURE FRACTION: 5.8 % (ref 1.1–10.3)
POC ANGLE TEG W HEP: 54.1 DEG (ref 59–74)
POC ANGLE TEG W HEP: 69.1 DEG (ref 59–74)
POC ANGLE TEG: 54.2 DEG (ref 59–74)
POC ANGLE TEG: 65.7 DEG (ref 59–74)
POC HCO3: 20.1 MMOL/L (ref 21–28)
POC KINETICS TEG W HEP: 1.5 MIN (ref 1–3)
POC KINETICS TEG W HEP: 2.5 MIN (ref 1–3)
POC KINETICS TEG: 1.8 MIN (ref 1–3)
POC KINETICS TEG: 2.7 MIN (ref 1–3)
POC MA(MAX CLOT) TEG: 52 MM (ref 55–74)
POC MA(MAX CLOT) TEG: 69 MM (ref 55–74)
POC MAX CLOT TEG W HEP: 52.3 MM (ref 55–74)
POC MAX CLOT TEG W HEP: 70.8 MM (ref 55–74)
POC O2 SATURATION: 100 % (ref 94–98)
POC PCO2: 33.4 MM HG (ref 35–48)
POC PH: 7.39 (ref 7.35–7.45)
POC PO2: 183.5 MM HG (ref 83–108)
POC REACTION TIME TEG W HEP: 10.2 MIN (ref 4–9)
POC REACTION TIME TEG W HEP: 13.7 MIN (ref 4–9)
POC REACTION TIME TEG: 10 MIN (ref 4–9)
POC REACTION TIME TEG: 14.9 MIN (ref 4–9)
POTASSIUM SERPL-SCNC: 3.9 MMOL/L (ref 3.7–5.3)
POTASSIUM SERPL-SCNC: 4.5 MMOL/L (ref 3.7–5.3)
PROTHROMBIN TIME: 11.9 SEC (ref 9.1–12.3)
RBC # BLD: 2.39 M/UL (ref 3.95–5.11)
REASON FOR REJECTION: NORMAL
SAMPLE SITE: ABNORMAL
SEG NEUTROPHILS: 77 % (ref 36–66)
SEGMENTED NEUTROPHILS ABSOLUTE COUNT: 9.09 K/UL (ref 1.8–7.7)
SODIUM BLD-SCNC: 138 MMOL/L (ref 135–144)
TRANSFUSION STATUS: NORMAL
UNIT DIVISION: 0
WBC # BLD: 11.8 K/UL (ref 3.5–11.3)
ZZ NTE CLEAN UP: ORDERED TEST: NORMAL
ZZ NTE WITH NAME CLEAN UP: SPECIMEN SOURCE: NORMAL

## 2022-04-11 PROCEDURE — 82803 BLOOD GASES ANY COMBINATION: CPT

## 2022-04-11 PROCEDURE — 2700000000 HC OXYGEN THERAPY PER DAY

## 2022-04-11 PROCEDURE — 6370000000 HC RX 637 (ALT 250 FOR IP): Performed by: NURSE PRACTITIONER

## 2022-04-11 PROCEDURE — 6370000000 HC RX 637 (ALT 250 FOR IP): Performed by: STUDENT IN AN ORGANIZED HEALTH CARE EDUCATION/TRAINING PROGRAM

## 2022-04-11 PROCEDURE — 2580000003 HC RX 258: Performed by: NURSE PRACTITIONER

## 2022-04-11 PROCEDURE — 99024 POSTOP FOLLOW-UP VISIT: CPT | Performed by: PHYSICIAN ASSISTANT

## 2022-04-11 PROCEDURE — 6360000002 HC RX W HCPCS: Performed by: INTERNAL MEDICINE

## 2022-04-11 PROCEDURE — 94761 N-INVAS EAR/PLS OXIMETRY MLT: CPT

## 2022-04-11 PROCEDURE — A4216 STERILE WATER/SALINE, 10 ML: HCPCS | Performed by: NURSE PRACTITIONER

## 2022-04-11 PROCEDURE — 36430 TRANSFUSION BLD/BLD COMPNT: CPT

## 2022-04-11 PROCEDURE — C9113 INJ PANTOPRAZOLE SODIUM, VIA: HCPCS | Performed by: NURSE PRACTITIONER

## 2022-04-11 PROCEDURE — 94640 AIRWAY INHALATION TREATMENT: CPT

## 2022-04-11 PROCEDURE — 85055 RETICULATED PLATELET ASSAY: CPT

## 2022-04-11 PROCEDURE — 2580000003 HC RX 258: Performed by: STUDENT IN AN ORGANIZED HEALTH CARE EDUCATION/TRAINING PROGRAM

## 2022-04-11 PROCEDURE — 83735 ASSAY OF MAGNESIUM: CPT

## 2022-04-11 PROCEDURE — 85014 HEMATOCRIT: CPT

## 2022-04-11 PROCEDURE — 2100000001 HC CVICU R&B

## 2022-04-11 PROCEDURE — 37799 UNLISTED PX VASCULAR SURGERY: CPT

## 2022-04-11 PROCEDURE — 85018 HEMOGLOBIN: CPT

## 2022-04-11 PROCEDURE — 86900 BLOOD TYPING SEROLOGIC ABO: CPT

## 2022-04-11 PROCEDURE — P9016 RBC LEUKOCYTES REDUCED: HCPCS

## 2022-04-11 PROCEDURE — 99291 CRITICAL CARE FIRST HOUR: CPT | Performed by: INTERNAL MEDICINE

## 2022-04-11 PROCEDURE — 2500000003 HC RX 250 WO HCPCS: Performed by: INTERNAL MEDICINE

## 2022-04-11 PROCEDURE — 6360000002 HC RX W HCPCS

## 2022-04-11 PROCEDURE — 82947 ASSAY GLUCOSE BLOOD QUANT: CPT

## 2022-04-11 PROCEDURE — 6360000002 HC RX W HCPCS: Performed by: THORACIC SURGERY (CARDIOTHORACIC VASCULAR SURGERY)

## 2022-04-11 PROCEDURE — 6360000002 HC RX W HCPCS: Performed by: PHYSICIAN ASSISTANT

## 2022-04-11 PROCEDURE — 6370000000 HC RX 637 (ALT 250 FOR IP): Performed by: INTERNAL MEDICINE

## 2022-04-11 PROCEDURE — 94003 VENT MGMT INPAT SUBQ DAY: CPT

## 2022-04-11 PROCEDURE — 6360000002 HC RX W HCPCS: Performed by: NURSE PRACTITIONER

## 2022-04-11 PROCEDURE — 71045 X-RAY EXAM CHEST 1 VIEW: CPT

## 2022-04-11 PROCEDURE — 85610 PROTHROMBIN TIME: CPT

## 2022-04-11 PROCEDURE — 84132 ASSAY OF SERUM POTASSIUM: CPT

## 2022-04-11 PROCEDURE — 85025 COMPLETE CBC W/AUTO DIFF WBC: CPT

## 2022-04-11 PROCEDURE — 80048 BASIC METABOLIC PNL TOTAL CA: CPT

## 2022-04-11 RX ORDER — SODIUM CHLORIDE 9 MG/ML
INJECTION, SOLUTION INTRAVENOUS PRN
Status: DISCONTINUED | OUTPATIENT
Start: 2022-04-11 | End: 2022-04-13

## 2022-04-11 RX ORDER — FUROSEMIDE 10 MG/ML
20 INJECTION INTRAMUSCULAR; INTRAVENOUS 2 TIMES DAILY
Status: DISCONTINUED | OUTPATIENT
Start: 2022-04-11 | End: 2022-04-11 | Stop reason: SDUPTHER

## 2022-04-11 RX ORDER — VANCOMYCIN HYDROCHLORIDE 1 G/200ML
1000 INJECTION, SOLUTION INTRAVENOUS EVERY 12 HOURS
Status: DISCONTINUED | OUTPATIENT
Start: 2022-04-11 | End: 2022-04-12

## 2022-04-11 RX ORDER — FUROSEMIDE 10 MG/ML
INJECTION INTRAMUSCULAR; INTRAVENOUS
Status: COMPLETED
Start: 2022-04-11 | End: 2022-04-11

## 2022-04-11 RX ORDER — FUROSEMIDE 10 MG/ML
20 INJECTION INTRAMUSCULAR; INTRAVENOUS 2 TIMES DAILY
Status: DISCONTINUED | OUTPATIENT
Start: 2022-04-11 | End: 2022-04-16 | Stop reason: HOSPADM

## 2022-04-11 RX ADMIN — ATORVASTATIN CALCIUM 20 MG: 20 TABLET, FILM COATED ORAL at 20:20

## 2022-04-11 RX ADMIN — AMIODARONE HYDROCHLORIDE 200 MG: 200 TABLET ORAL at 20:20

## 2022-04-11 RX ADMIN — DOCUSATE SODIUM LIQUID 100 MG: 100 LIQUID ORAL at 16:39

## 2022-04-11 RX ADMIN — POLYETHYLENE GLYCOL 3350 17 G: 17 POWDER, FOR SOLUTION ORAL at 08:18

## 2022-04-11 RX ADMIN — AMIODARONE HYDROCHLORIDE 200 MG: 200 TABLET ORAL at 13:47

## 2022-04-11 RX ADMIN — LEVOFLOXACIN 750 MG: 5 INJECTION, SOLUTION INTRAVENOUS at 08:25

## 2022-04-11 RX ADMIN — SODIUM CHLORIDE, PRESERVATIVE FREE 10 ML: 5 INJECTION INTRAVENOUS at 20:40

## 2022-04-11 RX ADMIN — LOVASTATIN 10 MG: 20 TABLET ORAL at 20:20

## 2022-04-11 RX ADMIN — OXYCODONE HYDROCHLORIDE AND ACETAMINOPHEN 2 TABLET: 5; 325 TABLET ORAL at 20:19

## 2022-04-11 RX ADMIN — IPRATROPIUM BROMIDE AND ALBUTEROL SULFATE 1 AMPULE: .5; 3 SOLUTION RESPIRATORY (INHALATION) at 19:45

## 2022-04-11 RX ADMIN — FUROSEMIDE 20 MG: 10 INJECTION, SOLUTION INTRAMUSCULAR; INTRAVENOUS at 12:17

## 2022-04-11 RX ADMIN — IPRATROPIUM BROMIDE AND ALBUTEROL SULFATE 1 AMPULE: .5; 3 SOLUTION RESPIRATORY (INHALATION) at 15:22

## 2022-04-11 RX ADMIN — SODIUM CHLORIDE, PRESERVATIVE FREE 40 MG: 5 INJECTION INTRAVENOUS at 08:19

## 2022-04-11 RX ADMIN — VANCOMYCIN HYDROCHLORIDE 1000 MG: 1 INJECTION, SOLUTION INTRAVENOUS at 12:16

## 2022-04-11 RX ADMIN — OXYCODONE HYDROCHLORIDE AND ACETAMINOPHEN 2 TABLET: 5; 325 TABLET ORAL at 12:26

## 2022-04-11 RX ADMIN — Medication 81 MG: at 08:18

## 2022-04-11 RX ADMIN — PROPOFOL 10 MCG/KG/MIN: 10 INJECTION, EMULSION INTRAVENOUS at 11:49

## 2022-04-11 RX ADMIN — SODIUM CHLORIDE, PRESERVATIVE FREE 10 ML: 5 INJECTION INTRAVENOUS at 08:19

## 2022-04-11 RX ADMIN — MAGNESIUM HYDROXIDE 30 ML: 400 SUSPENSION ORAL at 08:18

## 2022-04-11 RX ADMIN — METOPROLOL TARTRATE 25 MG: 25 TABLET ORAL at 08:18

## 2022-04-11 RX ADMIN — ACETAMINOPHEN 650 MG: 650 SUPPOSITORY RECTAL at 00:14

## 2022-04-11 RX ADMIN — OXYCODONE HYDROCHLORIDE AND ACETAMINOPHEN 2 TABLET: 5; 325 TABLET ORAL at 08:18

## 2022-04-11 RX ADMIN — IPRATROPIUM BROMIDE AND ALBUTEROL SULFATE 1 AMPULE: .5; 3 SOLUTION RESPIRATORY (INHALATION) at 07:59

## 2022-04-11 RX ADMIN — IPRATROPIUM BROMIDE AND ALBUTEROL SULFATE 1 AMPULE: .5; 3 SOLUTION RESPIRATORY (INHALATION) at 11:20

## 2022-04-11 RX ADMIN — POTASSIUM CHLORIDE 20 MEQ: 400 INJECTION, SOLUTION INTRAVENOUS at 19:42

## 2022-04-11 RX ADMIN — FENTANYL CITRATE 25 MCG: 50 INJECTION, SOLUTION INTRAMUSCULAR; INTRAVENOUS at 23:03

## 2022-04-11 RX ADMIN — CEFEPIME HYDROCHLORIDE 2000 MG: 2 INJECTION, POWDER, FOR SOLUTION INTRAVENOUS at 17:46

## 2022-04-11 RX ADMIN — OXYCODONE HYDROCHLORIDE AND ACETAMINOPHEN 2 TABLET: 5; 325 TABLET ORAL at 02:14

## 2022-04-11 RX ADMIN — CLOPIDOGREL 75 MG: 75 TABLET, FILM COATED ORAL at 08:18

## 2022-04-11 RX ADMIN — SODIUM CHLORIDE 25 ML: 9 INJECTION, SOLUTION INTRAVENOUS at 12:14

## 2022-04-11 RX ADMIN — OXYCODONE HYDROCHLORIDE AND ACETAMINOPHEN 2 TABLET: 5; 325 TABLET ORAL at 16:29

## 2022-04-11 RX ADMIN — SODIUM CHLORIDE, PRESERVATIVE FREE 20 ML: 5 INJECTION INTRAVENOUS at 20:40

## 2022-04-11 RX ADMIN — FUROSEMIDE 20 MG: 10 INJECTION, SOLUTION INTRAMUSCULAR; INTRAVENOUS at 21:50

## 2022-04-11 RX ADMIN — VANCOMYCIN HYDROCHLORIDE 1000 MG: 1 INJECTION, SOLUTION INTRAVENOUS at 21:49

## 2022-04-11 RX ADMIN — AMIODARONE HYDROCHLORIDE 200 MG: 200 TABLET ORAL at 08:18

## 2022-04-11 RX ADMIN — FENTANYL CITRATE 25 MCG: 50 INJECTION, SOLUTION INTRAMUSCULAR; INTRAVENOUS at 01:13

## 2022-04-11 RX ADMIN — METOPROLOL TARTRATE 25 MG: 25 TABLET ORAL at 20:20

## 2022-04-11 RX ADMIN — SODIUM CHLORIDE 25 ML: 9 INJECTION, SOLUTION INTRAVENOUS at 08:23

## 2022-04-11 RX ADMIN — POTASSIUM CHLORIDE 20 MEQ: 400 INJECTION, SOLUTION INTRAVENOUS at 19:35

## 2022-04-11 ASSESSMENT — PULMONARY FUNCTION TESTS
PIF_VALUE: 22
PIF_VALUE: 26
PIF_VALUE: 22
PIF_VALUE: 23
PIF_VALUE: 27
PIF_VALUE: 21
PIF_VALUE: 27
PIF_VALUE: 26
PIF_VALUE: 26
PIF_VALUE: 30
PIF_VALUE: 26
PIF_VALUE: 21
PIF_VALUE: 26
PIF_VALUE: 29

## 2022-04-11 ASSESSMENT — PAIN SCALES - GENERAL
PAINLEVEL_OUTOF10: 10
PAINLEVEL_OUTOF10: 7
PAINLEVEL_OUTOF10: 6
PAINLEVEL_OUTOF10: 4
PAINLEVEL_OUTOF10: 7
PAINLEVEL_OUTOF10: 10
PAINLEVEL_OUTOF10: 10

## 2022-04-11 NOTE — PROGRESS NOTES
Physical Therapy        Physical Therapy Cancel Note      DATE: 2022    NAME: Brittnee Melendez  MRN: [id number]   : 1965      Patient not seen this date for Physical Therapy due to: Other: pt not medically appropriate for PT evaluation at this time, pt re-intubated due to respiratory distress. PT will check back 22 for evaluation appropriateness.        Electronically signed by Gaby Rain PT on 2022 at 9:44 AM

## 2022-04-11 NOTE — CARE COORDINATION
Pt is intubated, fiO2 50%, PEEP 8, sedated. No family present. Continue to follow for transition needs. Will likely need SNF choices.

## 2022-04-11 NOTE — PROGRESS NOTES
[facility]   Progress Note                    Date:   4/11/2022  Patient name:  Yanni Chen  Date of admission:  4/3/2022  6:04 PM  MRN:   [id number]  YOB: 1965  PCP:    Radha Menchaca MD    Reason for Admission:  NSTEMI (non-ST elevated myocardial infarction) ([facility] [address] [I21.4]    Subjective:      Clinical Changes / Abnormalities:    Patient seen and examined at bedside. No acute events overnight. Remains intubated and sedated. O2 being weaned. Currently on antibiotics due to fever and concern for aspiration PNA.     Urine output in the last 24 hours:     Intake/Output Summary (Last 24 hours) at 4/11/2022 0751  Last data filed at 4/11/2022 0600  Gross per 24 hour   Intake 3101.22 ml   Output 1205 ml   Net 1896.22 ml     I/O since admission: +1.8 liters      Medications:   Scheduled Meds:   levofloxacin  750 mg IntraVENous Q24H    insulin lispro  0-6 Units SubCUTAneous TID WC    insulin lispro  0-3 Units SubCUTAneous Nightly    sodium chloride flush  5-40 mL IntraVENous 2 times per day    sodium chloride flush  5-40 mL IntraVENous 2 times per day    sodium chloride flush  10 mL IntraVENous 2 times per day    aspirin  81 mg Oral Daily    clopidogrel  75 mg Oral Daily    amiodarone  200 mg Oral TID    metoprolol tartrate  25 mg Oral BID    atorvastatin  20 mg Oral Nightly    pantoprazole  40 mg Oral Daily    pantoprazole (PROTONIX) 40 mg injection  40 mg IntraVENous Daily    ipratropium-albuterol  1 ampule Inhalation Q4H WA    insulin glargine  0.15 Units/kg SubCUTAneous Nightly    senna  1 tablet Oral Once    [Held by provider] hydroCHLOROthiazide  25 mg Oral Daily    [Held by provider] lisinopril  40 mg Oral Daily    potassium chloride  10 mEq Oral Once    lovastatin  10 mg Oral Nightly    sodium chloride flush  5-40 mL IntraVENous 2 times per day    [Held by provider] amLODIPine  10 mg Oral Daily    nicotine  1 patch TransDERmal Daily     Continuous Infusions:   propofol 15 mcg/kg/min (04/11/22 0600)    dexmedetomidine Stopped (04/10/22 1158)    sodium chloride      sodium chloride Stopped (04/11/22 0447)    dextrose      norepinephrine      EPINEPHrine      sodium chloride 20 mL/hr at 04/04/22 0408     CBC:   Recent Labs     04/09/22  0457 04/09/22  0457 04/10/22  0242 04/10/22  0929 04/11/22 0358   WBC 11.6*  --  14.2*  --  11.8*   HGB 9.1*   < > 9.0* 8.6* 7.2*   *  --  See Reflexed IPF Result  --  See Reflexed IPF Result    < > = values in this interval not displayed. BMP:    Recent Labs     04/09/22  0457 04/10/22  0242 04/11/22  0358    145* 138   K 4.3 4.1 4.5   * 112* 109*   CO2 22 20 22   BUN 12 13 21*   CREATININE 0.78 0.77 0.82   GLUCOSE 105* 139* 107*     Hepatic:   Recent Labs     04/08/22  1714 04/09/22 0457   AST 27 75*   ALT 13 33   BILITOT 0.96 1.94*   ALKPHOS 22* 25*     Troponin: No results for input(s): TROPONINI in the last 72 hours. No results for input(s): TROPONINT in the last 72 hours. BNP: No results for input(s): PROBNP in the last 72 hours. No results for input(s): BNP in the last 72 hours. Lipids: No results for input(s): CHOL, HDL in the last 72 hours. Invalid input(s): LDLCALCU  INR:   Recent Labs     04/09/22  0457 04/10/22  0242 04/11/22  0358   INR 0.9 1.2 1.1       Objective:   Vitals: /81   Pulse 104   Temp 99.3 °F (37.4 °C) (Bladder)   Resp 28   Ht 5' 1\" (1.549 m)   Wt 154 lb 12.2 oz (70.2 kg)   SpO2 96%   BMI 29.24 kg/m²    Last Recorded Weight:  [unfilled]    Constitutional and General Appearance:    Intubated and sedated  Respiratory:  · No for increased work of breathing. · On auscultation: dimnished breath sounds bilaterally  Cardiovascular:  · The apical impulse is not displaced  · Heart tones are crisp and normal. Regular S1 and S2. No murmurs.    Abdomen:   · No masses or tenderness  · Bowel sounds present  Extremities:  ·  No Cyanosis or Clubbing  ·  Lower extremity edema: No  ·  Skin: Warm and dry  Neurological:  · Intubated and sedated    Diagnostic Studies:       ECHO:   Summary  Normal LV size , mildly increased wall thickness. No obvious wall motion abnormality seen. Normal LV systolic function with LVEF >55%. Normal RV size and function. LA and RA appears normal in size. No obvious significant structural valvular abnormality noted. No significant valvular stenosis or regurgitation noted. Normal aortic root dimension. No significant pericardial effusion noted. No obvious intra-cardiac mass or shunt noted. IVC normal diameter and inspiratory variation indicating normal RA filling  pressure. Cardiac Angiography:  LMCA: Distal 30-40% stenosis     LAD: Mid stent stenosis 80%     LCx: Mid area 90% after the OM take off  OM1: in stent stenosis 90%     RCA: Proximal 75% stenosis  Mid area ulcerated plaques with 60% stenosis  PL branch ostial / proximal 90% stenosis      Coronary Tree      Dominance: Right     LV Analysis  LV function assessed as:Normal.  Ejection Fraction      Assessment / Acute Cardiac Problems:   1.  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  HTN  3.  HL  4.  active smoker  5. Post op anemia s/p 3  Units pRBCs, 1 unit cryoprecipitate, 1 unit plt and plasma    Plan of Treatment:   1. Continue ASA and plavix  2. Continue statin  3. Continue lopressor 25 mg BID  4. Hold Norvasc & HCTZ. Will restart as BP recovers  5. Currently intubated. Wean as tolerated  6. Antibiotics per Pulm/Critical care. 7. Post op management per CV surgery. 8. K>4, Mg>2    Angel Diggs MD  Fellow, [id number] [facility]      Attending Physician Statement  I have discussed the care of the patient, including pertinent history and exam findings, with the resident. I have seen and examined the patient and the key elements of all parts of the encounter have been performed by me.  I agree with the assessment, plan and orders as documented by the resident.     Hypotensive with SBP in 80s after Metoprolol   Continue Metoprolol at 25 mg PO BID        Deisy Kwon MD

## 2022-04-11 NOTE — PROGRESS NOTES
[address] [facility]  Speech Language Pathology    Date: 4/11/2022  Patient Name: Kashmir Chapa  YOB: 1965   AGE: 61 y.o.   MRN: [id number]        Patient Not Available for Speech Therapy     Due to:  [] Testing  [] Hemodialysis  [] Cancelled by RN  [] Surgery   [x] Intubation/Sedation/Pain Medication  [] Medical instability  [] Other:    Next scheduled treatment: as medically appropriate   Completed by: Jim Perez, SLP

## 2022-04-11 NOTE — PROGRESS NOTES
[facility] Cardiothoracic Surgical   Progress Note    4/11/2022 8:10 AM  Surgeon:  Suma Maurice          POD# 3    S/P :  Coronary artery bypass  EF:  55 %    Subjective:  Ms. Scarlet Valentine intubated on propofol    Vital Signs: /81   Pulse 104   Temp 99.3 °F (37.4 °C) (Bladder)   Resp 28   Ht 5' 1\" (1.549 m)   Wt 154 lb 12.2 oz (70.2 kg)   SpO2 96%   BMI 29.24 kg/m²  O2 Flow Rate (L/min): 10 L/min   Admit Weight: Weight: 140 lb (63.5 kg)       Labs and Studies:  CBC: Recent Labs     04/09/22  0457 04/09/22  0457 04/10/22  0242 04/10/22  0929 04/11/22  0358   WBC 11.6*  --  14.2*  --  11.8*   HGB 9.1*   < > 9.0* 8.6* 7.2*   HCT 27.2*   < > 27.5* 25.7* 21.8*   MCV 89.8  --  91.4  --  91.2   *  --  See Reflexed IPF Result  --  See Reflexed IPF Result    < > = values in this interval not displayed. BMP:   Recent Labs     04/09/22  0457 04/10/22  0242 04/11/22  0358    145* 138   K 4.3 4.1 4.5   * 112* 109*   CO2 22 20 22   BUN 12 13 21*   CREATININE 0.78 0.77 0.82     PT/INR:   Recent Labs     04/09/22  0457 04/10/22  0242 04/11/22  0358   PROTIME 10.0 12.2 11.9   INR 0.9 1.2 1.1     APTT:   Recent Labs     04/08/22  1458 04/08/22  1714 04/08/22 2127   APTT 44.8* 40.8* 28.2     I/O:  I/O last 3 completed shifts:   In: 3567.3 [I.V.:1183.3; NG/GT:50; IV Piggyback:2334]  Out: 1960 [Urine:1535; Chest Tube:425]    Scheduled Meds:    levofloxacin  750 mg IntraVENous Q24H    insulin lispro  0-6 Units SubCUTAneous TID WC    insulin lispro  0-3 Units SubCUTAneous Nightly    sodium chloride flush  5-40 mL IntraVENous 2 times per day    sodium chloride flush  5-40 mL IntraVENous 2 times per day    sodium chloride flush  10 mL IntraVENous 2 times per day    aspirin  81 mg Oral Daily    clopidogrel  75 mg Oral Daily    amiodarone  200 mg Oral TID    metoprolol tartrate  25 mg Oral BID    atorvastatin  20 mg Oral Nightly    pantoprazole  40 mg Oral Daily    pantoprazole (PROTONIX) 40 mg injection  40 mg IntraVENous Daily    ipratropium-albuterol  1 ampule Inhalation Q4H WA    insulin glargine  0.15 Units/kg SubCUTAneous Nightly    senna  1 tablet Oral Once    [Held by provider] hydroCHLOROthiazide  25 mg Oral Daily    [Held by provider] lisinopril  40 mg Oral Daily    potassium chloride  10 mEq Oral Once    lovastatin  10 mg Oral Nightly    sodium chloride flush  5-40 mL IntraVENous 2 times per day    [Held by provider] amLODIPine  10 mg Oral Daily    nicotine  1 patch TransDERmal Daily     Continuous Infusions:    propofol 15 mcg/kg/min (04/11/22 0600)    dexmedetomidine Stopped (04/10/22 1158)    sodium chloride      sodium chloride Stopped (04/11/22 0447)    dextrose      norepinephrine      EPINEPHrine      sodium chloride 20 mL/hr at 04/04/22 0408     Beta-Blocker: yes  ASA: yes  Plavix: yes  GI: yes  Statin: yes  Coumadin: no  ACE-I: no    Assessment/Plan:   POD #3, S/P CABG  Neuro intact  HD tachy and hypertensive. Increase beta per cardiology  Pulmonary intubated s/p possible aspiration yday on Levaquin. Vent management per pulmonary  GI NPO   40-45cc/hr. Creatine 0.82. Lasix 20mg IV bid  D/C chest tubes later today  ABLA 7.2.  Will discuss transfusion with Dr. Loren Rodney before ordering    SON Dykes

## 2022-04-11 NOTE — PROGRESS NOTES
[facility]  Internal Medicine Teaching Residency Program  Inpatient Daily Progress Note  ______________________________________________________________________________    Patient: Nitin Rivas  YOB: 1965   ICZ:[id number]    Acct: [de-identified]     Room: [address] [place] [address]  Admit date: 4/3/2022  Today's date: 04/11/22  Number of days in the hospital: 8    SUBJECTIVE   Admitting Diagnosis: NSTEMI (non-ST elevated myocardial infarction) ([facility] [address]  CC: Typical chest pain  Patient seen and examined at bedside. Chart results reviewed. She was extubated by CT surgery on 4/9/2022. Her oxygen requirement went up and she was reintubated yesterday. Currently on FiO2 60% 28/430/10. Sedated with propofol. This a.m. was 130/80, tachycardic heart rate 125. Labs reviewed and evaluated. Chest x-ray showed: Persistent bilateral airspace disease, with increased density in the right upper lobe as compared to prior.  Persistent small left pleural effusion. ROS: Cannot assess because of sedation and on mechanical ventilator. BRIEF HISTORY     The patient is a pleasant 61 y.o. female with a past medical history significant for essential hypertension, coronary artery disease status post PCI on DAPT, hyperlipidemia, chronic active smoking, history of stroke, MDD, memory problem presents with a chief complaint of typical chest pain. Chest pain started 3 to 4 days ago, dull aching in nature, 5-6 out of 10 in intensity, radiating to the left shoulder, increased with exertion and relieved with rest.  Also admits to exertional shortness of breath without orthopnea or PND. Patient denies palpitation syncope nausea vomiting diarrhea headache.     Evaluation in the ER, she was found to have elevated blood pressure 175/110. Heart rate 78 regular. SPO2 99% on room air. Heart auscultation showed normal first and second heart sound without murmur gallop.   Chest is clear to auscultation. Abdomen soft and nontender. Labs reviewed normal BMP. No leukocytosis hemoglobin 14. EKG showed normal sinus rhythm with some ST-T wave changes in inferior lead. Troponin is elevated 80. proBNP 217. Chest x-ray is negative for acute abnormality. Cardiology has been consulted in the ER for the concern of NSTEMI. Patient is started on heparin drip as per ACS protocol and will probably have cardiac cath tomorrow.     Of note, she has a past medical history of coronary artery disease status post PCI in ? Followed by another PCI in ? Questionable medication compliance. She has been actively smoking and is currently half pack per day. Denies alcohol intake. Admits to marijuana use.     Patient underwent cardiac cath on 2022. Findings:  LMCA: Distal 30-40% stenosis   LAD: Mid stent stenosis 80%   LCx: Mid area 90% after the OM take off   OM1: in stent stenosis 90%   RCA: Proximal 75% stenosis   Mid area ulcerated plaques with 60% stenosis   PL branch ostial / proximal 90% stenosis     The LV gram was performed in the LAWTON 30 position. LVEF: 50 %. Conclusions:   Multivessel CAD with left main disease    PLower normal LV systolic function   Recommendations:  CT surgery consult and recommend CABG. 4/10/-s/p CABG, chest tubes in place, on BiPAP  2022. All chest tube in place. Reintubated yesterday for worsening oxygen requirement. Currently on FiO2 60% with a PEEP of 10. OBJECTIVE     Vital Signs:  /70   Pulse 113   Temp 99.3 °F (37.4 °C) (Bladder)   Resp 28   Ht 5' 1\" (1.549 m)   Wt 154 lb 12.2 oz (70.2 kg)   SpO2 96%   BMI 29.24 kg/m²     Temp (24hrs), Av.1 °F (37.8 °C), Min:99 °F (37.2 °C), Max:101.3 °F (38.5 °C)    In: 3101.2   Out: 1370 [Urine:1025]    Physical Exam:  Constitutional:, Precedex drip mechanical ventilator with FiO2 60% and PEEP of 10. EENT:  PERRLA. No conjunctival injections. Neck: Supple without thyromegaly.  No elevated 21.8*   MCV 89.8  --  91.4  --  91.2   RDW 15.3*  --  15.7*  --  15.6*   *  --  See Reflexed IPF Result  --  See Reflexed IPF Result    < > = values in this interval not displayed. BMP:   Recent Labs     04/09/22  0457 04/10/22  0242 04/11/22  0358    145* 138   K 4.3 4.1 4.5   * 112* 109*   CO2 22 20 22   BUN 12 13 21*   CREATININE 0.78 0.77 0.82     BNP: No results for input(s): BNP in the last 72 hours. PT/INR:   Recent Labs     04/09/22  0457 04/10/22  0242 04/11/22  0358   PROTIME 10.0 12.2 11.9   INR 0.9 1.2 1.1     APTT:   Recent Labs     04/08/22  1458 04/08/22 1714 04/08/22 2127   APTT 44.8* 40.8* 28.2     CARDIAC ENZYMES: No results for input(s): CKMB, CKMBINDEX, TROPONINI in the last 72 hours. Invalid input(s): CKTOTAL;3  FASTING LIPID PANEL:  Lab Results   Component Value Date    CHOL 243 (H) 03/24/2021    HDL 36 (L) 12/01/2021    TRIG 178 (H) 03/24/2021     LIVER PROFILE:   Recent Labs     04/08/22 1714 04/09/22 0457   AST 27 75*   ALT 13 33   BILITOT 0.96 1.94*   ALKPHOS 22* 25*      MICROBIOLOGY:   Lab Results   Component Value Date/Time    CULTURE PENDING 04/10/2022 12:43 PM       Imaging:    XR CHEST PORTABLE    Result Date: 4/3/2022  No acute process. ASSESSMENT & PLAN     IMPRESSION  This is a 61 y.o. female with a past medical history of hypertension hyperlipidemia coronary artery disease status post PCI, history of stroke and memory problem who presented with typical chest pain and exertional shortness of breath and found to have ST-T wave changes in the inferior leads and elevated troponin 80. Patient admitted to inpatient status for the management of NSTEMI. She underwent cardiac cath and found to have multivessel coronary artery disease. CT surgery taken on board and s/p  CABG.        Principal Problem:    NSTEMI (non-ST elevated myocardial infarction) ([facility] [address]  Active Problems:    CAD (coronary artery disease) with multiple stents    Asthma    Smoker Anxiety    Essential hypertension    Memory problem    Mixed hyperlipidemia    Thyroid nodule    Multiple vessel coronary artery disease s/p CABG   Resolved Problems:    * No resolved hospital problems. *    NSTEMI with multivessel coronary artery disease s/p CABG-continue chest tube drainage,  On ASA Plavix and statin. CT surgery, cardiology, pulmonary/critical care on board. She was extubated to BiPAP on 4/9/2022 but was reintubated yesterday for worsening oxygen requirement. Currently intubated with FiO2 60% and PEEP of 10. Active Problems:  Concern of aspiration pneumonia status post bronchoscopy on mechanical ventilator. Patient underwent bronchoscopy on 4/10  Right lower lobe bronchi was collapsed with mucous plugging. Left lower lobe bronchus was collapse with mucous plugging. Bronchoalveolar lavage for Gram stain and culture. Started on IV levofloxacin. CT surgery also recommended to start on IV vancomycin. Anemia s/p transfusion:  Received 5 units of transfusion since surgery. Hemoglobin this a.m. is 7.2. Ordered 1 PRBC. No obvious source of bleeding noted. We will do H&H every 8 hours. Essential hypertension:  Clonidine and hydrochlorothiazide held as per cardiology. Currently maintained on lopressor. Smoker  Plan:   Counseled on smoking cessation. Continue  on nicotine patch.     Mixed hyperlipidemia  Plan: On atorvastatin 20 mg.      Thyroid nodule  Plan:   CT chest showed 3 cm incidental right thyroid nodule. Subsequent thyroid ultrasound showed 2.9 cm slight heterogeneous cystic. No follow-up study recommended.   TSH 1.33.     DVT ppx: EPC cuff only  GI ppx: Protonix OD     Terrell Menchaca MD  Internal Medicine Resident, PGY-1  [address]  2/35/6472,0:90 AM    Attending Physician Statement  I have discussed the care of Mónica. #5 Carlose Janell Vidal Final and I have examined the patient myselft and taken ros and hpi , including pertinent history and exam findings,  with the resident. I have reviewed the key elements of all parts of the encounter with the resident. I agree with the assessment, plan and orders as documented by the resident.   Multivessel CAD, s/p CABG  Hx of COPD/Emphysema,   AC hypoxic resp failure, reintubation   Aspiration PNA ,   Anemia s/ pRBC   On mechanical vent, vent settings reviewed   Critical care time spent 35 mins    Electronically signed by Wenceslao Rangel MD

## 2022-04-11 NOTE — PLAN OF CARE
Problem: Falls - Risk of:  Goal: Will remain free from falls  Description: Will remain free from falls  Outcome: Ongoing  Goal: Absence of physical injury  Description: Absence of physical injury  Outcome: Ongoing     Problem: Cardiac Output - Decreased:  Goal: Hemodynamic stability will improve  Description: Hemodynamic stability will improve  Outcome: Ongoing  Goal: Cardiac output within specified parameters  Description: Cardiac output within specified parameters  Outcome: Ongoing     Problem: Pain:  Goal: Pain level will decrease  Description: Pain level will decrease  Outcome: Ongoing  Goal: Control of acute pain  Description: Control of acute pain  Outcome: Ongoing  Goal: Control of chronic pain  Description: Control of chronic pain  Outcome: Ongoing     Problem: Tissue Perfusion - Cardiopulmonary, Altered:  Goal: Hemodynamic stability will improve  Description: Hemodynamic stability will improve  Outcome: Ongoing  Goal: Absence of angina  Description: Absence of angina  Outcome: Ongoing  Goal: Circulatory function within specified parameters  Description: Circulatory function within specified parameters  Outcome: Ongoing  Goal: Hemodynamic stability will improve  Description: Hemodynamic stability will improve  Outcome: Ongoing  Goal: Will show no evidence of cardiac arrhythmias  Description: Will show no evidence of cardiac arrhythmias  Outcome: Ongoing     Problem: Tobacco Use:  Goal: Will participate in inpatient tobacco-use cessation counseling  Description: Will participate in inpatient tobacco-use cessation counseling  Outcome: Ongoing     Problem: Pain:  Goal: Pain level will decrease  Description: Pain level will decrease  Outcome: Ongoing  Goal: Control of acute pain  Description: Control of acute pain  Outcome: Ongoing  Goal: Control of chronic pain  Description: Control of chronic pain  Outcome: Ongoing     Problem: OXYGENATION/RESPIRATORY FUNCTION  Goal: Patient will maintain patent airway  Outcome: Ongoing  Goal: Patient will achieve/maintain normal respiratory rate/effort  Description: Respiratory rate and effort will be within normal limits for the patient  Outcome: Ongoing     Problem: Non-Violent Restraints  Goal: Removal from restraints as soon as assessed to be safe  Outcome: Ongoing  Goal: No harm/injury to patient while restraints in use  Outcome: Ongoing  Goal: Patient's dignity will be maintained  Outcome: Ongoing     Problem: Skin Integrity:  Goal: Will show no infection signs and symptoms  Description: Will show no infection signs and symptoms  Outcome: Ongoing  Goal: Absence of new skin breakdown  Description: Absence of new skin breakdown  Outcome: Ongoing     Problem: MECHANICAL VENTILATION  Goal: Patient will maintain patent airway  Outcome: Ongoing  Goal: Oral health is maintained or improved  Outcome: Ongoing  Goal: ET tube will be managed safely  Outcome: Ongoing  Goal: Ability to express needs and understand communication  Outcome: Ongoing  Goal: Mobility/activity is maintained at optimum level for patient  Outcome: Ongoing     Problem: Nutrition  Goal: Optimal nutrition therapy  Outcome: Ongoing     Problem: Discharge Planning:  Goal: Discharged to appropriate level of care  Description: Discharged to appropriate level of care  Outcome: Ongoing     Problem:  Activity Intolerance:  Goal: Able to perform prescribed physical activity  Description: Able to perform prescribed physical activity  Outcome: Ongoing  Goal: Ability to tolerate increased activity will improve  Description: Ability to tolerate increased activity will improve  Outcome: Ongoing     Problem: Anxiety:  Goal: Level of anxiety will decrease  Description: Level of anxiety will decrease  Outcome: Ongoing     Problem: Fluid Volume - Imbalance:  Goal: Ability to achieve a balanced intake and output will improve  Description: Ability to achieve a balanced intake and output will improve  Outcome: Ongoing  Goal: inpatient tobacco-use cessation counseling  Outcome: Ongoing     Problem: Pain:  Goal: Pain level will decrease  Description: Pain level will decrease  Outcome: Ongoing  Goal: Control of acute pain  Description: Control of acute pain  Outcome: Ongoing  Goal: Control of chronic pain  Description: Control of chronic pain  Outcome: Ongoing     Problem: OXYGENATION/RESPIRATORY FUNCTION  Goal: Patient will maintain patent airway  Outcome: Ongoing  Goal: Patient will achieve/maintain normal respiratory rate/effort  Description: Respiratory rate and effort will be within normal limits for the patient  Outcome: Ongoing     Problem: Non-Violent Restraints  Goal: Removal from restraints as soon as assessed to be safe  Outcome: Ongoing  Goal: No harm/injury to patient while restraints in use  Outcome: Ongoing  Goal: Patient's dignity will be maintained  Outcome: Ongoing     Problem: Skin Integrity:  Goal: Will show no infection signs and symptoms  Description: Will show no infection signs and symptoms  Outcome: Ongoing  Goal: Absence of new skin breakdown  Description: Absence of new skin breakdown  Outcome: Ongoing     Problem: MECHANICAL VENTILATION  Goal: Patient will maintain patent airway  Outcome: Ongoing  Goal: Oral health is maintained or improved  Outcome: Ongoing  Goal: ET tube will be managed safely  Outcome: Ongoing  Goal: Ability to express needs and understand communication  Outcome: Ongoing  Goal: Mobility/activity is maintained at optimum level for patient  Outcome: Ongoing     Problem: Nutrition  Goal: Optimal nutrition therapy  Outcome: Ongoing     Problem: Discharge Planning:  Goal: Discharged to appropriate level of care  Description: Discharged to appropriate level of care  Outcome: Ongoing     Problem:  Activity Intolerance:  Goal: Able to perform prescribed physical activity  Description: Able to perform prescribed physical activity  Outcome: Ongoing  Goal: Ability to tolerate increased activity will improve  Description: Ability to tolerate increased activity will improve  Outcome: Ongoing     Problem: Anxiety:  Goal: Level of anxiety will decrease  Description: Level of anxiety will decrease  Outcome: Ongoing     Problem: Fluid Volume - Imbalance:  Goal: Ability to achieve a balanced intake and output will improve  Description: Ability to achieve a balanced intake and output will improve  Outcome: Ongoing  Goal: Chest tube drainage is within specified parameters  Description: Chest tube drainage is within specified parameters  Outcome: Ongoing     Problem: Gas Exchange - Impaired:  Goal: Levels of oxygenation will improve  Description: Levels of oxygenation will improve  Outcome: Ongoing  Goal: Ability to maintain adequate ventilation will improve  Description: Ability to maintain adequate ventilation will improve  Outcome: Ongoing     Problem: Falls - Risk of:  Goal: Will remain free from falls  Description: Will remain free from falls  Outcome: Ongoing  Goal: Absence of physical injury  Description: Absence of physical injury  Outcome: Ongoing     Problem: Cardiac Output - Decreased:  Goal: Hemodynamic stability will improve  Description: Hemodynamic stability will improve  Outcome: Ongoing  Goal: Cardiac output within specified parameters  Description: Cardiac output within specified parameters  Outcome: Ongoing     Problem: Pain:  Goal: Pain level will decrease  Description: Pain level will decrease  Outcome: Ongoing  Goal: Control of acute pain  Description: Control of acute pain  Outcome: Ongoing  Goal: Control of chronic pain  Description: Control of chronic pain  Outcome: Ongoing     Problem: Tissue Perfusion - Cardiopulmonary, Altered:  Goal: Hemodynamic stability will improve  Description: Hemodynamic stability will improve  Outcome: Ongoing  Goal: Absence of angina  Description: Absence of angina  Outcome: Ongoing  Goal: Circulatory function within specified parameters  Description: Circulatory function within specified parameters  Outcome: Ongoing  Goal: Hemodynamic stability will improve  Description: Hemodynamic stability will improve  Outcome: Ongoing  Goal: Will show no evidence of cardiac arrhythmias  Description: Will show no evidence of cardiac arrhythmias  Outcome: Ongoing     Problem: Tobacco Use:  Goal: Will participate in inpatient tobacco-use cessation counseling  Description: Will participate in inpatient tobacco-use cessation counseling  Outcome: Ongoing     Problem: Pain:  Goal: Pain level will decrease  Description: Pain level will decrease  Outcome: Ongoing  Goal: Control of acute pain  Description: Control of acute pain  Outcome: Ongoing  Goal: Control of chronic pain  Description: Control of chronic pain  Outcome: Ongoing     Problem: OXYGENATION/RESPIRATORY FUNCTION  Goal: Patient will maintain patent airway  Outcome: Ongoing  Goal: Patient will achieve/maintain normal respiratory rate/effort  Description: Respiratory rate and effort will be within normal limits for the patient  Outcome: Ongoing     Problem: Non-Violent Restraints  Goal: Removal from restraints as soon as assessed to be safe  Outcome: Ongoing  Goal: No harm/injury to patient while restraints in use  Outcome: Ongoing  Goal: Patient's dignity will be maintained  Outcome: Ongoing     Problem: Skin Integrity:  Goal: Will show no infection signs and symptoms  Description: Will show no infection signs and symptoms  Outcome: Ongoing  Goal: Absence of new skin breakdown  Description: Absence of new skin breakdown  Outcome: Ongoing     Problem: MECHANICAL VENTILATION  Goal: Patient will maintain patent airway  Outcome: Ongoing  Goal: Oral health is maintained or improved  Outcome: Ongoing  Goal: ET tube will be managed safely  Outcome: Ongoing  Goal: Ability to express needs and understand communication  Outcome: Ongoing  Goal: Mobility/activity is maintained at optimum level for patient  Outcome: Ongoing     Problem: Nutrition  Goal: Optimal nutrition therapy  Outcome: Ongoing     Problem: Discharge Planning:  Goal: Discharged to appropriate level of care  Description: Discharged to appropriate level of care  Outcome: Ongoing     Problem: Activity Intolerance:  Goal: Able to perform prescribed physical activity  Description: Able to perform prescribed physical activity  Outcome: Ongoing  Goal: Ability to tolerate increased activity will improve  Description: Ability to tolerate increased activity will improve  Outcome: Ongoing     Problem: Anxiety:  Goal: Level of anxiety will decrease  Description: Level of anxiety will decrease  Outcome: Ongoing     Problem: Fluid Volume - Imbalance:  Goal: Ability to achieve a balanced intake and output will improve  Description: Ability to achieve a balanced intake and output will improve  Outcome: Ongoing  Goal: Chest tube drainage is within specified parameters  Description: Chest tube drainage is within specified parameters  Outcome: Ongoing     Problem: Gas Exchange - Impaired:  Goal: Levels of oxygenation will improve  Description: Levels of oxygenation will improve  Outcome: Ongoing  Goal: Ability to maintain adequate ventilation will improve  Description: Ability to maintain adequate ventilation will improve  Outcome: Ongoing     Problem: Gas Exchange - Impaired:  Goal: Levels of oxygenation will improve  Description: Levels of oxygenation will improve  Outcome: Ongoing     Problem: Fluid Volume - Imbalance:  Goal: Ability to achieve a balanced intake and output will improve  Description: Ability to achieve a balanced intake and output will improve  Outcome: Ongoing     Problem: Anxiety:  Goal: Level of anxiety will decrease  Description: Level of anxiety will decrease  Outcome: Ongoing     Problem:  Activity Intolerance:  Goal: Able to perform prescribed physical activity  Description: Able to perform prescribed physical activity  Outcome: Ongoing     Problem: Discharge Planning:  Goal: Discharged to appropriate level of care  Description: Discharged to appropriate level of care  Outcome: Ongoing     Problem: Nutrition  Goal: Optimal nutrition therapy  Outcome: Ongoing

## 2022-04-11 NOTE — PROGRESS NOTES
PULMONARY & CRITICAL CARE MEDICINE PROGRESS NOTE     Patient:  Ara Heck  MRN: [id number]  Admit date: 4/3/2022  Primary Care Physician: Alexsander Torres MD  Consulting Physician: Evan Coker MD  CODE Status: Full Code  LOS: 8     SUBJECTIVE     CHIEF COMPLAINT/REASON FOR INITIAL CONSULT: Vent management    BRIEF HOSPITAL COURSE:   The patient is a 61 y.o. female with past medical history of smoking, CAD s/p stent, strong family history of heart disease, in the past initially presented with chest pain. Found to have elevated troponin. Patient required cardiac catheterization found to have multivessel disease. CT surgery was consulted and she was taken  to the OR, underwent CABG x3. Post CABG patient remained intubated and sedated. Pulmonology was consulted for vent management. Upon my evaluation, patient is intubated and sedated. She is on SIMV mode 24/380/5/50 ABG this morning showed 7.4/35/96/24. She is sedated with propofol at 20. She is off of pressor support. Post CABG patient had acute drop in hemoglobin requiring multiple transfusion. Chest x-ray showed left pleural effusion. She still has a chest tube in place both in the pleural and mediastinal.    INTERVAL HISTORY:  04/11/22  Presently patient is on mechanical ventilation, PaO2 improved this morning 283, FiO2 decreased to 50% and PEEP is down to 10. BAL cultures are growing Pseudomonas. Vancomycin was added in addition to Levaquin. X-ray chest reviewed, bilateral pulmonary infiltrates.     REVIEW OF SYSTEMS:  Unobtainable from patient due to sedation/mechanical ventilation    OBJECTIVE     VENTILATOR SETTINGS:  Vent Information  $Ventilation: $Subsequent Day  Skin Assessment: Clean, dry, & intact  Equipment ID: [id number]  Equipment Changed: HME  Vent Type: Servo i  Vent Mode: PRVC  Vt Ordered: 430 mL  Pressure Ordered: 5  Rate Set: 28 bmp  Pressure Support: 8 cmH20  FiO2 : 60 %  SpO2: 98 %  SpO2/FiO2 ratio: 160  Sensitivity: 1  PEEP/CPAP: 10  I Time/ I Time %: 0.8 s  Humidification Source: HME  Nitric Oxide/Epoprostenol In Use?: No  Mask Type: Full face mask  Mask Size: Small     PaO2/FiO2 RATIO:  Recent Labs     22  0334   POCPO2 183.5*      FiO2 : 60 %     VITAL SIGNS:   LAST:  /73   Pulse 93   Temp 99.3 °F (37.4 °C)   Resp 28   Ht 5' 1\" (1.549 m)   Wt 154 lb 12.2 oz (70.2 kg)   SpO2 98%   BMI 29.24 kg/m²   8-24 HR RANGE:  TEMP Temp  Av °F (37.8 °C)  Min: 99 °F (37.2 °C)  Max: 101.3 °F (15.2 °C)   BP Systolic (43VYY), GJE:039 , Min:86 , FOC:405      Diastolic (86QFP), TUQ:45, Min:50, Max:87     PULSE Pulse  Av.9  Min: 93  Max: 127   RR Resp  Av.1  Min: 23  Max: 28   O2 SAT SpO2  Av.3 %  Min: 96 %  Max: 100 %   OXYGEN DELIVERY No data recorded        Physical Exam  Constitutional:       General: She is not in acute distress. Appearance: She is ill-appearing. Interventions: She is sedated and intubated. HENT:      Head: Normocephalic and atraumatic. Eyes:      Conjunctiva/sclera: Conjunctivae normal.      Pupils: Pupils are equal, round, and reactive to light. Neck:      Thyroid: No thyromegaly. Vascular: No JVD. Cardiovascular:      Rate and Rhythm: Normal rate and regular rhythm. Pulses: Normal pulses. Heart sounds: Normal heart sounds, S1 normal and S2 normal. No murmur heard. Pulmonary:      Effort: She is intubated. Breath sounds: Decreased breath sounds present. Abdominal:      General: Bowel sounds are normal. There is no distension. Palpations: Abdomen is soft. There is no mass. Musculoskeletal:      Cervical back: Neck supple. Right lower leg: No edema. Left lower leg: No edema. Lymphadenopathy:      Cervical: No cervical adenopathy. Skin:     Coloration: Skin is not jaundiced or pale. Findings: No rash. Nails: There is no clubbing. Neurological:      General: No focal deficit present.          DATA REVIEW Medications:  Scheduled Meds:   vancomycin  1,000 mg IntraVENous Q12H    furosemide  20 mg IntraVENous BID    vancomycin (VANCOCIN) intermittent dosing (placeholder)   Other RX Placeholder    levofloxacin  750 mg IntraVENous Q24H    insulin lispro  0-6 Units SubCUTAneous TID WC    insulin lispro  0-3 Units SubCUTAneous Nightly    sodium chloride flush  5-40 mL IntraVENous 2 times per day    sodium chloride flush  5-40 mL IntraVENous 2 times per day    sodium chloride flush  10 mL IntraVENous 2 times per day    aspirin  81 mg Oral Daily    clopidogrel  75 mg Oral Daily    amiodarone  200 mg Oral TID    metoprolol tartrate  25 mg Oral BID    atorvastatin  20 mg Oral Nightly    pantoprazole  40 mg Oral Daily    pantoprazole (PROTONIX) 40 mg injection  40 mg IntraVENous Daily    ipratropium-albuterol  1 ampule Inhalation Q4H WA    insulin glargine  0.15 Units/kg SubCUTAneous Nightly    senna  1 tablet Oral Once    [Held by provider] hydroCHLOROthiazide  25 mg Oral Daily    [Held by provider] lisinopril  40 mg Oral Daily    potassium chloride  10 mEq Oral Once    lovastatin  10 mg Oral Nightly    sodium chloride flush  5-40 mL IntraVENous 2 times per day    [Held by provider] amLODIPine  10 mg Oral Daily    nicotine  1 patch TransDERmal Daily     Continuous Infusions:   sodium chloride      propofol 15 mcg/kg/min (04/11/22 0600)    dexmedetomidine Stopped (04/10/22 1158)    sodium chloride 25 mL (04/11/22 0823)    sodium chloride Stopped (04/11/22 0447)    dextrose      norepinephrine      EPINEPHrine      sodium chloride 20 mL/hr at 04/04/22 0408       INPUT/OUTPUT:  In: 3101.2 [I.V.:945; NG/GT:50]  Out: 1410 [TKV:1314]  Date 04/11/22 0000 - 04/11/22 2359   Shift 5961-6159 1344-6785 4833-2359 24 Hour Total   INTAKE   I.V.(mL/kg) 387. 4(5.5)   387. 4(5.5)   Shift Total(mL/kg) 387. 4(5.5)   387. 4(5.5)   OUTPUT   Urine(mL/kg/hr) 325(0.6) 205  530   Emesis/NG output(mL/kg) 0(0)   0(0) Chest Tube 35 0  35   Shift Total(mL/kg) 360(5.1) 205(2.9)  565(8)   Weight (kg) 70.2 70.2 70.2 70.2        LABS:  ABGs:   Recent Labs     04/10/22  1115 04/10/22  1311 04/10/22  1436 04/10/22  2326 04/11/22  0334   POCPH 7.399 7.198* 7.314* 7.348* 7.387   POCPCO2 33.8* 61.0* 41.4 38.7 33.4*   POCPO2 62.3* 69.9* 73.9* 121.1* 183.5*   POCHCO3 20.9* 23.8 21.0 21.3 20.1*   ZASR0AGO 92* 89* 93* 98 100*     CBC:   Recent Labs     04/08/22 2127 04/08/22 2127 04/09/22  0214 04/09/22  0457 04/10/22  0242 04/10/22  0929 04/11/22  0358   WBC 13.3*  --  11.3 11.6* 14.2*  --  11.8*   HGB 10.3*   < > 8.8* 9.1* 9.0* 8.6* 7.2*   HCT 31.7*   < > 26.1* 27.2* 27.5* 25.7* 21.8*   MCV 91.6  --  89.4 89.8 91.4  --  91.2   *  115*  --  99* 101* See Reflexed IPF Result  --  See Reflexed IPF Result   LYMPHOPCT  --   --   --  8* 9*  --  15*   RBC 3.46*  --  2.92* 3.03* 3.01*  --  2.39*   MCH 29.8  --  30.1 30.0 29.9  --  30.1   MCHC 32.5  --  33.7 33.5 32.7  --  33.0   RDW 14.7*  --  15.0* 15.3* 15.7*  --  15.6*    < > = values in this interval not displayed. CRP:   No results for input(s): CRP in the last 72 hours. LDH:   No results for input(s): LDH in the last 72 hours. BMP:   Recent Labs     04/08/22  1714 04/08/22  1714 04/08/22 2128 04/09/22 0214 04/09/22 0457 04/10/22  0242 04/11/22  0358   *  --  141  --  140 145* 138   K 4.1   < > 4.5 4.1 4.3 4.1 4.5   *  --  110*  --  110* 112* 109*   CO2 22  --  23  --  22 20 22   BUN 11  --  11  --  12 13 21*   CREATININE 0.82  --  0.81  --  0.78 0.77 0.82   GLUCOSE 118*  --  162*  --  105* 139* 107*    < > = values in this interval not displayed.      Liver Function Test:   Recent Labs     04/08/22 1714 04/09/22 0457   PROT 4.9* 5.3*   LABALBU 4.1 4.1   ALT 13 33   AST 27 75*   ALKPHOS 22* 25*   BILITOT 0.96 1.94*     Coagulation Profile:   Recent Labs     04/08/22  1458 04/08/22  1458 04/08/22 1714 04/08/22 2127 04/09/22  0457 04/10/22  0242 04/11/22  8738 greater than right. Nasogastric tube tip appears to be in the stomach. XR CHEST PORTABLE   Final Result   Interval progression detailed above. XR CHEST PORTABLE   Final Result   Interim removal of the endotracheal and NG tubes. Chest tubes unchanged. New mild airspace opacity at the base of the right upper lobe. Pulmonary   hemorrhage or aspiration not excluded. Improved aeration of the left lung and possibly decreased left pleural   effusion. XR CHEST PORTABLE   Final Result      XR CHEST PORTABLE   Final Result   1. Lines and tubes in expected position as above. 2. No acute pulmonary abnormality. VL DUP UPPER EXTREMITY ARTERIES BILATERAL   Final Result      VL DUP CAROTID BILATERAL   Final Result      VL VEIN MAPPING LOWER BILATERAL   Final Result      US HEAD NECK SOFT TISSUE THYROID   Final Result   Slightly heterogeneous thyroid gland with a 2.9 cm right thyroid lobe cyst   corresponding to the prior imaging studies. No FNA or follow-up imaging is   recommended based on TI rads criteria. RECOMMENDATIONS:   ACR TI-RADS recommendations:      TR5 (>= 7 points):  FNA if >= 1 cm; follow-up if 0.5-0.9 cm in 1, 2, 3, 4,   and 5 years      TR4 (4-6 points):  FNA if >= 1.5 cm; follow-up if 1.0-1.4 cm in 1, 2, 3, and   5 years      TR3 (3 points):  FNA if >= 2.5 cm; follow-up if 1.5-2.4 cm in 1, 3, and 5   years      TR2 (2 points):  No FNA or follow-up      TR1 (0 points):  No FNA or follow-up      ACR TI-RADS recommends that no more than two nodules with the highest ACR   TI-RADS point total should be biopsied and no more than four nodules should   be followed. CT CHEST WO CONTRAST   Final Result   incidental 3 cm hypodense lesion of the right lobe of the thyroid. See below   for recommendation. Calcified granuloma in the right middle lobe. Examination otherwise unremarkable. RECOMMENDATIONS:   3 cm incidental right thyroid nodule.  Recommend thyroid US. Reference: J Am Carlitos Radiol. 2015 Feb;12(2): 143-50         XR CHEST PORTABLE   Final Result   No acute process. XR CHEST PORTABLE    (Results Pending)        Echocardiogram:   Results for orders placed during the hospital encounter of 04/03/22    ECHO Complete 2D W Doppler W Color    CONCLUSIONS    Summary  Normal LV size , mildly increased wall thickness. No obvious wall motion abnormality seen. Normal LV systolic function with LVEF >55%. Normal RV size and function. LA and RA appears normal in size. No obvious significant structural valvular abnormality noted. No significant valvular stenosis or regurgitation noted. Normal aortic root dimension. No significant pericardial effusion noted. No obvious intra-cardiac mass or shunt noted. IVC normal diameter and inspiratory variation indicating normal RA filling  pressure. ASSESSMENT AND PLAN     Assessment:    // Acute hypoxic respiratory failure, s/p reintubation  // Bilateral aspiration pneumonia  // S/p bronchoscopic clearance of bilateral mucus plugging (4/10/2022)  // Coronary disease, s/p CABG x3 (4/8/2022)  // Postoperative blood loss anemia, s/p multiple transfusion  // Thyroid nodule    Plan:    I personally interviewed/examined the patient; reviewed interval history, interpreted all available radiographic and laboratory data at the time of service. Will change antibiotic from Levaquin to cefepime to cover Pseudomonas, follow culture and sensitivity results. At present continue vancomycin and BAL cultures do not grow MRSA then discontinue vancomycin. Continue to wean PEEP and FiO2, start spontaneous breathing trials in a.m. Continue ICU sedation protocol. Daily sedation holiday.   Continue diuresis  Discussed with nursing staff-Colace added    The patient remains critically ill with illness/injury that acutely impairs one or more vital organ systems, such that there is a high probability of imminent or life threatening deterioration in the patient's condition. Critical care time of 30 minutes was spent (excluding procedures), in coordination of care during bedside rounds and discussion of patient care in detail, and recommendations of the team were adopted in the plan. Necessity of all invasive devices was also confirmed. Gurwinder Schreiber MD  Pulmonary and Critical Care Medicine           4/11/2022, 10:16 AM    Please note that this chart was generated using voice recognition Dragon dictation software. Although every effort was made to ensure the accuracy of this automated transcription, some errors in transcription may have occurred.

## 2022-04-11 NOTE — PLAN OF CARE
Problem: Falls - Risk of:  Goal: Will remain free from falls  Description: Will remain free from falls  4/10/2022 2130 by Renee Horner RN  Outcome: Ongoing  4/10/2022 1236 by Abram Baker RCP  Outcome: Ongoing  4/10/2022 1235 by Abram Baker RCP  Outcome: Ongoing  Goal: Absence of physical injury  Description: Absence of physical injury  4/10/2022 2130 by Renee Horner RN  Outcome: Ongoing  4/10/2022 1236 by Abram Baker RCP  Outcome: Ongoing  4/10/2022 1235 by Abram Baker RCP  Outcome: Ongoing     Problem: Cardiac Output - Decreased:  Goal: Hemodynamic stability will improve  Description: Hemodynamic stability will improve  4/10/2022 2130 by Renee Horner RN  Outcome: Ongoing  4/10/2022 1236 by Abram Baker RCP  Outcome: Ongoing  4/10/2022 1235 by Abram Baker RCP  Outcome: Ongoing     Problem: Pain:  Goal: Pain level will decrease  Description: Pain level will decrease  4/10/2022 2130 by Renee Horner RN  Outcome: Ongoing  4/10/2022 1236 by Abram Baker RCP  Outcome: Ongoing  4/10/2022 1235 by Abram Baker RCP  Outcome: Ongoing  Goal: Control of acute pain  Description: Control of acute pain  4/10/2022 2130 by Renee Horner RN  Outcome: Ongoing  4/10/2022 1236 by Abram Baker RCP  Outcome: Ongoing  4/10/2022 1235 by Abram Baker RCP  Outcome: Ongoing  Goal: Control of chronic pain  Description: Control of chronic pain  4/10/2022 2130 by Renee Horner RN  Outcome: Ongoing  4/10/2022 1236 by Abram Baker RCP  Outcome: Ongoing  4/10/2022 1235 by Abram Baker RCP  Outcome: Ongoing     Problem: Tissue Perfusion - Cardiopulmonary, Altered:  Goal: Absence of angina  Description: Absence of angina  4/10/2022 2130 by Renee Horner RN  Outcome: Ongoing  4/10/2022 1236 by Abram Baker RCP  Outcome: Ongoing  4/10/2022 1235 by Abram Baker RCP  Outcome: Ongoing  Goal: Circulatory function within specified parameters  Description: Circulatory function within specified parameters  4/10/2022 2130 by Nasrin Cunningham RN  Outcome: Ongoing  4/10/2022 1236 by Fannie Pickens RCP  Outcome: Ongoing  4/10/2022 1235 by Fannie Pickens RCP  Outcome: Ongoing  Goal: Hemodynamic stability will improve  Description: Hemodynamic stability will improve  4/10/2022 2130 by Nasrin Cunningham RN  Outcome: Ongoing  4/10/2022 1236 by Fannie Pickens RCP  Outcome: Ongoing  4/10/2022 1235 by Fannie Pickens RCP  Outcome: Ongoing     Problem: Tobacco Use:  Goal: Will participate in inpatient tobacco-use cessation counseling  Description: Will participate in inpatient tobacco-use cessation counseling  4/10/2022 2130 by Nasrin Cunningham RN  Outcome: Ongoing  4/10/2022 1236 by Fannie Pickens RCP  Outcome: Ongoing  4/10/2022 1235 by Fannie Pickens RCP  Outcome: Ongoing     Problem: Pain:  Goal: Pain level will decrease  Description: Pain level will decrease  4/10/2022 2130 by Nasrin Cunningham RN  Outcome: Ongoing  4/10/2022 1236 by Fannie Pickens RCP  Outcome: Ongoing  4/10/2022 1235 by Fannie Pickens RCP  Outcome: Ongoing  Goal: Control of acute pain  Description: Control of acute pain  4/10/2022 2130 by Nasrin Cunningham RN  Outcome: Ongoing  4/10/2022 1236 by Fannie Pickens RCP  Outcome: Ongoing  4/10/2022 1235 by Fannie Pickens RCP  Outcome: Ongoing  Goal: Control of chronic pain  Description: Control of chronic pain  4/10/2022 2130 by Nasrin Cunningham RN  Outcome: Ongoing  4/10/2022 1236 by Fannie Pickens RCP  Outcome: Ongoing  4/10/2022 1235 by Fannie Pickens RCP  Outcome: Ongoing     Problem: OXYGENATION/RESPIRATORY FUNCTION  Goal: Patient will maintain patent airway  4/10/2022 2130 by Nasrin Cunningham RN  Outcome: Ongoing  4/10/2022 1236 by Fannie Pickens RCP  Outcome: Ongoing  4/10/2022 1235 by Lorrayne Dakin MARTELL Mccloud  Outcome: Ongoing  Goal: Patient will achieve/maintain normal respiratory rate/effort  Description: Respiratory rate and effort will be within normal limits for the patient  4/10/2022 2130 by Masha Hairston RN  Outcome: Ongoing  4/10/2022 1236 by Junior Thibodeaux RCP  Outcome: Ongoing  4/10/2022 1235 by Junior Carlos Eduardo RCP  Outcome: Ongoing     Problem: Non-Violent Restraints  Goal: Removal from restraints as soon as assessed to be safe  4/10/2022 2130 by Masha Hairston RN  Outcome: Ongoing  4/10/2022 1236 by Junior Carlos Eduardo RCP  Outcome: Ongoing  4/10/2022 1235 by Junior Carlos Eduardo RCP  Outcome: Ongoing  Goal: No harm/injury to patient while restraints in use  4/10/2022 2130 by Masha Hairston RN  Outcome: Ongoing  4/10/2022 1236 by Junior Thibodeaux RCP  Outcome: Ongoing  4/10/2022 1235 by Junior Carlos Eduardo RCP  Outcome: Ongoing  Goal: Patient's dignity will be maintained  4/10/2022 2130 by Masha Hairston RN  Outcome: Ongoing  4/10/2022 1236 by Junior Carlos Eduardo RCP  Outcome: Ongoing  4/10/2022 1235 by Junior Carlos Eduardo RCP  Outcome: Ongoing     Problem: Skin Integrity:  Goal: Will show no infection signs and symptoms  Description: Will show no infection signs and symptoms  4/10/2022 2130 by Masha Hairston RN  Outcome: Ongoing  4/10/2022 1236 by Junior Thibodeaux RCANTONI  Outcome: Ongoing  4/10/2022 1235 by Junior Thibodeaux RCANTONI  Outcome: Ongoing  Goal: Absence of new skin breakdown  Description: Absence of new skin breakdown  4/10/2022 2130 by Masha Hairston RN  Outcome: Ongoing  4/10/2022 1236 by Junior Carlos Eduardo RCP  Outcome: Ongoing  4/10/2022 1235 by Junior Carlos Eduardo RCP  Outcome: Ongoing     Problem: MECHANICAL VENTILATION  Goal: Patient will maintain patent airway  4/10/2022 2130 by Masha Hairston RN  Outcome: Ongoing  4/10/2022 1236 by Junior Carlos Eduardo RCP  Outcome: Ongoing  4/10/2022 1235 by Junior Thibodeaux MARTELL  Outcome: Ongoing  Goal: Oral health is maintained or improved  4/10/2022 2130 by Santana Miller RN  Outcome: Ongoing  4/10/2022 1236 by Venkat Sotelo RCP  Outcome: Ongoing  Goal: ET tube will be managed safely  4/10/2022 2130 by Santana Miller RN  Outcome: Ongoing  4/10/2022 1236 by Venkat Sotelo RCP  Outcome: Ongoing  Goal: Ability to express needs and understand communication  4/10/2022 2130 by Santana Miller RN  Outcome: Ongoing  4/10/2022 1236 by Venkat Sotelo RCP  Outcome: Ongoing  Goal: Mobility/activity is maintained at optimum level for patient  4/10/2022 2130 by Santana Miller RN  Outcome: Ongoing  4/10/2022 1236 by Venkat Sotelo RCP  Outcome: Ongoing     Problem: Nutrition  Goal: Optimal nutrition therapy  4/10/2022 2130 by Santana iMller RN  Outcome: Ongoing  4/10/2022 1632 by Jodi Kunz RD, LD  Outcome: Ongoing  Note: Nutrition Problem #1: Inadequate oral intake  Intervention: Food and/or Nutrient Delivery:  (Start nutrition support as able; suggest Peptide Based formula goal 35 mL/hr while on propofol at current rate.)  Nutritional Goals: meet % of estimated nutrient needs

## 2022-04-11 NOTE — PROGRESS NOTES
[facility])  Occupational Therapy Not Seen Note    DATE: 2022    NAME: Marlo Arreola  MRN: [id number]   : 1965      Patient not seen this date for Occupational Therapy due to:    Patient is not appropriate for active participation in OT evaluation/treatment at this time d/t Re-intubated 4/10 d/t respiratory distress;      Next Scheduled Treatment: Ck in pm as appropriate or      Electronically signed by Toni Hansen OT on 2022 at 7:27 AM

## 2022-04-11 NOTE — PROGRESS NOTES
[facility] Pharmacokinetic Monitoring Service - Vancomycin     Mia Romero is a 61 y.o. female starting on vancomycin therapy for aspiration pneumonia. Pharmacy consulted by SON Samuel for monitoring and adjustment. Target Concentration: Goal AUC/PAYTON 400-600 mg*hr/L    Pertinent Laboratory Values: Wt Readings from Last 1 Encounters:   04/11/22 154 lb 12.2 oz (70.2 kg)     Temp Readings from Last 1 Encounters:   04/11/22 99.3 °F (37.4 °C) (Bladder)     Estimated Creatinine Clearance: 68 mL/min (based on SCr of 0.82 mg/dL). Recent Labs     04/10/22  0242 04/11/22  0358   CREATININE 0.77 0.82   WBC 14.2* 11.8*       Plan:  Based on pharmacokinetic evaluation, will proceed with the following regimen  Start vancomycin 1000 mg IV q12h  Anticipated AUC of 570 and trough concentration of 16-18 at steady state  Pharmacy will continue to monitor patient and adjust therapy as indicated    Thank you for the consult,  Lela Mays Pharm. D., CYNTHIA, BCCCP  4/11/2022 9:49 AM

## 2022-04-12 ENCOUNTER — APPOINTMENT (OUTPATIENT)
Dept: GENERAL RADIOLOGY | Age: 57
DRG: 233 | End: 2022-04-12
Payer: MEDICARE

## 2022-04-12 LAB
ABO/RH: NORMAL
ABSOLUTE EOS #: 0.32 K/UL (ref 0–0.4)
ABSOLUTE IMMATURE GRANULOCYTE: 0 K/UL (ref 0–0.3)
ABSOLUTE LYMPH #: 1.61 K/UL (ref 1–4.8)
ABSOLUTE MONO #: 1.61 K/UL (ref 0.1–0.8)
ANION GAP SERPL CALCULATED.3IONS-SCNC: 10 MMOL/L (ref 9–17)
ANION GAP: 21 MMOL/L (ref 7–16)
ANTIBODY SCREEN: NEGATIVE
ARM BAND NUMBER: NORMAL
BASOPHILS # BLD: 0 % (ref 0–2)
BASOPHILS ABSOLUTE: 0 K/UL (ref 0–0.2)
BLD PROD TYP BPU: NORMAL
BLOOD BANK BLOOD PRODUCT EXPIRATION DATE: NORMAL
BLOOD BANK ISBT PRODUCT BLOOD TYPE: 6200
BLOOD BANK PRODUCT CODE: NORMAL
BLOOD BANK UNIT TYPE AND RH: NORMAL
BPU ID: NORMAL
BUN BLDV-MCNC: 18 MG/DL (ref 6–20)
CALCIUM SERPL-MCNC: 8.4 MG/DL (ref 8.6–10.4)
CHLORIDE BLD-SCNC: 104 MMOL/L (ref 98–107)
CO2: 24 MMOL/L (ref 20–31)
CREAT SERPL-MCNC: 0.76 MG/DL (ref 0.5–0.9)
CROSSMATCH RESULT: NORMAL
CULTURE: ABNORMAL
DIRECT EXAM: ABNORMAL
DISPENSE STATUS BLOOD BANK: NORMAL
EOSINOPHILS RELATIVE PERCENT: 2 % (ref 1–4)
EXPIRATION DATE: NORMAL
FIO2: 45
FIO2: 60
GFR AFRICAN AMERICAN: >60 ML/MIN
GFR NON-AFRICAN AMERICAN: >60 ML/MIN
GFR NON-AFRICAN AMERICAN: >60 ML/MIN
GFR SERPL CREATININE-BSD FRML MDRD: >60 ML/MIN
GFR SERPL CREATININE-BSD FRML MDRD: ABNORMAL ML/MIN/{1.73_M2}
GFR SERPL CREATININE-BSD FRML MDRD: NORMAL ML/MIN/{1.73_M2}
GLUCOSE BLD-MCNC: 104 MG/DL (ref 70–99)
GLUCOSE BLD-MCNC: 105 MG/DL (ref 74–100)
GLUCOSE BLD-MCNC: 147 MG/DL (ref 65–105)
GLUCOSE BLD-MCNC: 75 MG/DL (ref 65–105)
GLUCOSE BLD-MCNC: 82 MG/DL (ref 65–105)
GLUCOSE BLD-MCNC: 96 MG/DL (ref 74–100)
HCT VFR BLD CALC: 25.8 % (ref 36.3–47.1)
HCT VFR BLD CALC: 27.8 % (ref 36.3–47.1)
HCT VFR BLD CALC: 28.4 % (ref 36.3–47.1)
HEMOGLOBIN: 8.6 G/DL (ref 11.9–15.1)
HEMOGLOBIN: 9.5 G/DL (ref 11.9–15.1)
HEMOGLOBIN: 9.6 G/DL (ref 11.9–15.1)
IMMATURE GRANULOCYTES: 0 %
INR BLD: 1
LYMPHOCYTES # BLD: 10 % (ref 24–44)
MAGNESIUM: 2.6 MG/DL (ref 1.6–2.6)
MCH RBC QN AUTO: 29.7 PG (ref 25.2–33.5)
MCHC RBC AUTO-ENTMCNC: 33.3 G/DL (ref 28.4–34.8)
MCV RBC AUTO: 89 FL (ref 82.6–102.9)
MODE: ABNORMAL
MONOCYTES # BLD: 10 % (ref 1–7)
MORPHOLOGY: ABNORMAL
NRBC AUTOMATED: 0 PER 100 WBC
O2 DEVICE/FLOW/%: ABNORMAL
PATIENT TEMP: 37
PATIENT TEMP: 37.8
PDW BLD-RTO: 15.4 % (ref 11.8–14.4)
PLATELET # BLD: 144 K/UL (ref 138–453)
PMV BLD AUTO: 10.3 FL (ref 8.1–13.5)
POC BUN: 11 MG/DL (ref 8–26)
POC CHLORIDE: 97 MMOL/L (ref 98–107)
POC CREATININE: 0.76 MG/DL (ref 0.51–1.19)
POC HCO3: 23.7 MMOL/L (ref 21–28)
POC HCO3: 30.6 MMOL/L (ref 21–28)
POC HEMATOCRIT: 38 % (ref 36–46)
POC HEMOGLOBIN: 13 G/DL (ref 12–16)
POC IONIZED CALCIUM: 1.07 MMOL/L (ref 1.15–1.33)
POC LACTIC ACID: 0.88 MMOL/L (ref 0.56–1.39)
POC O2 SATURATION: 99 % (ref 94–98)
POC O2 SATURATION: 99 % (ref 94–98)
POC PCO2 TEMP: 37 MM HG
POC PCO2: 31.4 MM HG (ref 35–48)
POC PCO2: 35.4 MM HG (ref 35–48)
POC PH TEMP: 7.53
POC PH: 7.49 (ref 7.35–7.45)
POC PH: 7.54 (ref 7.35–7.45)
POC PO2 TEMP: 127 MM HG
POC PO2: 111.8 MM HG (ref 83–108)
POC PO2: 122 MM HG (ref 83–108)
POC POTASSIUM: 3.6 MMOL/L (ref 3.5–4.5)
POC SODIUM: 148 MMOL/L (ref 138–146)
POC TCO2: 31 MMOL/L (ref 22–30)
POSITIVE BASE EXCESS, ART: 1 (ref 0–3)
POSITIVE BASE EXCESS, ART: 8 (ref 0–3)
POTASSIUM SERPL-SCNC: 4.3 MMOL/L (ref 3.7–5.3)
PROTHROMBIN TIME: 10.6 SEC (ref 9.1–12.3)
RBC # BLD: 2.9 M/UL (ref 3.95–5.11)
SAMPLE SITE: ABNORMAL
SEG NEUTROPHILS: 78 % (ref 36–66)
SEGMENTED NEUTROPHILS ABSOLUTE COUNT: 12.56 K/UL (ref 1.8–7.7)
SODIUM BLD-SCNC: 138 MMOL/L (ref 135–144)
SPECIMEN DESCRIPTION: ABNORMAL
SPECIMEN DESCRIPTION: ABNORMAL
TRANSFUSION STATUS: NORMAL
UNIT DIVISION: 0
UNIT ISSUE DATE/TIME: NORMAL
WBC # BLD: 16.1 K/UL (ref 3.5–11.3)

## 2022-04-12 PROCEDURE — 82565 ASSAY OF CREATININE: CPT

## 2022-04-12 PROCEDURE — 99291 CRITICAL CARE FIRST HOUR: CPT | Performed by: INTERNAL MEDICINE

## 2022-04-12 PROCEDURE — 6370000000 HC RX 637 (ALT 250 FOR IP): Performed by: NURSE PRACTITIONER

## 2022-04-12 PROCEDURE — 84520 ASSAY OF UREA NITROGEN: CPT

## 2022-04-12 PROCEDURE — 2100000001 HC CVICU R&B

## 2022-04-12 PROCEDURE — 2500000003 HC RX 250 WO HCPCS: Performed by: STUDENT IN AN ORGANIZED HEALTH CARE EDUCATION/TRAINING PROGRAM

## 2022-04-12 PROCEDURE — 71045 X-RAY EXAM CHEST 1 VIEW: CPT

## 2022-04-12 PROCEDURE — 6360000002 HC RX W HCPCS: Performed by: NURSE PRACTITIONER

## 2022-04-12 PROCEDURE — 37799 UNLISTED PX VASCULAR SURGERY: CPT

## 2022-04-12 PROCEDURE — 85610 PROTHROMBIN TIME: CPT

## 2022-04-12 PROCEDURE — 2700000000 HC OXYGEN THERAPY PER DAY

## 2022-04-12 PROCEDURE — 82947 ASSAY GLUCOSE BLOOD QUANT: CPT

## 2022-04-12 PROCEDURE — 80051 ELECTROLYTE PANEL: CPT

## 2022-04-12 PROCEDURE — 80048 BASIC METABOLIC PNL TOTAL CA: CPT

## 2022-04-12 PROCEDURE — 94660 CPAP INITIATION&MGMT: CPT

## 2022-04-12 PROCEDURE — 83605 ASSAY OF LACTIC ACID: CPT

## 2022-04-12 PROCEDURE — 94003 VENT MGMT INPAT SUBQ DAY: CPT

## 2022-04-12 PROCEDURE — 2580000003 HC RX 258: Performed by: STUDENT IN AN ORGANIZED HEALTH CARE EDUCATION/TRAINING PROGRAM

## 2022-04-12 PROCEDURE — 6360000002 HC RX W HCPCS: Performed by: PHYSICIAN ASSISTANT

## 2022-04-12 PROCEDURE — 82803 BLOOD GASES ANY COMBINATION: CPT

## 2022-04-12 PROCEDURE — C9113 INJ PANTOPRAZOLE SODIUM, VIA: HCPCS | Performed by: NURSE PRACTITIONER

## 2022-04-12 PROCEDURE — 99024 POSTOP FOLLOW-UP VISIT: CPT | Performed by: NURSE PRACTITIONER

## 2022-04-12 PROCEDURE — 85025 COMPLETE CBC W/AUTO DIFF WBC: CPT

## 2022-04-12 PROCEDURE — 2580000003 HC RX 258: Performed by: NURSE PRACTITIONER

## 2022-04-12 PROCEDURE — 2500000003 HC RX 250 WO HCPCS: Performed by: INTERNAL MEDICINE

## 2022-04-12 PROCEDURE — 85014 HEMATOCRIT: CPT

## 2022-04-12 PROCEDURE — 6370000000 HC RX 637 (ALT 250 FOR IP): Performed by: STUDENT IN AN ORGANIZED HEALTH CARE EDUCATION/TRAINING PROGRAM

## 2022-04-12 PROCEDURE — 85018 HEMOGLOBIN: CPT

## 2022-04-12 PROCEDURE — 94640 AIRWAY INHALATION TREATMENT: CPT

## 2022-04-12 PROCEDURE — 6360000002 HC RX W HCPCS: Performed by: STUDENT IN AN ORGANIZED HEALTH CARE EDUCATION/TRAINING PROGRAM

## 2022-04-12 PROCEDURE — 6360000002 HC RX W HCPCS: Performed by: THORACIC SURGERY (CARDIOTHORACIC VASCULAR SURGERY)

## 2022-04-12 PROCEDURE — 6360000002 HC RX W HCPCS

## 2022-04-12 PROCEDURE — 6360000002 HC RX W HCPCS: Performed by: INTERNAL MEDICINE

## 2022-04-12 PROCEDURE — 94761 N-INVAS EAR/PLS OXIMETRY MLT: CPT

## 2022-04-12 PROCEDURE — 6370000000 HC RX 637 (ALT 250 FOR IP): Performed by: INTERNAL MEDICINE

## 2022-04-12 PROCEDURE — 82330 ASSAY OF CALCIUM: CPT

## 2022-04-12 PROCEDURE — 83735 ASSAY OF MAGNESIUM: CPT

## 2022-04-12 RX ORDER — FUROSEMIDE 10 MG/ML
INJECTION INTRAMUSCULAR; INTRAVENOUS
Status: COMPLETED
Start: 2022-04-12 | End: 2022-04-12

## 2022-04-12 RX ORDER — MORPHINE SULFATE 2 MG/ML
2 INJECTION, SOLUTION INTRAMUSCULAR; INTRAVENOUS EVERY 4 HOURS PRN
Status: DISCONTINUED | OUTPATIENT
Start: 2022-04-12 | End: 2022-04-16 | Stop reason: HOSPADM

## 2022-04-12 RX ORDER — LORAZEPAM 2 MG/ML
0.5 INJECTION INTRAMUSCULAR EVERY 6 HOURS PRN
Status: DISCONTINUED | OUTPATIENT
Start: 2022-04-12 | End: 2022-04-16 | Stop reason: HOSPADM

## 2022-04-12 RX ORDER — METOPROLOL TARTRATE 5 MG/5ML
5 INJECTION INTRAVENOUS EVERY 8 HOURS
Status: DISCONTINUED | OUTPATIENT
Start: 2022-04-12 | End: 2022-04-13

## 2022-04-12 RX ORDER — CALCIUM GLUCONATE 20 MG/ML
1000 INJECTION, SOLUTION INTRAVENOUS ONCE
Status: COMPLETED | OUTPATIENT
Start: 2022-04-12 | End: 2022-04-13

## 2022-04-12 RX ADMIN — FENTANYL CITRATE 50 MCG: 50 INJECTION, SOLUTION INTRAMUSCULAR; INTRAVENOUS at 18:36

## 2022-04-12 RX ADMIN — DEXTROSE MONOHYDRATE 25 ML/HR: 50 INJECTION, SOLUTION INTRAVENOUS at 18:31

## 2022-04-12 RX ADMIN — WATER 2000 MG: 1 INJECTION INTRAMUSCULAR; INTRAVENOUS; SUBCUTANEOUS at 17:30

## 2022-04-12 RX ADMIN — LORAZEPAM 0.5 MG: 2 INJECTION INTRAMUSCULAR; INTRAVENOUS at 23:33

## 2022-04-12 RX ADMIN — Medication 81 MG: at 08:51

## 2022-04-12 RX ADMIN — IPRATROPIUM BROMIDE AND ALBUTEROL SULFATE 1 AMPULE: .5; 3 SOLUTION RESPIRATORY (INHALATION) at 11:53

## 2022-04-12 RX ADMIN — AMIODARONE HYDROCHLORIDE 200 MG: 200 TABLET ORAL at 08:51

## 2022-04-12 RX ADMIN — AMIODARONE HYDROCHLORIDE 200 MG: 200 TABLET ORAL at 13:47

## 2022-04-12 RX ADMIN — IPRATROPIUM BROMIDE AND ALBUTEROL SULFATE 1 AMPULE: .5; 3 SOLUTION RESPIRATORY (INHALATION) at 15:53

## 2022-04-12 RX ADMIN — SODIUM CHLORIDE, PRESERVATIVE FREE 10 ML: 5 INJECTION INTRAVENOUS at 08:53

## 2022-04-12 RX ADMIN — HYDRALAZINE HYDROCHLORIDE 5 MG: 20 INJECTION INTRAMUSCULAR; INTRAVENOUS at 20:07

## 2022-04-12 RX ADMIN — FUROSEMIDE 20 MG: 10 INJECTION, SOLUTION INTRAMUSCULAR; INTRAVENOUS at 17:43

## 2022-04-12 RX ADMIN — SODIUM CHLORIDE, PRESERVATIVE FREE 10 ML: 5 INJECTION INTRAVENOUS at 20:59

## 2022-04-12 RX ADMIN — DEXTROSE MONOHYDRATE 100 ML/HR: 50 INJECTION, SOLUTION INTRAVENOUS at 09:04

## 2022-04-12 RX ADMIN — METOPROLOL TARTRATE 5 MG: 1 INJECTION, SOLUTION INTRAVENOUS at 20:44

## 2022-04-12 RX ADMIN — IPRATROPIUM BROMIDE AND ALBUTEROL SULFATE 1 AMPULE: .5; 3 SOLUTION RESPIRATORY (INHALATION) at 09:00

## 2022-04-12 RX ADMIN — OXYCODONE HYDROCHLORIDE AND ACETAMINOPHEN 2 TABLET: 5; 325 TABLET ORAL at 08:52

## 2022-04-12 RX ADMIN — PROPOFOL 20 MCG/KG/MIN: 10 INJECTION, EMULSION INTRAVENOUS at 02:43

## 2022-04-12 RX ADMIN — WATER 2000 MG: 1 INJECTION INTRAMUSCULAR; INTRAVENOUS; SUBCUTANEOUS at 08:53

## 2022-04-12 RX ADMIN — LORAZEPAM 0.5 MG: 2 INJECTION INTRAMUSCULAR; INTRAVENOUS at 17:28

## 2022-04-12 RX ADMIN — MORPHINE SULFATE 2 MG: 2 INJECTION, SOLUTION INTRAMUSCULAR; INTRAVENOUS at 22:07

## 2022-04-12 RX ADMIN — OXYCODONE HYDROCHLORIDE AND ACETAMINOPHEN 2 TABLET: 5; 325 TABLET ORAL at 02:02

## 2022-04-12 RX ADMIN — CLOPIDOGREL 75 MG: 75 TABLET, FILM COATED ORAL at 08:51

## 2022-04-12 RX ADMIN — OXYCODONE HYDROCHLORIDE AND ACETAMINOPHEN 2 TABLET: 5; 325 TABLET ORAL at 12:47

## 2022-04-12 RX ADMIN — CALCIUM GLUCONATE 1000 MG: 20 INJECTION, SOLUTION INTRAVENOUS at 23:29

## 2022-04-12 RX ADMIN — FENTANYL CITRATE 25 MCG: 50 INJECTION, SOLUTION INTRAMUSCULAR; INTRAVENOUS at 13:47

## 2022-04-12 RX ADMIN — VANCOMYCIN HYDROCHLORIDE 1000 MG: 1 INJECTION, SOLUTION INTRAVENOUS at 10:20

## 2022-04-12 RX ADMIN — HYDRALAZINE HYDROCHLORIDE 5 MG: 20 INJECTION INTRAMUSCULAR; INTRAVENOUS at 13:10

## 2022-04-12 RX ADMIN — ALPRAZOLAM 0.25 MG: 0.25 TABLET ORAL at 10:00

## 2022-04-12 RX ADMIN — FENTANYL CITRATE 50 MCG: 50 INJECTION, SOLUTION INTRAMUSCULAR; INTRAVENOUS at 10:00

## 2022-04-12 RX ADMIN — FUROSEMIDE 20 MG: 10 INJECTION, SOLUTION INTRAMUSCULAR; INTRAVENOUS at 08:52

## 2022-04-12 RX ADMIN — IPRATROPIUM BROMIDE AND ALBUTEROL SULFATE 1 AMPULE: .5; 3 SOLUTION RESPIRATORY (INHALATION) at 20:44

## 2022-04-12 RX ADMIN — FENTANYL CITRATE 50 MCG: 50 INJECTION, SOLUTION INTRAMUSCULAR; INTRAVENOUS at 05:47

## 2022-04-12 RX ADMIN — SODIUM CHLORIDE, PRESERVATIVE FREE 10 ML: 5 INJECTION INTRAVENOUS at 21:00

## 2022-04-12 RX ADMIN — POTASSIUM CHLORIDE 10 MEQ: 7.46 INJECTION, SOLUTION INTRAVENOUS at 22:05

## 2022-04-12 RX ADMIN — DOCUSATE SODIUM LIQUID 100 MG: 100 LIQUID ORAL at 08:51

## 2022-04-12 RX ADMIN — METOPROLOL TARTRATE 25 MG: 25 TABLET ORAL at 08:52

## 2022-04-12 RX ADMIN — SODIUM CHLORIDE, PRESERVATIVE FREE 40 MG: 5 INJECTION INTRAVENOUS at 08:52

## 2022-04-12 RX ADMIN — FENTANYL CITRATE 50 MCG: 50 INJECTION, SOLUTION INTRAMUSCULAR; INTRAVENOUS at 19:32

## 2022-04-12 ASSESSMENT — PAIN DESCRIPTION - FREQUENCY
FREQUENCY: CONTINUOUS
FREQUENCY: INTERMITTENT

## 2022-04-12 ASSESSMENT — PAIN DESCRIPTION - ORIENTATION
ORIENTATION: MID
ORIENTATION: MID

## 2022-04-12 ASSESSMENT — PULMONARY FUNCTION TESTS
PIF_VALUE: 10
PIF_VALUE: 10
PIF_VALUE: 13
PIF_VALUE: 23
PIF_VALUE: 23
PIF_VALUE: 9
PIF_VALUE: 24
PIF_VALUE: 23
PIF_VALUE: 10
PIF_VALUE: 2
PIF_VALUE: 10
PIF_VALUE: 24
PIF_VALUE: 21
PIF_VALUE: 10
PIF_VALUE: 25
PIF_VALUE: 25
PIF_VALUE: 13
PIF_VALUE: 10
PIF_VALUE: 23
PIF_VALUE: 10
PIF_VALUE: 10
PIF_VALUE: 17
PIF_VALUE: 25
PIF_VALUE: 10
PIF_VALUE: 10
PIF_VALUE: 24
PIF_VALUE: 23
PIF_VALUE: 13
PIF_VALUE: 10
PIF_VALUE: 14

## 2022-04-12 ASSESSMENT — PAIN SCALES - GENERAL
PAINLEVEL_OUTOF10: 8
PAINLEVEL_OUTOF10: 7
PAINLEVEL_OUTOF10: 9
PAINLEVEL_OUTOF10: 7
PAINLEVEL_OUTOF10: 0
PAINLEVEL_OUTOF10: 8
PAINLEVEL_OUTOF10: 0

## 2022-04-12 ASSESSMENT — PAIN DESCRIPTION - DESCRIPTORS
DESCRIPTORS: ACHING
DESCRIPTORS: ACHING

## 2022-04-12 ASSESSMENT — PAIN DESCRIPTION - LOCATION
LOCATION: BACK
LOCATION: BACK

## 2022-04-12 ASSESSMENT — PAIN DESCRIPTION - PAIN TYPE
TYPE: ACUTE PAIN
TYPE: ACUTE PAIN

## 2022-04-12 NOTE — FLOWSHEET NOTE
met with pt' sister who was at bedside, very concerned and worried about pt. Who had bypass this morning. Pt. Is intubated and unable to respond at this time. Sister appreciates visit and reports that many people are praying for pt.  offers emotional support and encouragement. 04/12/22 8555   Encounter Summary   Services provided to: Patient and family together   Referral/Consult From: [facility] Parent; Family members   [facility] of [address]   (04/12/2022)   Complexity of Encounter High   Length of Encounter 30 minutes   Spiritual Assessment Completed Yes   Routine   Type Follow up   Assessment Unable to respond   Intervention Active listening;Explored feelings, thoughts, concerns;Explored coping resources;Nurtured hope;Sustaining presence/ Ministry of presence   Outcome Comfort;Coping;Venting emotion

## 2022-04-12 NOTE — PROGRESS NOTES
flush  5-40 mL IntraVENous 2 times per day    [Held by provider] amLODIPine  10 mg Oral Daily    nicotine  1 patch TransDERmal Daily     Continuous Infusions:   sodium chloride      propofol 15 mcg/kg/min (04/12/22 0600)    dexmedetomidine Stopped (04/10/22 1158)    sodium chloride Stopped (04/11/22 1935)    sodium chloride Stopped (04/11/22 0447)    dextrose      norepinephrine      EPINEPHrine      sodium chloride 20 mL/hr at 04/04/22 0408     CBC:   Recent Labs     04/10/22  0242 04/10/22  0929 04/11/22  0358 04/11/22  0358 04/11/22  1352 04/11/22  1820 04/12/22  0306   WBC 14.2*  --  11.8*  --   --   --  16.1*   HGB 9.0*   < > 7.2*   < > 9.0* 8.6* 8.6*   PLT See Reflexed IPF Result  --  See Reflexed IPF Result  --   --   --  144    < > = values in this interval not displayed. BMP:    Recent Labs     04/10/22  0242 04/10/22  0242 04/11/22 0358 04/11/22  1820 04/12/22  0306   *  --  138  --  138   K 4.1   < > 4.5 3.9 4.3   *  --  109*  --  104   CO2 20  --  22  --  24   BUN 13  --  21*  --  18   CREATININE 0.77  --  0.82  --  0.76   GLUCOSE 139*  --  107*  --  104*    < > = values in this interval not displayed. Hepatic: No results for input(s): AST, ALT, ALB, BILITOT, ALKPHOS in the last 72 hours. Troponin: No results for input(s): TROPONINI in the last 72 hours. No results for input(s): TROPONINT in the last 72 hours. BNP: No results for input(s): PROBNP in the last 72 hours. No results for input(s): BNP in the last 72 hours. Lipids: No results for input(s): CHOL, HDL in the last 72 hours.     Invalid input(s): LDLCALCU  INR:   Recent Labs     04/10/22  0242 04/11/22  0358 04/12/22  0306   INR 1.2 1.1 1.0       Objective:   Vitals: /80   Pulse 99   Temp 99.3 °F (37.4 °C) (Bladder)   Resp 25   Ht 5' 1\" (1.549 m)   Wt 153 lb 14.1 oz (69.8 kg)   SpO2 98%   BMI 29.08 kg/m²    Constitutional and General Appearance:   Intubated and sedated  Respiratory:  No for increased work of breathing. On auscultation: dimnished breath sounds bilaterally  Cardiovascular: The apical impulse is not displaced  Heart tones are crisp and normal. Regular S1 and S2. No murmurs. Abdomen:   No masses or tenderness  Bowel sounds present  Extremities:   No Cyanosis or Clubbing   Lower extremity edema: No   Skin: Warm and dry  Neurological:  Intubated and sedated     Diagnostic Studies:         ECHO:   Summary  Normal LV size , mildly increased wall thickness. No obvious wall motion abnormality seen. Normal LV systolic function with LVEF >55%. Normal RV size and function. LA and RA appears normal in size. No obvious significant structural valvular abnormality noted. No significant valvular stenosis or regurgitation noted. Normal aortic root dimension. No significant pericardial effusion noted. No obvious intra-cardiac mass or shunt noted. IVC normal diameter and inspiratory variation indicating normal RA filling  pressure. Cardiac Angiography:  LMCA: Distal 30-40% stenosis     LAD: Mid stent stenosis 80%     LCx: Mid area 90% after the OM take off  OM1: in stent stenosis 90%     RCA: Proximal 75% stenosis  Mid area ulcerated plaques with 60% stenosis  PL branch ostial / proximal 90% stenosis      Coronary Tree      Dominance: Right     LV Analysis  LV function assessed as:Normal.  Ejection Fraction        Assessment / Acute Cardiac Problems:   1. 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 HTN  3.  HL  4.  active smoker  5. Post op anemia s/p 3  Units pRBCs, 1 unit cryoprecipitate, 1 unit plt and plasma  6. Aspiration PNA     Plan of Treatment:   Continue ASA and plavix  Continue statin  Continue lopressor 25 mg BID  Continue amiodarone 200 mg TID per CV surgery recommendations. Hold Norvasc & HCTZ. Will restart as BP recovers  Currently intubated. Wean as tolerated  Antibiotics per Pulm/Critical care.  Currently on cefepime & vancomycin  Post op management per CV

## 2022-04-12 NOTE — PLAN OF CARE
Problem: Falls - Risk of:  Goal: Will remain free from falls  Description: Will remain free from falls  4/12/2022 0135 by Katie Callahan RN  Outcome: Ongoing  4/11/2022 1946 by Carlos Alberto Reddy RN  Outcome: Ongoing  Goal: Absence of physical injury  Description: Absence of physical injury  4/12/2022 0135 by Katie Callahan RN  Outcome: Ongoing  4/11/2022 1946 by Carlos Alberto Reddy RN  Outcome: Ongoing     Problem: Cardiac Output - Decreased:  Goal: Hemodynamic stability will improve  Description: Hemodynamic stability will improve  4/12/2022 0135 by Katie Callahan RN  Outcome: Ongoing  4/11/2022 1946 by Carlos Alberto Reddy RN  Outcome: Ongoing  Goal: Cardiac output within specified parameters  Description: Cardiac output within specified parameters  4/12/2022 0135 by Katie Callahan RN  Outcome: Ongoing  4/11/2022 1946 by Carlos Alberto Reddy RN  Outcome: Ongoing     Problem: Pain:  Goal: Pain level will decrease  Description: Pain level will decrease  4/12/2022 0135 by Katie Callahan RN  Outcome: Ongoing  4/11/2022 1946 by Carlos Alberto Reddy RN  Outcome: Ongoing  Goal: Control of acute pain  Description: Control of acute pain  4/12/2022 0135 by Katie Callahan RN  Outcome: Ongoing  4/11/2022 1946 by Carlos Alberto Reddy RN  Outcome: Ongoing  Goal: Control of chronic pain  Description: Control of chronic pain  4/12/2022 0135 by Katie Callahan RN  Outcome: Ongoing  4/11/2022 1946 by Carlos Alberto Reddy RN  Outcome: Ongoing     Problem: Tissue Perfusion - Cardiopulmonary, Altered:  Goal: Hemodynamic stability will improve  Description: Hemodynamic stability will improve  4/12/2022 0135 by Katie Callahan RN  Outcome: Ongoing  4/11/2022 1946 by Carlos Alberto Reddy RN  Outcome: Ongoing  Goal: Absence of angina  Description: Absence of angina  4/12/2022 0135 by Katie Callahan RN  Outcome: Ongoing  4/11/2022 1946 by Carlos Alberto Reddy RN  Outcome: Ongoing  Goal: Circulatory function within specified parameters  Description: Circulatory function within specified parameters  4/12/2022 0135 by Chepe Evans RN  Outcome: Ongoing  4/11/2022 1946 by Yadira Perez RN  Outcome: Ongoing  Goal: Hemodynamic stability will improve  Description: Hemodynamic stability will improve  4/12/2022 0135 by Chepe Evans RN  Outcome: Ongoing  4/11/2022 1946 by Yadira Perez RN  Outcome: Ongoing  Goal: Will show no evidence of cardiac arrhythmias  Description: Will show no evidence of cardiac arrhythmias  4/12/2022 0135 by Chepe Evans RN  Outcome: Ongoing  4/11/2022 1946 by Yadira Perez RN  Outcome: Ongoing     Problem: Tobacco Use:  Goal: Will participate in inpatient tobacco-use cessation counseling  Description: Will participate in inpatient tobacco-use cessation counseling  4/12/2022 0135 by Chepe Evans RN  Outcome: Ongoing  4/11/2022 1946 by Yadira Perez RN  Outcome: Ongoing     Problem: Pain:  Goal: Pain level will decrease  Description: Pain level will decrease  4/12/2022 0135 by Chepe Evans RN  Outcome: Ongoing  4/11/2022 1946 by Yadira Perez RN  Outcome: Ongoing  Goal: Control of acute pain  Description: Control of acute pain  4/12/2022 0135 by Chepe Evans RN  Outcome: Ongoing  4/11/2022 1946 by Yadira Perez RN  Outcome: Ongoing  Goal: Control of chronic pain  Description: Control of chronic pain  4/12/2022 0135 by Chepe Evans RN  Outcome: Ongoing  4/11/2022 1946 by Yadira Perez RN  Outcome: Ongoing     Problem: OXYGENATION/RESPIRATORY FUNCTION  Goal: Patient will maintain patent airway  4/12/2022 0135 by Chepe Evans RN  Outcome: Ongoing  4/11/2022 1946 by Yadira Perez RN  Outcome: Ongoing  Goal: Patient will achieve/maintain normal respiratory rate/effort  Description: Respiratory rate and effort will be within normal limits for the patient  4/12/2022 0135 by Chepe Evans RN  Outcome: Ongoing  4/11/2022 1946 by Yadira Perez RN  Outcome: Ongoing     Problem: Non-Violent Restraints  Goal: Removal from restraints as soon as assessed to be safe  4/12/2022 0135 by Parish Garcia RN  Outcome: Ongoing  4/11/2022 1946 by Annabelle Esparza RN  Outcome: Ongoing  Goal: No harm/injury to patient while restraints in use  4/12/2022 0135 by Parish Garcia RN  Outcome: Ongoing  4/11/2022 1946 by Annabelle Esparza RN  Outcome: Ongoing  Goal: Patient's dignity will be maintained  4/12/2022 0135 by Parish Garcia RN  Outcome: Ongoing  4/11/2022 1946 by Annabelle Esparza RN  Outcome: Ongoing     Problem: Skin Integrity:  Goal: Will show no infection signs and symptoms  Description: Will show no infection signs and symptoms  4/12/2022 0135 by Parish Garcia RN  Outcome: Ongoing  4/11/2022 1946 by Annabelle Esparza RN  Outcome: Ongoing  Goal: Absence of new skin breakdown  Description: Absence of new skin breakdown  4/12/2022 0135 by Parish Garcia RN  Outcome: Ongoing  4/11/2022 1946 by Annabelle Esparza RN  Outcome: Ongoing     Problem: MECHANICAL VENTILATION  Goal: Patient will maintain patent airway  4/12/2022 0135 by Parish Garcia RN  Outcome: Ongoing  4/11/2022 1946 by Annabelle Esparza RN  Outcome: Ongoing  Goal: Oral health is maintained or improved  4/12/2022 0135 by Parish Garcia RN  Outcome: Ongoing  4/11/2022 1946 by Annabelle Esparza RN  Outcome: Ongoing  Goal: ET tube will be managed safely  4/12/2022 0135 by Parish Garcia RN  Outcome: Ongoing  4/11/2022 1946 by Annabelle Esparza RN  Outcome: Ongoing  Goal: Ability to express needs and understand communication  4/12/2022 0135 by Parish Garcia RN  Outcome: Ongoing  4/11/2022 1946 by Annabelle Esparza RN  Outcome: Ongoing  Goal: Mobility/activity is maintained at optimum level for patient  4/12/2022 0135 by Parish Garcia RN  Outcome: Ongoing  4/11/2022 1946 by Annabelle Esparza RN  Outcome: Ongoing     Problem: Nutrition  Goal: Optimal nutrition therapy  4/12/2022 0135 by Parish Garcia RN  Outcome: Ongoing  4/11/2022 1946 by Carlos Alberto Reddy RN  Outcome: Ongoing     Problem: Discharge Planning:  Goal: Discharged to appropriate level of care  Description: Discharged to appropriate level of care  4/12/2022 0135 by Katie Callahan RN  Outcome: Ongoing  4/11/2022 1946 by Carlos Alberto Reddy RN  Outcome: Ongoing     Problem:  Activity Intolerance:  Goal: Able to perform prescribed physical activity  Description: Able to perform prescribed physical activity  4/12/2022 0135 by Katie Callahan RN  Outcome: Ongoing  4/11/2022 1946 by Carlos Alberto Reddy RN  Outcome: Ongoing  Goal: Ability to tolerate increased activity will improve  Description: Ability to tolerate increased activity will improve  4/12/2022 0135 by Katie Callahan RN  Outcome: Ongoing  4/11/2022 1946 by Carlos Alberto Reddy RN  Outcome: Ongoing     Problem: Anxiety:  Goal: Level of anxiety will decrease  Description: Level of anxiety will decrease  4/12/2022 0135 by Katie Callahan RN  Outcome: Ongoing  4/11/2022 1946 by Carlos Alberto Reddy RN  Outcome: Ongoing     Problem: Fluid Volume - Imbalance:  Goal: Ability to achieve a balanced intake and output will improve  Description: Ability to achieve a balanced intake and output will improve  4/12/2022 0135 by Katie Callahan RN  Outcome: Ongoing  4/11/2022 1946 by Carlos Alberto Reddy RN  Outcome: Ongoing  Goal: Chest tube drainage is within specified parameters  Description: Chest tube drainage is within specified parameters  4/12/2022 0135 by Katie Callahan RN  Outcome: Ongoing  4/11/2022 1946 by Carlos Alberto Reddy RN  Outcome: Ongoing     Problem: Gas Exchange - Impaired:  Goal: Levels of oxygenation will improve  Description: Levels of oxygenation will improve  4/12/2022 0135 by Katie Callahan RN  Outcome: Ongoing  4/11/2022 1946 by Carlos Alberto Reddy RN  Outcome: Ongoing  Goal: Ability to maintain adequate ventilation will improve  Description: Ability to maintain adequate ventilation will improve  4/12/2022 0135 by Katie Callahan RN  Outcome: Ongoing  4/11/2022 1946 by Jack Sagastume RN  Outcome: Ongoing

## 2022-04-12 NOTE — PROGRESS NOTES
PULMONARY & CRITICAL CARE MEDICINE PROGRESS NOTE     Patient:  Jannette Tyler  MRN: [id number]  Admit date: 4/3/2022  Primary Care Physician: Anthony Maldonado MD  Consulting Physician: Terry Fitzgerald MD  CODE Status: Full Code  LOS: 9     SUBJECTIVE     CHIEF COMPLAINT/REASON FOR INITIAL CONSULT: Vent management    BRIEF HOSPITAL COURSE:   The patient is a 61 y.o. female with past medical history of smoking, CAD s/p stent, strong family history of heart disease, in the past initially presented with chest pain. Found to have elevated troponin. Patient required cardiac catheterization found to have multivessel disease. CT surgery was consulted and she was taken  to the OR, underwent CABG x3. Post CABG patient remained intubated and sedated. Pulmonology was consulted for vent management. Upon my evaluation, patient is intubated and sedated. She is on SIMV mode 24/380/5/50 ABG this morning showed 7.4/35/96/24. She is sedated with propofol at 20. She is off of pressor support. Post CABG patient had acute drop in hemoglobin requiring multiple transfusion. Chest x-ray showed left pleural effusion. She still has a chest tube in place both in the pleural and mediastinal.    INTERVAL HISTORY:  04/12/22   Patient was seen and examined at bedside. Patient remained intubated and sedated with propofol at 15. She is currently on CPAP tolerating it well. She is awake and following commands. ABG from today is 7.48/31/111/23  BAL culture grew Pseudomonas, continue to be on cefepime. Vancomycin has been stopped since there is no staph growing on culture. X-ray chest reviewed, bilateral pulmonary infiltrates.     REVIEW OF SYSTEMS:  Unobtainable from patient due to sedation/mechanical ventilation    OBJECTIVE     VENTILATOR SETTINGS:  Vent Information  $Ventilation: $Subsequent Day  Skin Assessment: Clean, dry, & intact  Equipment ID: [id number]  Equipment Changed: HME  Vent Type: Servo i  Vent Mode: CPAP  Vt Ordered: 430 mL  Pressure Ordered: 5  Rate Set: 25 bmp  Pressure Support: 8 cmH20  FiO2 : 40 %  SpO2: 98 %  SpO2/FiO2 ratio: 245  Sensitivity: 1  PEEP/CPAP: 8  I Time/ I Time %: 0.8 s  Humidification Source: HME  Nitric Oxide/Epoprostenol In Use?: No  Mask Type: Full face mask  Mask Size: Small     PaO2/FiO2 RATIO:  Recent Labs     22  0331   POCPO2 111.8*      FiO2 : 40 %     VITAL SIGNS:   LAST:  /88   Pulse 107   Temp 100.2 °F (37.9 °C) (Bladder)   Resp 21   Ht 5' 1\" (1.549 m)   Wt 153 lb 14.1 oz (69.8 kg)   SpO2 98%   BMI 29.08 kg/m²   8-24 HR RANGE:  TEMP Temp  Av.5 °F (37.5 °C)  Min: 99 °F (37.2 °C)  Max: 100.2 °F (18.1 °C)   BP Systolic (43ABW), MR , Min:107 , WGO:812      Diastolic (76QCW), RPA:46, Min:63, Max:109     PULSE Pulse  Av.8  Min: 89  Max: 135   RR Resp  Av.4  Min: 21  Max: 26   O2 SAT SpO2  Av.8 %  Min: 93 %  Max: 100 %   OXYGEN DELIVERY No data recorded        Physical Exam  Constitutional:       General: She is not in acute distress. Appearance: She is ill-appearing. Interventions: She is sedated and intubated. HENT:      Head: Normocephalic and atraumatic. Eyes:      Conjunctiva/sclera: Conjunctivae normal.      Pupils: Pupils are equal, round, and reactive to light. Neck:      Thyroid: No thyromegaly. Vascular: No JVD. Cardiovascular:      Rate and Rhythm: Normal rate and regular rhythm. Pulses: Normal pulses. Heart sounds: Normal heart sounds, S1 normal and S2 normal. No murmur heard. Pulmonary:      Effort: She is intubated. Breath sounds: Decreased breath sounds present. Abdominal:      General: Bowel sounds are normal. There is no distension. Palpations: Abdomen is soft. There is no mass. Musculoskeletal:      Cervical back: Neck supple. Right lower leg: No edema. Left lower leg: No edema. Lymphadenopathy:      Cervical: No cervical adenopathy.    Skin:     Coloration: Skin is not jaundiced or pale. Findings: No rash. Nails: There is no clubbing. Neurological:      General: No focal deficit present.        DATA REVIEW     Medications:  Scheduled Meds:   cefepime  2,000 mg IntraVENous Q8H    furosemide  20 mg IntraVENous BID    docusate  100 mg Oral BID    insulin lispro  0-6 Units SubCUTAneous TID WC    insulin lispro  0-3 Units SubCUTAneous Nightly    sodium chloride flush  5-40 mL IntraVENous 2 times per day    sodium chloride flush  5-40 mL IntraVENous 2 times per day    sodium chloride flush  10 mL IntraVENous 2 times per day    aspirin  81 mg Oral Daily    clopidogrel  75 mg Oral Daily    amiodarone  200 mg Oral TID    metoprolol tartrate  25 mg Oral BID    atorvastatin  20 mg Oral Nightly    pantoprazole  40 mg Oral Daily    pantoprazole (PROTONIX) 40 mg injection  40 mg IntraVENous Daily    ipratropium-albuterol  1 ampule Inhalation Q4H WA    insulin glargine  0.15 Units/kg SubCUTAneous Nightly    senna  1 tablet Oral Once    [Held by provider] hydroCHLOROthiazide  25 mg Oral Daily    [Held by provider] lisinopril  40 mg Oral Daily    potassium chloride  10 mEq Oral Once    lovastatin  10 mg Oral Nightly    sodium chloride flush  5-40 mL IntraVENous 2 times per day    [Held by provider] amLODIPine  10 mg Oral Daily    nicotine  1 patch TransDERmal Daily     Continuous Infusions:   sodium chloride      propofol 15 mcg/kg/min (04/12/22 0600)    dexmedetomidine Stopped (04/10/22 1158)    sodium chloride Stopped (04/11/22 1935)    sodium chloride Stopped (04/11/22 0447)    dextrose 100 mL/hr (04/12/22 0904)    norepinephrine      EPINEPHrine      sodium chloride 20 mL/hr at 04/04/22 0408       INPUT/OUTPUT:  In: 1159.8 [I.V.:251; Blood:344.5; NG/GT:160]  Out: 5276 [Urine:4465]  Date 04/12/22 0000 - 04/12/22 2359   Shift 4654-7936 4560-4607 2701-0912 24 Hour Total   INTAKE   I.V.(mL/kg) 68.9(1)   68.9(1)   NG/GT(mL/kg) 50(0.7) 60(0.9)  110(1.6)   IV Piggyback(mL/kg) 200(2.9)   200(2.9)   Shift Total(mL/kg) 318.9(4.6) 60(0.9)  378.9(5.4)   OUTPUT   Urine(mL/kg/hr) 660(1.2) 1140  1800   Chest Tube 60 0  60   Shift Total(mL/kg) 720(10.3) 1140(16.3)  1860(26.6)   Weight (kg) 69.8 69.8 69.8 69.8        LABS:  ABGs:   Recent Labs     04/10/22  1311 04/10/22  1436 04/10/22  2326 04/11/22  0334 04/12/22  0331   POCPH 7.198* 7.314* 7.348* 7.387 7.486*   POCPCO2 61.0* 41.4 38.7 33.4* 31.4*   POCPO2 69.9* 73.9* 121.1* 183.5* 111.8*   POCHCO3 23.8 21.0 21.3 20.1* 23.7   GMSB4YRI 89* 93* 98 100* 99*     CBC:   Recent Labs     04/10/22  0242 04/10/22  0929 04/11/22  0358 04/11/22  1352 04/11/22  1820 04/12/22  0306 04/12/22  1019   WBC 14.2*  --  11.8*  --   --  16.1*  --    HGB 9.0*   < > 7.2* 9.0* 8.6* 8.6* 9.6*   HCT 27.5*   < > 21.8* 27.9* 25.8* 25.8* 28.4*   MCV 91.4  --  91.2  --   --  89.0  --    PLT See Reflexed IPF Result  --  See Reflexed IPF Result  --   --  144  --    LYMPHOPCT 9*  --  15*  --   --  10*  --    RBC 3.01*  --  2.39*  --   --  2.90*  --    MCH 29.9  --  30.1  --   --  29.7  --    MCHC 32.7  --  33.0  --   --  33.3  --    RDW 15.7*  --  15.6*  --   --  15.4*  --     < > = values in this interval not displayed. CRP:   No results for input(s): CRP in the last 72 hours. LDH:   No results for input(s): LDH in the last 72 hours. BMP:   Recent Labs     04/10/22  0242 04/11/22  0358 04/11/22  1820 04/12/22  0306   * 138  --  138   K 4.1 4.5 3.9 4.3   * 109*  --  104   CO2 20 22  --  24   BUN 13 21*  --  18   CREATININE 0.77 0.82  --  0.76   GLUCOSE 139* 107*  --  104*     Liver Function Test:   No results for input(s): PROT, LABALBU, ALT, AST, GGT, ALKPHOS, BILITOT in the last 72 hours. Coagulation Profile:   Recent Labs     04/10/22  0242 04/11/22  0358 04/12/22  0306   INR 1.2 1.1 1.0   PROTIME 12.2 11.9 10.6     D-Dimer:  No results for input(s): DDIMER in the last 72 hours.   Lactic Acid:  No results for input(s): LACTA in the last 72 hours. Cardiac Enzymes:  No results for input(s): CKTOTAL, CKMB, CKMBINDEX, TROPONINI in the last 72 hours. Invalid input(s): TROPONIN, HSTROP  BNP/ProBNP:   No results for input(s): BNP, PROBNP in the last 72 hours. Triglycerides:  No results for input(s): TRIG in the last 72 hours. Microbiology:  Urine Culture:  No components found for: CURINE  Blood Culture:  No components found for: CBLOOD, CFUNGUSBL  Sputum Culture:  No components found for: [facility]  Recent Labs     04/10/22  0249 04/10/22  0925 04/10/22  1243   SPECDESC . BLOOD   < > . BRONCHIAL ALVEOLAR LAVAGE   SPECIAL R AC 1ML  --   --    CULTURE NO GROWTH 2 DAYS   < > PSEUDOMONAS AERUGINOSA <34880 CFU/ML*  NO NORMAL JUSTINA    < > = values in this interval not displayed. Recent Labs     04/10/22  0238 04/10/22  0238 04/10/22  0249 04/10/22  0925 04/10/22  1243   SPECDESC . BLOOD   < > .BLOOD . EXPECTORATED SPUTUM . BRONCHIAL ALVEOLAR LAVAGE   SPECIAL L WRIST 1ML  --  R AC 1ML  --   --    CULTURE NO GROWTH 2 DAYS   < > NO GROWTH 2 DAYS PSEUDOMONAS AERUGINOSA MODERATE GROWTH*  NO NORMAL JUSTINA PSEUDOMONAS AERUGINOSA <44046 CFU/ML*  NO NORMAL JUSTINA    < > = values in this interval not displayed. Pathology:    Radiology Reports:  XR CHEST PORTABLE   Final Result   Worsening right basilar atelectasis, otherwise similar appearing chest.  No   pneumothorax. XR CHEST PORTABLE   Final Result   Persistent bilateral airspace disease, with increased density in the right   upper lobe as compared to prior. Persistent small left pleural effusion. XR ABDOMEN FOR NG/OG/NE TUBE PLACEMENT   Final Result   Endotracheal tube tip is approximately 4 cm above the maria r. Similar perihilar infiltrates, left greater than right. Nasogastric tube tip appears to be in the stomach. XR CHEST PORTABLE   Final Result   Endotracheal tube tip is approximately 4 cm above the maria r. Similar perihilar infiltrates, left greater than right. Nasogastric tube tip appears to be in the stomach. XR CHEST PORTABLE   Final Result   Interval progression detailed above. XR CHEST PORTABLE   Final Result   Interim removal of the endotracheal and NG tubes. Chest tubes unchanged. New mild airspace opacity at the base of the right upper lobe. Pulmonary   hemorrhage or aspiration not excluded. Improved aeration of the left lung and possibly decreased left pleural   effusion. XR CHEST PORTABLE   Final Result      XR CHEST PORTABLE   Final Result   1. Lines and tubes in expected position as above. 2. No acute pulmonary abnormality. VL DUP UPPER EXTREMITY ARTERIES BILATERAL   Final Result      VL DUP CAROTID BILATERAL   Final Result      VL VEIN MAPPING LOWER BILATERAL   Final Result      US HEAD NECK SOFT TISSUE THYROID   Final Result   Slightly heterogeneous thyroid gland with a 2.9 cm right thyroid lobe cyst   corresponding to the prior imaging studies. No FNA or follow-up imaging is   recommended based on TI rads criteria. RECOMMENDATIONS:   ACR TI-RADS recommendations:      TR5 (>= 7 points):  FNA if >= 1 cm; follow-up if 0.5-0.9 cm in 1, 2, 3, 4,   and 5 years      TR4 (4-6 points):  FNA if >= 1.5 cm; follow-up if 1.0-1.4 cm in 1, 2, 3, and   5 years      TR3 (3 points):  FNA if >= 2.5 cm; follow-up if 1.5-2.4 cm in 1, 3, and 5   years      TR2 (2 points):  No FNA or follow-up      TR1 (0 points):  No FNA or follow-up      ACR TI-RADS recommends that no more than two nodules with the highest ACR   TI-RADS point total should be biopsied and no more than four nodules should   be followed. CT CHEST WO CONTRAST   Final Result   incidental 3 cm hypodense lesion of the right lobe of the thyroid. See below   for recommendation. Calcified granuloma in the right middle lobe. Examination otherwise unremarkable. RECOMMENDATIONS:   3 cm incidental right thyroid nodule. Recommend thyroid US.    Reference: J Am Carlitos Radiol. 2015 Feb;12(2): 143-50         XR CHEST PORTABLE   Final Result   No acute process. XR CHEST PORTABLE    (Results Pending)        Echocardiogram:   Results for orders placed during the hospital encounter of 04/03/22    ECHO Complete 2D W Doppler W Color    CONCLUSIONS    Summary  Normal LV size , mildly increased wall thickness. No obvious wall motion abnormality seen. Normal LV systolic function with LVEF >55%. Normal RV size and function. LA and RA appears normal in size. No obvious significant structural valvular abnormality noted. No significant valvular stenosis or regurgitation noted. Normal aortic root dimension. No significant pericardial effusion noted. No obvious intra-cardiac mass or shunt noted. IVC normal diameter and inspiratory variation indicating normal RA filling  pressure. ASSESSMENT AND PLAN     Assessment:  // Acute hypoxic respiratory failure, s/p reintubation  // Bilateral aspiration pneumonia  // S/p bronchoscopic clearance of bilateral mucus plugging (4/10/2022)  // Coronary disease, s/p CABG x3 (4/8/2022)  // Postoperative blood loss anemia, s/p multiple transfusion  // Thyroid nodule    Plan:    I personally interviewed/examined the patient; reviewed interval history, interpreted all available radiographic and laboratory data at the time of service. Patient currently is intubated and sedated. She is on CPAP mode she is tolerating well. She is sedated with 15 of propofol. Vital and hemodynamically stable. We will discontinue vancomycin since culture did not show any staph, continue cefepime. Continue IV diuresis, continue to monitor creatinine, electrolytes and I/O  iF she tolerated CPAP well and then will extubate later today   Chest and mediastinal tube being managed by CT surgery.   On amiodarone, Lipitor, Lasix  DVT prophylaxis: None    Plan to be discussed with rounding attending    Orion Nichole MD.  Internal Medicine Resident PGY [facility] [address] [place] [address]   4/12/2022, 5:42 PM      Please note that this chart was generated using voice recognition Dragon dictation software. Although every effort was made to ensure the accuracy of this automated transcription, some errors in transcription may have occurred. Attending Physician Statement  I have discussed the care of Crow Riddle, including pertinent history and exam findings with the resident. I have reviewed the key elements of all parts of the encounter with the resident. I have seen and examined the patient with the resident. I agree with the assessment and plan and status of the problem list as documented. I seen the patient during the round today, chart reviewed, labs and medications reviewed artery blood gases and ventilator settings seen. Overnight she is afebrile she is hemodynamically stable does not require any pressor support. When I saw her she was on propofol 50 mcg she was comfortable she was arousable and follow commands. Arterial blood gas this morning was 7.4 8/31/111/24 on ventilator setting PRVC/25/430/8/40 percent. Currently patient is on CPAP/pressure support 8/8/and her volumes around 400 40 percent her respiratory rate is in 24 on CPAP/pressure support. Labs shows BUN is 18 creatinine 0.76 bicarbonate 24 potassium 4.3 WBC 16.1 hemoglobin 8.6 and platelet count 122. Her bronchial washing culture grew Pseudomonas she is currently on cefepime vancomycin DC'd. Chest x-ray shows similar finding with right upper/midlung infiltrate and left lower lobe infiltrate with effusion. Since she is tolerating spontaneous breathing trial alert and awake will extubate   Post extubation will use NIV/BiPAP intermittently during the daytime and every night. We will encourage incentive spirometry deep breathing and cough. Most importantly mobilization of secretions and expectoration.   Monitor intake and output and continue Lasix currently 20 mg twice daily. On aspirin statin amiodarone and on Lantus insulin. Continue cefepime    Discussed with nursing staff, treatment and plan discussed. Discussed with respiratory therapist.    Total critical care time caring for this patient with life threatening, unstable organ failure, including direct patient contact, management of life support systems, review of data including imaging and labs, discussions with other team members and physicians at least 35 min so far today, excluding procedures. Please note that this chart was generated using voice recognition Dragon dictation software. Although every effort was made to ensure the accuracy of this automated transcription, some errors in transcription may have occurred.      Yajaira Meneses MD  4/12/2022 5:51 PM

## 2022-04-12 NOTE — PROGRESS NOTES
[id number] [facility] Cardiothoracic Surgical   Progress Note    4/12/2022 8:54 AM  Surgeon:  Randalyn Scheuermann          POD# 4  S/P :  Coronary artery bypass  EF:  55 %    Subjective:  Ms. Jaylin Wang intubated on propofol. Following commands while intubated    Vital Signs: /80   Pulse 99   Temp 99.3 °F (37.4 °C) (Bladder)   Resp 25   Ht 5' 1\" (1.549 m)   Wt 153 lb 14.1 oz (69.8 kg)   SpO2 98%   BMI 29.08 kg/m²  O2 Flow Rate (L/min): 10 L/min   Admit Weight: Weight: 140 lb (63.5 kg)     Objective:  Vitals:    04/12/22 0600   BP: 116/80   Pulse: 99   Resp: 25   Temp:    SpO2: 98%     Chest: pacing wires: yes, chest tubes:yes, air leak no, 3 +  CV: no murmur noted, Normal S1, S2,   Lungs: clear to auscultation, no wheezes, rales, or rhonchi  Abd: normal bowel sounds   Lower Extremities: Trace edema  Saph Incison: no sign of drainage or infection  Sternal Incison: dressing applied-no excessive drainage or signs of infection noted    CXR-improved right thorax of aspiration  Left thorax is improving of atelectasis/pl effusion    Labs and Studies:  CBC:   Recent Labs     04/10/22  0242 04/10/22  0929 04/11/22 0358 04/11/22 0358 04/11/22  1352 04/11/22  1820 04/12/22  0306   WBC 14.2*  --  11.8*  --   --   --  16.1*   HGB 9.0*   < > 7.2*   < > 9.0* 8.6* 8.6*   HCT 27.5*   < > 21.8*   < > 27.9* 25.8* 25.8*   MCV 91.4  --  91.2  --   --   --  89.0   PLT See Reflexed IPF Result  --  See Reflexed IPF Result  --   --   --  144    < > = values in this interval not displayed. BMP:   Recent Labs     04/10/22  0242 04/10/22  0242 04/11/22  0358 04/11/22  1820 04/12/22  0306   *  --  138  --  138   K 4.1   < > 4.5 3.9 4.3   *  --  109*  --  104   CO2 20  --  22  --  24   BUN 13  --  21*  --  18   CREATININE 0.77  --  0.82  --  0.76    < > = values in this interval not displayed.      PT/INR:   Recent Labs     04/10/22  0242 04/11/22  0358 04/12/22  0306   PROTIME 12.2 11.9 10.6   INR 1.2 1.1 1.0     APTT:   No results for input(s): APTT in the last 72 hours. I/O:  I/O last 3 completed shifts: In: 1748.7 [I.V.:849.9;  Blood:344.5; NG/GT:150; IV Piggyback:404.3]  Out: 3935 [Urine:3835; Emesis/NG output:5; Chest Tube:95]    Scheduled Meds:    cefepime  2,000 mg IntraVENous Q8H    vancomycin  1,000 mg IntraVENous Q12H    furosemide  20 mg IntraVENous BID    vancomycin (VANCOCIN) intermittent dosing (placeholder)   Other RX Placeholder    docusate  100 mg Oral BID    insulin lispro  0-6 Units SubCUTAneous TID WC    insulin lispro  0-3 Units SubCUTAneous Nightly    sodium chloride flush  5-40 mL IntraVENous 2 times per day    sodium chloride flush  5-40 mL IntraVENous 2 times per day    sodium chloride flush  10 mL IntraVENous 2 times per day    aspirin  81 mg Oral Daily    clopidogrel  75 mg Oral Daily    amiodarone  200 mg Oral TID    metoprolol tartrate  25 mg Oral BID    atorvastatin  20 mg Oral Nightly    pantoprazole  40 mg Oral Daily    pantoprazole (PROTONIX) 40 mg injection  40 mg IntraVENous Daily    ipratropium-albuterol  1 ampule Inhalation Q4H WA    insulin glargine  0.15 Units/kg SubCUTAneous Nightly    senna  1 tablet Oral Once    [Held by provider] hydroCHLOROthiazide  25 mg Oral Daily    [Held by provider] lisinopril  40 mg Oral Daily    potassium chloride  10 mEq Oral Once    lovastatin  10 mg Oral Nightly    sodium chloride flush  5-40 mL IntraVENous 2 times per day    [Held by provider] amLODIPine  10 mg Oral Daily    nicotine  1 patch TransDERmal Daily     Continuous Infusions:    sodium chloride      propofol 15 mcg/kg/min (04/12/22 0600)    dexmedetomidine Stopped (04/10/22 1158)    sodium chloride Stopped (04/11/22 1935)    sodium chloride Stopped (04/11/22 0447)    dextrose      norepinephrine      EPINEPHrine      sodium chloride 20 mL/hr at 04/04/22 0408     Beta-Blocker: yes  ASA: yes  Plavix: yes  GI: yes  Statin: yes  Coumadin: no  ACE-I: no    Assessment/Plan:   Neuro intact  Pulmonary intubated s/p possible aspiration yday on cefepime and vanco.   CPAP now and extubate  Goal is to extubate today normal ABG-pt follows commands  GI NPO   40-45cc/hr. Creatine 0.82.  Lasix 20mg IV bid  D/C chest tubes later once patient is more vertical up out of bed    [facility], APRN - NP

## 2022-04-12 NOTE — PROGRESS NOTES
Pharmacy Note     Renal Dose Adjustment    Jannette Tyler is a 61 y.o. female. Pharmacist assessment of renally cleared medications. Recent Labs     04/11/22  0358 04/12/22  0306   BUN 21* 18       Recent Labs     04/11/22  0358 04/12/22  0306   CREATININE 0.82 0.76       Estimated Creatinine Clearance: 73 mL/min (based on SCr of 0.76 mg/dL). Height:   Ht Readings from Last 1 Encounters:   04/10/22 5' 1\" (1.549 m)     Weight:  Wt Readings from Last 1 Encounters:   04/12/22 153 lb 14.1 oz (69.8 kg)       The following medication dose has been adjusted based upon renal function per P&T Guidelines:             Cefepime changed to 2000 mg IV q8h for CrCl >60 mL/min. Kasia Taylor, Pharm. D., CYNTHIA, [facility]  4/12/2022 7:15 AM

## 2022-04-12 NOTE — PROGRESS NOTES
[facility]  Internal Medicine Teaching Residency Program  Inpatient Daily Progress Note  ______________________________________________________________________________    Patient: Estevan Enrique  YOB: 1965   BJF:[id number]    Acct: [de-identified]     Room: [address] [place] [address]  Admit date: 4/3/2022  Today's date: 04/12/22  Number of days in the hospital: 9    SUBJECTIVE   Admitting Diagnosis: NSTEMI (non-ST elevated myocardial infarction) ([facility] [address]  CC: Typical chest pain  POD #4, S/P CABG  And examined at bedside. Chart and results reviewed. Currently intubated on FiO2 40% with PEEP of 8. Blood gases showed 7.4 8/32/888/24. Blood pressure is 115/80. Heart rate 108. Labs reviewed and evaluated. Leukocytosis WBC 13. Hemoglobin 9.6 -stable. Respiratory culture grew Pseudomonas. Antibiotic titrated to cefepime and vancomycin. ROS: Cannot assess because of sedation and on mechanical ventilator. BRIEF HISTORY     The patient is a pleasant 61 y.o. female with a past medical history significant for essential hypertension, coronary artery disease status post PCI on DAPT, hyperlipidemia, chronic active smoking, history of stroke, MDD, memory problem presents with a chief complaint of typical chest pain. Chest pain started 3 to 4 days ago, dull aching in nature, 5-6 out of 10 in intensity, radiating to the left shoulder, increased with exertion and relieved with rest.  Also admits to exertional shortness of breath without orthopnea or PND. Patient denies palpitation syncope nausea vomiting diarrhea headache.     Evaluation in the ER, she was found to have elevated blood pressure 175/110. Heart rate 78 regular. SPO2 99% on room air. Heart auscultation showed normal first and second heart sound without murmur gallop. Chest is clear to auscultation. Abdomen soft and nontender. Labs reviewed normal BMP. No leukocytosis hemoglobin 14.   EKG showed normal sinus rhythm with some ST-T wave changes in inferior lead. Troponin is elevated 80. proBNP 217. Chest x-ray is negative for acute abnormality. Cardiology has been consulted in the ER for the concern of NSTEMI. Patient is started on heparin drip as per ACS protocol and will probably have cardiac cath tomorrow.     Of note, she has a past medical history of coronary artery disease status post PCI in ? Followed by another PCI in ? Questionable medication compliance. She has been actively smoking and is currently half pack per day. Denies alcohol intake. Admits to marijuana use.     Patient underwent cardiac cath on 2022. Findings:  LMCA: Distal 30-40% stenosis   LAD: Mid stent stenosis 80%   LCx: Mid area 90% after the OM take off   OM1: in stent stenosis 90%   RCA: Proximal 75% stenosis   Mid area ulcerated plaques with 60% stenosis   PL branch ostial / proximal 90% stenosis     The LV gram was performed in the LAWTON 30 position. LVEF: 50 %. Conclusions:   Multivessel CAD with left main disease    PLower normal LV systolic function   Recommendations:  CT surgery consult and recommend CABG. 4/10/-s/p CABG, chest tubes in place, on BiPAP  2022. All chest tube in place. Reintubated for worsening oxygen requirement. Currently on FiO2 40% with a PEEP of 8.  2022. On mechanical ventilator with FiO2 40% and PEEP of 8. OBJECTIVE     Vital Signs:  /80   Pulse 99   Temp 99.3 °F (37.4 °C) (Bladder)   Resp 25   Ht 5' 1\" (1.549 m)   Wt 153 lb 14.1 oz (69.8 kg)   SpO2 98%   BMI 29.08 kg/m²     Temp (24hrs), Av.2 °F (37.3 °C), Min:99 °F (37.2 °C), Max:99.3 °F (37.4 °C)    In: 1099.8   Out: 3440 [Urine:3375]    Physical Exam:  Constitutional:, Precedex drip mechanical ventilator with FiO2 40% and PEEP of 8. EENT:  PERRLA. No conjunctival injections. Neck: Supple without thyromegaly. No elevated JVP. Trachea was midline.   Respiratory: Decreased breath sounds bilaterally. Cardiovascular: Chest tubes in place regular without murmur, clicks, gallops or rubs. Abdomen: Slightly rounded and soft without organomegaly. No rebound, rigidity or guarding was appreciated. Lymphatic: No lymphadenopathy. Musculoskeletal: Normal curvature of the spine. No gross muscle weakness. Extremities: Compression stockings on bilateral lower extremity no lower extremity edema, ulcerations, tenderness, varicosities or erythema. Muscle size, tone and strength are normal.  No involuntary movements are noted. Skin:  Warm and dry. Good color, turgor and pigmentation. No lesions or scars. No cyanosis or clubbing  Neurological/Psychiatric: Cannot assess.     Medications:  Scheduled Medications:    cefepime  2,000 mg IntraVENous Q8H    vancomycin  1,000 mg IntraVENous Q12H    furosemide  20 mg IntraVENous BID    vancomycin (VANCOCIN) intermittent dosing (placeholder)   Other RX Placeholder    docusate  100 mg Oral BID    insulin lispro  0-6 Units SubCUTAneous TID WC    insulin lispro  0-3 Units SubCUTAneous Nightly    sodium chloride flush  5-40 mL IntraVENous 2 times per day    sodium chloride flush  5-40 mL IntraVENous 2 times per day    sodium chloride flush  10 mL IntraVENous 2 times per day    aspirin  81 mg Oral Daily    clopidogrel  75 mg Oral Daily    amiodarone  200 mg Oral TID    metoprolol tartrate  25 mg Oral BID    atorvastatin  20 mg Oral Nightly    pantoprazole  40 mg Oral Daily    pantoprazole (PROTONIX) 40 mg injection  40 mg IntraVENous Daily    ipratropium-albuterol  1 ampule Inhalation Q4H WA    insulin glargine  0.15 Units/kg SubCUTAneous Nightly    senna  1 tablet Oral Once    [Held by provider] hydroCHLOROthiazide  25 mg Oral Daily    [Held by provider] lisinopril  40 mg Oral Daily    potassium chloride  10 mEq Oral Once    lovastatin  10 mg Oral Nightly    sodium chloride flush  5-40 mL IntraVENous 2 times per day    [Held by provider] amLODIPine  10 mg Oral Daily    nicotine  1 patch TransDERmal Daily     Continuous Infusions:    sodium chloride      propofol 15 mcg/kg/min (04/12/22 0600)    dexmedetomidine Stopped (04/10/22 1158)    sodium chloride Stopped (04/11/22 1935)    sodium chloride Stopped (04/11/22 0447)    dextrose      norepinephrine      EPINEPHrine      sodium chloride 20 mL/hr at 04/04/22 0408     PRN Medicationssodium chloride, , PRN  albuterol, 2.5 mg, Q6H PRN  acetylcysteine, 600 mg, Q4H PRN  sodium chloride flush, 5-40 mL, PRN  sodium chloride, 25 mL, PRN  sodium chloride flush, 10 mL, PRN  ondansetron, 4 mg, Q8H PRN   Or  ondansetron, 4 mg, Q6H PRN  oxyCODONE-acetaminophen, 1 tablet, Q4H PRN   Or  oxyCODONE-acetaminophen, 2 tablet, Q4H PRN  fentanNYL, 25 mcg, Q1H PRN   Or  fentanNYL, 50 mcg, Q1H PRN  hydrALAZINE, 5 mg, Q5 Min PRN  metoprolol, 2.5 mg, Q10 Min PRN  diphenhydrAMINE, 25 mg, Nightly PRN  potassium chloride, 20 mEq, PRN  magnesium sulfate, 2,000 mg, PRN  albumin human, 25 g, PRN  glucose, 15 g, PRN  dextrose, 12.5 g, PRN  glucagon (rDNA), 1 mg, PRN  dextrose, 100 mL/hr, PRN  norepinephrine, 0.01-3.3 mcg/kg/min, Continuous PRN  EPINEPHrine, 0.1-0.3 mcg/kg/min, Continuous PRN  magnesium hydroxide, 30 mL, Daily PRN  ALPRAZolam, 0.25 mg, BID PRN  aspirin-acetaminophen-caffeine, 1 tablet, Q8H PRN  magnesium sulfate, 1,000 mg, PRN  potassium chloride, 10 mEq, PRN  potassium chloride, 40 mEq, PRN   Or  potassium alternative oral replacement, 40 mEq, PRN   Or  potassium chloride, 10 mEq, PRN  sodium chloride, , PRN  polyethylene glycol, 17 g, Daily PRN  acetaminophen, 650 mg, Q6H PRN   Or  acetaminophen, 650 mg, Q6H PRN  calcium carbonate, 500 mg, TID PRN        Diagnostic Labs:  CBC:   Recent Labs     04/10/22  0242 04/10/22  0929 04/11/22  0358 04/11/22  0358 04/11/22  1352 04/11/22  1820 04/12/22  0306   WBC 14.2*  --  11.8*  --   --   --  16.1*   RBC 3.01*  --  2.39*  --   --   --  2.90*   HGB 9.0*   < > 7.2* < > 9.0* 8.6* 8.6*   HCT 27.5*   < > 21.8*   < > 27.9* 25.8* 25.8*   MCV 91.4  --  91.2  --   --   --  89.0   RDW 15.7*  --  15.6*  --   --   --  15.4*   PLT See Reflexed IPF Result  --  See Reflexed IPF Result  --   --   --  144    < > = values in this interval not displayed. BMP:   Recent Labs     04/10/22  0242 04/10/22  0242 04/11/22  0358 04/11/22  1820 04/12/22  0306   *  --  138  --  138   K 4.1   < > 4.5 3.9 4.3   *  --  109*  --  104   CO2 20  --  22  --  24   BUN 13  --  21*  --  18   CREATININE 0.77  --  0.82  --  0.76    < > = values in this interval not displayed. BNP: No results for input(s): BNP in the last 72 hours. PT/INR:   Recent Labs     04/10/22  0242 04/11/22  0358 04/12/22  0306   PROTIME 12.2 11.9 10.6   INR 1.2 1.1 1.0     APTT:   No results for input(s): APTT in the last 72 hours. CARDIAC ENZYMES: No results for input(s): CKMB, CKMBINDEX, TROPONINI in the last 72 hours. Invalid input(s): CKTOTAL;3  FASTING LIPID PANEL:  Lab Results   Component Value Date    CHOL 243 (H) 03/24/2021    HDL 36 (L) 12/01/2021    TRIG 178 (H) 03/24/2021     LIVER PROFILE:   No results for input(s): AST, ALT, ALB, BILIDIR, BILITOT, ALKPHOS in the last 72 hours. MICROBIOLOGY:   Lab Results   Component Value Date/Time    CULTURE PSEUDOMONAS SPECIES <93920 CFU/ML (A) 04/10/2022 12:43 PM       Imaging:    XR CHEST PORTABLE    Result Date: 4/3/2022  No acute process. ASSESSMENT & PLAN     IMPRESSION  This is a 61 y.o. female with a past medical history of hypertension hyperlipidemia coronary artery disease status post PCI, history of stroke and memory problem who presented with typical chest pain and exertional shortness of breath and found to have ST-T wave changes in the inferior leads and elevated troponin 80. Patient admitted to inpatient status for the management of NSTEMI. She underwent cardiac cath and found to have multivessel coronary artery disease.   CT surgery taken on board and s/p  CABG.      Principal Problem:    NSTEMI (non-ST elevated myocardial infarction) ([facility] [address]  Active Problems:    CAD (coronary artery disease) with multiple stents    Asthma    Smoker    Anxiety    Essential hypertension    Memory problem    Mixed hyperlipidemia    Thyroid nodule    Multiple vessel coronary artery disease s/p CABG   Resolved Problems:    * No resolved hospital problems. *    NSTEMI with multivessel coronary artery disease s/p CABG-continue chest tube drainage,  On ASA Plavix and statin. CT surgery, cardiology, pulmonary/critical care on board. She was extubated to BiPAP on 4/9/2022 but was reintubated for worsening oxygen requirement. Currently intubated with FiO2 40% and PEEP of 8. Active Problems:  Concern of aspiration pneumonia status post bronchoscopy on mechanical ventilator. Patient underwent bronchoscopy on 4/10  Right lower lobe bronchi was collapsed with mucous plugging. Left lower lobe bronchus was collapse with mucous plugging. Bronchoalveolar lavage respiratory culture grew Pseudomonas. Started on cefepime and IV vancomycin. Pulmonary/critical care following. Anemia s/p transfusion:  Received 5 units of transfusion since surgery. Hemoglobin this a.m. is 9.6. No obvious source of bleeding noted. We will do H&H every 8 hours. Essential hypertension:  Clonidine and hydrochlorothiazide held as per cardiology. Currently maintained on lopressor 25 mg twice daily. Smoker  Plan:   Continue  on nicotine patch.     Mixed hyperlipidemia  Plan: On atorvastatin 20 mg.      Thyroid nodule  Plan:   CT chest showed 3 cm incidental right thyroid nodule. Subsequent thyroid ultrasound showed 2.9 cm slight heterogeneous cystic. No follow-up study recommended.   TSH 1.33.     DVT ppx: EPC cuff only  GI ppx: Protonix OD     Chas Sanchez MD  Internal Medicine Resident, PGY-1  [facility]  1/16/2999,6:06 AM  Attending Physician Statement  I have discussed the care of Mónica. #5 Marissa Roth and I have examined the patient myselft and taken ros and hpi , including pertinent history and exam findings,  with the resident. I have reviewed the key elements of all parts of the encounter with the resident. I agree with the assessment, plan and orders as documented by the resident.   Multivessel CAD, s/p CABG  Hx of COPD/Emphysema,   AC hypoxic resp failure, reintubation   Aspiration PNA ,   Anemia s/ pRBC   Was On mechanical vent,this am ,    vent settings reviewed   Subsequently extubated , was anxious and tachycardic this evening   On call resident ordered ABG, reviewed ,   Resp alkalosis,,   Will repeat in am  ,   Possible reduce PEEP/rate on BIPAP as well     Episode   Critical care time spent 37 mins    Electronically signed by Jenni Arteaga MD

## 2022-04-12 NOTE — PLAN OF CARE
Problem: Falls - Risk of:  Goal: Will remain free from falls  Description: Will remain free from falls  4/12/2022 1109 by Elizabeth Porter RN  Outcome: Ongoing  4/12/2022 0135 by Adan Mackey RN  Outcome: Ongoing  Goal: Absence of physical injury  Description: Absence of physical injury  4/12/2022 1109 by Elizabeth Porter RN  Outcome: Ongoing  4/12/2022 0135 by Adan Mackey RN  Outcome: Ongoing     Problem: Cardiac Output - Decreased:  Goal: Hemodynamic stability will improve  Description: Hemodynamic stability will improve  4/12/2022 1109 by Elizabeth Porter RN  Outcome: Ongoing  4/12/2022 0135 by Adan Mackey RN  Outcome: Ongoing  Goal: Cardiac output within specified parameters  Description: Cardiac output within specified parameters  4/12/2022 1109 by Elizabeth Porter RN  Outcome: Ongoing  4/12/2022 0135 by Adan Mackey RN  Outcome: Ongoing     Problem: Pain:  Description: Pain management should include both nonpharmacologic and pharmacologic interventions.   Goal: Pain level will decrease  Description: Pain level will decrease  4/12/2022 1109 by Elizabeth Porter RN  Outcome: Ongoing  4/12/2022 0135 by Adan Mackey RN  Outcome: Ongoing  Goal: Control of acute pain  Description: Control of acute pain  4/12/2022 1109 by Elizabeth Porter RN  Outcome: Ongoing  4/12/2022 0135 by Adan Mackey RN  Outcome: Ongoing  Goal: Control of chronic pain  Description: Control of chronic pain  4/12/2022 1109 by Elizabeth Porter RN  Outcome: Ongoing  4/12/2022 0135 by Adan Mackey RN  Outcome: Ongoing     Problem: Tissue Perfusion - Cardiopulmonary, Altered:  Goal: Hemodynamic stability will improve  Description: Hemodynamic stability will improve  4/12/2022 1109 by Elizabeth Porter RN  Outcome: Ongoing  4/12/2022 0135 by Adan Mackey RN  Outcome: Ongoing  Goal: Absence of angina  Description: Absence of angina  4/12/2022 1109 by Elizabeth Porter RN  Outcome: Ongoing  4/12/2022 0135 by Karina Zacarias RN  Outcome: Ongoing  Goal: Circulatory function within specified parameters  Description: Circulatory function within specified parameters  4/12/2022 1109 by Tory Morales RN  Outcome: Ongoing  4/12/2022 0135 by Karina Zacarias RN  Outcome: Ongoing  Goal: Hemodynamic stability will improve  Description: Hemodynamic stability will improve  4/12/2022 1109 by Tory Morales RN  Outcome: Ongoing  4/12/2022 0135 by Karina Zacarias RN  Outcome: Ongoing  Goal: Will show no evidence of cardiac arrhythmias  Description: Will show no evidence of cardiac arrhythmias  4/12/2022 1109 by Tory Morales RN  Outcome: Ongoing  4/12/2022 0135 by Karina Zacarias RN  Outcome: Ongoing     Problem: Tobacco Use:  Goal: Will participate in inpatient tobacco-use cessation counseling  Description: Will participate in inpatient tobacco-use cessation counseling  4/12/2022 1109 by Tory Morales RN  Outcome: Ongoing  4/12/2022 0135 by Karina Zacarias RN  Outcome: Ongoing     Problem: Pain:  Description: Pain management should include both nonpharmacologic and pharmacologic interventions.   Goal: Pain level will decrease  Description: Pain level will decrease  4/12/2022 1109 by Tory Morales RN  Outcome: Ongoing  4/12/2022 0135 by Karina Zacarias RN  Outcome: Ongoing  Goal: Control of acute pain  Description: Control of acute pain  4/12/2022 1109 by Tory Morales RN  Outcome: Ongoing  4/12/2022 0135 by Karina Zacarias RN  Outcome: Ongoing  Goal: Control of chronic pain  Description: Control of chronic pain  4/12/2022 1109 by Tory Morales RN  Outcome: Ongoing  4/12/2022 0135 by Karina Zacarias RN  Outcome: Ongoing     Problem: OXYGENATION/RESPIRATORY FUNCTION  Goal: Patient will maintain patent airway  4/12/2022 1109 by Tory Morales RN  Outcome: Ongoing  4/12/2022 0927 by Taniya Jenkins RCP  Outcome: Ongoing  4/12/2022 0135 by Karina Zacarias RN  Outcome: Ongoing  Goal: Patient will achieve/maintain normal respiratory rate/effort  Description: Respiratory rate and effort will be within normal limits for the patient  4/12/2022 1109 by Saeed Hearn RN  Outcome: Ongoing  4/12/2022 0927 by Yamilet Mittal RCP  Outcome: Ongoing  4/12/2022 0135 by Parish Garcia RN  Outcome: Ongoing     Problem: Non-Violent Restraints  Goal: Removal from restraints as soon as assessed to be safe  4/12/2022 1109 by Saeed Hearn RN  Outcome: Ongoing  4/12/2022 0135 by Parish Garcia RN  Outcome: Ongoing  Goal: No harm/injury to patient while restraints in use  4/12/2022 1109 by Saeed Hearn RN  Outcome: Ongoing  4/12/2022 0135 by Parish Garcia RN  Outcome: Ongoing  Goal: Patient's dignity will be maintained  4/12/2022 1109 by Saeed Hearn RN  Outcome: Ongoing  4/12/2022 0135 by Parish Garcia RN  Outcome: Ongoing     Problem: Skin Integrity:  Goal: Will show no infection signs and symptoms  Description: Will show no infection signs and symptoms  4/12/2022 1109 by Saeed Hearn RN  Outcome: Ongoing  4/12/2022 0135 by Parish Garcia RN  Outcome: Ongoing  Goal: Absence of new skin breakdown  Description: Absence of new skin breakdown  4/12/2022 1109 by Saede Hearn RN  Outcome: Ongoing  4/12/2022 0135 by Parish Garcia RN  Outcome: Ongoing     Problem: Nutrition  Goal: Optimal nutrition therapy  4/12/2022 1109 by Saeed Hearn RN  Outcome: Ongoing  4/12/2022 0135 by Parish Garcia RN  Outcome: Ongoing     Problem: Discharge Planning:  Goal: Discharged to appropriate level of care  Description: Discharged to appropriate level of care  4/12/2022 1109 by Saeed Hearn RN  Outcome: Ongoing  4/12/2022 0135 by Parish Garcia RN  Outcome: Ongoing     Problem:  Activity Intolerance:  Goal: Able to perform prescribed physical activity  Description: Able to perform prescribed physical activity  4/12/2022 1109 by Saeed Hearn RN  Outcome: Ongoing  4/12/2022 0135 by Pro Chopra ALETHA Powell  Outcome: Ongoing  Goal: Ability to tolerate increased activity will improve  Description: Ability to tolerate increased activity will improve  4/12/2022 1109 by Elif Oconnell RN  Outcome: Ongoing  4/12/2022 0135 by Joshua Ram RN  Outcome: Ongoing     Problem: Anxiety:  Goal: Level of anxiety will decrease  Description: Level of anxiety will decrease  4/12/2022 1109 by Elif Oconnell RN  Outcome: Ongoing  4/12/2022 0135 by Joshua Ram RN  Outcome: Ongoing     Problem: Fluid Volume - Imbalance:  Goal: Ability to achieve a balanced intake and output will improve  Description: Ability to achieve a balanced intake and output will improve  4/12/2022 1109 by Elif Oconnell RN  Outcome: Ongoing  4/12/2022 0135 by Joshua Ram RN  Outcome: Ongoing  Goal: Chest tube drainage is within specified parameters  Description: Chest tube drainage is within specified parameters  4/12/2022 1109 by Elif Oconnell RN  Outcome: Ongoing  4/12/2022 0135 by Joshua Ram RN  Outcome: Ongoing     Problem: Gas Exchange - Impaired:  Goal: Levels of oxygenation will improve  Description: Levels of oxygenation will improve  4/12/2022 1109 by Elif Oconnell RN  Outcome: Ongoing  4/12/2022 0135 by Joshua Ram RN  Outcome: Ongoing  Goal: Ability to maintain adequate ventilation will improve  Description: Ability to maintain adequate ventilation will improve  4/12/2022 1109 by Elif Oconnell RN  Outcome: Ongoing  4/12/2022 0135 by Joshua Ram RN  Outcome: Ongoing

## 2022-04-12 NOTE — CARE COORDINATION
Met with pt's mother who was at bedside, transition plans discussed, discussed likely need for SNF, left hospital SNF list with ratings and SNF list printed from pt's insurance website with pt's mother, need choices. Pt is extubated, on high flow nasal cannula fiO2 40%, 10L.

## 2022-04-12 NOTE — PROGRESS NOTES
Physical Therapy        Physical Therapy Cancel Note      DATE: 2022    NAME: Trista Diaz  MRN: [id number]   : 1965      Patient not seen this date for Physical Therapy due to: Other: goal to extubate today per CT surgery note, PT will continue to follow for needs and evaluate at appropriate.        Electronically signed by Dharmesh Pandya PT on 2022 at 10:14 AM

## 2022-04-13 ENCOUNTER — APPOINTMENT (OUTPATIENT)
Dept: GENERAL RADIOLOGY | Age: 57
DRG: 233 | End: 2022-04-13
Payer: MEDICARE

## 2022-04-13 LAB
ABSOLUTE EOS #: 0.37 K/UL (ref 0–0.4)
ABSOLUTE IMMATURE GRANULOCYTE: 0.37 K/UL (ref 0–0.3)
ABSOLUTE LYMPH #: 2.38 K/UL (ref 1–4.8)
ABSOLUTE MONO #: 1.65 K/UL (ref 0.1–0.8)
ANION GAP SERPL CALCULATED.3IONS-SCNC: 10 MMOL/L (ref 9–17)
BASOPHILS # BLD: 0 % (ref 0–2)
BASOPHILS ABSOLUTE: 0 K/UL (ref 0–0.2)
BUN BLDV-MCNC: 14 MG/DL (ref 6–20)
CALCIUM SERPL-MCNC: 8.8 MG/DL (ref 8.6–10.4)
CHLORIDE BLD-SCNC: 103 MMOL/L (ref 98–107)
CO2: 25 MMOL/L (ref 20–31)
CREAT SERPL-MCNC: 0.61 MG/DL (ref 0.5–0.9)
EOSINOPHILS RELATIVE PERCENT: 2 % (ref 1–4)
FIBRINOGEN ANTIGEN: 267 MG/DL (ref 149–353)
FIBRINOGEN RATIO: 0.82 RATIO (ref 0.59–1.23)
FIBRINOGEN: 327 MG/DL (ref 150–430)
GFR AFRICAN AMERICAN: >60 ML/MIN
GFR NON-AFRICAN AMERICAN: >60 ML/MIN
GFR SERPL CREATININE-BSD FRML MDRD: ABNORMAL ML/MIN/{1.73_M2}
GLUCOSE BLD-MCNC: 103 MG/DL (ref 65–105)
GLUCOSE BLD-MCNC: 105 MG/DL (ref 70–99)
GLUCOSE BLD-MCNC: 91 MG/DL (ref 65–105)
GLUCOSE BLD-MCNC: 95 MG/DL (ref 74–100)
HCT VFR BLD CALC: 28.3 % (ref 36.3–47.1)
HCT VFR BLD CALC: 29.7 % (ref 36.3–47.1)
HEMOGLOBIN: 9.4 G/DL (ref 11.9–15.1)
HEMOGLOBIN: 9.7 G/DL (ref 11.9–15.1)
IMMATURE GRANULOCYTES: 2 %
INR BLD: 1
LYMPHOCYTES # BLD: 13 % (ref 24–44)
MAGNESIUM: 2.4 MG/DL (ref 1.6–2.6)
MCH RBC QN AUTO: 29.9 PG (ref 25.2–33.5)
MCHC RBC AUTO-ENTMCNC: 33.2 G/DL (ref 28.4–34.8)
MCV RBC AUTO: 90.1 FL (ref 82.6–102.9)
MONOCYTES # BLD: 9 % (ref 1–7)
MORPHOLOGY: ABNORMAL
NRBC AUTOMATED: 0.2 PER 100 WBC
PDW BLD-RTO: 14.9 % (ref 11.8–14.4)
PLATELET # BLD: 196 K/UL (ref 138–453)
PMV BLD AUTO: 10.2 FL (ref 8.1–13.5)
POC HCO3: 23 MMOL/L (ref 21–28)
POC O2 SATURATION: 96 % (ref 94–98)
POC PCO2: 30.6 MM HG (ref 35–48)
POC PH: 7.48 (ref 7.35–7.45)
POC PO2: 77.1 MM HG (ref 83–108)
POSITIVE BASE EXCESS, ART: 0 (ref 0–3)
POTASSIUM SERPL-SCNC: 3.9 MMOL/L (ref 3.7–5.3)
PROTHROMBIN TIME: 10.8 SEC (ref 9.1–12.3)
RBC # BLD: 3.14 M/UL (ref 3.95–5.11)
SEG NEUTROPHILS: 74 % (ref 36–66)
SEGMENTED NEUTROPHILS ABSOLUTE COUNT: 13.53 K/UL (ref 1.8–7.7)
SODIUM BLD-SCNC: 138 MMOL/L (ref 135–144)
WBC # BLD: 18.3 K/UL (ref 3.5–11.3)

## 2022-04-13 PROCEDURE — 2500000003 HC RX 250 WO HCPCS: Performed by: INTERNAL MEDICINE

## 2022-04-13 PROCEDURE — 6370000000 HC RX 637 (ALT 250 FOR IP): Performed by: NURSE PRACTITIONER

## 2022-04-13 PROCEDURE — 94660 CPAP INITIATION&MGMT: CPT

## 2022-04-13 PROCEDURE — 6360000002 HC RX W HCPCS: Performed by: NURSE PRACTITIONER

## 2022-04-13 PROCEDURE — 6360000002 HC RX W HCPCS: Performed by: PHYSICIAN ASSISTANT

## 2022-04-13 PROCEDURE — 94640 AIRWAY INHALATION TREATMENT: CPT

## 2022-04-13 PROCEDURE — 71045 X-RAY EXAM CHEST 1 VIEW: CPT

## 2022-04-13 PROCEDURE — 2580000003 HC RX 258: Performed by: STUDENT IN AN ORGANIZED HEALTH CARE EDUCATION/TRAINING PROGRAM

## 2022-04-13 PROCEDURE — 2500000003 HC RX 250 WO HCPCS

## 2022-04-13 PROCEDURE — 2100000001 HC CVICU R&B

## 2022-04-13 PROCEDURE — 99233 SBSQ HOSP IP/OBS HIGH 50: CPT | Performed by: INTERNAL MEDICINE

## 2022-04-13 PROCEDURE — 6360000002 HC RX W HCPCS: Performed by: INTERNAL MEDICINE

## 2022-04-13 PROCEDURE — 2500000003 HC RX 250 WO HCPCS: Performed by: STUDENT IN AN ORGANIZED HEALTH CARE EDUCATION/TRAINING PROGRAM

## 2022-04-13 PROCEDURE — 85018 HEMOGLOBIN: CPT

## 2022-04-13 PROCEDURE — 6360000002 HC RX W HCPCS: Performed by: STUDENT IN AN ORGANIZED HEALTH CARE EDUCATION/TRAINING PROGRAM

## 2022-04-13 PROCEDURE — 97535 SELF CARE MNGMENT TRAINING: CPT

## 2022-04-13 PROCEDURE — 2580000003 HC RX 258: Performed by: NURSE PRACTITIONER

## 2022-04-13 PROCEDURE — 82947 ASSAY GLUCOSE BLOOD QUANT: CPT

## 2022-04-13 PROCEDURE — 82803 BLOOD GASES ANY COMBINATION: CPT

## 2022-04-13 PROCEDURE — C9113 INJ PANTOPRAZOLE SODIUM, VIA: HCPCS | Performed by: NURSE PRACTITIONER

## 2022-04-13 PROCEDURE — 97530 THERAPEUTIC ACTIVITIES: CPT

## 2022-04-13 PROCEDURE — 99024 POSTOP FOLLOW-UP VISIT: CPT | Performed by: PHYSICIAN ASSISTANT

## 2022-04-13 PROCEDURE — 92611 MOTION FLUOROSCOPY/SWALLOW: CPT

## 2022-04-13 PROCEDURE — 80048 BASIC METABOLIC PNL TOTAL CA: CPT

## 2022-04-13 PROCEDURE — 97163 PT EVAL HIGH COMPLEX 45 MIN: CPT

## 2022-04-13 PROCEDURE — 85025 COMPLETE CBC W/AUTO DIFF WBC: CPT

## 2022-04-13 PROCEDURE — 74230 X-RAY XM SWLNG FUNCJ C+: CPT

## 2022-04-13 PROCEDURE — 99291 CRITICAL CARE FIRST HOUR: CPT | Performed by: INTERNAL MEDICINE

## 2022-04-13 PROCEDURE — 85014 HEMATOCRIT: CPT

## 2022-04-13 PROCEDURE — 85610 PROTHROMBIN TIME: CPT

## 2022-04-13 PROCEDURE — 2700000000 HC OXYGEN THERAPY PER DAY

## 2022-04-13 PROCEDURE — 97167 OT EVAL HIGH COMPLEX 60 MIN: CPT

## 2022-04-13 PROCEDURE — 36415 COLL VENOUS BLD VENIPUNCTURE: CPT

## 2022-04-13 PROCEDURE — 83735 ASSAY OF MAGNESIUM: CPT

## 2022-04-13 PROCEDURE — 94761 N-INVAS EAR/PLS OXIMETRY MLT: CPT

## 2022-04-13 RX ORDER — METOPROLOL TARTRATE 5 MG/5ML
INJECTION INTRAVENOUS
Status: COMPLETED
Start: 2022-04-13 | End: 2022-04-13

## 2022-04-13 RX ORDER — LABETALOL HYDROCHLORIDE 5 MG/ML
10 INJECTION, SOLUTION INTRAVENOUS EVERY 4 HOURS PRN
Status: DISCONTINUED | OUTPATIENT
Start: 2022-04-13 | End: 2022-04-16 | Stop reason: HOSPADM

## 2022-04-13 RX ORDER — BISACODYL 10 MG
10 SUPPOSITORY, RECTAL RECTAL DAILY PRN
Status: DISCONTINUED | OUTPATIENT
Start: 2022-04-13 | End: 2022-04-16 | Stop reason: HOSPADM

## 2022-04-13 RX ORDER — KETOROLAC TROMETHAMINE 30 MG/ML
15 INJECTION, SOLUTION INTRAMUSCULAR; INTRAVENOUS EVERY 6 HOURS PRN
Status: DISCONTINUED | OUTPATIENT
Start: 2022-04-13 | End: 2022-04-16 | Stop reason: HOSPADM

## 2022-04-13 RX ORDER — METOPROLOL TARTRATE 5 MG/5ML
5 INJECTION INTRAVENOUS EVERY 6 HOURS PRN
Status: DISCONTINUED | OUTPATIENT
Start: 2022-04-13 | End: 2022-04-13

## 2022-04-13 RX ORDER — LABETALOL HYDROCHLORIDE 5 MG/ML
10 INJECTION, SOLUTION INTRAVENOUS ONCE
Status: COMPLETED | OUTPATIENT
Start: 2022-04-13 | End: 2022-04-13

## 2022-04-13 RX ORDER — METOPROLOL TARTRATE 5 MG/5ML
2.5 INJECTION INTRAVENOUS EVERY 6 HOURS
Status: DISCONTINUED | OUTPATIENT
Start: 2022-04-13 | End: 2022-04-16 | Stop reason: HOSPADM

## 2022-04-13 RX ORDER — FUROSEMIDE 10 MG/ML
INJECTION INTRAMUSCULAR; INTRAVENOUS
Status: DISPENSED
Start: 2022-04-13 | End: 2022-04-14

## 2022-04-13 RX ADMIN — FUROSEMIDE 20 MG: 10 INJECTION, SOLUTION INTRAMUSCULAR; INTRAVENOUS at 09:37

## 2022-04-13 RX ADMIN — WATER 2000 MG: 1 INJECTION INTRAMUSCULAR; INTRAVENOUS; SUBCUTANEOUS at 09:36

## 2022-04-13 RX ADMIN — BISACODYL 10 MG: 10 SUPPOSITORY RECTAL at 09:46

## 2022-04-13 RX ADMIN — METOPROLOL TARTRATE 2.5 MG: 5 INJECTION INTRAVENOUS at 09:37

## 2022-04-13 RX ADMIN — FENTANYL CITRATE 50 MCG: 50 INJECTION, SOLUTION INTRAMUSCULAR; INTRAVENOUS at 09:36

## 2022-04-13 RX ADMIN — IPRATROPIUM BROMIDE AND ALBUTEROL SULFATE 1 AMPULE: .5; 3 SOLUTION RESPIRATORY (INHALATION) at 21:50

## 2022-04-13 RX ADMIN — SODIUM CHLORIDE, PRESERVATIVE FREE 10 ML: 5 INJECTION INTRAVENOUS at 20:58

## 2022-04-13 RX ADMIN — SODIUM CHLORIDE, PRESERVATIVE FREE 10 ML: 5 INJECTION INTRAVENOUS at 20:57

## 2022-04-13 RX ADMIN — KETOROLAC TROMETHAMINE 15 MG: 30 INJECTION, SOLUTION INTRAMUSCULAR at 12:35

## 2022-04-13 RX ADMIN — HYDRALAZINE HYDROCHLORIDE 5 MG: 20 INJECTION INTRAMUSCULAR; INTRAVENOUS at 05:21

## 2022-04-13 RX ADMIN — IPRATROPIUM BROMIDE AND ALBUTEROL SULFATE 1 AMPULE: .5; 3 SOLUTION RESPIRATORY (INHALATION) at 17:26

## 2022-04-13 RX ADMIN — ONDANSETRON 4 MG: 2 INJECTION INTRAMUSCULAR; INTRAVENOUS at 15:05

## 2022-04-13 RX ADMIN — HYDRALAZINE HYDROCHLORIDE 5 MG: 20 INJECTION INTRAMUSCULAR; INTRAVENOUS at 01:45

## 2022-04-13 RX ADMIN — HYDRALAZINE HYDROCHLORIDE 5 MG: 20 INJECTION INTRAMUSCULAR; INTRAVENOUS at 01:26

## 2022-04-13 RX ADMIN — METOPROLOL TARTRATE 2.5 MG: 1 INJECTION, SOLUTION INTRAVENOUS at 09:37

## 2022-04-13 RX ADMIN — MORPHINE SULFATE 2 MG: 2 INJECTION, SOLUTION INTRAMUSCULAR; INTRAVENOUS at 02:00

## 2022-04-13 RX ADMIN — METOPROLOL TARTRATE 5 MG: 1 INJECTION, SOLUTION INTRAVENOUS at 01:46

## 2022-04-13 RX ADMIN — Medication 10 MG: at 05:53

## 2022-04-13 RX ADMIN — WATER 2000 MG: 1 INJECTION INTRAMUSCULAR; INTRAVENOUS; SUBCUTANEOUS at 18:00

## 2022-04-13 RX ADMIN — METOPROLOL TARTRATE 2.5 MG: 5 INJECTION INTRAVENOUS at 15:05

## 2022-04-13 RX ADMIN — SODIUM CHLORIDE, PRESERVATIVE FREE 40 MG: 5 INJECTION INTRAVENOUS at 09:37

## 2022-04-13 RX ADMIN — WATER 2000 MG: 1 INJECTION INTRAMUSCULAR; INTRAVENOUS; SUBCUTANEOUS at 02:35

## 2022-04-13 RX ADMIN — FENTANYL CITRATE 50 MCG: 50 INJECTION, SOLUTION INTRAMUSCULAR; INTRAVENOUS at 04:16

## 2022-04-13 RX ADMIN — METOPROLOL TARTRATE 2.5 MG: 1 INJECTION, SOLUTION INTRAVENOUS at 15:05

## 2022-04-13 RX ADMIN — FUROSEMIDE 20 MG: 10 INJECTION, SOLUTION INTRAMUSCULAR; INTRAVENOUS at 18:01

## 2022-04-13 RX ADMIN — KETOROLAC TROMETHAMINE 15 MG: 30 INJECTION, SOLUTION INTRAMUSCULAR at 18:00

## 2022-04-13 RX ADMIN — IPRATROPIUM BROMIDE AND ALBUTEROL SULFATE 1 AMPULE: .5; 3 SOLUTION RESPIRATORY (INHALATION) at 12:46

## 2022-04-13 RX ADMIN — Medication 10 MG: at 02:35

## 2022-04-13 RX ADMIN — FENTANYL CITRATE 50 MCG: 50 INJECTION, SOLUTION INTRAMUSCULAR; INTRAVENOUS at 01:02

## 2022-04-13 RX ADMIN — METOPROLOL TARTRATE 2.5 MG: 5 INJECTION INTRAVENOUS at 21:04

## 2022-04-13 ASSESSMENT — PAIN DESCRIPTION - PAIN TYPE
TYPE: SURGICAL PAIN
TYPE: ACUTE PAIN
TYPE: SURGICAL PAIN
TYPE: ACUTE PAIN;SURGICAL PAIN
TYPE: ACUTE PAIN;SURGICAL PAIN
TYPE: ACUTE PAIN

## 2022-04-13 ASSESSMENT — PULMONARY FUNCTION TESTS
PIF_VALUE: 12
PIF_VALUE: 10
PIF_VALUE: 8
PIF_VALUE: 14
PIF_VALUE: 10
PIF_VALUE: 9
PIF_VALUE: 9
PIF_VALUE: 10
PIF_VALUE: 12
PIF_VALUE: 9
PIF_VALUE: 13
PIF_VALUE: 10
PIF_VALUE: 12
PIF_VALUE: 10
PIF_VALUE: 12
PIF_VALUE: 10
PIF_VALUE: 11
PIF_VALUE: 9
PIF_VALUE: 11
PIF_VALUE: 9
PIF_VALUE: 9
PIF_VALUE: 11
PIF_VALUE: 9
PIF_VALUE: 10
PIF_VALUE: 9
PIF_VALUE: 13
PIF_VALUE: 11

## 2022-04-13 ASSESSMENT — ENCOUNTER SYMPTOMS
SHORTNESS OF BREATH: 1
GASTROINTESTINAL NEGATIVE: 1
COUGH: 1

## 2022-04-13 ASSESSMENT — PAIN DESCRIPTION - LOCATION
LOCATION: CHEST
LOCATION: BACK
LOCATION: BACK
LOCATION: CHEST
LOCATION: BACK
LOCATION: BACK
LOCATION: CHEST;BACK
LOCATION: BACK
LOCATION: BACK;CHEST;INCISION
LOCATION: BACK

## 2022-04-13 ASSESSMENT — PAIN DESCRIPTION - ORIENTATION
ORIENTATION: MID

## 2022-04-13 ASSESSMENT — PAIN SCALES - GENERAL
PAINLEVEL_OUTOF10: 5
PAINLEVEL_OUTOF10: 8
PAINLEVEL_OUTOF10: 4
PAINLEVEL_OUTOF10: 9
PAINLEVEL_OUTOF10: 7
PAINLEVEL_OUTOF10: 10
PAINLEVEL_OUTOF10: 7
PAINLEVEL_OUTOF10: 8
PAINLEVEL_OUTOF10: 2
PAINLEVEL_OUTOF10: 0
PAINLEVEL_OUTOF10: 2
PAINLEVEL_OUTOF10: 2
PAINLEVEL_OUTOF10: 8

## 2022-04-13 ASSESSMENT — PAIN DESCRIPTION - DESCRIPTORS
DESCRIPTORS: ACHING
DESCRIPTORS: DISCOMFORT;ACHING
DESCRIPTORS: DISCOMFORT

## 2022-04-13 ASSESSMENT — PAIN DESCRIPTION - FREQUENCY
FREQUENCY: INTERMITTENT
FREQUENCY: CONTINUOUS
FREQUENCY: INTERMITTENT

## 2022-04-13 ASSESSMENT — PAIN - FUNCTIONAL ASSESSMENT
PAIN_FUNCTIONAL_ASSESSMENT: PREVENTS OR INTERFERES SOME ACTIVE ACTIVITIES AND ADLS
PAIN_FUNCTIONAL_ASSESSMENT: PREVENTS OR INTERFERES SOME ACTIVE ACTIVITIES AND ADLS

## 2022-04-13 NOTE — CARE COORDINATION
TRansitional Planning    Patient not in room. Called patient's Yoko Dubose [phone], and left message requesting return phone call.

## 2022-04-13 NOTE — PLAN OF CARE
Problem: Falls - Risk of:  Goal: Will remain free from falls  Description: Will remain free from falls  4/12/2022 2122 by Evangelina Rich RN  Outcome: Ongoing  4/12/2022 1109 by Queenie Cheung RN  Outcome: Ongoing  Goal: Absence of physical injury  Description: Absence of physical injury  4/12/2022 2122 by Evangelina Rich RN  Outcome: Ongoing  4/12/2022 1109 by Queenie Cheung RN  Outcome: Ongoing     Problem: Cardiac Output - Decreased:  Goal: Hemodynamic stability will improve  Description: Hemodynamic stability will improve  4/12/2022 2122 by Evangelina Rich RN  Outcome: Ongoing  4/12/2022 1109 by Queenie Cheung RN  Outcome: Ongoing  Goal: Cardiac output within specified parameters  Description: Cardiac output within specified parameters  4/12/2022 2122 by Evangelina Rich RN  Outcome: Ongoing  4/12/2022 1109 by Queenie Cheung RN  Outcome: Ongoing     Problem: Pain:  Description: Pain management should include both nonpharmacologic and pharmacologic interventions.   Goal: Pain level will decrease  Description: Pain level will decrease  4/12/2022 2122 by Evangelina Rich RN  Outcome: Ongoing  4/12/2022 1109 by Queenie Cheung RN  Outcome: Ongoing  Goal: Control of acute pain  Description: Control of acute pain  4/12/2022 2122 by Evangelina Rich RN  Outcome: Ongoing  4/12/2022 1109 by Queenie Cheung RN  Outcome: Ongoing  Goal: Control of chronic pain  Description: Control of chronic pain  4/12/2022 2122 by Evangelina Rich RN  Outcome: Ongoing  4/12/2022 1109 by Queenie Cheung RN  Outcome: Ongoing     Problem: Tissue Perfusion - Cardiopulmonary, Altered:  Goal: Hemodynamic stability will improve  Description: Hemodynamic stability will improve  4/12/2022 2122 by Evangelina Rich RN  Outcome: Ongoing  4/12/2022 1109 by Queenie Cheung RN  Outcome: Ongoing  Goal: Absence of angina  Description: Absence of angina  4/12/2022 2122 by Evangelina Rich RN  Outcome: Ongoing  4/12/2022 1109 by Queenie Cheung RN  Outcome: Ongoing  Goal: Circulatory function within specified parameters  Description: Circulatory function within specified parameters  4/12/2022 2122 by Alber Vallecillo RN  Outcome: Ongoing  4/12/2022 1109 by Miki Garcia RN  Outcome: Ongoing  Goal: Hemodynamic stability will improve  Description: Hemodynamic stability will improve  4/12/2022 2122 by Alber Vallecillo RN  Outcome: Ongoing  4/12/2022 1109 by Miki Garcia RN  Outcome: Ongoing  Goal: Will show no evidence of cardiac arrhythmias  Description: Will show no evidence of cardiac arrhythmias  4/12/2022 2122 by Alber Vallecillo RN  Outcome: Ongoing  4/12/2022 1109 by Miki Garcia RN  Outcome: Ongoing     Problem: Tobacco Use:  Goal: Will participate in inpatient tobacco-use cessation counseling  Description: Will participate in inpatient tobacco-use cessation counseling  4/12/2022 2122 by lAber Vallecillo RN  Outcome: Ongoing  4/12/2022 1109 by Miki Garcia RN  Outcome: Ongoing     Problem: Pain:  Description: Pain management should include both nonpharmacologic and pharmacologic interventions.   Goal: Pain level will decrease  Description: Pain level will decrease  4/12/2022 2122 by Alber Vallecillo RN  Outcome: Ongoing  4/12/2022 1109 by Miki Garcia RN  Outcome: Ongoing  Goal: Control of acute pain  Description: Control of acute pain  4/12/2022 2122 by Alber Vallecillo RN  Outcome: Ongoing  4/12/2022 1109 by Miki Garcia RN  Outcome: Ongoing  Goal: Control of chronic pain  Description: Control of chronic pain  4/12/2022 2122 by Alber Vallecillo RN  Outcome: Ongoing  4/12/2022 1109 by Miki Garcia RN  Outcome: Ongoing     Problem: OXYGENATION/RESPIRATORY FUNCTION  Goal: Patient will maintain patent airway  4/12/2022 2122 by Alber Vallecillo RN  Outcome: Ongoing  4/12/2022 1109 by Miki Garcia RN  Outcome: Ongoing  4/12/2022 0927 by Priyanka Batista RCP  Outcome: Ongoing  Goal: Patient will achieve/maintain normal respiratory rate/effort  Description: Respiratory rate and effort will be within normal limits for the patient  4/12/2022 2122 by Lily Wallis RN  Outcome: Ongoing  4/12/2022 1109 by Xander Kc RN  Outcome: Ongoing  4/12/2022 0927 by Mahesh Nunn RCP  Outcome: Ongoing     Problem: MECHANICAL VENTILATION  Goal: Patient will maintain patent airway  4/12/2022 2122 by Lily Wallis RN  Outcome: Ongoing  4/12/2022 1109 by Xander Kc RN  Outcome: Ongoing  4/12/2022 0927 by Mahesh Nunn RCP  Outcome: Ongoing  Goal: Oral health is maintained or improved  4/12/2022 2122 by Lily Wallis RN  Outcome: Ongoing  4/12/2022 1109 by Xander Kc RN  Outcome: Ongoing  4/12/2022 0927 by Mahesh Nunn RCP  Outcome: Ongoing  Goal: ET tube will be managed safely  4/12/2022 2122 by Lily Wallis RN  Outcome: Ongoing  4/12/2022 1109 by Xander Kc RN  Outcome: Ongoing  4/12/2022 0927 by Mahesh Nunn RCP  Outcome: Ongoing  Goal: Ability to express needs and understand communication  4/12/2022 2122 by Lily Wallis RN  Outcome: Ongoing  4/12/2022 1109 by Xander Kc RN  Outcome: Ongoing  4/12/2022 0927 by Mahesh Nunn RCP  Outcome: Ongoing  Goal: Mobility/activity is maintained at optimum level for patient  4/12/2022 2122 by Lily Wallis RN  Outcome: Ongoing  4/12/2022 1109 by Xander Kc RN  Outcome: Ongoing  4/12/2022 0927 by Mahesh Nunn RCP  Outcome: Ongoing     Problem: Non-Violent Restraints  Goal: Removal from restraints as soon as assessed to be safe  4/12/2022 2122 by Lily Wallis RN  Outcome: Ongoing  4/12/2022 1109 by Xander Kc RN  Outcome: Ongoing  Goal: No harm/injury to patient while restraints in use  4/12/2022 2122 by Lily Wallis RN  Outcome: Ongoing  4/12/2022 1109 by Xander Kc RN  Outcome: Ongoing  Goal: Patient's dignity will be maintained  4/12/2022 2122 by [place], RN  Outcome: Ongoing  4/12/2022 1109 by Xander Kc RN  Outcome: Ongoing     Problem: Skin Integrity:  Goal: Will show no infection signs and symptoms  Description: Will show no infection signs and symptoms  4/12/2022 2122 by Yolanda Bear RN  Outcome: Ongoing  4/12/2022 1109 by Romi Capps RN  Outcome: Ongoing  Goal: Absence of new skin breakdown  Description: Absence of new skin breakdown  4/12/2022 2122 by Yolanda Bear RN  Outcome: Ongoing  4/12/2022 1109 by Romi Capps RN  Outcome: Ongoing     Problem: Nutrition  Goal: Optimal nutrition therapy  4/12/2022 2122 by Yolanda Bear RN  Outcome: Ongoing  4/12/2022 1109 by Romi Capps RN  Outcome: Ongoing     Problem: Discharge Planning:  Goal: Discharged to appropriate level of care  Description: Discharged to appropriate level of care  4/12/2022 2122 by Yolanda Bear RN  Outcome: Ongoing  4/12/2022 1109 by Romi Capps RN  Outcome: Ongoing     Problem:  Activity Intolerance:  Goal: Able to perform prescribed physical activity  Description: Able to perform prescribed physical activity  4/12/2022 2122 by Yolanda Bear RN  Outcome: Ongoing  4/12/2022 1109 by Romi Capps RN  Outcome: Ongoing  Goal: Ability to tolerate increased activity will improve  Description: Ability to tolerate increased activity will improve  4/12/2022 2122 by Yolanda Bear RN  Outcome: Ongoing  4/12/2022 1109 by Romi Capps RN  Outcome: Ongoing     Problem: Anxiety:  Goal: Level of anxiety will decrease  Description: Level of anxiety will decrease  4/12/2022 2122 by Yolanda Bear RN  Outcome: Ongoing  4/12/2022 1109 by Romi Capps RN  Outcome: Ongoing     Problem: Fluid Volume - Imbalance:  Goal: Ability to achieve a balanced intake and output will improve  Description: Ability to achieve a balanced intake and output will improve  4/12/2022 2122 by Yolanda Bear RN  Outcome: Ongoing  4/12/2022 1109 by Romi Capps RN  Outcome: Ongoing  Goal: Chest tube drainage is within specified parameters  Description: Chest tube drainage is within specified parameters  4/12/2022 2122 by Leyla Aguillon RN  Outcome: Ongoing  4/12/2022 1109 by Charlie Medina RN  Outcome: Ongoing     Problem: Gas Exchange - Impaired:  Goal: Levels of oxygenation will improve  Description: Levels of oxygenation will improve  4/12/2022 2122 by Leyla Aguillon RN  Outcome: Ongoing  4/12/2022 1109 by Charlie Median RN  Outcome: Ongoing  Goal: Ability to maintain adequate ventilation will improve  Description: Ability to maintain adequate ventilation will improve  4/12/2022 2122 by Leyla Aguillon RN  Outcome: Ongoing  4/12/2022 1109 by Charlie Medina RN  Outcome: Ongoing

## 2022-04-13 NOTE — PLAN OF CARE
Nutrition Problem #1: Inadequate oral intake  Intervention: Food and/or Nutrient Delivery: Continue NPO (Monitor for results of swallow study and start of oral diet as able/appropriate.)  Nutritional Goals: meet % of estimated nutrient needs

## 2022-04-13 NOTE — PROCEDURES
INSTRUMENTAL SWALLOW REPORT  MODIFIED BARIUM SWALLOW    NAME: Sameera Silva   : 1965  MRN: [id number]       Date of Eval: 2022        Radiologist: Dr. Claudene Mustard    Past Medical History:  has a past medical history of Anemia, Asthma, CAD (coronary artery disease), Depression, High cholesterol, Hypertension, MI, old, Osteoarthritis, Pneumonia, Sleep apnea, and Stroke ([facility] [address] Past Surgical History:  has a past surgical history that includes Hysterectomy; Coronary angioplasty with stent (); Tubal ligation; Tonsillectomy; Appendectomy; and Coronary artery bypass graft (N/A, 2022). Current Diet Solid Consistency: NPO  Current Diet Liquid Consistency: NPO       Type of Study: Initial MBS    Patient Complaints/Reason for Referral:  Sameera Silva was referred for a MBS to assess the efficiency of her swallow function, assess for aspiration, and to make recommendations regarding safe dietary consistencies, effective compensatory strategies, and safe eating environment. Behavior/Cognition/Vision/Hearing:  Behavior/Cognition: Alert; Cooperative  Vision: Within Functional Limits  Hearing: Within functional limits    Impressions:  Pt. with no penetration, no aspiration with all consistencies tested. Piecemeal swallowing noted with puree. Moderate extended mastication noted with soft solid.  + Lingual oral residual noted that pt. was unable to clear with subsequent swallows and liquid wash. Pt. eventually expectorated residual.   Premature spill noted with soft solid. ST to recommend Dysphagia Minced and Moist (Dysphagia II) with thin liquid. If s/s of aspiration occur, d/c all PO and recommend NPO and repeat MBSS. Results and recommendations reported to RN. Patient Degrees: upright in chair  Consistencies Administered: Dysphagia Soft and Bite-Sized (Dysphagia III); Dysphagia Pureed (Dysphagia I); Thin straw;Nectar straw;Nectar  teaspoon    Recommended Diet:  Solid consistency: Dysphagia Minced and Moist (Dysphagia II)  Liquid consistency: Thin    Safe Swallow Protocol:     Compensatory Swallowing Strategies: Upright as possible for all oral intake;Eat/Feed slowly; Small bites/sips    Recommendations/Treatment  Requires SLP Intervention: Yes  D/C Recommendations: Further therapy recommended at discharge. Recommended Exercises:    Therapeutic Interventions: Diet tolerance monitoring;Patient/Family education    Education: Images and recommendations were reviewed with pt. And RN following this exam.   Patient Education: yes  Patient Education Response: Verbalizes understanding    Prognosis  Prognosis for safe diet advancement: fair  Duration/Frequency of Treatment  Duration/Frequency of Treatment: 3-5 x week      Goals:    Long Term: To Maximize safety with intake, optimize nutrition/hydration and minimize risk for aspiration. Short Term:    Dysphagia Goals: The patient will tolerate recommended diet without observed clinical signs of aspiration      Oral Preparation / Oral Phase  Oral Phase: Impaired   Oral Phase: Moderate extended mastication noted with soft solid.  + Lingual oral residual noted that pt. was unable to clear with subsequent swallows and liquid wash. Pt. eventually expectorated residual.  Premature spill noted with soft solid. Pharyngeal Phase  Pharyngeal Phase: WFL   Pharyngeal: Pt. with no penetration, no aspiration with all consistencies tested. Piecemeal swallowing noted with puree. Esophageal Phase  Esophageal Screen: WFL    Pain   Patient Currently in Pain: No  Pain Level: 0    Therapy Time:   Individual Concurrent Group Co-treatment   Time In 1440         Time Out 1450         Minutes 10                   Jarek Herrera M.A.  CCC-SLP, 4/13/2022, 3:09 PM

## 2022-04-13 NOTE — PROGRESS NOTES
Physical Therapy    Facility/Department: [facility]  Initial Assessment    NAME: Nitin Rivas  : 1965  MRN: [id number]    Date of Service: 2022  S/P CABG x3 (22)  Discharge Recommendations:    Further therapy recommended at discharge. PT Equipment Recommendations  Equipment Needed: No    Assessment   Body structures, Functions, Activity limitations: Decreased functional mobility ; Increased pain;Decreased balance;Decreased ROM; Decreased strength;Decreased endurance;Decreased high-level IADLs  Assessment: Pt ambulated 3ft w/ RW modA, pt limited significantly due to fatigue and endurance deficits this date. Pt is a high fall risk and would be unsafe to perform functional mobility unassisted at this time. Recommending continued skilled physical therapy to address these deficits to return pt to prior level of independence. Prognosis: Fair  Decision Making: High Complexity  PT Education: Goals;PT Role;Plan of Care;Precautions  Patient Education: Sternal precautions; proper breathing technique with functional transfers  REQUIRES PT FOLLOW UP: Yes  Activity Tolerance  Activity Tolerance: Patient limited by fatigue;Patient limited by endurance       Patient Diagnosis(es): The encounter diagnosis was NSTEMI (non-ST elevated myocardial infarction) ([facility] [address] has a past medical history of Anemia, Asthma, CAD (coronary artery disease), Depression, High cholesterol, Hypertension, MI, old, Osteoarthritis, Pneumonia, Sleep apnea, and Stroke ([address] has a past surgical history that includes Hysterectomy; Coronary angioplasty with stent (); Tubal ligation; Tonsillectomy; Appendectomy; and Coronary artery bypass graft (N/A, 2022).     Restrictions  Restrictions/Precautions  Required Braces or Orthoses?: Yes  Required Braces or Orthoses  Other: Heart Hugger Brace  Position Activity Restriction  Sternal Precautions: No Pushing,No Pulling,5# Lifting Restrictions  Other position/activity restrictions: CABG x 3 (4/8/22); amb pt, up in chair, up for meals  Vision/Hearing  Vision: Impaired  Vision Exceptions: Wears glasses at all times  Hearing: Within functional limits     Subjective  General  Patient assessed for rehabilitation services?: Yes  Response To Previous Treatment: Not applicable  Family / Caregiver Present: Yes (pt's mother at bedside)  Follows Commands: Within Functional Limits  Subjective  Subjective: RN and pt in agreement for PT eval; pt supine in bed upon PT arrival, pt on Hi-Billy O2 at 35L/ 80%. Pt lethargic, requiring encouragement throughout session  Pain Screening  Patient Currently in Pain: Yes  Pain Assessment  Pain Assessment: 0-10  Pain Level: 8  Pain Type: Acute pain;Surgical pain  Pain Location: Chest;Back  Pain Descriptors: Discomfort;Aching  Non-Pharmaceutical Pain Intervention(s): Ambulation/Increased Activity;Repositioned  Response to Pain Intervention: Patient Satisfied  Multiple Pain Sites: No  Vital Signs  Patient Currently in Pain: Yes       Orientation  Orientation  Overall Orientation Status: Within Functional Limits  Social/Functional History  Social/Functional History  Lives With: Daughter  Type of Home: House  Home Layout: One level  Home Access: Level entry  Bathroom Shower/Tub: Tub/Shower unit,Walk-in shower  Bathroom Equipment: Shower chair  Home Equipment: 4 wheeled walker  Receives Help From: Family  ADL Assistance: Independent  Homemaking Assistance: Independent  Homemaking Responsibilities: Yes  Ambulation Assistance: Independent  Transfer Assistance: Independent  Active : Yes  Mode of Transportation: [employer]  Occupation: Retired  Additional Comments: Per family report, plan to return to mother's home upon discharge as she is able to provide 24hr support. Cognition   Cognition  Overall Cognitive Status: Exceptions  Arousal/Alertness: Delayed responses to stimuli  Following Commands:  Follows one step commands with increased time  Attention Span: Difficulty dividing attention  Safety Judgement: Decreased awareness of need for assistance;Decreased awareness of need for safety  Insights: Decreased awareness of deficits  Initiation: Requires cues for some  Sequencing: Requires cues for some    Objective  Joint Mobility  ROM RLE: WFL  ROM LLE: WFL  ROM RUE: Did not formally assess due to sternal precautions  ROM LUE: Did not formally assess due to sternal precautions  Strength RLE  Strength RLE: Exception  Comment: Did not formally assess- AROM against gravity noted at hip, knee, and ankle  Strength LLE  Comment: Did not formally assess- AROM against gravity noted at hip, knee, and ankle  Strength RUE  Comment: Did not formally assess due to sternal precautions  Strength LUE  Comment: Did not formally assess due to sternal precautions     Sensation  Overall Sensation Status: WFL (pt denies numbness/tingling)  Bed mobility  Supine to Sit: Moderate assistance;2 Person assistance (increased time required to complete)  Sit to Supine:  (Did not assess- pt retired in bedside chair upon writer's exit)  Transfers  Sit to Stand: Minimal Assistance; Moderate Assistance  Stand to sit: Minimal Assistance; Moderate Assistance  Comment: Pt required mod VC's for hand placement and use of heart hugger with functional transfers with fair return demo. Pt demonstrates difficulty with maintaining BUE on RW this date due to weakness. STS performed x2, requiring Iesha from bedside, modA from bedside chair  Ambulation  Ambulation?: Yes  Ambulation 1  Surface: level tile  Device: Rolling Walker  Other Apparatus: O2 (HiFlo)  Assistance: Moderate assistance  Quality of Gait: Forward flexed posture  Gait Deviations: Slow Erin  Distance: 3ft  Comments: Pt demonstrates significant shuffling of gait, modA required to maintain balance and for progression of RW.      Balance  Posture: Fair  Sitting - Static: Fair;+  Sitting - Dynamic: Fair;-  Standing - Static: Poor;+  Standing - Dynamic: Poor;+  Comments: standing balance assessed w/ RW; pt able to sit EOB with supervision        Plan   Plan  Times per week: 7x/week; 1-2x/day  Current Treatment Recommendations: Strengthening,Home Exercise Program,ROM,Safety Education & Anabelle Lauro Training,Patient/Caregiver Education & Training,Functional Mobility Training,Equipment Evaluation, Education, & procurement,Transfer Training,Gait Training,Stair training  Safety Devices  Type of devices: Left in chair,Call light within reach,Chair alarm in place,Gait belt,Patient at risk for falls,Nurse notified  Restraints  Initially in place: No    AM-PAC Score     AM-PAC Inpatient Mobility without Stair Climbing Raw Score : 10 (04/13/22 1327)  AM-PAC Inpatient without Stair Climbing T-Scale Score : 34.07 (04/13/22 1327)  Mobility Inpatient CMS 0-100% Score: 71.66 (04/13/22 1327)  Mobility Inpatient without Stair CMS G-Code Modifier : CL (04/13/22 1327)       Goals  Short term goals  Time Frame for Short term goals: 14  Short term goal 1: Pt to perform bed mobility Iesha  Short term goal 2: Demonstrate functional transfers with supervision  Short term goal 3: Pt to ambulate 150ft w/ least restrictive AD with supervision  Short term goal 4: Demonstrate independent performance of cardiac rehab program HEP  Patient Goals   Patient goals : Did not state       Therapy Time   Individual Concurrent Group Co-treatment   Time In 0931         Time Out 1029         Minutes 58         Timed Code Treatment Minutes: [address] Jaffe, PT

## 2022-04-13 NOTE — PROGRESS NOTES
Comprehensive Nutrition Assessment    Type and Reason for Visit:  Reassess    Nutrition Recommendations/Plan: Continue NPO. Monitor for start of oral diet as able. Will provide ONS with meals as able/needed. Monitor for bowel function, labs, weights, and plan of care. Nutrition Assessment:  Pt extubated yesterday and using Bipap. Pt remains NPO - noted plans for swallow study. Episodes of emesis overnight noted. Weight fluctuations noted. Last BM 4/7. Labs reviewed. Meds include: Lasix. Malnutrition Assessment:  Malnutrition Status:  Insufficient data    Context:  Acute Illness     Findings of the 6 clinical characteristics of malnutrition:  Energy Intake:  Mild decrease in energy intake  Weight Loss:  Unable to assess     Body Fat Loss:  No significant body fat loss     Muscle Mass Loss:  No significant muscle mass loss    Fluid Accumulation:  1 - Mild Generalized   Strength:  Not Performed    Estimated Daily Nutrient Needs:  Energy (kcal):  MSJ x 1.2-1.3 = 8357-1076 kcals/day; Weight Used for Energy Requirements:  Current     Protein (g):  70 g pro/day; Weight Used for Protein Requirements:  Ideal (1.5)        Fluid (ml/day):  2000 mL/day; Method Used for Fluid Requirements:  ml/Kg (30)      Nutrition Related Findings:  Meds/Labs reviewed. Hypoactive bowel sounds. Episodes of emesis. Last BM 4/7. Wounds:  Surgical Incision,Multiple (LLE, sternum)       Current Nutrition Therapies:    Diet NPO    Anthropometric Measures:  · Height: 5' 1\" (154.9 cm)  · Current Body Weight: 162 lb 0.6 oz (73.5 kg)   · Admission Body Weight: 141 lb (64 kg)    · Usual Body Weight: 142 lb (64.4 kg) (9/9/21)     · Ideal Body Weight: 105 lbs; % Ideal Body Weight 154.3 %   · BMI: 30.6  · BMI Categories: Obese Class 1 (BMI 30.0-34. 9)       Nutrition Diagnosis:   · Inadequate oral intake related to  (recent extubation; current condition) as evidenced by NPO or clear liquid status due to medical condition (pending swallow study)    Nutrition Interventions:   Food and/or Nutrient Delivery:  Continue NPO (Monitor for results of swallow study and start of oral diet as able/appropriate.)  Nutrition Education/Counseling:  No recommendation at this time   Coordination of Nutrition Care:  Continue to monitor while inpatient,Speech Therapy,Swallow Evaluation    Goals:  meet % of estimated nutrient needs       Nutrition Monitoring and Evaluation:   Behavioral-Environmental Outcomes:  None Identified   Food/Nutrient Intake Outcomes:  Diet Advancement/Tolerance  Physical Signs/Symptoms Outcomes:  Biochemical Data,GI Status,Constipation,Fluid Status or Edema,Hemodynamic Status,Nutrition Focused Physical Findings,Skin,Weight     Discharge Planning:     Too soon to determine     Electronically signed by Kerry Alvarez RD, LD on 4/13/22 at 10:42 AM EDT    Contact: [phone]

## 2022-04-13 NOTE — PROGRESS NOTES
[facility]   Progress Note                    Date:   4/13/2022  Patient name:  Mia Romero  Date of admission:  4/3/2022  6:04 PM  MRN:   [id number]  YOB: 1965  PCP:    Anny Marquez MD    Reason for Admission:  NSTEMI (non-ST elevated myocardial infarction) ([facility] [address] [I21.4]    Subjective:      Clinical Changes / Abnormalities:    Patient seen and examined at bedside. No acute events overnight. No chest pain or shortness of breath. Successfully extubated currently on BiPAP.     Urine output in the last 24 hours:     Intake/Output Summary (Last 24 hours) at 4/13/2022 0727  Last data filed at 4/13/2022 0600  Gross per 24 hour   Intake 800.39 ml   Output 2923 ml   Net -2122.61 ml     I/O since admission: -2.7 liters    Medications:   Scheduled Meds:   cefepime  2,000 mg IntraVENous Q8H    furosemide  20 mg IntraVENous BID    docusate  100 mg Oral BID    insulin lispro  0-6 Units SubCUTAneous TID     insulin lispro  0-3 Units SubCUTAneous Nightly    sodium chloride flush  5-40 mL IntraVENous 2 times per day    sodium chloride flush  5-40 mL IntraVENous 2 times per day    sodium chloride flush  10 mL IntraVENous 2 times per day    aspirin  81 mg Oral Daily    clopidogrel  75 mg Oral Daily    amiodarone  200 mg Oral TID    [Held by provider] metoprolol tartrate  25 mg Oral BID    atorvastatin  20 mg Oral Nightly    pantoprazole  40 mg Oral Daily    pantoprazole (PROTONIX) 40 mg injection  40 mg IntraVENous Daily    ipratropium-albuterol  1 ampule Inhalation Q4H WA    insulin glargine  0.15 Units/kg SubCUTAneous Nightly    senna  1 tablet Oral Once    [Held by provider] hydroCHLOROthiazide  25 mg Oral Daily    [Held by provider] lisinopril  40 mg Oral Daily    lovastatin  10 mg Oral Nightly    sodium chloride flush  5-40 mL IntraVENous 2 times per day    [Held by provider] amLODIPine  10 mg Oral Daily    nicotine  1 patch TransDERmal Daily     Continuous Infusions:   sodium chloride      sodium chloride Stopped (04/11/22 1935)    sodium chloride Stopped (04/11/22 0447)    dextrose 25 mL/hr (04/12/22 1831)    EPINEPHrine      sodium chloride 20 mL/hr at 04/04/22 0408     CBC:   Recent Labs     04/11/22  0358 04/11/22  1352 04/12/22  0306 04/12/22  0306 04/12/22  1019 04/12/22 1827 04/13/22 0316   WBC 11.8*  --  16.1*  --   --   --  18.3*   HGB 7.2*   < > 8.6*   < > 9.6* 9.5* 9.4*   PLT See Reflexed IPF Result  --  144  --   --   --  196    < > = values in this interval not displayed. BMP:    Recent Labs     04/11/22 0358 04/11/22  0358 04/11/22  1820 04/12/22  0306 04/12/22 2051 04/13/22 0316     --   --  138  --  138   K 4.5   < > 3.9 4.3  --  3.9   *  --   --  104  --  103   CO2 22  --   --  24  --  25   BUN 21*  --   --  18  --  14   CREATININE 0.82   < >  --  0.76 0.76 0.61   GLUCOSE 107*  --   --  104*  --  105*    < > = values in this interval not displayed. Hepatic: No results for input(s): AST, ALT, ALB, BILITOT, ALKPHOS in the last 72 hours. Troponin: No results for input(s): TROPONINI in the last 72 hours. No results for input(s): TROPONINT in the last 72 hours. BNP: No results for input(s): PROBNP in the last 72 hours. No results for input(s): BNP in the last 72 hours. Lipids: No results for input(s): CHOL, HDL in the last 72 hours. Invalid input(s): LDLCALCU  INR:   Recent Labs     04/11/22 0358 04/12/22 0306 04/13/22 0316   INR 1.1 1.0 1.0       Objective:   Vitals: /82   Pulse 99   Temp 100.2 °F (37.9 °C) (Axillary)   Resp 24   Ht 5' 1\" (1.549 m)   Wt 162 lb 0.6 oz (73.5 kg)   SpO2 98%   BMI 30.62 kg/m²    Constitutional and General Appearance:   · On bipap  Respiratory:  · No for increased work of breathing. · On auscultation: dimnished breath sounds bilaterally  Cardiovascular:  · The apical impulse is not displaced  · Heart tones are crisp and normal. Regular S1 and S2. No murmurs. Abdomen:   · No masses or tenderness  · Bowel sounds present  Extremities:  ·  No Cyanosis or Clubbing  ·  Lower extremity edema: No  ·  Skin: Warm and dry  Neurological:  · Not performed     Diagnostic Studies:         ECHO:   Summary  Normal LV size , mildly increased wall thickness. No obvious wall motion abnormality seen. Normal LV systolic function with LVEF >55%. Normal RV size and function. LA and RA appears normal in size. No obvious significant structural valvular abnormality noted. No significant valvular stenosis or regurgitation noted. Normal aortic root dimension. No significant pericardial effusion noted. No obvious intra-cardiac mass or shunt noted. IVC normal diameter and inspiratory variation indicating normal RA filling  pressure.     Cardiac Angiography:  LMCA: Distal 30-40% stenosis     LAD: Mid stent stenosis 80%     LCx: Mid area 90% after the OM take off  OM1: in stent stenosis 90%     RCA: Proximal 75% stenosis  Mid area ulcerated plaques with 60% stenosis  PL branch ostial / proximal 90% stenosis      Coronary Tree      Dominance: Right     LV Analysis  LV function assessed as:Normal.  Ejection Fraction        Assessment / Acute Cardiac Problems:   1.  NSTEMI s/p cardiac cath: MV-CAD s/p CABGx3 on 4/8/22  2.  HTN  3.  HL  4.  active smoker  5.  Post op anemia s/p 3  Units pRBCs, 1 unit cryoprecipitate, 1 unit plt and plasma  6. Aspiration PNA     Plan of Treatment:   1. Continue ASA and plavix  2. Continue statin  3. Patient unable to take PO meds post extubation will start lopressor IV 2.5 mg q6hr. If able to tolerate will increase to lopressor IV 5 mg q6hr. 4. Continue amiodarone 200 mg TID per CV surgery recommendations. 5. Successfully extubated. On BiPAP this AM.  6. Antibiotics per Pulm/Critical care. Currently on cefepime & vancomycin  7. Post op management per CV surgery.   8. K>4, Mg>2    Elsy Cartagena MD  Fellow, [place] [id number] [facility]      Attending Physician Statement  I have discussed the care of the patient, including pertinent history and exam findings, with the resident. I have seen and examined the patient and the key elements of all parts of the encounter have been performed by me. I agree with the assessment, plan and orders as documented by the resident.     Patient is extubated, on high flow O2   Sitting up on side of the bed with PT/OT   Encourage incentive spirometer   Continue rest     Soha Toure MD

## 2022-04-13 NOTE — PROGRESS NOTES
Occupational Therapy   Occupational Therapy Initial Assessment  Date: 2022   Patient Name: Marlo Arreola  MRN: [id number]     : 1965     Chief Complaint   Patient presents with    Chest Pain     Midsternal x2 days    Shortness of Breath   s/p CABG X3 22    Date of Service: 2022    Discharge Recommendations:  Patient would benefit from continued therapy after discharge Patient is currently unsafe to return to prior living arrangements without 24 hour assistance. Further therapy recommended at discharge. The patient should be able to tolerate at least 3 hours of therapy per day over 5 days or 15 hours over 7 days. This patient may benefit from a Physical Medicine and Rehab consult. OT Equipment Recommendations  Equipment Needed: Yes  Equipment recommendations listed below are based on what the patient would need if they were able to return to prior living arrangements at the time of discharge. Mobility Devices: Becca Budd; ADL Assistive Devices  Walker: Rolling  ADL Assistive Devices: Toileting - 3-in-1 Commode;Long-handled [place];Long-handled Sponge;Reacher;Sock-Aid Hard    Assessment   Performance deficits / Impairments: Decreased functional mobility ; Decreased ADL status; Decreased safe awareness;Decreased cognition;Decreased endurance;Decreased balance;Decreased high-level IADLs;Decreased coordination  Assessment: Pt agreeable to OT eval this date. Pt presents s/p CABGX3 with above deficits listed. Pt requires Mod A X2 for supine > sit for progression of BLE and trunk. Pt completed functional transfers with Min-Mod A and functional mobility with Mod A and RW use. Pt ed throughout session on proper heart hugger use, sternal precautions, and breathing tech. Pt engaged in toileting task requiring Max A for pericare and clothing management d/t limitations in functional reach and balance.  Pt will benefit from continued acute and post-acute care OT services to address deficits listed in order to increase independence and safety with ADLs/IADLs and functional transfers/mobility  Prognosis: Good  Decision Making: High Complexity  Patient Education: Pt ed on OT role, OT POC, safety awareness, bed mobility training, how to don/doff/adjust heart hugger, transfer training, DME use, breathing tech, sternal precautions, heart hugger use, and importance of continued OT. Pt verbalized fair understanding  REQUIRES OT FOLLOW UP: Yes  Activity Tolerance  Activity Tolerance: Patient Tolerated treatment well;Patient limited by fatigue  Safety Devices  Safety Devices in place: Yes  Type of devices: Nurse notified; Left in chair;Gait belt; Chair alarm in place;Call light within reach  Restraints  Initially in place: No           Patient Diagnosis(es): The encounter diagnosis was NSTEMI (non-ST elevated myocardial infarction) ([facility] [address] has a past medical history of Anemia, Asthma, CAD (coronary artery disease), Depression, High cholesterol, Hypertension, MI, old, Osteoarthritis, Pneumonia, Sleep apnea, and Stroke ([facility] [address] has a past surgical history that includes Hysterectomy; Coronary angioplasty with stent (2004); Tubal ligation; Tonsillectomy; Appendectomy; and Coronary artery bypass graft (N/A, 4/8/2022). Restrictions  Restrictions/Precautions  Restrictions/Precautions: Fall Risk,Surgical Protocols,Up as Tolerated  Required Braces or Orthoses?: Yes  Required Braces or Orthoses  Other: Heart Hugger Brace  Position Activity Restriction  Sternal Precautions: 5# Lifting Restrictions  Other position/activity restrictions: s/p CABG x 3 (4/8/22); extubated 4/12/22; amb pt, up in chair, up for meals    Subjective   General  Patient assessed for rehabilitation services?: Yes  Family / Caregiver Present: No  General Comment  Comments: RN ok'd pt for OT eval this date.  Pt agreeable to session and pleasant/cooperative throughout yet lethargic  Patient Currently in Pain: Yes  Pain Assessment  Pain Assessment: 0-10  Pain Level: 8  Pain Type: Acute pain;Surgical pain  Pain Location: Back; Chest;Incision  Pain Orientation: Mid  Pain Descriptors: Discomfort  Functional Pain Assessment: Prevents or interferes some active activities and ADLs  Non-Pharmaceutical Pain Intervention(s): Ambulation/Increased Activity; Distraction;Repositioned  Response to Pain Intervention: Patient Satisfied  Pre Treatment Pain Screening  Intervention List: Patient able to continue with treatment    Social/Functional History  Social/Functional History  Lives With: Daughter  Type of Home: House  Home Layout: One level  Home Access: Level entry  Bathroom Shower/Tub: Tub/Shower unit,Walk-in shower  Bathroom Equipment: Shower chair  Home Equipment: 4 wheeled walker  Receives Help From: Family  ADL Assistance: [address]: Independent  Homemaking Responsibilities: Yes  Ambulation Assistance: Independent  Transfer Assistance: Independent  Active : Yes  Mode of Transportation: [employer]  Occupation: Retired  Additional Comments: Per family report, plan to return to mother's home upon discharge as she is able to provide 24hr support. Above information is obtained for pt mother's home       Objective   Vision: Impaired  Vision Exceptions: Wears glasses at all times  Hearing: Within functional limits    Orientation  Overall Orientation Status: Within Functional Limits    Balance  Sitting Balance: Minimal assistance (intermittent CGA required however pt occasionally leans laterally and requires Min A to maintain upright position)  Standing Balance: Moderate assistance  Standing Balance  Time: ~1 min, ~2 min  Activity: standing EOB in prep for functional mobility, standing at recliner for pericare  Comment: pt required Min A for initial stand from bed, Mod A during pericare tasks with anterior/posterior leaning  Functional Mobility  Functional - Mobility Device: Rolling Walker  Activity: Other  Assist Level:  Moderate assistance  Functional Mobility Comments: Pt completed short functional mobility task from EOB> recliner requring Mod A d/t unsteadiness and moderate buckling near end of functional mobility     ADL  Feeding: Minimal assistance;Setup; Increased time to complete;Verbal cueing  Grooming: Minimal assistance;Setup; Increased time to complete;Verbal cueing (pt attempted to bring tissue to nose and washcloth to face however moderate fumbling noted with coordination deficits requiring Min A this date)  UE Bathing: Moderate assistance;Setup;Verbal cueing; Increased time to complete  LE Bathing: Setup;Verbal cueing; Increased time to complete; Moderate assistance  UE Dressing: Moderate assistance;Setup  LE Dressing: Maximum assistance;Setup;Verbal cueing; Increased time to complete (pt requires Max A to don B socks d/t limited functional reach and dynamic sitting balance)  Toileting: Maximum assistance;Setup; Increased time to complete (pt completed toileting via bed pan placed on bedside chair. Pt requires Max A to perform pericare and clothing management d/t deficits in balance)  Tone RUE  RUE Tone: Normotonic  Tone LUE  LUE Tone: Normotonic  Coordination  Movements Are Fluid And Coordinated: No  Coordination and Movement description: Fine motor impairments;Right UE;Left UE    Bed mobility  Rolling to Right: Minimal assistance  Supine to Sit: Moderate assistance;2 Person assistance (pt requires Mod A X2 for trunk and BLE progression)  Sit to Supine: Unable to assess (pt retired to recliner at end of session)  Scooting: Moderate assistance  Transfers  Sit to stand: Minimal assistance; Moderate assistance  Stand to sit: Minimal assistance; Moderate assistance  Transfer Comments: pt completed initial stand from EOB with Min A and second stand from recliner with Mod A; VCs/TCs throughout for proper hand placement and to exhale and utilize heart hugger during transfers    Cognition  Overall Cognitive Status: Exceptions  Arousal/Alertness: Delayed responses to stimuli  Following Commands:  Follows one step commands with increased time  Attention Span: Difficulty dividing attention  Safety Judgement: Decreased awareness of need for assistance;Decreased awareness of need for safety  Insights: Decreased awareness of deficits  Initiation: Requires cues for some  Sequencing: Requires cues for some       Sensation  Overall Sensation Status: WFL (pt denies numbness/tingling)        LUE AROM (degrees)  LUE AROM : WFL  LUE General AROM: shoulder flexion observed to 90 degrees only to maintain sternal precautions  Left Hand AROM (degrees)  Left Hand AROM: WFL  RUE AROM (degrees)  RUE AROM : WFL  Right Hand AROM (degrees)  Right Hand AROM: WFL  Right Hand General AROM: shoulder flexion observed to 90 degrees only to maintain sternal precautions  LUE Strength  LUE Strength Comment: not formally assessed d/t sternal precautions however based on functional performance and observation to be [facility]  RUE Strength  RUE Strength Comment: not formally assessed d/t sternal precautions however based on functional performance and observation to be [facility]     Hand Dominance  Hand Dominance: Right             Plan   Plan  Times per week: 4-6 x/wk (CABG)  Current Treatment Recommendations: Balance Training,Functional Mobility Training,Endurance Training,Safety Education & Training,Self-Care / Cynthia Gutierrez Reorientation,Equipment Evaluation, Education, & procurement,Patient/Caregiver Education & Training,Home Management Training    AM-PAC Score  AM-PAC Inpatient Daily Activity Raw Score: 14 (04/13/22 1621)  AM-PAC Inpatient ADL T-Scale Score : 33.39 (04/13/22 1621)  ADL Inpatient CMS 0-100% Score: 59.67 (04/13/22 1621)  ADL Inpatient CMS G-Code Modifier : CK (04/13/22 1621)    Goals  Short term goals  Time Frame for Short term goals: By discharge, pt will:  Short term goal 1: Demo CGA for functional transfers and functional mobility with use of LRD for improved independence in ADLs/IADLs  Short term goal 2: Verbalize/incorporate proper heart hugger use and sternal precautions into therapeutic interventions with <2 VCs  Short term goal 3: Demo CGA for UB ADLs and grooming tasks  Short term goal 4: Demo Min A for LB ADLs and toileting tasks with setup and AE PRN  Short term goal 5: Demo appropriate use of EC/WS tech and breathing tech throughout therapy session with <2 VCs  Short term goal 6:  Increase activity tolerance to 30+ min for improved endurance for participation in ADLs/IADLs       Therapy Time   Individual Concurrent Group Co-treatment   Time In 0931         Time Out 1029         Minutes 58         Timed Code Treatment Minutes: 24 Minutes       Donnalee Moritz, OTR/L

## 2022-04-13 NOTE — PROGRESS NOTES
Pt attempting to remove bipap and get up out of bed. Pt continually educated on the importance of wearing the bipap for her respiratory status and the need to stay in the bed to protect her respiratory and hemodynamic status. Pt uncooperative with assessment refusing to answer this nurse's questions and participate in assessment. Pt also refusing Suppository tylenol at this time. Will attempt to assess pt again and try alternative routes to decrease fever. Will continue to monitor closely. VSS.

## 2022-04-13 NOTE — PLAN OF CARE
Problem: Falls - Risk of:  Goal: Will remain free from falls  Description: Will remain free from falls  Outcome: Ongoing  Goal: Absence of physical injury  Description: Absence of physical injury  Outcome: Ongoing     Problem: Cardiac Output - Decreased:  Goal: Hemodynamic stability will improve  Description: Hemodynamic stability will improve  Outcome: Ongoing  Goal: Cardiac output within specified parameters  Description: Cardiac output within specified parameters  Outcome: Ongoing     Problem: Pain:  Goal: Pain level will decrease  Description: Pain level will decrease  Outcome: Ongoing  Goal: Control of acute pain  Description: Control of acute pain  Outcome: Ongoing  Goal: Control of chronic pain  Description: Control of chronic pain  Outcome: Ongoing     Problem: Tissue Perfusion - Cardiopulmonary, Altered:  Goal: Hemodynamic stability will improve  Description: Hemodynamic stability will improve  Outcome: Ongoing  Goal: Absence of angina  Description: Absence of angina  Outcome: Ongoing  Goal: Circulatory function within specified parameters  Description: Circulatory function within specified parameters  Outcome: Ongoing  Goal: Hemodynamic stability will improve  Description: Hemodynamic stability will improve  Outcome: Ongoing  Goal: Will show no evidence of cardiac arrhythmias  Description: Will show no evidence of cardiac arrhythmias  Outcome: Ongoing     Problem: OXYGENATION/RESPIRATORY FUNCTION  Goal: Patient will maintain patent airway  Outcome: Ongoing  Goal: Patient will achieve/maintain normal respiratory rate/effort  Description: Respiratory rate and effort will be within normal limits for the patient  Outcome: Ongoing

## 2022-04-13 NOTE — PROGRESS NOTES
[facility]  Internal Medicine Teaching Residency Program  Inpatient Daily Progress Note  ______________________________________________________________________________    Patient: Kiran Hensley  YOB: 1965   IKJ:[id number]    Acct: [de-identified]     Room: [address] [place] [address]  Admit date: 4/3/2022  Today's date: 04/13/22  Number of days in the hospital: 10    SUBJECTIVE   Admitting Diagnosis: NSTEMI (non-ST elevated myocardial infarction) ([facility] [address]  CC: Typical chest pain  POD #5, S/P CABG  Patient examined at bedside, extubated yesterday successfully currently on BiPAP, trying to wean to nasal cannula. Chest tubes taken out this morning  Afebrile, blood pressure stable, tachycardic. Unable to get blood pressure medications orally, were switched to IV this morning. Will get swallow study later this evening if respiratory status stays stable  Currently on cefepime for pneumonia, pulmonary on board   Labs unremarkable  ROS: Patient drowsy, unable to assess    BRIEF HISTORY     The patient is a pleasant 61 y.o. female with a past medical history significant for essential hypertension, coronary artery disease status post PCI on DAPT, hyperlipidemia, chronic active smoking, history of stroke, MDD, memory problem presents with a chief complaint of typical chest pain. Chest pain started 3 to 4 days ago, dull aching in nature, 5-6 out of 10 in intensity, radiating to the left shoulder, increased with exertion and relieved with rest.  Also admits to exertional shortness of breath without orthopnea or PND. Patient denies palpitation syncope nausea vomiting diarrhea headache.     Evaluation in the ER, she was found to have elevated blood pressure 175/110. Heart rate 78 regular. SPO2 99% on room air. Heart auscultation showed normal first and second heart sound without murmur gallop. Chest is clear to auscultation. Abdomen soft and nontender. Labs reviewed normal BMP.   No leukocytosis hemoglobin 14. EKG showed normal sinus rhythm with some ST-T wave changes in inferior lead. Troponin is elevated 80. proBNP 217. Chest x-ray is negative for acute abnormality. Cardiology has been consulted in the ER for the concern of NSTEMI. Patient is started on heparin drip as per ACS protocol and will probably have cardiac cath tomorrow.     Of note, she has a past medical history of coronary artery disease status post PCI in ? Followed by another PCI in ? Questionable medication compliance. She has been actively smoking and is currently half pack per day. Denies alcohol intake. Admits to marijuana use.     Patient underwent cardiac cath on 2022. Findings:  LMCA: Distal 30-40% stenosis   LAD: Mid stent stenosis 80%   LCx: Mid area 90% after the OM take off   OM1: in stent stenosis 90%   RCA: Proximal 75% stenosis   Mid area ulcerated plaques with 60% stenosis   PL branch ostial / proximal 90% stenosis     The LV gram was performed in the LAWTON 30 position. LVEF: 50 %. Conclusions:   Multivessel CAD with left main disease    PLower normal LV systolic function   Recommendations:  CT surgery consult and recommend CABG. 4/10/-s/p CABG, chest tubes in place, on BiPAP  2022. All chest tube in place. Reintubated for worsening oxygen requirement. Currently on FiO2 40% with a PEEP of 8.  2022.   On mechanical ventilator with FiO2 40% and PEEP of 8.  4/-extubated yesterday, currently saturating well on BiPAP, plan to wean to nasal cannula and get swallow study thereafter, chest tubes taken out    OBJECTIVE     Vital Signs:  /84   Pulse 110   Temp 98.7 °F (37.1 °C) (Oral)   Resp 21   Ht 5' 1\" (1.549 m)   Wt 162 lb 0.6 oz (73.5 kg)   SpO2 94%   BMI 30.62 kg/m²     Temp (24hrs), Av °F (37.8 °C), Min:98.7 °F (37.1 °C), Max:100.9 °F (38.3 °C)    In: 1400.4   Out: 3828 [Urine:3763]    Physical Exam:  Constitutional: Drowsy, on BiPAP   EENT: PERRLA. No conjunctival injections. Neck: Supple without thyromegaly. No elevated JVP. Trachea was midline. Respiratory: Decreased breath sounds bilaterally. Cardiovascular: Chest tubes in place regular without murmur, clicks, gallops or rubs. Abdomen: Slightly rounded and soft without organomegaly. No rebound, rigidity or guarding was appreciated. Lymphatic: No lymphadenopathy. Musculoskeletal: Normal curvature of the spine. No gross muscle weakness. Extremities: Compression stockings on bilateral lower extremity no lower extremity edema, ulcerations, tenderness, varicosities or erythema. Muscle size, tone and strength are normal.  No involuntary movements are noted. Skin:  Warm and dry. Good color, turgor and pigmentation. No lesions or scars.   No cyanosis or clubbing  Neurological/Psychiatric: Drowsy, answering questions slowly    Medications:  Scheduled Medications:    metoprolol  2.5 mg IntraVENous Q6H    cefepime  2,000 mg IntraVENous Q8H    furosemide  20 mg IntraVENous BID    docusate  100 mg Oral BID    insulin lispro  0-6 Units SubCUTAneous TID WC    insulin lispro  0-3 Units SubCUTAneous Nightly    sodium chloride flush  5-40 mL IntraVENous 2 times per day    sodium chloride flush  5-40 mL IntraVENous 2 times per day    sodium chloride flush  10 mL IntraVENous 2 times per day    aspirin  81 mg Oral Daily    clopidogrel  75 mg Oral Daily    amiodarone  200 mg Oral TID    [Held by provider] metoprolol tartrate  25 mg Oral BID    atorvastatin  20 mg Oral Nightly    pantoprazole  40 mg Oral Daily    pantoprazole (PROTONIX) 40 mg injection  40 mg IntraVENous Daily    ipratropium-albuterol  1 ampule Inhalation Q4H WA    insulin glargine  0.15 Units/kg SubCUTAneous Nightly    senna  1 tablet Oral Once    [Held by provider] hydroCHLOROthiazide  25 mg Oral Daily    lovastatin  10 mg Oral Nightly    sodium chloride flush  5-40 mL IntraVENous 2 times per day    [Held by provider] amLODIPine  10 mg Oral Daily    nicotine  1 patch TransDERmal Daily     Continuous Infusions:    sodium chloride Stopped (04/11/22 1935)    sodium chloride Stopped (04/11/22 0447)    dextrose 25 mL/hr (04/12/22 1831)    EPINEPHrine      sodium chloride 20 mL/hr at 04/04/22 0408     PRN Medicationslabetalol, 10 mg, Q4H PRN  bisacodyl, 10 mg, Daily PRN  ketorolac, 15 mg, Q6H PRN  LORazepam, 0.5 mg, Q6H PRN  morphine, 2 mg, Q4H PRN  albuterol, 2.5 mg, Q6H PRN  acetylcysteine, 600 mg, Q4H PRN  sodium chloride flush, 5-40 mL, PRN  sodium chloride, 25 mL, PRN  sodium chloride flush, 10 mL, PRN  ondansetron, 4 mg, Q8H PRN   Or  ondansetron, 4 mg, Q6H PRN  oxyCODONE-acetaminophen, 1 tablet, Q4H PRN   Or  oxyCODONE-acetaminophen, 2 tablet, Q4H PRN  fentanNYL, 25 mcg, Q1H PRN   Or  fentanNYL, 50 mcg, Q1H PRN  hydrALAZINE, 5 mg, Q5 Min PRN  diphenhydrAMINE, 25 mg, Nightly PRN  potassium chloride, 20 mEq, PRN  magnesium sulfate, 2,000 mg, PRN  albumin human, 25 g, PRN  glucose, 15 g, PRN  dextrose, 12.5 g, PRN  glucagon (rDNA), 1 mg, PRN  dextrose, 100 mL/hr, PRN  EPINEPHrine, 0.1-0.3 mcg/kg/min, Continuous PRN  magnesium hydroxide, 30 mL, Daily PRN  aspirin-acetaminophen-caffeine, 1 tablet, Q8H PRN  magnesium sulfate, 1,000 mg, PRN  potassium chloride, 10 mEq, PRN  potassium chloride, 40 mEq, PRN   Or  potassium alternative oral replacement, 40 mEq, PRN   Or  potassium chloride, 10 mEq, PRN  sodium chloride, , PRN  polyethylene glycol, 17 g, Daily PRN  acetaminophen, 650 mg, Q6H PRN   Or  acetaminophen, 650 mg, Q6H PRN  calcium carbonate, 500 mg, TID PRN        Diagnostic Labs:  CBC:   Recent Labs     04/11/22  0358 04/11/22  1352 04/12/22  0306 04/12/22  0306 04/12/22  1019 04/12/22  1827 04/13/22  0316   WBC 11.8*  --  16.1*  --   --   --  18.3*   RBC 2.39*  --  2.90*  --   --   --  3.14*   HGB 7.2*   < > 8.6*   < > 9.6* 9.5* 9.4*   HCT 21.8*   < > 25.8*   < > 28.4* 27.8* 28.3*   MCV 91.2  --  89.0  --   -- --  90.1   RDW 15.6*  --  15.4*  --   --   --  14.9*   PLT See Reflexed IPF Result  --  144  --   --   --  196    < > = values in this interval not displayed. BMP:   Recent Labs     04/11/22 0358 04/11/22 0358 04/11/22  1820 04/12/22 0306 04/12/22 2051 04/13/22 0316     --   --  138  --  138   K 4.5   < > 3.9 4.3  --  3.9   *  --   --  104  --  103   CO2 22  --   --  24  --  25   BUN 21*  --   --  18  --  14   CREATININE 0.82   < >  --  0.76 0.76 0.61    < > = values in this interval not displayed. BNP: No results for input(s): BNP in the last 72 hours. PT/INR:   Recent Labs     04/11/22 0358 04/12/22 0306 04/13/22 0316   PROTIME 11.9 10.6 10.8   INR 1.1 1.0 1.0     APTT:   No results for input(s): APTT in the last 72 hours. CARDIAC ENZYMES: No results for input(s): CKMB, CKMBINDEX, TROPONINI in the last 72 hours. Invalid input(s): CKTOTAL;3  FASTING LIPID PANEL:  Lab Results   Component Value Date    CHOL 243 (H) 03/24/2021    HDL 36 (L) 12/01/2021    TRIG 178 (H) 03/24/2021     LIVER PROFILE:   No results for input(s): AST, ALT, ALB, BILIDIR, BILITOT, ALKPHOS in the last 72 hours. MICROBIOLOGY:   Lab Results   Component Value Date/Time    CULTURE PSEUDOMONAS AERUGINOSA <61184 CFU/ML (A) 04/10/2022 12:43 PM    CULTURE NO NORMAL JUSTINA 04/10/2022 12:43 PM       Imaging:    XR CHEST PORTABLE    Result Date: 4/3/2022  No acute process. ASSESSMENT & PLAN     IMPRESSION  This is a 61 y.o. female with a past medical history of hypertension hyperlipidemia coronary artery disease status post PCI, history of stroke and memory problem who presented with typical chest pain and exertional shortness of breath and found to have ST-T wave changes in the inferior leads and elevated troponin 80. Patient admitted to inpatient status for the management of NSTEMI. She underwent cardiac cath and found to have multivessel coronary artery disease.   CT surgery taken on board and s/p CABG.       Principal Problem:    NSTEMI (non-ST elevated myocardial infarction) ([address]  Active Problems:    CAD (coronary artery disease) with multiple stents    Asthma    Smoker    Anxiety    Essential hypertension    Memory problem    Mixed hyperlipidemia    Thyroid nodule    Multiple vessel coronary artery disease s/p CABG   Resolved Problems:    * No resolved hospital problems. *    NSTEMI with multivessel coronary artery disease s/p CABG-  -chest tubes taken out by cardiothoracic surgery this morning  -On ASA Plavix ,statin, amiodarone    Acute hypoxic failure, concern for aspiration pneumonia and bilateral mucous plugging  History of COPD  -Extubated yesterday, currently on BiPAP  - s/p  bronchoscopy on 4/10-mucous plugging  -Bronchoalveolar lavage respiratory culture grew Pseudomonas. Started on cefepime and IV vancomycin, currently on cefepime\  -Also on Lasix 20 twice daily  -Pulmonary/critical care following. Postoperative blood loss anemia s/p multiple transfusion  Received 5 units of transfusion since surgery. Hemoglobin currently stable    Essential hypertension:  Currently on IV medications until patient passes swallow study and can eat. Smoker  Continue  on nicotine patch.     Mixed hyperlipidemia  On atorvastatin 20 mg.      Thyroid nodule  CT chest showed 3 cm incidental right thyroid nodule. Subsequent thyroid ultrasound showed 2.9 cm slight heterogeneous cystic. No follow-up study recommended. TSH 1.33.     DVT ppx: EPC cuff only  GI ppx: Protonix    Discharge planning-Per primary team     Ludy Chen MD  Internal Medicine Resident, PGY-2  JENNIFER Elizalde [address] [place] [address]  2/39/9677,8:61 PM       Attending Physician Statement  I have discussed the care of Mónica. #5 Marissa Vidal Final and I have examined the patient myselft and taken ros and hpi , including pertinent history and exam findings,  with the resident.  I have reviewed the key elements of all parts of the encounter with the resident. I agree with the assessment, plan and orders as documented by the resident. Attending Physician Statement  I have discussed the care of Sales. #5 Carlose Janell Vidal Final and I have examined the patient myselft and taken ros and hpi , including pertinent history and exam findings,  with the resident. I have reviewed the key elements of all parts of the encounter with the resident. I agree with the assessment, plan and orders as documented by the resident.   Multivessel CAD, s/p CABG  Hx of COPD/Emphysema,   AC hypoxic resp failure, reintubation   Aspiration PNA ,   Anemia s/ pRBC   Was On mechanical vent,this am ,    vent settings reviewed   Subsequently extubated , was anxious and tachycardic this morning   Better now   Improving   IS   PT/OT   Chest tubes have been taken out today '  CXR next am          Electronically signed by Jena Wood MD

## 2022-04-13 NOTE — PROGRESS NOTES
[facility] Cardiothoracic Surgical   Progress Note    4/13/2022 10:11 AM  Surgeon:  Mady Kirk          POD# 5  S/P :  Coronary artery bypass  EF:  55 %    Subjective:  Ms. Donovan Vargas intubated on propofol. Following commands while intubated    Vital Signs: /79   Pulse 107   Temp 100.2 °F (37.9 °C) (Axillary)   Resp 24   Ht 5' 1\" (1.549 m)   Wt 162 lb 0.6 oz (73.5 kg)   SpO2 94%   BMI 30.62 kg/m²  O2 Flow Rate (L/min): 25 L/min   Admit Weight: Weight: 140 lb (63.5 kg)     Objective:  Vitals:    04/13/22 0915   BP:    Pulse:    Resp: 24   Temp:    SpO2: 94%       Labs and Studies:  CBC:   Recent Labs     04/11/22 0358 04/11/22  1352 04/12/22  0306 04/12/22  0306 04/12/22  1019 04/12/22 1827 04/13/22 0316   WBC 11.8*  --  16.1*  --   --   --  18.3*   HGB 7.2*   < > 8.6*   < > 9.6* 9.5* 9.4*   HCT 21.8*   < > 25.8*   < > 28.4* 27.8* 28.3*   MCV 91.2  --  89.0  --   --   --  90.1   PLT See Reflexed IPF Result  --  144  --   --   --  196    < > = values in this interval not displayed. BMP:   Recent Labs     04/11/22 0358 04/11/22 0358 04/11/22  1820 04/12/22 0306 04/12/22 2051 04/13/22 0316     --   --  138  --  138   K 4.5   < > 3.9 4.3  --  3.9   *  --   --  104  --  103   CO2 22  --   --  24  --  25   BUN 21*  --   --  18  --  14   CREATININE 0.82   < >  --  0.76 0.76 0.61    < > = values in this interval not displayed. PT/INR:   Recent Labs     04/11/22 0358 04/12/22 0306 04/13/22  0316   PROTIME 11.9 10.6 10.8   INR 1.1 1.0 1.0     APTT:   No results for input(s): APTT in the last 72 hours. I/O:  I/O last 3 completed shifts:   In: 1555.7 [I.V.:777.5; NG/GT:175; IV Piggyback:603.2]  Out: 1560 [AFWQQ:1177; Emesis/NG output:5; Chest Tube:125]    Scheduled Meds:    metoprolol  2.5 mg IntraVENous Q6H    cefepime  2,000 mg IntraVENous Q8H    furosemide  20 mg IntraVENous BID    docusate  100 mg Oral BID    insulin lispro  0-6 Units SubCUTAneous TID     insulin lispro  0-3 Units SubCUTAneous Nightly    sodium chloride flush  5-40 mL IntraVENous 2 times per day    sodium chloride flush  5-40 mL IntraVENous 2 times per day    sodium chloride flush  10 mL IntraVENous 2 times per day    aspirin  81 mg Oral Daily    clopidogrel  75 mg Oral Daily    amiodarone  200 mg Oral TID    [Held by provider] metoprolol tartrate  25 mg Oral BID    atorvastatin  20 mg Oral Nightly    pantoprazole  40 mg Oral Daily    pantoprazole (PROTONIX) 40 mg injection  40 mg IntraVENous Daily    ipratropium-albuterol  1 ampule Inhalation Q4H WA    insulin glargine  0.15 Units/kg SubCUTAneous Nightly    senna  1 tablet Oral Once    [Held by provider] hydroCHLOROthiazide  25 mg Oral Daily    [Held by provider] lisinopril  40 mg Oral Daily    lovastatin  10 mg Oral Nightly    sodium chloride flush  5-40 mL IntraVENous 2 times per day    [Held by provider] amLODIPine  10 mg Oral Daily    nicotine  1 patch TransDERmal Daily     Continuous Infusions:    sodium chloride      sodium chloride Stopped (04/11/22 1935)    sodium chloride Stopped (04/11/22 0447)    dextrose 25 mL/hr (04/12/22 1831)    EPINEPHrine      sodium chloride 20 mL/hr at 04/04/22 0408     Beta-Blocker: yes  ASA: yes  Plavix: yes  GI: yes  Statin: yes  Coumadin: no  ACE-I: no    Assessment/Plan:   Febrile, WBC 18 on abx for aspiration, pulmonary managing  (Culture from 4/10: pseudomonas)   Swallow study today  CXR still bilateral air space disease  Needs Bowel movement.  Air bubble on [place], [place]

## 2022-04-13 NOTE — PROGRESS NOTES
PT continually educated on the need for the cool room temperature, ice packs, and fans to fight her fever. Pt again offered suppository tylenol, but she refused. Pt BP and HR has been elevated throughout the night. This nurse worked with the Internal Medicine Resident to lower both the BP and HR through various medications as seen through [facility]  VSS. Will continue to monitor closely.

## 2022-04-13 NOTE — PROGRESS NOTES
PULMONARY & CRITICAL CARE MEDICINE PROGRESS NOTE     Patient:  Kanchan Farrar  MRN: [id number]  Admit date: 4/3/2022  Primary Care Physician: Zo Sierra MD  Consulting Physician: Ratna Khanna MD  CODE Status: Full Code  LOS: 10     SUBJECTIVE     CHIEF COMPLAINT/REASON FOR INITIAL CONSULT: Vent management    BRIEF HOSPITAL COURSE:   The patient is a 61 y.o. female with past medical history of smoking, CAD s/p stent, strong family history of heart disease, in the past initially presented with chest pain. Found to have elevated troponin. Patient required cardiac catheterization found to have multivessel disease. CT surgery was consulted and she was taken  to the OR, underwent CABG x3. Post CABG patient remained intubated and sedated. Pulmonology was consulted for vent management. Upon my evaluation, patient is intubated and sedated. She is on SIMV mode 24/380/5/50 ABG this morning showed 7.4/35/96/24. She is sedated with propofol at 20. She is off of pressor support. Post CABG patient had acute drop in hemoglobin requiring multiple transfusion. Chest x-ray showed left pleural effusion.   She still has a chest tube in place both in the pleural and mediastinal.    INTERVAL HISTORY:  22  Extubated yesterday   On high flow oxygen   Working pt ot   Post MBSS - started dysphagia diet     OBJECTIVE     PaO2/FiO2 RATIO:  Recent Labs     22  0549   POCPO2 77.1*      FiO2 : 50 %     VITAL SIGNS:   LAST:  /79   Pulse 107   Temp 100.2 °F (37.9 °C) (Axillary)   Resp 24   Ht 5' 1\" (1.549 m)   Wt 162 lb 0.6 oz (73.5 kg)   SpO2 94%   BMI 30.62 kg/m²   8-24 HR RANGE:  TEMP Temp  Av.2 °F (37.9 °C)  Min: 99.7 °F (37.6 °C)  Max: 100.9 °F (28.4 °C)   BP Systolic (81AFE), GUERDA:750 , Min:112 , NQO:301      Diastolic (67CUN), VTS:26, Min:79, Max:109     PULSE Pulse  Av.2  Min: 97  Max: 120   RR Resp  Av.1  Min: 19  Max: 27   O2 SAT SpO2  Av.2 %  Min: 94 %  Max: 100 %   OXYGEN DELIVERY O2 Flow Rate (L/min)  Av L/min  Min: 25 L/min  Max: 25 L/min        Exam   Awake alert   Tachypnea   Tachycardia   Lungs rales post and dec bs left post   cvs tachy s1 s2 no s3   abd nt bs +  Le faustino a  ue edema   Neuro cn normal motor /5     Review of Systems   Constitutional: Positive for fatigue and fever. HENT: Negative. Respiratory: Positive for cough and shortness of breath. Cardiovascular: Positive for leg swelling. Gastrointestinal: Negative.            DATA REVIEW     Medications:  Scheduled Meds:   metoprolol  2.5 mg IntraVENous Q6H    cefepime  2,000 mg IntraVENous Q8H    furosemide  20 mg IntraVENous BID    docusate  100 mg Oral BID    insulin lispro  0-6 Units SubCUTAneous TID WC    insulin lispro  0-3 Units SubCUTAneous Nightly    sodium chloride flush  5-40 mL IntraVENous 2 times per day    sodium chloride flush  5-40 mL IntraVENous 2 times per day    sodium chloride flush  10 mL IntraVENous 2 times per day    aspirin  81 mg Oral Daily    clopidogrel  75 mg Oral Daily    amiodarone  200 mg Oral TID    [Held by provider] metoprolol tartrate  25 mg Oral BID    atorvastatin  20 mg Oral Nightly    pantoprazole  40 mg Oral Daily    pantoprazole (PROTONIX) 40 mg injection  40 mg IntraVENous Daily    ipratropium-albuterol  1 ampule Inhalation Q4H WA    insulin glargine  0.15 Units/kg SubCUTAneous Nightly    senna  1 tablet Oral Once    [Held by provider] hydroCHLOROthiazide  25 mg Oral Daily    lovastatin  10 mg Oral Nightly    sodium chloride flush  5-40 mL IntraVENous 2 times per day    [Held by provider] amLODIPine  10 mg Oral Daily    nicotine  1 patch TransDERmal Daily     Continuous Infusions:   sodium chloride      sodium chloride Stopped (22)    sodium chloride Stopped (22)    dextrose 25 mL/hr (22 183)    EPINEPHrine      sodium chloride 20 mL/hr at 22 0408       INPUT/OUTPUT:  In: 1400.4 [I.V.:1026.5; NG/GT:75]  Out: 2978 [Urine:2913]  Date 04/13/22 0000 - 04/13/22 2359   Shift [id number] 5412-4349 2775-4144 24 Hour Total   INTAKE   I.V.(mL/kg) 500(6.8)   500(6.8)   IV Piggyback(mL/kg) 100(1.4)   100(1.4)   Shift Total(mL/kg) 600(8.2)   600(8.2)   OUTPUT   Urine(mL/kg/hr) 345(0.6)   345   Chest Tube 15   15   Shift Total(mL/kg) 360(4.9)   360(4.9)   Weight (kg) 73.5 73.5 73.5 73.5        LABS:  ABGs:   Recent Labs     04/10/22  2326 04/11/22  0334 04/12/22  0331 04/12/22 2051 04/13/22  0549   POCPH 7.348* 7.387 7.486* 7.545* 7.484*   POCPCO2 38.7 33.4* 31.4* 35.4 30.6*   POCPO2 121.1* 183.5* 111.8* 122.0* 77.1*   POCHCO3 21.3 20.1* 23.7 30.6* 23.0   ENVI1KXX 98 100* 99* 99* 96     CBC:   Recent Labs     04/11/22  0358 04/11/22  1352 04/11/22  1820 04/12/22  0306 04/12/22  1019 04/12/22  1827 04/13/22  0316   WBC 11.8*  --   --  16.1*  --   --  18.3*   HGB 7.2*   < > 8.6* 8.6* 9.6* 9.5* 9.4*   HCT 21.8*   < > 25.8* 25.8* 28.4* 27.8* 28.3*   MCV 91.2  --   --  89.0  --   --  90.1   PLT See Reflexed IPF Result  --   --  144  --   --  196   LYMPHOPCT 15*  --   --  10*  --   --  13*   RBC 2.39*  --   --  2.90*  --   --  3.14*   MCH 30.1  --   --  29.7  --   --  29.9   MCHC 33.0  --   --  33.3  --   --  33.2   RDW 15.6*  --   --  15.4*  --   --  14.9*    < > = values in this interval not displayed. CRP:   No results for input(s): CRP in the last 72 hours. LDH:   No results for input(s): LDH in the last 72 hours. BMP:   Recent Labs     04/11/22 0358 04/11/22 1820 04/12/22 0306 04/12/22 2051 04/13/22 0316     --  138  --  138   K 4.5 3.9 4.3  --  3.9   *  --  104  --  103   CO2 22  --  24  --  25   BUN 21*  --  18  --  14   CREATININE 0.82  --  0.76 0.76 0.61   GLUCOSE 107*  --  104*  --  105*     Liver Function Test:   No results for input(s): PROT, LABALBU, ALT, AST, GGT, ALKPHOS, BILITOT in the last 72 hours.   Coagulation Profile:   Recent Labs     04/11/22 0358 04/12/22 0306 04/13/22 0316 INR 1.1 1.0 1.0   PROTIME 11.9 10.6 10.8     D-Dimer:  No results for input(s): DDIMER in the last 72 hours. Lactic Acid:  No results for input(s): LACTA in the last 72 hours. Cardiac Enzymes:  No results for input(s): CKTOTAL, CKMB, CKMBINDEX, TROPONINI in the last 72 hours. Invalid input(s): TROPONIN, HSTROP  BNP/ProBNP:   No results for input(s): BNP, PROBNP in the last 72 hours. Triglycerides:  No results for input(s): TRIG in the last 72 hours. Microbiology:  Urine Culture:  No components found for: CURINE  Blood Culture:  No components found for: CBLOOD, CFUNGUSBL  Sputum Culture:  No components found for: CSPUTUM  Recent Labs     04/10/22  1243   [facility] . BRONCHIAL ALVEOLAR LAVAGE   CULTURE PSEUDOMONAS AERUGINOSA <83868 CFU/ML*  NO NORMAL JUSTINA     Recent Labs     04/10/22  1243   [facility] . BRONCHIAL ALVEOLAR LAVAGE   CULTURE PSEUDOMONAS AERUGINOSA <65862 CFU/ML*  NO NORMAL JUSTINA        Pathology:    Radiology Reports:  XR CHEST PORTABLE   Final Result   No substantial interval change in bilateral airspace disease or left greater   than right pleural effusions as compared to prior. XR CHEST PORTABLE   Final Result   Worsening right basilar atelectasis, otherwise similar appearing chest.  No   pneumothorax. XR CHEST PORTABLE   Final Result   Persistent bilateral airspace disease, with increased density in the right   upper lobe as compared to prior. Persistent small left pleural effusion. XR ABDOMEN FOR NG/OG/NE TUBE PLACEMENT   Final Result   Endotracheal tube tip is approximately 4 cm above the maria r. Similar perihilar infiltrates, left greater than right. Nasogastric tube tip appears to be in the stomach. XR CHEST PORTABLE   Final Result   Endotracheal tube tip is approximately 4 cm above the maria r. Similar perihilar infiltrates, left greater than right. Nasogastric tube tip appears to be in the stomach.          XR CHEST PORTABLE   Final Result   Interval progression detailed above. XR CHEST PORTABLE   Final Result   Interim removal of the endotracheal and NG tubes. Chest tubes unchanged. New mild airspace opacity at the base of the right upper lobe. Pulmonary   hemorrhage or aspiration not excluded. Improved aeration of the left lung and possibly decreased left pleural   effusion. XR CHEST PORTABLE   Final Result      XR CHEST PORTABLE   Final Result   1. Lines and tubes in expected position as above. 2. No acute pulmonary abnormality. VL DUP UPPER EXTREMITY ARTERIES BILATERAL   Final Result      VL DUP CAROTID BILATERAL   Final Result      VL VEIN MAPPING LOWER BILATERAL   Final Result      US HEAD NECK SOFT TISSUE THYROID   Final Result   Slightly heterogeneous thyroid gland with a 2.9 cm right thyroid lobe cyst   corresponding to the prior imaging studies. No FNA or follow-up imaging is   recommended based on TI rads criteria. RECOMMENDATIONS:   ACR TI-RADS recommendations:      TR5 (>= 7 points):  FNA if >= 1 cm; follow-up if 0.5-0.9 cm in 1, 2, 3, 4,   and 5 years      TR4 (4-6 points):  FNA if >= 1.5 cm; follow-up if 1.0-1.4 cm in 1, 2, 3, and   5 years      TR3 (3 points):  FNA if >= 2.5 cm; follow-up if 1.5-2.4 cm in 1, 3, and 5   years      TR2 (2 points):  No FNA or follow-up      TR1 (0 points):  No FNA or follow-up      ACR TI-RADS recommends that no more than two nodules with the highest ACR   TI-RADS point total should be biopsied and no more than four nodules should   be followed. CT CHEST WO CONTRAST   Final Result   incidental 3 cm hypodense lesion of the right lobe of the thyroid. See below   for recommendation. Calcified granuloma in the right middle lobe. Examination otherwise unremarkable. RECOMMENDATIONS:   3 cm incidental right thyroid nodule. Recommend thyroid US. Reference: J Am Carlitos Radiol.  2015 Feb;12(2): 143-50         XR CHEST PORTABLE   Final Result   No acute process. FL MODIFIED BARIUM SWALLOW W VIDEO    (Results Pending)   XR CHEST PORTABLE    (Results Pending)        Echocardiogram:   Results for orders placed during the hospital encounter of 04/03/22    ECHO Complete 2D W Doppler W Color    CONCLUSIONS    Summary  Normal LV size , mildly increased wall thickness. No obvious wall motion abnormality seen. Normal LV systolic function with LVEF >55%. Normal RV size and function. LA and RA appears normal in size. No obvious significant structural valvular abnormality noted. No significant valvular stenosis or regurgitation noted. Normal aortic root dimension. No significant pericardial effusion noted. No obvious intra-cardiac mass or shunt noted. IVC normal diameter and inspiratory variation indicating normal RA filling  pressure. ASSESSMENT AND PLAN     Assessment:    // Acute hypoxic respiratory failure,  // Bilateral aspiration pneumonia - pseudomonas   // S/p bronchoscopic clearance of bilateral mucus plugging (4/10/2022)  // Coronary disease, s/p CABG x3 (4/8/2022)  // Postoperative blood loss anemia, s/p multiple transfusion  // Thyroid nodule    Plan:    I personally interviewed/examined the patient; reviewed interval history, interpreted all available radiographic and laboratory data at the time of service. Complete 7 days of cefepime   Cont to wean high flow oxygen   Pt ot   Is and deep breathing   Diuresis     The patient remains critically ill with illness/injury that acutely impairs one or more vital organ systems, such that there is a high probability of imminent or life threatening deterioration in the patient's condition. Critical care time of 30 minutes was spent (excluding procedures), in coordination of care during bedside rounds and discussion of patient care in detail, and recommendations of the team were adopted in the plan. Necessity of all invasive devices was also confirmed.      Osman Espinoza MD  Pulmonary and Critical Care Medicine           4/13/2022, 10:17 AM    Please note that this chart was generated using voice recognition Dragon dictation software. Although every effort was made to ensure the accuracy of this automated transcription, some errors in transcription may have occurred.

## 2022-04-13 NOTE — PROGRESS NOTES
On initial assessment pt very tachycardiac, hypertensive, tachypnic, drowsy, vomiting and in 10/10 back pain. PRN fentanyl and hydralazine given with minimal improvement. Spoke with Internal Med resident an obtained ABG and labetalol. PT placed back on bipap and resting more comfortably. VSS. Will continue to monitor closely.

## 2022-04-14 ENCOUNTER — APPOINTMENT (OUTPATIENT)
Dept: GENERAL RADIOLOGY | Age: 57
DRG: 233 | End: 2022-04-14
Payer: MEDICARE

## 2022-04-14 LAB
ABSOLUTE EOS #: 0 K/UL (ref 0–0.4)
ABSOLUTE IMMATURE GRANULOCYTE: 0 K/UL (ref 0–0.3)
ABSOLUTE LYMPH #: 1.93 K/UL (ref 1–4.8)
ABSOLUTE MONO #: 1.75 K/UL (ref 0.1–0.8)
ANION GAP SERPL CALCULATED.3IONS-SCNC: 11 MMOL/L (ref 9–17)
BASOPHILS # BLD: 0 % (ref 0–2)
BASOPHILS ABSOLUTE: 0 K/UL (ref 0–0.2)
BUN BLDV-MCNC: 24 MG/DL (ref 6–20)
CALCIUM SERPL-MCNC: 8.7 MG/DL (ref 8.6–10.4)
CHLORIDE BLD-SCNC: 100 MMOL/L (ref 98–107)
CO2: 24 MMOL/L (ref 20–31)
CREAT SERPL-MCNC: 0.86 MG/DL (ref 0.5–0.9)
EOSINOPHILS RELATIVE PERCENT: 0 % (ref 1–4)
GFR AFRICAN AMERICAN: >60 ML/MIN
GFR NON-AFRICAN AMERICAN: >60 ML/MIN
GFR SERPL CREATININE-BSD FRML MDRD: ABNORMAL ML/MIN/{1.73_M2}
GLUCOSE BLD-MCNC: 101 MG/DL (ref 65–105)
GLUCOSE BLD-MCNC: 82 MG/DL (ref 65–105)
GLUCOSE BLD-MCNC: 99 MG/DL (ref 70–99)
HCT VFR BLD CALC: 29.8 % (ref 36.3–47.1)
HCT VFR BLD CALC: 30.1 % (ref 36.3–47.1)
HEMOGLOBIN: 10 G/DL (ref 11.9–15.1)
HEMOGLOBIN: 9.5 G/DL (ref 11.9–15.1)
IMMATURE GRANULOCYTES: 0 %
INR BLD: 1.1
LYMPHOCYTES # BLD: 11 % (ref 24–44)
MAGNESIUM: 2.4 MG/DL (ref 1.6–2.6)
MCH RBC QN AUTO: 29.3 PG (ref 25.2–33.5)
MCHC RBC AUTO-ENTMCNC: 31.9 G/DL (ref 28.4–34.8)
MCV RBC AUTO: 92 FL (ref 82.6–102.9)
MONOCYTES # BLD: 10 % (ref 1–7)
MORPHOLOGY: NORMAL
NRBC AUTOMATED: 0.2 PER 100 WBC
NUCLEATED RED BLOOD CELLS: 1 PER 100 WBC
PDW BLD-RTO: 14.4 % (ref 11.8–14.4)
PLATELET # BLD: 238 K/UL (ref 138–453)
PMV BLD AUTO: 10.3 FL (ref 8.1–13.5)
POTASSIUM SERPL-SCNC: 3.7 MMOL/L (ref 3.7–5.3)
PROTHROMBIN TIME: 11.3 SEC (ref 9.1–12.3)
RBC # BLD: 3.24 M/UL (ref 3.95–5.11)
SEG NEUTROPHILS: 79 % (ref 36–66)
SEGMENTED NEUTROPHILS ABSOLUTE COUNT: 13.82 K/UL (ref 1.8–7.7)
SODIUM BLD-SCNC: 135 MMOL/L (ref 135–144)
WBC # BLD: 17.5 K/UL (ref 3.5–11.3)

## 2022-04-14 PROCEDURE — 6370000000 HC RX 637 (ALT 250 FOR IP): Performed by: STUDENT IN AN ORGANIZED HEALTH CARE EDUCATION/TRAINING PROGRAM

## 2022-04-14 PROCEDURE — 6370000000 HC RX 637 (ALT 250 FOR IP): Performed by: NURSE PRACTITIONER

## 2022-04-14 PROCEDURE — 97116 GAIT TRAINING THERAPY: CPT

## 2022-04-14 PROCEDURE — 99291 CRITICAL CARE FIRST HOUR: CPT | Performed by: INTERNAL MEDICINE

## 2022-04-14 PROCEDURE — 94640 AIRWAY INHALATION TREATMENT: CPT

## 2022-04-14 PROCEDURE — 2500000003 HC RX 250 WO HCPCS: Performed by: INTERNAL MEDICINE

## 2022-04-14 PROCEDURE — 6360000002 HC RX W HCPCS: Performed by: PHYSICIAN ASSISTANT

## 2022-04-14 PROCEDURE — 99232 SBSQ HOSP IP/OBS MODERATE 35: CPT | Performed by: INTERNAL MEDICINE

## 2022-04-14 PROCEDURE — 97530 THERAPEUTIC ACTIVITIES: CPT

## 2022-04-14 PROCEDURE — 2140000001 HC CVICU INTERMEDIATE R&B

## 2022-04-14 PROCEDURE — 6360000002 HC RX W HCPCS: Performed by: INTERNAL MEDICINE

## 2022-04-14 PROCEDURE — 80048 BASIC METABOLIC PNL TOTAL CA: CPT

## 2022-04-14 PROCEDURE — 85018 HEMOGLOBIN: CPT

## 2022-04-14 PROCEDURE — 71045 X-RAY EXAM CHEST 1 VIEW: CPT

## 2022-04-14 PROCEDURE — 2580000003 HC RX 258: Performed by: NURSE PRACTITIONER

## 2022-04-14 PROCEDURE — 2700000000 HC OXYGEN THERAPY PER DAY

## 2022-04-14 PROCEDURE — 36415 COLL VENOUS BLD VENIPUNCTURE: CPT

## 2022-04-14 PROCEDURE — 85610 PROTHROMBIN TIME: CPT

## 2022-04-14 PROCEDURE — 6360000002 HC RX W HCPCS: Performed by: NURSE PRACTITIONER

## 2022-04-14 PROCEDURE — 85014 HEMATOCRIT: CPT

## 2022-04-14 PROCEDURE — 85025 COMPLETE CBC W/AUTO DIFF WBC: CPT

## 2022-04-14 PROCEDURE — 94761 N-INVAS EAR/PLS OXIMETRY MLT: CPT

## 2022-04-14 PROCEDURE — 2580000003 HC RX 258: Performed by: STUDENT IN AN ORGANIZED HEALTH CARE EDUCATION/TRAINING PROGRAM

## 2022-04-14 PROCEDURE — 97110 THERAPEUTIC EXERCISES: CPT

## 2022-04-14 PROCEDURE — 83735 ASSAY OF MAGNESIUM: CPT

## 2022-04-14 PROCEDURE — 82947 ASSAY GLUCOSE BLOOD QUANT: CPT

## 2022-04-14 PROCEDURE — 99024 POSTOP FOLLOW-UP VISIT: CPT | Performed by: NURSE PRACTITIONER

## 2022-04-14 RX ADMIN — WATER 2000 MG: 1 INJECTION INTRAMUSCULAR; INTRAVENOUS; SUBCUTANEOUS at 17:16

## 2022-04-14 RX ADMIN — AMIODARONE HYDROCHLORIDE 200 MG: 200 TABLET ORAL at 20:43

## 2022-04-14 RX ADMIN — IPRATROPIUM BROMIDE AND ALBUTEROL SULFATE 1 AMPULE: .5; 3 SOLUTION RESPIRATORY (INHALATION) at 13:05

## 2022-04-14 RX ADMIN — Medication 81 MG: at 08:00

## 2022-04-14 RX ADMIN — SODIUM CHLORIDE, PRESERVATIVE FREE 10 ML: 5 INJECTION INTRAVENOUS at 07:57

## 2022-04-14 RX ADMIN — IPRATROPIUM BROMIDE AND ALBUTEROL SULFATE 1 AMPULE: .5; 3 SOLUTION RESPIRATORY (INHALATION) at 09:14

## 2022-04-14 RX ADMIN — OXYCODONE HYDROCHLORIDE AND ACETAMINOPHEN 2 TABLET: 5; 325 TABLET ORAL at 14:02

## 2022-04-14 RX ADMIN — CLOPIDOGREL 75 MG: 75 TABLET, FILM COATED ORAL at 08:02

## 2022-04-14 RX ADMIN — METOPROLOL TARTRATE 2.5 MG: 5 INJECTION INTRAVENOUS at 17:16

## 2022-04-14 RX ADMIN — IPRATROPIUM BROMIDE AND ALBUTEROL SULFATE 1 AMPULE: .5; 3 SOLUTION RESPIRATORY (INHALATION) at 17:16

## 2022-04-14 RX ADMIN — OXYCODONE HYDROCHLORIDE AND ACETAMINOPHEN 2 TABLET: 5; 325 TABLET ORAL at 20:40

## 2022-04-14 RX ADMIN — SODIUM CHLORIDE, PRESERVATIVE FREE 10 ML: 5 INJECTION INTRAVENOUS at 20:53

## 2022-04-14 RX ADMIN — AMIODARONE HYDROCHLORIDE 200 MG: 200 TABLET ORAL at 16:20

## 2022-04-14 RX ADMIN — SODIUM CHLORIDE, PRESERVATIVE FREE 30 ML: 5 INJECTION INTRAVENOUS at 20:54

## 2022-04-14 RX ADMIN — AMIODARONE HYDROCHLORIDE 200 MG: 200 TABLET ORAL at 08:00

## 2022-04-14 RX ADMIN — WATER 2000 MG: 1 INJECTION INTRAMUSCULAR; INTRAVENOUS; SUBCUTANEOUS at 00:43

## 2022-04-14 RX ADMIN — KETOROLAC TROMETHAMINE 15 MG: 30 INJECTION, SOLUTION INTRAMUSCULAR at 01:32

## 2022-04-14 RX ADMIN — LOVASTATIN 10 MG: 20 TABLET ORAL at 20:44

## 2022-04-14 RX ADMIN — POTASSIUM BICARBONATE 40 MEQ: 782 TABLET, EFFERVESCENT ORAL at 04:57

## 2022-04-14 RX ADMIN — METOPROLOL TARTRATE 2.5 MG: 5 INJECTION INTRAVENOUS at 03:09

## 2022-04-14 RX ADMIN — FUROSEMIDE 20 MG: 10 INJECTION, SOLUTION INTRAMUSCULAR; INTRAVENOUS at 18:47

## 2022-04-14 RX ADMIN — FUROSEMIDE 20 MG: 10 INJECTION, SOLUTION INTRAMUSCULAR; INTRAVENOUS at 08:06

## 2022-04-14 RX ADMIN — METOPROLOL TARTRATE 2.5 MG: 5 INJECTION INTRAVENOUS at 08:07

## 2022-04-14 RX ADMIN — WATER 2000 MG: 1 INJECTION INTRAMUSCULAR; INTRAVENOUS; SUBCUTANEOUS at 07:57

## 2022-04-14 RX ADMIN — METOPROLOL TARTRATE 2.5 MG: 5 INJECTION INTRAVENOUS at 22:21

## 2022-04-14 RX ADMIN — FENTANYL CITRATE 50 MCG: 50 INJECTION, SOLUTION INTRAMUSCULAR; INTRAVENOUS at 05:10

## 2022-04-14 RX ADMIN — SODIUM CHLORIDE, PRESERVATIVE FREE 10 ML: 5 INJECTION INTRAVENOUS at 08:08

## 2022-04-14 RX ADMIN — OXYCODONE HYDROCHLORIDE AND ACETAMINOPHEN 1 TABLET: 5; 325 TABLET ORAL at 09:31

## 2022-04-14 RX ADMIN — PANTOPRAZOLE SODIUM 40 MG: 40 TABLET, DELAYED RELEASE ORAL at 08:05

## 2022-04-14 ASSESSMENT — PAIN DESCRIPTION - PAIN TYPE
TYPE: ACUTE PAIN;SURGICAL PAIN

## 2022-04-14 ASSESSMENT — PAIN DESCRIPTION - LOCATION
LOCATION: CHEST
LOCATION: CHEST;BACK

## 2022-04-14 ASSESSMENT — PAIN SCALES - GENERAL
PAINLEVEL_OUTOF10: 0
PAINLEVEL_OUTOF10: 7
PAINLEVEL_OUTOF10: 5
PAINLEVEL_OUTOF10: 2
PAINLEVEL_OUTOF10: 0
PAINLEVEL_OUTOF10: 0
PAINLEVEL_OUTOF10: 2
PAINLEVEL_OUTOF10: 8
PAINLEVEL_OUTOF10: 4
PAINLEVEL_OUTOF10: 7
PAINLEVEL_OUTOF10: 6
PAINLEVEL_OUTOF10: 8
PAINLEVEL_OUTOF10: 1

## 2022-04-14 ASSESSMENT — PAIN DESCRIPTION - ORIENTATION
ORIENTATION: MID

## 2022-04-14 ASSESSMENT — ENCOUNTER SYMPTOMS
SHORTNESS OF BREATH: 1
GASTROINTESTINAL NEGATIVE: 1
COUGH: 1
TACHYPNEA: 1

## 2022-04-14 ASSESSMENT — PAIN DESCRIPTION - ONSET
ONSET: ON-GOING
ONSET: ON-GOING

## 2022-04-14 ASSESSMENT — PAIN DESCRIPTION - DESCRIPTORS
DESCRIPTORS: ACHING
DESCRIPTORS: ACHING

## 2022-04-14 NOTE — PROGRESS NOTES
Physical Therapy  Facility/Department: [facility] 1  Daily Treatment Note  NAME: Kashmir Chapa  : 1965  MRN: [id number]    Date of Service: 2022    Discharge Recommendations:  Continue to assess pending progress   PT Equipment Recommendations  Equipment Needed: No (CTA)    Assessment   Body structures, Functions, Activity limitations: Decreased functional mobility ; Increased pain;Decreased balance;Decreased ROM; Decreased strength;Decreased endurance;Decreased high-level IADLs  Assessment: Pt amb 100 ft x2, grossly CGA with use of RW, pt wants to go home, cues for safety awareness Recommending continued skilled physical therapy to address these deficits to return pt to prior level of independence. Prognosis: Fair  Decision Making: High Complexity  PT Education: Goals;PT Role;Plan of Care;Precautions  Patient Education: Sternal precautions; proper breathing technique with functional transfers  REQUIRES PT FOLLOW UP: Yes  Activity Tolerance  Activity Tolerance: Patient limited by fatigue;Patient limited by endurance  Activity Tolerance: tired     Patient Diagnosis(es): The encounter diagnosis was NSTEMI (non-ST elevated myocardial infarction) ([facility] [address] has a past medical history of Anemia, Asthma, CAD (coronary artery disease), Depression, High cholesterol, Hypertension, MI, old, Osteoarthritis, Pneumonia, Sleep apnea, and Stroke ([facility] [address] has a past surgical history that includes Hysterectomy; Coronary angioplasty with stent (); Tubal ligation; Tonsillectomy; Appendectomy; and Coronary artery bypass graft (N/A, 2022).     Restrictions  Restrictions/Precautions  Restrictions/Precautions: Fall Risk,Surgical Protocols,Up as Tolerated  Required Braces or Orthoses?: Yes  Required Braces or Orthoses  Other: Heart Hugger Brace  Position Activity Restriction  Sternal Precautions: 5# Lifting Restrictions  Other position/activity restrictions: s/p CABG x 3 (22); extubated 22; amb pt, up in chair, up for meals  Subjective   General  Chart Reviewed: Yes  Response To Previous Treatment: Patient with no complaints from previous session. Family / Caregiver Present: No  Subjective  Subjective: RN and pt in agreement for PT eval; pt  seated in recliner chair upon PT arrival,pt on NC  General Comment  Comments: Pt retired to chair with callight and chair alarm  Pain Screening  Patient Currently in Pain: Yes  Pain Assessment  Pain Assessment: 0-10  Pain Level: 5  Patient's Stated Pain Goal: No pain  Pain Type: Acute pain;Surgical pain  Pain Location: Chest;Back  Pain Orientation: Mid  Vital Signs  Patient Currently in Pain: Yes       Orientation  Orientation  Overall Orientation Status: Within Functional Limits  Cognition   Cognition  Overall Cognitive Status: Exceptions  Safety Judgement: Decreased awareness of need for assistance;Decreased awareness of need for safety  Insights: Decreased awareness of deficits  Objective   Bed mobility  Supine to Sit: Unable to assess  Sit to Supine: Unable to assess  Scooting: Stand by assistance  Transfers  Sit to Stand: Contact guard assistance;Stand by assistance  Stand to sit: Contact guard assistance;Stand by assistance  Comment: Pt required mod VC's for hand placement and use of heart hugger with functional transfers with fair return demo, increased strength this date  Ambulation  Ambulation?: Yes  More Ambulation?: Yes  Ambulation 1  Surface: level tile  Device: Rolling Walker  Other Apparatus: O2  Assistance:  Moderate assistance  Quality of Gait: Forward flexed posture  Gait Deviations: Slow Suzanne  Distance: 100 ft x2  Comments: Pt CGA/RW slow suzanne     Balance  Posture: Fair  Sitting - Static: Fair;+  Sitting - Dynamic: Fair;-  Standing - Static: Good  Standing - Dynamic: Fair;+  Comments: standing balance assessed w/ RW  Other exercises  Other exercises?: Yes  Other exercises 1: Seated LE exercise program: Long Arc Quads, hip abduction/adduction, heel/toe raises, and bessie.  Reps:10    Other exercises 2: IS and Accapella x10    Pt given cardiac HEP packet  Goals  Short term goals  Time Frame for Short term goals: 15  Short term goal 1: Pt to perform bed mobility Iesha  Short term goal 2: Demonstrate functional transfers with supervision  Short term goal 3: Pt to ambulate 150ft w/ least restrictive AD with supervision  Short term goal 4: Demonstrate independent performance of cardiac rehab program HEP  Patient Goals   Patient goals : Did not state    Plan    Plan  Times per week: 7x/week; 1-2x/day  Current Treatment Recommendations: Strengthening,Home Exercise Program,ROM,Safety Education & Jaxon Karma Training,Patient/Caregiver Education & Training,Functional Mobility Training,Equipment Evaluation, Education, & procurement,Transfer Training,Gait Training,Stair training  Safety Devices  Type of devices: Left in chair,Call light within reach,Chair alarm in place,Gait belt,Patient at risk for falls,Nurse notified  Restraints  Initially in place: No     Therapy Time   Individual Concurrent Group Co-treatment   Time In 1058         Time Out 1152         Minutes 54         Timed Code Treatment Minutes: [id number] [facility]

## 2022-04-14 NOTE — PROGRESS NOTES
PULMONARY & CRITICAL CARE MEDICINE PROGRESS NOTE     Patient:  Naida Dahl  MRN: [id number]  Admit date: 4/3/2022  Primary Care Physician: Elissa Moreno MD  Consulting Physician: Candy Sagastume MD  CODE Status: Full Code  LOS: 11     SUBJECTIVE     CHIEF COMPLAINT/REASON FOR INITIAL CONSULT: Vent management    BRIEF HOSPITAL COURSE:   The patient is a 61 y.o. female with past medical history of smoking, CAD s/p stent, strong family history of heart disease, in the past initially presented with chest pain. Found to have elevated troponin. Patient required cardiac catheterization found to have multivessel disease. CT surgery was consulted and she was taken  to the OR, underwent CABG x3. Post CABG patient remained intubated and sedated. Pulmonology was consulted for vent management. Upon my evaluation, patient is intubated and sedated. She is on SIMV mode 24/380/5/50 ABG this morning showed 7.4/35/96/24. She is sedated with propofol at 20. She is off of pressor support. Post CABG patient had acute drop in hemoglobin requiring multiple transfusion. Chest x-ray showed left pleural effusion.   She still has a chest tube in place both in the pleural and mediastinal.    INTERVAL HISTORY:  22  Weaned off high flow oxygen  On 3 L nasal cannula  No work of breathing  Cough minimal sputum  Afebrile  OBJECTIVE     PaO2/FiO2 RATIO:  Recent Labs     22  0549   POCPO2 77.1*      FiO2 : 50 %     VITAL SIGNS:   LAST:  /89   Pulse 97   Temp 98 °F (36.7 °C) (Oral)   Resp 17   Ht 5' 1\" (1.549 m)   Wt 160 lb 11.5 oz (72.9 kg)   SpO2 98%   BMI 30.37 kg/m²   8-24 HR RANGE:  TEMP Temp  Av.4 °F (36.9 °C)  Min: 97.7 °F (36.5 °C)  Max: 98.7 °F (40.3 °C)   BP Systolic (96ECW), WIT:279 , Min:114 , MAUREEN:599      Diastolic (55LVZ), BBO:43, Min:65, Max:89     PULSE Pulse  Av.4  Min: 93  Max: 110   RR Resp  Av.7  Min: 17  Max: 24   O2 SAT SpO2  Av %  Min: 96 %  Max: 98 %   OXYGEN P.O.(mL/kg/hr)  120  120   Shift Total(mL/kg)  120(1.6)  120(1.6)   OUTPUT   Urine(mL/kg/hr) 400(0.7) 100  500   Shift Total(mL/kg) 400(5.5) 100(1.4)  500(6.9)   Weight (kg) 72.9 72.9 72.9 72.9        LABS:  ABGs:   Recent Labs     04/12/22  0331 04/12/22 2051 04/13/22  0549   POCPH 7.486* 7.545* 7.484*   POCPCO2 31.4* 35.4 30.6*   POCPO2 111.8* 122.0* 77.1*   POCHCO3 23.7 30.6* 23.0   BTDN1NLW 99* 99* 96     CBC:   Recent Labs     04/12/22  0306 04/12/22 0306 04/12/22  1019 04/12/22 1827 04/13/22 0316 04/13/22 1832 04/14/22  0308   WBC 16.1*  --   --   --  18.3*  --  17.5*   HGB 8.6*   < > 9.6* 9.5* 9.4* 9.7* 9.5*   HCT 25.8*   < > 28.4* 27.8* 28.3* 29.7* 29.8*   MCV 89.0  --   --   --  90.1  --  92.0     --   --   --  196  --  238   LYMPHOPCT 10*  --   --   --  13*  --  11*   RBC 2.90*  --   --   --  3.14*  --  3.24*   MCH 29.7  --   --   --  29.9  --  29.3   MCHC 33.3  --   --   --  33.2  --  31.9   RDW 15.4*  --   --   --  14.9*  --  14.4    < > = values in this interval not displayed. CRP:   No results for input(s): CRP in the last 72 hours. LDH:   No results for input(s): LDH in the last 72 hours. BMP:   Recent Labs     04/11/22  1820 04/12/22  0306 04/12/22 2051 04/13/22 0316 04/14/22 0308   NA  --  138  --  138 135   K 3.9 4.3  --  3.9 3.7   CL  --  104  --  103 100   CO2  --  24  --  25 24   BUN  --  18  --  14 24*   CREATININE  --  0.76 0.76 0.61 0.86   GLUCOSE  --  104*  --  105* 99     Liver Function Test:   No results for input(s): PROT, LABALBU, ALT, AST, GGT, ALKPHOS, BILITOT in the last 72 hours. Coagulation Profile:   Recent Labs     04/12/22 0306 04/13/22 0316 04/14/22 0308   INR 1.0 1.0 1.1   PROTIME 10.6 10.8 11.3     D-Dimer:  No results for input(s): DDIMER in the last 72 hours. Lactic Acid:  No results for input(s): LACTA in the last 72 hours. Cardiac Enzymes:  No results for input(s): CKTOTAL, CKMB, CKMBINDEX, TROPONINI in the last 72 hours.     Invalid input(s): TROPONIN, HSTROP  BNP/ProBNP:   No results for input(s): BNP, PROBNP in the last 72 hours. Triglycerides:  No results for input(s): TRIG in the last 72 hours. Microbiology:  Urine Culture:  No components found for: CURINE  Blood Culture:  No components found for: CBLOOD, CFUNGUSBL  Sputum Culture:  No components found for: CSPUTUM  No results for input(s): SPECDESC, SPECIAL, CULTURE, STATUS, ORG, CDIFFTOXPCR, CAMPYLOBPCR, SALMONELLAPC, SHIGAPCR, SHIGELLAPCR, MPNEUG, MPNEUM, LACTOQL in the last 72 hours. No results for input(s): SPUTUM, SPECDESC, SPECIAL, CULTURE, STATUS, ORG, CDIFFTOXPCR, MPNEUM, MPNEUG in the last 72 hours. Invalid input(s): CURINE, CBLOOD, CFUNGUSBL     Pathology:    Radiology Reports:  XR CHEST PORTABLE   Final Result   1. Bibasilar and bilateral parahilar airspace disease, similar to the prior   study with stable small left-sided pleural effusion. Findings likely   represent multifocal pneumonia. Follow-up is recommended to document   resolution. 2. Stable cardiomegaly. Prior median sternotomy and CABG. 3. Right IJ approach central venous catheter distal tip overlying the   cavoatrial junction, stable. FL MODIFIED BARIUM SWALLOW W VIDEO   Final Result   1. No penetration or aspiration with the above administered substances. 2. Patient could not swallow the soft solid substance even after liquid wash   attempt and expectorated the soft solid substance. 3. Cookie solid substance was not attempted. Please see separate speech pathology report for full discussion of findings   and recommendations. XR CHEST PORTABLE   Preliminary Result   Removal of left chest tube. Persistent moderate left pleural effusion. Persistent multifocal airspace consolidation. Findings suggest multifocal   pneumonia. No pneumothorax.          XR CHEST PORTABLE   Final Result   No substantial interval change in bilateral airspace disease or left greater   than right pleural effusions as compared to prior. XR CHEST PORTABLE   Final Result   Worsening right basilar atelectasis, otherwise similar appearing chest.  No   pneumothorax. XR CHEST PORTABLE   Final Result   Persistent bilateral airspace disease, with increased density in the right   upper lobe as compared to prior. Persistent small left pleural effusion. XR ABDOMEN FOR NG/OG/NE TUBE PLACEMENT   Final Result   Endotracheal tube tip is approximately 4 cm above the maria r. Similar perihilar infiltrates, left greater than right. Nasogastric tube tip appears to be in the stomach. XR CHEST PORTABLE   Final Result   Endotracheal tube tip is approximately 4 cm above the maria r. Similar perihilar infiltrates, left greater than right. Nasogastric tube tip appears to be in the stomach. XR CHEST PORTABLE   Final Result   Interval progression detailed above. XR CHEST PORTABLE   Final Result   Interim removal of the endotracheal and NG tubes. Chest tubes unchanged. New mild airspace opacity at the base of the right upper lobe. Pulmonary   hemorrhage or aspiration not excluded. Improved aeration of the left lung and possibly decreased left pleural   effusion. XR CHEST PORTABLE   Final Result      XR CHEST PORTABLE   Final Result   1. Lines and tubes in expected position as above. 2. No acute pulmonary abnormality. VL DUP UPPER EXTREMITY ARTERIES BILATERAL   Final Result      VL DUP CAROTID BILATERAL   Final Result      VL VEIN MAPPING LOWER BILATERAL   Final Result      US HEAD NECK SOFT TISSUE THYROID   Final Result   Slightly heterogeneous thyroid gland with a 2.9 cm right thyroid lobe cyst   corresponding to the prior imaging studies. No FNA or follow-up imaging is   recommended based on TI rads criteria.       RECOMMENDATIONS:   ACR TI-RADS recommendations:      TR5 (>= 7 points):  FNA if >= 1 cm; follow-up if 0.5-0.9 cm in 1, 2, 3, 4,   and nodule    Plan:    I personally interviewed/examined the patient; reviewed interval history, interpreted all available radiographic and laboratory data at the time of service. Complete course of cefepime  Continue diuresis  Continue bronchodilator therapy  I-S and deep breathing      The patient remains critically ill with illness/injury that acutely impairs one or more vital organ systems, such that there is a high probability of imminent or life threatening deterioration in the patient's condition. Critical care time of 30 minutes was spent (excluding procedures), in coordination of care during bedside rounds and discussion of patient care in detail, and recommendations of the team were adopted in the plan. Necessity of all invasive devices was also confirmed. Kristyn Gonzales MD  Pulmonary and Critical Care Medicine           4/14/2022, 9:11 AM    Please note that this chart was generated using voice recognition Dragon dictation software. Although every effort was made to ensure the accuracy of this automated transcription, some errors in transcription may have occurred.

## 2022-04-14 NOTE — PROGRESS NOTES
[facility]  Internal Medicine Teaching Residency Program  Inpatient Daily Progress Note  ______________________________________________________________________________    Patient: Brittnee Melendez  YOB: 1965   TDU:[id number]    Acct: [de-identified]     Room: [address] [place] [address]  Admit date: 4/3/2022  Today's date: 04/14/22  Number of days in the hospital: 11    SUBJECTIVE   Admitting Diagnosis: NSTEMI (non-ST elevated myocardial infarction) ([facility] [address]  CC: Typical chest pain  POD #5, S/P CABG  Patient examined at bedside, extubated 4/12 successfully   Weaned from BiPAP to nasal cannula currently currently on 3 L. Chest tubes taken out 4/13  Afebrile, blood pressure stable, tachycardic at 97  Unable to get blood pressure medications orally, were switched to IV 4/13. Will be switched to oral once patient able to tolerate PO per cardio. Swallow study shows no aspiration. Currently on cefepime for pneumonia, pulmonary on board   Labs show leukocytosis wbc 17.5  ROS: Patient drowsy, unable to assess    BRIEF HISTORY     The patient is a pleasant 61 y.o. female with a past medical history significant for essential hypertension, coronary artery disease status post PCI on DAPT, hyperlipidemia, chronic active smoking, history of stroke, MDD, memory problem presents with a chief complaint of typical chest pain. Chest pain started 3 to 4 days ago, dull aching in nature, 5-6 out of 10 in intensity, radiating to the left shoulder, increased with exertion and relieved with rest.  Also admits to exertional shortness of breath without orthopnea or PND. Patient denies palpitation syncope nausea vomiting diarrhea headache.     Evaluation in the ER, she was found to have elevated blood pressure 175/110. Heart rate 78 regular. SPO2 99% on room air. Heart auscultation showed normal first and second heart sound without murmur gallop. Chest is clear to auscultation.   Abdomen soft and nontender. Labs reviewed normal BMP. No leukocytosis hemoglobin 14. EKG showed normal sinus rhythm with some ST-T wave changes in inferior lead. Troponin is elevated 80. proBNP 217. Chest x-ray is negative for acute abnormality. Cardiology has been consulted in the ER for the concern of NSTEMI. Patient is started on heparin drip as per ACS protocol and will probably have cardiac cath tomorrow.     Of note, she has a past medical history of coronary artery disease status post PCI in ? Followed by another PCI in ? Questionable medication compliance. She has been actively smoking and is currently half pack per day. Denies alcohol intake. Admits to marijuana use.     Patient underwent cardiac cath on 2022. Findings:  LMCA: Distal 30-40% stenosis   LAD: Mid stent stenosis 80%   LCx: Mid area 90% after the OM take off   OM1: in stent stenosis 90%   RCA: Proximal 75% stenosis   Mid area ulcerated plaques with 60% stenosis   PL branch ostial / proximal 90% stenosis     The LV gram was performed in the LAWTON 30 position. LVEF: 50 %. Conclusions:   Multivessel CAD with left main disease    PLower normal LV systolic function   Recommendations:  CT surgery consult and recommend CABG. 4/10/-s/p CABG, chest tubes in place, on BiPAP  2022. All chest tube in place. Reintubated for worsening oxygen requirement. Currently on FiO2 40% with a PEEP of 8.  2022.   On mechanical ventilator with FiO2 40% and PEEP of 8.  4/-extubated yesterday, currently saturating well on BiPAP, plan to wean to nasal cannula and get swallow study thereafter, chest tubes taken out    OBJECTIVE     Vital Signs:  BP (!) 148/85   Pulse 98   Temp 97.7 °F (36.5 °C) (Oral)   Resp 23   Ht 5' 1\" (1.549 m)   Wt 160 lb 11.5 oz (72.9 kg)   SpO2 97%   BMI 30.37 kg/m²     Temp (24hrs), Av.4 °F (36.9 °C), Min:97.7 °F (36.5 °C), Max:98.7 °F (37.1 °C)    In: 600   Out: 2860 [Urine:2825]    Physical Exam:  Constitutional: Drowsy, on BiPAP   EENT:  PERRLA. No conjunctival injections. Neck: Supple without thyromegaly. No elevated JVP. Trachea was midline. Respiratory: Decreased breath sounds bilaterally. Cardiovascular: Chest tubes in place regular without murmur, clicks, gallops or rubs. Abdomen: Slightly rounded and soft without organomegaly. No rebound, rigidity or guarding was appreciated. Lymphatic: No lymphadenopathy. Musculoskeletal: Normal curvature of the spine. No gross muscle weakness. Extremities: Compression stockings on bilateral lower extremity no lower extremity edema, ulcerations, tenderness, varicosities or erythema. Muscle size, tone and strength are normal.  No involuntary movements are noted. Skin:  Warm and dry. Good color, turgor and pigmentation. No lesions or scars.   No cyanosis or clubbing  Neurological/Psychiatric: Drowsy, answering questions slowly    Medications:  Scheduled Medications:    metoprolol  2.5 mg IntraVENous Q6H    cefepime  2,000 mg IntraVENous Q8H    furosemide  20 mg IntraVENous BID    docusate  100 mg Oral BID    sodium chloride flush  5-40 mL IntraVENous 2 times per day    sodium chloride flush  5-40 mL IntraVENous 2 times per day    sodium chloride flush  10 mL IntraVENous 2 times per day    aspirin  81 mg Oral Daily    clopidogrel  75 mg Oral Daily    amiodarone  200 mg Oral TID    [Held by provider] metoprolol tartrate  25 mg Oral BID    atorvastatin  20 mg Oral Nightly    pantoprazole  40 mg Oral Daily    pantoprazole (PROTONIX) 40 mg injection  40 mg IntraVENous Daily    ipratropium-albuterol  1 ampule Inhalation Q4H WA    senna  1 tablet Oral Once    [Held by provider] hydroCHLOROthiazide  25 mg Oral Daily    lovastatin  10 mg Oral Nightly    sodium chloride flush  5-40 mL IntraVENous 2 times per day    [Held by provider] amLODIPine  10 mg Oral Daily    nicotine  1 patch TransDERmal Daily     Continuous Infusions:    sodium chloride Stopped (04/11/22 1935)    sodium chloride Stopped (04/11/22 0447)    dextrose 25 mL/hr (04/12/22 1831)    EPINEPHrine      sodium chloride 20 mL/hr at 04/04/22 0408     PRN Medicationslabetalol, 10 mg, Q4H PRN  bisacodyl, 10 mg, Daily PRN  ketorolac, 15 mg, Q6H PRN  LORazepam, 0.5 mg, Q6H PRN  morphine, 2 mg, Q4H PRN  albuterol, 2.5 mg, Q6H PRN  acetylcysteine, 600 mg, Q4H PRN  sodium chloride flush, 5-40 mL, PRN  sodium chloride, 25 mL, PRN  sodium chloride flush, 10 mL, PRN  ondansetron, 4 mg, Q8H PRN   Or  ondansetron, 4 mg, Q6H PRN  oxyCODONE-acetaminophen, 1 tablet, Q4H PRN   Or  oxyCODONE-acetaminophen, 2 tablet, Q4H PRN  fentanNYL, 25 mcg, Q1H PRN   Or  fentanNYL, 50 mcg, Q1H PRN  hydrALAZINE, 5 mg, Q5 Min PRN  diphenhydrAMINE, 25 mg, Nightly PRN  potassium chloride, 20 mEq, PRN  magnesium sulfate, 2,000 mg, PRN  albumin human, 25 g, PRN  glucose, 15 g, PRN  dextrose, 12.5 g, PRN  glucagon (rDNA), 1 mg, PRN  dextrose, 100 mL/hr, PRN  EPINEPHrine, 0.1-0.3 mcg/kg/min, Continuous PRN  magnesium hydroxide, 30 mL, Daily PRN  aspirin-acetaminophen-caffeine, 1 tablet, Q8H PRN  magnesium sulfate, 1,000 mg, PRN  potassium chloride, 10 mEq, PRN  potassium chloride, 40 mEq, PRN   Or  potassium alternative oral replacement, 40 mEq, PRN   Or  potassium chloride, 10 mEq, PRN  sodium chloride, , PRN  polyethylene glycol, 17 g, Daily PRN  acetaminophen, 650 mg, Q6H PRN   Or  acetaminophen, 650 mg, Q6H PRN  calcium carbonate, 500 mg, TID PRN        Diagnostic Labs:  CBC:   Recent Labs     04/12/22  0306 04/12/22  1019 04/13/22  0316 04/13/22  1832 04/14/22 0308   WBC 16.1*  --  18.3*  --  17.5*   RBC 2.90*  --  3.14*  --  3.24*   HGB 8.6*   < > 9.4* 9.7* 9.5*   HCT 25.8*   < > 28.3* 29.7* 29.8*   MCV 89.0  --  90.1  --  92.0   RDW 15.4*  --  14.9*  --  14.4     --  196  --  238    < > = values in this interval not displayed.      BMP:   Recent Labs     04/12/22  0306 04/12/22 2051 04/13/22 0316 04/14/22  0308     --  138 135   K 4.3  --  3.9 3.7     --  103 100   CO2 24  --  25 24   BUN 18  --  14 24*   CREATININE 0.76 0.76 0.61 0.86     BNP: No results for input(s): BNP in the last 72 hours. PT/INR:   Recent Labs     04/12/22  0306 04/13/22  0316 04/14/22  0308   PROTIME 10.6 10.8 11.3   INR 1.0 1.0 1.1     APTT:   No results for input(s): APTT in the last 72 hours. CARDIAC ENZYMES: No results for input(s): CKMB, CKMBINDEX, TROPONINI in the last 72 hours. Invalid input(s): CKTOTAL;3  FASTING LIPID PANEL:  Lab Results   Component Value Date    CHOL 243 (H) 03/24/2021    HDL 36 (L) 12/01/2021    TRIG 178 (H) 03/24/2021     LIVER PROFILE:   No results for input(s): AST, ALT, ALB, BILIDIR, BILITOT, ALKPHOS in the last 72 hours. MICROBIOLOGY:   Lab Results   Component Value Date/Time    CULTURE PSEUDOMONAS AERUGINOSA <28323 CFU/ML (A) 04/10/2022 12:43 PM    CULTURE NO NORMAL JUSTINA 04/10/2022 12:43 PM       Imaging:    XR CHEST PORTABLE    Result Date: 4/3/2022  No acute process. ASSESSMENT & PLAN     IMPRESSION  This is a 61 y.o. female with a past medical history of hypertension hyperlipidemia coronary artery disease status post PCI, history of stroke and memory problem who presented with typical chest pain and exertional shortness of breath and found to have ST-T wave changes in the inferior leads and elevated troponin 80. Patient admitted to inpatient status for the management of NSTEMI. She underwent cardiac cath and found to have multivessel coronary artery disease. CT surgery taken on board and s/p  CABG.      Principal Problem:    NSTEMI (non-ST elevated myocardial infarction) ([address]  Active Problems:    CAD (coronary artery disease) with multiple stents    Asthma    Smoker    Anxiety    Essential hypertension    Memory problem    Mixed hyperlipidemia    Thyroid nodule    Multiple vessel coronary artery disease s/p CABG   Resolved Problems:    * No resolved hospital problems. *    NSTEMI with multivessel coronary artery disease s/p CABG-  -chest tubes taken out by cardiothoracic surgery yesterday.  -On ASA Plavix ,statin, amiodarone    Acute hypoxic failure, concern for aspiration pneumonia and bilateral mucous plugging  History of COPD  -Extubated yesterday, currently on BiPAP-->nasal cannula  - s/p  bronchoscopy on 4/10-mucous plugging  -Bronchoalveolar lavage respiratory culture grew Pseudomonas. Started on cefepime and IV vancomycin, currently on cefepime\  -Also on Lasix 20 twice daily  -Pulmonary/critical care following. Postoperative blood loss anemia s/p multiple transfusion  Received 5 units of transfusion since surgery. Hemoglobin currently stable    Essential hypertension:  Currently on IV medications until patient passes swallow study and can eat. Smoker  Continue  on nicotine patch.     Mixed hyperlipidemia  On atorvastatin 20 mg.      Thyroid nodule  CT chest showed 3 cm incidental right thyroid nodule. Subsequent thyroid ultrasound showed 2.9 cm slight heterogeneous cystic. No follow-up study recommended.   TSH 1.33.     DVT ppx: EPC cuff only  GI ppx: Protonix    Discharge planning-Per primary team    Kb Land MD  PGY-1, Internal Medicine Resident  [id number] [facility]         4/14/2022, 8:27 AM

## 2022-04-14 NOTE — PROGRESS NOTES
[facility]   Progress Note                    Date:   4/14/2022  Patient name:  Kiran Hensley  Date of admission:  4/3/2022  6:04 PM  MRN:   [id number]  YOB: 1965  PCP:    Sharath Mosqueda MD    Reason for Admission:  NSTEMI (non-ST elevated myocardial infarction) ([facility] [address] [I21.4]    Subjective:      Clinical Changes / Abnormalities:    Patient seen and examined at bedside. No acute events overnight. No chest pain or shortness of breath. On NC this morning.      Urine output in the last 24 hours:     Intake/Output Summary (Last 24 hours) at 4/14/2022 0805  Last data filed at 4/14/2022 0400  Gross per 24 hour   Intake --   Output 1725 ml   Net -1725 ml     I/O since admission: -3.9 liters    Medications:   Scheduled Meds:   metoprolol  2.5 mg IntraVENous Q6H    cefepime  2,000 mg IntraVENous Q8H    furosemide  20 mg IntraVENous BID    docusate  100 mg Oral BID    sodium chloride flush  5-40 mL IntraVENous 2 times per day    sodium chloride flush  5-40 mL IntraVENous 2 times per day    sodium chloride flush  10 mL IntraVENous 2 times per day    aspirin  81 mg Oral Daily    clopidogrel  75 mg Oral Daily    amiodarone  200 mg Oral TID    [Held by provider] metoprolol tartrate  25 mg Oral BID    atorvastatin  20 mg Oral Nightly    pantoprazole  40 mg Oral Daily    pantoprazole (PROTONIX) 40 mg injection  40 mg IntraVENous Daily    ipratropium-albuterol  1 ampule Inhalation Q4H WA    senna  1 tablet Oral Once    [Held by provider] hydroCHLOROthiazide  25 mg Oral Daily    lovastatin  10 mg Oral Nightly    sodium chloride flush  5-40 mL IntraVENous 2 times per day    [Held by provider] amLODIPine  10 mg Oral Daily    nicotine  1 patch TransDERmal Daily     Continuous Infusions:   sodium chloride Stopped (04/11/22 1935)    sodium chloride Stopped (04/11/22 0447)    dextrose 25 mL/hr (04/12/22 1831)    EPINEPHrine      sodium chloride 20 mL/hr at 04/04/22 5728 CBC:   Recent Labs     04/12/22  0306 04/12/22  1019 04/13/22  0316 04/13/22  1832 04/14/22 0308   WBC 16.1*  --  18.3*  --  17.5*   HGB 8.6*   < > 9.4* 9.7* 9.5*     --  196  --  238    < > = values in this interval not displayed. BMP:    Recent Labs     04/12/22  0306 04/12/22 2051 04/13/22 0316 04/14/22 0308     --  138 135   K 4.3  --  3.9 3.7     --  103 100   CO2 24  --  25 24   BUN 18  --  14 24*   CREATININE 0.76 0.76 0.61 0.86   GLUCOSE 104*  --  105* 99     Hepatic: No results for input(s): AST, ALT, ALB, BILITOT, ALKPHOS in the last 72 hours. Troponin: No results for input(s): TROPONINI in the last 72 hours. No results for input(s): TROPONINT in the last 72 hours. BNP: No results for input(s): PROBNP in the last 72 hours. No results for input(s): BNP in the last 72 hours. Lipids: No results for input(s): CHOL, HDL in the last 72 hours. Invalid input(s): LDLCALCU  INR:   Recent Labs     04/12/22  0306 04/13/22 0316 04/14/22 0308   INR 1.0 1.0 1.1       Objective:   Vitals: BP (!) 148/85   Pulse 98   Temp 97.7 °F (36.5 °C) (Oral)   Resp 23   Ht 5' 1\" (1.549 m)   Wt 160 lb 11.5 oz (72.9 kg)   SpO2 97%   BMI 30.37 kg/m²    Constitutional and General Appearance:   · On NC  Respiratory:  · No for increased work of breathing. · On auscultation: dimnished breath sounds bilaterally  Cardiovascular:  · The apical impulse is not displaced  · Heart tones are crisp and normal. Regular S1 and S2. No murmurs. Abdomen:   · No masses or tenderness  · Bowel sounds present  Extremities:  ·  No Cyanosis or Clubbing  ·  Lower extremity edema: No  ·  Skin: Warm and dry  Neurological:  · Not performed     Diagnostic Studies:         ECHO:   Summary  Normal LV size , mildly increased wall thickness. No obvious wall motion abnormality seen. Normal LV systolic function with LVEF >55%. Normal RV size and function. LA and RA appears normal in size.   No obvious significant structural valvular abnormality noted. No significant valvular stenosis or regurgitation noted. Normal aortic root dimension. No significant pericardial effusion noted. No obvious intra-cardiac mass or shunt noted. IVC normal diameter and inspiratory variation indicating normal RA filling  pressure.     Cardiac Angiography:  LMCA: Distal 30-40% stenosis     LAD: Mid stent stenosis 80%     LCx: Mid area 90% after the OM take off  OM1: in stent stenosis 90%     RCA: Proximal 75% stenosis  Mid area ulcerated plaques with 60% stenosis  PL branch ostial / proximal 90% stenosis      Coronary Tree      Dominance: Right     LV Analysis  LV function assessed as:Normal.  Ejection Fraction        Assessment / Acute Cardiac Problems:   1.  NSTEMI s/p cardiac cath: MV-CAD s/p CABGx3 on 4/8/22  2.  HTN  3.  HL  4.  active smoker  5.  Post op anemia s/p 3  Units pRBCs, 1 unit cryoprecipitate, 1 unit plt and plasma  6.  Aspiration PNA     Plan of Treatment:   1. Continue ASA and plavix as PO meds tolerated  2. Continue statin as PO meds tolerated  3. Patient unable to take PO meds post extubation will continue lopressor IV 2.5 mg q6hr. Once able to tolerate PO will start lopressor 25 mg BID with holding parameters and plan to up titrate as tolerated. 4. Continue amiodarone 200 mg TID per CV surgery recommendations. 5. Antibiotics per Pulm/Critical care. Currently on cefepime & vancomycin  6. Post op management per CV surgery. 7. K>4, Mg>2    Felix Hernandez MD  Fellow, [id number] [facility]      I performed a history and physical examination of the patient and discussed management with the resident. I reviewed the residents note and agree with the documented findings and plan of care. Any areas of disagreement are noted on the chart. I was personally present for the key portions of any procedures.  I have documented in the chart those procedures where I was not present during the lopez portions. I have personally evaluated this patient and have completed at least one if not all key elements of the E/M (history, physical exam, and MDM). Additional findings are as noted.     Robert Koch MD

## 2022-04-14 NOTE — PLAN OF CARE
Problem: Falls - Risk of:  Goal: Will remain free from falls  Description: Will remain free from falls  Outcome: Ongoing  Goal: Absence of physical injury  Description: Absence of physical injury  Outcome: Ongoing     Problem: Pain:  Goal: Pain level will decrease  Description: Pain level will decrease  Outcome: Ongoing  Goal: Control of acute pain  Description: Control of acute pain  Outcome: Ongoing  Goal: Control of chronic pain  Description: Control of chronic pain  Outcome: Ongoing     Problem: Cardiac Output - Decreased:  Goal: Hemodynamic stability will improve  Description: Hemodynamic stability will improve  Outcome: Ongoing  Goal: Cardiac output within specified parameters  Description: Cardiac output within specified parameters  Outcome: Ongoing     Problem: Tissue Perfusion - Cardiopulmonary, Altered:  Goal: Hemodynamic stability will improve  Description: Hemodynamic stability will improve  Outcome: Ongoing  Goal: Absence of angina  Description: Absence of angina  Outcome: Ongoing  Goal: Circulatory function within specified parameters  Description: Circulatory function within specified parameters  Outcome: Ongoing  Goal: Hemodynamic stability will improve  Description: Hemodynamic stability will improve  Outcome: Ongoing  Goal: Will show no evidence of cardiac arrhythmias  Description: Will show no evidence of cardiac arrhythmias  Outcome: Ongoing     Problem: Pain:  Goal: Pain level will decrease  Description: Pain level will decrease  Outcome: Ongoing  Goal: Control of acute pain  Description: Control of acute pain  Outcome: Ongoing  Goal: Control of chronic pain  Description: Control of chronic pain  Outcome: Ongoing

## 2022-04-14 NOTE — CARE COORDINATION
Met with pt to discuss transition plans, pt states that she is not interested in SNF/ARU, is agreeable to [facility]. [facility] list and [facility] printed from pt's insurance provided to pt, will need choices.

## 2022-04-14 NOTE — ADT AUTH CERT
Coronary Artery Bypass Graft (CABG) - Care Day 7 (4/14/2022) by Nissa Cross RN       Review Entered Review Status   4/14/2022 14:45 Completed      Criteria Review      Care Day: 7 Care Date: 4/14/2022 Level of Care:    Guideline Day 2    Level Of Care    (X) ICU    4/14/2022 2:45 PM EDT by Liberty Hawthorne      icu    Clinical Status    ( ) * Procedure completed    (X) * Hemodynamic stability    4/14/2022 2:45 PM EDT by Liberty Hawthorne      97.7 (36.5) 23 98 148/85   3L nc  sp02  98%  prain  2-8    ( ) * No evidence of myocardial ischemia    (X) * Mechanical ventilation absent    Activity    (X) * Up to chair    4/14/2022 2:45 PM EDT by Liberty Hawthorne      up to chair    Routes    (X) * Clear liquid diet, advance as tolerated    4/14/2022 2:45 PM EDT by Liberty Hawthorne      dysphagia diet  monced and moist  cardiac    (X) Oral or parenteral medications    4/14/2022 2:45 PM EDT by Liberty Hawthorne      maxipime 2 g iv q8h   lasix 20 mg iv 2xd  fentayl 50 mcg iv q1hprn x1   toradol 15 mg iv q6hprn x1    Interventions    (X) Oxygen    4/14/2022 2:45 PM EDT by Liberty Hawthorne      3l nc    Medications    (X) Beta-blocker    4/14/2022 2:45 PM EDT by Liberty Hawthorne      lopressor 2.5 mg iv q6h    (X) Statin    4/14/2022 2:45 PM EDT by Liberty Hawthorne      lipitor 20 mg dialy  mevacor 10 mg dialy    * Milestone   Additional Notes   DATE: 4/14/22          Vitals:   97.7 (36.5) 23 98 148/85   3L nc  sp02 98%   pain   2-8            Abnl/Pertinent Labs/Radiology/Diagnostic Studies:   Wbc  17.5    H/h  9.5 / 29.8       XR CHEST PORTABLE   Bibasilar and bilateral parahilar airspace disease, similar to the prior   study with stable small left-sided pleural effusion.  Findings likely   represent multifocal pneumonia.  Follow-up is recommended to document   resolution. 2. Stable cardiomegaly.  Prior median sternotomy and CABG. 3. Right IJ approach central venous catheter distal tip overlying the   cavoatrial junction, stable. MD Consults/Assessments & Plans:   Medicine    POD #5, S/P CABG   Patient examined at bedside, extubated 4/12 successfully    Weaned from BiPAP to nasal cannula currently currently on 3 L. Chest tubes taken out 4/13   Afebrile, blood pressure stable, tachycardic at 97   Unable to get blood pressure medications orally, were switched to IV 4/13. Will be switched to oral once patient able to tolerate PO per cardio. Swallow study shows no aspiration. Currently on cefepime for pneumonia, pulmonary on board    Labs show leukocytosis wbc 17.5      Constitutional: Drowsy, on BiPAP    EENT:  PERRLA.  No conjunctival injections.      Neck: Supple without thyromegaly. No elevated JVP. Trachea was midline. Respiratory: Decreased breath sounds bilaterally.     Cardiovascular: Chest tubes in place regular without murmur, clicks, gallops or rubs. Abdomen: Slightly rounded and soft without organomegaly. No rebound, rigidity or guarding was appreciated.     Lymphatic: No lymphadenopathy. Musculoskeletal: Normal curvature of the spine.  No gross muscle weakness.     Extremities: Compression stockings on bilateral lower extremity no lower extremity edema, ulcerations, tenderness, varicosities or erythema.  Muscle size, tone and strength are normal.  No involuntary movements are noted.     Skin:  Warm and dry.  Good color, turgor and pigmentation.  No lesions or scars.  No cyanosis or clubbing   Neurological/Psychiatric: Drowsy, answering questions slowly      NSTEMI with multivessel coronary artery disease s/p CABG-   -chest tubes taken out by cardiothoracic surgery yesterday.   -On ASA Plavix ,statin, amiodarone       Acute hypoxic failure, concern for aspiration pneumonia and bilateral mucous plugging   History of COPD   -Extubated yesterday, currently on BiPAP-->nasal cannula   - s/p  bronchoscopy on 4/10-mucous plugging   -Bronchoalveolar lavage respiratory culture grew Pseudomonas.  Started on cefepime and IV vancomycin, currently on cefepime\   -Also on Lasix 20 twice daily   -Pulmonary/critical care following.       Postoperative blood loss anemia s/p multiple transfusion   Received 5 units of transfusion since surgery. Hemoglobin currently stable       Essential hypertension:   Currently on IV medications until patient passes swallow study and can eat.       Smoker   Continue  on nicotine patch. Cardiology    Patient seen and examined at bedside. No acute events overnight. No chest pain or shortness of breath. On NC this morning. Constitutional and General Appearance:    · On NC   Respiratory:   · No for increased work of breathing. · On auscultation: dimnished breath sounds bilaterally   Cardiovascular:   · The apical impulse is not displaced   · Heart tones are crisp and normal. Regular S1 and S2. No murmurs. Abdomen:    · No masses or tenderness   · Bowel sounds present   Extremities:   ·  No Cyanosis or Clubbing   ·  Lower extremity edema: No          Assessment / Acute Cardiac Problems:   1.  NSTEMI s/p cardiac cath: MV-CAD s/p CABGx3 on 4/8/22   2.  HTN   3.  HL   4.  active smoker   5.  Post op anemia s/p 3  Units pRBCs, 1 unit cryoprecipitate, 1 unit plt and plasma   6.  Aspiration PNA       Plan of Treatment:   1. Continue ASA and plavix as PO meds tolerated   2. Continue statin as PO meds tolerated   3. Patient unable to take PO meds post extubation will continue lopressor IV 2.5 mg q6hr. Once able to tolerate PO will start lopressor 25 mg BID with holding parameters and plan to up titrate as tolerated. 4. Continue amiodarone 200 mg TID per CV surgery recommendations. 5. Antibiotics per Pulm/Critical care. Currently on cefepime & vancomycin   6. Post op management per CV surgery. 7. K>4, Mg>2         Cardiothoracic Surgical    Resting in chair. No SOB or distress. Much better today that past few days. Pt feels good.  Pt is able to eat normal Diet today       Chest: pacing wires: yes, chest tubes:yes, air leak no, 3 +   CV: no murmur noted, Normal S1, S2,    Lungs: clear to auscultation, no wheezes, rales, or rhonchi   Abd: normal bowel sounds    Lower Extremities: Trace edema   Saph Incison: no sign of drainage or infection   Sternal Incison: dressing applied-no excessive drainage or signs of infection noted       Assessment/Plan:     on abx for aspiration, pulmonary managing  (Culture from 4/10: pseudomonas) -continue antibiotics as scheduled   Passed swallow study   improvement in CXR today on left thorax.  Continue coughing and deep breathing exercises at this time   Continue to work on BM      Medications:   Cordarone 200mg  3xd,  duoneb neb  q4h, percocet 5-325 2 tab q4hprn x2   Colace 100 mg  2xd,  nicoderm 14 mg patch daily,  tylenol 650 mg  q6hprn x1,    Mom 30 ml prn x1, glycolax 17g daily prn x1  plavix 75 mg dialy,  protonix 40 mg  daily, effer K  40 meq x1       Orders:   Hob 30 m mech  vent,  cont pulse ox  telemtry,  vs,  I and o,  acapella  q2h,     Chest tube care and mgmt,   epc          PT/OT/SLP/CM Assessments or Notes:   Dc plan   home with home care             Coronary Artery Bypass Graft (CABG) - Care Day 4 (4/11/2022) by Dallas Lee RN       Review Entered Review Status   4/14/2022 14:29 Completed      Criteria Review      Care Day: 4 Care Date: 4/11/2022 Level of Care:    Guideline Day 2    Level Of Care    (X) ICU    4/14/2022 2:29 PM EDT by Lisa Bauman      icu    Clinical Status    (X) * Procedure completed    4/14/2022 2:29 PM EDT by Lisa Bauman      cabg x3    ( ) * Hemodynamic stability    4/14/2022 2:29 PM EDT by Lisa Bauman      101.3 (38.5) 28 103 135/86 138/62 Semi fowlers 100 -- 100 Ventilator     154 lb 12.2 oz (70.2 kg)    ( ) * No evidence of myocardial ischemia    ( ) * Mechanical ventilation absent    Activity    (X) * Up to chair    4/14/2022 2:29 PM EDT by Lisa Bauman      as terence    Routes    (X) * Clear liquid diet, advance as tolerated    4/14/2022 2:29 PM EDT by Otilio Grade      npo    (X) IV fluids    4/14/2022 2:29 PM EDT by Otilio Grade      ivf 50 mlhr    (X) Oral or parenteral medications    4/14/2022 2:29 PM EDT by Otilio Grade      maxipime 2 gm iv q24h   lasix 20 mg iv 2xd  levaquin 750 mg iv q24h  protonix 40 mg iv daily   vancomycin  1 gm iv q12h  propofol gtt   fentnayl 25 mcg iv q1hprn x2   kcl 20 meq iv prn x1    Interventions    (X) Oxygen    4/14/2022 2:29 PM EDT by Otilio Grade      mch vent  fi02 100    (X) Glucose control    Medications    (X) Aspirin    4/14/2022 2:29 PM EDT by Otilio Grade      asa 81 mg dialy  plavix 75 mg dialy    (X) Beta-blocker    4/14/2022 2:29 PM EDT by Otilio Grade      lopressor 25 mg  2xd    (X) Statin    4/14/2022 2:29 PM EDT by Otilio Grade      lipitor 20 mg dialy  mevacor 10 mg dialy    * Milestone   Additional Notes   DATE: 4/11/22             Vitals:   101.3 (38.5) 28 103 135/86 138/62 Semi fowlers Fi02 100 - 100% Ventilator            Abnl/Pertinent Labs/Radiology/Diagnostic Studies:      Na 145   Cl 112   Gluocse 139   Ca 8.1       POC Lactic Acid: 1.83 (H)   POC HCO3: 20.9 (L)   POC Hematocrit: 22 (L)   POC O2 SAT: 97   POC pCO2: 35.7   POC pH: 7.376   POC PO2: 91.8   POC Hemoglobin: 7.6 (L)      Wbc 14.2    H/h  9.0/27.5       H/h  7.6 / 22      H/h 7.2 / 21.8       H/h  9.0 / 27.9      XR CHEST PORTABLE   Persistent bilateral airspace disease, with increased density in the right   upper lobe as compared to prior.  Persistent small left pleural effusion. Physical Exam:         MD Consults/Assessments & Plans:   Cardiology   No acute events overnight. Remains intubated and sedated. O2 being weaned. Currently on antibiotics due to fever and concern for aspiration PNA      I/O since admission: +1.8 liters      Constitutional and General Appearance:    · Intubated and sedated   Respiratory:   · No for increased work of breathing.     · On auscultation: dimnished breath sounds bilaterally   Cardiovascular:   · The apical impulse is not displaced   · Heart tones are crisp and normal. Regular S1 and S2. No murmurs. Abdomen:    · No masses or tenderness   · Bowel sounds present   Extremities:   · No Cyanosis or Clubbing   · Lower extremity edema: No   · Skin: Warm and dry   Neurological:   · Intubated and sedated   Assessment / Acute Cardiac Problems:   1.  NSTEMI s/p cardiac cath: MV-CAD s/p CABGx3 on 4/8/22   2.  HTN   3.  HL   4.  active smoker   5.  Post op anemia s/p 3  Units pRBCs, 1 unit cryoprecipitate, 1 unit plt and plasma       Plan of Treatment:   1. Continue ASA and plavix   2. Continue statin   3. Continue lopressor 25 mg BID   4. Hold Norvasc & HCTZ. Will restart as BP recovers   5. Currently intubated. Wean as tolerated   6. Antibiotics per Pulm/Critical care. 7. Post op management per CV surgery. 8. K>4, Mg>2         Cardiothoracic Surgical    POD# 3     S/P :  Coronary artery bypass   EF:  55 %      POD #3, S/P CABG   Neuro intact   HD tachy and hypertensive. Increase beta per cardiology   Pulmonary intubated s/p possible aspiration yday on Levaquin. Vent management per pulmonary   GI NPO    40-45cc/hr. Creatine 0.82. Lasix 20mg IV bid   D/C chest tubes later today   ABLA 7.2. Will discuss transfusion with Dr. Gale Bourgeois before ordering         Medicine    She was extubated by CT surgery on 4/9/2022. Rosi Mendoza oxygen requirement went up and she was reintubated yesterday.  Currently on FiO2 60% 28/430/10.  Sedated with propofol. This a.m. was 130/80, tachycardic heart rate 125. Labs reviewed and evaluated. Chest x-ray showed: Persistent bilateral airspace disease, with increased density in the right upper lobe as compared to prior.  Persistent small left pleural effusion      Constitutional:, Precedex drip mechanical ventilator with FiO2 60% and PEEP of 10. EENT:  PERRLA.  No conjunctival injections.      Neck: Supple without thyromegaly.  No elevated JVP. Trachea was midline. Respiratory: Decreased breath sounds in the left-sided chest,   Cardiovascular: Chest tubes in place regular without murmur, clicks, gallops or rubs. Abdomen: Slightly rounded and soft without organomegaly. No rebound, rigidity or guarding was appreciated.     Lymphatic: No lymphadenopathy. Musculoskeletal: Normal curvature of the spine.  No gross muscle weakness.     Extremities: Compression stockings on bilateral lower extremity no lower extremity edema, ulcerations, tenderness, varicosities or erythema.  Muscle size, tone and strength are normal.  No involuntary movements are noted.     Skin:  Warm and dry.  Good color, turgor and pigmentation. No lesions or scars.  No cyanosis or clubbing   Neurological/Psychiatric: Cannot assess. NSTEMI with multivessel coronary artery disease s/p CABG-continue chest tube drainage,   On ASA Plavix and statin. CT surgery, cardiology, pulmonary/critical care on board. She was extubated to BiPAP on 4/9/2022 but was reintubated yesterday for worsening oxygen requirement. Currently intubated with FiO2 60% and PEEP of 10.       Active Problems:   Concern of aspiration pneumonia status post bronchoscopy on mechanical ventilator. Patient underwent bronchoscopy on 4/10   Right lower lobe bronchi was collapsed with mucous plugging. Left lower lobe bronchus was collapse with mucous plugging. Bronchoalveolar lavage for Gram stain and culture. Started on IV levofloxacin.  CT surgery also recommended to start on IV vancomycin.       Anemia s/p transfusion:   Received 5 units of transfusion since surgery. Hemoglobin this a.m. is 7.2.  Ordered 1 PRBC. No obvious source of bleeding noted. We will do H&H every 8 hours.       Essential hypertension:   Clonidine and hydrochlorothiazide held as per cardiology.  Currently maintained on lopressor.        Smoker   Plan:    Counseled on smoking cessation. Continue  on nicotine patch.     Mixed hyperlipidemia   Plan:    On atorvastatin 20 mg.        Thyroid nodule   Plan:    CT chest showed 3 cm incidental right thyroid nodule.  Subsequent thyroid ultrasound showed 2.9 cm slight heterogeneous cystic.  No follow-up study recommended. TSH 1.33.       DVT ppx: EPC cuff only   GI ppx: Protonix OD         PULMONARY   Presently patient is on mechanical ventilation, PaO2 improved this morning 283, FiO2 decreased to 50% and PEEP is down to 10. BAL cultures are growing Pseudomonas. Vancomycin was added in addition to Levaquin. X-ray chest reviewed, bilateral pulmonary infiltrates.         General: She is not in acute distress.      Appearance: She is ill-appearing.       Interventions: She is sedated and intubated. HENT:       Head: Normocephalic and atraumatic. Eyes:       Conjunctiva/sclera: Conjunctivae normal.       Pupils: Pupils are equal, round, and reactive to light. Neck:       Thyroid: No thyromegaly.       Vascular: No JVD. Cardiovascular:       Rate and Rhythm: Normal rate and regular rhythm.       Pulses: Normal pulses.       Heart sounds: Normal heart sounds, S1 normal and S2 normal. No murmur heard.          Pulmonary:       Effort: She is intubated.       Breath sounds: Decreased breath sounds present. · Will change antibiotic from Levaquin to cefepime to cover Pseudomonas, follow culture and sensitivity results. · At present continue vancomycin and BAL cultures do not grow MRSA then discontinue vancomycin. · Continue to wean PEEP and FiO2, start spontaneous breathing trials in a.m. · Continue ICU sedation protocol. · Daily sedation holiday.    · Continue diuresis   · Discussed with nursing staff-Colace added         Medications:   Cordarone 200mg  3xd,  duoneb neb  q4h, percocet 5-325 2 tab q4hprn x5   Colace 100 mg  2xd,  nicoderm 14 mg patch daily,  tylenol 650 mg  q6hprn x1,    Mom 30 ml prn x1, glycolax 17g daily prn x1       Orders:   Hob 30 m [facility]  vent,  cont pulse ox  telemtry,  vs,  I and o,  acapella  q2h,     Chest tube care and mgmt,   epc          PT/OT/SLP/CM Assessments or Notes:   Dc plan  snf             Coronary Artery Bypass Graft (CABG) - Care Day 1 (4/8/2022) by Porsche Zamorano RN       Review Entered Review Status   4/14/2022 14:13 Completed      Criteria Review      Care Day: 1 Care Date: 4/8/2022 Level of Care:    Guideline Day 1    Level Of Care    (X) OR to ICU    4/14/2022 2:13 PM EDT by William Morales      icu    Clinical Status    (X) * Clinical Indications met    4/14/2022 2:13 PM EDT by William Morales      : MULTIVESSEL CAD  CABG X 3 / 7050 [employer] LEFT LEG / KIT    (X) Intubated    4/14/2022 2:13 PM EDT by William Morales      intubated mech vent   fi02 50    Activity    (X) Dangle legs 16 hours postoperatively    4/14/2022 2:13 PM EDT by William Morales      as terence    Routes    (X) IV medications    4/14/2022 2:13 PM EDT by Raheem Horowitz protonix 40 mg iv daily  humulin R gtt  1 mlhr   propofol gtt  albumin 5% 25 gm iv prn x2   fentnayl 50 cg iv q1hprn x3   apresoline 5 mg iv prn x2   lopressor 2.5 mg iv prn x1   kcl 20 meq iv prn x2   na bicarb 50 meq iv x1    Interventions    (X) Chest tube    4/14/2022 2:13 PM EDT by William Morales      x3   20 mmhg cont suction    (X) Central lines    4/14/2022 2:13 PM EDT by William Morales      TLC   ART LINE    (X) Oxygen    4/14/2022 2:13 PM EDT by William Morales      fi02 50 mech vent    Medications    (X) Prophylactic antibiotics    4/14/2022 2:13 PM EDT by William Morales      ancef 2 gm iv q8h  vancomycin 1 gm iv q12h    (X) Possible aspirin    4/14/2022 2:13 PM EDT by William Morales      asa 81 mg daily    (X) Statin    4/14/2022 2:13 PM EDT by William Morales      mevacor 10 mg daily    * Milestone   Additional Notes   DATE: 4/8/22          Vitals:   95.2  101   21   106/73    127/70   map 85   ssp02 100   fi02 50  vent       Abnl/Pertinent Labs/Radiology/Diagnostic Studies:   Glucose 103 POC Hematocrit: 35 (L)   POC O2 SAT: 100 (H)   POC pCO2: 39.4   POC pH: 7.383   POC PO2: 541.3 (H)   POC Hemoglobin: 11.9 (L)   XR CHEST PORTABLE   Lines and tubes in expected position as above. 2. No acute pulmonary abnormality.       Normal sinus rhythm   Low voltage QRS   Cannot rule out Anteroseptal infarct , age undetermined   Prolonged QT   Abnormal ECG         Op note    Date of Procedure: 4/8/2022       Pre-Op Diagnosis: MULTIVESSEL CAD       Post-Op Diagnosis: Same         Procedure(s):   CABG X 3 / EVH LEFT LEG / KIT    Anesthesia: General       Estimated Blood Loss (mL): 227       Complications: None      Chest Tube 1 Left Pleural 36 Syrian (Active)       Chest Tube 2 Anterior Mediastinal 36 Syrian (Active)       Chest Tube 3 Anterior Mediastinal 36 Syrian (Active)       Urethral Catheter Straight-tip; Non-latex 16 fr (Active)           Findings: Good EF         MD Consults/Assessments & Plans:   Mediicine    No significant event overnight. Blood pressure is 135/60.  Pulse 70. Labs reviewed and evaluated. No significant change. Constitutional: This is a well developed, well nourished, 25-29.9 - Overweight 61y.o. year old female who is alert, oriented, cooperative and in no apparent distress.  Head:normocephalic and atraumatic.     EENT:  PERRLA.  No conjunctival injections.   Septum was midline, mucosa was without erythema, exudates or cobblestoning.  No thrush was noted. Neck: Supple without thyromegaly. No elevated JVP. Trachea was midline. Respiratory: Chest was symmetrical without dullness to percussion.  Breath sounds bilaterally were clear to auscultation. There were no wheezes, rhonchi or rales. There is no intercostal retraction or use of accessory muscles. No egophony noted. Cardiovascular: Regular without murmur, clicks, gallops or rubs. Abdomen: Slightly rounded and soft without organomegaly.  No rebound, rigidity or guarding was appreciated.     Lymphatic: No lymphadenopathy. Musculoskeletal: Normal curvature of the spine.  No gross muscle weakness.     Extremities:  No lower extremity edema, ulcerations, tenderness, varicosities or erythema.  Muscle size, tone and strength are normal.  No involuntary movements are noted.     Skin:  Warm and dry.  Good color, turgor and pigmentation. No lesions or scars.  No cyanosis or clubbing   Neurological/Psychiatric: The patient's general behavior, level of consciousness, thought content and emotional status is normal.            NSTEMI (non-ST elevated myocardial infarction) (HCC)   Plan:    Chest pain is typical in nature.  Medication compliance is questionable.  No record of recent stress test found. Loaded with ASA and clopidogrel.  Started on Lovenox 1 mg/kg twice daily as per ACS protocol.        Underwent cardiac cath on 4/4/2022. Multivessel coronary artery disease with left main disease.         Echocardiography on 4/5/2022:   Ejection fraction 55%.  No definite regional wall motion abnormality or valvular abnormality noted.       CT surgery recommend CABG. surgery today.       Active Problems:   CAD (coronary artery disease) with multiple stents   Plan:    On ASA and Plavix.       Essential hypertension:   Resume lisinopril 40 mg daily, amlodipine 10 mg once daily.  Metoprolol 25 mg twice daily. Blood pressure is better controlled with the above regimen.       Smoker   Plan:    Counseled on smoking cessation. We will start on nicotine patch.       Mixed hyperlipidemia   Plan:    Previously refused atorvastatin due to statin induced muscle weakness currently on lovastatin.       Thyroid nodule   Plan:    CT chest showed 3 cm incidental right thyroid nodule.  Subsequent thyroid ultrasound showed 2.9 cm slight heterogeneous cystic.  No follow-up study recommended. TSH 1.33.       DVT ppx: On Lovenox.    GI ppx: Not needed            Medications:   Cordarone 200mg  3xd,  duoneb neb  q4h, percocet 5-325 2 tab q4hprn x2          Orders:   Hob 30 m mech  vent,  cont pulse ox  telemtry,  vs,  I and o,  acapella  q2h,     Chest tube care and mgmt,   epc    PT/OT/SLP/CM Assessments or Notes:   Dc plan snf             Coronary Artery Bypass Graft (CABG) - Clinical Indications for Procedure by Barbi Carrizales RN       Review Entered Review Status   4/14/2022 13:58 Completed      Criteria Review      Clinical Indications for Procedure    Most Recent : Cyndy Whittington Most Recent Date: 4/14/2022 1:58 PM EDT    (X) Procedure is indicated for  1 or more  of the following  (1) (2) (3) (4) (5) (6):       (X) Three-vessel coronary artery stenosis (ie, 3 vessels with greater than or equal to 70% stenosis,       FFR less than 0.80, or iFR less than 0.89) [A] [B]        Myocardial Infarction - Care Day 3 (4/5/2022) by Barbi Carrizales RN       Review Entered Review Status   4/14/2022 13:44 Completed      Criteria Review      Care Day: 3 Care Date: 4/5/2022 Level of Care:    Guideline Day 2    Level Of Care    (X) Intermediate care or telemetry    4/14/2022 1:44 PM EDT by Cyndy Whittington      icu    Clinical Status    ( ) * Hemodynamic stability    4/14/2022 1:44 PM EDT by Cyndy Whittington      66  134/97    ( ) * Chest pain, dyspnea, or anginal equivalent absent    Activity    (X) Activity as tolerated    4/14/2022 1:44 PM EDT by Cyndy Whittington      up as terence    Routes    (X) * Oral hydration    4/14/2022 1:44 PM EDT by Cyndy Whittington      po    ( ) * Oral medications    (X) Oral diet as tolerated    4/14/2022 1:44 PM EDT by Cyndy Whittington      low na diet    Interventions    (X) * Oxygen absent    (X) PT/PTT and platelet count    7/91/9269 1:44 PM EDT by Cyndy Whittington      ptt 84.6 ,  36.9 ,  46.7    Medications    ( ) * IV nitroglycerin absent    (X) Anticoagulants    4/14/2022 1:44 PM EDT by Cyndy Whittington      heparin gtt    (X) Antiplatelet agents (eg, aspirin, clopidogrel, ticagrelor)    4/14/2022 1:44 PM EDT by Cyndy Whittington      asa 81 mg daily    * Milestone   Additional Notes   DATE: 4/5/22          Vitals:   98.4  66   17   134/97         Abnl/Pertinent Labs/Radiology/Diagnostic Studies:      Mag 2.7       CT CHEST WO CONTRAST   incidental 3 cm hypodense lesion of the right lobe of the thyroid.  See below   for recommendation.  Calcified granuloma in the right middle lobe. Examination otherwise unremarkable. US HEAD NECK SOFT TISSUE THYROID   Slightly heterogeneous thyroid gland with a 2.9 cm right thyroid lobe cyst   corresponding to the prior imaging studies.  No FNA or follow-up imaging is   recommended based on TI rads criteria.       RECOMMENDATIONS:   ACR TI-RADS recommendations:       TR5 (>= 7 points):  FNA if >= 1 cm; follow-up if 0.5-0.9 cm in 1, 2, 3, 4,   and 5 years       TR4 (4-6 points):  FNA if >= 1.5 cm; follow-up if 1.0-1.4 cm in 1, 2, 3, and   5 years       TR3 (3 points):  FNA if >= 2.5 cm; follow-up if 1.5-2.4 cm in 1, 3, and 5   years       TR2 (2 points):  No FNA or follow-up       TR1 (0 points):  No FNA or follow-up       ACR TI-RADS recommends that no more than two nodules with the highest ACR   TI-RADS point total should be biopsied and no more than four nodules should   be followed. VL DUP CAROTID BILATERAL   Mild < 50% stenosis of the internal carotid arteries bilaterally.    Patent vertebral arteries bilaterally with antegrade flow. Physical Exam         MD Consults/Assessments & Plans:   Cardiology     Denies chest pain or SOB.  Report occasional HA she states is from the pink metoprolol pill vs the white one she takes at home.  Heparin drip on.  SR on tele HR 62      General appearance: alert and cooperative with exam   HEENT: Head: Normocephalic, no lesions, without obvious abnormality.    Neck:no JVD, trachea midline, no adenopathy   Lungs: Clear to auscultation   Heart: Regular rate and rhythm, s1/s2 auscultated, no murmurs   Abdomen: soft, non-tender, bowel sounds active   Extremities: no edema   Neurologic: not done   Right radial artery site:  CDI without ecchymosis or hematoma.  Soft + pulse.        1. MV-CAD s/p CATH 4/4/2022  CTS consult with work up in process.     2. Continue ASA, Norvasc, ACE, statin, BB, IV Heparin.     3. Keep K > 4.0 and Mag > 2.0 K 3.8 today will order [address] lab. Replace K today.  Replace Mag if < 2.0 per modified SS. Medicine    She complained of headache overnight. Chest pain is improved. Blood pressure is better controlled 135/80.  Pulse 61.  Saturating well on room air. Labs reviewed and evaluated. No significant change. Cardiac cath showed multivessel coronary artery disease, need evaluation for CABG.  CT surgery on board. 61 y. o. female with a past medical history significant for essential hypertension, coronary artery disease status post PCI on DAPT, hyperlipidemia, chronic active smoking, history of stroke, MDD, memory problem presents with a chief complaint of typical chest pain.  Chest pain started 3 to 4 days ago, dull aching in nature, 5-6 out of 10 in intensity, radiating to the left shoulder, increased with exertion and relieved with rest.  Also admits to exertional shortness of breath without orthopnea or PND.  Patient denies palpitation syncope nausea vomiting diarrhea headache. Constitutional: This is a well developed, well nourished, 25-29.9 - Overweight 61y.o. year old female who is alert, oriented, cooperative and in no apparent distress.  Head:normocephalic and atraumatic.     EENT:  PERRLA.  No conjunctival injections.   Septum was midline, mucosa was without erythema, exudates or cobblestoning.  No thrush was noted. Neck: Supple without thyromegaly. No elevated JVP. Trachea was midline. Respiratory: Chest was symmetrical without dullness to percussion.  Breath sounds bilaterally were clear to auscultation. There were no wheezes, rhonchi or rales. There is no intercostal retraction or use of accessory muscles.  No egophony noted. Cardiovascular: Regular without murmur, clicks, gallops or rubs. Abdomen: Slightly rounded and soft without organomegaly. No rebound, rigidity or guarding was appreciated.     Lymphatic: No lymphadenopathy. Musculoskeletal: Normal curvature of the spine.  No gross muscle weakness.     Extremities:  No lower extremity edema, ulcerations, tenderness, varicosities or erythema.  Muscle size, tone and strength are normal.  No involuntary movements are noted.     Skin:  Warm and dry.  Good color, turgor and pigmentation. No lesions or scars.  No cyanosis or clubbing   Neurological/Psychiatric: The patient's general behavior, level of consciousness, thought content and emotional status is normal.            NSTEMI (non-ST elevated myocardial infarction) (HCC)   Plan:    Chest pain is typical in nature.  Medication compliance is questionable.  No record of recent stress test found. Loaded with ASA and clopidogrel.  Started on heparin drip as per ACS protocol.        Underwent cardiac cath on 4/4/2022. Multivessel coronary artery disease with left main disease.     Recommend CT surgery evaluation for CABG.     Echocardiography on 4/5/2022:   Ejection fraction 55%.  No definite regional wall motion abnormality or valvular abnormality noted.       Active Problems:   CAD (coronary artery disease) with multiple stents   Plan:    Continue ASA and Plavix. Will resume home medication including beta-blocker.       Essential hypertension:   Resume lisinopril 40 mg daily, amlodipine 10 mg once daily.  Metoprolol 25 mg twice daily.       Smoker   Plan:    Counseled on smoking cessation. We will start on nicotine patch.         Mixed hyperlipidemia   Plan:    Previously refused atorvastatin due to statin induced muscle weakness currently on lovastatin.         Thyroid nodule   Plan:    CT chest showed 3 cm incidental right thyroid nodule. Recommend thyroid US.     TSH 1.33.       DVT ppx: On heparin drip GI ppx: Not needed               Medications:   Norvasc 10 mg daily,  asa 81 mg dialy,  lisinopirl 40 mg dialy,  mevacor 10 mg nightly ,       Orders:   Labs daily,low na diet, hob 30,  I and o q8h,  pt.ot,  up ast ol vs   telemtry       PT/OT/SLP/CM Assessments or Notes:   Discharge Plan: Home with family support.

## 2022-04-14 NOTE — PROGRESS NOTES
[facility] [place] Cardiothoracic Surgical   Progress Note    4/14/2022 12:48 PM  Surgeon:  Felicia Mcintosh          POD# 5  S/P :  Coronary artery bypass  EF:  55 %    Subjective:  Ms. To Stairs   Resting in chair. No SOB or distress. Much better today that past few days. Pt feels good. Pt is able to eat normal Diet today       Chest: pacing wires: yes, chest tubes:yes, air leak no, 3 +  CV: no murmur noted, Normal S1, S2,   Lungs: clear to auscultation, no wheezes, rales, or rhonchi  Abd: normal bowel sounds   Lower Extremities: Trace edema  Saph Incison: no sign of drainage or infection  Sternal Incison: dressing applied-no excessive drainage or signs of infection noted    CXR-improved left thorax of atelectasis. No pneumos noted. Vital Signs: /85   Pulse 100   Temp 98 °F (36.7 °C) (Oral)   Resp 23   Ht 5' 1\" (1.549 m)   Wt 160 lb 11.5 oz (72.9 kg)   SpO2 96%   BMI 30.37 kg/m²  O2 Flow Rate (L/min): 3 L/min   Admit Weight: Weight: 140 lb (63.5 kg)     Objective:  Vitals:    04/14/22 1100   BP: 127/85   Pulse: 100   Resp: 23   Temp:    SpO2: 96%       Labs and Studies:  CBC:   Recent Labs     04/12/22  0306 04/12/22  1019 04/13/22  0316 04/13/22 0316 04/13/22  1832 04/14/22  0308 04/14/22  1052   WBC 16.1*  --  18.3*  --   --  17.5*  --    HGB 8.6*   < > 9.4*   < > 9.7* 9.5* 10.0*   HCT 25.8*   < > 28.3*   < > 29.7* 29.8* 30.1*   MCV 89.0  --  90.1  --   --  92.0  --      --  196  --   --  238  --     < > = values in this interval not displayed. BMP:   Recent Labs     04/12/22  0306 04/12/22  2051 04/13/22 0316 04/14/22  0308     --  138 135   K 4.3  --  3.9 3.7     --  103 100   CO2 24  --  25 24   BUN 18  --  14 24*   CREATININE 0.76 0.76 0.61 0.86     PT/INR:   Recent Labs     04/12/22  0306 04/13/22  0316 04/14/22  0308   PROTIME 10.6 10.8 11.3   INR 1.0 1.0 1.1     APTT:   No results for input(s): APTT in the last 72 hours. I/O:  I/O last 3 completed shifts:   In: 600 [I.V.:500; IV

## 2022-04-15 ENCOUNTER — APPOINTMENT (OUTPATIENT)
Dept: CT IMAGING | Age: 57
DRG: 233 | End: 2022-04-15
Payer: MEDICARE

## 2022-04-15 ENCOUNTER — APPOINTMENT (OUTPATIENT)
Dept: GENERAL RADIOLOGY | Age: 57
DRG: 233 | End: 2022-04-15
Payer: MEDICARE

## 2022-04-15 LAB
ABSOLUTE EOS #: 0.51 K/UL (ref 0–0.44)
ABSOLUTE IMMATURE GRANULOCYTE: 0.51 K/UL (ref 0–0.3)
ABSOLUTE LYMPH #: 2.37 K/UL (ref 1.1–3.7)
ABSOLUTE MONO #: 2.2 K/UL (ref 0.1–1.2)
ANION GAP SERPL CALCULATED.3IONS-SCNC: 11 MMOL/L (ref 9–17)
BASOPHILS # BLD: 1 % (ref 0–2)
BASOPHILS ABSOLUTE: 0.17 K/UL (ref 0–0.2)
BUN BLDV-MCNC: 19 MG/DL (ref 6–20)
CALCIUM SERPL-MCNC: 8.5 MG/DL (ref 8.6–10.4)
CHLORIDE BLD-SCNC: 97 MMOL/L (ref 98–107)
CO2: 24 MMOL/L (ref 20–31)
CREAT SERPL-MCNC: 0.69 MG/DL (ref 0.5–0.9)
CULTURE: NORMAL
CULTURE: NORMAL
EOSINOPHILS RELATIVE PERCENT: 3 % (ref 1–4)
GFR AFRICAN AMERICAN: >60 ML/MIN
GFR NON-AFRICAN AMERICAN: >60 ML/MIN
GFR SERPL CREATININE-BSD FRML MDRD: ABNORMAL ML/MIN/{1.73_M2}
GLUCOSE BLD-MCNC: 101 MG/DL (ref 70–99)
HCT VFR BLD CALC: 28.5 % (ref 36.3–47.1)
HCT VFR BLD CALC: 33 % (ref 36.3–47.1)
HEMOGLOBIN: 10.8 G/DL (ref 11.9–15.1)
HEMOGLOBIN: 9.2 G/DL (ref 11.9–15.1)
IMMATURE GRANULOCYTES: 3 %
INR BLD: 1.1
LYMPHOCYTES # BLD: 14 % (ref 24–43)
Lab: NORMAL
Lab: NORMAL
MAGNESIUM: 2.1 MG/DL (ref 1.6–2.6)
MCH RBC QN AUTO: 29.5 PG (ref 25.2–33.5)
MCHC RBC AUTO-ENTMCNC: 32.3 G/DL (ref 28.4–34.8)
MCV RBC AUTO: 91.3 FL (ref 82.6–102.9)
MONOCYTES # BLD: 13 % (ref 3–12)
MORPHOLOGY: NORMAL
NRBC AUTOMATED: 0 PER 100 WBC
PDW BLD-RTO: 13.9 % (ref 11.8–14.4)
PLATELET # BLD: 279 K/UL (ref 138–453)
PMV BLD AUTO: 9.7 FL (ref 8.1–13.5)
POTASSIUM SERPL-SCNC: 3.7 MMOL/L (ref 3.7–5.3)
PROTHROMBIN TIME: 11.9 SEC (ref 9.1–12.3)
RBC # BLD: 3.12 M/UL (ref 3.95–5.11)
SEG NEUTROPHILS: 66 % (ref 36–65)
SEGMENTED NEUTROPHILS ABSOLUTE COUNT: 11.14 K/UL (ref 1.5–8.1)
SODIUM BLD-SCNC: 132 MMOL/L (ref 135–144)
SPECIMEN DESCRIPTION: NORMAL
SPECIMEN DESCRIPTION: NORMAL
WBC # BLD: 16.9 K/UL (ref 3.5–11.3)

## 2022-04-15 PROCEDURE — 94640 AIRWAY INHALATION TREATMENT: CPT

## 2022-04-15 PROCEDURE — 6360000002 HC RX W HCPCS: Performed by: STUDENT IN AN ORGANIZED HEALTH CARE EDUCATION/TRAINING PROGRAM

## 2022-04-15 PROCEDURE — 97530 THERAPEUTIC ACTIVITIES: CPT

## 2022-04-15 PROCEDURE — 2700000000 HC OXYGEN THERAPY PER DAY

## 2022-04-15 PROCEDURE — 83735 ASSAY OF MAGNESIUM: CPT

## 2022-04-15 PROCEDURE — 85025 COMPLETE CBC W/AUTO DIFF WBC: CPT

## 2022-04-15 PROCEDURE — 6370000000 HC RX 637 (ALT 250 FOR IP): Performed by: NURSE PRACTITIONER

## 2022-04-15 PROCEDURE — 2500000003 HC RX 250 WO HCPCS: Performed by: INTERNAL MEDICINE

## 2022-04-15 PROCEDURE — 71045 X-RAY EXAM CHEST 1 VIEW: CPT

## 2022-04-15 PROCEDURE — 94761 N-INVAS EAR/PLS OXIMETRY MLT: CPT

## 2022-04-15 PROCEDURE — 2140000001 HC CVICU INTERMEDIATE R&B

## 2022-04-15 PROCEDURE — 99024 POSTOP FOLLOW-UP VISIT: CPT | Performed by: PHYSICIAN ASSISTANT

## 2022-04-15 PROCEDURE — 6370000000 HC RX 637 (ALT 250 FOR IP): Performed by: STUDENT IN AN ORGANIZED HEALTH CARE EDUCATION/TRAINING PROGRAM

## 2022-04-15 PROCEDURE — 97116 GAIT TRAINING THERAPY: CPT

## 2022-04-15 PROCEDURE — 99232 SBSQ HOSP IP/OBS MODERATE 35: CPT | Performed by: INTERNAL MEDICINE

## 2022-04-15 PROCEDURE — 71250 CT THORAX DX C-: CPT

## 2022-04-15 PROCEDURE — 80048 BASIC METABOLIC PNL TOTAL CA: CPT

## 2022-04-15 PROCEDURE — 6360000002 HC RX W HCPCS: Performed by: PHYSICIAN ASSISTANT

## 2022-04-15 PROCEDURE — 36415 COLL VENOUS BLD VENIPUNCTURE: CPT

## 2022-04-15 PROCEDURE — 99291 CRITICAL CARE FIRST HOUR: CPT | Performed by: INTERNAL MEDICINE

## 2022-04-15 PROCEDURE — 2580000003 HC RX 258: Performed by: NURSE PRACTITIONER

## 2022-04-15 PROCEDURE — 6360000002 HC RX W HCPCS: Performed by: NURSE PRACTITIONER

## 2022-04-15 PROCEDURE — 85018 HEMOGLOBIN: CPT

## 2022-04-15 PROCEDURE — 85610 PROTHROMBIN TIME: CPT

## 2022-04-15 PROCEDURE — 6360000002 HC RX W HCPCS: Performed by: INTERNAL MEDICINE

## 2022-04-15 PROCEDURE — 6370000000 HC RX 637 (ALT 250 FOR IP): Performed by: INTERNAL MEDICINE

## 2022-04-15 PROCEDURE — 85014 HEMATOCRIT: CPT

## 2022-04-15 RX ADMIN — WATER 2000 MG: 1 INJECTION INTRAMUSCULAR; INTRAVENOUS; SUBCUTANEOUS at 07:30

## 2022-04-15 RX ADMIN — OXYCODONE HYDROCHLORIDE AND ACETAMINOPHEN 2 TABLET: 5; 325 TABLET ORAL at 10:28

## 2022-04-15 RX ADMIN — Medication 81 MG: at 07:29

## 2022-04-15 RX ADMIN — IPRATROPIUM BROMIDE AND ALBUTEROL SULFATE 1 AMPULE: .5; 3 SOLUTION RESPIRATORY (INHALATION) at 15:42

## 2022-04-15 RX ADMIN — SODIUM CHLORIDE, PRESERVATIVE FREE 10 ML: 5 INJECTION INTRAVENOUS at 21:03

## 2022-04-15 RX ADMIN — AMIODARONE HYDROCHLORIDE 200 MG: 200 TABLET ORAL at 07:30

## 2022-04-15 RX ADMIN — DOCUSATE SODIUM LIQUID 100 MG: 100 LIQUID ORAL at 21:03

## 2022-04-15 RX ADMIN — DIPHENHYDRAMINE HCL 25 MG: 25 TABLET ORAL at 21:01

## 2022-04-15 RX ADMIN — ONDANSETRON 4 MG: 2 INJECTION INTRAMUSCULAR; INTRAVENOUS at 19:56

## 2022-04-15 RX ADMIN — CLOPIDOGREL 75 MG: 75 TABLET, FILM COATED ORAL at 07:29

## 2022-04-15 RX ADMIN — OXYCODONE HYDROCHLORIDE AND ACETAMINOPHEN 2 TABLET: 5; 325 TABLET ORAL at 19:20

## 2022-04-15 RX ADMIN — ATORVASTATIN CALCIUM 20 MG: 20 TABLET, FILM COATED ORAL at 21:02

## 2022-04-15 RX ADMIN — POTASSIUM CHLORIDE 40 MEQ: 1500 TABLET, EXTENDED RELEASE ORAL at 05:52

## 2022-04-15 RX ADMIN — WATER 2000 MG: 1 INJECTION INTRAMUSCULAR; INTRAVENOUS; SUBCUTANEOUS at 15:56

## 2022-04-15 RX ADMIN — METOPROLOL TARTRATE 2.5 MG: 5 INJECTION INTRAVENOUS at 21:04

## 2022-04-15 RX ADMIN — WATER 2000 MG: 1 INJECTION INTRAMUSCULAR; INTRAVENOUS; SUBCUTANEOUS at 00:03

## 2022-04-15 RX ADMIN — IPRATROPIUM BROMIDE AND ALBUTEROL SULFATE 1 AMPULE: .5; 3 SOLUTION RESPIRATORY (INHALATION) at 09:20

## 2022-04-15 RX ADMIN — PANTOPRAZOLE SODIUM 40 MG: 40 TABLET, DELAYED RELEASE ORAL at 07:29

## 2022-04-15 RX ADMIN — AMIODARONE HYDROCHLORIDE 200 MG: 200 TABLET ORAL at 15:56

## 2022-04-15 RX ADMIN — ONDANSETRON 4 MG: 4 TABLET, ORALLY DISINTEGRATING ORAL at 08:34

## 2022-04-15 RX ADMIN — OXYCODONE HYDROCHLORIDE AND ACETAMINOPHEN 1 TABLET: 5; 325 TABLET ORAL at 05:52

## 2022-04-15 RX ADMIN — LOVASTATIN 10 MG: 20 TABLET ORAL at 21:01

## 2022-04-15 RX ADMIN — AMIODARONE HYDROCHLORIDE 200 MG: 200 TABLET ORAL at 21:02

## 2022-04-15 RX ADMIN — FUROSEMIDE 20 MG: 10 INJECTION, SOLUTION INTRAMUSCULAR; INTRAVENOUS at 07:30

## 2022-04-15 RX ADMIN — METOPROLOL TARTRATE 2.5 MG: 5 INJECTION INTRAVENOUS at 09:57

## 2022-04-15 RX ADMIN — MORPHINE SULFATE 2 MG: 2 INJECTION, SOLUTION INTRAMUSCULAR; INTRAVENOUS at 07:37

## 2022-04-15 RX ADMIN — IPRATROPIUM BROMIDE AND ALBUTEROL SULFATE 1 AMPULE: .5; 3 SOLUTION RESPIRATORY (INHALATION) at 20:57

## 2022-04-15 RX ADMIN — OXYCODONE HYDROCHLORIDE AND ACETAMINOPHEN 1 TABLET: 5; 325 TABLET ORAL at 00:54

## 2022-04-15 ASSESSMENT — PAIN SCALES - GENERAL
PAINLEVEL_OUTOF10: 2
PAINLEVEL_OUTOF10: 9
PAINLEVEL_OUTOF10: 9
PAINLEVEL_OUTOF10: 7
PAINLEVEL_OUTOF10: 7
PAINLEVEL_OUTOF10: 5
PAINLEVEL_OUTOF10: 0
PAINLEVEL_OUTOF10: 4

## 2022-04-15 ASSESSMENT — ENCOUNTER SYMPTOMS
COUGH: 1
GASTROINTESTINAL NEGATIVE: 1
SHORTNESS OF BREATH: 1

## 2022-04-15 ASSESSMENT — PAIN DESCRIPTION - PAIN TYPE: TYPE: ACUTE PAIN;SURGICAL PAIN

## 2022-04-15 ASSESSMENT — PAIN DESCRIPTION - LOCATION: LOCATION: CHEST

## 2022-04-15 NOTE — PROGRESS NOTES
Comprehensive Nutrition Assessment    Type and Reason for Visit:  Reassess    Nutrition Recommendations/Plan: Continue current diet. Will provide Ensure Enlive oral supplements x 2 per day. Encourage/monitor PO intakes as tolerated. Will monitor labs, weights, and plan of care. Nutrition Assessment:  Pt passed a MBSS on 4/13 for a Dysphagia 2 Minced and Moist diet with thin liquids. Pt only eating small amounts of meals (25% or less). Pt also with c/o nausea but not episodes of vomiting. Discussed with RN about ordering oral supplements with meals. C/o constipation - last recorded BM 4/15. Weight fluctuations noted. Labs reviewed; Na 132  mmol/L. Meds include: Lasix. Malnutrition Assessment:  Malnutrition Status: At risk for malnutrition    Context:  Acute Illness     Findings of the 6 clinical characteristics of malnutrition:  Energy Intake:  Mild decrease in energy intake  Weight Loss:  Unable to assess - Weight fluctuations noted. Body Fat Loss:  No significant body fat loss   Muscle Mass Loss:  No significant muscle mass loss  Fluid Accumulation:  1 - Mild Extremities   Strength:  Not Performed    Estimated Daily Nutrient Needs:  Energy (kcal):  MSJ x 1.2-1.3 = 2843-0676 kcals/day; Weight Used for Energy Requirements:  Current     Protein (g):  70 g pro/day; Weight Used for Protein Requirements:  Ideal (1.5)        Fluid (ml/day):  2000 mL/day; Method Used for Fluid Requirements:  ml/Kg (30)      Nutrition Related Findings:  Meds/Labs reviewed. C/o nausea. Last recorded BM 4/15 - c/o constipation noted. Wounds:  Surgical Incision,Multiple (LLE, sternum)       Current Nutrition Therapies:    ADULT DIET;  Dysphagia - Minced and Moist; Low Fat/Low Chol/High Fiber/2 gm Na    Anthropometric Measures:  · Height: 5' 1\" (154.9 cm)  · Current Body Weight: 160 lb 7.9 oz (72.8 kg)   · Admission Body Weight: 141 lb (64 kg)    · Usual Body Weight: 142 lb (64.4 kg) (9/9/21)     · Ideal Body Weight: 105 lbs; % Ideal Body Weight 152.9 %   · BMI: 30.3  · BMI Categories: Obese Class 1 (BMI 30.0-34. 9)       Nutrition Diagnosis:   · Inadequate oral intake related to swallowing difficulty (current condition) as evidenced by swallow study results,intake 0-25% (need for oral supplements)    Nutrition Interventions:   Food and/or Nutrient Delivery:  Continue Current Diet,Start Oral Nutrition Supplement (Provide Ensure Enlive oral supplements x 2 per day.)  Nutrition Education/Counseling:  No recommendation at this time   Coordination of Nutrition Care:  Continue to monitor while inpatient,Speech Therapy    Goals:  meet % of estimated nutrient needs       Nutrition Monitoring and Evaluation:   Behavioral-Environmental Outcomes:  None Identified   Food/Nutrient Intake Outcomes:  Food and Nutrient Intake,Supplement Intake  Physical Signs/Symptoms Outcomes:  Biochemical Data,Chewing or Swallowing,GI Status,Fluid Status or Edema,Hemodynamic Status,Nutrition Focused Physical Findings,Skin,Weight     Discharge Planning:     Too soon to determine     Electronically signed by Sandro Rome RD, LD on 4/15/22 at 12:26 PM EDT    Contact: [phone]

## 2022-04-15 NOTE — PLAN OF CARE
Problem: OXYGENATION/RESPIRATORY FUNCTION  Goal: Patient will achieve/maintain normal respiratory rate/effort  Description: Respiratory rate and effort will be within normal limits for the patient  4/15/2022 0513 by Jan Jaime RCP  Outcome: Completed     Problem: Gas Exchange - Impaired:  Goal: Levels of oxygenation will improve  Description: Levels of oxygenation will improve  4/15/2022 0513 by Jan Jaime RCP  Outcome: Completed     Problem: Gas Exchange - Impaired:  Goal: Ability to maintain adequate ventilation will improve  Description: Ability to maintain adequate ventilation will improve  4/15/2022 0513 by Jan Jaime RCP  Outcome: Completed     Problem: RESPIRATORY  Intervention: Respiratory assessment  Note:   PROVIDE ADEQUATE OXYGENATION WITH ACCEPTABLE SP02/ABG'S    [x]  IDENTIFY APPROPRIATE OXYGEN THERAPY  [x]   MONITOR SP02/ABG'S AS NEEDED   [x]   PATIENT EDUCATION AS NEEDED   BRONCHOSPASM/BRONCHOCONSTRICTION     [x]         IMPROVE AERATION/BREATH SOUNDS  [x]   ADMINISTER BRONCHODILATOR THERAPY AS APPROPRIATE  [x]   ASSESS BREATH SOUNDS  []   IMPLEMENT AEROSOL/MDI PROTOCOL  [x]   PATIENT EDUCATION AS NEEDED

## 2022-04-15 NOTE — PLAN OF CARE
Problem: Falls - Risk of:  Goal: Will remain free from falls  Description: Will remain free from falls  4/15/2022 0645 by Kvng Ocampo RN  Outcome: Ongoing  4/14/2022 1855 by Tamia Bernal RN  Outcome: Ongoing  Goal: Absence of physical injury  Description: Absence of physical injury  4/15/2022 0645 by Kvng Ocampo RN  Outcome: Ongoing  4/14/2022 1855 by Tamia Bernal RN  Outcome: Ongoing     Problem: Cardiac Output - Decreased:  Goal: Hemodynamic stability will improve  Description: Hemodynamic stability will improve  4/15/2022 0645 by Kvng Ocampo RN  Outcome: Ongoing  4/14/2022 1855 by Tamia Bernal RN  Outcome: Ongoing  Goal: Cardiac output within specified parameters  Description: Cardiac output within specified parameters  4/15/2022 0645 by Kvng Ocampo RN  Outcome: Ongoing  4/14/2022 1855 by Tamia Bernal RN  Outcome: Ongoing     Problem: Pain:  Goal: Pain level will decrease  Description: Pain level will decrease  4/15/2022 0645 by Kvng Ocampo RN  Outcome: Ongoing  4/14/2022 1855 by Tamia Bernal RN  Outcome: Ongoing  Goal: Control of acute pain  Description: Control of acute pain  4/15/2022 0645 by Kvng Ocampo RN  Outcome: Ongoing  4/14/2022 1855 by Tamia Bernal RN  Outcome: Ongoing  Goal: Control of chronic pain  Description: Control of chronic pain  4/15/2022 0645 by Kvng Ocampo RN  Outcome: Ongoing  4/14/2022 1855 by Tamia Bernal RN  Outcome: Ongoing     Problem: Tissue Perfusion - Cardiopulmonary, Altered:  Goal: Hemodynamic stability will improve  Description: Hemodynamic stability will improve  4/15/2022 0645 by Kvng Ocampo RN  Outcome: Ongoing  4/14/2022 1855 by Tamia Bernal RN  Outcome: Ongoing  Goal: Absence of angina  Description: Absence of angina  4/15/2022 0645 by Kvng Ocampo RN  Outcome: Ongoing  4/14/2022 1855 by Tamia Bernal RN  Outcome: Ongoing  Goal: Circulatory function within specified parameters  Description: Circulatory function within specified parameters  4/15/2022 0645 by Funmilayo Wyatt RN  Outcome: Ongoing  4/14/2022 1855 by Camille Coelho RN  Outcome: Ongoing  Goal: Hemodynamic stability will improve  Description: Hemodynamic stability will improve  4/15/2022 0645 by Funmilayo Wyatt RN  Outcome: Ongoing  4/14/2022 1855 by Camille Coelho RN  Outcome: Ongoing  Goal: Will show no evidence of cardiac arrhythmias  Description: Will show no evidence of cardiac arrhythmias  4/15/2022 0645 by Funmilayo Wyatt RN  Outcome: Ongoing  4/14/2022 1855 by Camille Coelho RN  Outcome: Ongoing     Problem: Tobacco Use:  Goal: Will participate in inpatient tobacco-use cessation counseling  Description: Will participate in inpatient tobacco-use cessation counseling  4/15/2022 0645 by Funmilayo Wyatt RN  Outcome: Ongoing  4/14/2022 1855 by Camille Coelho RN  Outcome: Ongoing     Problem: Pain:  Goal: Pain level will decrease  Description: Pain level will decrease  4/15/2022 0645 by Funmilayo Wyatt RN  Outcome: Ongoing  4/14/2022 1855 by Camille Coelho RN  Outcome: Ongoing  Goal: Control of acute pain  Description: Control of acute pain  4/15/2022 0645 by Funmilayo Wyatt RN  Outcome: Ongoing  4/14/2022 1855 by Camille Coelho RN  Outcome: Ongoing  Goal: Control of chronic pain  Description: Control of chronic pain  4/15/2022 0645 by Funmilayo Wyatt RN  Outcome: Ongoing  4/14/2022 1855 by Camille Coelho RN  Outcome: Ongoing     Problem: Skin Integrity:  Goal: Will show no infection signs and symptoms  Description: Will show no infection signs and symptoms  4/15/2022 0645 by Funmilayo Wyatt RN  Outcome: Ongoing  4/14/2022 1855 by Camille Coelho RN  Outcome: Ongoing  Goal: Absence of new skin breakdown  Description: Absence of new skin breakdown  4/15/2022 0645 by Funmilayo Wyatt RN  Outcome: Ongoing  4/14/2022 1855 by Camille Coelho RN  Outcome: Ongoing     Problem: Nutrition  Goal: Optimal nutrition therapy  4/15/2022 0645 by Berenice Waller RN  Outcome: Ongoing  4/14/2022 1855 by Arie Lovelace RN  Outcome: Ongoing     Problem: Discharge Planning:  Goal: Discharged to appropriate level of care  Description: Discharged to appropriate level of care  4/15/2022 0645 by Berenice Waller RN  Outcome: Ongoing  4/14/2022 1855 by Arie Lovelace RN  Outcome: Ongoing     Problem:  Activity Intolerance:  Goal: Able to perform prescribed physical activity  Description: Able to perform prescribed physical activity  4/15/2022 0645 by Berenice Waller RN  Outcome: Ongoing  4/14/2022 1855 by Arie Lovelace RN  Outcome: Ongoing  Goal: Ability to tolerate increased activity will improve  Description: Ability to tolerate increased activity will improve  4/15/2022 0645 by Berenice Waller RN  Outcome: Ongoing  4/14/2022 1855 by Arie Lovelace RN  Outcome: Ongoing     Problem: Anxiety:  Goal: Level of anxiety will decrease  Description: Level of anxiety will decrease  4/15/2022 0645 by Berenice Waller RN  Outcome: Ongoing  4/14/2022 1855 by Arie Lovelace RN  Outcome: Ongoing     Problem: Fluid Volume - Imbalance:  Goal: Ability to achieve a balanced intake and output will improve  Description: Ability to achieve a balanced intake and output will improve  4/15/2022 0645 by Berenice Waller RN  Outcome: Ongoing  4/14/2022 1855 by Arie Lovelace RN  Outcome: Ongoing  Goal: Chest tube drainage is within specified parameters  Description: Chest tube drainage is within specified parameters  4/15/2022 0645 by Berenice Waller RN  Outcome: Ongoing  4/14/2022 1855 by Arie Lovelace RN  Outcome: Ongoing

## 2022-04-15 NOTE — PROGRESS NOTES
Occupational [address]  Occupational Therapy Not Seen Note    DATE: 4/15/2022    NAME: Nabila Berman  MRN: [id number]   : 1965      Patient not seen this date for Occupational Therapy due to:      Pt currently with visitor and pleasantly declines OT session at this time. Will continue as able.        Electronically signed by NELSON Orellana on 4/15/2022 at 2:18 PM

## 2022-04-15 NOTE — PROGRESS NOTES
[facility]  Internal Medicine Teaching Residency Program  Inpatient Daily Progress Note  ______________________________________________________________________________    Patient: Jennifer Bliss  YOB: 1965   EZV:[id number]    Acct: [de-identified]     Room: [address] [place] [address]  Admit date: 4/3/2022  Today's date: 04/14/22  Number of days in the hospital: 11    SUBJECTIVE   Admitting Diagnosis: NSTEMI (non-ST elevated myocardial infarction) ([facility] [address]  CC: Typical chest pain  POD #5, S/P CABG  Patient examined at bedside, extubated 4/12 successfully   Weaned from BiPAP to nasal cannula currently currently on 3 L. Chest tubes taken out 4/13  Afebrile, blood pressure stable, tachycardic at 97  Unable to get blood pressure medications orally, were switched to IV 4/13. Will be switched to oral once patient able to tolerate PO per cardio. Swallow study shows no aspiration. Currently on cefepime for pneumonia, pulmonary on board   Labs show leukocytosis wbc 17.5  ROS: Patient drowsy, unable to assess    BRIEF HISTORY     The patient is a pleasant 61 y.o. female with a past medical history significant for essential hypertension, coronary artery disease status post PCI on DAPT, hyperlipidemia, chronic active smoking, history of stroke, MDD, memory problem presents with a chief complaint of typical chest pain. Chest pain started 3 to 4 days ago, dull aching in nature, 5-6 out of 10 in intensity, radiating to the left shoulder, increased with exertion and relieved with rest.  Also admits to exertional shortness of breath without orthopnea or PND. Patient denies palpitation syncope nausea vomiting diarrhea headache.     Evaluation in the ER, she was found to have elevated blood pressure 175/110. Heart rate 78 regular. SPO2 99% on room air. Heart auscultation showed normal first and second heart sound without murmur gallop. Chest is clear to auscultation.   Abdomen soft and Exam:  Constitutional: Drowsy, on BiPAP   EENT:  PERRLA. No conjunctival injections. Neck: Supple without thyromegaly. No elevated JVP. Trachea was midline. Respiratory: Decreased breath sounds bilaterally. Cardiovascular: Chest tubes in place regular without murmur, clicks, gallops or rubs. Abdomen: Slightly rounded and soft without organomegaly. No rebound, rigidity or guarding was appreciated. Lymphatic: No lymphadenopathy. Musculoskeletal: Normal curvature of the spine. No gross muscle weakness. Extremities: Compression stockings on bilateral lower extremity no lower extremity edema, ulcerations, tenderness, varicosities or erythema. Muscle size, tone and strength are normal.  No involuntary movements are noted. Skin:  Warm and dry. Good color, turgor and pigmentation. No lesions or scars.   No cyanosis or clubbing  Neurological/Psychiatric: Drowsy, answering questions slowly    Medications:  Scheduled Medications:    metoprolol  2.5 mg IntraVENous Q6H    cefepime  2,000 mg IntraVENous Q8H    furosemide  20 mg IntraVENous BID    docusate  100 mg Oral BID    sodium chloride flush  5-40 mL IntraVENous 2 times per day    sodium chloride flush  5-40 mL IntraVENous 2 times per day    sodium chloride flush  10 mL IntraVENous 2 times per day    aspirin  81 mg Oral Daily    clopidogrel  75 mg Oral Daily    amiodarone  200 mg Oral TID    [Held by provider] metoprolol tartrate  25 mg Oral BID    atorvastatin  20 mg Oral Nightly    pantoprazole  40 mg Oral Daily    pantoprazole (PROTONIX) 40 mg injection  40 mg IntraVENous Daily    ipratropium-albuterol  1 ampule Inhalation Q4H WA    senna  1 tablet Oral Once    [Held by provider] hydroCHLOROthiazide  25 mg Oral Daily    lovastatin  10 mg Oral Nightly    sodium chloride flush  5-40 mL IntraVENous 2 times per day    [Held by provider] amLODIPine  10 mg Oral Daily    nicotine  1 patch TransDERmal Daily     Continuous Infusions:    sodium chloride Stopped (04/11/22 1935)    sodium chloride Stopped (04/11/22 0447)    dextrose 25 mL/hr (04/12/22 1831)    EPINEPHrine      sodium chloride 20 mL/hr at 04/04/22 0408     PRN Medicationslabetalol, 10 mg, Q4H PRN  bisacodyl, 10 mg, Daily PRN  ketorolac, 15 mg, Q6H PRN  LORazepam, 0.5 mg, Q6H PRN  morphine, 2 mg, Q4H PRN  albuterol, 2.5 mg, Q6H PRN  acetylcysteine, 600 mg, Q4H PRN  sodium chloride flush, 5-40 mL, PRN  sodium chloride, 25 mL, PRN  sodium chloride flush, 10 mL, PRN  ondansetron, 4 mg, Q8H PRN   Or  ondansetron, 4 mg, Q6H PRN  oxyCODONE-acetaminophen, 1 tablet, Q4H PRN   Or  oxyCODONE-acetaminophen, 2 tablet, Q4H PRN  fentanNYL, 25 mcg, Q1H PRN   Or  fentanNYL, 50 mcg, Q1H PRN  hydrALAZINE, 5 mg, Q5 Min PRN  diphenhydrAMINE, 25 mg, Nightly PRN  potassium chloride, 20 mEq, PRN  magnesium sulfate, 2,000 mg, PRN  albumin human, 25 g, PRN  glucose, 15 g, PRN  dextrose, 12.5 g, PRN  glucagon (rDNA), 1 mg, PRN  dextrose, 100 mL/hr, PRN  EPINEPHrine, 0.1-0.3 mcg/kg/min, Continuous PRN  magnesium hydroxide, 30 mL, Daily PRN  aspirin-acetaminophen-caffeine, 1 tablet, Q8H PRN  magnesium sulfate, 1,000 mg, PRN  potassium chloride, 10 mEq, PRN  potassium chloride, 40 mEq, PRN   Or  potassium alternative oral replacement, 40 mEq, PRN   Or  potassium chloride, 10 mEq, PRN  sodium chloride, , PRN  polyethylene glycol, 17 g, Daily PRN  acetaminophen, 650 mg, Q6H PRN   Or  acetaminophen, 650 mg, Q6H PRN  calcium carbonate, 500 mg, TID PRN        Diagnostic Labs:  CBC:   Recent Labs     04/12/22  0306 04/12/22  1019 04/13/22  0316 04/13/22 0316 04/13/22  1832 04/14/22  0308 04/14/22  1052   WBC 16.1*  --  18.3*  --   --  17.5*  --    RBC 2.90*  --  3.14*  --   --  3.24*  --    HGB 8.6*   < > 9.4*   < > 9.7* 9.5* 10.0*   HCT 25.8*   < > 28.3*   < > 29.7* 29.8* 30.1*   MCV 89.0  --  90.1  --   --  92.0  --    RDW 15.4*  --  14.9*  --   --  14.4  --      --  196  --   --  238  --     < > = values in this interval not displayed. BMP:   Recent Labs     04/12/22 0306 04/12/22 2051 04/13/22 0316 04/14/22 0308     --  138 135   K 4.3  --  3.9 3.7     --  103 100   CO2 24  --  25 24   BUN 18  --  14 24*   CREATININE 0.76 0.76 0.61 0.86     BNP: No results for input(s): BNP in the last 72 hours. PT/INR:   Recent Labs     04/12/22 0306 04/13/22 0316 04/14/22 0308   PROTIME 10.6 10.8 11.3   INR 1.0 1.0 1.1     APTT:   No results for input(s): APTT in the last 72 hours. CARDIAC ENZYMES: No results for input(s): CKMB, CKMBINDEX, TROPONINI in the last 72 hours. Invalid input(s): CKTOTAL;3  FASTING LIPID PANEL:  Lab Results   Component Value Date    CHOL 243 (H) 03/24/2021    HDL 36 (L) 12/01/2021    TRIG 178 (H) 03/24/2021     LIVER PROFILE:   No results for input(s): AST, ALT, ALB, BILIDIR, BILITOT, ALKPHOS in the last 72 hours. MICROBIOLOGY:   Lab Results   Component Value Date/Time    CULTURE PSEUDOMONAS AERUGINOSA <81564 CFU/ML (A) 04/10/2022 12:43 PM    CULTURE NO NORMAL JUSTINA 04/10/2022 12:43 PM       Imaging:    XR CHEST PORTABLE    Result Date: 4/3/2022  No acute process. ASSESSMENT & PLAN     IMPRESSION  This is a 61 y.o. female with a past medical history of hypertension hyperlipidemia coronary artery disease status post PCI, history of stroke and memory problem who presented with typical chest pain and exertional shortness of breath and found to have ST-T wave changes in the inferior leads and elevated troponin 80. Patient admitted to inpatient status for the management of NSTEMI. She underwent cardiac cath and found to have multivessel coronary artery disease. CT surgery taken on board and s/p  CABG.        Principal Problem:    NSTEMI (non-ST elevated myocardial infarction) ([address]  Active Problems:    CAD (coronary artery disease) with multiple stents    Asthma    Smoker    Anxiety    Essential hypertension    Memory problem    Mixed hyperlipidemia    Thyroid nodule Multiple vessel coronary artery disease s/p CABG   Resolved Problems:    * No resolved hospital problems. *    NSTEMI with multivessel coronary artery disease s/p CABG-  -chest tubes taken out by cardiothoracic surgery yesterday.  -On ASA Plavix ,statin, amiodarone    Acute hypoxic failure, concern for aspiration pneumonia and bilateral mucous plugging  History of COPD  -Extubated yesterday, currently on BiPAP-->nasal cannula  - s/p  bronchoscopy on 4/10-mucous plugging  -Bronchoalveolar lavage respiratory culture grew Pseudomonas. Started on cefepime and IV vancomycin, currently on cefepime\  -Also on Lasix 20 twice daily  -Pulmonary/critical care following. Postoperative blood loss anemia s/p multiple transfusion  Received 5 units of transfusion since surgery. Hemoglobin currently stable    Essential hypertension:  Currently on IV medications until patient passes swallow study and can eat. Smoker  Continue  on nicotine patch.     Mixed hyperlipidemia  On atorvastatin 20 mg.      Thyroid nodule  CT chest showed 3 cm incidental right thyroid nodule. Subsequent thyroid ultrasound showed 2.9 cm slight heterogeneous cystic. No follow-up study recommended. TSH 1.33.     DVT ppx: EPC cuff only  GI ppx: Protonix    Discharge planning-Per primary team    Jace Joyce MD  PGY-1, Internal Medicine Resident  [facility]         4/14/2022, 11:27 PM    Attending Physician Statement  I have discussed the care of Yanni Chen and I have examined the patient myselft and taken ros and hpi , including pertinent history and exam findings,  with the resident. I have reviewed the key elements of all parts of the encounter with the resident. I agree with the assessment, plan and orders as documented by the resident.   Multivessel CAD, s/p CABG  Hx of COPD/Emphysema,   AC hypoxic resp failure, reintubation   Aspiration PNA ,   Anemia s/ pRBC   Was On mechanical vent,this am ,    vent settings reviewed   Subsequently extubated , was anxious and tachycardic this morning   Better now   Improving   IS   PT/OT   Chest tubes have been taken out '  CXR reviewed     Electronically signed by Yasmeen Devine MD

## 2022-04-15 NOTE — PROGRESS NOTES
Physical Therapy  Facility/Department: [facility]  Daily Treatment Note  NAME: Rachael Calvo  : 1965  MRN: [id number]    Date of Service: 4/15/2022    Discharge Recommendations:  Continue to assess pending progress   PT Equipment Recommendations  Equipment Needed: No    Assessment   Body structures, Functions, Activity limitations: Decreased functional mobility ; Increased pain;Decreased balance;Decreased ROM; Decreased strength;Decreased endurance;Decreased high-level IADLs  Assessment: Pt amb 1150 ft x2, grossly CGA with use of RW,  25 ft without, CGA/ASBA grossly, pt wants to go home, cues for safety awareness Recommending continued skilled physical therapy to address these deficits to return pt to prior level of independence. Specific instructions for Next Treatment: endurance  Prognosis: Fair  Decision Making: High Complexity  PT Education: Goals;PT Role;Plan of Care;Precautions  Patient Education: Sternal precautions; proper breathing technique with functional transfers  REQUIRES PT FOLLOW UP: Yes  Activity Tolerance  Activity Tolerance: Patient limited by fatigue     Patient Diagnosis(es): The encounter diagnosis was NSTEMI (non-ST elevated myocardial infarction) ([facility] [address] has a past medical history of Anemia, Asthma, CAD (coronary artery disease), Depression, High cholesterol, Hypertension, MI, old, Osteoarthritis, Pneumonia, Sleep apnea, and Stroke ([facility] [address] has a past surgical history that includes Hysterectomy; Coronary angioplasty with stent (); Tubal ligation; Tonsillectomy; Appendectomy; and Coronary artery bypass graft (N/A, 2022).     Restrictions  Restrictions/Precautions  Restrictions/Precautions: Fall Risk,Surgical Protocols,Up as Tolerated  Required Braces or Orthoses?: Yes  Required Braces or Orthoses  Other: Heart Hugger Brace  Position Activity Restriction  Sternal Precautions: 5# Lifting Restrictions  Other position/activity restrictions: s/p CABG x 3 (22); extubated 4/12/22; amb pt, up in chair, up for meals  Subjective   General  Chart Reviewed: Yes  Response To Previous Treatment: Patient with no complaints from previous session. Family / Caregiver Present: No  Subjective  Subjective: RN and pt in agreement for PT eval; pt  seated in recliner chair upon PT arrival,pt on 4L NC  General Comment  Comments: Pt retired to chair with callight and chair alarm  Pain Screening  Patient Currently in Pain: Yes  Pain Assessment  Pain Level: 9  Patient's Stated Pain Goal: No pain  Pain Type: Acute pain;Surgical pain  Pain Location: Chest  Vital Signs  Patient Currently in Pain: Yes       Orientation  Orientation  Overall Orientation Status: Within Functional Limits  Cognition   Cognition  Overall Cognitive Status: WFL  Objective   Bed mobility  Scooting: Stand by assistance  Comment: Pt start and retired to chair  Transfers  Sit to Stand: Contact guard assistance;Stand by assistance  Stand to sit: Contact guard assistance;Stand by assistance  Comment: Pt required mod VC's for hand placement and use of heart hugger with functional transfers with fair return demo, increased strength this date  Ambulation  Ambulation?: Yes  Ambulation 1  Surface: level tile  Device: Rolling Walker  Other Apparatus: O2 (4 l)  Assistance: Contact guard assistance;Stand by assistance  Quality of Gait: Forward flexed posture  Gait Deviations: Slow Suzanne  Distance: 125 ft x2  Comments: Pt CGA/RW slow suzanne  Ambulation 2  Surface - 2: level tile  Device 2: Rolling Walker  Assistance 2: Contact guard assistance;Stand by assistance  Gait Deviations: Slow Suzanne  Distance: 25 ft  Ambulation 3  Surface - 3: level tile  Device 3: No device  Stairs/Curb  Stairs?: Yes  Stairs  # Steps : 5  Stairs Height: 6\"  Rails: Right ascending  Device: No Device  Assistance: Contact guard assistance;Stand by assistance  Comment: Pt recipricol steps, good foiot clearance, educated on energy conservation.      Balance  Posture: Fair  Sitting - Static: Fair;+  Sitting - Dynamic: Fair;-  Standing - Static: Good  Standing - Dynamic: Fair;+  Comments: standing balance assessed w/ RW  Exercises  Comments: Pt tolerated seated ther ex and breathing ther ex, given cardiac packet  Other exercises?: Yes  Other exercises 1: Seated LE exercise program: Long Arc Quads, hip abduction/adduction, heel/toe raises, and marches.  Reps: 15  Other exercises 2: IS and Accapella x10      Goals  Short term goals  Time Frame for Short term goals: 15  Short term goal 1: Pt to perform bed mobility Iesha  Short term goal 2: Demonstrate functional transfers with supervision  Short term goal 3: Pt to ambulate 150ft w/ least restrictive AD with supervision  Short term goal 4: Demonstrate independent performance of cardiac rehab program HEP  Patient Goals   Patient goals : Did not state    Plan    Plan  Times per week: 7x/week; 1-2x/day  Specific instructions for Next Treatment: endurance  Current Treatment Recommendations: Strengthening,Home Exercise Program,ROM,Safety Education & Roxianne Riddles Training,Patient/Caregiver Education & Training,Functional Mobility Training,Equipment Evaluation, Education, & procurement,Transfer Training,Gait Training,Stair training  Safety Devices  Type of devices: Left in chair,Call light within reach,Chair alarm in place,Gait belt,Patient at risk for falls,Nurse notified  Restraints  Initially in place: No     Therapy Time   Individual Concurrent Group Co-treatment   Time In 1435         Time Out 1500         Minutes 25         Timed Code Treatment Minutes: [address] PTA

## 2022-04-15 NOTE — DISCHARGE INSTR - COC
Continuity of Care Form    Patient Name: Ainsley Leonardo   :  1965  MRN:  [id number]    Admit date:  4/3/2022  Discharge date:  22      Code Status Order: Full Code   Advance Directives:   Advance Care Flowsheet Documentation       Date/Time Healthcare Directive Type of Healthcare Directive Copy in [address] Agent's Name Healthcare Agent's Phone Number    [phone] No, patient does not have an advance directive for healthcare treatment -- -- -- -- --            Admitting Physician:  Eileen Vaca MD  PCP: Saundra Guan MD    Discharging Nurse: [facility] Unit/Room#: 3765/1443-95  Discharging Unit Phone Number: [phone]    Emergency Contact:   Extended Emergency Contact Information  Primary Emergency Contact: Indigo Torres  Address: [address] [place] [address], [address] [place] [address] Phone: [phone]  Relation: Parent  Secondary Emergency Contact: [place] Phone: [phone]  Relation: Brother/Sister    Past Surgical History:  Past Surgical History:   Procedure Laterality Date    APPENDECTOMY      CORONARY ANGIOPLASTY WITH STENT PLACEMENT      CORONARY ARTERY BYPASS GRAFT N/A 2022    CABG X 3 / EVH LEFT LEG / KIT performed by Bradley Reid MD at [address] [place] [address]         Immunization History:   Immunization History   Administered Date(s) Administered    COVID-19, Pfizer Purple top, DILUTE for use, 12+ yrs, 30mcg/0.3mL dose 2021    Pneumococcal Polysaccharide ([id number]) 2017    Tdap (Boostrix, Adacel) 2014       Active Problems:  Patient Active Problem List   Diagnosis Code    Stroke ([facility] [address] I63.9    HTN (hypertension) I10    CAD (coronary artery disease) with multiple stents I25.10    Asthma J45.909    Depression F32. A    Smoker F17.200    Dementia (HCC) F03.90    Anxiety F41.9    Dyslipidemia E78.5    Essential hypertension I10    Neck pain M54.2    Memory problem R41.3    History of stroke Z86.73    DDD (degenerative disc disease), cervical M50.30    Grade III hemorrhoids K64.2    Mixed hyperlipidemia E78.2    Current mild episode of major depressive disorder without prior episode ([facility]) F32.0    Allergic sinusitis J30.9    Refusal of statin medication by patient Z53.29    Prediabetes R73.03    Sinus bradycardia R00.1    Thyroid nodule E04.1    NSTEMI (non-ST elevated myocardial infarction) ([facility]) I21.4    Multiple vessel coronary artery disease s/p CABG  I25.10       Isolation/Infection:   Isolation            No Isolation          Patient Infection Status       Infection Onset Added Last Indicated Last Indicated By Review Planned Expiration Resolved Resolved By    None active    Resolved    COVID-19 (Rule Out) 02/17/21 02/17/21 02/17/21 COVID-19 (Ordered)   02/18/21 Rule-Out Test Resulted            Nurse Assessment:  Last Vital Signs: BP (!) 149/86   Pulse 104   Temp 98.5 °F (36.9 °C) (Oral)   Resp 20   Ht 5' 1\" (1.549 m)   Wt 160 lb 7.9 oz (72.8 kg)   SpO2 96%   BMI 30.33 kg/m²     Last documented pain score (0-10 scale): Pain Level: 9  Last Weight:   Wt Readings from Last 1 Encounters:   04/15/22 160 lb 7.9 oz (72.8 kg)     Mental Status:  oriented and alert    IV Access:  - None    Nursing Mobility/ADLs:  Walking   Independent  Transfer  Independent  Bathing  Independent  Dressing  Independent  Toileting  Independent  Feeding  [address]  Independent  Med Delivery   whole    Wound Care Documentation and Therapy:        Elimination:  Continence: Bowel: Yes  Bladder: Yes  Urinary Catheter: None   Colostomy/Ileostomy/Ileal Conduit: No       Date of Last BM: 4/15/2022      Intake/Output Summary (Last 24 hours) at 4/15/2022 1106  Last data filed at 4/15/2022 0400  Gross per 24 hour   Intake 100 ml   Output 1750 ml   Net -1650 ml     I/O last 3 completed shifts:   In: 340 [P.O.:340]  [facility] 30[facility]    Safety Concerns: At Risk for Falls    Impairments/Disabilities:      None    Nutrition Therapy:  Current Nutrition Therapy:   - Oral Diet:  Cardiac    Routes of Feeding: Oral  Liquids: No Restrictions  Daily Fluid Restriction: no  Last Modified Barium Swallow with Video (Video Swallowing Test): not done    Treatments at the Time of Hospital Discharge:   Respiratory Treatments:   Oxygen Therapy:  is not on home oxygen therapy.   Ventilator:    - No ventilator support    Rehab Therapies: Physical Therapy and Occupational Therapy  Weight Bearing Status/Restrictions: No weight bearing restrictions  Other Medical Equipment (for information only, NOT a DME order):  walker  Other Treatments: Heart Hugger Use / Movement as directed in discharge education, do not lift more than 5 lbs for 8 weeks time    Patient's personal belongings (please select all that are sent with patient):  None    RN SIGNATURE:  Electronically signed by Pepe Marinelli RN on 4/16/22 at 12:15 PM EDT    CASE MANAGEMENT/SOCIAL WORK SECTION    Inpatient Status Date: 4/3/22    Readmission Risk Assessment Score:  Readmission Risk              Risk of Unplanned Readmission:  23           [facility] Facility/ Agency   [facility]          [address] [place] [id number]       Phone: [phone]        Phone:  Fax:    Dialysis Facility (if applicable)   Name:  Address:  Dialysis Schedule:  Phone:  Fax:    / signature: Electronically signed by Sonam Brantley RN on 4/16/22 at 12:01 PM EDT    PHYSICIAN SECTION    Prognosis: Good    Condition at Discharge: Stable    Rehab Potential (if transferring to Rehab): {Prognosis:[id number]}    Recommended Labs or Other Treatments After Discharge: ***    Physician Certification: I certify the above information and transfer of Fortunato Gomez  is necessary for the continuing treatment of the diagnosis listed and that she requires Home Care for less 30 days.      Update Admission H&P: No change in H&P    PHYSICIAN SIGNATURE:  Electronically signed by Ainsley Retana MD on 4/16/22 @ 12:22 PM oral

## 2022-04-15 NOTE — CARE COORDINATION
Met with pt to discuss transition plans, received [facility] choice of [facility], if they are unable to accept, pt voiced no further preference, per pt, \"send them and let me know who can accept. \"    Pt will be staying with her mother at [address], [place], [address]. Referral sent to [facility]. [phone] - Received call from Nilda at [facility], pt is accepted to [facility].

## 2022-04-15 NOTE — PROGRESS NOTES
PULMONARY & CRITICAL CARE MEDICINE PROGRESS NOTE     Patient:  Rachael Calvo  MRN: [id number]  Admit date: 4/3/2022  Primary Care Physician: Arnol Drew MD  Consulting Physician: Adin Vasquez MD  CODE Status: Full Code  LOS: 12     SUBJECTIVE     CHIEF COMPLAINT/REASON FOR INITIAL CONSULT: Vent management    BRIEF HOSPITAL COURSE:   The patient is a 61 y.o. female with past medical history of smoking, CAD s/p stent, strong family history of heart disease, in the past initially presented with chest pain. Found to have elevated troponin. Patient required cardiac catheterization found to have multivessel disease. CT surgery was consulted and she was taken  to the OR, underwent CABG x3. Post CABG patient remained intubated and sedated. Pulmonology was consulted for vent management. Upon my evaluation, patient is intubated and sedated. She is on SIMV mode 24/380/5/50 ABG this morning showed 7.4/35/96/24. She is sedated with propofol at 20. She is off of pressor support. Post CABG patient had acute drop in hemoglobin requiring multiple transfusion. Chest x-ray showed left pleural effusion.   She still has a chest tube in place both in the pleural and mediastinal.    INTERVAL HISTORY:  04/15/22  On 3 l nc   No wob   Sitting in chair   Has cough small sputum   Sob with ex but not at rest   OBJECTIVE     PaO2/FiO2 RATIO:  Recent Labs     22  0549   POCPO2 77.1*      FiO2 : 50 %     VITAL SIGNS:   LAST:  BP (!) 149/86   Pulse 104   Temp 98.5 °F (36.9 °C) (Oral)   Resp 20   Ht 5' 1\" (1.549 m)   Wt 160 lb 7.9 oz (72.8 kg)   SpO2 96%   BMI 30.33 kg/m²   8-24 HR RANGE:  TEMP Temp  Av.1 °F (36.7 °C)  Min: 97.7 °F (36.5 °C)  Max: 98.5 °F (23.0 °C)   BP Systolic (97XIU), UTJ:609 , Min:119 , LCB:513      Diastolic (87QZD), SXV:00, Min:77, Max:89     PULSE Pulse  Av.7  Min: 87  Max: 113   RR Resp  Av.5  Min: 16  Max: 20   O2 SAT SpO2  Av %  Min: 96 %  Max: 98 %   OXYGEN DELIVERY O2 Flow Rate (L/min)  Avg: 3 L/min  Min: 3 L/min  Max: 3 L/min        Exam   Awake alert   Tachypnea   Tachycardia   Lungs barrel chest, prolonged expiratory phase and decreased breath sound left post and dull  cvs tachy s1 s2 no s3   abd nt bs +  Le faustino a  ue edema   Neuro cn normal motor 5/5     Review of Systems   Constitutional: Positive for fatigue and fever. HENT: Negative. Respiratory: Positive for cough and shortness of breath. Cardiovascular: Positive for leg swelling. Gastrointestinal: Negative.            DATA REVIEW     Medications:  Scheduled Meds:   metoprolol  2.5 mg IntraVENous Q6H    cefepime  2,000 mg IntraVENous Q8H    furosemide  20 mg IntraVENous BID    docusate  100 mg Oral BID    sodium chloride flush  5-40 mL IntraVENous 2 times per day    sodium chloride flush  5-40 mL IntraVENous 2 times per day    sodium chloride flush  10 mL IntraVENous 2 times per day    aspirin  81 mg Oral Daily    clopidogrel  75 mg Oral Daily    amiodarone  200 mg Oral TID    [Held by provider] metoprolol tartrate  25 mg Oral BID    atorvastatin  20 mg Oral Nightly    pantoprazole  40 mg Oral Daily    pantoprazole (PROTONIX) 40 mg injection  40 mg IntraVENous Daily    ipratropium-albuterol  1 ampule Inhalation Q4H WA    senna  1 tablet Oral Once    [Held by provider] hydroCHLOROthiazide  25 mg Oral Daily    lovastatin  10 mg Oral Nightly    sodium chloride flush  5-40 mL IntraVENous 2 times per day    [Held by provider] amLODIPine  10 mg Oral Daily    nicotine  1 patch TransDERmal Daily     Continuous Infusions:   sodium chloride Stopped (04/11/22 1935)    sodium chloride Stopped (04/11/22 0447)    dextrose 25 mL/hr (04/12/22 1831)    EPINEPHrine      sodium chloride 20 mL/hr at 04/04/22 0408       INPUT/OUTPUT:  In: 340 [P.O.:340]  Out: 2300 [Urine:2300]  Date 04/15/22 0000 - 04/15/22 2359   Shift 1434-3547 3588-5082 2861-1399 24 Hour Total   INTAKE   Shift Total(mL/kg)       OUTPUT Urine(mL/kg/hr) 850(1.5)   850   Shift Total(mL/kg) 850(11.7)   850(11.7)   Weight (kg) 72.8 72.8 72.8 72.8        LABS:  ABGs:   Recent Labs     04/12/22 2051 04/13/22  0549   POCPH 7.545* 7.484*   POCPCO2 35.4 30.6*   POCPO2 122.0* 77.1*   POCHCO3 30.6* 23.0   [id number] 99* 96     CBC:   Recent Labs     04/13/22 0316 04/13/22 1832 04/14/22 0308 04/14/22  1052 04/15/22  0449   WBC 18.3*  --  17.5*  --  16.9*   HGB 9.4* 9.7* 9.5* 10.0* 9.2*   HCT 28.3* 29.7* 29.8* 30.1* 28.5*   MCV 90.1  --  92.0  --  91.3     --  238  --  279   LYMPHOPCT 13*  --  11*  --  14*   RBC 3.14*  --  3.24*  --  3.12*   MCH 29.9  --  29.3  --  29.5   MCHC 33.2  --  31.9  --  32.3   RDW 14.9*  --  14.4  --  13.9     CRP:   No results for input(s): CRP in the last 72 hours. LDH:   No results for input(s): LDH in the last 72 hours. BMP:   Recent Labs     04/12/22  2051 04/13/22  0316 04/14/22  0308 04/15/22  0449   NA  --  138 135 132*   K  --  3.9 3.7 3.7   CL  --  103 100 97*   CO2  --  25 24 24   BUN  --  14 24* 19   CREATININE 0.76 0.61 0.86 0.69   GLUCOSE  --  105* 99 101*     Liver Function Test:   No results for input(s): PROT, LABALBU, ALT, AST, GGT, ALKPHOS, BILITOT in the last 72 hours. Coagulation Profile:   Recent Labs     04/13/22  0316 04/14/22  0308 04/15/22  0449   INR 1.0 1.1 1.1   PROTIME 10.8 11.3 11.9     D-Dimer:  No results for input(s): DDIMER in the last 72 hours. Lactic Acid:  No results for input(s): LACTA in the last 72 hours. Cardiac Enzymes:  No results for input(s): CKTOTAL, CKMB, CKMBINDEX, TROPONINI in the last 72 hours. Invalid input(s): TROPONIN, HSTROP  BNP/ProBNP:   No results for input(s): BNP, PROBNP in the last 72 hours. Triglycerides:  No results for input(s): TRIG in the last 72 hours.      Microbiology:  Urine Culture:  No components found for: CURINE  Blood Culture:  No components found for: CBLOOD, CFUNGUSBL  Sputum Culture:  No components found for: CSPUTUM  No results for input(s): SPECDESC, SPECIAL, CULTURE, STATUS, ORG, CDIFFTOXPCR, CAMPYLOBPCR, SALMONELLAPC, SHIGAPCR, SHIGELLAPCR, MPNEUG, MPNEUM, LACTOQL in the last 72 hours. No results for input(s): SPUTUM, SPECDESC, SPECIAL, CULTURE, STATUS, ORG, CDIFFTOXPCR, MPNEUM, MPNEUG in the last 72 hours. Invalid input(s): CURINE, CBLOOD, CFUNGUSBL     Pathology:    Radiology Reports:  XR CHEST PORTABLE   Final Result   Interim removal of the [place] R[place]-Rodríguez catheter. Mild bibasilar atelectasis and left pleural effusion improved from yesterday. XR CHEST PORTABLE   Final Result   1. Bibasilar and bilateral parahilar airspace disease, similar to the prior   study with stable small left-sided pleural effusion. Findings likely   represent multifocal pneumonia. Follow-up is recommended to document   resolution. 2. Stable cardiomegaly. Prior median sternotomy and CABG. 3. Right IJ approach central venous catheter distal tip overlying the   cavoatrial junction, stable. FL MODIFIED BARIUM SWALLOW W VIDEO   Final Result   1. No penetration or aspiration with the above administered substances. 2. Patient could not swallow the soft solid substance even after liquid wash   attempt and expectorated the soft solid substance. 3. Cookie solid substance was not attempted. Please see separate speech pathology report for full discussion of findings   and recommendations. XR CHEST PORTABLE   Final Result   Removal of left chest tube. Persistent moderate left pleural effusion. Persistent multifocal airspace consolidation. Findings suggest multifocal   pneumonia. No pneumothorax. XR CHEST PORTABLE   Final Result   No substantial interval change in bilateral airspace disease or left greater   than right pleural effusions as compared to prior. XR CHEST PORTABLE   Final Result   Worsening right basilar atelectasis, otherwise similar appearing chest.  No   pneumothorax.          XR CHEST PORTABLE   Final Result   Persistent bilateral airspace disease, with increased density in the right   upper lobe as compared to prior. Persistent small left pleural effusion. XR ABDOMEN FOR NG/OG/NE TUBE PLACEMENT   Final Result   Endotracheal tube tip is approximately 4 cm above the maria r. Similar perihilar infiltrates, left greater than right. Nasogastric tube tip appears to be in the stomach. XR CHEST PORTABLE   Final Result   Endotracheal tube tip is approximately 4 cm above the maria r. Similar perihilar infiltrates, left greater than right. Nasogastric tube tip appears to be in the stomach. XR CHEST PORTABLE   Final Result   Interval progression detailed above. XR CHEST PORTABLE   Final Result   Interim removal of the endotracheal and NG tubes. Chest tubes unchanged. New mild airspace opacity at the base of the right upper lobe. Pulmonary   hemorrhage or aspiration not excluded. Improved aeration of the left lung and possibly decreased left pleural   effusion. XR CHEST PORTABLE   Final Result      XR CHEST PORTABLE   Final Result   1. Lines and tubes in expected position as above. 2. No acute pulmonary abnormality. VL DUP UPPER EXTREMITY ARTERIES BILATERAL   Final Result      VL DUP CAROTID BILATERAL   Final Result      VL VEIN MAPPING LOWER BILATERAL   Final Result      US HEAD NECK SOFT TISSUE THYROID   Final Result   Slightly heterogeneous thyroid gland with a 2.9 cm right thyroid lobe cyst   corresponding to the prior imaging studies. No FNA or follow-up imaging is   recommended based on TI rads criteria.       RECOMMENDATIONS:   ACR TI-RADS recommendations:      TR5 (>= 7 points):  FNA if >= 1 cm; follow-up if 0.5-0.9 cm in 1, 2, 3, 4,   and 5 years      TR4 (4-6 points):  FNA if >= 1.5 cm; follow-up if 1.0-1.4 cm in 1, 2, 3, and   5 years      TR3 (3 points):  FNA if >= 2.5 cm; follow-up if 1.5-2.4 cm in 1, 3, and 5 years      TR2 (2 points):  No FNA or follow-up      TR1 (0 points):  No FNA or follow-up      ACR TI-RADS recommends that no more than two nodules with the highest ACR   TI-RADS point total should be biopsied and no more than four nodules should   be followed. CT CHEST WO CONTRAST   Final Result   incidental 3 cm hypodense lesion of the right lobe of the thyroid. See below   for recommendation. Calcified granuloma in the right middle lobe. Examination otherwise unremarkable. RECOMMENDATIONS:   3 cm incidental right thyroid nodule. Recommend thyroid US. Reference: J Am Carlitos Radiol. 2015 Feb;12(2): 143-50         XR CHEST PORTABLE   Final Result   No acute process. XR CHEST PORTABLE    (Results Pending)        Echocardiogram:   Results for orders placed during the hospital encounter of 04/03/22    ECHO Complete 2D W Doppler W Color    CONCLUSIONS    Summary  Normal LV size , mildly increased wall thickness. No obvious wall motion abnormality seen. Normal LV systolic function with LVEF >55%. Normal RV size and function. LA and RA appears normal in size. No obvious significant structural valvular abnormality noted. No significant valvular stenosis or regurgitation noted. Normal aortic root dimension. No significant pericardial effusion noted. No obvious intra-cardiac mass or shunt noted. IVC normal diameter and inspiratory variation indicating normal RA filling  pressure. ASSESSMENT AND PLAN     Assessment:    // Acute hypoxic respiratory failure,  // Bilateral aspiration pneumonia - pseudomonas   // S/p bronchoscopic clearance of bilateral mucus plugging (4/10/2022)  // Coronary disease, s/p CABG x3 (4/8/2022)  // Postoperative blood loss anemia, s/p multiple transfusion  // Thyroid nodule  ?  Left loculated pleural effusion     Plan:    I personally interviewed/examined the patient; reviewed interval history, interpreted all available radiographic and laboratory data at the time of service. Xray chest reviewed - increased left lower lobe density - atelectasis / loculated effusion . Will get ct chest - d/w CTS and thoracentesis if pleural effusion - d/w Dr Jenifer Hodges previously   Complete cefepime   Pt ot   Is   bronchodilator       The patient remains critically ill with illness/injury that acutely impairs one or more vital organ systems, such that there is a high probability of imminent or life threatening deterioration in the patient's condition. Critical care time of 30 minutes was spent (excluding procedures), in coordination of care during bedside rounds and discussion of patient care in detail, and recommendations of the team were adopted in the plan. Necessity of all invasive devices was also confirmed. Edward Goldman MD  Pulmonary and Critical Care Medicine           4/15/2022, 9:38 AM    Please note that this chart was generated using voice recognition Dragon dictation software. Although every effort was made to ensure the accuracy of this automated transcription, some errors in transcription may have occurred.

## 2022-04-15 NOTE — PROGRESS NOTES
[facility] Cardiothoracic Surgical   Progress Note    4/15/2022 9:09 AM  Surgeon:  Felicia Mcintosh          POD# 6  S/P :  Coronary artery bypass  EF:  55 %    Subjective:  Ms. To Stairs   Nauseous    Objective:  Chest: pacing wires: yes, chest tubes:yes, air leak no, 3 +  CV: no murmur noted, Normal S1, S2,   Lungs: clear to auscultation, no wheezes, rales, or rhonchi  Abd: normal bowel sounds   Lower Extremities: Trace edema  Saph Incison: no sign of drainage or infection  Sternal Incison: dressing applied-no excessive drainage or signs of infection noted    Vital Signs: BP (!) 149/86   Pulse 92   Temp 98.5 °F (36.9 °C) (Oral)   Resp 16   Ht 5' 1\" (1.549 m)   Wt 160 lb 7.9 oz (72.8 kg)   SpO2 98%   BMI 30.33 kg/m²  O2 Flow Rate (L/min): 3 L/min   Admit Weight: Weight: 140 lb (63.5 kg)     Objective:  Vitals:    04/15/22 0721   BP:    Pulse:    Resp: 16   Temp: 98.5 °F (36.9 °C)   SpO2:        Labs and Studies:  CBC:   Recent Labs     04/13/22  0316 04/13/22  1832 04/14/22  0308 04/14/22  1052 04/15/22  0449   WBC 18.3*  --  17.5*  --  16.9*   HGB 9.4*   < > 9.5* 10.0* 9.2*   HCT 28.3*   < > 29.8* 30.1* 28.5*   MCV 90.1  --  92.0  --  91.3     --  238  --  279    < > = values in this interval not displayed. BMP:   Recent Labs     04/13/22  0316 04/14/22  0308 04/15/22  0449    135 132*   K 3.9 3.7 3.7    100 97*   CO2 25 24 24   BUN 14 24* 19   CREATININE 0.61 0.86 0.69     PT/INR:   Recent Labs     04/13/22  0316 04/14/22  0308 04/15/22  0449   PROTIME 10.8 11.3 11.9   INR 1.0 1.1 1.1     APTT:   No results for input(s): APTT in the last 72 hours. I/O:  I/O last 3 completed shifts:   In: 340 [P.O.:340]  Out: 3050 [Urine:3050]    Scheduled Meds:    metoprolol  2.5 mg IntraVENous Q6H    cefepime  2,000 mg IntraVENous Q8H    furosemide  20 mg IntraVENous BID    docusate  100 mg Oral BID    sodium chloride flush  5-40 mL IntraVENous 2 times per day    sodium chloride flush  5-40 mL IntraVENous 2 times per day    sodium chloride flush  10 mL IntraVENous 2 times per day    aspirin  81 mg Oral Daily    clopidogrel  75 mg Oral Daily    amiodarone  200 mg Oral TID    [Held by provider] metoprolol tartrate  25 mg Oral BID    atorvastatin  20 mg Oral Nightly    pantoprazole  40 mg Oral Daily    pantoprazole (PROTONIX) 40 mg injection  40 mg IntraVENous Daily    ipratropium-albuterol  1 ampule Inhalation Q4H WA    senna  1 tablet Oral Once    [Held by provider] hydroCHLOROthiazide  25 mg Oral Daily    lovastatin  10 mg Oral Nightly    sodium chloride flush  5-40 mL IntraVENous 2 times per day    [Held by provider] amLODIPine  10 mg Oral Daily    nicotine  1 patch TransDERmal Daily     Continuous Infusions:    sodium chloride Stopped (04/11/22 1935)    sodium chloride Stopped (04/11/22 0447)    dextrose 25 mL/hr (04/12/22 1831)    EPINEPHrine      sodium chloride 20 mL/hr at 04/04/22 0408     Beta-Blocker: yes  ASA: yes  Plavix: yes  GI: yes  Statin: yes  Coumadin: no  ACE-I: no    Assessment/Plan:   Nausea no vomiting. Has had BM. On zofran.  Start reglan 5mg try to remove any medications that could cause nause  Cut pacing wires  WBC improving, continue abx  D/C this weekend    Henny Wallacema

## 2022-04-16 ENCOUNTER — APPOINTMENT (OUTPATIENT)
Dept: GENERAL RADIOLOGY | Age: 57
DRG: 233 | End: 2022-04-16
Payer: MEDICARE

## 2022-04-16 VITALS
TEMPERATURE: 98.5 F | HEIGHT: 61 IN | BODY MASS INDEX: 30.3 KG/M2 | WEIGHT: 160.5 LBS | OXYGEN SATURATION: 93 % | RESPIRATION RATE: 16 BRPM | HEART RATE: 105 BPM | DIASTOLIC BLOOD PRESSURE: 80 MMHG | SYSTOLIC BLOOD PRESSURE: 159 MMHG

## 2022-04-16 LAB
ABSOLUTE EOS #: 0.58 K/UL (ref 0–0.4)
ABSOLUTE IMMATURE GRANULOCYTE: 0.19 K/UL (ref 0–0.3)
ABSOLUTE LYMPH #: 2.9 K/UL (ref 1–4.8)
ABSOLUTE MONO #: 1.93 K/UL (ref 0.1–0.8)
ANION GAP SERPL CALCULATED.3IONS-SCNC: 11 MMOL/L (ref 9–17)
BASOPHILS # BLD: 0 % (ref 0–2)
BASOPHILS ABSOLUTE: 0 K/UL (ref 0–0.2)
BUN BLDV-MCNC: 13 MG/DL (ref 6–20)
CALCIUM SERPL-MCNC: 8.6 MG/DL (ref 8.6–10.4)
CHLORIDE BLD-SCNC: 100 MMOL/L (ref 98–107)
CO2: 25 MMOL/L (ref 20–31)
CREAT SERPL-MCNC: 0.75 MG/DL (ref 0.5–0.9)
EOSINOPHILS RELATIVE PERCENT: 3 % (ref 1–4)
GFR AFRICAN AMERICAN: >60 ML/MIN
GFR NON-AFRICAN AMERICAN: >60 ML/MIN
GFR SERPL CREATININE-BSD FRML MDRD: ABNORMAL ML/MIN/{1.73_M2}
GLUCOSE BLD-MCNC: 116 MG/DL (ref 70–99)
HCT VFR BLD CALC: 29.6 % (ref 36.3–47.1)
HEMOGLOBIN: 9.8 G/DL (ref 11.9–15.1)
IMMATURE GRANULOCYTES: 1 %
INR BLD: 1.1
LYMPHOCYTES # BLD: 15 % (ref 24–44)
MAGNESIUM: 2.1 MG/DL (ref 1.6–2.6)
MCH RBC QN AUTO: 30.1 PG (ref 25.2–33.5)
MCHC RBC AUTO-ENTMCNC: 33.1 G/DL (ref 28.4–34.8)
MCV RBC AUTO: 90.8 FL (ref 82.6–102.9)
MONOCYTES # BLD: 10 % (ref 1–7)
MORPHOLOGY: NORMAL
NRBC AUTOMATED: 0 PER 100 WBC
PDW BLD-RTO: 13.6 % (ref 11.8–14.4)
PLATELET # BLD: 376 K/UL (ref 138–453)
PMV BLD AUTO: 9.5 FL (ref 8.1–13.5)
POTASSIUM SERPL-SCNC: 3.8 MMOL/L (ref 3.7–5.3)
PROTHROMBIN TIME: 11.6 SEC (ref 9.1–12.3)
RBC # BLD: 3.26 M/UL (ref 3.95–5.11)
SEG NEUTROPHILS: 71 % (ref 36–66)
SEGMENTED NEUTROPHILS ABSOLUTE COUNT: 13.7 K/UL (ref 1.8–7.7)
SODIUM BLD-SCNC: 136 MMOL/L (ref 135–144)
WBC # BLD: 19.3 K/UL (ref 3.5–11.3)

## 2022-04-16 PROCEDURE — 36415 COLL VENOUS BLD VENIPUNCTURE: CPT

## 2022-04-16 PROCEDURE — 2580000003 HC RX 258: Performed by: INTERNAL MEDICINE

## 2022-04-16 PROCEDURE — 6370000000 HC RX 637 (ALT 250 FOR IP): Performed by: STUDENT IN AN ORGANIZED HEALTH CARE EDUCATION/TRAINING PROGRAM

## 2022-04-16 PROCEDURE — 6360000002 HC RX W HCPCS: Performed by: STUDENT IN AN ORGANIZED HEALTH CARE EDUCATION/TRAINING PROGRAM

## 2022-04-16 PROCEDURE — 6370000000 HC RX 637 (ALT 250 FOR IP): Performed by: NURSE PRACTITIONER

## 2022-04-16 PROCEDURE — 94640 AIRWAY INHALATION TREATMENT: CPT

## 2022-04-16 PROCEDURE — 6360000002 HC RX W HCPCS: Performed by: INTERNAL MEDICINE

## 2022-04-16 PROCEDURE — 97535 SELF CARE MNGMENT TRAINING: CPT

## 2022-04-16 PROCEDURE — 94761 N-INVAS EAR/PLS OXIMETRY MLT: CPT

## 2022-04-16 PROCEDURE — 85610 PROTHROMBIN TIME: CPT

## 2022-04-16 PROCEDURE — 2500000003 HC RX 250 WO HCPCS: Performed by: INTERNAL MEDICINE

## 2022-04-16 PROCEDURE — 83735 ASSAY OF MAGNESIUM: CPT

## 2022-04-16 PROCEDURE — 2580000003 HC RX 258: Performed by: NURSE PRACTITIONER

## 2022-04-16 PROCEDURE — 85025 COMPLETE CBC W/AUTO DIFF WBC: CPT

## 2022-04-16 PROCEDURE — 2700000000 HC OXYGEN THERAPY PER DAY

## 2022-04-16 PROCEDURE — 71045 X-RAY EXAM CHEST 1 VIEW: CPT

## 2022-04-16 PROCEDURE — 80048 BASIC METABOLIC PNL TOTAL CA: CPT

## 2022-04-16 PROCEDURE — 99233 SBSQ HOSP IP/OBS HIGH 50: CPT | Performed by: INTERNAL MEDICINE

## 2022-04-16 PROCEDURE — 99024 POSTOP FOLLOW-UP VISIT: CPT | Performed by: PHYSICIAN ASSISTANT

## 2022-04-16 RX ORDER — POTASSIUM CHLORIDE 20 MEQ/1
40 TABLET, EXTENDED RELEASE ORAL PRN
Qty: 60 TABLET | Refills: 3 | Status: ON HOLD | OUTPATIENT
Start: 2022-04-16 | End: 2022-04-27 | Stop reason: HOSPADM

## 2022-04-16 RX ORDER — AMIODARONE HYDROCHLORIDE 200 MG/1
100 TABLET ORAL 3 TIMES DAILY
Qty: 90 TABLET | Refills: 0 | Status: ON HOLD | OUTPATIENT
Start: 2022-04-16 | End: 2022-04-25 | Stop reason: SDUPTHER

## 2022-04-16 RX ORDER — AMLODIPINE BESYLATE 10 MG/1
10 TABLET ORAL DAILY
Qty: 30 TABLET | Refills: 3 | Status: SHIPPED | OUTPATIENT
Start: 2022-04-17

## 2022-04-16 RX ORDER — OXYCODONE HYDROCHLORIDE AND ACETAMINOPHEN 5; 325 MG/1; MG/1
1 TABLET ORAL EVERY 8 HOURS PRN
Qty: 25 TABLET | Refills: 0 | Status: SHIPPED | OUTPATIENT
Start: 2022-04-16 | End: 2022-04-19

## 2022-04-16 RX ORDER — CALCIUM CARBONATE 200(500)MG
500 TABLET,CHEWABLE ORAL 3 TIMES DAILY PRN
Qty: 90 TABLET | Refills: 1 | Status: SHIPPED | OUTPATIENT
Start: 2022-04-16 | End: 2022-05-16

## 2022-04-16 RX ORDER — ASPIRIN 81 MG/1
81 TABLET ORAL DAILY
Qty: 30 TABLET | Refills: 3 | Status: SHIPPED | OUTPATIENT
Start: 2022-04-17

## 2022-04-16 RX ADMIN — ACETAMINOPHEN 650 MG: 325 TABLET ORAL at 07:04

## 2022-04-16 RX ADMIN — WATER 2000 MG: 1 INJECTION INTRAMUSCULAR; INTRAVENOUS; SUBCUTANEOUS at 00:30

## 2022-04-16 RX ADMIN — METOPROLOL TARTRATE 2.5 MG: 5 INJECTION INTRAVENOUS at 09:00

## 2022-04-16 RX ADMIN — PANTOPRAZOLE SODIUM 40 MG: 40 TABLET, DELAYED RELEASE ORAL at 08:45

## 2022-04-16 RX ADMIN — METOPROLOL TARTRATE 2.5 MG: 5 INJECTION INTRAVENOUS at 04:21

## 2022-04-16 RX ADMIN — Medication 81 MG: at 08:45

## 2022-04-16 RX ADMIN — SODIUM CHLORIDE, PRESERVATIVE FREE 10 ML: 5 INJECTION INTRAVENOUS at 08:49

## 2022-04-16 RX ADMIN — OXYCODONE HYDROCHLORIDE AND ACETAMINOPHEN 2 TABLET: 5; 325 TABLET ORAL at 01:55

## 2022-04-16 RX ADMIN — OXYCODONE HYDROCHLORIDE AND ACETAMINOPHEN 2 TABLET: 5; 325 TABLET ORAL at 11:26

## 2022-04-16 RX ADMIN — WATER 2000 MG: 1 INJECTION INTRAMUSCULAR; INTRAVENOUS; SUBCUTANEOUS at 08:48

## 2022-04-16 RX ADMIN — CLOPIDOGREL 75 MG: 75 TABLET, FILM COATED ORAL at 08:45

## 2022-04-16 RX ADMIN — AMIODARONE HYDROCHLORIDE 200 MG: 200 TABLET ORAL at 08:45

## 2022-04-16 RX ADMIN — IPRATROPIUM BROMIDE AND ALBUTEROL SULFATE 1 AMPULE: .5; 3 SOLUTION RESPIRATORY (INHALATION) at 09:55

## 2022-04-16 RX ADMIN — ALBUTEROL SULFATE 2.5 MG: 2.5 SOLUTION RESPIRATORY (INHALATION) at 04:32

## 2022-04-16 ASSESSMENT — PAIN DESCRIPTION - LOCATION: LOCATION: CHEST

## 2022-04-16 ASSESSMENT — PAIN DESCRIPTION - PAIN TYPE: TYPE: SURGICAL PAIN

## 2022-04-16 ASSESSMENT — PAIN DESCRIPTION - DESCRIPTORS: DESCRIPTORS: ACHING

## 2022-04-16 ASSESSMENT — PAIN DESCRIPTION - FREQUENCY: FREQUENCY: CONTINUOUS

## 2022-04-16 ASSESSMENT — PAIN DESCRIPTION - ORIENTATION: ORIENTATION: MID

## 2022-04-16 ASSESSMENT — PAIN SCALES - GENERAL
PAINLEVEL_OUTOF10: 7
PAINLEVEL_OUTOF10: 2
PAINLEVEL_OUTOF10: 4
PAINLEVEL_OUTOF10: 7

## 2022-04-16 NOTE — PLAN OF CARE
Problem: Falls - Risk of:  Goal: Will remain free from falls  Description: Will remain free from falls  Outcome: Ongoing  Goal: Absence of physical injury  Description: Absence of physical injury  Outcome: Ongoing     Problem: Cardiac Output - Decreased:  Goal: Hemodynamic stability will improve  Description: Hemodynamic stability will improve  Outcome: Ongoing  Goal: Cardiac output within specified parameters  Description: Cardiac output within specified parameters  Outcome: Ongoing     Problem: Pain:  Goal: Pain level will decrease  Description: Pain level will decrease  Outcome: Ongoing  Goal: Control of acute pain  Description: Control of acute pain  Outcome: Ongoing  Goal: Control of chronic pain  Description: Control of chronic pain  Outcome: Ongoing     Problem: Tissue Perfusion - Cardiopulmonary, Altered:  Goal: Hemodynamic stability will improve  Description: Hemodynamic stability will improve  Outcome: Ongoing  Goal: Absence of angina  Description: Absence of angina  Outcome: Ongoing  Goal: Circulatory function within specified parameters  Description: Circulatory function within specified parameters  Outcome: Ongoing  Goal: Hemodynamic stability will improve  Description: Hemodynamic stability will improve  Outcome: Ongoing  Goal: Will show no evidence of cardiac arrhythmias  Description: Will show no evidence of cardiac arrhythmias  Outcome: Ongoing     Problem: Tobacco Use:  Goal: Will participate in inpatient tobacco-use cessation counseling  Description: Will participate in inpatient tobacco-use cessation counseling  Outcome: Ongoing     Problem: Pain:  Goal: Pain level will decrease  Description: Pain level will decrease  Outcome: Ongoing  Goal: Control of acute pain  Description: Control of acute pain  Outcome: Ongoing  Goal: Control of chronic pain  Description: Control of chronic pain  Outcome: Ongoing     Problem: Skin Integrity:  Goal: Will show no infection signs and symptoms  Description: Will show no infection signs and symptoms  Outcome: Ongoing  Goal: Absence of new skin breakdown  Description: Absence of new skin breakdown  Outcome: Ongoing     Problem: Nutrition  Goal: Optimal nutrition therapy  Outcome: Ongoing     Problem: Discharge Planning:  Goal: Discharged to appropriate level of care  Description: Discharged to appropriate level of care  Outcome: Ongoing     Problem:  Activity Intolerance:  Goal: Able to perform prescribed physical activity  Description: Able to perform prescribed physical activity  Outcome: Ongoing  Goal: Ability to tolerate increased activity will improve  Description: Ability to tolerate increased activity will improve  Outcome: Ongoing     Problem: Anxiety:  Goal: Level of anxiety will decrease  Description: Level of anxiety will decrease  Outcome: Ongoing     Problem: Fluid Volume - Imbalance:  Goal: Ability to achieve a balanced intake and output will improve  Description: Ability to achieve a balanced intake and output will improve  Outcome: Ongoing  Goal: Chest tube drainage is within specified parameters  Description: Chest tube drainage is within specified parameters  Outcome: Ongoing

## 2022-04-16 NOTE — PROGRESS NOTES
[facility]  Internal Medicine Teaching Residency Program  Inpatient Daily Progress Note  ______________________________________________________________________________    Patient: Kashmir Chapa  YOB: 1965   PEX:[id number]    Acct: [de-identified]     Room: [address] [place] [address]  Admit date: 4/3/2022  Today's date: 04/16/22  Number of days in the hospital: 13    SUBJECTIVE   Admitting Diagnosis: NSTEMI (non-ST elevated myocardial infarction) ([facility] [address]  CC: Typical chest pain  POD #7, S/P CABG  Patient could not be examined at bedside as she was in the restroom. Got extubated 4/12 successfully   No acute events overnight. Patient is afebrile and hemodynamically stable. Weaned from BiPAP to nasal cannula currently currently on 3-->2 L. Saturating 96% on 3 L nasal cannula. Chest tubes taken out 4/13  Patient is able to tolerate oral medications. Swallow study shows no aspiration. Currently on cefepime for pneumonia, pulmonary on board   Labs show leukocytosis wbc 17.5-->16.9-->19.3    ROS: Could not assess as patient not at bedside. BRIEF HISTORY     The patient is a pleasant 61 y.o. female with a past medical history significant for essential hypertension, coronary artery disease status post PCI on DAPT, hyperlipidemia, chronic active smoking, history of stroke, MDD, memory problem presents with a chief complaint of typical chest pain. Chest pain started 3 to 4 days ago, dull aching in nature, 5-6 out of 10 in intensity, radiating to the left shoulder, increased with exertion and relieved with rest.  Also admits to exertional shortness of breath without orthopnea or PND. Patient denies palpitation syncope nausea vomiting diarrhea headache.     Evaluation in the ER, she was found to have elevated blood pressure 175/110. Heart rate 78 regular. SPO2 99% on room air. Heart auscultation showed normal first and second heart sound without murmur gallop.   Chest is clear to auscultation. Abdomen soft and nontender. Labs reviewed normal BMP. No leukocytosis hemoglobin 14. EKG showed normal sinus rhythm with some ST-T wave changes in inferior lead. Troponin is elevated 80. proBNP 217. Chest x-ray is negative for acute abnormality. Cardiology has been consulted in the ER for the concern of NSTEMI. Patient is started on heparin drip as per ACS protocol and will probably have cardiac cath tomorrow.     Of note, she has a past medical history of coronary artery disease status post PCI in ? Followed by another PCI in ? Questionable medication compliance. She has been actively smoking and is currently half pack per day. Denies alcohol intake. Admits to marijuana use.     Patient underwent cardiac cath on 2022. Findings:  LMCA: Distal 30-40% stenosis   LAD: Mid stent stenosis 80%   LCx: Mid area 90% after the OM take off   OM1: in stent stenosis 90%   RCA: Proximal 75% stenosis   Mid area ulcerated plaques with 60% stenosis   PL branch ostial / proximal 90% stenosis     The LV gram was performed in the LAWTON 30 position. LVEF: 50 %. Conclusions:   Multivessel CAD with left main disease    PLower normal LV systolic function   Recommendations:  CT surgery consult and recommend CABG. 4/10/-s/p CABG, chest tubes in place, on BiPAP  2022. All chest tube in place. Reintubated for worsening oxygen requirement. Currently on FiO2 40% with a PEEP of 8.  2022. On mechanical ventilator with FiO2 40% and PEEP of 8.  /-extubated yesterday, currently saturating well on BiPAP, plan to wean to nasal cannula and get swallow study thereafter, chest tubes taken out  4/15/122 - CXR shows improved atlelectasis and left pleural effusion.     OBJECTIVE     Vital Signs:  /88   Pulse 89   Temp 97.6 °F (36.4 °C) (Oral)   Resp 18   Ht 5' 1\" (1.549 m)   Wt 160 lb 7.9 oz (72.8 kg)   SpO2 96%   BMI 30.33 kg/m²     Temp (24hrs), Av.4 °F (36.9 °C), Min:97.6 °F (36.4 °C), Max:98.9 °F (37.2 °C)    No intake/output data recorded. Physical Exam:  Constitutional: Drowsy, on BiPAP   EENT:  PERRLA. No conjunctival injections. Neck: Supple without thyromegaly. No elevated JVP. Trachea was midline. Respiratory: Breath sounds improved bilaterally. No crackles, rhonchi or rails. Cardiovascular: Chest tubes taken out. Normal heart sounds with no murmurs, rubs or gallops. Abdomen: Slightly rounded and soft without organomegaly. No rebound, rigidity or guarding was appreciated. Lymphatic: No lymphadenopathy. Musculoskeletal: Normal curvature of the spine. No gross muscle weakness. Extremities: Compression stockings on bilateral lower extremity no lower extremity edema, ulcerations, tenderness, varicosities or erythema. Muscle size, tone and strength are normal.  No involuntary movements are noted. Skin:  Warm and dry. Good color, turgor and pigmentation. No lesions or scars. No cyanosis or clubbing  Neurological/Psychiatric: Patient is more awake, alert and oriented today.     Medications:  Scheduled Medications:    metoprolol  2.5 mg IntraVENous Q6H    cefepime  2,000 mg IntraVENous Q8H    furosemide  20 mg IntraVENous BID    docusate  100 mg Oral BID    sodium chloride flush  5-40 mL IntraVENous 2 times per day    sodium chloride flush  5-40 mL IntraVENous 2 times per day    sodium chloride flush  10 mL IntraVENous 2 times per day    aspirin  81 mg Oral Daily    clopidogrel  75 mg Oral Daily    amiodarone  200 mg Oral TID    [Held by provider] metoprolol tartrate  25 mg Oral BID    atorvastatin  20 mg Oral Nightly    pantoprazole  40 mg Oral Daily    pantoprazole (PROTONIX) 40 mg injection  40 mg IntraVENous Daily    ipratropium-albuterol  1 ampule Inhalation Q4H WA    senna  1 tablet Oral Once    [Held by provider] hydroCHLOROthiazide  25 mg Oral Daily    lovastatin  10 mg Oral Nightly    sodium chloride flush  5-40 mL IntraVENous 2 times per day    [Held by provider] amLODIPine  10 mg Oral Daily    nicotine  1 patch TransDERmal Daily     Continuous Infusions:    sodium chloride Stopped (04/11/22 1935)    sodium chloride Stopped (04/11/22 0447)    dextrose 25 mL/hr (04/12/22 1831)    EPINEPHrine      sodium chloride 20 mL/hr at 04/04/22 0408     PRN Medicationslabetalol, 10 mg, Q4H PRN  bisacodyl, 10 mg, Daily PRN  ketorolac, 15 mg, Q6H PRN  LORazepam, 0.5 mg, Q6H PRN  morphine, 2 mg, Q4H PRN  albuterol, 2.5 mg, Q6H PRN  acetylcysteine, 600 mg, Q4H PRN  sodium chloride flush, 5-40 mL, PRN  sodium chloride, 25 mL, PRN  sodium chloride flush, 10 mL, PRN  ondansetron, 4 mg, Q8H PRN   Or  ondansetron, 4 mg, Q6H PRN  oxyCODONE-acetaminophen, 1 tablet, Q4H PRN   Or  oxyCODONE-acetaminophen, 2 tablet, Q4H PRN  fentanNYL, 25 mcg, Q1H PRN   Or  fentanNYL, 50 mcg, Q1H PRN  hydrALAZINE, 5 mg, Q5 Min PRN  diphenhydrAMINE, 25 mg, Nightly PRN  potassium chloride, 20 mEq, PRN  magnesium sulfate, 2,000 mg, PRN  albumin human, 25 g, PRN  glucose, 15 g, PRN  dextrose, 12.5 g, PRN  glucagon (rDNA), 1 mg, PRN  dextrose, 100 mL/hr, PRN  EPINEPHrine, 0.1-0.3 mcg/kg/min, Continuous PRN  magnesium hydroxide, 30 mL, Daily PRN  aspirin-acetaminophen-caffeine, 1 tablet, Q8H PRN  magnesium sulfate, 1,000 mg, PRN  potassium chloride, 10 mEq, PRN  potassium chloride, 40 mEq, PRN   Or  potassium alternative oral replacement, 40 mEq, PRN   Or  potassium chloride, 10 mEq, PRN  sodium chloride, , PRN  polyethylene glycol, 17 g, Daily PRN  acetaminophen, 650 mg, Q6H PRN   Or  acetaminophen, 650 mg, Q6H PRN  calcium carbonate, 500 mg, TID PRN        Diagnostic Labs:  CBC:   Recent Labs     04/14/22  0308 04/14/22  1052 04/15/22  0449 04/15/22  1742 04/16/22  0302   WBC 17.5*  --  16.9*  --  19.3*   RBC 3.24*  --  3.12*  --  3.26*   HGB 9.5*   < > 9.2* 10.8* 9.8*   HCT 29.8*   < > 28.5* 33.0* 29.6*   MCV 92.0  --  91.3  --  90.8   RDW 14.4  --  13.9  --  13.6   --  279  --  376    < > = values in this interval not displayed. BMP:   Recent Labs     04/14/22  0308 04/15/22  0449 04/16/22  0302    132* 136   K 3.7 3.7 3.8    97* 100   CO2 24 24 25   BUN 24* 19 13   CREATININE 0.86 0.69 0.75     BNP: No results for input(s): BNP in the last 72 hours. PT/INR:   Recent Labs     04/14/22  0308 04/15/22  0449 04/16/22  0302   PROTIME 11.3 11.9 11.6   INR 1.1 1.1 1.1     APTT:   No results for input(s): APTT in the last 72 hours. CARDIAC ENZYMES: No results for input(s): CKMB, CKMBINDEX, TROPONINI in the last 72 hours. Invalid input(s): CKTOTAL;3  FASTING LIPID PANEL:  Lab Results   Component Value Date    CHOL 243 (H) 03/24/2021    HDL 36 (L) 12/01/2021    TRIG 178 (H) 03/24/2021     LIVER PROFILE:   No results for input(s): AST, ALT, ALB, BILIDIR, BILITOT, ALKPHOS in the last 72 hours. MICROBIOLOGY:   Lab Results   Component Value Date/Time    CULTURE PSEUDOMONAS AERUGINOSA <13005 CFU/ML (A) 04/10/2022 12:43 PM    CULTURE NO NORMAL JUSTINA 04/10/2022 12:43 PM       Imaging:    XR CHEST PORTABLE    Result Date: 4/3/2022  No acute process. ASSESSMENT & PLAN     IMPRESSION  This is a 61 y.o. female with a past medical history of hypertension hyperlipidemia coronary artery disease status post PCI, history of stroke and memory problem who presented with typical chest pain and exertional shortness of breath and found to have ST-T wave changes in the inferior leads and elevated troponin 80. Patient admitted to inpatient status for the management of NSTEMI. She underwent cardiac cath and found to have multivessel coronary artery disease. CT surgery taken on board and s/p  CABG.        Principal Problem:    NSTEMI (non-ST elevated myocardial infarction) ([facility] [address]  Active Problems:    CAD (coronary artery disease) with multiple stents    Asthma    Smoker    Anxiety    Essential hypertension    Memory problem    Mixed hyperlipidemia    Thyroid nodule Multiple vessel coronary artery disease s/p CABG   Resolved Problems:    * No resolved hospital problems. *    NSTEMI with multivessel coronary artery disease s/p CABG-  -On ASA Plavix ,statin, amiodarone    Acute hypoxic failure, concern for aspiration pneumonia and bilateral mucous plugging  History of COPD  -Extubated , currently on BiPAP-->nasal cannula  - s/p  bronchoscopy on 4/10-mucous plugging  -Bronchoalveolar lavage respiratory culture grew Pseudomonas. Started on cefepime and IV vancomycin, currently on cefepime\  -Also on Lasix 20 twice daily  -Pulmonary/critical care following.  - Chest tubes taken out. Postoperative blood loss anemia s/p multiple transfusion  Received 5 units of transfusion since surgery. Hemoglobin currently stable    Essential hypertension:  Currently on IV medications until patient passes swallow study and can eat. Smoker  Continue  on nicotine patch.     Mixed hyperlipidemia  On atorvastatin 20 mg.      Thyroid nodule  CT chest showed 3 cm incidental right thyroid nodule. Subsequent thyroid ultrasound showed 2.9 cm slight heterogeneous cystic. No follow-up study recommended.   TSH 1.33.     DVT ppx: EPC cuff only  GI ppx: Protonix    Discharge planning-Per primary team.  Needs home with healthcare.,    Areli Serrano MD  PGY-1, Internal Medicine Resident  [address] [facility]         4/16/2022, 7:16 AM

## 2022-04-16 NOTE — PROGRESS NOTES
Occupational Therapy  Facility/Department: [facility] 1  Daily Treatment Note  NAME: Ainsley Leonardo  : 1965  MRN: [id number]    Date of Service: 2022    Discharge Recommendations:  Patient would benefit from continued therapy after discharge       Assessment   Performance deficits / Impairments: Decreased functional mobility ; Decreased ADL status; Decreased safe awareness;Decreased cognition;Decreased endurance;Decreased balance;Decreased high-level IADLs;Decreased coordination  Assessment: Pt would benefit from continued acute care and post acute care OT to address above listed deficits. Treatment Diagnosis: CABG X3 22  Prognosis: Good  REQUIRES OT FOLLOW UP: Yes  Activity Tolerance  Activity Tolerance: Patient Tolerated treatment well;Patient limited by fatigue;Patient limited by pain  Safety Devices  Safety Devices in place: Yes  Type of devices: Call light within reach;Gait belt;Patient at risk for falls; Left in chair;Nurse notified         Patient Diagnosis(es): The encounter diagnosis was NSTEMI (non-ST elevated myocardial infarction) ([facility] [address] has a past medical history of Anemia, Asthma, CAD (coronary artery disease), Depression, High cholesterol, Hypertension, MI, old, Osteoarthritis, Pneumonia, Sleep apnea, and Stroke ([facility] [address] has a past surgical history that includes Hysterectomy; Coronary angioplasty with stent (); Tubal ligation; Tonsillectomy; Appendectomy; and Coronary artery bypass graft (N/A, 2022).     Restrictions  Restrictions/Precautions  Restrictions/Precautions: Cardiac,Surgical Protocols,Fall Risk,Up as Tolerated  Required Braces or Orthoses?: Yes (Heart Hugger)  Required Braces or Orthoses  Other: Heart Hugger Brace  Position Activity Restriction  Sternal Precautions: 5# Lifting Restrictions  Sternal Precautions: CABG X3 22  Other position/activity restrictions: O2 @2L NC  Subjective   General  Patient assessed for rehabilitation services?: Yes  Family / Caregiver Present: No  General Comment  Comments: RN ok'd pt for OT tx this date. Pt agreeable to session and pleasant/cooperative throughout. Pain Assessment  Pain Assessment: 0-10  Pain Level: 4  Patient's Stated Pain Goal: No pain  Pain Type: Surgical pain  Pain Location: Chest  Pain Orientation: Mid  Pain Descriptors: Aching  Pain Frequency: Continuous  Response to Pain Intervention: Patient Satisfied  Vital Signs  Patient Currently in Pain: Yes   Orientation  WNL     Objective    ADL  Feeding: Supervision;Setup;Verbal cueing;Dentures; Increased time to complete (able to open containers and feed self)  Grooming: Stand by assistance;Setup;Verbal cueing; Increased time to complete (wash face seated at sink, brushed teeth standing at sink)  UE Bathing: Minimal assistance;Setup;Verbal cueing; Increased time to complete (assist to wash back)  LE Bathing: Stand by assistance;Setup;Verbal cueing; Increased time to complete (seated to wash BLE and feet)  UE Dressing: Minimal assistance; Moderate assistance;Setup;Verbal cueing; Increased time to complete (Min-to snap, thread arms,tie gown, Mod A to doff/don sx bra & heart hugger)  LE Dressing: Stand by assistance;Setup;Verbal cueing; Increased time to complete (seated to doff/don footies and underwear)  Toileting: Stand by assistance;Setup; Increased time to complete (able to complete chava care seated on toilet)      Pt in bed upon arrival. Transferred to EOB and then to bathroom for self care and toileting (see above for LOF). Pt sat at sink for self care using sx soap. Pt doffed O2 and completed tx. No SOB with increased activity, but did need frequent rest breaks d/t fatigue. Checked SpO2 once retired to recliner 88%, O2 donned at 2L NC and increased to 96% and RN notified. Increased time and assist needed d/t fatigue and pain. Educ on AE/DME, EC/WS tech, Sternal Prec, Use of IS/Acapella, Fall Prev, Safety w/func mob and ADL's. Issued written info and pt verbalized understanding. Balance  Sitting Balance: Stand by assistance (seated at EOB, on chair at sink and on toilet w/no LOB)  Standing Balance: Contact guard assistance (w/no LOB and no AD)  Standing Balance  Time: Pt stood for approx 10 min total for transfers, func mob, chava care and brushing teeth  Comment: w/SBA, no LOB, supporte by BUE at times when standing at sink  Functional Mobility  Functional - Mobility Device: No device  Assist Level: Contact guard assistance  Functional Mobility Comments: no LOB  Bed mobility  Rolling to Right: Stand by assistance  Supine to Sit: Stand by assistance  Sit to Supine: Unable to assess (left up in chair)  Scooting: Stand by assistance  Comment: HOB elevated  Transfers  Stand Step Transfers: Contact guard assistance  Sit to stand: Stand by assistance  Stand to sit: Stand by assistance  Transfer Comments: w/no AD      Plan   Plan  Times per week: 4-6 x/wk (CABG)  Current Treatment Recommendations: Balance Training,Functional Mobility Training,Endurance Training,Safety Education & Training,Self-Care / [place] Reorientation,Equipment Evaluation, Education, & procurement,Patient/Caregiver Education & Training,Home Management Training    AM-PAC Score  AM-[facility] Inpatient Daily Activity Raw Score: 21 (04/16/22 1152)  AM-PAC Inpatient ADL T-Scale Score : 44.27 (04/16/22 1152)  ADL Inpatient CMS 0-100% Score: 32.79 (04/16/22 1152)  ADL Inpatient CMS G-Code Modifier : Astrid Mendoza (04/16/22 1152)    Goals  Short term goals  Time Frame for Short term goals: By discharge, pt will:  Short term goal 1: Demo CGA for functional transfers and functional mobility with use of LRD for improved independence in ADLs/IADLs  Short term goal 2: Verbalize/incorporate proper heart hugger use and sternal precautions into therapeutic interventions with <2 VCs  Short term goal 3: Demo CGA for UB ADLs and grooming tasks  Short term goal 4: Demo Min A for LB ADLs and toileting tasks with setup and AE PRN  Short term goal 5: Demo appropriate use of EC/WS tech and breathing tech throughout therapy session with <2 VCs  Short term goal 6:  Increase activity tolerance to 30+ min for improved endurance for participation in ADLs/IADLs     Therapy Time   Individual Concurrent Group Co-treatment   Time In  0805         Time Out  0920         Minutes  75 total tx time                 NELSON Miller/L

## 2022-04-16 NOTE — PROGRESS NOTES
CLINICAL PHARMACY NOTE: MEDS TO BEDS    Total # of Prescriptions Filled: 7   The following medications were delivered to the patient:  · Aspirin 81 mg  · Amlodipine 10 mg  · Metoprolol tartrate 25 mg  · Calcium carbonate 500 mg  · Amiodarone 100 mg  · Potassium chloride alma 20 ER MeQ  · Oxycodone-APAP 5 mg/325mg    Additional Documentation:

## 2022-04-16 NOTE — PROGRESS NOTES
Pulmonary, Critical Care and Sleep Medicine   Jhon Jaramillo, MPH MD [facility] covering physician service    [address] Problems    Diagnosis Date Noted    Multiple vessel coronary artery disease s/p CABG  [I25.10] 04/10/2022    NSTEMI (non-ST elevated myocardial infarction) ([facility] [address] [I21.4] 2022    Thyroid nodule [E04.1] 09/15/2021    Mixed hyperlipidemia [E78.2] 2018    Memory problem [R41.3] 10/06/2017    Essential hypertension [I10] 2017    Anxiety [F41.9] 2015    Smoker [F17.200] 10/20/2014    Asthma [J45.909]     CAD (coronary artery disease) with multiple stents [I25.10]      Assessment and plan   // Acute hypoxic respiratory failure,  // Bilateral aspiration pneumonia - pseudomonas   // S/p bronchoscopic clearance of bilateral mucus plugging (4/10/2022)  // Coronary disease, s/p CABG x3 (2022)  // Postoperative blood loss anemia, s/p multiple transfusion  // Thyroid nodule  ? Left loculated pleural effusion, resolving/ improved, chest tube removed    Anticipated d/c today; leucocytosis not improving, but afebrile, no obvious infection. Continue incentive spirometer: teaching done with RT  HOT eval as clinically indicated, currently on RA, I don't think she will need HO2  Will sign off; No pulmonary follow-up needed  If the patient develops new pulmonary needs we can assist with, please don't hesitate to re-consult us. SUPPORTING DATA   I saw patient with RN  I reviewed Epic, VSS, labs, notes. Patient - Estevan Enrique,  Age - 61 y.o.    - 1965      Room Number - 6891/2608-56   N -  [id number]   Acct # - [de-identified]  Date of Admission -  4/3/2022  Hospital Day - 13  Admission history, hospital course to date   The patient is a 61 y.o. female with past medical history of smoking, CAD s/p stent, strong family history of heart disease, in the past initially presented with chest pain. Found to have elevated troponin.   Patient required cardiac catheterization found to have multivessel disease. CT surgery was consulted and she was taken  to the OR, underwent CABG x3. Post CABG patient remained intubated and sedated. Pulmonology was consulted for vent management. Post CABG patient had acute drop in hemoglobin requiring multiple transfusion. Chest x-ray showed left pleural effusion. She still has a chest tube in place both in the pleural and mediastinal.    04/15/22 On 3 l nc, No wob, Sitting in chair, Has cough small sputum,  Sob with ex but not at rest   Xray chest reviewed - increased left lower lobe density - atelectasis / loculated effusion . Past 24 hours   no acute events reported in last 24 hours  No tests or procedures in last 24h. Chest tubes removed    All other systems reviewed   Objective    Vitals   height is 5' 1\" (1.549 m) and weight is 160 lb 7.9 oz (72.8 kg). Her oral temperature is 98.8 °F (37.1 °C). Her blood pressure is 135/99 (abnormal) and her pulse is 106. Her respiration is 16 and oxygen saturation is 96%. Body mass index is 30.33 kg/m². O2 Flow Rate (L/min): 2 L/min  I/O  No intake or output data in the 24 hours ending 04/16/22 1012  I/O last 3 completed shifts:  In: -   Out: 850 [Urine:850]   Patient Vitals for the past 96 hrs (Last 3 readings):   Weight   04/16/22 0600 160 lb 7.9 oz (72.8 kg)   04/15/22 0511 160 lb 7.9 oz (72.8 kg)   04/14/22 0504 160 lb 11.5 oz (72.9 kg)     Exam    General Appearance - awake, alert, oriented, in no acute distress  HEENT - normal  Neck - supple, symmetrical, trachea midline   Lungs - [employer] aram only to aboout 800 (goal >1200)  Cardiovascular - regular rhythm .   Abdomen - soft, nontender, nondistended, no masses or organomegaly  Neurologic - no focal motor or sensory deficits  Skin - no bruising or bleeding  Extremities - no cyanosis, clubbing or edema    Meds       metoprolol  2.5 mg IntraVENous Q6H    cefepime  2,000 mg IntraVENous Q8H    furosemide  20 mg IntraVENous BID    docusate  100 mg Oral BID    sodium chloride flush  5-40 mL IntraVENous 2 times per day    sodium chloride flush  5-40 mL IntraVENous 2 times per day    sodium chloride flush  10 mL IntraVENous 2 times per day    aspirin  81 mg Oral Daily    clopidogrel  75 mg Oral Daily    amiodarone  200 mg Oral TID    [Held by provider] metoprolol tartrate  25 mg Oral BID    atorvastatin  20 mg Oral Nightly    pantoprazole  40 mg Oral Daily    pantoprazole (PROTONIX) 40 mg injection  40 mg IntraVENous Daily    ipratropium-albuterol  1 ampule Inhalation Q4H WA    senna  1 tablet Oral Once    [Held by provider] hydroCHLOROthiazide  25 mg Oral Daily    lovastatin  10 mg Oral Nightly    sodium chloride flush  5-40 mL IntraVENous 2 times per day    [Held by provider] amLODIPine  10 mg Oral Daily    nicotine  1 patch TransDERmal Daily      sodium chloride Stopped (04/11/22 1935)    sodium chloride Stopped (04/11/22 0447)    dextrose 25 mL/hr (04/12/22 1831)    EPINEPHrine      sodium chloride 20 mL/hr at 04/04/22 0408     labetalol, bisacodyl, ketorolac, LORazepam, morphine, albuterol, acetylcysteine, sodium chloride flush, sodium chloride, sodium chloride flush, ondansetron **OR** ondansetron, oxyCODONE-acetaminophen **OR** oxyCODONE-acetaminophen, fentanNYL **OR** fentanNYL, hydrALAZINE, diphenhydrAMINE, potassium chloride, magnesium sulfate, albumin human, glucose, dextrose, glucagon (rDNA), dextrose, EPINEPHrine, magnesium hydroxide, aspirin-acetaminophen-caffeine, magnesium sulfate, potassium chloride, potassium chloride **OR** potassium alternative oral replacement **OR** potassium chloride, sodium chloride, polyethylene glycol, acetaminophen **OR** acetaminophen, calcium carbonate    Data   I reviewed lab and radiology data.  CBC noted, below  CBC  Recent Labs     04/14/22  0308 04/14/22  1052 04/15/22  0449 04/15/22  1742 04/16/22  0302   WBC 17.5*  --  16.9*  --  19.3*   RBC 3.24* --  3.12*  --  3.26*   HGB 9.5*   < > 9.2* 10.8* 9.8*   HCT 29.8*   < > 28.5* 33.0* 29.6*   MCV 92.0  --  91.3  --  90.8   MCH 29.3  --  29.5  --  30.1   MCHC 31.9  --  32.3  --  33.1   RDW 14.4  --  13.9  --  13.6     --  279  --  376   MPV 10.3  --  9.7  --  9.5    < > = values in this interval not displayed.

## 2022-04-19 RX ORDER — SODIUM CHLORIDE 9 MG/ML
INJECTION, SOLUTION INTRAVENOUS CONTINUOUS
Status: CANCELLED | OUTPATIENT
Start: 2022-04-19

## 2022-04-19 RX ORDER — LEVOFLOXACIN 5 MG/ML
750 INJECTION, SOLUTION INTRAVENOUS EVERY 24 HOURS
Status: CANCELLED | OUTPATIENT
Start: 2022-04-19

## 2022-04-19 RX ORDER — ACETAMINOPHEN 325 MG/1
650 TABLET ORAL EVERY 6 HOURS PRN
Status: CANCELLED | OUTPATIENT
Start: 2022-04-19

## 2022-04-19 RX ORDER — IPRATROPIUM BROMIDE AND ALBUTEROL SULFATE 2.5; .5 MG/3ML; MG/3ML
1 SOLUTION RESPIRATORY (INHALATION)
Status: CANCELLED | OUTPATIENT
Start: 2022-04-19

## 2022-04-19 RX ORDER — ONDANSETRON 4 MG/1
4 TABLET, ORALLY DISINTEGRATING ORAL EVERY 8 HOURS PRN
Status: CANCELLED | OUTPATIENT
Start: 2022-04-19

## 2022-04-19 RX ORDER — ACETAMINOPHEN 650 MG/1
650 SUPPOSITORY RECTAL EVERY 6 HOURS PRN
Status: CANCELLED | OUTPATIENT
Start: 2022-04-19

## 2022-04-19 RX ORDER — SODIUM CHLORIDE 0.9 % (FLUSH) 0.9 %
5-40 SYRINGE (ML) INJECTION EVERY 12 HOURS SCHEDULED
Status: CANCELLED | OUTPATIENT
Start: 2022-04-19

## 2022-04-19 RX ORDER — ALBUTEROL SULFATE 2.5 MG/3ML
2.5 SOLUTION RESPIRATORY (INHALATION) EVERY 4 HOURS PRN
Status: CANCELLED | OUTPATIENT
Start: 2022-04-19

## 2022-04-19 RX ORDER — ONDANSETRON 2 MG/ML
4 INJECTION INTRAMUSCULAR; INTRAVENOUS EVERY 6 HOURS PRN
Status: CANCELLED | OUTPATIENT
Start: 2022-04-19

## 2022-04-19 RX ORDER — SODIUM CHLORIDE 0.9 % (FLUSH) 0.9 %
10 SYRINGE (ML) INJECTION PRN
Status: CANCELLED | OUTPATIENT
Start: 2022-04-19

## 2022-04-19 RX ORDER — SODIUM CHLORIDE 9 MG/ML
INJECTION, SOLUTION INTRAVENOUS PRN
Status: CANCELLED | OUTPATIENT
Start: 2022-04-19

## 2022-04-20 ENCOUNTER — APPOINTMENT (OUTPATIENT)
Dept: GENERAL RADIOLOGY | Age: 57
DRG: 186 | End: 2022-04-20
Attending: INTERNAL MEDICINE
Payer: MEDICARE

## 2022-04-20 ENCOUNTER — HOSPITAL ENCOUNTER (INPATIENT)
Age: 57
LOS: 7 days | Discharge: NURSING, REHAB OR LONG-TERM CARE | DRG: 186 | End: 2022-04-27
Attending: INTERNAL MEDICINE
Payer: MEDICARE

## 2022-04-20 PROBLEM — J90 PLEURAL EFFUSION: Status: ACTIVE | Noted: 2022-04-20

## 2022-04-20 PROBLEM — J90 RECURRENT PLEURAL EFFUSION: Status: ACTIVE | Noted: 2022-04-20

## 2022-04-20 LAB
ABSOLUTE EOS #: 0.4 K/UL (ref 0–0.44)
ABSOLUTE IMMATURE GRANULOCYTE: 0.11 K/UL (ref 0–0.3)
ABSOLUTE LYMPH #: 2.37 K/UL (ref 1.1–3.7)
ABSOLUTE MONO #: 1.36 K/UL (ref 0.1–1.2)
ANION GAP SERPL CALCULATED.3IONS-SCNC: 13 MMOL/L (ref 9–17)
BASOPHILS # BLD: 1 % (ref 0–2)
BASOPHILS ABSOLUTE: 0.07 K/UL (ref 0–0.2)
BUN BLDV-MCNC: 8 MG/DL (ref 6–20)
CALCIUM SERPL-MCNC: 8.6 MG/DL (ref 8.6–10.4)
CHLORIDE BLD-SCNC: 106 MMOL/L (ref 98–107)
CO2: 20 MMOL/L (ref 20–31)
CREAT SERPL-MCNC: 0.62 MG/DL (ref 0.5–0.9)
EOSINOPHILS RELATIVE PERCENT: 3 % (ref 1–4)
GFR AFRICAN AMERICAN: >60 ML/MIN
GFR NON-AFRICAN AMERICAN: >60 ML/MIN
GFR SERPL CREATININE-BSD FRML MDRD: ABNORMAL ML/MIN/{1.73_M2}
GLUCOSE BLD-MCNC: 111 MG/DL (ref 70–99)
GLUCOSE BLD-MCNC: 84 MG/DL (ref 65–105)
HCT VFR BLD CALC: 30.3 % (ref 36.3–47.1)
HEMOGLOBIN: 9.8 G/DL (ref 11.9–15.1)
IMMATURE GRANULOCYTES: 1 %
LYMPHOCYTES # BLD: 16 % (ref 24–43)
MCH RBC QN AUTO: 29.4 PG (ref 25.2–33.5)
MCHC RBC AUTO-ENTMCNC: 32.3 G/DL (ref 28.4–34.8)
MCV RBC AUTO: 91 FL (ref 82.6–102.9)
MONOCYTES # BLD: 9 % (ref 3–12)
NRBC AUTOMATED: 0 PER 100 WBC
PDW BLD-RTO: 14.8 % (ref 11.8–14.4)
PLATELET # BLD: 671 K/UL (ref 138–453)
PMV BLD AUTO: 8.9 FL (ref 8.1–13.5)
POTASSIUM SERPL-SCNC: 3.9 MMOL/L (ref 3.7–5.3)
RBC # BLD: 3.33 M/UL (ref 3.95–5.11)
RBC # BLD: ABNORMAL 10*6/UL
SEG NEUTROPHILS: 71 % (ref 36–65)
SEGMENTED NEUTROPHILS ABSOLUTE COUNT: 10.64 K/UL (ref 1.5–8.1)
SODIUM BLD-SCNC: 139 MMOL/L (ref 135–144)
WBC # BLD: 15 K/UL (ref 3.5–11.3)

## 2022-04-20 PROCEDURE — 36415 COLL VENOUS BLD VENIPUNCTURE: CPT

## 2022-04-20 PROCEDURE — 85025 COMPLETE CBC W/AUTO DIFF WBC: CPT

## 2022-04-20 PROCEDURE — 6370000000 HC RX 637 (ALT 250 FOR IP): Performed by: INTERNAL MEDICINE

## 2022-04-20 PROCEDURE — 6360000002 HC RX W HCPCS: Performed by: INTERNAL MEDICINE

## 2022-04-20 PROCEDURE — 82947 ASSAY GLUCOSE BLOOD QUANT: CPT

## 2022-04-20 PROCEDURE — 99222 1ST HOSP IP/OBS MODERATE 55: CPT | Performed by: INTERNAL MEDICINE

## 2022-04-20 PROCEDURE — 71045 X-RAY EXAM CHEST 1 VIEW: CPT

## 2022-04-20 PROCEDURE — 99223 1ST HOSP IP/OBS HIGH 75: CPT | Performed by: INTERNAL MEDICINE

## 2022-04-20 PROCEDURE — 97530 THERAPEUTIC ACTIVITIES: CPT

## 2022-04-20 PROCEDURE — 93308 TTE F-UP OR LMTD: CPT

## 2022-04-20 PROCEDURE — 2580000003 HC RX 258: Performed by: INTERNAL MEDICINE

## 2022-04-20 PROCEDURE — 94640 AIRWAY INHALATION TREATMENT: CPT

## 2022-04-20 PROCEDURE — 1200000000 HC SEMI PRIVATE

## 2022-04-20 PROCEDURE — 97162 PT EVAL MOD COMPLEX 30 MIN: CPT

## 2022-04-20 PROCEDURE — 80048 BASIC METABOLIC PNL TOTAL CA: CPT

## 2022-04-20 RX ORDER — BUDESONIDE AND FORMOTEROL FUMARATE DIHYDRATE 80; 4.5 UG/1; UG/1
2 AEROSOL RESPIRATORY (INHALATION) 2 TIMES DAILY
Refills: 3 | Status: DISCONTINUED | OUTPATIENT
Start: 2022-04-20 | End: 2022-04-27 | Stop reason: HOSPADM

## 2022-04-20 RX ORDER — CLOPIDOGREL BISULFATE 75 MG/1
75 TABLET ORAL DAILY
Status: DISCONTINUED | OUTPATIENT
Start: 2022-04-20 | End: 2022-04-27 | Stop reason: HOSPADM

## 2022-04-20 RX ORDER — ALBUTEROL SULFATE 90 UG/1
2 AEROSOL, METERED RESPIRATORY (INHALATION) EVERY 4 HOURS PRN
Status: DISCONTINUED | OUTPATIENT
Start: 2022-04-20 | End: 2022-04-27 | Stop reason: HOSPADM

## 2022-04-20 RX ORDER — ENOXAPARIN SODIUM 100 MG/ML
40 INJECTION SUBCUTANEOUS DAILY
Status: DISCONTINUED | OUTPATIENT
Start: 2022-04-20 | End: 2022-04-27 | Stop reason: HOSPADM

## 2022-04-20 RX ORDER — FUROSEMIDE 10 MG/ML
40 INJECTION INTRAMUSCULAR; INTRAVENOUS 2 TIMES DAILY
Status: DISCONTINUED | OUTPATIENT
Start: 2022-04-20 | End: 2022-04-20

## 2022-04-20 RX ORDER — DEXTROSE MONOHYDRATE 50 MG/ML
100 INJECTION, SOLUTION INTRAVENOUS PRN
Status: DISCONTINUED | OUTPATIENT
Start: 2022-04-20 | End: 2022-04-27 | Stop reason: HOSPADM

## 2022-04-20 RX ORDER — BUSPIRONE HYDROCHLORIDE 15 MG/1
15 TABLET ORAL 3 TIMES DAILY
Status: DISCONTINUED | OUTPATIENT
Start: 2022-04-20 | End: 2022-04-20

## 2022-04-20 RX ORDER — INSULIN LISPRO 100 [IU]/ML
0-3 INJECTION, SOLUTION INTRAVENOUS; SUBCUTANEOUS NIGHTLY
Status: DISCONTINUED | OUTPATIENT
Start: 2022-04-20 | End: 2022-04-20

## 2022-04-20 RX ORDER — FENTANYL CITRATE 50 UG/ML
25 INJECTION, SOLUTION INTRAMUSCULAR; INTRAVENOUS
Status: DISCONTINUED | OUTPATIENT
Start: 2022-04-20 | End: 2022-04-27 | Stop reason: HOSPADM

## 2022-04-20 RX ORDER — FUROSEMIDE 40 MG/1
40 TABLET ORAL 2 TIMES DAILY
Status: DISCONTINUED | OUTPATIENT
Start: 2022-04-20 | End: 2022-04-25

## 2022-04-20 RX ORDER — PANTOPRAZOLE SODIUM 40 MG/1
40 TABLET, DELAYED RELEASE ORAL
Status: DISCONTINUED | OUTPATIENT
Start: 2022-04-20 | End: 2022-04-27 | Stop reason: HOSPADM

## 2022-04-20 RX ORDER — ATORVASTATIN CALCIUM 20 MG/1
20 TABLET, FILM COATED ORAL NIGHTLY
Status: DISCONTINUED | OUTPATIENT
Start: 2022-04-20 | End: 2022-04-27 | Stop reason: HOSPADM

## 2022-04-20 RX ORDER — INSULIN LISPRO 100 [IU]/ML
0-6 INJECTION, SOLUTION INTRAVENOUS; SUBCUTANEOUS
Status: DISCONTINUED | OUTPATIENT
Start: 2022-04-20 | End: 2022-04-20

## 2022-04-20 RX ORDER — BUSPIRONE HYDROCHLORIDE 15 MG/1
15 TABLET ORAL 3 TIMES DAILY PRN
Status: DISCONTINUED | OUTPATIENT
Start: 2022-04-20 | End: 2022-04-27 | Stop reason: HOSPADM

## 2022-04-20 RX ORDER — NICOTINE POLACRILEX 4 MG
15 LOZENGE BUCCAL PRN
Status: DISCONTINUED | OUTPATIENT
Start: 2022-04-20 | End: 2022-04-27 | Stop reason: HOSPADM

## 2022-04-20 RX ORDER — ACETAMINOPHEN 325 MG/1
650 TABLET ORAL EVERY 6 HOURS PRN
Status: DISCONTINUED | OUTPATIENT
Start: 2022-04-20 | End: 2022-04-27 | Stop reason: HOSPADM

## 2022-04-20 RX ORDER — ENOXAPARIN SODIUM 100 MG/ML
40 INJECTION SUBCUTANEOUS DAILY
Status: DISCONTINUED | OUTPATIENT
Start: 2022-04-20 | End: 2022-04-20

## 2022-04-20 RX ORDER — POLYETHYLENE GLYCOL 3350 17 G/17G
17 POWDER, FOR SOLUTION ORAL DAILY PRN
Status: DISCONTINUED | OUTPATIENT
Start: 2022-04-20 | End: 2022-04-27 | Stop reason: HOSPADM

## 2022-04-20 RX ORDER — DEXTROSE MONOHYDRATE 25 G/50ML
12.5 INJECTION, SOLUTION INTRAVENOUS PRN
Status: DISCONTINUED | OUTPATIENT
Start: 2022-04-20 | End: 2022-04-27 | Stop reason: HOSPADM

## 2022-04-20 RX ORDER — SODIUM CHLORIDE 0.9 % (FLUSH) 0.9 %
5-40 SYRINGE (ML) INJECTION PRN
Status: DISCONTINUED | OUTPATIENT
Start: 2022-04-20 | End: 2022-04-27 | Stop reason: HOSPADM

## 2022-04-20 RX ORDER — AMIODARONE HYDROCHLORIDE 200 MG/1
100 TABLET ORAL 3 TIMES DAILY
Status: DISCONTINUED | OUTPATIENT
Start: 2022-04-20 | End: 2022-04-21

## 2022-04-20 RX ORDER — SODIUM CHLORIDE 0.9 % (FLUSH) 0.9 %
5-40 SYRINGE (ML) INJECTION EVERY 12 HOURS SCHEDULED
Status: DISCONTINUED | OUTPATIENT
Start: 2022-04-20 | End: 2022-04-27 | Stop reason: HOSPADM

## 2022-04-20 RX ORDER — ONDANSETRON 4 MG/1
4 TABLET, ORALLY DISINTEGRATING ORAL EVERY 8 HOURS PRN
Status: DISCONTINUED | OUTPATIENT
Start: 2022-04-20 | End: 2022-04-27 | Stop reason: HOSPADM

## 2022-04-20 RX ORDER — ASPIRIN 81 MG/1
81 TABLET ORAL DAILY
Status: DISCONTINUED | OUTPATIENT
Start: 2022-04-20 | End: 2022-04-27 | Stop reason: HOSPADM

## 2022-04-20 RX ORDER — SODIUM CHLORIDE 9 MG/ML
25 INJECTION, SOLUTION INTRAVENOUS PRN
Status: DISCONTINUED | OUTPATIENT
Start: 2022-04-20 | End: 2022-04-27 | Stop reason: HOSPADM

## 2022-04-20 RX ORDER — CALCIUM CARBONATE 200(500)MG
500 TABLET,CHEWABLE ORAL 3 TIMES DAILY PRN
Status: DISCONTINUED | OUTPATIENT
Start: 2022-04-20 | End: 2022-04-27 | Stop reason: HOSPADM

## 2022-04-20 RX ORDER — ALBUTEROL SULFATE 90 UG/1
2 AEROSOL, METERED RESPIRATORY (INHALATION) 4 TIMES DAILY
Status: DISCONTINUED | OUTPATIENT
Start: 2022-04-20 | End: 2022-04-20

## 2022-04-20 RX ORDER — AMLODIPINE BESYLATE 10 MG/1
10 TABLET ORAL DAILY
Status: DISCONTINUED | OUTPATIENT
Start: 2022-04-20 | End: 2022-04-27 | Stop reason: HOSPADM

## 2022-04-20 RX ORDER — ACETAMINOPHEN 650 MG/1
650 SUPPOSITORY RECTAL EVERY 6 HOURS PRN
Status: DISCONTINUED | OUTPATIENT
Start: 2022-04-20 | End: 2022-04-27 | Stop reason: HOSPADM

## 2022-04-20 RX ORDER — OXYCODONE HYDROCHLORIDE 5 MG/1
5 TABLET ORAL EVERY 6 HOURS PRN
Status: DISCONTINUED | OUTPATIENT
Start: 2022-04-20 | End: 2022-04-27 | Stop reason: HOSPADM

## 2022-04-20 RX ORDER — ONDANSETRON 2 MG/ML
4 INJECTION INTRAMUSCULAR; INTRAVENOUS EVERY 6 HOURS PRN
Status: DISCONTINUED | OUTPATIENT
Start: 2022-04-20 | End: 2022-04-27 | Stop reason: HOSPADM

## 2022-04-20 RX ORDER — FUROSEMIDE 10 MG/ML
40 INJECTION INTRAMUSCULAR; INTRAVENOUS ONCE
Status: DISCONTINUED | OUTPATIENT
Start: 2022-04-20 | End: 2022-04-20 | Stop reason: SDUPTHER

## 2022-04-20 RX ADMIN — PANTOPRAZOLE SODIUM 40 MG: 40 TABLET, DELAYED RELEASE ORAL at 07:00

## 2022-04-20 RX ADMIN — FUROSEMIDE 40 MG: 10 INJECTION, SOLUTION INTRAMUSCULAR; INTRAVENOUS at 10:42

## 2022-04-20 RX ADMIN — METOPROLOL TARTRATE 25 MG: 25 TABLET ORAL at 20:57

## 2022-04-20 RX ADMIN — AMIODARONE HYDROCHLORIDE 100 MG: 200 TABLET ORAL at 10:43

## 2022-04-20 RX ADMIN — AMIODARONE HYDROCHLORIDE 100 MG: 200 TABLET ORAL at 20:57

## 2022-04-20 RX ADMIN — Medication 81 MG: at 10:42

## 2022-04-20 RX ADMIN — BUSPIRONE HYDROCHLORIDE 15 MG: 15 TABLET ORAL at 10:43

## 2022-04-20 RX ADMIN — FUROSEMIDE 40 MG: 40 TABLET ORAL at 18:03

## 2022-04-20 RX ADMIN — OXYCODONE HYDROCHLORIDE 5 MG: 5 TABLET ORAL at 15:29

## 2022-04-20 RX ADMIN — ACETAMINOPHEN 650 MG: 325 TABLET ORAL at 03:33

## 2022-04-20 RX ADMIN — CLOPIDOGREL 75 MG: 75 TABLET, FILM COATED ORAL at 10:42

## 2022-04-20 RX ADMIN — ATORVASTATIN CALCIUM 20 MG: 20 TABLET, FILM COATED ORAL at 20:57

## 2022-04-20 RX ADMIN — METOPROLOL TARTRATE 25 MG: 25 TABLET ORAL at 10:42

## 2022-04-20 RX ADMIN — AMLODIPINE BESYLATE 10 MG: 10 TABLET ORAL at 10:42

## 2022-04-20 RX ADMIN — AMIODARONE HYDROCHLORIDE 100 MG: 200 TABLET ORAL at 15:29

## 2022-04-20 RX ADMIN — SODIUM CHLORIDE, PRESERVATIVE FREE 10 ML: 5 INJECTION INTRAVENOUS at 20:57

## 2022-04-20 RX ADMIN — BUDESONIDE AND FORMOTEROL FUMARATE DIHYDRATE 2 PUFF: 80; 4.5 AEROSOL RESPIRATORY (INHALATION) at 08:30

## 2022-04-20 RX ADMIN — OXYCODONE HYDROCHLORIDE 5 MG: 5 TABLET ORAL at 07:00

## 2022-04-20 RX ADMIN — PANTOPRAZOLE SODIUM 40 MG: 40 TABLET, DELAYED RELEASE ORAL at 18:03

## 2022-04-20 RX ADMIN — OXYCODONE HYDROCHLORIDE 5 MG: 5 TABLET ORAL at 23:32

## 2022-04-20 RX ADMIN — ENOXAPARIN SODIUM 40 MG: 100 INJECTION SUBCUTANEOUS at 10:42

## 2022-04-20 ASSESSMENT — ENCOUNTER SYMPTOMS
COUGH: 1
VOICE CHANGE: 0
DIARRHEA: 0
ABDOMINAL PAIN: 0
VOMITING: 0

## 2022-04-20 ASSESSMENT — PAIN SCALES - GENERAL
PAINLEVEL_OUTOF10: 6
PAINLEVEL_OUTOF10: 5
PAINLEVEL_OUTOF10: 8
PAINLEVEL_OUTOF10: 3
PAINLEVEL_OUTOF10: 7
PAINLEVEL_OUTOF10: 6

## 2022-04-20 NOTE — PROGRESS NOTES
Physical Therapy  Facility/Department: [facility] RENAL//MED SURG  Physical Therapy Initial Assessment    Name: Kassandra Olivre  : 1965  MRN: [id number]  Date of Service: 2022  -61year old female with extensive cardiac Hx and recent complicated CABG x3 on  for CP 2/2 NSTEMI. At that time, post op course complicated by blood loss requiring multiple blood transfusions. Also managed for hypoxic resp failure 2/2 mucous plugging and pleural ffusion +/- aspiration PNA. For which patient was intubated. eventualy extubated on . Required chest tube for drainage, mucous clearance via bronch. She was on abx for but infectious work up was unrevealing and thus [address]. Patient returned to [facility] were she reported Dyspnea with little exertion and edema in extremities. Work up concerning for recurrent effusion, was transferred to [facility] where she had all her interventions for further stabilization and investigation. Discharge Recommendations:  Therapy recommended at discharge   PT Equipment Recommendations  Equipment Needed: Yes  Mobility Devices: Cindra Turkmen: Rollator (4 Wheeled)      Patient Diagnosis(es): There were no encounter diagnoses. Past Medical History:  has a past medical history of Anemia, Asthma, CAD (coronary artery disease), Depression, High cholesterol, Hypertension, MI, old, Osteoarthritis, Pneumonia, Sleep apnea, and Stroke ([facility] [address] Past Surgical History:  has a past surgical history that includes Hysterectomy; Coronary angioplasty with stent (); Tubal ligation; Tonsillectomy; Appendectomy; and Coronary artery bypass graft (N/A, 2022). Assessment   Body Structures, Functions, Activity Limitations Requiring Skilled Therapeutic Intervention: Decreased functional mobility ; Decreased tolerance to work activity; Decreased endurance  Assessment: The pt ambulated 75 ft with a RW x CGA. She ambulated slowly and fatigue quickly with mobilization.  She could benefit from a continuation of PT for gait and strengthneing following her DC  Therapy Prognosis: Good  Decision Making: Medium Complexity  Requires PT Follow-Up: Yes  Activity Tolerance  Activity Tolerance: Patient limited by fatigue;Patient limited by endurance     Plan   Plan  Plan: 1 time a day 7 days a week  Current Treatment Recommendations: Strengthening,Functional mobility training,Endurance training,Gait training,Stair training,Safety education & training,Therapeutic activities  Safety Devices  Type of Devices: Nurse notified,Left in bed,Call light within reach     Restrictions  Restrictions/Precautions  Required Braces or Orthoses?: No  Position Activity Restriction  Other position/activity restrictions:  Up with assist     Subjective   General  Patient assessed for rehabilitation services?: Yes  Response To Previous Treatment: Not applicable  Family / Caregiver Present: No  Follows Commands: Within Functional Limits  Subjective  Subjective: RN and pt agreeable to PT eval         Social/Functional History  Social/Functional History  Lives With: Daughter  Type of Home: Apartment  Home Layout: One level  Home Access: Stairs to enter without rails  Entrance Stairs - Number of Steps: 1 step  Bathroom Shower/Tub: Tub/Shower unit  Bathroom Toilet: Standard  Has the patient had two or more falls in the past year or any fall with injury in the past year?: No  ADL Assistance: Independent  Homemaking Assistance: Independent  Homemaking Responsibilities: Yes  Ambulation Assistance: Independent  Transfer Assistance: Independent  Active : No  Mode of Transportation: Family,Friends  Occupation: On disability  Leisure & Hobbies: time with grandchildren, take walks in the park  Vision/Hearing  Vision Exceptions: Wears glasses for reading  Hearing: Within functional limits    Cognition   Orientation  Overall Orientation Status: Within Normal Limits  Orientation Level: Oriented X4  Cognition  Overall Cognitive Status: WNL     Objective      Gross Assessment  AROM: Within functional limits  PROM: Within functional limits  Strength: Within functional limits  Coordination: Within functional limits  Tone: Normal  Sensation: Impaired (Reports numbness in her B hands)     AROM RLE (degrees)  RLE AROM: WNL  AROM LLE (degrees)  LLE AROM : WNL  AROM RUE (degrees)  RUE AROM : WNL  AROM LUE (degrees)  LUE AROM : WNL  Strength RLE  Strength RLE: WNL  Strength LLE  Strength LLE: WFL  Strength RUE  Strength RUE: WFL  Strength LUE  Strength LUE: WFL        Balance  Sitting: Intact  Standing: Intact  Bed mobility  Supine to Sit: Contact guard assistance  Sit to Supine: Contact guard assistance  Scooting: Contact guard assistance  Transfers  Sit to Stand: Contact guard assistance  Stand to sit: Contact guard assistance  Ambulation  Surface: level tile  Device: Rolling Walker  Assistance: Contact guard assistance  Gait Deviations: Slow Erin  Distance: amb 75 ft x 2 with a RW x CGA  Comments: The pt fatigues quickly with mobilization.      Balance  Posture: Good  Sitting - Static: Good  Sitting - Dynamic: Fair  Standing - Static: Good  Standing - Dynamic: Good     OutComes Score     AM-PAC Score  AM-PAC Inpatient Mobility Raw Score : 20 (04/20/22 1446)  AM-PAC Inpatient T-Scale Score : 47.67 (04/20/22 1446)  Mobility Inpatient CMS 0-100% Score: 35.83 (04/20/22 1446)  Mobility Inpatient CMS G-Code Modifier : CJ (04/20/22 1446)        Goals  Long Term Goals  Time Frame for Long term goals : 10 visits  Long term goal 1: transfers with SBA  Long term goal 2: amb 150 ft with or without device x SBA  Long term goal 3: ascend/descend 4 steps with SBA  Long term goal 4: 20 min strenthening exercise program x SBA  Patient Goals   Patient goals : Return home       Therapy Time   Individual Concurrent Group Co-treatment   Time In  1320         Time Out  1350         Minutes  30             [address] of [address], PT

## 2022-04-20 NOTE — CONSULTS
Attestation signed by      Attending Physician Statement:    I have discussed the care of  Ainsley Leonardo , including pertinent history and exam findings, with the Cardiology fellow/resident. I have seen and examined the patient and the key elements of all parts of the encounter have been performed by me. I agree with the assessment, plan and orders as documented by the fellow/resident, after I modified exam findings and plan of treatments, and the final version is my approved version of the assessment. Additional Comments:   Known to us from 4/22: Dr. Knight Lung 4/3/2022:   1.  NSTEMI s/p cardiac cath: MV-CAD s/p CABGx3 on 4/8/22  2.  HTN  3.  HL  4.  active smoker  5.  Post op anemia s/p 3  Units pRBCs, 1 unit cryoprecipitate, 1 unit plt and plasma  6.  Aspiration PNA      My A/P Today:   Syncope - found down in bathroom, was having multiple coughing fits prior and in last few days. ? Vasovagal syncope  Was at [facility] tx here  Has known CAD s/p CABG on 9/66 with complications of Effusion, anemia (s/p transfusions), Resp failure with mucus plugging and asp PNA  - Admits to some SOB/ELIZABETH  - has effusion on CXR  - diurese IV lasix  - Pulm to see  - CTS to see (has dressing in place that she state was to be removed today)  - if not responding to diuretics - ? Thoracentesis  - limited Echo is pending- await final report    Doyle Duenas DO, [facility], [address], [address], [place] [address] [facility]  [email or web address]  [phone]     [facility] Cardiology    Consult / H&P               Today's Date: 4/20/2022  Patient Name: Ainsley Obriench  Date of admission: 4/20/2022 12:07 AM  Patient's age: 61 y.o., 1965  Admission Dx: Pleural effusion [J90]  Recurrent pleural effusion [J90]    Reason for Consult:  Cardiac evaluation    Requesting Physician: No admitting provider for patient encounter.     CHIEF COMPLAINT:  Pleural effusion    History Obtained From:  patient, electronic medical record    HISTORY OF PRESENT ILLNESS:      The patient is a 61 y.o.  female who is admitted to the hospital for pleural effusion. Patient has a recent history of CABGx3 on 4/8/2022 by Dr. Giulia Rosas and had post-op complications including extensive blood loss requiring transfusions, hypoxic respiratory failure requiring intubation, pleural effusion and possible aspiration PNA. Patient was extubated on 4/12 and discharged. On 4/19/2022, patient had a syncopal episode at home and was taken to [facility] prior to transfer to [facility]. She does not remember too clearly prior to the syncopal episode however she was using the toilet at the time and was coughing. CXR on admission showed moderate L effusion w/ L basilar opacity. Patient was given 40 mg of IV Lasix. Denies any new CP, diaphoresis, fevers. Past Medical History:   has a past medical history of Anemia, Asthma, CAD (coronary artery disease), Depression, High cholesterol, Hypertension, MI, old, Osteoarthritis, Pneumonia, Sleep apnea, and Stroke ([facility] [address] Past Surgical History:   has a past surgical history that includes Hysterectomy; Coronary angioplasty with stent (2004); Tubal ligation; Tonsillectomy; Appendectomy; and Coronary artery bypass graft (N/A, 4/8/2022). Home Medications:    Prior to Admission medications    Medication Sig Start Date End Date Taking?  Authorizing Provider   aspirin 81 MG EC tablet Take 1 tablet by mouth daily 4/17/22   Tatyana Reddy MD   calcium carbonate (TUMS) 500 MG chewable tablet Take 1 tablet by mouth 3 times daily as needed for Heartburn 4/16/22 5/16/22  Tatyana Reddy MD   amiodarone (CORDARONE) 200 MG tablet Take 0.5 tablets by mouth 3 times daily 4/16/22   Tatyana Reddy MD   metoprolol tartrate (LOPRESSOR) 25 MG tablet Take 1 tablet by mouth 2 times daily 4/16/22   Tatyana Reddy MD   potassium chloride (KLOR-CON M) 20 MEQ extended release tablet Take 2 tablets by mouth as needed (Potassium Replacement) 4/16/22   Jihan Painting MD Mayra   amLODIPine (NORVASC) 10 MG tablet Take 1 tablet by mouth daily 4/17/22   Ulysses Rued, MD   albuterol sulfate  (90 Base) MCG/ACT inhaler INHALE 2 PUFFS BY MOUTH EVERY 6 HOURS AS NEEDED FOR WHEEZING 11/15/21   Shahida Adams MD   omeprazole (PRILOSEC) 20 MG delayed release capsule TAKE 1 CAPSULE BY MOUTH EVERY DAY 11/15/21   Shahida Adams MD   clopidogrel (PLAVIX) 75 MG tablet TAKE 1 TABLET BY MOUTH DAILY 10/7/21   Shahida Adams MD   lovastatin (MEVACOR) 10 MG tablet TAKE 1 TABLET BY MOUTH EVERY NIGHT 9/1/21   Shahida Adams MD   Ginkgo Biloba (GNP GINGKO BILOBA EXTRACT PO) Take by mouth daily    Dylan Arizmendi MD   clobetasol (TEMOVATE) 0.05 % ointment apply to affected area twice a day 8/31/21   Shahida Adams MD   ezetimibe (ZETIA) 10 MG tablet Take 1 tablet by mouth daily 8/31/21   Shahida Adams MD   ibuprofen (ADVIL;MOTRIN) 800 MG tablet take 1 tablet by mouth every 8 hours if needed for pain 3/30/21   Shahida Adams MD   mometasone-formoterol [facility]) 100-5 MCG/ACT inhaler Inhale 2 puffs into the lungs 2 times daily 3/24/21   Shahida Adams MD   busPIRone (BUSPAR) 15 MG tablet take 1 tablet by mouth three times a day  Patient taking differently: 3 times daily as needed take 1 tablet by mouth three times a day 3/24/21   Shahida Adams MD   Omega-3 Fatty Acids (OMEGA-3 EPA FISH OIL) 1205 MG CAPS Take 1 tablet by mouth daily 3/24/21   Shahida Adams MD   clopidogrel (PLAVIX) 75 MG tablet TAKE 1 TABLET BY MOUTH DAILY 3/24/21   Shahida Adams MD   cetirizine (ZYRTEC) 10 MG tablet take 1 tablet by mouth once daily 3/16/21   Shahida Adams MD   diclofenac sodium 1 % GEL Apply 2 g topically 2 times daily 5/8/19   Shahida Adams MD   hydrocortisone 1 % cream apply topically twice a day 1/15/18   Shahida Adams MD   Heat Wraps (SOFTHEAT HEATING WRAP ULTRA) MISC Use daily to for neck and shoulder pain 12/6/17   Shahida Adams MD   lidocaine (LMX) 4 % cream Apply 2g topically as needed. 7/26/17   Jenifer Davis MD   Blood Pressure Monitor MISC Use daily to take BP 3/15/17   Jenifer Davis MD      Current Facility-Administered Medications: amiodarone (CORDARONE) tablet 100 mg, 100 mg, Oral, TID  amLODIPine (NORVASC) tablet 10 mg, 10 mg, Oral, Daily  aspirin EC tablet 81 mg, 81 mg, Oral, Daily  clopidogrel (PLAVIX) tablet 75 mg, 75 mg, Oral, Daily  calcium carbonate (TUMS) chewable tablet 500 mg, 500 mg, Oral, TID PRN  atorvastatin (LIPITOR) tablet 20 mg, 20 mg, Oral, Nightly  metoprolol tartrate (LOPRESSOR) tablet 25 mg, 25 mg, Oral, BID  budesonide-formoterol (SYMBICORT) 80-4.5 MCG/ACT inhaler 2 puff, 2 puff, Inhalation, BID  pantoprazole (PROTONIX) tablet 40 mg, 40 mg, Oral, BID AC  sodium chloride flush 0.9 % injection 5-40 mL, 5-40 mL, IntraVENous, 2 times per day  sodium chloride flush 0.9 % injection 5-40 mL, 5-40 mL, IntraVENous, PRN  0.9 % sodium chloride infusion, 25 mL, IntraVENous, PRN  ondansetron (ZOFRAN-ODT) disintegrating tablet 4 mg, 4 mg, Oral, Q8H PRN **OR** ondansetron (ZOFRAN) injection 4 mg, 4 mg, IntraVENous, Q6H PRN  polyethylene glycol (GLYCOLAX) packet 17 g, 17 g, Oral, Daily PRN  acetaminophen (TYLENOL) tablet 650 mg, 650 mg, Oral, Q6H PRN **OR** acetaminophen (TYLENOL) suppository 650 mg, 650 mg, Rectal, Q6H PRN  enoxaparin (LOVENOX) injection 40 mg, 40 mg, SubCUTAneous, Daily  insulin lispro (HUMALOG) injection vial 0-6 Units, 0-6 Units, SubCUTAneous, TID WC  insulin lispro (HUMALOG) injection vial 0-3 Units, 0-3 Units, SubCUTAneous, Nightly  glucose (GLUTOSE) 40 % oral gel 15 g, 15 g, Oral, PRN  dextrose 50 % IV solution, 12.5 g, IntraVENous, PRN  glucagon (rDNA) injection 1 mg, 1 mg, IntraMUSCular, PRN  dextrose 5 % solution, 100 mL/hr, IntraVENous, PRN  fentaNYL (SUBLIMAZE) injection 25 mcg, 25 mcg, IntraVENous, Q2H PRN  oxyCODONE (ROXICODONE) immediate release tablet 5 mg, 5 mg, Oral, Q6H PRN  albuterol sulfate  (90 Base) MCG/ACT inhaler 2 puff, 2 puff, Inhalation, Q4H PRN  furosemide (LASIX) injection 40 mg, 40 mg, IntraVENous, BID  busPIRone (BUSPAR) tablet 15 mg, 15 mg, Oral, TID PRN    Allergies:  Lipitor [atorvastatin], Prozac [fluoxetine], and Zoloft [sertraline hcl]    Social History:   reports that she has been smoking cigarettes. She has a 12.50 pack-year smoking history. She has never used smokeless tobacco. She reports previous alcohol use. She reports current drug use. Drug: Marijuana Elfriedmadhu Winter). Family History: family history includes High Blood Pressure in her mother. No h/o sudden cardiac death. No for premature CAD    REVIEW OF SYSTEMS:    · Constitutional: there has been no unanticipated weight loss. There's been No change in energy level, No change in activity level. · Eyes: No visual changes or diplopia. No scleral icterus. · ENT: No Headaches  · Cardiovascular: post-op CABG   · Respiratory: + cough and mild SOB  · Gastrointestinal: No abdominal pain. No change in bowel or bladder habits. · Genitourinary: No dysuria, trouble voiding, or hematuria. · Musculoskeletal:  No gait disturbance, No weakness or joint complaints. · Integumentary: No rash or pruritis. · Neurological: No headache, diplopia, change in muscle strength, numbness or tingling. No change in gait, balance, coordination, mood, affect, memory, mentation, behavior. · Psychiatric: No anxiety, or depression. · Endocrine: No temperature intolerance. No excessive thirst, fluid intake, or urination. No tremor. · Hematologic/Lymphatic: No abnormal bruising or bleeding, blood clots or swollen lymph nodes. · Allergic/Immunologic: No nasal congestion or hives. PHYSICAL EXAM:      /87   Pulse 96   Temp 98.2 °F (36.8 °C) (Oral)   Resp 16   Ht 5' 1\" (1.549 m)   Wt 160 lb (72.6 kg)   SpO2 95%   BMI 30.23 kg/m²    Constitutional and General Appearance: alert, cooperative, no distress and appears stated age  HEENT: PERRL, no cervical lymphadenopathy.  No masses palpable. Normal oral mucosa  Respiratory:  · Resp Auscultation: On auscultation: diminished breathing sounds with mild crackles L>R  Cardiovascular:  · The apical impulse is not displaced  · Heart tones are crisp and normal. regular S1 and S2.  · Jugular venous pulsation Normal  · The carotid upstroke is normal in amplitude and contour without delay or bruit  · Peripheral pulses are symmetrical and full   Abdomen:   · No masses or tenderness  · Bowel sounds present  Extremities:  ·  No Cyanosis or Clubbing  ·  Lower extremity edema: No  ·  Skin: Warm and dry  Neurological:  · Alert and oriented. · Moves all extremities well  · No abnormalities of mood, affect, memory, mentation, or behavior are noted    DATA:    Diagnostics:    [id number]. Normal sinus rhythm  Low voltage QRS  Cannot rule out Anteroseptal infarct , age undetermined  Prolonged QT  Abnormal ECG  ECHO: ordered, but not yet obtained. Echo on 4/3/2022  Normal LV size , mildly increased wall thickness. No obvious wall motion abnormality seen. Normal LV systolic function with LVEF >55%. Normal RV size and function. LA and RA appears normal in size. No obvious significant structural valvular abnormality noted. No significant valvular stenosis or regurgitation noted. Normal aortic root dimension. No significant pericardial effusion noted. No obvious intra-cardiac mass or shunt noted. IVC normal diameter and inspiratory variation indicating normal RA filling  pressure. Cardiac Angiography:4/3/2022   Multivessel CAD with left main disease   PLower normal LV systolic function    Labs:     CBC:   Recent Labs     22  0521   WBC 15.0*   HGB 9.8*   HCT 30.3*   *     BMP:   Recent Labs     22  0521      K 3.9   CO2 20   BUN 8   CREATININE 0.62   LABGLOM >60   GLUCOSE 111*     BNP: No results for input(s): BNP in the last 72 hours. PT/INR: No results for input(s): PROTIME, INR in the last 72 hours.   APTT:No results for input(s): APTT in the last 72 hours. CARDIAC ENZYMES:No results for input(s): CKTOTAL, CKMB, CKMBINDEX, TROPONINI in the last 72 hours. FASTING LIPID PANEL:  Lab Results   Component Value Date    HDL 36 12/01/2021    TRIG 178 03/24/2021     LIVER PROFILE:No results for input(s): AST, ALT, LABALBU in the last 72 hours. IMPRESSION/Recs:  Syncope - found down in bathroom, was having multiple coughing fits prior and in last few days. ? Vasovagal syncope  Was at [facility] tx here  Has known CAD s/p CABG on 0/37 with complications of Effusion, anemia (s/p transfusions), Resp failure with mucus plugging and asp PNA  - Admits to some SOB/ELIZABETH  - has effusion on CXR  - diurese IV lasix  - Pulm to see  - CTS to see (has dressing in place that she state was to be removed today)  - if not responding to diuretics - ? Thoracentesis  - limited Echo is pending- await final report        Discussed with patient and Nurse.     Electronically signed by Russell Gutierrez on 4/20/2022 at 12:04 PM    [facility]      [phone]

## 2022-04-20 NOTE — PROGRESS NOTES
[place] [facility]  Documentation Note                Admission Dx: Pleural effusion [J90]  Recurrent pleural effusion [J90]    Past Medical History:   has a past medical history of Anemia, Asthma, CAD (coronary artery disease), Depression, High cholesterol, Hypertension, MI, old, Osteoarthritis, Pneumonia, Sleep apnea, and Stroke ([facility] [address] Previous Testing:     CABG 4/8/2022: LIMA-LAD, SVG-OM1, SVG-RCA     CATH 4/4/2022: LM/MVD. EF 50%. ECHO 4/4/2022: EF 55%, no regurg/stenosis. Previous office/hospital visit:   Dr. Barger 4/3/2022:   1.  NSTEMI s/p cardiac cath: MV-CAD s/p CABGx3 on 4/8/22  2.  HTN  3.  HL  4.  active smoker  5.  Post op anemia s/p 3  Units pRBCs, 1 unit cryoprecipitate, 1 unit plt and plasma  6.  Aspiration PNA    Plan --   1. Continue ASA and plavix as PO meds tolerated  2. Continue statin as PO meds tolerated  3. Patient unable to take PO meds post extubation will continue lopressor IV 2.5 mg q6hr. Once able to tolerate PO will start lopressor 25 mg BID with holding parameters and plan to up titrate as tolerated. 4. Continue amiodarone 200 mg TID per CV surgery recommendations. 5. Antibiotics per Pulm/Critical care. Currently on cefepime & vancomycin  6. Post op management per CV surgery.   7. K>4, Mg>2    [facility]

## 2022-04-20 NOTE — CARE COORDINATION
Case Management Initial Discharge Plan  [address]. [address] [facility] Final,             Met with:patient to discuss discharge plans. Information verified: address, contacts, phone number, , insurance Yes  Insurance Provider: [employer]    Emergency Contact/Next of Kin name & number:   Indigo Ospina  2. Desire Paez      Parent  Brother/Sister [phone]       Who are involved in patient's support system? family    PCP: Pt has pcp she will let us know name  Date of last visit: 3 m      Discharge Planning    Living Arrangements:  [address] 1 [address]  1 stairs to climb to get into front door, 0stairs to climb to reach second floor  Location of bedroom/bathroom in home main    Patient able to perform ADL's:Independent    Current Services (outpatient & in home) had [facility]  DME equipment: none  DME provider:     Is patient receiving oral anticoagulation therapy? No    If indicated:   Physician managing anticoagulation treatment: n/a  Where does patient obtain lab work for ATC treatment? n/a    Does patient have any issues/concerns obtaining medications? No  If yes, what are patient's concerns? Is there a preferred Pharmacy after hours or on weekends? No    If yes, which pharmacy? Potential Assistance Needed:  N/A    Patient agreeable to home care: Yes has [facility]  Freedom of choice provided:  n/a    Prior SNF/Rehab Placement and Facility: none  Agreeable to SNF/Rehab: Yes  [place] of choice provided: yes     Evaluation: no    Expected Discharge date:  22    Patient expects to be discharged to: If home: is the family and/or caregiver wiling & able to provide support at home? Lives with daughter  Who will be providing this support?  Daughter does not help her    Follow Up Appointment: Best Day/ Time: Monday AM    Transportation provider: family  Transportation arrangements needed for discharge: Yes if snf placement    Readmission Risk              Risk of Unplanned Readmission:  21             Does patient have a readmission risk score greater than 14?: Yes  If yes, follow-up appointment must be made within 7 days of discharge. Goals of Care: get stronger      Educated pt on transitional options, provided freedom of choice and are agreeable with plan      Discharge Plan: Wants snf placement, awaiting pt/ot eval, freedom of choice given to pt.  Pt states she cannot even feed herself at this time she is to weak          Electronically signed by Gemma Hilliard RN on 4/20/22 at 10:51 AM EDT

## 2022-04-20 NOTE — PLAN OF CARE
Notified of reconsult. Based on CXR likely has moderate effusion to left thorax. IR for thoracentesis.    Consult note to follow by hever HERNANDEZ

## 2022-04-20 NOTE — CONSULTS
PULMONARY & CRITICAL CARE MEDICINE CONSULT NOTE     Patient:  Nitin Rivas  MRN: [id number]  Admit date: 4/20/2022  Primary Care Physician: No primary care provider on file. Consulting Physician: Eun Rivas DO  CODE Status: Full Code  LOS: 0     SUBJECTIVE     CHIEF COMPLAINT/REASON FOR CONSULT: Left pleural effusion    HISTORY OF PRESENT ILLNESS:  The patient is a 61 y.o. female patient was recently hospitalized with chest pain. She was found to have multivessel coronary artery disease and required CABG x3 (4/8/2022). Postop course was complicated with development of bleeding from chest tube, anemia and worsening respiratory failure. She required reintubation and bronchoscopic clearance of mucous plugging and had left lung atelectasis/effusion. She was discharged from the hospital on 4/16. She was readmitted after she was found to have in bathroom after a coughing spell. Initially admitted to [facility] but transferred here for further management. Chest x-ray shows left-sided effusion/atelectasis. Patient currently sitting in the bed comfortably. She is on room air and is hemodynamically stable. She has been given IV Lasix and is annoyed by need to go to the bathroom repeatedly. She denies cough, sputum, wheezing, pleuritic chest pain.     PAST MEDICAL HISTORY:        Diagnosis Date    Anemia     Asthma     CAD (coronary artery disease)     Depression 10/20/2014    High cholesterol     Hypertension     MI, old     Osteoarthritis     Pneumonia     Sleep apnea     Stroke ([address] 9/27/2014     PAST SURGICAL HISTORY:        Procedure Laterality Date    APPENDECTOMY      CORONARY ANGIOPLASTY WITH STENT PLACEMENT  2004    CORONARY ARTERY BYPASS GRAFT N/A 4/8/2022    CABG X 3 / EVH LEFT LEG / KIT performed by Ej Figueroa MD at [address] [place] [address] HISTORY:       Problem Relation Age of Onset    High Blood Pressure Mother SOCIAL HISTORY:   TOBACCO:   reports that she has been smoking cigarettes. She has a 12.50 pack-year smoking history. She has never used smokeless tobacco.  ETOH:  reports previous alcohol use. DRUGS: reports current drug use. Drug: Marijuana [place]). ALLERGIES:    Allergies   Allergen Reactions    Lipitor [Atorvastatin] Other (See Comments)     Muscle cramping in legs    Prozac [Fluoxetine] Other (See Comments)     Mood swings    Zoloft [Sertraline Hcl] Other (See Comments)     Anger and mood swings          HOME MEDICATIONS:  Prior to Admission medications    Medication Sig Start Date End Date Taking?  Authorizing Provider   aspirin 81 MG EC tablet Take 1 tablet by mouth daily 4/17/22   Omer Lawler MD   calcium carbonate (TUMS) 500 MG chewable tablet Take 1 tablet by mouth 3 times daily as needed for Heartburn 4/16/22 5/16/22  Omer Lawler MD   amiodarone (CORDARONE) 200 MG tablet Take 0.5 tablets by mouth 3 times daily 4/16/22   Omer Lawler MD   metoprolol tartrate (LOPRESSOR) 25 MG tablet Take 1 tablet by mouth 2 times daily 4/16/22   Omer Lawler MD   potassium chloride (KLOR-CON M) 20 MEQ extended release tablet Take 2 tablets by mouth as needed (Potassium Replacement) 4/16/22   Omer Lawler MD   amLODIPine (NORVASC) 10 MG tablet Take 1 tablet by mouth daily 4/17/22   Omer Lawler MD   albuterol sulfate  (90 Base) MCG/ACT inhaler INHALE 2 PUFFS BY MOUTH EVERY 6 HOURS AS NEEDED FOR WHEEZING 11/15/21   Radha Menchaca MD   omeprazole (PRILOSEC) 20 MG delayed release capsule TAKE 1 CAPSULE BY MOUTH EVERY DAY 11/15/21   Radha Menchaca MD   clopidogrel (PLAVIX) 75 MG tablet TAKE 1 TABLET BY MOUTH DAILY 10/7/21   Radha Menchaca MD   lovastatin (MEVACOR) 10 MG tablet TAKE 1 TABLET BY MOUTH EVERY NIGHT 9/1/21   Radha Menchaca MD   Ginkgo Biloba (GNP GINGKO BILOBA EXTRACT PO) Take by mouth daily    Historical Provider, MD   clobetasol (TEMOVATE) 0.05 % ointment apply to affected area twice a day 8/31/21 Reatha Course, MD   ezetimibe (ZETIA) 10 MG tablet Take 1 tablet by mouth daily 8/31/21 Reatha Course, MD   ibuprofen (ADVIL;MOTRIN) 800 MG tablet take 1 tablet by mouth every 8 hours if needed for pain 3/30/21   Reatha Course, MD   mometasone-formoterol [facility]) 100-5 MCG/ACT inhaler Inhale 2 puffs into the lungs 2 times daily 3/24/21   Reatha Course, MD   busPIRone (BUSPAR) 15 MG tablet take 1 tablet by mouth three times a day  Patient taking differently: 3 times daily as needed take 1 tablet by mouth three times a day 3/24/21   Reatha Course, MD   Omega-3 Fatty Acids (OMEGA-3 EPA FISH OIL) 1205 MG CAPS Take 1 tablet by mouth daily 3/24/21   Reatha Course, MD   clopidogrel (PLAVIX) 75 MG tablet TAKE 1 TABLET BY MOUTH DAILY 3/24/21   Reatha Course, MD   cetirizine (ZYRTEC) 10 MG tablet take 1 tablet by mouth once daily 3/16/21   Reatha Course, MD   diclofenac sodium 1 % GEL Apply 2 g topically 2 times daily 5/8/19 Reatha Course, MD   hydrocortisone 1 % cream apply topically twice a day 1/15/18   Reatha Course, MD   Heat Wraps (SOFTHEAT HEATING WRAP ULTRA) MISC Use daily to for neck and shoulder pain 12/6/17 Reatha Course, MD   lidocaine (LMX) 4 % cream Apply 2g topically as needed. 7/26/17 Reatha Course, MD   Blood Pressure Monitor MISC Use daily to take BP 3/15/17   Reatha Course, MD     IMMUNIZATIONS:  Most Recent Immunizations   Administered Date(s) Administered    COVID-19, Pfizer Purple top, DILUTE for use, 12+ yrs, 30mcg/0.3mL dose 05/01/2021    Pneumococcal Polysaccharide ([id number]) 05/04/2017    Tdap (Boostrix, Adacel) 01/01/2014     REVIEW OF SYSTEMS:  Review of Systems   Constitutional: Negative for fever. HENT: Negative for voice change. Eyes: Negative for visual disturbance. Respiratory: Positive for cough. Gastrointestinal: Negative for abdominal pain, diarrhea and vomiting. Genitourinary: Positive for frequency. Negative for dysuria and urgency. Musculoskeletal: Negative for joint swelling. Allergic/Immunologic: Negative for environmental allergies and immunocompromised state. Hematological: Negative for adenopathy. Does not bruise/bleed easily. Psychiatric/Behavioral: Negative for behavioral problems. OBJECTIVE     PaO2/FiO2 RATIO:  No results for input(s): POCPO2 in the last 72 hours. VITAL SIGNS:   LAST:  /87   Pulse 96   Temp 98.2 °F (36.8 °C) (Oral)   Resp 16   Ht 5' 1\" (1.549 m)   Wt 160 lb (72.6 kg)   SpO2 95%   BMI 30.23 kg/m²   8-24 HR RANGE:  TEMP Temp  Av.7 °F (37.1 °C)  Min: 98.2 °F (36.8 °C)  Max: 99.1 °F (72.2 °C)   BP Systolic (56VAQ), MPB:559 , Min:125 , CKU:273      Diastolic (68AIZ), BNJ:15, Min:87, Max:91     PULSE Pulse  Av  Min: 88  Max: 96   RR Resp  Av  Min: 16  Max: 16   O2 SAT SpO2  Av %  Min: 95 %  Max: 95 %   OXYGEN DELIVERY No data recorded        Physical Exam  Constitutional:       General: She is not in acute distress. HENT:      Head: Normocephalic and atraumatic. Mouth/Throat:      Mouth: Mucous membranes are moist.   Eyes:      Extraocular Movements: Extraocular movements intact. Pupils: Pupils are equal, round, and reactive to light. Cardiovascular:      Rate and Rhythm: Normal rate and regular rhythm. Heart sounds: No murmur heard. Pulmonary:      Effort: No accessory muscle usage or respiratory distress. Breath sounds: Examination of the left-lower field reveals decreased breath sounds. Decreased breath sounds present. Abdominal:      General: There is no distension. Palpations: Abdomen is soft. There is no mass. Tenderness: There is no abdominal tenderness. Musculoskeletal:      Cervical back: Neck supple. Right lower leg: No edema. Left lower leg: No edema. Lymphadenopathy:      Cervical: No cervical adenopathy. Skin:     Coloration: Skin is not pale. Findings: No rash.    Neurological:      General: No focal deficit present. Mental Status: She is oriented to person, place, and time. DATA REVIEW     Medications: Current Inpatient  Scheduled Meds:   amiodarone  100 mg Oral TID    amLODIPine  10 mg Oral Daily    aspirin  81 mg Oral Daily    clopidogrel  75 mg Oral Daily    atorvastatin  20 mg Oral Nightly    metoprolol tartrate  25 mg Oral BID    budesonide-formoterol  2 puff Inhalation BID    pantoprazole  40 mg Oral BID AC    sodium chloride flush  5-40 mL IntraVENous 2 times per day    enoxaparin  40 mg SubCUTAneous Daily    insulin lispro  0-6 Units SubCUTAneous TID WC    insulin lispro  0-3 Units SubCUTAneous Nightly    furosemide  40 mg IntraVENous BID     Continuous Infusions:   sodium chloride      dextrose         INPUT/OUTPUT:  No intake/output data recorded. LABS:  ABGs:   No results for input(s): POCPH, POCPCO2, POCPO2, POCHCO3, HSTO4RUR in the last 72 hours. CBC:   Recent Labs     04/20/22  0521   WBC 15.0*   HGB 9.8*   HCT 30.3*   MCV 91.0   *   LYMPHOPCT 16*   RBC 3.33*   MCH 29.4   MCHC 32.3   RDW 14.8*     CRP:   No results for input(s): CRP in the last 72 hours. LDH:   No results for input(s): LDH in the last 72 hours. BMP:   Recent Labs     04/20/22  0521      K 3.9      CO2 20   BUN 8   CREATININE 0.62   GLUCOSE 111*     Liver Function Test:   No results for input(s): PROT, LABALBU, ALT, AST, GGT, ALKPHOS, BILITOT in the last 72 hours. Coagulation Profile:   No results for input(s): INR, PROTIME, APTT in the last 72 hours. D-Dimer:  No results for input(s): DDIMER in the last 72 hours. Lactic Acid:  No results for input(s): LACTA in the last 72 hours. Cardiac Enzymes:  No results for input(s): CKTOTAL, CKMB, CKMBINDEX, TROPONINI in the last 72 hours. Invalid input(s): TROPONIN, HSTROP  BNP/ProBNP:   No results for input(s): BNP, PROBNP in the last 72 hours. Triglycerides:  No results for input(s): TRIG in the last 72 hours. Microbiology:  Urine Culture:  No components found for: CURINE  Blood Culture:  No components found for: CBLOOD, CFUNGUSBL  Sputum Culture:  No components found for: CSPUTUM  No results for input(s): SPECDESC, SPECIAL, CULTURE, STATUS, ORG, CDIFFTOXPCR, CAMPYLOBPCR, SALMONELLAPC, SHIGAPCR, SHIGELLAPCR, MPNEUG, MPNEUM, LACTOQL in the last 72 hours. No results for input(s): SPUTUM, SPECIAL, CULTURE, STATUS, ORG, CDIFFTOXPCR, MPNEUM, MPNEUG in the last 72 hours. Invalid input(s): [place], CFUNGUSBL,  [address] [facility]       Radiology Reports:  XR CHEST PORTABLE   Final Result   Bibasilar opacities most confluent left lung base suggestive of effusions and   atelectasis         IR GUIDED THORACENTESIS PLEURAL    (Results Pending)        Echocardiogram:   Results for orders placed during the hospital encounter of 04/03/22    ECHO Complete 2D W Doppler W Color    Summary  Normal LV size , mildly increased wall thickness. No obvious wall motion abnormality seen. Normal LV systolic function with LVEF >55%. Normal RV size and function. LA and RA appears normal in size. No obvious significant structural valvular abnormality noted. No significant valvular stenosis or regurgitation noted. Normal aortic root dimension. No significant pericardial effusion noted. No obvious intra-cardiac mass or shunt noted. IVC normal diameter and inspiratory variation indicating normal RA filling  pressure. ASSESSMENT AND PLAN     Assessment:    // Left pleural effusion/compressive atelectasis  // Syncope; ? related to cough  // Recent CABG x3 (4/8/2022)  // Postoperative anemia  // Coronary artery disease  // Essential hypertension  // Hyperlipidemia  // Tobacco abuse    Plan:    I personally interviewed/examined the patient; reviewed interval history, interpreted all available radiographic and laboratory data at the time of service.     Patient is hemodynamically stable and is currently saturating well on room air  Discussed x-ray findings with the patient and recommended thoracentesis  However, patient is reluctant for any procedure and wants to see if therapeutic response of Lasix  Will plan to repeat x-ray in the a.m. Encourage incentive spirometry  Continue pulmonary toilet, aspiration precautions and bronchodilators  Continue antiplatelet/diuresis as per cardiology  Continue to monitor off antimicrobials  Physical/occupational therapy    We will continue to follow. I would like to thank you for allowing me to participate in the care of this patient. Please feel free to call with any further questions or concerns. Saira Lemon MD  Pulmonary and Critical Care Medicine           4/20/2022, 2:37 PM    Please note that this chart was generated using voice recognition Dragon dictation software. Although every effort was made to ensure the accuracy of this automated transcription, some errors in transcription may have occurred.

## 2022-04-20 NOTE — PROGRESS NOTES
Patient is currently refusing IV lasix. Originally she stated that she would only except this if she could have a rocha placed, which Dr Geovanna Guzman approved if needed. As the day has gone she states that \"she can not take anymore poking on her\" and because of this she no longer will except the rocha. She does state that if she is feeling better tomorrow they can place the rocha and change back to IV lasix. Per Dr Asif Maguire, will change to PO lasix, cardiology notified.

## 2022-04-20 NOTE — CARE COORDINATION
Call from Nilda at [employer]. Patient is current with them and can restart if she does not end up qualifying for SNF.

## 2022-04-20 NOTE — H&P
[facility]    [address] [place] [address]    HISTORY AND PHYSICAL EXAMINATION            Date:   4/20/2022  Patient name:  Estevan Enrique  Date of admission:  4/20/2022 12:07 AM  MRN:   [id number]  Account:  [de-identified]  YOB: 1965  PCP:    No primary care provider on file. Room:   [address] [place] [address]  Code Status:    Full Code      History of Present Illness:   -61year old female with extensive cardiac Hx and recent complicated CABG x3 on 9/85/96 for CP 2/2 NSTEMI. At that time, post op course complicated by blood loss requiring multiple blood transfusions. Also managed for hypoxic resp failure 2/2 mucous plugging and pleural ffusion +/- aspiration PNA. For which patient was intubated. eventualy extubated on 4/12. Required chest tube for drainage, mucous clearance via bronch. She was on abx for but infectious work up was unrevealing and thus [address]. Patient returned to [facility] were she reported Dyspnea with little exertion and edema in extremities. Work up concerning for recurrent effusion, was transferred to [facility] where she had all her interventions for further stabilization and investigation. Past Medical History:     Past Medical History:   Diagnosis Date    Anemia     Asthma     CAD (coronary artery disease)     Depression 10/20/2014    High cholesterol     Hypertension     MI, old     Osteoarthritis     Pneumonia     Sleep apnea     Stroke ([facility] [address] 9/27/2014        Past Surgical History:     Past Surgical History:   Procedure Laterality Date    APPENDECTOMY      CORONARY ANGIOPLASTY WITH STENT PLACEMENT  2004    CORONARY ARTERY BYPASS GRAFT N/A 4/8/2022    CABG X 3 / EVH LEFT LEG / KIT performed by Tika Ramsey MD at [address] [place] [address]          Medications Prior to Admission:     Prior to Admission medications    Medication Sig Start Date End Date Taking?  Authorizing Provider aspirin 81 MG EC tablet Take 1 tablet by mouth daily 4/17/22   Mary Carmen Elias MD   calcium carbonate (TUMS) 500 MG chewable tablet Take 1 tablet by mouth 3 times daily as needed for Heartburn 4/16/22 5/16/22  Mary Carmen Elias MD   amiodarone (CORDARONE) 200 MG tablet Take 0.5 tablets by mouth 3 times daily 4/16/22   Mary Carmen Elias MD   metoprolol tartrate (LOPRESSOR) 25 MG tablet Take 1 tablet by mouth 2 times daily 4/16/22   Mary Carmen Elias MD   potassium chloride (KLOR-CON M) 20 MEQ extended release tablet Take 2 tablets by mouth as needed (Potassium Replacement) 4/16/22   Mary Carmen Elias MD   amLODIPine (NORVASC) 10 MG tablet Take 1 tablet by mouth daily 4/17/22   Mary Carmen Elias MD   albuterol sulfate  (90 Base) MCG/ACT inhaler INHALE 2 PUFFS BY MOUTH EVERY 6 HOURS AS NEEDED FOR WHEEZING 11/15/21   Henri Fuentes MD   omeprazole (PRILOSEC) 20 MG delayed release capsule TAKE 1 CAPSULE BY MOUTH EVERY DAY 11/15/21   Henri Fuentes MD   clopidogrel (PLAVIX) 75 MG tablet TAKE 1 TABLET BY MOUTH DAILY 10/7/21   Henri Fuentes MD   lovastatin (MEVACOR) 10 MG tablet TAKE 1 TABLET BY MOUTH EVERY NIGHT 9/1/21   Henri Fuentes MD   Ginkgo Biloba (GNP GINGKO BILOBA EXTRACT PO) Take by mouth daily    Historical Provider, MD   clobetasol (TEMOVATE) 0.05 % ointment apply to affected area twice a day 8/31/21   Henri Fuentes MD   ezetimibe (ZETIA) 10 MG tablet Take 1 tablet by mouth daily 8/31/21   Henri Fuentes MD   ibuprofen (ADVIL;MOTRIN) 800 MG tablet take 1 tablet by mouth every 8 hours if needed for pain 3/30/21   Henri Fuentes MD   mometasone-formoterol [facility]) 100-5 MCG/ACT inhaler Inhale 2 puffs into the lungs 2 times daily 3/24/21   Henri Fuentes MD   busPIRone (BUSPAR) 15 MG tablet take 1 tablet by mouth three times a day 3/24/21   Henri Fuentes MD   Omega-3 Fatty Acids (OMEGA-3 EPA FISH OIL) 1205 MG CAPS Take 1 tablet by mouth daily 3/24/21   Henri Fuentes MD   clopidogrel (PLAVIX) 75 MG tablet TAKE 1 TABLET BY MOUTH DAILY 3/24/21   Bernice Bingham MD   cetirizine (ZYRTEC) 10 MG tablet take 1 tablet by mouth once daily 3/16/21   Bernice Bingham MD   diclofenac sodium 1 % GEL Apply 2 g topically 2 times daily 5/8/19   Bernice Bingham MD   hydrocortisone 1 % cream apply topically twice a day 1/15/18   Bernice Bingham MD   Heat Wraps (SOFTHEAT HEATING WRAP ULTRA) MISC Use daily to for neck and shoulder pain 12/6/17   Bernice Bingham MD   lidocaine (LMX) 4 % cream Apply 2g topically as needed. 7/26/17   Bernice Bingham MD   Blood Pressure Monitor MISC Use daily to take BP 3/15/17   Bernice Bingham MD        Allergies:     Lipitor [atorvastatin], Prozac [fluoxetine], and Zoloft [sertraline hcl]    Social History:     Tobacco:    reports that she has been smoking cigarettes. She has a 12.50 pack-year smoking history. She has never used smokeless tobacco.  Alcohol:      reports previous alcohol use. Drug Use:  reports current drug use. Drug: Marijuana [place]). Family History:     Family History   Problem Relation Age of Onset    High Blood Pressure Mother        Review of Systems:     Positive and Negative as described in HPI.     CONSTITUTIONAL:  negative for fevers, chills, sweats, fatigue, weight loss  HEENT:  negative for vision, hearing changes, runny nose, throat pain  RESPIRATORY:  negative for shortness of breath, cough, congestion, wheezing  CARDIOVASCULAR:  negative for chest pain, palpitations  GASTROINTESTINAL:  negative for nausea, vomiting, diarrhea, constipation, change in bowel habits, abdominal pain   GENITOURINARY:  negative for difficulty of urination, burning with urination, frequency   INTEGUMENT:  negative for rash, skin lesions, easy bruising   HEMATOLOGIC/LYMPHATIC:  negative for swelling/edema   ALLERGIC/IMMUNOLOGIC:  negative for urticaria , itching  ENDOCRINE:  negative increase in drinking, increase in urination, hot or cold intolerance  MUSCULOSKELETAL:  negative joint pains, muscle aches, swelling of joints  NEUROLOGICAL:  negative for headaches, dizziness, lightheadedness, numbness, pain, tingling extremities  BEHAVIOR/PSYCH:  negative for depression, anxiety    Physical Exam:   There were no vitals taken for this visit. No data recorded. No results for input(s): POCGLU in the last 72 hours. No intake or output data in the 24 hours ending 04/20/22 0038    General Appearance: Ao3, in mild distress, tired from transfer travel. Mental status: oriented to person, place, and time  Head: normocephalic, atraumatic  Eye: no icterus, redness, pupils equal and reactive, extraocular eye movements intact, conjunctiva clear  Ear: normal external ear, no discharge, hearing intact  Nose: no drainage noted  Mouth: mucous membranes moist  Neck: supple, no carotid bruits, thyroid not palpable  Lungs: diminished breath sounds, L > R. crackles in bases. supoptimal inspiratory reserve. No immanent distress, accessory use or tripoding. Cardiovascular: normal rate, regular rhythm, no murmur, gallop,  Abdomen: Soft, nontender, nondistended, normal bowel sounds, no hepatomegaly or splenomegaly  Neurologic: There are no new focal motor or sensory deficits, normal muscle tone and bulk, no abnormal sensation, normal speech, cranial nerves II through XII grossly intact  Skin: No gross lesions, rashes, bruising or bleeding on exposed skin area  Extremities: peripheral pulses palpable, no pedal edema or calf pain with palpation  Psych: tired mood congruent     Investigations:      Laboratory Testing:  No results found for this or any previous visit (from the past 24 hour(s)). Imaging/Diagnostics:  CT CHEST WO CONTRAST    Result Date: 4/15/2022  Recent postoperative changes from CABG. Small layering bilateral pleural effusions which do not appear loculated. Mild scattered subsegmental atelectasis/edema in the lungs with left lower lobe volume loss/compressive atelectasis.      XR CHEST PORTABLE    Result Date: 4/16/2022  No change from prior examination. XR CHEST PORTABLE    Result Date: 4/15/2022  Interim removal of the Hollywood-Rodríguez catheter. Mild bibasilar atelectasis and left pleural effusion improved from yesterday. XR CHEST PORTABLE    Result Date: 4/14/2022  Removal of left chest tube. Persistent moderate left pleural effusion. Persistent multifocal airspace consolidation. Findings suggest multifocal pneumonia. No pneumothorax. XR CHEST PORTABLE    Result Date: 4/14/2022  1. Bibasilar and bilateral parahilar airspace disease, similar to the prior study with stable small left-sided pleural effusion. Findings likely represent multifocal pneumonia. Follow-up is recommended to document resolution. 2. Stable cardiomegaly. Prior median sternotomy and CABG. 3. Right IJ approach central venous catheter distal tip overlying the cavoatrial junction, stable. XR CHEST PORTABLE    Result Date: 4/13/2022  No substantial interval change in bilateral airspace disease or left greater than right pleural effusions as compared to prior. FL MODIFIED BARIUM SWALLOW W VIDEO    Result Date: 4/13/2022  1. No penetration or aspiration with the above administered substances. 2. Patient could not swallow the soft solid substance even after liquid wash attempt and expectorated the soft solid substance. 3. Cookie solid substance was not attempted. Please see separate speech pathology report for full discussion of findings and recommendations. Assessment :      #Post CABG recurrent pleural effusion  -Currently on room air, hemodynamically stable  -Had CABG on 4/10/22 for CP 2/2 NSTEMI,. post op course complicated by blood loss requiring multiple blood transfusions. Also managed for hypoxic resp failure 2/2 mucous plugging and pleural ffusion +/- aspiration PNA. For which patient was intubated. eventually extubated on 4/12. Required chest tube for drainage, mucous clearance via bronch.   She was on abx for but infectious work up was unrevealing and thus [address] prior to transfer. -WBC 15, nml bicarb  -CXR on admission: Moderate left-sided effusion with left  basilar opacity likely represent areas of compressive atelectasis versus  superimposed airspace disease.  Minimal right blunting and right-sided  effusion and right-sided atelectasis noted. -Patient not on diuretics , no diuretics on discharge. PLAN  -admin 40mg IV lasix x1  and assess response. If no impvoement, consider drainage with fluid analysis. Consider PO lasix on discharge. -F/U on proBNP, CMP, troponin--Patient refused labs on admission   -consider limited echo,  to assess for pericardial patholgy []  -Incentive spirometer, cardiology consult, may need CT surgery  -consider bipap if needed      #CAD   -No anginal CP at this time. Post surgical chest discomfort   -Hx of stents in 2004 and 2009 and CABG x 3  -Multiple cardiac risk factors. -EF 50% on recent cath  -resume DAPT, metoprolol, amiodarone, lisinopril, Norvasc, furosemide  -Analgesia prn, investigate for pericarditis , as per cardio       #HTN  -stable at this time  -Resume home meds  -F/u on renal function       #Anemia  -Hemoglobin 9.8, appears to be around baseline. Platelets 768,  -Postoperative CABG required 3 units PRBCs, 1 unit cryo, 1 unit platelet and FFP    #DM  -Glucose stable at this time   -resume home meds, sliding scale,       #Hyperlipidemia  -Resume high intensity statin      #Anxiety/Depression   -On buparone 15mg tiD  vte ppx: lovenox    #Nicotine dependence  -Long-term smoking use. Counseled patient on importance of staying off nicotine.   Provided nicotine patch as needed

## 2022-04-20 NOTE — PLAN OF CARE
Problem: Falls - Risk of:  Goal: Will remain free from falls  Description: Will remain free from falls  Outcome: Ongoing  Goal: Absence of physical injury  Description: Absence of physical injury  Outcome: Ongoing     Problem: Falls - Risk of:  Goal: Absence of physical injury  Description: Absence of physical injury  Outcome: Ongoing     Problem: Pain:  Goal: Pain level will decrease  Description: Pain level will decrease  Outcome: Ongoing  Goal: Control of acute pain  Description: Control of acute pain  Outcome: Ongoing  Goal: Control of chronic pain  Description: Control of chronic pain  Outcome: Ongoing     Problem: Pain:  Goal: Control of acute pain  Description: Control of acute pain  Outcome: Ongoing     Problem: Pain:  Goal: Control of chronic pain  Description: Control of chronic pain  Outcome: Ongoing

## 2022-04-21 ENCOUNTER — APPOINTMENT (OUTPATIENT)
Dept: ULTRASOUND IMAGING | Age: 57
DRG: 186 | End: 2022-04-21
Attending: INTERNAL MEDICINE
Payer: MEDICARE

## 2022-04-21 ENCOUNTER — APPOINTMENT (OUTPATIENT)
Dept: GENERAL RADIOLOGY | Age: 57
DRG: 186 | End: 2022-04-21
Attending: INTERNAL MEDICINE
Payer: MEDICARE

## 2022-04-21 LAB
ABSOLUTE EOS #: 0.42 K/UL (ref 0–0.44)
ABSOLUTE IMMATURE GRANULOCYTE: 0.1 K/UL (ref 0–0.3)
ABSOLUTE LYMPH #: 2.58 K/UL (ref 1.1–3.7)
ABSOLUTE MONO #: 1.23 K/UL (ref 0.1–1.2)
ANION GAP SERPL CALCULATED.3IONS-SCNC: 13 MMOL/L (ref 9–17)
BASOPHILS # BLD: 1 % (ref 0–2)
BASOPHILS ABSOLUTE: 0.09 K/UL (ref 0–0.2)
BUN BLDV-MCNC: 10 MG/DL (ref 6–20)
CALCIUM SERPL-MCNC: 8.4 MG/DL (ref 8.6–10.4)
CHLORIDE BLD-SCNC: 104 MMOL/L (ref 98–107)
CO2: 22 MMOL/L (ref 20–31)
CREAT SERPL-MCNC: 0.74 MG/DL (ref 0.5–0.9)
EOSINOPHILS RELATIVE PERCENT: 3 % (ref 1–4)
GFR AFRICAN AMERICAN: >60 ML/MIN
GFR NON-AFRICAN AMERICAN: >60 ML/MIN
GFR SERPL CREATININE-BSD FRML MDRD: ABNORMAL ML/MIN/{1.73_M2}
GLUCOSE BLD-MCNC: 106 MG/DL (ref 70–99)
GLUCOSE, FLUID: 113 MG/DL
HCT VFR BLD CALC: 31.4 % (ref 36.3–47.1)
HEMATOCRIT FLUID: 0.5 %
HEMOGLOBIN FLUID: 0.2 GM/DL
HEMOGLOBIN: 10.2 G/DL (ref 11.9–15.1)
IMMATURE GRANULOCYTES: 1 %
LACTATE DEHYDROGENASE, FLUID: 295 U/L
LYMPHOCYTES # BLD: 20 % (ref 24–43)
MAGNESIUM: 2.1 MG/DL (ref 1.6–2.6)
MCH RBC QN AUTO: 29.6 PG (ref 25.2–33.5)
MCHC RBC AUTO-ENTMCNC: 32.5 G/DL (ref 28.4–34.8)
MCV RBC AUTO: 91 FL (ref 82.6–102.9)
MONOCYTES # BLD: 9 % (ref 3–12)
NRBC AUTOMATED: 0 PER 100 WBC
PDW BLD-RTO: 15.3 % (ref 11.8–14.4)
PH FLUID: 8.5
PLATELET # BLD: 716 K/UL (ref 138–453)
PMV BLD AUTO: 8.6 FL (ref 8.1–13.5)
POTASSIUM SERPL-SCNC: 3.4 MMOL/L (ref 3.7–5.3)
RBC # BLD: 3.45 M/UL (ref 3.95–5.11)
RBC # BLD: ABNORMAL 10*6/UL
SEG NEUTROPHILS: 66 % (ref 36–65)
SEGMENTED NEUTROPHILS ABSOLUTE COUNT: 8.64 K/UL (ref 1.5–8.1)
SODIUM BLD-SCNC: 139 MMOL/L (ref 135–144)
SPECIMEN TYPE: NORMAL
TOTAL PROTEIN, BODY FLUID: 4.3 G/DL
WBC # BLD: 13.1 K/UL (ref 3.5–11.3)

## 2022-04-21 PROCEDURE — 99233 SBSQ HOSP IP/OBS HIGH 50: CPT | Performed by: INTERNAL MEDICINE

## 2022-04-21 PROCEDURE — 83735 ASSAY OF MAGNESIUM: CPT

## 2022-04-21 PROCEDURE — 6370000000 HC RX 637 (ALT 250 FOR IP): Performed by: NURSE PRACTITIONER

## 2022-04-21 PROCEDURE — 6370000000 HC RX 637 (ALT 250 FOR IP): Performed by: INTERNAL MEDICINE

## 2022-04-21 PROCEDURE — 88305 TISSUE EXAM BY PATHOLOGIST: CPT

## 2022-04-21 PROCEDURE — 97166 OT EVAL MOD COMPLEX 45 MIN: CPT

## 2022-04-21 PROCEDURE — 82945 GLUCOSE OTHER FLUID: CPT

## 2022-04-21 PROCEDURE — 85014 HEMATOCRIT: CPT

## 2022-04-21 PROCEDURE — 80048 BASIC METABOLIC PNL TOTAL CA: CPT

## 2022-04-21 PROCEDURE — 99221 1ST HOSP IP/OBS SF/LOW 40: CPT | Performed by: NURSE PRACTITIONER

## 2022-04-21 PROCEDURE — 83986 ASSAY PH BODY FLUID NOS: CPT

## 2022-04-21 PROCEDURE — 99232 SBSQ HOSP IP/OBS MODERATE 35: CPT | Performed by: FAMILY MEDICINE

## 2022-04-21 PROCEDURE — 71045 X-RAY EXAM CHEST 1 VIEW: CPT

## 2022-04-21 PROCEDURE — 2580000003 HC RX 258: Performed by: INTERNAL MEDICINE

## 2022-04-21 PROCEDURE — 85018 HEMOGLOBIN: CPT

## 2022-04-21 PROCEDURE — 97116 GAIT TRAINING THERAPY: CPT

## 2022-04-21 PROCEDURE — 94640 AIRWAY INHALATION TREATMENT: CPT

## 2022-04-21 PROCEDURE — 1200000000 HC SEMI PRIVATE

## 2022-04-21 PROCEDURE — 94761 N-INVAS EAR/PLS OXIMETRY MLT: CPT

## 2022-04-21 PROCEDURE — 88112 CYTOPATH CELL ENHANCE TECH: CPT

## 2022-04-21 PROCEDURE — 36415 COLL VENOUS BLD VENIPUNCTURE: CPT

## 2022-04-21 PROCEDURE — 97535 SELF CARE MNGMENT TRAINING: CPT

## 2022-04-21 PROCEDURE — 87075 CULTR BACTERIA EXCEPT BLOOD: CPT

## 2022-04-21 PROCEDURE — 83615 LACTATE (LD) (LDH) ENZYME: CPT

## 2022-04-21 PROCEDURE — 2709999900 US THORACENTESIS

## 2022-04-21 PROCEDURE — 85025 COMPLETE CBC W/AUTO DIFF WBC: CPT

## 2022-04-21 PROCEDURE — 87070 CULTURE OTHR SPECIMN AEROBIC: CPT

## 2022-04-21 PROCEDURE — 89051 BODY FLUID CELL COUNT: CPT

## 2022-04-21 PROCEDURE — 87205 SMEAR GRAM STAIN: CPT

## 2022-04-21 PROCEDURE — 97110 THERAPEUTIC EXERCISES: CPT

## 2022-04-21 PROCEDURE — 84157 ASSAY OF PROTEIN OTHER: CPT

## 2022-04-21 RX ORDER — AMIODARONE HYDROCHLORIDE 200 MG/1
100 TABLET ORAL 2 TIMES DAILY
Status: DISCONTINUED | OUTPATIENT
Start: 2022-04-21 | End: 2022-04-27 | Stop reason: HOSPADM

## 2022-04-21 RX ADMIN — OXYCODONE HYDROCHLORIDE 5 MG: 5 TABLET ORAL at 15:12

## 2022-04-21 RX ADMIN — ATORVASTATIN CALCIUM 20 MG: 20 TABLET, FILM COATED ORAL at 21:34

## 2022-04-21 RX ADMIN — FUROSEMIDE 40 MG: 40 TABLET ORAL at 09:13

## 2022-04-21 RX ADMIN — AMIODARONE HYDROCHLORIDE 100 MG: 200 TABLET ORAL at 21:35

## 2022-04-21 RX ADMIN — FUROSEMIDE 40 MG: 40 TABLET ORAL at 17:27

## 2022-04-21 RX ADMIN — AMLODIPINE BESYLATE 10 MG: 10 TABLET ORAL at 09:16

## 2022-04-21 RX ADMIN — OXYCODONE HYDROCHLORIDE 5 MG: 5 TABLET ORAL at 08:14

## 2022-04-21 RX ADMIN — PANTOPRAZOLE SODIUM 40 MG: 40 TABLET, DELAYED RELEASE ORAL at 17:26

## 2022-04-21 RX ADMIN — SODIUM CHLORIDE, PRESERVATIVE FREE 10 ML: 5 INJECTION INTRAVENOUS at 21:35

## 2022-04-21 RX ADMIN — SODIUM CHLORIDE, PRESERVATIVE FREE 10 ML: 5 INJECTION INTRAVENOUS at 09:14

## 2022-04-21 RX ADMIN — METOPROLOL TARTRATE 25 MG: 25 TABLET ORAL at 21:34

## 2022-04-21 RX ADMIN — METOPROLOL TARTRATE 25 MG: 25 TABLET ORAL at 09:13

## 2022-04-21 RX ADMIN — AMIODARONE HYDROCHLORIDE 100 MG: 200 TABLET ORAL at 09:14

## 2022-04-21 RX ADMIN — BUDESONIDE AND FORMOTEROL FUMARATE DIHYDRATE 2 PUFF: 80; 4.5 AEROSOL RESPIRATORY (INHALATION) at 19:52

## 2022-04-21 ASSESSMENT — PAIN SCALES - GENERAL
PAINLEVEL_OUTOF10: 4
PAINLEVEL_OUTOF10: 9
PAINLEVEL_OUTOF10: 7
PAINLEVEL_OUTOF10: 9

## 2022-04-21 ASSESSMENT — PAIN DESCRIPTION - DESCRIPTORS: DESCRIPTORS: DISCOMFORT;ACHING

## 2022-04-21 ASSESSMENT — PAIN DESCRIPTION - LOCATION
LOCATION: BACK;CHEST
LOCATION: CHEST;BACK
LOCATION: CHEST;BACK

## 2022-04-21 NOTE — CONSULTS
[facility] Cardiothoracic Surgery  Consult    Patient's Name/Date of Birth: Sameera Silva / 1965 (30 y.o.)    Date: April 21, 2022     Chief Complaint: post op CABG    HPI: Sameera Silva is a 61 y.o.  female who presented back to the hospital from [facility] with shortness of breath. After x-rays patient noted to have a moderate to large left-sided pleural effusion. Patient had a moderate eventful postop recovery from CABG from aspiration pneumonia after extubation requiring reintubation. X-rays from prior admission with clinic atelectasis on the left side. Patient was discharged stable from the hospital but now has a repeat accumulated left-sided pleural effusion    Currently resting in bed patient has no chest pain or shortness of breath. Patient just returned from IR to perform thoracentesis. Patient resting comfortably without any nasal cannula oxygen.       ROS:   CONSTITUTIONAL: Alert and oriented x4  Respiratory: negative  Cardiovascular: negative  Gastrointestinal: negative  Genitourinary:negative  Hematologic/lymphatic: negative  Musculoskeletal:negative  Neurological: negative  Endocrine: negative  Psychiatric: negative  Past Medical History:   Diagnosis Date    Anemia     Asthma     CAD (coronary artery disease)     Depression 10/20/2014    High cholesterol     Hypertension     MI, old     Osteoarthritis     Pneumonia     Sleep apnea     Stroke ([facility] [address] 9/27/2014     Past Surgical History:   Procedure Laterality Date    APPENDECTOMY      CORONARY ANGIOPLASTY WITH STENT PLACEMENT  2004    CORONARY ARTERY BYPASS GRAFT N/A 4/8/2022    CABG X 3 / EVH LEFT LEG / KIT performed by Geovani Morfin MD at [address] [facility]       Allergies   Allergen Reactions    Lipitor [Atorvastatin] Other (See Comments)     Muscle cramping in legs    Prozac [Fluoxetine] Other (See Comments)     Mood swings    Zoloft [Sertraline Hcl] Other (See Comments)     Anger and mood swings      Family History   Problem Relation Age of Onset    High Blood Pressure Mother      Social History     Socioeconomic History    Marital status:      Spouse name: Not on file    Number of children: Not on file    Years of education: Not on file    Highest education level: Not on file   Occupational History    Not on file   Tobacco Use    Smoking status: Current Every Day Smoker     Packs/day: 0.50     Years: 25.00     Pack years: 12.50     Types: Cigarettes    Smokeless tobacco: Never Used   Vaping Use    Vaping Use: Never used   Substance and Sexual Activity    Alcohol use: Not Currently     Alcohol/week: 0.0 standard drinks     Comment: occasionally    Drug use: Yes     Types: Marijuana Nancy Kales)    Sexual activity: Not Currently   Other Topics Concern    Not on file   Social History Narrative    Not on file     Social Determinants of Health     Financial Resource Strain:     Difficulty of Paying Living Expenses: Not on file   Food Insecurity:     Worried About Running Out of Food in the Last Year: Not on file    Diana of Food in the Last Year: Not on file   Transportation Needs:     Lack of Transportation (Medical): Not on file    Lack of Transportation (Non-Medical):  Not on file   Physical Activity:     Days of Exercise per Week: Not on file    Minutes of Exercise per Session: Not on file   Stress:     Feeling of Stress : Not on file   Social Connections:     Frequency of Communication with Friends and Family: Not on file    Frequency of Social Gatherings with Friends and Family: Not on file    Attends Rastafari Services: Not on file    Active Member of Clubs or Organizations: Not on file    Attends Club or Organization Meetings: Not on file    Marital Status: Not on file   Intimate Partner Violence:     Fear of Current or Ex-Partner: Not on file    Emotionally Abused: Not on file    Physically Abused: Not on file    Sexually Abused: Not on file   Housing Stability:     Unable to Pay for Housing in the Last Year: Not on file    Number of Nicki in the Last Year: Not on file    Unstable Housing in the Last Year: Not on file       Current Facility-Administered Medications   Medication Dose Route Frequency Provider Last Rate Last Admin    amiodarone (CORDARONE) tablet 100 mg  100 mg Oral BID ELLY Holland - HILARIO        amLODIPine (NORVASC) tablet 10 mg  10 mg Oral Daily Kaushal Mcdaniel MD   10 mg at 04/21/22 0916    aspirin EC tablet 81 mg  81 mg Oral Daily Kaushal Mcdaniel MD   81 mg at 04/20/22 1042    clopidogrel (PLAVIX) tablet 75 mg  75 mg Oral Daily Kaushal Mcdaniel MD   75 mg at 04/20/22 1042    calcium carbonate (TUMS) chewable tablet 500 mg  500 mg Oral TID PRN Kaushal Mcdaniel MD        atorvastatin (LIPITOR) tablet 20 mg  20 mg Oral Nightly Kaushal Mcdaniel MD   20 mg at 04/20/22 2057    metoprolol tartrate (LOPRESSOR) tablet 25 mg  25 mg Oral BID Kaushal Mcdaniel MD   25 mg at 04/21/22 0913    budesonide-formoterol (SYMBICORT) 80-4.5 MCG/ACT inhaler 2 puff  2 puff Inhalation BID Kaushal Mcdaniel MD   2 puff at 04/20/22 0830    pantoprazole (PROTONIX) tablet 40 mg  40 mg Oral BID AC Kaushal Mcdaniel MD   40 mg at 04/20/22 1803    sodium chloride flush 0.9 % injection 5-40 mL  5-40 mL IntraVENous 2 times per day Kaushal Mcdaniel MD   10 mL at 04/21/22 0914    sodium chloride flush 0.9 % injection 5-40 mL  5-40 mL IntraVENous PRN Kaushal Mcdaniel MD        0.9 % sodium chloride infusion  25 mL IntraVENous PRN Kaushal Mcdaniel MD        ondansetron (ZOFRAN-ODT) disintegrating tablet 4 mg  4 mg Oral Q8H PRN Kaushal Mcdaniel MD        Or    ondansetron [facility]) injection 4 mg  4 mg IntraVENous Q6H PRN Kaushal Mcdaniel MD        polyethylene glycol [facility]) packet 17 g  17 g Oral Daily PRN Kaushal Mcdaniel MD        acetaminophen (TYLENOL) tablet 650 mg  650 mg Oral Q6H PRN Kaushal Mcdaniel MD   650 mg at 04/20/22 515    Or    acetaminophen (TYLENOL) suppository 650 mg  650 mg Rectal Q6H PRN Ann Crane MD        enoxaparin (LOVENOX) injection 40 mg  40 mg SubCUTAneous Daily Ann Crane MD   40 mg at 04/20/22 1042    glucose (GLUTOSE) 40 % oral gel 15 g  15 g Oral PRN Ann Crane MD        dextrose 50 % IV solution  12.5 g IntraVENous PRN Ann Crane MD        glucagon (rDNA) injection 1 mg  1 mg IntraMUSCular PRN Ann Crane MD        dextrose 5 % solution  100 mL/hr IntraVENous PRN Ann Crane MD        fentaNYL (SUBLIMAZE) injection 25 mcg  25 mcg IntraVENous Q2H PRN Ann Crane MD        oxyCODONE (ROXICODONE) immediate release tablet 5 mg  5 mg Oral Q6H PRN Ann Crane MD   5 mg at 04/21/22 0814    albuterol sulfate  (90 Base) MCG/ACT inhaler 2 puff  2 puff Inhalation Q4H PRN Jigna Ye DO        busPIRone (BUSPAR) tablet 15 mg  15 mg Oral TID PRN Jigna Ye DO        furosemide (LASIX) tablet 40 mg  40 mg Oral BID Doyle Henrique, DO   40 mg at 04/21/22 0913       Physical Exam:  Vitals:    04/21/22 1220   BP:    Pulse: 82   Resp:    Temp:    SpO2:      Weight: Weight: 160 lb (72.6 kg)    Weight: 160 lb (72.6 kg)        General: Alert and Oriented x3. Sitting up in bed. No apparent distress. HEENT:  Normocephalic and atraumatic. PERRL. EOMI. Lips and oral mucosa moist and without lesions. Neck:  Supple. Trachea midline. Chest:  No abnormality. Equal and symmetric expansion with respiration. Lungs:  Clear to auscultation. Cardiac:  Regular rate and rhythm without murmurs, rubs or gallops. Abdomen:  Soft, non-tender, normoactive bowel sounds. No masses or organomegaly. Extremities:  No cyanosis, clubbing, or edema. Intact pulses in all four extremities. Musculoskeletal:  Intact range of motion of peripheral joints. Normal muscular strength. Neurologic:  Cranial nerves are grossly intact. Non-focal sensory deficits on exam.  Psychiatric: Mood and affect are appropriate.       Imaging Studies: Chest x-rays moderate to large left-sided pleural effusion no pneumothorax    Assessment   Pl effusion    PLAN:  At this time there is no intervention by cardiothoracic surgery. Patient will follow-up in clinic next week with repeat chest x-ray to evaluate pleural effusion. We recommend patient to be discharged to SNF.   Cardiothoracic surgery will sign off    20 min spent*    ELLY Blount NP, CNP  Phone: [phone]

## 2022-04-21 NOTE — PROGRESS NOTES
[facility]  Office: [address], DO, Jb Kras, DO, Sabrina Jordan, DO, Linda Isamar Blood, DO, Zara Mccollum MD, Gold Hwang MD, José Miguel Siegel MD, Krzysztof Shine MD, Antonio Figueroa MD, Saad Poole MD, Patricia Cowan MD, Gerald Love, DO, Weston Dey, DO, Jeremiah Santoyo MD,  Toño James, DO, Jake Fowler MD, Randy Jimenez MD, Nimco Dick MD, Jhonathan Upton DO, Shaila Christiansen MD, Robert Tran MD, Darrion Lazar, [facility], [facility], CNP, Stephanie Wiggins, CNP, Alton Section, CNP, Nelson Daigle, CNP, Dora Solddaya, CNP, Luis Felipe Gutierrez PAKatarinaC, Uma Joe, [facility], Jesus Husbands, CNP, Joy Kruger, CNP, Preston Loya, CNP, Gabriella Romano, CNS, Orysia Soda, [facility], Starlett Market, CNP, Marek Betters, CNP, Nikkie Fitting, [facility]    Progress Note    4/21/2022    7:28 AM    Name:   Amparo Henderson  MRN:     [id number]     Acct:      [de-identified]   Room:   [address] [place] [address] Day:  1  Admit Date:  4/20/2022 12:07 AM    PCP:   No primary care provider on file. Code Status:  Full Code    Subjective:     C/C: No chief complaint on file. Interval History Status: not changed. Pt was seen and examined this morning  No acute events overnight   Sitting in bedside chair   States that she is feeling better at this time  Denies having any other complaints       Review of Systems:     12 point ROS performed and negative for anything other than what was stated in subjective     Medications: Allergies:     Allergies   Allergen Reactions    Lipitor [Atorvastatin] Other (See Comments)     Muscle cramping in legs    Prozac [Fluoxetine] Other (See Comments)     Mood swings    Zoloft [Sertraline Hcl] Other (See Comments)     Anger and mood swings        Current Meds:   Scheduled Meds:    amiodarone  100 mg Oral TID    amLODIPine  10 mg Oral Daily    aspirin  81 mg Oral Daily    clopidogrel  75 mg Oral Daily    atorvastatin  20 mg Oral Nightly    metoprolol tartrate  25 mg Oral BID    budesonide-formoterol  2 puff Inhalation BID    pantoprazole  40 mg Oral BID AC    sodium chloride flush  5-40 mL IntraVENous 2 times per day    enoxaparin  40 mg SubCUTAneous Daily    furosemide  40 mg Oral BID     Continuous Infusions:    sodium chloride      dextrose       PRN Meds: calcium carbonate, sodium chloride flush, sodium chloride, ondansetron **OR** ondansetron, polyethylene glycol, acetaminophen **OR** acetaminophen, glucose, dextrose, glucagon (rDNA), dextrose, fentanNYL, oxyCODONE, albuterol sulfate HFA, busPIRone    Data:     Past Medical History:   has a past medical history of Anemia, Asthma, CAD (coronary artery disease), Depression, High cholesterol, Hypertension, MI, old, Osteoarthritis, Pneumonia, Sleep apnea, and Stroke ([address] Social History:   reports that she has been smoking cigarettes. She has a 12.50 pack-year smoking history. She has never used smokeless tobacco. She reports previous alcohol use. She reports current drug use. Drug: Marijuana Robert Sainz). Family History:   Family History   Problem Relation Age of Onset    High Blood Pressure Mother        Vitals:  /87   Pulse 94   Temp 99 °F (37.2 °C) (Oral)   Resp 17   Ht 5' 1\" (1.549 m)   Wt 160 lb (72.6 kg)   SpO2 93%   BMI 30.23 kg/m²   Temp (24hrs), Av.8 °F (37.1 °C), Min:98.2 °F (36.8 °C), Max:99.2 °F (37.3 °C)    Recent Labs     22  1141   POCGLU 84       I/O (24Hr):   No intake or output data in the 24 hours ending 22 0728    Labs:  Hematology:  Recent Labs     22  0521 22  0606   WBC 15.0* 13.1*   RBC 3.33* 3.45*   HGB 9.8* 10.2*   HCT 30.3* 31.4*   MCV 91.0 91.0   MCH 29.4 29.6   MCHC 32.3 32.5   RDW 14.8* 15.3*   * 716*   MPV 8.9 8.6     Chemistry:  Recent Labs     22  0521 22  0606    139   K 3.9 3.4*    104   CO2 20 22   GLUCOSE 111* 106*   BUN 8 10 CREATININE 0.62 0.74   MG  --  2.1   ANIONGAP 13 13   LABGLOM >60 >60   GFRAA >60 >60   CALCIUM 8.6 8.4*     Recent Labs     04/20/22  1141   POCGLU 84     ABG:  Lab Results   Component Value Date    POCPH 7.484 04/13/2022    POCPCO2 30.6 04/13/2022    POCPO2 77.1 04/13/2022    POCHCO3 23.0 04/13/2022    NBEA 4 04/11/2022    PBEA 0 04/13/2022    ECAJ8YOK 96 04/13/2022    FIO2 60.0 04/12/2022     Lab Results   Component Value Date/Time    SPECIAL R AC 1ML 04/10/2022 02:49 AM     Lab Results   Component Value Date/Time    CULTURE PSEUDOMONAS AERUGINOSA <57266 CFU/ML (A) 04/10/2022 12:43 PM    CULTURE NO NORMAL JUSTINA 04/10/2022 12:43 PM       Radiology:  CT CHEST WO CONTRAST    Result Date: 4/15/2022  Recent postoperative changes from CABG. Small layering bilateral pleural effusions which do not appear loculated. Mild scattered subsegmental atelectasis/edema in the lungs with left lower lobe volume loss/compressive atelectasis. XR CHEST PORTABLE    Result Date: 4/21/2022  There is a moderate-sized left pleural effusion which is mildly increased. Trace right effusion. Bibasilar airspace disease likely representing atelectasis. XR CHEST PORTABLE    Result Date: 4/20/2022  Bibasilar opacities most confluent left lung base suggestive of effusions and atelectasis     XR CHEST PORTABLE    Result Date: 4/16/2022  No change from prior examination. XR CHEST PORTABLE    Result Date: 4/15/2022  Interim removal of the [place]Rod[place]guez catheter. Mild bibasilar atelectasis and left pleural effusion improved from yesterday.        Physical Examination:        General appearance:  alert, cooperative and no distress  Mental Status:  oriented to person, place and time and normal affect  Lungs: decreased breath sounds bilaterally   Heart:  regular rate and rhythm, no murmur  Abdomen:  soft, nontender, nondistended, normal bowel sounds   Extremities:  no edema, redness, tenderness in the calves  Skin:  no gross lesions, rashes, induration    Assessment:        Hospital Problems           Last Modified POA    * (Principal) Pleural effusion 4/20/2022 Yes    Recurrent pleural effusion 4/20/2022 Yes          Plan:        1. Post CABG recurrent pleural effusion  - cardiology on board   - CTS on board   - pulmonology on board   - continue IV lasix   - monitor Is/Os   - echo pending  - repeat chest xray showed moderate effusion to the left thorax  - IR consult for thoracentesis       2. CAD   - cardiology on board   - trend cardiac enzymes  - telemetry   - stable at this time     3. HTN  - v/s per unit protocol   - continue current anti hypertensive regimen      4. Anemia  - Hemoglobin 9.8, appears to be around baseline. 5. Hyperlipidemia  - Resume high intensity statin     6.  Anxiety/Depression   - On buparone 15mg tiD              Pito Senior MD  4/21/2022  7:28 AM

## 2022-04-21 NOTE — PLAN OF CARE
Problem: Falls - Risk of:  Goal: Will remain free from falls  Description: Will remain free from falls  4/21/2022 0452 by Rehan Hadley RN  Outcome: Progressing  4/21/2022 0100 by Rehan Hadley RN  Outcome: Progressing  Goal: Absence of physical injury  Description: Absence of physical injury  4/21/2022 0452 by Rehan Hadley RN  Outcome: Progressing  4/21/2022 0100 by Rehan Hadley RN  Outcome: Progressing     Problem: Falls - Risk of:  Goal: Absence of physical injury  Description: Absence of physical injury  4/21/2022 0452 by Rehan Hadley RN  Outcome: Progressing  4/21/2022 0100 by Rehan Hadley RN  Outcome: Progressing     Problem: Pain:  Goal: Pain level will decrease  Description: Pain level will decrease  4/21/2022 0452 by Rehan Hadley RN  Outcome: Progressing  4/21/2022 0100 by Rehan Hadley RN  Outcome: Progressing  Goal: Control of acute pain  Description: Control of acute pain  4/21/2022 0452 by Rehan Hadley RN  Outcome: Progressing  4/21/2022 0100 by Rehan Hadley RN  Outcome: Progressing  Goal: Control of chronic pain  Description: Control of chronic pain  4/21/2022 0452 by Rehan Hadley RN  Outcome: Progressing  4/21/2022 0100 by Rehan Hadley RN  Outcome: Progressing     Problem: Pain:  Goal: Control of acute pain  Description: Control of acute pain  4/21/2022 0452 by Rehan Hadley RN  Outcome: Progressing  4/21/2022 0100 by Rehan Hadley RN  Outcome: Progressing     Problem: Pain:  Goal: Control of chronic pain  Description: Control of chronic pain  4/21/2022 0452 by Rehan Hadley RN  Outcome: Progressing  4/21/2022 0100 by Rehan Hadley RN  Outcome: Progressing     Problem: Discharge Planning  Goal: Discharge to home or other facility with appropriate resources  4/21/2022 0452 by Rehan Hadley RN  Outcome: Progressing  4/21/2022 0100 by Rehan Hadley RN  Outcome: Progressing

## 2022-04-21 NOTE — BRIEF OP NOTE
Brief Postoperative Note for Thoracentesis    Jannette Tyler  YOB: 1965  [id number]    Pre-operative Diagnosis: left Pleural effusion      Post-operative Diagnosis: Same    Procedure: Ultrasound guided Thoracentesis     Anesthesia: 1% Lidocaine     Surgeons/Assistants: Lucas Daly PA-C    Complications: none    EBL: Minimal    Specimens: were obtained    Ultrasound guided left thoracentesis performed. 450 ml serosang fluid obtained. Dressing applied. Unable to completely drain due to pt beginning to cough.     Electronically signed by SON Rodriguez on 4/21/2022 at 12:27 PM

## 2022-04-21 NOTE — PLAN OF CARE
Problem: Falls - Risk of:  Goal: Will remain free from falls  Description: Will remain free from falls  4/21/2022 1107 by Crescencio Pitt  Outcome: Progressing  4/21/2022 0452 by Rehan Hadley RN  Outcome: Progressing  4/21/2022 0100 by Rehan Hadley RN  Outcome: Progressing  Goal: Absence of physical injury  Description: Absence of physical injury  4/21/2022 1107 by Crescencio Pitt  Outcome: Progressing  4/21/2022 0452 by Rehan Hadley RN  Outcome: Progressing  4/21/2022 0100 by Rehan Hadley RN  Outcome: Progressing     Problem: Pain:  Goal: Pain level will decrease  Description: Pain level will decrease  4/21/2022 1107 by Crescencio Pitt  Outcome: Progressing  4/21/2022 0452 by Rehan Hadley RN  Outcome: Progressing  4/21/2022 0100 by Rehan Hadley RN  Outcome: Progressing  Goal: Control of acute pain  Description: Control of acute pain  4/21/2022 1107 by Crescencio Pitt  Outcome: Progressing  4/21/2022 0452 by Rehan Hadley RN  Outcome: Progressing  4/21/2022 0100 by Rehan Hadley RN  Outcome: Progressing  Goal: Control of chronic pain  Description: Control of chronic pain  4/21/2022 1107 by Crescencio Pitt  Outcome: Progressing  4/21/2022 0452 by Rehan Hadley RN  Outcome: Progressing  4/21/2022 0100 by Rehan Hadley RN  Outcome: Progressing     Problem: Discharge Planning  Goal: Discharge to home or other facility with appropriate resources  4/21/2022 1107 by Crescencio Pitt  Outcome: Progressing  4/21/2022 0452 by Rehan Hadley RN  Outcome: Progressing  4/21/2022 0100 by Rehan Hadley RN  Outcome: Progressing     Problem: Chronic Conditions and Co-morbidities  Goal: Patient's chronic conditions and co-morbidity symptoms are monitored and maintained or improved  4/21/2022 1107 by Crescencio Pitt  Outcome: Progressing  4/21/2022 0452 by Rehan Hadley RN  Outcome: Progressing  4/21/2022 0100 by Rehan Hadley RN  Outcome: Progressing

## 2022-04-21 NOTE — PROGRESS NOTES
Thoracentesis: left    Patient to [address] [facility] room 8, identified, allergies confirmed. Sitting up, monitors on. Stable. Prep to left back   450 mLs of light red fluid drawn off.  x2 with Tegaderm to back puncture. No problems noted. Sample collected and sent to lab per writer. Patient tolerated well. Report called to RN.

## 2022-04-21 NOTE — PROGRESS NOTES
PULMONARY & CRITICAL CARE MEDICINE CONSULT NOTE     Patient:  Liss Burrell  MRN: [id number]  Admit date: 4/20/2022  Primary Care Physician: No primary care provider on file. Consulting Physician: Romel Leonardo MD  CODE Status: Full Code  LOS: 1     SUBJECTIVE     CHIEF COMPLAINT/REASON FOR CONSULT: Left pleural effusion    HISTORY OF PRESENT ILLNESS:  The patient is a 61 y.o. female patient was recently hospitalized with chest pain. She was found to have multivessel coronary artery disease and required CABG x3 (4/8/2022). Postop course was complicated with development of bleeding from chest tube, anemia and worsening respiratory failure. She required reintubation and bronchoscopic clearance of mucous plugging and had left lung atelectasis/effusion. She was discharged from the hospital on 4/16. She was readmitted after she was found to have in bathroom after a coughing spell. Initially admitted to [facility] but transferred here for further management. Chest x-ray shows left-sided effusion/atelectasis. Patient currently sitting in the bed comfortably. She is on room air and is hemodynamically stable. She has been given IV Lasix and is annoyed by need to go to the bathroom repeatedly. She denies cough, sputum, wheezing, pleuritic chest pain.   4/21/2022   Subjective      No acute events   Cough but no hemoptysis or purulent sputum     PAST MEDICAL HISTORY:        Diagnosis Date    Anemia     Asthma     CAD (coronary artery disease)     Depression 10/20/2014    High cholesterol     Hypertension     MI, old     Osteoarthritis     Pneumonia     Sleep apnea     Stroke ([facility] [address] 9/27/2014     PAST SURGICAL HISTORY:        Procedure Laterality Date    APPENDECTOMY      CORONARY ANGIOPLASTY WITH STENT PLACEMENT  2004    CORONARY ARTERY BYPASS GRAFT N/A 4/8/2022    CABG X 3 / EVH LEFT LEG / KIT performed by Mary Carmen Elias MD at [address] [place] [address] HISTORY:       Problem Relation Age of Onset    High Blood Pressure Mother      SOCIAL HISTORY:   TOBACCO:   reports that she has been smoking cigarettes. She has a 12.50 pack-year smoking history. She has never used smokeless tobacco.  ETOH:  reports previous alcohol use. DRUGS: reports current drug use. Drug: Marijuana Gerson Bilberry). ALLERGIES:    Allergies   Allergen Reactions    Lipitor [Atorvastatin] Other (See Comments)     Muscle cramping in legs    Prozac [Fluoxetine] Other (See Comments)     Mood swings    Zoloft [Sertraline Hcl] Other (See Comments)     Anger and mood swings          HOME MEDICATIONS:  Prior to Admission medications    Medication Sig Start Date End Date Taking?  Authorizing Provider   aspirin 81 MG EC tablet Take 1 tablet by mouth daily 4/17/22   Virginie Tyson MD   calcium carbonate (TUMS) 500 MG chewable tablet Take 1 tablet by mouth 3 times daily as needed for Heartburn 4/16/22 5/16/22  Virginie Tyson MD   amiodarone (CORDARONE) 200 MG tablet Take 0.5 tablets by mouth 3 times daily 4/16/22   Virginie Tyson MD   metoprolol tartrate (LOPRESSOR) 25 MG tablet Take 1 tablet by mouth 2 times daily 4/16/22   Virginie Tyson MD   potassium chloride (KLOR-CON M) 20 MEQ extended release tablet Take 2 tablets by mouth as needed (Potassium Replacement) 4/16/22   Virginie Tyson MD   amLODIPine (NORVASC) 10 MG tablet Take 1 tablet by mouth daily 4/17/22   Virginie Tyson MD   albuterol sulfate  (90 Base) MCG/ACT inhaler INHALE 2 PUFFS BY MOUTH EVERY 6 HOURS AS NEEDED FOR WHEEZING 11/15/21   Sharath Mosqueda MD   omeprazole (PRILOSEC) 20 MG delayed release capsule TAKE 1 CAPSULE BY MOUTH EVERY DAY 11/15/21   Sharath Mosqueda MD   clopidogrel (PLAVIX) 75 MG tablet TAKE 1 TABLET BY MOUTH DAILY 10/7/21   Sharath Mosqueda MD   lovastatin (MEVACOR) 10 MG tablet TAKE 1 TABLET BY MOUTH EVERY NIGHT 9/1/21   Sharath Mosqueda MD   Ginkgo Biloba (GNP GINGKO BILOBA EXTRACT PO) Take by mouth daily    Historical Provider, MD   clobetasol (TEMOVATE) 0.05 % ointment apply to affected area twice a day 8/31/21   Radha Menchaca MD   ezetimibe (ZETIA) 10 MG tablet Take 1 tablet by mouth daily 8/31/21   Radha Menchaca MD   ibuprofen (ADVIL;MOTRIN) 800 MG tablet take 1 tablet by mouth every 8 hours if needed for pain 3/30/21   Radha Menchaca MD   mometasone-formoterol [facility]) 100-5 MCG/ACT inhaler Inhale 2 puffs into the lungs 2 times daily 3/24/21   Radha Menchaca MD   busPIRone (BUSPAR) 15 MG tablet take 1 tablet by mouth three times a day  Patient taking differently: 3 times daily as needed take 1 tablet by mouth three times a day 3/24/21   Radha Menchaca MD   Omega-3 Fatty Acids (OMEGA-3 EPA FISH OIL) 1205 MG CAPS Take 1 tablet by mouth daily 3/24/21   Radha Menchaca MD   clopidogrel (PLAVIX) 75 MG tablet TAKE 1 TABLET BY MOUTH DAILY 3/24/21   Radha Menchaca MD   cetirizine (ZYRTEC) 10 MG tablet take 1 tablet by mouth once daily 3/16/21   Radha Menchaca MD   diclofenac sodium 1 % GEL Apply 2 g topically 2 times daily 5/8/19   Radha Menchaca MD   hydrocortisone 1 % cream apply topically twice a day 1/15/18   Radha Menchaca MD   Heat Wraps (SOFTHEAT HEATING WRAP ULTRA) MISC Use daily to for neck and shoulder pain 12/6/17   Radha Menchaca MD   lidocaine (LMX) 4 % cream Apply 2g topically as needed. 7/26/17   Radha Menchaca MD   Blood Pressure Monitor MISC Use daily to take BP 3/15/17   Radha Menchaca MD     IMMUNIZATIONS:  Most Recent Immunizations   Administered Date(s) Administered    COVID-19, Pfizer Purple top, DILUTE for use, 12+ yrs, 30mcg/0.3mL dose 05/01/2021    Pneumococcal Polysaccharide ([id number]) 05/04/2017    Tdap (Boostrix, Adacel) 01/01/2014     REVIEW OF SYSTEMS:  Review of Systems   Constitutional: Negative for fever. HENT: Negative for voice change. Eyes: Negative for visual disturbance. Respiratory: Positive for cough. Gastrointestinal: Negative for abdominal pain, diarrhea and vomiting. Genitourinary: Positive for frequency. Negative for dysuria and urgency. Musculoskeletal: Negative for joint swelling. Allergic/Immunologic: Negative for environmental allergies and immunocompromised state. Hematological: Negative for adenopathy. Does not bruise/bleed easily. Psychiatric/Behavioral: Negative for behavioral problems. OBJECTIVE     PaO2/FiO2 RATIO:  No results for input(s): POCPO2 in the last 72 hours. VITAL SIGNS:   LAST:  /87   Pulse 94   Temp 99 °F (37.2 °C) (Oral)   Resp 17   Ht 5' 1\" (1.549 m)   Wt 160 lb (72.6 kg)   SpO2 93%   BMI 30.23 kg/m²   8-24 HR RANGE:  TEMP Temp  Av.8 °F (37.1 °C)  Min: 98.2 °F (36.8 °C)  Max: 99.2 °F (49.1 °C)   BP Systolic (93TDY), NKB:215 , Min:125 , AJF:268      Diastolic (36KEF), NQR:06, Min:87, Max:88     PULSE Pulse  Av.3  Min: 93  Max: 96   RR Resp  Av  Min: 17  Max: 17   O2 SAT SpO2  Av %  Min: 93 %  Max: 93 %   OXYGEN DELIVERY No data recorded        Physical Exam  Constitutional:       General: She is not in acute distress. HENT:      Head: Normocephalic and atraumatic. Mouth/Throat:      Mouth: Mucous membranes are moist.   Eyes:      Extraocular Movements: Extraocular movements intact. Pupils: Pupils are equal, round, and reactive to light. Cardiovascular:      Rate and Rhythm: Normal rate and regular rhythm. Heart sounds: No murmur heard. Pulmonary:      Effort: No accessory muscle usage or respiratory distress. Breath sounds: Examination of the left-lower field reveals decreased breath sounds. Decreased breath sounds present. Abdominal:      General: There is no distension. Palpations: Abdomen is soft. There is no mass. Tenderness: There is no abdominal tenderness. Musculoskeletal:      Cervical back: Neck supple. Right lower leg: No edema. Left lower leg: No edema. Lymphadenopathy:      Cervical: No cervical adenopathy.    Skin:     Coloration: Skin is not pale. Findings: No rash. Neurological:      General: No focal deficit present. Mental Status: She is oriented to person, place, and time. DATA REVIEW     Medications: Current Inpatient  Scheduled Meds:   amiodarone  100 mg Oral BID    amLODIPine  10 mg Oral Daily    aspirin  81 mg Oral Daily    clopidogrel  75 mg Oral Daily    atorvastatin  20 mg Oral Nightly    metoprolol tartrate  25 mg Oral BID    budesonide-formoterol  2 puff Inhalation BID    pantoprazole  40 mg Oral BID AC    sodium chloride flush  5-40 mL IntraVENous 2 times per day    enoxaparin  40 mg SubCUTAneous Daily    furosemide  40 mg Oral BID     Continuous Infusions:   sodium chloride      dextrose         INPUT/OUTPUT:  No intake/output data recorded. LABS:  ABGs:   No results for input(s): POCPH, POCPCO2, POCPO2, POCHCO3, ZXQC8YGD in the last 72 hours. CBC:   Recent Labs     04/20/22  0521 04/21/22  0606   WBC 15.0* 13.1*   HGB 9.8* 10.2*   HCT 30.3* 31.4*   MCV 91.0 91.0   * 716*   LYMPHOPCT 16* 20*   RBC 3.33* 3.45*   MCH 29.4 29.6   MCHC 32.3 32.5   RDW 14.8* 15.3*     CRP:   No results for input(s): CRP in the last 72 hours. LDH:   No results for input(s): LDH in the last 72 hours. BMP:   Recent Labs     04/20/22  0521 04/21/22  0606    139   K 3.9 3.4*    104   CO2 20 22   BUN 8 10   CREATININE 0.62 0.74   GLUCOSE 111* 106*     Liver Function Test:   No results for input(s): PROT, LABALBU, ALT, AST, GGT, ALKPHOS, BILITOT in the last 72 hours. Coagulation Profile:   No results for input(s): INR, PROTIME, APTT in the last 72 hours. D-Dimer:  No results for input(s): DDIMER in the last 72 hours. Lactic Acid:  No results for input(s): LACTA in the last 72 hours. Cardiac Enzymes:  No results for input(s): CKTOTAL, CKMB, CKMBINDEX, TROPONINI in the last 72 hours.     Invalid input(s): TROPONIN, HSTROP  BNP/ProBNP:   No results for input(s): BNP, PROBNP in the last 72 hours.  Triglycerides:  No results for input(s): TRIG in the last 72 hours. Microbiology:  Urine Culture:  No components found for: CURINE  Blood Culture:  No components found for: CBLOOD, CFUNGUSBL  Sputum Culture:  No components found for: CSPUTUM  No results for input(s): SPECDESC, SPECIAL, CULTURE, STATUS, ORG, CDIFFTOXPCR, CAMPYLOBPCR, SALMONELLAPC, SHIGAPCR, SHIGELLAPCR, MPNEUG, MPNEUM, LACTOQL in the last 72 hours. No results for input(s): SPUTUM, SPECIAL, CULTURE, STATUS, ORG, CDIFFTOXPCR, MPNEUM, MPNEUG in the last 72 hours. Invalid input(s): Vita Aragon, [address] [facility],  [facility]       Radiology Reports:  XR CHEST PORTABLE   Final Result   There is a moderate-sized left pleural effusion which is mildly increased. Trace right effusion. Bibasilar airspace disease likely representing atelectasis. XR CHEST PORTABLE   Final Result   Bibasilar opacities most confluent left lung base suggestive of effusions and   atelectasis         US THORACENTESIS Which side should the procedure be performed? Left    (Results Pending)        Echocardiogram:   Results for orders placed during the hospital encounter of 04/03/22    ECHO Complete 2D W Doppler W Color    Summary  Normal LV size , mildly increased wall thickness. No obvious wall motion abnormality seen. Normal LV systolic function with LVEF >55%. Normal RV size and function. LA and RA appears normal in size. No obvious significant structural valvular abnormality noted. No significant valvular stenosis or regurgitation noted. Normal aortic root dimension. No significant pericardial effusion noted. No obvious intra-cardiac mass or shunt noted. IVC normal diameter and inspiratory variation indicating normal RA filling  pressure.        ASSESSMENT AND PLAN     Assessment:    // Left pleural effusion/compressive atelectasis  // Syncope; ? related to cough  // Recent CABG x3 (4/8/2022)  // Postoperative anemia  // Coronary artery disease  // Essential hypertension  // Hyperlipidemia  // Tobacco abuse    Plan:    I personally interviewed/examined the patient; reviewed interval history, interpreted all available radiographic and laboratory data at the time of service. D/w patient explained procedure and indications for thoracentesis . She is agreeable to proceed . Orders for analysis placed   Cont IS and deep breathing . We will continue to follow. I would like to thank you for allowing me to participate in the care of this patient. Please feel free to call with any further questions or concerns. Cortney Cruz MD  Pulmonary and Critical Care Medicine           4/21/2022, 10:16 AM    Please note that this chart was generated using voice recognition Dragon dictation software. Although every effort was made to ensure the accuracy of this automated transcription, some errors in transcription may have occurred.

## 2022-04-21 NOTE — PROGRESS NOTES
Physical Therapy  Facility/Department: [facility] RENAL//MED SURG  Physical Therapy Initial Assessment    Name: Rachael Calvo  : 1965  MRN: [id number]  Date of Service: 2022    Discharge Recommendations:  Therapy recommended at discharge   PT Equipment Recommendations  Equipment Needed: Yes  Mobility Devices: Amezquita Hair: Rollator (4 Wheeled)      Patient Diagnosis(es): There were no encounter diagnoses. Past Medical History:  has a past medical history of Anemia, Asthma, CAD (coronary artery disease), Depression, High cholesterol, Hypertension, MI, old, Osteoarthritis, Pneumonia, Sleep apnea, and Stroke ([facility] [address] Past Surgical History:  has a past surgical history that includes Hysterectomy; Coronary angioplasty with stent (); Tubal ligation; Tonsillectomy; Appendectomy; and Coronary artery bypass graft (N/A, 2022). Assessment   Body Structures, Functions, Activity Limitations Requiring Skilled Therapeutic Intervention: Decreased tolerance to work activity; Decreased endurance  Assessment: The pt ambulated 20ft x2 with a RW x CGA Significant fatigue and SOB noted this date . Pt taking up to 8standing rest breaks. . She ambulated slowly and fatigue quickly with mobilization. Recomending continued therapy to address deficits  Therapy Prognosis: Good  Requires PT Follow-Up: Yes  Activity Tolerance  Activity Tolerance: Patient limited by fatigue;Patient limited by pain; Patient limited by endurance     Plan   Plan  Plan: 1 time a day 7 days a week  Current Treatment Recommendations: Strengthening,Functional mobility training,Endurance training,Gait training,Stair training,Safety education & training,Therapeutic activities  Safety Devices  Type of Devices: Nurse notified,Call light within reach,Gait belt,Left in bed,Patient at risk for falls  Restraints  Restraints Initially in Place: No     Restrictions  Restrictions/Precautions  Required Braces or Orthoses?: No  Position Activity Restriction  Other position/activity restrictions: Up with assist; CABG x3 on 4/10/22 for CP 2/2 NSTEMI     Subjective   General  Patient assessed for rehabilitation services?: Yes  Response To Previous Treatment: Patient with no complaints from previous session. Family / Caregiver Present: No  Follows Commands: Within Functional Limits  Subjective  Subjective: RN and pt agreeable to PT  Pt up in chair upon arriva , pleasant and cooperative t/o      Objective    Bed Mobility Training  Bed Mobility Training: Yes  Bed mobility  Sit to Supine: Contact guard assistance  Scooting: Contact guard assistance  Transfers  Sit to Stand: Contact guard assistance  Stand to sit: Contact guard assistance  Ambulation  Surface: level tile  Device: Rolling Walker  Assistance: Contact guard assistance  Quality of Gait: Fatigues quickly requiring standing rest x8. Gait Deviations: Slow Erin  Distance: 20ft x2  Comments: Significant amount of time to complete task.      Balance  Posture: Good  Sitting - Static: Good  Sitting - Dynamic: Fair  Standing - Static: Good  Standing - Dynamic: Good  Exercise Treatment: .sit      AM-PAC Score  AM-PAC Inpatient Mobility Raw Score : 18 (04/21/22 1605)  AM-PAC Inpatient T-Scale Score : 43.63 (04/21/22 1605)  Mobility Inpatient CMS 0-100% Score: 46.58 (04/21/22 1605)  Mobility Inpatient CMS G-Code Modifier : CK (04/21/22 1605)          Goals  Long Term Goals  Time Frame for Long term goals : 10 visits  Long term goal 1: transfers with SBA  Long term goal 2: amb 150 ft with or without device x SBA  Long term goal 3: ascend/descend 4 steps with SBA  Long term goal 4: 20 min strenthening exercise program x SBA  Patient Goals   Patient goals : Return home       Therapy Time   Individual Concurrent Group Co-treatment   Time In 1421         Time Out 1446         Minutes 25                 Salvatore Habermann, PTA

## 2022-04-21 NOTE — PROGRESS NOTES
Occupational Therapy  Facility/Department: [facility] RENAL//MED SURG  Occupational Therapy Initial Assessment    Name: Jannette Tyler  : 1965  MRN: [id number]  Date of Service: 2022    Obtained from medical chart:   \" 61year old female with extensive cardiac Hx and recent complicated CABG x3 on  for CP 2/2 NSTEMI. At that time, post op course complicated by blood loss requiring multiple blood transfusions. Also managed for hypoxic resp failure 2/2 mucous plugging and pleural ffusion +/- aspiration PNA. For which patient was intubated. eventualy extubated on . Required chest tube for drainage, mucous clearance via bronch. She was on abx for but infectious work up was unrevealing and thus [address]. Patient returned to [facility] were she reported Dyspnea with little exertion and edema in extremities. Work up concerning for recurrent effusion, was transferred to [facility] where she had all her interventions for further stabilization and investigation. \"    Discharge Recommendations:  Patient would benefit from continued therapy after discharge  OT Equipment Recommendations  Equipment Needed: Yes  Mobility Devices: ADL Assistive Devices  ADL Assistive Devices: Shower Chair with back; Reacher;Sock-Aid Hard;Grab Bars - toilet;Grab Bars - shower     Patient Diagnosis(es): There were no encounter diagnoses. Past Medical History:  has a past medical history of Anemia, Asthma, CAD (coronary artery disease), Depression, High cholesterol, Hypertension, MI, old, Osteoarthritis, Pneumonia, Sleep apnea, and Stroke ([facility] [address] Past Surgical History:  has a past surgical history that includes Hysterectomy; Coronary angioplasty with stent (); Tubal ligation; Tonsillectomy; Appendectomy; and Coronary artery bypass graft (N/A, 2022). Assessment   Performance deficits / Impairments: Decreased functional mobility ; Decreased ADL status; Decreased strength;Decreased safe awareness;Decreased high-level IADLs;Decreased balance;Decreased posture;Decreased endurance  Assessment: Patient demonstrates decreased endurance overall and generalized weakness impacting performance in ADLs and functional tasks. Pt limited with attempt to ambulate to the bathroom, unable to progress d/t fatigue, returned to recliner. Pt demo decreased functional reach to distal extremities and with attempt to utilize figure four method to compensate. Patient would benefit from continued OT services to address functional deficits through skilled intervention to promote independence with ADLs and functional tasks. Prognosis: Good  Decision Making: Medium Complexity  REQUIRES OT FOLLOW-UP: Yes  Activity Tolerance  Activity Tolerance: Patient limited by fatigue      Patient Education:Role of Therapy; Plan of Care; Family Education; Equipment; Precautions; Transfer Training; Fall Prevention Strategies; Energy Conservation    Plan   Plan  Times per Week: 3-4x/wk  Current Treatment Recommendations: Self-Care / ADL,Functional mobility training,Safety education & training,Patient/Caregiver education & training,Equipment evaluation, education, & procurement,Strengthening,Balance training,Endurance training      Restrictions  Restrictions/Precautions  Required Braces or Orthoses?: No  Position Activity Restriction  Other position/activity restrictions: Up with assist; CABG x3 on 4/10/22 for CP 2/2 NSTEMI    Subjective   General  Patient assessed for rehabilitation services?: Yes  Family / Caregiver Present: No  General Comment  Comments: RN ok'd patient for OT Evaluation. Pt agreeable, pleasant and cooperative throughout.   Pre Treatment Pain Screening  Pain at present: 6 (Chest and back)  Scale Used: Numeric Score  Intervention List: Patient able to continue with treatment  Vital Signs    Social/Functional History  Social/Functional History  Lives With: Daughter  Type of Home: Apartment  Home Layout: One level  Home Access: Stairs to enter without rails  Entrance Stairs - Number of Steps: 1 step  Bathroom Shower/Tub: Tub/Shower unit  Bathroom Toilet: Standard  Has the patient had two or more falls in the past year or any fall with injury in the past year?: No  ADL Assistance: Independent  Homemaking Assistance: Independent  Homemaking Responsibilities: Yes  Meal Prep Responsibility: Secondary  Laundry Responsibility: Secondary  Cleaning Responsibility: Secondary  Shopping Responsibility: Secondary  Ambulation Assistance: Independent  Transfer Assistance: Independent  Active : No  Mode of Transportation: Family,Friends  Occupation: On disability  Leisure & Hobbies: time with grandchildren, take walks in the park  Additional Comments: Daughter helps at home and shares household responsibilities     Objective   Vision Exceptions: Wears glasses for reading  Hearing: Within functional limits    Balance  Sitting Balance: Independent (unsupported in chair for ~14-15 minutes)  Standing Balance: Stand by assistance  Standing Balance  Time: ~1-2 min  Activity: cumulative, rest breaks and static at bedside recliner  Comment: using RW for support  Functional Mobility  Functional - Mobility Device: Rolling Walker  Activity: Other  Assist Level: Stand by assistance  Functional Mobility Comments: unable to reach bathroom d/t fatigue upon attempt; completed functional mobility from chair > 1/2 way to bathroom  ADL  Feeding: Independent  Grooming: Stand by assistance; Increased time to complete  UE Bathing: Minimal assistance; Increased time to complete  LE Bathing: Minimal assistance; Increased time to complete  UE Dressing: Minimal assistance; Increased time to complete  LE Dressing: Minimal assistance; Increased time to complete  Toileting: Minimal assistance; Increased time to complete  Additional Comments: Patient limited d/t decreased endurance and generalized strength.  Pt demo decreased functional reach to distal extremities and inability to complete figure four d/t ROM deficits this date. Pt educated on donning and wear of heart hugger with fair return, required Mod A to don appropriately. Patient declined ADL engagement this date d/t fatigue. Bed mobility  Comment: in recliner upon arrival, retired to recliner at end of session  Transfers  Sit to stand: Stand by assistance  Stand to sit: Stand by assistance  Transfer Comments: VCs for proper hand placement to adhere to sternal precautions using heart pillow     Cognition  Overall Cognitive Status: WFL  LUE AROM : WFL  Left Hand AROM: WFL  RUE AROM : WFL  Right Hand AROM: WFL  Gross Assessment  AROM Generally decreased, functional  Strength Generally decreased, functional  Sensation ImpairedSensation. Impaired. The comment is decreased sensation in the R UE; reports B fingertips.  Taken on 4/21/22 1043    AM-PAC Score        AM-PAC Inpatient Daily Activity Raw Score: 19 (04/21/22 1106)  AM-PAC Inpatient ADL T-Scale Score : 40.22 (04/21/22 1106)  ADL Inpatient CMS 0-100% Score: 42.8 (04/21/22 1106)  ADL Inpatient CMS G-Code Modifier : CK (04/21/22 1106)    Goals  Short Term Goals  Time Frame for Short term goals: Patient will, by discharge  Short Term Goal 1: demo UB ADLs independently  Short Term Goal 2: demo LB ADLs at SBA using AE PRN  Short Term Goal 3: demo adherence to sternal precautions during functional tasks with 0 VCs  Short Term Goal 4: demo functional transfers/mobility using LRD at Supervision  Short Term Goal 5: demo 25+ min of functional activity tolerance using EC/WS techs PRN  Short Term Goal 6: demo 10+ min of dynamic standing tolerance at Supervision, reaching outside MENDEZ in all planes, to engage in ADL/IADLs     Therapy Time   Individual Concurrent Group Co-treatment   Time In 0940         Time Out 1017         Minutes 37         Timed Code Treatment Minutes: [address] [facility], OTR/L

## 2022-04-21 NOTE — CARE COORDINATION
[facility] [place] Flow/Interdisciplinary Rounds Progress Note    Quality Flow Rounds held on April 21, 2022 at [address] Attending:  Bedside Nurse,  and Nursing Unit Leadership          Readmission Risk              Risk of Unplanned Readmission:  24           Discussed patient goal for the day, patient clinical progression, and barriers to discharge.   The following Goal(s) of the Day/Commitment(s) have been identified:    Met with patient, reviewed snf list wants referral to [facility]     12:58 faxed chart notes for review    13:25 todd called [facility] can accept  And will start precert today    Lemuel Dickerson RN  April 21, 2022

## 2022-04-21 NOTE — PROGRESS NOTES
[facility]  Progress Note                   Date:   4/21/2022  Patient name: Mia Romero  Date of admission:  4/20/2022 12:07 AM  MRN:   [id number]  YOB: 1965  PCP: No primary care provider on file. Reason for Admission: Pleural effusion [J90]  Recurrent pleural effusion [J90]    Subjective:       Clinical Changes /Abnormalities:Stable. No CV issues/concerns overnight. Labs, vitals, tele & echo reviewed. Remains SR. She states she is very frustrated and wants to speak to [facility] doctor only. \" She states she is still having a lot of discomfort and pain in her whole chest area and back. States her breathing is \"OK but I am not moving around at all because everything is sore. \" She was recommended thorocentesis and when discussed she stated she was \"not doing anything until I speak to a doctor in person. \"     Review of Systems    Medications:   Scheduled Meds:   amiodarone  100 mg Oral TID    amLODIPine  10 mg Oral Daily    aspirin  81 mg Oral Daily    clopidogrel  75 mg Oral Daily    atorvastatin  20 mg Oral Nightly    metoprolol tartrate  25 mg Oral BID    budesonide-formoterol  2 puff Inhalation BID    pantoprazole  40 mg Oral BID AC    sodium chloride flush  5-40 mL IntraVENous 2 times per day    enoxaparin  40 mg SubCUTAneous Daily    furosemide  40 mg Oral BID     Continuous Infusions:   sodium chloride      dextrose       CBC:   Recent Labs     04/20/22  0521 04/21/22  0606   WBC 15.0* 13.1*   HGB 9.8* 10.2*   * 716*     BMP:    Recent Labs     04/20/22  0521 04/21/22  0606    139   K 3.9 3.4*    104   CO2 20 22   BUN 8 10   CREATININE 0.62 0.74   GLUCOSE 111* 106*     Hepatic:No results for input(s): AST, ALT, ALB, BILITOT, ALKPHOS in the last 72 hours. Troponin: No results for input(s): TROPHS in the last 72 hours. BNP: No results for input(s): BNP in the last 72 hours.   Lipids: No results for input(s): CHOL, HDL in the last 72 hours.    Invalid input(s): LDLCALCU  INR: No results for input(s): INR in the last 72 hours. Objective:   Vitals: /87   Pulse 94   Temp 99 °F (37.2 °C) (Oral)   Resp 17   Ht 5' 1\" (1.549 m)   Wt 160 lb (72.6 kg)   SpO2 93%   BMI 30.23 kg/m²   General appearance: alert and cooperative with exam  HEENT: Head: Normocephalic, no lesions, without obvious abnormality. Neck:no JVD, trachea midline, no adenopathy  Lungs: Diminished throughout, very dim left side. No audible rales. On RA without distress  Heart: Regular rate and rhythm, s1/s2 auscultated, no murmurs, SR 92  Abdomen: soft, non-tender, bowel sounds active  Extremities: trace- +1 b/l ankle edema  Neurologic: not done    CABG 4/8/2022: LIMA-LAD, SVG-OM1, SVG-RCA      CATH 4/4/2022: LM/MVD. EF 50%.      ECHO 4/4/2022: EF 55%, no regurg/stenosis. Echo 4/20/22  Summary  Global left ventricular systolic function appears low normal. Estimated  ejection fraction is 50 % . Calculated EF via 3D Heart Model is 54 %. Assessment / Acute Cardiac Problems:   1. Syncope  -? Vasovagal  2. Known CAD s/p CABG 4/22 as above  3. Preserved LVEF  4. Moderate left pleural effusion  5. Hypokalemia  6. HTN  7. Recent aspriation PNA post-op CTS  8. Post-op anemia s/p PRBC transfusion last admission  9. HLP  10.  Tobacco abuse    Patient Active Problem List:     Stroke ([facility] [address]     HTN (hypertension)     CAD (coronary artery disease) with multiple stents     Asthma     Depression     Smoker     Dementia ([facility] [address]     Anxiety     Dyslipidemia     Essential hypertension     Neck pain     Memory problem     History of stroke     DDD (degenerative disc disease), cervical     Grade III hemorrhoids     Mixed hyperlipidemia     Current mild episode of major depressive disorder without prior episode (HCC)     Allergic sinusitis     Refusal of statin medication by patient     Prediabetes     Sinus bradycardia     Thyroid nodule     NSTEMI (non-ST elevated myocardial infarction) [facility])     Multiple vessel coronary artery disease s/p CABG      Pleural effusion     Recurrent pleural effusion      Plan of Treatment:   1. Stable from CV standpoint. Reviewed CXR findings with patient and recommendations for thorocentesis however she stated she does not want to discuss this nor will she make a decision until she talks to a doctor. Clinically stable and on RA. Continue PO Lasix daily and re-eval need for IV after thorocentesis (she does not want any more IV)  2. Continue PO Amio (will decrease dose) along with ASA, statin, BB, & Plavix. 3. Echo as above. 4. BP stable. Continue BB & Norvasc.     Electronically signed by ELLY Worley CNP on 4/21/2022 at 9:37 AM  [address]  [phone]

## 2022-04-22 LAB
ABSOLUTE EOS #: 0.37 K/UL (ref 0–0.44)
ABSOLUTE IMMATURE GRANULOCYTE: 0.09 K/UL (ref 0–0.3)
ABSOLUTE LYMPH #: 1.99 K/UL (ref 1.1–3.7)
ABSOLUTE MONO #: 1.2 K/UL (ref 0.1–1.2)
ANION GAP SERPL CALCULATED.3IONS-SCNC: 12 MMOL/L (ref 9–17)
BASOPHILS # BLD: 0 % (ref 0–2)
BASOPHILS ABSOLUTE: 0.06 K/UL (ref 0–0.2)
BUN BLDV-MCNC: 11 MG/DL (ref 6–20)
CALCIUM SERPL-MCNC: 8.7 MG/DL (ref 8.6–10.4)
CASE NUMBER:: NORMAL
CHLORIDE BLD-SCNC: 102 MMOL/L (ref 98–107)
CO2: 23 MMOL/L (ref 20–31)
CREAT SERPL-MCNC: 0.7 MG/DL (ref 0.5–0.9)
EOSINOPHILS RELATIVE PERCENT: 3 % (ref 1–4)
GFR AFRICAN AMERICAN: >60 ML/MIN
GFR NON-AFRICAN AMERICAN: >60 ML/MIN
GFR SERPL CREATININE-BSD FRML MDRD: ABNORMAL ML/MIN/{1.73_M2}
GLUCOSE BLD-MCNC: 118 MG/DL (ref 70–99)
HCT VFR BLD CALC: 32.1 % (ref 36.3–47.1)
HEMOGLOBIN: 10.4 G/DL (ref 11.9–15.1)
IMMATURE GRANULOCYTES: 1 %
LYMPHOCYTES # BLD: 15 % (ref 24–43)
LYMPHOCYTES, BODY FLUID: 20 %
MAGNESIUM: 1.9 MG/DL (ref 1.6–2.6)
MCH RBC QN AUTO: 29.7 PG (ref 25.2–33.5)
MCHC RBC AUTO-ENTMCNC: 32.4 G/DL (ref 28.4–34.8)
MCV RBC AUTO: 91.7 FL (ref 82.6–102.9)
MONOCYTES # BLD: 9 % (ref 3–12)
NEUTROPHIL, FLUID: 1 %
NRBC AUTOMATED: 0 PER 100 WBC
OTHER CELLS FLUID: NORMAL %
PDW BLD-RTO: 15.4 % (ref 11.8–14.4)
PLATELET # BLD: 731 K/UL (ref 138–453)
PMV BLD AUTO: 8.5 FL (ref 8.1–13.5)
POTASSIUM SERPL-SCNC: 3.4 MMOL/L (ref 3.7–5.3)
RBC # BLD: 3.5 M/UL (ref 3.95–5.11)
RBC # BLD: ABNORMAL 10*6/UL
RBC FLUID: NORMAL /MM3
SEG NEUTROPHILS: 72 % (ref 36–65)
SEGMENTED NEUTROPHILS ABSOLUTE COUNT: 10.02 K/UL (ref 1.5–8.1)
SODIUM BLD-SCNC: 137 MMOL/L (ref 135–144)
SPECIMEN DESCRIPTION: NORMAL
SPECIMEN TYPE: NORMAL
WBC # BLD: 13.7 K/UL (ref 3.5–11.3)
WBC FLUID: 1680 /MM3

## 2022-04-22 PROCEDURE — 2580000003 HC RX 258: Performed by: INTERNAL MEDICINE

## 2022-04-22 PROCEDURE — 6360000002 HC RX W HCPCS: Performed by: INTERNAL MEDICINE

## 2022-04-22 PROCEDURE — 85025 COMPLETE CBC W/AUTO DIFF WBC: CPT

## 2022-04-22 PROCEDURE — 97110 THERAPEUTIC EXERCISES: CPT

## 2022-04-22 PROCEDURE — 83735 ASSAY OF MAGNESIUM: CPT

## 2022-04-22 PROCEDURE — 97116 GAIT TRAINING THERAPY: CPT

## 2022-04-22 PROCEDURE — 6370000000 HC RX 637 (ALT 250 FOR IP): Performed by: NURSE PRACTITIONER

## 2022-04-22 PROCEDURE — 99232 SBSQ HOSP IP/OBS MODERATE 35: CPT | Performed by: INTERNAL MEDICINE

## 2022-04-22 PROCEDURE — 99232 SBSQ HOSP IP/OBS MODERATE 35: CPT | Performed by: FAMILY MEDICINE

## 2022-04-22 PROCEDURE — 36415 COLL VENOUS BLD VENIPUNCTURE: CPT

## 2022-04-22 PROCEDURE — 6370000000 HC RX 637 (ALT 250 FOR IP): Performed by: INTERNAL MEDICINE

## 2022-04-22 PROCEDURE — 80048 BASIC METABOLIC PNL TOTAL CA: CPT

## 2022-04-22 PROCEDURE — 1200000000 HC SEMI PRIVATE

## 2022-04-22 RX ADMIN — CLOPIDOGREL 75 MG: 75 TABLET, FILM COATED ORAL at 09:28

## 2022-04-22 RX ADMIN — METOPROLOL TARTRATE 25 MG: 25 TABLET ORAL at 22:22

## 2022-04-22 RX ADMIN — POLYETHYLENE GLYCOL 3350 17 G: 17 POWDER, FOR SOLUTION ORAL at 22:28

## 2022-04-22 RX ADMIN — SODIUM CHLORIDE, PRESERVATIVE FREE 10 ML: 5 INJECTION INTRAVENOUS at 22:28

## 2022-04-22 RX ADMIN — AMIODARONE HYDROCHLORIDE 100 MG: 200 TABLET ORAL at 09:27

## 2022-04-22 RX ADMIN — AMIODARONE HYDROCHLORIDE 100 MG: 200 TABLET ORAL at 22:23

## 2022-04-22 RX ADMIN — OXYCODONE HYDROCHLORIDE 5 MG: 5 TABLET ORAL at 23:26

## 2022-04-22 RX ADMIN — ATORVASTATIN CALCIUM 20 MG: 20 TABLET, FILM COATED ORAL at 22:24

## 2022-04-22 RX ADMIN — SODIUM CHLORIDE, PRESERVATIVE FREE 10 ML: 5 INJECTION INTRAVENOUS at 09:28

## 2022-04-22 RX ADMIN — AMLODIPINE BESYLATE 10 MG: 10 TABLET ORAL at 09:27

## 2022-04-22 RX ADMIN — Medication 81 MG: at 09:27

## 2022-04-22 RX ADMIN — OXYCODONE HYDROCHLORIDE 5 MG: 5 TABLET ORAL at 03:16

## 2022-04-22 RX ADMIN — OXYCODONE HYDROCHLORIDE 5 MG: 5 TABLET ORAL at 16:04

## 2022-04-22 RX ADMIN — PANTOPRAZOLE SODIUM 40 MG: 40 TABLET, DELAYED RELEASE ORAL at 05:08

## 2022-04-22 RX ADMIN — FUROSEMIDE 40 MG: 40 TABLET ORAL at 09:26

## 2022-04-22 RX ADMIN — FUROSEMIDE 40 MG: 40 TABLET ORAL at 17:06

## 2022-04-22 RX ADMIN — ENOXAPARIN SODIUM 40 MG: 100 INJECTION SUBCUTANEOUS at 09:26

## 2022-04-22 RX ADMIN — METOPROLOL TARTRATE 25 MG: 25 TABLET ORAL at 09:27

## 2022-04-22 RX ADMIN — PANTOPRAZOLE SODIUM 40 MG: 40 TABLET, DELAYED RELEASE ORAL at 16:01

## 2022-04-22 ASSESSMENT — PAIN DESCRIPTION - DESCRIPTORS
DESCRIPTORS: DISCOMFORT;ACHING
DESCRIPTORS: ACHING;DISCOMFORT
DESCRIPTORS: ACHING
DESCRIPTORS: ACHING;DISCOMFORT

## 2022-04-22 ASSESSMENT — PAIN DESCRIPTION - LOCATION
LOCATION: BACK
LOCATION: BACK
LOCATION: BACK;CHEST
LOCATION: BACK

## 2022-04-22 ASSESSMENT — PAIN SCALES - GENERAL
PAINLEVEL_OUTOF10: 9
PAINLEVEL_OUTOF10: 6
PAINLEVEL_OUTOF10: 4
PAINLEVEL_OUTOF10: 8
PAINLEVEL_OUTOF10: 8

## 2022-04-22 ASSESSMENT — PAIN DESCRIPTION - ORIENTATION: ORIENTATION: RIGHT

## 2022-04-22 NOTE — PLAN OF CARE
Problem: Falls - Risk of:  Goal: Will remain free from falls  Description: Will remain free from falls  Outcome: Progressing  Goal: Absence of physical injury  Description: Absence of physical injury  Outcome: Progressing     Problem: Pain:  Goal: Pain level will decrease  Description: Pain level will decrease  Outcome: Progressing  Goal: Control of acute pain  Description: Control of acute pain  Outcome: Progressing  Goal: Control of chronic pain  Description: Control of chronic pain  Outcome: Progressing     Problem: Discharge Planning  Goal: Discharge to home or other facility with appropriate resources  Outcome: Progressing     Problem: Chronic Conditions and Co-morbidities  Goal: Patient's chronic conditions and co-morbidity symptoms are monitored and maintained or improved  Outcome: Progressing

## 2022-04-22 NOTE — PROGRESS NOTES
[facility]  Office: [phone]  Azeb Ledesma, DO, Robby Wakefield, DO, Joel Paul, DO, Augusto Peña, DO, Ruth Braun MD, Toño Plunkett MD, David Shrestha MD, Milan Irene MD, Rao Cameron MD, Evan Taveras MD, Chelsie Houston MD, Angelia Bermudez DO, Evelin Harmon DO, Kj Reynolds MD,  Jennifer Cruz DO, Babs Bautista MD, Radha Frank MD, Rehan Ramsay MD, Hitesh Vera DO, Kristina Quan MD, Janelle Eisenberg MD, Carla Lind [facility], [facility], [facility], Carolee Sheffield, CNP, Shawna Skinner, CNP, Gil White, CNP, Brock Gonzalez, CNP, Catheryn Leventhal, PA-C, Josi Boswell, [facility], Kayden Ayala, CNP, Teagan Ahn, CNP, Krunal Gallardo, CNP, Edel Oliveros, CNS, Mohit Barajas, [facility], Lexus Soto, CNP, Rick Coats, CNP, Alejandro Cordero, [facility]    Progress Note    4/22/2022    7:36 AM    Name:   Kashmir Chapa  MRN:     [id number]     Acct:      [de-identified]   Room:   [address] [place] [address] Day:  2  Admit Date:  4/20/2022 12:07 AM    PCP:   No primary care provider on file. Code Status:  Full Code    Subjective:     C/C: No chief complaint on file. Interval History Status: improved. Pt was seen and examiend this morning  No acute events overnight  Sitting up in bedside chair  Denies having any complaints at this time       Review of Systems:     12 point ROS performed and negative for anything other than what was stated in subjective    Medications: Allergies:     Allergies   Allergen Reactions    Lipitor [Atorvastatin] Other (See Comments)     Muscle cramping in legs    Prozac [Fluoxetine] Other (See Comments)     Mood swings    Zoloft [Sertraline Hcl] Other (See Comments)     Anger and mood swings        Current Meds:   Scheduled Meds:    amiodarone  100 mg Oral BID    amLODIPine  10 mg Oral Daily    aspirin  81 mg Oral Daily    clopidogrel  75 mg Oral Daily    atorvastatin  20 mg Oral Nightly  metoprolol tartrate  25 mg Oral BID    budesonide-formoterol  2 puff Inhalation BID    pantoprazole  40 mg Oral BID AC    sodium chloride flush  5-40 mL IntraVENous 2 times per day    enoxaparin  40 mg SubCUTAneous Daily    furosemide  40 mg Oral BID     Continuous Infusions:    sodium chloride      dextrose       PRN Meds: calcium carbonate, sodium chloride flush, sodium chloride, ondansetron **OR** ondansetron, polyethylene glycol, acetaminophen **OR** acetaminophen, glucose, dextrose, glucagon (rDNA), dextrose, fentanNYL, oxyCODONE, albuterol sulfate HFA, busPIRone    Data:     Past Medical History:   has a past medical history of Anemia, Asthma, CAD (coronary artery disease), Depression, High cholesterol, Hypertension, MI, old, Osteoarthritis, Pneumonia, Sleep apnea, and Stroke ([address] Social History:   reports that she has been smoking cigarettes. She has a 12.50 pack-year smoking history. She has never used smokeless tobacco. She reports previous alcohol use. She reports current drug use. Drug: Marijuana Fronie Catrachita). Family History:   Family History   Problem Relation Age of Onset    High Blood Pressure Mother        Vitals:  /80   Pulse 95   Temp 98.8 °F (37.1 °C) (Oral)   Resp 18   Ht 5' 1\" (1.549 m)   Wt 160 lb (72.6 kg)   SpO2 97%   BMI 30.23 kg/m²   Temp (24hrs), Av.7 °F (37.1 °C), Min:98.4 °F (36.9 °C), Max:98.8 °F (37.1 °C)    Recent Labs     22  1141   POCGLU 84       I/O (24Hr):     Intake/Output Summary (Last 24 hours) at 2022 0736  Last data filed at 2022 1625  Gross per 24 hour   Intake 480 ml   Output 450 ml   Net 30 ml       Labs:  Hematology:  Recent Labs     22  0521 22  0606 22  0443   WBC 15.0* 13.1* 13.7*   RBC 3.33* 3.45* 3.50*   HGB 9.8* 10.2* 10.4*   HCT 30.3* 31.4* 32.1*   MCV 91.0 91.0 91.7   MCH 29.4 29.6 29.7   MCHC 32.3 32.5 32.4   RDW 14.8* 15.3* 15.4*   * 716* 731*   MPV 8.9 8.6 8.5     Chemistry:  Recent Labs 04/20/22  0521 04/21/22  0606 04/22/22  0443    139 137   K 3.9 3.4* 3.4*    104 102   CO2 20 22 23   GLUCOSE 111* 106* 118*   BUN 8 10 11   CREATININE 0.62 0.74 0.70   MG  --  2.1 1.9   ANIONGAP 13 13 12   LABGLOM >60 >60 >60   GFRAA >60 >60 >60   CALCIUM 8.6 8.4* 8.7     Recent Labs     04/20/22  1141   POCGLU 84     ABG:  Lab Results   Component Value Date    POCPH 7.484 04/13/2022    POCPCO2 30.6 04/13/2022    POCPO2 77.1 04/13/2022    POCHCO3 23.0 04/13/2022    NBEA 4 04/11/2022    PBEA 0 04/13/2022    ASMF1QNG 96 04/13/2022    FIO2 60.0 04/12/2022     Lab Results   Component Value Date/Time    SPECIAL R AC 1ML 04/10/2022 02:49 AM     Lab Results   Component Value Date/Time    CULTURE PENDING 04/21/2022 03:40 PM       Radiology:  CT CHEST WO CONTRAST    Result Date: 4/15/2022  Recent postoperative changes from CABG. Small layering bilateral pleural effusions which do not appear loculated. Mild scattered subsegmental atelectasis/edema in the lungs with left lower lobe volume loss/compressive atelectasis. XR CHEST PORTABLE    Result Date: 4/21/2022  Interval decrease in size of a left pleural effusion. Bibasilar airspace disease likely representing atelectasis. XR CHEST PORTABLE    Result Date: 4/21/2022  There is a moderate-sized left pleural effusion which is mildly increased. Trace right effusion. Bibasilar airspace disease likely representing atelectasis. XR CHEST PORTABLE    Result Date: 4/20/2022  Bibasilar opacities most confluent left lung base suggestive of effusions and atelectasis     XR CHEST PORTABLE    Result Date: 4/16/2022  No change from prior examination. US THORACENTESIS Which side should the procedure be performed? Left    Result Date: 4/21/2022  Successful ultrasound guided thoracentesis.        Physical Examination:        General appearance:  alert, cooperative and no distress  Mental Status:  oriented to person, place and time and normal affect  Lungs:  clear to auscultation bilaterally, normal effort  Heart:  regular rate and rhythm, no murmur  Abdomen:  soft, nontender, nondistended, normal bowel sounds, no masses, hepatomegaly, splenomegaly  Extremities:  no edema, redness, tenderness in the calves  Skin:  no gross lesions, rashes, induration    Assessment:        Hospital Problems           Last Modified POA    * (Principal) Pleural effusion 4/20/2022 Yes    Recurrent pleural effusion 4/20/2022 Yes          Plan:        1. Post CABG recurrent pleural effusion  - cardiology on board   - CTS on board   - pulmonology on board   - continue PO lasix   - monitor Is/Os   - echo done, normal LVEF  - repeat chest xray showed moderate effusion to the left thorax  - IR consult for thoracentesis, had 450 ml removed on 4/21  - cleared for d/c by CTS and pulmonology      2. CAD   - cardiology on board   - trend cardiac enzymes  - telemetry   - stable at this time      3. HTN  - v/s per unit protocol   - continue current anti hypertensive regimen      4. Anemia  - Hemoglobin 9.8, appears to be around baseline.       5. Hyperlipidemia  - Resume high intensity statin     6. Anxiety/Depression   - On buparone 15mg tiD     7.  DISPO  - pending pre cert for SNF placement  - PT/OT      Babs Bautista MD  4/22/2022  7:36 AM

## 2022-04-22 NOTE — CARE COORDINATION
[facility] Flow/Interdisciplinary Rounds Progress Note    Quality Flow Rounds held on April 22, 2022 at [address] [facility] Attending:  Bedside Nurse,  and Nursing Unit Leadership        Readmission Risk              Risk of Unplanned Readmission:  21           Discussed patient goal for the day, patient clinical progression, and barriers to discharge.   The following Goal(s) of the Day/Commitment(s) have been identified:    Called [facility] to f/u on precert no answer    Chad Luz RN  April 22, 2022

## 2022-04-22 NOTE — CARE COORDINATION
04/21/22 2008   Readmission Assessment   Number of Days since last admission? 8-30 days   Previous Disposition Home with Home Health   Who is being Interviewed Patient   What was the patient's/caregiver's perception as to why they think they needed to return back to the hospital?   (shortness of breath)   Did you visit your Primary Care Physician after you left the hospital, before you returned this time? No   Did you see a specialist, such as Cardiac, Pulmonary, Orthopedic Physician, etc. after you left the hospital? No   Does the patient report anything that got in the way of taking their medications?  No

## 2022-04-22 NOTE — PROGRESS NOTES
PULMONARY & CRITICAL CARE MEDICINE CONSULT NOTE     Patient:  Rachael Calvo  MRN: [id number]  Admit date: 4/20/2022  Primary Care Physician: No primary care provider on file. Consulting Physician: Norris Holter, MD  CODE Status: Full Code  LOS: 2     SUBJECTIVE     CHIEF COMPLAINT/REASON FOR CONSULT: Left pleural effusion    HISTORY OF PRESENT ILLNESS:  The patient is a 61 y.o. female patient was recently hospitalized with chest pain. She was found to have multivessel coronary artery disease and required CABG x3 (4/8/2022). Postop course was complicated with development of bleeding from chest tube, anemia and worsening respiratory failure. She required reintubation and bronchoscopic clearance of mucous plugging and had left lung atelectasis/effusion. She was discharged from the hospital on 4/16. She was readmitted after she was found to have in bathroom after a coughing spell. Initially admitted to [facility] but transferred here for further management. Chest x-ray shows left-sided effusion/atelectasis. Patient currently sitting in the bed comfortably. She is on room air and is hemodynamically stable. She has been given IV Lasix and is annoyed by need to go to the bathroom repeatedly. She denies cough, sputum, wheezing, pleuritic chest pain.   4/22/2022   Subjective      No acute events   Had thoracentesis     PAST MEDICAL HISTORY:        Diagnosis Date    Anemia     Asthma     CAD (coronary artery disease)     Depression 10/20/2014    High cholesterol     Hypertension     MI, old     Osteoarthritis     Pneumonia     Sleep apnea     Stroke ([facility] [address] 9/27/2014     PAST SURGICAL HISTORY:        Procedure Laterality Date    APPENDECTOMY      CORONARY ANGIOPLASTY WITH STENT PLACEMENT  2004    CORONARY ARTERY BYPASS GRAFT N/A 4/8/2022    CABG X 3 / EVH LEFT LEG / KIT performed by Bronwyn Elam MD at [address] [place] [address] HISTORY:       Problem Relation Age of Onset    High Blood Pressure Mother      SOCIAL HISTORY:   TOBACCO:   reports that she has been smoking cigarettes. She has a 12.50 pack-year smoking history. She has never used smokeless tobacco.  ETOH:  reports previous alcohol use. DRUGS: reports current drug use. Drug: Marijuana Willie Divine). ALLERGIES:    Allergies   Allergen Reactions    Lipitor [Atorvastatin] Other (See Comments)     Muscle cramping in legs    Prozac [Fluoxetine] Other (See Comments)     Mood swings    Zoloft [Sertraline Hcl] Other (See Comments)     Anger and mood swings          HOME MEDICATIONS:  Prior to Admission medications    Medication Sig Start Date End Date Taking?  Authorizing Provider   aspirin 81 MG EC tablet Take 1 tablet by mouth daily 4/17/22   Bradley Reid MD   calcium carbonate (TUMS) 500 MG chewable tablet Take 1 tablet by mouth 3 times daily as needed for Heartburn 4/16/22 5/16/22  Bradley Reid MD   amiodarone (CORDARONE) 200 MG tablet Take 0.5 tablets by mouth 3 times daily 4/16/22   Bradley Reid MD   metoprolol tartrate (LOPRESSOR) 25 MG tablet Take 1 tablet by mouth 2 times daily 4/16/22   Bradley Reid MD   potassium chloride (KLOR-CON M) 20 MEQ extended release tablet Take 2 tablets by mouth as needed (Potassium Replacement) 4/16/22   Bradley Reid MD   amLODIPine (NORVASC) 10 MG tablet Take 1 tablet by mouth daily 4/17/22   Bradley Reid MD   albuterol sulfate  (90 Base) MCG/ACT inhaler INHALE 2 PUFFS BY MOUTH EVERY 6 HOURS AS NEEDED FOR WHEEZING 11/15/21   Saundra Guan MD   omeprazole (PRILOSEC) 20 MG delayed release capsule TAKE 1 CAPSULE BY MOUTH EVERY DAY 11/15/21   Saundra Guan MD   clopidogrel (PLAVIX) 75 MG tablet TAKE 1 TABLET BY MOUTH DAILY 10/7/21   Saundra Guan MD   lovastatin (MEVACOR) 10 MG tablet TAKE 1 TABLET BY MOUTH EVERY NIGHT 9/1/21   Saundra Guan MD   Ginkgo Biloba (GNP GINGKO BILOBA EXTRACT PO) Take by mouth daily    Historical Provider, MD clobetasol (TEMOVATE) 0.05 % ointment apply to affected area twice a day 8/31/21 Reatha Course, MD   ezetimibe (ZETIA) 10 MG tablet Take 1 tablet by mouth daily 8/31/21 Reatha Course, MD   ibuprofen (ADVIL;MOTRIN) 800 MG tablet take 1 tablet by mouth every 8 hours if needed for pain 3/30/21   Reatha Course, MD   mometasone-formoterol [facility]) 100-5 MCG/ACT inhaler Inhale 2 puffs into the lungs 2 times daily 3/24/21   Reatha Course, MD   busPIRone (BUSPAR) 15 MG tablet take 1 tablet by mouth three times a day  Patient taking differently: 3 times daily as needed take 1 tablet by mouth three times a day 3/24/21   Reatha Course, MD   Omega-3 Fatty Acids (OMEGA-3 EPA FISH OIL) 1205 MG CAPS Take 1 tablet by mouth daily 3/24/21   Reatha Course, MD   clopidogrel (PLAVIX) 75 MG tablet TAKE 1 TABLET BY MOUTH DAILY 3/24/21   Reatha Course, MD   cetirizine (ZYRTEC) 10 MG tablet take 1 tablet by mouth once daily 3/16/21   Reatha Course, MD   diclofenac sodium 1 % GEL Apply 2 g topically 2 times daily 5/8/19 Reatha Course, MD   hydrocortisone 1 % cream apply topically twice a day 1/15/18   Reatha Course, MD   Heat Wraps (SOFTHEAT HEATING WRAP ULTRA) MISC Use daily to for neck and shoulder pain 12/6/17 Reatha Course, MD   lidocaine (LMX) 4 % cream Apply 2g topically as needed. 7/26/17 Reatha Course, MD   Blood Pressure Monitor MISC Use daily to take BP 3/15/17   Reatha Course, MD     IMMUNIZATIONS:  Most Recent Immunizations   Administered Date(s) Administered    COVID-19, Pfizer Purple top, DILUTE for use, 12+ yrs, 30mcg/0.3mL dose 05/01/2021    Pneumococcal Polysaccharide ([id number]) 05/04/2017    Tdap (Boostrix, Adacel) 01/01/2014     REVIEW OF SYSTEMS:  Review of Systems   Constitutional: Negative for fever. HENT: Negative for voice change. Eyes: Negative for visual disturbance. Respiratory: Positive for cough. Gastrointestinal: Negative for abdominal pain, diarrhea and vomiting.    Genitourinary: Positive for frequency. Negative for dysuria and urgency. Musculoskeletal: Negative for joint swelling. Allergic/Immunologic: Negative for environmental allergies and immunocompromised state. Hematological: Negative for adenopathy. Does not bruise/bleed easily. Psychiatric/Behavioral: Negative for behavioral problems. OBJECTIVE     PaO2/FiO2 RATIO:  No results for input(s): POCPO2 in the last 72 hours. VITAL SIGNS:   LAST:  /80   Pulse 80   Temp 99.1 °F (37.3 °C) (Oral)   Resp 18   Ht 5' 1\" (1.549 m)   Wt 160 lb (72.6 kg)   SpO2 97%   BMI 30.23 kg/m²   8-24 HR RANGE:  TEMP Temp  Av.8 °F (37.1 °C)  Min: 98.4 °F (36.9 °C)  Max: 99.1 °F (69.5 °C)   BP Systolic (86OGU), GGM:859 , Min:108 , ULISES:338      Diastolic (06NIG), FMX:56, Min:72, Max:80     PULSE Pulse  Av.5  Min: 78  Max: 95   RR No data recorded   O2 SAT No data recorded   OXYGEN DELIVERY No data recorded        Physical Exam  Constitutional:       General: She is not in acute distress. HENT:      Head: Normocephalic and atraumatic. Mouth/Throat:      Mouth: Mucous membranes are moist.   Eyes:      Extraocular Movements: Extraocular movements intact. Pupils: Pupils are equal, round, and reactive to light. Cardiovascular:      Rate and Rhythm: Normal rate and regular rhythm. Heart sounds: No murmur heard. Pulmonary:      Effort: No accessory muscle usage or respiratory distress. Breath sounds:improved air entry left chest and dec bases and dull   AP diameter of chest increased. Thoracic expansion and diaphragmatic excursion diminished. BS diminished and expiratory phase prolonged. Abdominal:      General: There is no distension. Palpations: Abdomen is soft. There is no mass. Tenderness: There is no abdominal tenderness. Musculoskeletal:      Cervical back: Neck supple. Right lower leg: No edema. Left lower leg: No edema.    Lymphadenopathy:      Cervical: No cervical adenopathy. Skin:     Coloration: Skin is not pale. Findings: No rash. Neurological:      General: No focal deficit present. Mental Status: She is oriented to person, place, and time. DATA REVIEW     Medications: Current Inpatient  Scheduled Meds:   amiodarone  100 mg Oral BID    amLODIPine  10 mg Oral Daily    aspirin  81 mg Oral Daily    clopidogrel  75 mg Oral Daily    atorvastatin  20 mg Oral Nightly    metoprolol tartrate  25 mg Oral BID    budesonide-formoterol  2 puff Inhalation BID    pantoprazole  40 mg Oral BID AC    sodium chloride flush  5-40 mL IntraVENous 2 times per day    enoxaparin  40 mg SubCUTAneous Daily    furosemide  40 mg Oral BID     Continuous Infusions:   sodium chloride      dextrose         INPUT/OUTPUT:  In: 480 [P.O.:480]  Out: 450        LABS:  ABGs:   No results for input(s): POCPH, POCPCO2, POCPO2, POCHCO3, [id number] in the last 72 hours. CBC:   Recent Labs     04/20/22  0521 04/21/22  0606 04/22/22  0443   WBC 15.0* 13.1* 13.7*   HGB 9.8* 10.2* 10.4*   HCT 30.3* 31.4* 32.1*   MCV 91.0 91.0 91.7   * 716* 731*   LYMPHOPCT 16* 20* 15*   RBC 3.33* 3.45* 3.50*   MCH 29.4 29.6 29.7   MCHC 32.3 32.5 32.4   RDW 14.8* 15.3* 15.4*     CRP:   No results for input(s): CRP in the last 72 hours. LDH:   No results for input(s): LDH in the last 72 hours. BMP:   Recent Labs     04/20/22  0521 04/21/22  0606 04/22/22  0443    139 137   K 3.9 3.4* 3.4*    104 102   CO2 20 22 23   BUN 8 10 11   CREATININE 0.62 0.74 0.70   GLUCOSE 111* 106* 118*     Liver Function Test:   No results for input(s): PROT, LABALBU, ALT, AST, GGT, ALKPHOS, BILITOT in the last 72 hours. Coagulation Profile:   No results for input(s): INR, PROTIME, APTT in the last 72 hours. D-Dimer:  No results for input(s): DDIMER in the last 72 hours. Lactic Acid:  No results for input(s): LACTA in the last 72 hours.   Cardiac Enzymes:  No results for input(s): CKTOTAL, CKMB, Melene Plume in the last 72 hours. Invalid input(s): TROPONIN, HSTROP  BNP/ProBNP:   No results for input(s): BNP, PROBNP in the last 72 hours. Triglycerides:  No results for input(s): TRIG in the last 72 hours. Microbiology:  Urine Culture:  No components found for: CURINE  Blood Culture:  No components found for: CBLOOD, CFUNGUSBL  Sputum Culture:  No components found for: [place]     04/21/22  1540 04/21/22  1540 04/21/22  1543   SPECDESC . PLEURAL FLUID   < > . PLEURAL FLUID   CULTURE NO GROWTH 15 HOURS  --   --     < > = values in this interval not displayed. Recent Labs     04/21/22  1540   CULTURE NO GROWTH 15 HOURS          Radiology Reports:  XR CHEST PORTABLE   Final Result   Interval decrease in size of a left pleural effusion. Bibasilar airspace disease likely representing atelectasis. US THORACENTESIS Which side should the procedure be performed? Left   Final Result   Successful ultrasound guided thoracentesis. XR CHEST PORTABLE   Final Result   There is a moderate-sized left pleural effusion which is mildly increased. Trace right effusion. Bibasilar airspace disease likely representing atelectasis. XR CHEST PORTABLE   Final Result   Bibasilar opacities most confluent left lung base suggestive of effusions and   atelectasis              Echocardiogram:   Results for orders placed during the hospital encounter of 04/03/22    ECHO Complete 2D W Doppler W Color    Summary  Normal LV size , mildly increased wall thickness. No obvious wall motion abnormality seen. Normal LV systolic function with LVEF >55%. Normal RV size and function. LA and RA appears normal in size. No obvious significant structural valvular abnormality noted. No significant valvular stenosis or regurgitation noted. Normal aortic root dimension. No significant pericardial effusion noted. No obvious intra-cardiac mass or shunt noted.   IVC normal diameter and inspiratory variation indicating normal RA filling  pressure. ASSESSMENT AND PLAN     Assessment:    // Left pleural effusion- exudate   Left basal  atelectasis    // Recent CABG x3 (4/8/2022)  // Postoperative anemia  // Coronary artery disease  // Essential hypertension  // Hyperlipidemia  // Tobacco abuse    Plan:    I personally interviewed/examined the patient; reviewed interval history, interpreted all available radiographic and laboratory data at the time of service. Exudate pleural effusion - PCIS   Ok to dc and follow up with CTS as scheduled   Continue bronchodilator       We will continue to follow. I would like to thank you for allowing me to participate in the care of this patient. Please feel free to call with any further questions or concerns. Ayanna Piña MD  Pulmonary and Critical Care Medicine           4/22/2022, 10:56 AM    Please note that this chart was generated using voice recognition Dragon dictation software. Although every effort was made to ensure the accuracy of this automated transcription, some errors in transcription may have occurred.

## 2022-04-22 NOTE — PROGRESS NOTES
[facility]  Progress Note                   Date:   4/22/2022  Patient name: Marissa Guajardo  Date of admission:  4/20/2022 12:07 AM  MRN:   [id number]  YOB: 1965  PCP: No primary care provider on file. Reason for Admission: Pleural effusion [J90]  Recurrent pleural effusion [J90]    Subjective:       Clinical Changes /Abnormalities:Stable. No CV issues/concerns overnight. Labs, vitals, tele & echo reviewed. Remains SR. Post thorocentesis yesterday reviewed. Review of Systems    Medications:   Scheduled Meds:   amiodarone  100 mg Oral BID    amLODIPine  10 mg Oral Daily    aspirin  81 mg Oral Daily    clopidogrel  75 mg Oral Daily    atorvastatin  20 mg Oral Nightly    metoprolol tartrate  25 mg Oral BID    budesonide-formoterol  2 puff Inhalation BID    pantoprazole  40 mg Oral BID AC    sodium chloride flush  5-40 mL IntraVENous 2 times per day    enoxaparin  40 mg SubCUTAneous Daily    furosemide  40 mg Oral BID     Continuous Infusions:   sodium chloride      dextrose       CBC:   Recent Labs     04/20/22  0521 04/21/22  0606 04/22/22  0443   WBC 15.0* 13.1* 13.7*   HGB 9.8* 10.2* 10.4*   * 716* 731*     BMP:    Recent Labs     04/20/22  0521 04/21/22  0606 04/22/22  0443    139 137   K 3.9 3.4* 3.4*    104 102   CO2 20 22 23   BUN 8 10 11   CREATININE 0.62 0.74 0.70   GLUCOSE 111* 106* 118*     Hepatic:No results for input(s): AST, ALT, ALB, BILITOT, ALKPHOS in the last 72 hours. Troponin: No results for input(s): TROPHS in the last 72 hours. BNP: No results for input(s): BNP in the last 72 hours. Lipids: No results for input(s): CHOL, HDL in the last 72 hours. Invalid input(s): LDLCALCU  INR: No results for input(s): INR in the last 72 hours.     Objective:   Vitals: /80   Pulse 80   Temp 99.1 °F (37.3 °C) (Oral)   Resp 18   Ht 5' 1\" (1.549 m)   Wt 158 lb (71.7 kg) Comment: 158  SpO2 97%   BMI 29.85 kg/m²   General appearance: alert and cooperative with exam  HEENT: Head: Normocephalic, no lesions, without obvious abnormality. Neck:no JVD, trachea midline, no adenopathy  Lungs: Diminished throughout, very dim left side. No audible rales. On RA without distress  Heart: Regular rate and rhythm, s1/s2 auscultated, no murmurs, SR 92  Abdomen: soft, non-tender, bowel sounds active  Extremities: trace- +1 b/l ankle edema  Neurologic: not done    CABG 4/8/2022: LIMA-LAD, SVG-OM1, SVG-RCA      CATH 4/4/2022: LM/MVD. EF 50%.      ECHO 4/4/2022: EF 55%, no regurg/stenosis. Echo 4/20/22  Summary  Global left ventricular systolic function appears low normal. Estimated  ejection fraction is 50 % . Calculated EF via 3D Heart Model is 54 %. Procedure: Ultrasound guided Thoracentesis                 Anesthesia: 1% Lidocaine           Surgeons/Assistants: Hernandez Daly PA-C     Complications: none     EBL: Minimal     Specimens: were obtained     Ultrasound guided left thoracentesis performed. 450 ml serosang fluid obtained. Dressing applied. Unable to completely drain due to pt beginning to cough    Assessment / Acute Cardiac Problems:   1. Syncope  -? Vasovagal  2. Known CAD s/p CABG 4/22 as above  3. Preserved LVEF  4. Moderate left pleural effusion  5. Hypokalemia  6. HTN  7. Recent aspriation PNA post-op CTS  8. Post-op anemia s/p PRBC transfusion last admission  9. HLP  10.  Tobacco abuse    Patient Active Problem List:     Stroke [facility])     HTN (hypertension)     CAD (coronary artery disease) with multiple stents     Asthma     Depression     Smoker     Dementia ([facility] [address]     Anxiety     Dyslipidemia     Essential hypertension     Neck pain     Memory problem     History of stroke     DDD (degenerative disc disease), cervical     Grade III hemorrhoids     Mixed hyperlipidemia     Current mild episode of major depressive disorder without prior episode (HCC)     Allergic sinusitis     Refusal of statin medication by patient Prediabetes     Sinus bradycardia     Thyroid nodule     NSTEMI (non-ST elevated myocardial infarction) ([facility] [address]     Multiple vessel coronary artery disease s/p CABG      Pleural effusion     Recurrent pleural effusion      Plan of Treatment:   1. Stable from CV standpoint. Improved post thoracentesis. Clinically stable and on RA. Continue PO Lasix   2. Continue PO Amio, ASA, statin, BB, & Plavix. No plans for further intervention from CTS standpoint  3. Echo as above. 4. BP stable. Continue BB & Norvasc. 5. Will sign off. Please call with questions/concerns.  F/U as OP as scheduled     Electronically signed by ELLY Mcghee CNP on 4/22/2022 at 11:24 [address] [facility].  [phone]

## 2022-04-22 NOTE — PROGRESS NOTES
Assessment: Patient was reclining in her chair when  visited. Family was not present at the time. Patient was in good spirit and seemed to appreciate the treatment and care she was receiving. When asked how she was feeling, patient responded; \"I am feeling better today than yesterday. \" Patient was raised [place] and open to spiritual care. Intervention:  maintained listening presence, offered support and prayed with patient. Outcome: Patient expressed appreciation for the spiritual support she received. Plan: Follow up visits recommended for more prayers and support.

## 2022-04-22 NOTE — PROGRESS NOTES
Physical Therapy  Facility/Department: [facility] RENAL//MED SURG                                                                          Daily Treatment Note    Name: Marie Galeano  : 1965  MRN: [id number]  Date of Service: 2022    Discharge Recommendations:  Therapy recommended at discharge   PT Equipment Recommendations  Equipment Needed: Yes  Mobility Devices: [place]: Rollator (4 Wheeled)      Patient Diagnosis(es): There were no encounter diagnoses. Past Medical History:  has a past medical history of Anemia, Asthma, CAD (coronary artery disease), Depression, High cholesterol, Hypertension, MI, old, Osteoarthritis, Pneumonia, Sleep apnea, and Stroke ([facility] [address] Past Surgical History:  has a past surgical history that includes Hysterectomy; Coronary angioplasty with stent (); Tubal ligation; Tonsillectomy; Appendectomy; and Coronary artery bypass graft (N/A, 2022). Assessment   Body Structures, Functions, Activity Limitations Requiring Skilled Therapeutic Intervention: Decreased tolerance to work activity; Decreased endurance  Assessment: The pt ambulated 20ft and 100ft  with a RW x CGA Significant fatigue, decrease endurance  and SOB noted this date . Pt taking frequent  standing rest breaks. She ambulated slowly and fatigue quickly with mobilization. Recomending continued therapy to address deficits  Therapy Prognosis: Good  Requires PT Follow-Up: Yes  Activity Tolerance  Activity Tolerance: Patient limited by fatigue;Patient limited by pain; Patient limited by endurance     Plan   Plan  Plan: 1 time a day 7 days a week  Current Treatment Recommendations: Strengthening,Functional mobility training,Endurance training,Gait training,Stair training,Safety education & training,Therapeutic activities  Safety Devices  Type of Devices: Nurse notified,Call light within reach,Gait belt,Left in bed,Patient at risk for falls  Restraints  Restraints Initially in Place: No Restrictions  Restrictions/Precautions  Required Braces or Orthoses?: No  Position Activity Restriction  Other position/activity restrictions: Up with assist; CABG x3 on 4/10/22 for CP 2/2 NSTEMI     Subjective   General  Patient assessed for rehabilitation services?: Yes  Response To Previous Treatment: Patient with no complaints from previous session. Family / Caregiver Present: No  Follows Commands: Within Functional Limits  Subjective  Subjective: RN and pt agreeable to PT  Pt up in chair upon arriva , pleasant and cooperative t/o         Social/Functional History  Social/Functional History  Lives With: Daughter  Type of Home: Apartment  Home Layout: One level  Home Access: Stairs to enter without rails  Entrance Stairs - Number of Steps: 1 step  Bathroom Shower/Tub: Tub/Shower unit  Bathroom Toilet: Standard  Has the patient had two or more falls in the past year or any fall with injury in the past year?: No  ADL Assistance: Independent  Homemaking Assistance: Independent  Homemaking Responsibilities: Yes  Meal Prep Responsibility: Secondary  Laundry Responsibility: Secondary  Cleaning Responsibility: Secondary  Shopping Responsibility: Secondary  Ambulation Assistance: Independent  Transfer Assistance: Independent  Active : No  Mode of Transportation: Family,Friends  Occupation: On disability  Leisure & Hobbies: time with grandchildren, take walks in the park  Additional Comments: Daughter helps at home and shares household responsibilities  VObjective    Bed mobility  Comment: Pt in [facility] upon arrival and exit. Transfers  Sit to Stand: Contact guard assistance  Stand to sit: Contact guard assistance  Comment: Pt requires signoficant amount of time to complete task. Very perculiar how she wants to move .   Ambulation  Surface: level tile  Assistance: Contact guard assistance  Quality of Gait: Fatigues quickly requiring standing with frequent staning rest breaks  Gait Deviations: Slow Erin  Distance: 20ft x2  Comments: Significant amount of time to complete task.      Balance  Posture: Good  Sitting - Static: Good  Sitting - Dynamic: Fair  Standing - Static: Good  Standing - Dynamic: Good  Exercise Treatment: .sit      AM-PAC Score  AM-PAC Inpatient Mobility Raw Score : 18 (04/21/22 1605)  AM-PAC Inpatient T-Scale Score : 43.63 (04/21/22 1605)  Mobility Inpatient CMS 0-100% Score: 46.58 (04/21/22 1605)  Mobility Inpatient CMS G-Code Modifier : CK (04/21/22 1605)          Goals  Long Term Goals  Time Frame for Long term goals : 10 visits  Long term goal 1: transfers with SBA  Long term goal 2: amb 150 ft with or without device x SBA  Long term goal 3: ascend/descend 4 steps with SBA  Long term goal 4: 20 min strenthening exercise program x SBA  Patient Goals   Patient goals : Return home       Therapy Time   Individual Concurrent Group Co-treatment   Time In 3460         Time Out 1533         Minutes 38         Timed Code Treatment Minutes: Dinesh Viramontes PTA

## 2022-04-23 LAB
ABSOLUTE EOS #: 0.5 K/UL (ref 0–0.44)
ABSOLUTE IMMATURE GRANULOCYTE: 0.09 K/UL (ref 0–0.3)
ABSOLUTE LYMPH #: 2.44 K/UL (ref 1.1–3.7)
ABSOLUTE MONO #: 1.34 K/UL (ref 0.1–1.2)
ANION GAP SERPL CALCULATED.3IONS-SCNC: 10 MMOL/L (ref 9–17)
BASOPHILS # BLD: 1 % (ref 0–2)
BASOPHILS ABSOLUTE: 0.08 K/UL (ref 0–0.2)
BUN BLDV-MCNC: 15 MG/DL (ref 6–20)
CALCIUM SERPL-MCNC: 8.9 MG/DL (ref 8.6–10.4)
CHLORIDE BLD-SCNC: 99 MMOL/L (ref 98–107)
CO2: 25 MMOL/L (ref 20–31)
CREAT SERPL-MCNC: 0.82 MG/DL (ref 0.5–0.9)
EOSINOPHILS RELATIVE PERCENT: 4 % (ref 1–4)
GFR AFRICAN AMERICAN: >60 ML/MIN
GFR NON-AFRICAN AMERICAN: >60 ML/MIN
GFR SERPL CREATININE-BSD FRML MDRD: ABNORMAL ML/MIN/{1.73_M2}
GLUCOSE BLD-MCNC: 98 MG/DL (ref 70–99)
HCT VFR BLD CALC: 35.9 % (ref 36.3–47.1)
HEMOGLOBIN: 11.3 G/DL (ref 11.9–15.1)
IMMATURE GRANULOCYTES: 1 %
LYMPHOCYTES # BLD: 20 % (ref 24–43)
MAGNESIUM: 2 MG/DL (ref 1.6–2.6)
MCH RBC QN AUTO: 29.2 PG (ref 25.2–33.5)
MCHC RBC AUTO-ENTMCNC: 31.5 G/DL (ref 28.4–34.8)
MCV RBC AUTO: 92.8 FL (ref 82.6–102.9)
MONOCYTES # BLD: 11 % (ref 3–12)
NRBC AUTOMATED: 0 PER 100 WBC
PDW BLD-RTO: 15.3 % (ref 11.8–14.4)
PLATELET # BLD: 755 K/UL (ref 138–453)
PMV BLD AUTO: 8.6 FL (ref 8.1–13.5)
POTASSIUM SERPL-SCNC: 3.3 MMOL/L (ref 3.7–5.3)
RBC # BLD: 3.87 M/UL (ref 3.95–5.11)
RBC # BLD: ABNORMAL 10*6/UL
SEG NEUTROPHILS: 63 % (ref 36–65)
SEGMENTED NEUTROPHILS ABSOLUTE COUNT: 8.06 K/UL (ref 1.5–8.1)
SODIUM BLD-SCNC: 134 MMOL/L (ref 135–144)
WBC # BLD: 12.5 K/UL (ref 3.5–11.3)

## 2022-04-23 PROCEDURE — 6370000000 HC RX 637 (ALT 250 FOR IP): Performed by: NURSE PRACTITIONER

## 2022-04-23 PROCEDURE — 83735 ASSAY OF MAGNESIUM: CPT

## 2022-04-23 PROCEDURE — 6370000000 HC RX 637 (ALT 250 FOR IP): Performed by: INTERNAL MEDICINE

## 2022-04-23 PROCEDURE — 99231 SBSQ HOSP IP/OBS SF/LOW 25: CPT | Performed by: FAMILY MEDICINE

## 2022-04-23 PROCEDURE — 97116 GAIT TRAINING THERAPY: CPT

## 2022-04-23 PROCEDURE — 80048 BASIC METABOLIC PNL TOTAL CA: CPT

## 2022-04-23 PROCEDURE — 2580000003 HC RX 258: Performed by: INTERNAL MEDICINE

## 2022-04-23 PROCEDURE — 1200000000 HC SEMI PRIVATE

## 2022-04-23 PROCEDURE — 85025 COMPLETE CBC W/AUTO DIFF WBC: CPT

## 2022-04-23 PROCEDURE — 97110 THERAPEUTIC EXERCISES: CPT

## 2022-04-23 PROCEDURE — 6370000000 HC RX 637 (ALT 250 FOR IP): Performed by: FAMILY MEDICINE

## 2022-04-23 PROCEDURE — 94640 AIRWAY INHALATION TREATMENT: CPT

## 2022-04-23 PROCEDURE — 99232 SBSQ HOSP IP/OBS MODERATE 35: CPT | Performed by: INTERNAL MEDICINE

## 2022-04-23 PROCEDURE — 6360000002 HC RX W HCPCS: Performed by: INTERNAL MEDICINE

## 2022-04-23 PROCEDURE — 36415 COLL VENOUS BLD VENIPUNCTURE: CPT

## 2022-04-23 PROCEDURE — 97530 THERAPEUTIC ACTIVITIES: CPT

## 2022-04-23 PROCEDURE — 94761 N-INVAS EAR/PLS OXIMETRY MLT: CPT

## 2022-04-23 RX ORDER — POTASSIUM CHLORIDE 20 MEQ/1
40 TABLET, EXTENDED RELEASE ORAL ONCE
Status: COMPLETED | OUTPATIENT
Start: 2022-04-23 | End: 2022-04-23

## 2022-04-23 RX ADMIN — OXYCODONE HYDROCHLORIDE 5 MG: 5 TABLET ORAL at 22:26

## 2022-04-23 RX ADMIN — SODIUM CHLORIDE, PRESERVATIVE FREE 10 ML: 5 INJECTION INTRAVENOUS at 09:24

## 2022-04-23 RX ADMIN — METOPROLOL TARTRATE 25 MG: 25 TABLET ORAL at 09:24

## 2022-04-23 RX ADMIN — AMIODARONE HYDROCHLORIDE 100 MG: 200 TABLET ORAL at 09:24

## 2022-04-23 RX ADMIN — OXYCODONE HYDROCHLORIDE 5 MG: 5 TABLET ORAL at 05:49

## 2022-04-23 RX ADMIN — ENOXAPARIN SODIUM 40 MG: 100 INJECTION SUBCUTANEOUS at 09:23

## 2022-04-23 RX ADMIN — AMLODIPINE BESYLATE 10 MG: 10 TABLET ORAL at 09:24

## 2022-04-23 RX ADMIN — ATORVASTATIN CALCIUM 20 MG: 20 TABLET, FILM COATED ORAL at 20:42

## 2022-04-23 RX ADMIN — PANTOPRAZOLE SODIUM 40 MG: 40 TABLET, DELAYED RELEASE ORAL at 05:49

## 2022-04-23 RX ADMIN — OXYCODONE HYDROCHLORIDE 5 MG: 5 TABLET ORAL at 14:29

## 2022-04-23 RX ADMIN — AMIODARONE HYDROCHLORIDE 100 MG: 200 TABLET ORAL at 20:41

## 2022-04-23 RX ADMIN — FUROSEMIDE 40 MG: 40 TABLET ORAL at 16:52

## 2022-04-23 RX ADMIN — METOPROLOL TARTRATE 25 MG: 25 TABLET ORAL at 20:42

## 2022-04-23 RX ADMIN — CLOPIDOGREL 75 MG: 75 TABLET, FILM COATED ORAL at 09:24

## 2022-04-23 RX ADMIN — PANTOPRAZOLE SODIUM 40 MG: 40 TABLET, DELAYED RELEASE ORAL at 16:52

## 2022-04-23 RX ADMIN — Medication 81 MG: at 09:24

## 2022-04-23 RX ADMIN — POTASSIUM CHLORIDE 40 MEQ: 1500 TABLET, EXTENDED RELEASE ORAL at 09:24

## 2022-04-23 RX ADMIN — SODIUM CHLORIDE, PRESERVATIVE FREE 10 ML: 5 INJECTION INTRAVENOUS at 20:42

## 2022-04-23 RX ADMIN — FUROSEMIDE 40 MG: 40 TABLET ORAL at 09:24

## 2022-04-23 RX ADMIN — BUDESONIDE AND FORMOTEROL FUMARATE DIHYDRATE 2 PUFF: 80; 4.5 AEROSOL RESPIRATORY (INHALATION) at 11:10

## 2022-04-23 ASSESSMENT — PAIN DESCRIPTION - LOCATION
LOCATION: CHEST;BACK
LOCATION: CHEST

## 2022-04-23 ASSESSMENT — PAIN SCALES - GENERAL
PAINLEVEL_OUTOF10: 7
PAINLEVEL_OUTOF10: 8

## 2022-04-23 ASSESSMENT — PAIN DESCRIPTION - DESCRIPTORS
DESCRIPTORS: ACHING
DESCRIPTORS: ACHING;DISCOMFORT

## 2022-04-23 ASSESSMENT — PAIN DESCRIPTION - ORIENTATION: ORIENTATION: MID

## 2022-04-23 ASSESSMENT — PAIN - FUNCTIONAL ASSESSMENT: PAIN_FUNCTIONAL_ASSESSMENT: PREVENTS OR INTERFERES WITH MANY ACTIVE NOT PASSIVE ACTIVITIES

## 2022-04-23 NOTE — PROGRESS NOTES
[facility]  Office: [address], DO, Rubi Smoker, DO, Olga [facility], DO, Suzanne Rebollar Blood, DO, Colonel Campos MD, Fozia Perea MD, Alice Barahona MD, Ember Mccurdy MD, Harry Ordaz MD, Romy Bautista MD, Lauren Skinner MD, Lacey Ayala, DO, Kee Musa, DO, Ashley Matson MD,  [place], DO, Brando Trevizo MD, Rafaela Cabezas MD, Ashlee Hsu MD, Sylvia Holman, DO, Amber Pena MD, Ector Burkett MD, Norris Rooney, [facility], [facility] Sloan, CNP, Marky Lanier, CNP, Ace Larios, CNP, Duane Escobedo, CNP, Tamara Saldaña, CNP, Ciara Arellano, PA-C, Supriya Nava, [facility] [place]), Papa Yates, CNP, Jacob Salomon, CNP, Leon Condon, CNP, Parris Barrientos, CNS, Dinesh Shoemaker, [facility] [place]), Jessi Moran, CNP, Reyna Garibay, CNP, Go Phan, [facility] [address]    Progress Note    4/23/2022    7:31 AM    Name:   Fortunato Gomez  MRN:     [id number]     Acct:      [de-identified]   Room:   [address] [place] [address] Day:  3  Admit Date:  4/20/2022 12:07 AM    PCP:   No primary care provider on file. Code Status:  Full Code    Subjective:     C/C: No chief complaint on file. Interval History Status: improved. Pt was seen and examined this morning  No acute events overnight  Laying in bed this morning  States that she is doing well w/o any acute complaints other than mild non productive cough    Review of Systems:     12 point ROS performed and negative for anything other than what was stated in subjective     Medications: Allergies:     Allergies   Allergen Reactions    Lipitor [Atorvastatin] Other (See Comments)     Muscle cramping in legs    Prozac [Fluoxetine] Other (See Comments)     Mood swings    Zoloft [Sertraline Hcl] Other (See Comments)     Anger and mood swings        Current Meds:   Scheduled Meds:    amiodarone  100 mg Oral BID    amLODIPine  10 mg Oral Daily    aspirin  81 mg Oral Daily    clopidogrel  75 mg Oral Daily    atorvastatin  20 mg Oral Nightly    metoprolol tartrate  25 mg Oral BID    budesonide-formoterol  2 puff Inhalation BID    pantoprazole  40 mg Oral BID AC    sodium chloride flush  5-40 mL IntraVENous 2 times per day    enoxaparin  40 mg SubCUTAneous Daily    furosemide  40 mg Oral BID     Continuous Infusions:    sodium chloride      dextrose       PRN Meds: calcium carbonate, sodium chloride flush, sodium chloride, ondansetron **OR** ondansetron, polyethylene glycol, acetaminophen **OR** acetaminophen, glucose, dextrose, glucagon (rDNA), dextrose, fentanNYL, oxyCODONE, albuterol sulfate HFA, busPIRone    Data:     Past Medical History:   has a past medical history of Anemia, Asthma, CAD (coronary artery disease), Depression, High cholesterol, Hypertension, MI, old, Osteoarthritis, Pneumonia, Sleep apnea, and Stroke ([address] Social History:   reports that she has been smoking cigarettes. She has a 12.50 pack-year smoking history. She has never used smokeless tobacco. She reports previous alcohol use. She reports current drug use. Drug: Marijuana Emmanuelle Beat). Family History:   Family History   Problem Relation Age of Onset    High Blood Pressure Mother        Vitals:  /74   Pulse 91   Temp 97.9 °F (36.6 °C)   Resp 18   Ht 5' 1\" (1.549 m)   Wt 156 lb (70.8 kg)   SpO2 96%   BMI 29.48 kg/m²   Temp (24hrs), Av.4 °F (36.9 °C), Min:97.9 °F (36.6 °C), Max:98.9 °F (37.2 °C)    Recent Labs     22  1141   POCGLU 84       I/O (24Hr):     Intake/Output Summary (Last 24 hours) at 2022 0731  Last data filed at 2022 1002  Gross per 24 hour   Intake 240 ml   Output --   Net 240 ml       Labs:  Hematology:  Recent Labs     22  0606 22  0443 22  0600   WBC 13.1* 13.7* 12.5*   RBC 3.45* 3.50* 3.87*   HGB 10.2* 10.4* 11.3*   HCT 31.4* 32.1* 35.9*   MCV 91.0 91.7 92.8   MCH 29.6 29.7 29.2   MCHC 32.5 32.4 31.5   RDW 15.3* 15.4* 15.3*   * 731* 755*   MPV 8.6 8.5 8.6     Chemistry:  Recent Labs     04/21/22  0606 04/22/22  0443 04/23/22  0600    137 134*   K 3.4* 3.4* 3.3*    102 99   CO2 22 23 25   GLUCOSE 106* 118* 98   BUN 10 11 15   CREATININE 0.74 0.70 0.82   MG 2.1 1.9 2.0   ANIONGAP 13 12 10   LABGLOM >60 >60 >60   GFRAA >60 >60 >60   CALCIUM 8.4* 8.7 8.9     Recent Labs     04/20/22  1141   POCGLU 84     ABG:  Lab Results   Component Value Date    POCPH 7.484 04/13/2022    POCPCO2 30.6 04/13/2022    POCPO2 77.1 04/13/2022    POCHCO3 23.0 04/13/2022    NBEA 4 04/11/2022    PBEA 0 04/13/2022    CKWV1SYG 96 04/13/2022    FIO2 60.0 04/12/2022     Lab Results   Component Value Date/Time    SPECIAL R AC 1ML 04/10/2022 02:49 AM     Lab Results   Component Value Date/Time    CULTURE NO GROWTH 15 HOURS 04/21/2022 03:40 PM       Radiology:  XR CHEST PORTABLE    Result Date: 4/21/2022  Interval decrease in size of a left pleural effusion. Bibasilar airspace disease likely representing atelectasis. XR CHEST PORTABLE    Result Date: 4/21/2022  There is a moderate-sized left pleural effusion which is mildly increased. Trace right effusion. Bibasilar airspace disease likely representing atelectasis. XR CHEST PORTABLE    Result Date: 4/20/2022  Bibasilar opacities most confluent left lung base suggestive of effusions and atelectasis     US THORACENTESIS Which side should the procedure be performed? Left    Result Date: 4/21/2022  Successful ultrasound guided thoracentesis.        Physical Examination:        General appearance:  alert, cooperative and no distress  Mental Status:  oriented to person, place and time and normal affect  Lungs:  clear to auscultation bilaterally, normal effort  Heart:  regular rate and rhythm, no murmur  Abdomen:  soft, nontender, nondistended, normal bowel sound   Extremities:  no edema, redness, tenderness in the calves  Skin:  no gross lesions, rashes, induration    Assessment:        Hospital Problems           Last Modified POA    * (Principal) Pleural effusion 4/20/2022 Yes    Recurrent pleural effusion 4/20/2022 Yes          Plan:        1. Post CABG recurrent pleural effusion  - cardiology on board - cleared pt for d/c  - CTS on board - cleared pt for dc  - pulmonology on board - cleared pt for d/c  - continue PO lasix   - monitor Is/Os   - echo done, normal LVEF  - repeat chest xray showed moderate effusion to the left thorax  - IR consult for thoracentesis, had 450 ml removed on 4/21     2. CAD   - cardiology on board   - telemetry   - stable at this time      3.  HTN  - v/s per unit protocol   - continue current anti hypertensive regimen      4. Anemia  - Hemoglobin 11.3     5. Hyperlipidemia  - Resume high intensity statin     6.  Anxiety/Depression   - On buparone 15mg tiD     7. DISPO  - pending pre cert for SNF placement  - PT/OT    Tessie Levy MD  4/23/2022  7:31 AM

## 2022-04-23 NOTE — PLAN OF CARE
Problem: Falls - Risk of:  Goal: Absence of physical injury  Description: Absence of physical injury  4/23/2022 1018 by Carol Ann Jimenez RN  Outcome: Progressing  4/23/2022 0242 by Siri Oppenheim  Outcome: Progressing     Problem: Pain:  Goal: Pain level will decrease  Description: Pain level will decrease  4/23/2022 1018 by Carol Ann Jimenez RN  Outcome: Progressing     Problem: Pain:  Goal: Pain level will decrease  Description: Pain level will decrease  4/23/2022 1018 by Carol Ann Jimenez RN  Outcome: Progressing  4/23/2022 0242 by Siri Oppenheim  Outcome: Progressing

## 2022-04-23 NOTE — CARE COORDINATION
Transitional planning. Attempted to call Talitha Libman [phone], cell [phone] to check on pre-cert. Unable to leave message with both numbers.

## 2022-04-23 NOTE — PROGRESS NOTES
Physical Therapy  Facility/Department: [facility] RENAL//MED SURG  Daily treatment note    Name: Carlos Najera  : 1965  MRN: [id number]  Date of Service: 2022     Discharge Recommendations:  Therapy recommended at discharge   PT Equipment Recommendations  Equipment Needed: Yes  Mobility Devices: [place]: Rollator (4 Wheeled)      Patient Diagnosis(es): There were no encounter diagnoses. Past Medical History:  has a past medical history of Anemia, Asthma, CAD (coronary artery disease), Depression, High cholesterol, Hypertension, MI, old, Osteoarthritis, Pneumonia, Sleep apnea, and Stroke ([facility] [address] Past Surgical History:  has a past surgical history that includes Hysterectomy; Coronary angioplasty with stent (); Tubal ligation; Tonsillectomy; Appendectomy; and Coronary artery bypass graft (N/A, 2022).    Assessment   Body Structures, Functions, Activity Limitations Requiring Skilled Therapeutic Intervention: Decreased tolerance to work activity; Decreased endurance  Assessment: The pt ambulated 20ft x3  with a RW x CGA Significant fatigue, decrease endurance  and SOB noted this date . Pt taking frequent  standing rest breaks. and 2 seated rest breaks. She ambulated slowly and fatigue quickly with mobilization. Recomending continued therapy to address deficits  Therapy Prognosis: Good  Requires PT Follow-Up: Yes  Activity Tolerance  Activity Tolerance: Patient limited by fatigue;Patient limited by pain; Patient limited by endurance      Plan   Plan  Plan: 1 time a day 7 days a week  Current Treatment Recommendations: Strengthening,Functional mobility training,Endurance training,Gait training,Stair training,Safety education & training,Therapeutic activities  Safety Devices  Type of Devices: Nurse notified,Call light within reach,Gait belt,Left in bed,Patient at risk for falls  Restraints  Restraints Initially in Place: No      Restrictions  Restrictions/Precautions  Required Braces or Orthoses?: No  Position Activity Restriction  Other position/activity restrictions: Up with assist; CABG x3 on 4/10/22 for CP 2/2 NSTEMI      Subjective   General  Patient assessed for rehabilitation services?: Yes  Response To Previous Treatment: Patient with no complaints from previous session. Family / Caregiver Present: No  Follows Commands: Within Functional Limits  Subjective  Subjective: RN and pt agreeable to PT,pt supine in bed upon arrival , denies pain  , pleasant and cooperative t/o. Pt reports feeling better this date , buts also states that she is having so much stress from family members which she does not want to deal with at this time. Objective    Bed mobility  Comment: Pt in recliner upon arrival and exit. Transfers  Sit to Stand: Contact guard assistance  Stand to sit: Contact guard assistance  Comment: Pt requires signoficant amount of time to complete task. Very perculiar how she wants to move . Ambulation  Surface: level tile  Assistance: Contact guard assistance  Quality of Gait: Fatigues quickly requiring standing with frequent staning rest breaks  Gait Deviations: Slow Erin  Distance: 20ft x3  Comments: Significant amount of time to complete task. 2 seated rest breaks and multiple standing rest break. Balance  Posture: Good  Sitting - Static: Good  Sitting - Dynamic: Fair  Standing - Static: Good  Standing - Dynamic: Good  Exercise Treatment: Seated LE exercise program: Long Arc Quads, hip abduction/adduction, heel/toe raises, and marches.  Reps:15-20    AM-PAC Score  AM-PAC Inpatient Mobility Raw Score : 18 (04/21/22 1605)  AM-PAC Inpatient T-Scale Score : 43.63 (04/21/22 1605)  Mobility Inpatient CMS 0-100% Score: 46.58 (04/21/22 1605)  Mobility Inpatient CMS G-Code Modifier : CK (04/21/22 1605)     Goals  Long Term Goals  Time Frame for Long term goals : 10 visits  Long term goal 1: transfers with SBA  Long term goal 2: amb 150 ft with or without device x SBA  Long term goal 3: ascend/descend 4 steps with SBA  Long term goal 4: 20 min strenthening exercise program x SBA  Patient Goals   Patient goals : Return home       Therapy Time   Individual Concurrent Group Co-treatment   Time In 1004         Time Out 1042         Minutes 38         Timed Code Treatment Minutes: [address] [facility]

## 2022-04-23 NOTE — PROGRESS NOTES
PULMONARY & CRITICAL CARE MEDICINE CONSULT NOTE     Patient:  Ara Heck  MRN: [id number]  Admit date: 4/20/2022  Primary Care Physician: No primary care provider on file. Consulting Physician: Luisa Menard MD  CODE Status: Full Code  LOS: 3     SUBJECTIVE     CHIEF COMPLAINT/REASON FOR CONSULT: Left pleural effusion    HISTORY OF PRESENT ILLNESS:  The patient is a 61 y.o. female patient was recently hospitalized with chest pain. She was found to have multivessel coronary artery disease and required CABG x3 (4/8/2022). Postop course was complicated with development of bleeding from chest tube, anemia and worsening respiratory failure. She required reintubation and bronchoscopic clearance of mucous plugging and had left lung atelectasis/effusion. She was discharged from the hospital on 4/16. She was readmitted after she was found to have in bathroom after a coughing spell. Initially admitted to [facility] but transferred here for further management. Chest x-ray shows left-sided effusion/atelectasis. Patient currently sitting in the bed comfortably. She is on room air and is hemodynamically stable. She has been given IV Lasix and is annoyed by need to go to the bathroom repeatedly. She denies cough, sputum, wheezing, pleuritic chest pain.   4/23/2022   Subjective      No acute events   On ra     PAST MEDICAL HISTORY:        Diagnosis Date    Anemia     Asthma     CAD (coronary artery disease)     Depression 10/20/2014    High cholesterol     Hypertension     MI, old     Osteoarthritis     Pneumonia     Sleep apnea     Stroke ([facility] [address] 9/27/2014     PAST SURGICAL HISTORY:        Procedure Laterality Date    APPENDECTOMY      CORONARY ANGIOPLASTY WITH STENT PLACEMENT  2004    CORONARY ARTERY BYPASS GRAFT N/A 4/8/2022    CABG X 3 / EVH LEFT LEG / KIT performed by Sanchez Chairez MD at [address] [place] [address] HISTORY:       Problem Relation Age of Onset    High Blood Pressure Mother      SOCIAL HISTORY:   TOBACCO:   reports that she has been smoking cigarettes. She has a 12.50 pack-year smoking history. She has never used smokeless tobacco.  ETOH:  reports previous alcohol use. DRUGS: reports current drug use. Drug: Marijuana Gerson Bilberry). ALLERGIES:    Allergies   Allergen Reactions    Lipitor [Atorvastatin] Other (See Comments)     Muscle cramping in legs    Prozac [Fluoxetine] Other (See Comments)     Mood swings    Zoloft [Sertraline Hcl] Other (See Comments)     Anger and mood swings          HOME MEDICATIONS:  Prior to Admission medications    Medication Sig Start Date End Date Taking?  Authorizing Provider   aspirin 81 MG EC tablet Take 1 tablet by mouth daily 4/17/22   Virginie Tyson MD   calcium carbonate (TUMS) 500 MG chewable tablet Take 1 tablet by mouth 3 times daily as needed for Heartburn 4/16/22 5/16/22  Virginie yTson MD   amiodarone (CORDARONE) 200 MG tablet Take 0.5 tablets by mouth 3 times daily 4/16/22   Virginie Tyson MD   metoprolol tartrate (LOPRESSOR) 25 MG tablet Take 1 tablet by mouth 2 times daily 4/16/22   Virginie Tyson MD   potassium chloride (KLOR-CON M) 20 MEQ extended release tablet Take 2 tablets by mouth as needed (Potassium Replacement) 4/16/22   Virginie Tyson MD   amLODIPine (NORVASC) 10 MG tablet Take 1 tablet by mouth daily 4/17/22   Virginie Tyson MD   albuterol sulfate  (90 Base) MCG/ACT inhaler INHALE 2 PUFFS BY MOUTH EVERY 6 HOURS AS NEEDED FOR WHEEZING 11/15/21   Sharath Mosqueda MD   omeprazole (PRILOSEC) 20 MG delayed release capsule TAKE 1 CAPSULE BY MOUTH EVERY DAY 11/15/21   Sharath Mosqueda MD   clopidogrel (PLAVIX) 75 MG tablet TAKE 1 TABLET BY MOUTH DAILY 10/7/21   Sharath Mosqueda MD   lovastatin (MEVACOR) 10 MG tablet TAKE 1 TABLET BY MOUTH EVERY NIGHT 9/1/21   Sharath Mosqueda MD   Ginkgo Biloba (GNP GINGKO BILOBA EXTRACT PO) Take by mouth daily    Historical Provider, MD   clobetasol (Tyrell Graft) 0.05 % ointment apply to affected area twice a day 8/31/21   Yousuf Taylor MD   ezetimibe (ZETIA) 10 MG tablet Take 1 tablet by mouth daily 8/31/21   Yousuf Taylor MD   ibuprofen (ADVIL;MOTRIN) 800 MG tablet take 1 tablet by mouth every 8 hours if needed for pain 3/30/21   Yousuf Taylor MD   mometasone-formoterol [facility]) 100-5 MCG/ACT inhaler Inhale 2 puffs into the lungs 2 times daily 3/24/21   Yousuf Taylor MD   busPIRone (BUSPAR) 15 MG tablet take 1 tablet by mouth three times a day  Patient taking differently: 3 times daily as needed take 1 tablet by mouth three times a day 3/24/21   Yousuf Taylor MD   Omega-3 Fatty Acids (OMEGA-3 EPA FISH OIL) 1205 MG CAPS Take 1 tablet by mouth daily 3/24/21   Yousuf Taylor MD   clopidogrel (PLAVIX) 75 MG tablet TAKE 1 TABLET BY MOUTH DAILY 3/24/21   Yousuf Taylor MD   cetirizine (ZYRTEC) 10 MG tablet take 1 tablet by mouth once daily 3/16/21   Yousuf Taylor MD   diclofenac sodium 1 % GEL Apply 2 g topically 2 times daily 5/8/19   Yousuf Taylor MD   hydrocortisone 1 % cream apply topically twice a day 1/15/18   Yousuf Taylor MD   Heat Wraps (SOFTHEAT HEATING WRAP ULTRA) MISC Use daily to for neck and shoulder pain 12/6/17   Yousuf Taylor MD   lidocaine (LMX) 4 % cream Apply 2g topically as needed. 7/26/17   Yousuf Taylor MD   Blood Pressure Monitor MISC Use daily to take BP 3/15/17   Yousuf Taylor MD     IMMUNIZATIONS:  Most Recent Immunizations   Administered Date(s) Administered    COVID-19, Pfizer Purple top, DILUTE for use, 12+ yrs, 30mcg/0.3mL dose 05/01/2021    Pneumococcal Polysaccharide ([id number]) 05/04/2017    Tdap (Boostrix, Adacel) 01/01/2014     REVIEW OF SYSTEMS:  Review of Systems   Constitutional: Negative for fever. HENT: Negative for voice change. Eyes: Negative for visual disturbance. Respiratory: Positive for cough. Gastrointestinal: Negative for abdominal pain, diarrhea and vomiting.    Genitourinary: . Negative for dysuria and urgency. Musculoskeletal: Negative for joint swelling. Allergic/Immunologic: Negative for environmental allergies and immunocompromised state. Hematological: Negative for adenopathy. Does not bruise/bleed easily. Psychiatric/Behavioral: Negative for behavioral problems. OBJECTIVE     PaO2/FiO2 RATIO:  No results for input(s): POCPO2 in the last 72 hours. VITAL SIGNS:   LAST:  /83   Pulse 70   Temp 98.2 °F (36.8 °C) (Oral)   Resp 16   Ht 5' 1\" (1.549 m)   Wt 156 lb (70.8 kg)   SpO2 98%   BMI 29.48 kg/m²   8-24 HR RANGE:  TEMP Temp  Av.3 °F (36.8 °C)  Min: 97.9 °F (36.6 °C)  Max: 98.9 °F (18.4 °C)   BP Systolic (20DFX), FLA:635 , Min:108 , XFE:148      Diastolic (37TPS), C, Min:74, Max:83     PULSE Pulse  Av  Min: 70  Max: 91   RR Resp  Av  Min: 16  Max: 16   O2 SAT SpO2  Av %  Min: 98 %  Max: 98 %   OXYGEN DELIVERY No data recorded        Physical Exam  Constitutional:       General: She is not in acute distress. HENT:      Head: Normocephalic and atraumatic. Mouth/Throat:      Mouth: Mucous membranes are moist.   Eyes:      Extraocular Movements: Extraocular movements intact. Pupils: Pupils are equal, round, and reactive to light. Cardiovascular:      Rate and Rhythm: Normal rate and regular rhythm. Heart sounds: No murmur heard. Pulmonary:      Effort: No accessory muscle usage or respiratory distress. Breath sounds:clear right , dec left post base and dull   AP diameter of chest increased. Thoracic expansion and diaphragmatic excursion diminished. BS diminished and expiratory phase prolonged. Abdominal:      General: There is no distension. Palpations: Abdomen is soft. There is no mass. Tenderness: There is no abdominal tenderness. Musculoskeletal:      Cervical back: Neck supple. Right lower leg: No edema. Left lower leg: No edema. Lymphadenopathy:      Cervical: No cervical adenopathy.    Skin: Coloration: Skin is not pale. Findings: No rash. Neurological:      General: No focal deficit present. Mental Status: She is oriented to person, place, and time. DATA REVIEW     Medications: Current Inpatient  Scheduled Meds:   potassium chloride  40 mEq Oral Once    amiodarone  100 mg Oral BID    amLODIPine  10 mg Oral Daily    aspirin  81 mg Oral Daily    clopidogrel  75 mg Oral Daily    atorvastatin  20 mg Oral Nightly    metoprolol tartrate  25 mg Oral BID    budesonide-formoterol  2 puff Inhalation BID    pantoprazole  40 mg Oral BID AC    sodium chloride flush  5-40 mL IntraVENous 2 times per day    enoxaparin  40 mg SubCUTAneous Daily    furosemide  40 mg Oral BID     Continuous Infusions:   sodium chloride      dextrose         INPUT/OUTPUT:  In: 240 [P.O.:240]  Out: -        LABS:  ABGs:   No results for input(s): POCPH, POCPCO2, POCPO2, POCHCO3, ZORO1TCV in the last 72 hours. CBC:   Recent Labs     04/21/22  0606 04/22/22  0443 04/23/22  0600   WBC 13.1* 13.7* 12.5*   HGB 10.2* 10.4* 11.3*   HCT 31.4* 32.1* 35.9*   MCV 91.0 91.7 92.8   * 731* 755*   LYMPHOPCT 20* 15* 20*   RBC 3.45* 3.50* 3.87*   MCH 29.6 29.7 29.2   MCHC 32.5 32.4 31.5   RDW 15.3* 15.4* 15.3*     CRP:   No results for input(s): CRP in the last 72 hours. LDH:   No results for input(s): LDH in the last 72 hours. BMP:   Recent Labs     04/21/22  0606 04/22/22  0443 04/23/22  0600    137 134*   K 3.4* 3.4* 3.3*    102 99   CO2 22 23 25   BUN 10 11 15   CREATININE 0.74 0.70 0.82   GLUCOSE 106* 118* 98     Liver Function Test:   No results for input(s): PROT, LABALBU, ALT, AST, GGT, ALKPHOS, BILITOT in the last 72 hours. Coagulation Profile:   No results for input(s): INR, PROTIME, APTT in the last 72 hours. D-Dimer:  No results for input(s): DDIMER in the last 72 hours. Lactic Acid:  No results for input(s): LACTA in the last 72 hours.   Cardiac Enzymes:  No results for input(s): CKTOTAL, CKMB, CKMBINDEX, TROPONINI in the last 72 hours. Invalid input(s): TROPONIN, HSTROP  BNP/ProBNP:   No results for input(s): BNP, PROBNP in the last 72 hours. Triglycerides:  No results for input(s): TRIG in the last 72 hours. Microbiology:  Urine Culture:  No components found for: CURINE  Blood Culture:  No components found for: CBLOOD, CFUNGUSBL  Sputum Culture:  No components found for: [place]     04/21/22  1540 04/21/22  1540 04/21/22  1543   SPECDESC . PLEURAL FLUID   < > . PLEURAL FLUID   CULTURE NO GROWTH 2 DAYS  --   --     < > = values in this interval not displayed. Recent Labs     04/21/22  1540   CULTURE NO GROWTH 2 DAYS          Radiology Reports:  XR CHEST PORTABLE   Final Result   Interval decrease in size of a left pleural effusion. Bibasilar airspace disease likely representing atelectasis. US THORACENTESIS Which side should the procedure be performed? Left   Final Result   Successful ultrasound guided thoracentesis. XR CHEST PORTABLE   Final Result   There is a moderate-sized left pleural effusion which is mildly increased. Trace right effusion. Bibasilar airspace disease likely representing atelectasis. XR CHEST PORTABLE   Final Result   Bibasilar opacities most confluent left lung base suggestive of effusions and   atelectasis              Echocardiogram:   Results for orders placed during the hospital encounter of 04/03/22    ECHO Complete 2D W Doppler W Color    Summary  Normal LV size , mildly increased wall thickness. No obvious wall motion abnormality seen. Normal LV systolic function with LVEF >55%. Normal RV size and function. LA and RA appears normal in size. No obvious significant structural valvular abnormality noted. No significant valvular stenosis or regurgitation noted. Normal aortic root dimension. No significant pericardial effusion noted. No obvious intra-cardiac mass or shunt noted.   IVC normal diameter and inspiratory variation indicating normal RA filling  pressure. ASSESSMENT AND PLAN     Assessment:    // Left pleural effusion- exudate - PCIS   Left basal  atelectasis    // Recent CABG x3 (4/8/2022)  // Postoperative anemia  // Coronary artery disease  // Essential hypertension  // Hyperlipidemia  // Tobacco abuse    Plan:    I personally interviewed/examined the patient; reviewed interval history, interpreted all available radiographic and laboratory data at the time of service. Xray chest in am   IS and deep breathing   Ok to dc and follow up with CTS as scheduled   Continue bronchodilator       We will continue to follow. I would like to thank you for allowing me to participate in the care of this patient. Please feel free to call with any further questions or concerns. Kristyn Gonzales MD  Pulmonary and Critical Care Medicine           4/23/2022, 9:10 AM    Please note that this chart was generated using voice recognition Dragon dictation software. Although every effort was made to ensure the accuracy of this automated transcription, some errors in transcription may have occurred.

## 2022-04-23 NOTE — PLAN OF CARE
Problem: Falls - Risk of:  Goal: Will remain free from falls  Description: Will remain free from falls  4/23/2022 0242 by Alejandro Ortega  Outcome: Progressing  4/22/2022 1721 by Young Vazquez  Outcome: Progressing  Goal: Absence of physical injury  Description: Absence of physical injury  4/23/2022 0242 by Alejandro Ortega  Outcome: Progressing  4/22/2022 1721 by Young Vazquez  Outcome: Progressing     Problem: Pain:  Goal: Pain level will decrease  Description: Pain level will decrease  4/23/2022 0242 by Alejandro Ortega  Outcome: Progressing  4/22/2022 1721 by Young Vazquez  Outcome: Progressing  Goal: Control of acute pain  Description: Control of acute pain  4/23/2022 0242 by Alejandro Ortega  Outcome: Progressing  4/22/2022 1721 by Young Vazquez  Outcome: Progressing  Goal: Control of chronic pain  Description: Control of chronic pain  4/23/2022 0242 by Alejandro Ortega  Outcome: Progressing  4/22/2022 1721 by Young Vazquez  Outcome: Progressing     Problem: Discharge Planning  Goal: Discharge to home or other facility with appropriate resources  4/23/2022 0242 by Alejandro Ortega  Outcome: Progressing  4/22/2022 1721 by Young Vazquez  Outcome: Progressing     Problem: Chronic Conditions and Co-morbidities  Goal: Patient's chronic conditions and co-morbidity symptoms are monitored and maintained or improved  4/23/2022 0242 by Alejandro Ortega  Outcome: Progressing  4/22/2022 1721 by Young Vazquez  Outcome: Progressing     Problem: Pain  Goal: Verbalizes/displays adequate comfort level or baseline comfort level  Outcome: Progressing

## 2022-04-24 ENCOUNTER — APPOINTMENT (OUTPATIENT)
Dept: GENERAL RADIOLOGY | Age: 57
DRG: 186 | End: 2022-04-24
Attending: INTERNAL MEDICINE
Payer: MEDICARE

## 2022-04-24 PROCEDURE — 6370000000 HC RX 637 (ALT 250 FOR IP): Performed by: NURSE PRACTITIONER

## 2022-04-24 PROCEDURE — 6370000000 HC RX 637 (ALT 250 FOR IP): Performed by: INTERNAL MEDICINE

## 2022-04-24 PROCEDURE — 99231 SBSQ HOSP IP/OBS SF/LOW 25: CPT | Performed by: FAMILY MEDICINE

## 2022-04-24 PROCEDURE — 99233 SBSQ HOSP IP/OBS HIGH 50: CPT | Performed by: INTERNAL MEDICINE

## 2022-04-24 PROCEDURE — 6360000002 HC RX W HCPCS: Performed by: INTERNAL MEDICINE

## 2022-04-24 PROCEDURE — 94640 AIRWAY INHALATION TREATMENT: CPT

## 2022-04-24 PROCEDURE — 97535 SELF CARE MNGMENT TRAINING: CPT

## 2022-04-24 PROCEDURE — 71046 X-RAY EXAM CHEST 2 VIEWS: CPT

## 2022-04-24 PROCEDURE — 1200000000 HC SEMI PRIVATE

## 2022-04-24 RX ADMIN — CLOPIDOGREL 75 MG: 75 TABLET, FILM COATED ORAL at 10:15

## 2022-04-24 RX ADMIN — AMIODARONE HYDROCHLORIDE 100 MG: 200 TABLET ORAL at 10:14

## 2022-04-24 RX ADMIN — BUDESONIDE AND FORMOTEROL FUMARATE DIHYDRATE 2 PUFF: 80; 4.5 AEROSOL RESPIRATORY (INHALATION) at 10:15

## 2022-04-24 RX ADMIN — Medication 81 MG: at 10:15

## 2022-04-24 RX ADMIN — METOPROLOL TARTRATE 25 MG: 25 TABLET ORAL at 10:15

## 2022-04-24 RX ADMIN — AMLODIPINE BESYLATE 10 MG: 10 TABLET ORAL at 10:15

## 2022-04-24 RX ADMIN — AMIODARONE HYDROCHLORIDE 100 MG: 200 TABLET ORAL at 20:21

## 2022-04-24 RX ADMIN — FENTANYL CITRATE 25 MCG: 50 INJECTION, SOLUTION INTRAMUSCULAR; INTRAVENOUS at 23:58

## 2022-04-24 RX ADMIN — PANTOPRAZOLE SODIUM 40 MG: 40 TABLET, DELAYED RELEASE ORAL at 06:15

## 2022-04-24 RX ADMIN — OXYCODONE HYDROCHLORIDE 5 MG: 5 TABLET ORAL at 18:48

## 2022-04-24 RX ADMIN — METOPROLOL TARTRATE 25 MG: 25 TABLET ORAL at 20:21

## 2022-04-24 RX ADMIN — FUROSEMIDE 40 MG: 40 TABLET ORAL at 18:48

## 2022-04-24 RX ADMIN — FUROSEMIDE 40 MG: 40 TABLET ORAL at 10:15

## 2022-04-24 RX ADMIN — ATORVASTATIN CALCIUM 20 MG: 20 TABLET, FILM COATED ORAL at 20:21

## 2022-04-24 RX ADMIN — PANTOPRAZOLE SODIUM 40 MG: 40 TABLET, DELAYED RELEASE ORAL at 17:18

## 2022-04-24 ASSESSMENT — PAIN SCALES - GENERAL
PAINLEVEL_OUTOF10: 6
PAINLEVEL_OUTOF10: 0
PAINLEVEL_OUTOF10: 9

## 2022-04-24 ASSESSMENT — PAIN DESCRIPTION - LOCATION: LOCATION: BACK

## 2022-04-24 NOTE — PROGRESS NOTES
[facility]  Office: [address], DO, Sunita Carrasco, DO, Griselda Ceballos, DO, Guillaumeleonel Klein Blood, DO, Parish Pike MD, Telma Suazo MD, Queen Nate MD, Juan Devine MD, Lluvia Cash MD, Екатерина Drummond MD, Mumtaz Palm MD, Lorena Jane, DO, Willis Rodney, DO, Ney Perkins MD,  Marlon Mcclelland DO, Tessie Levy MD, Khadijah Vargas MD, Catalino Bower MD, Glendy Corrales DO, Crystal Pittman MD, Malaika Oviedo MD, Hank Dobbs, [facility], [facility], CNP, Lisa Samaniego, CNP, Shar Chatterjee, CNP, Edilson Arana, CNP, Abdiel Peck, CNP, Maryam Olson PAKatarinaC, Fanny Mcfarlane, [facility], Maureen Taveras, [facility], Amari Sandoval, CNP, Jose Munson, CNP, [place], CNS, Gale Lara, [facility], Radha Whitmore, CNP, Aiden Hyde, CNP, Wil Burt, [facility]    Progress Note    4/24/2022    8:13 AM    Name:   Yovany Vargas  MRN:     [id number]     Acct:      [de-identified]   Room:   [facility] Day:  4  Admit Date:  4/20/2022 12:07 AM    PCP:   No primary care provider on file. Code Status:  Full Code    Subjective:     C/C: No chief complaint on file. Interval History Status: improved. Pt was seen and examined this morning  No acute events overnight  No new complaints     Review of Systems:     12 point ROS performed and negative for anything other than what was stated in subjective     Medications: Allergies:     Allergies   Allergen Reactions    Lipitor [Atorvastatin] Other (See Comments)     Muscle cramping in legs    Prozac [Fluoxetine] Other (See Comments)     Mood swings    Zoloft [Sertraline Hcl] Other (See Comments)     Anger and mood swings        Current Meds:   Scheduled Meds:    amiodarone  100 mg Oral BID    amLODIPine  10 mg Oral Daily    aspirin  81 mg Oral Daily    clopidogrel  75 mg Oral Daily    atorvastatin  20 mg Oral Nightly    metoprolol tartrate  25 mg Oral BID    budesonide-formoterol  2 puff Inhalation BID    pantoprazole  40 mg Oral BID AC    sodium chloride flush  5-40 mL IntraVENous 2 times per day    enoxaparin  40 mg SubCUTAneous Daily    furosemide  40 mg Oral BID     Continuous Infusions:    sodium chloride      dextrose       PRN Meds: calcium carbonate, sodium chloride flush, sodium chloride, ondansetron **OR** ondansetron, polyethylene glycol, acetaminophen **OR** acetaminophen, glucose, dextrose, glucagon (rDNA), dextrose, fentanNYL, oxyCODONE, albuterol sulfate HFA, busPIRone    Data:     Past Medical History:   has a past medical history of Anemia, Asthma, CAD (coronary artery disease), Depression, High cholesterol, Hypertension, MI, old, Osteoarthritis, Pneumonia, Sleep apnea, and Stroke ([facility] [address] Social History:   reports that she has been smoking cigarettes. She has a 12.50 pack-year smoking history. She has never used smokeless tobacco. She reports previous alcohol use. She reports current drug use. Drug: Marijuana Melinda Dasen). Family History:   Family History   Problem Relation Age of Onset    High Blood Pressure Mother        Vitals:  /74   Pulse 87   Temp 97.2 °F (36.2 °C) (Temporal)   Resp 18   Ht 5' 1\" (1.549 m)   Wt 156 lb (70.8 kg)   SpO2 95%   BMI 29.48 kg/m²   Temp (24hrs), Av.7 °F (36.5 °C), Min:97.2 °F (36.2 °C), Max:98.2 °F (36.8 °C)    No results for input(s): POCGLU in the last 72 hours. I/O (24Hr):   No intake or output data in the 24 hours ending 22 0813    Labs:  Hematology:  Recent Labs     22  0600   WBC 13.7* 12.5*   RBC 3.50* 3.87*   HGB 10.4* 11.3*   HCT 32.1* 35.9*   MCV 91.7 92.8   MCH 29.7 29.2   MCHC 32.4 31.5   RDW 15.4* 15.3*   * 755*   MPV 8.5 8.6     Chemistry:  Recent Labs     22/22  0600    134*   K 3.4* 3.3*    99   CO2 23 25   GLUCOSE 118* 98   BUN 11 15   CREATININE 0.70 0.82   MG 1.9 2.0   ANIONGAP 12 10   LABGLOM >60 >60   GFRAA >60 >60   CALCIUM 8.7 8.9   No results for input(s): PROT, LABALBU, LABA1C, R4LDVKV, M9SGOSY, FT4, TSH, AST, ALT, LDH, GGT, ALKPHOS, LABGGT, BILITOT, BILIDIR, AMMONIA, AMYLASE, LIPASE, LACTATE, CHOL, HDL, LDLCHOLESTEROL, CHOLHDLRATIO, TRIG, VLDL, ABV84PB, PHENYTOIN, PHENYF, URICACID, POCGLU in the last 72 hours. ABG:  Lab Results   Component Value Date    POCPH 7.484 04/13/2022    POCPCO2 30.6 04/13/2022    POCPO2 77.1 04/13/2022    POCHCO3 23.0 04/13/2022    NBEA 4 04/11/2022    PBEA 0 04/13/2022    WMSW3KNG 96 04/13/2022    FIO2 60.0 04/12/2022     Lab Results   Component Value Date/Time    SPECIAL R AC 1ML 04/10/2022 02:49 AM     Lab Results   Component Value Date/Time    CULTURE NO GROWTH 3 DAYS 04/21/2022 03:40 PM       Radiology:  XR CHEST PORTABLE    Result Date: 4/21/2022  Interval decrease in size of a left pleural effusion. Bibasilar airspace disease likely representing atelectasis. XR CHEST PORTABLE    Result Date: 4/21/2022  There is a moderate-sized left pleural effusion which is mildly increased. Trace right effusion. Bibasilar airspace disease likely representing atelectasis. XR CHEST PORTABLE    Result Date: 4/20/2022  Bibasilar opacities most confluent left lung base suggestive of effusions and atelectasis     US THORACENTESIS Which side should the procedure be performed? Left    Result Date: 4/21/2022  Successful ultrasound guided thoracentesis.        Physical Examination:        General appearance:  alert, cooperative and no distress  Mental Status:  oriented to person, place and time and normal affect  Lungs:  clear to auscultation bilaterally, normal effort  Heart:  regular rate and rhythm, no murmur  Abdomen:  soft, nontender, nondistended, normal bowel sounds   Extremities:  no edema, redness, tenderness in the calves  Skin:  no gross lesions, rashes, induration    Assessment:        Hospital Problems           Last Modified POA    * (Principal) Pleural effusion 4/20/2022 Yes    Recurrent pleural effusion 4/20/2022 Yes          Plan:        1. Post CABG recurrent pleural effusion  - cardiology on board - cleared pt for d/c  - CTS on board - cleared pt for dc  - pulmonology on board - cleared pt for d/c  - continue PO lasix   - monitor Is/Os   - echo done, normal LVEF  - repeat chest xray showed moderate effusion to the left thorax  - IR consult for thoracentesis, had 450 ml removed on 4/21     2. CAD   - cardiology on board   - telemetry   - stable at this time      3.  HTN  - v/s per unit protocol   - continue current anti hypertensive regimen      4. Anemia  - Hemoglobin 11.3 yday, remains stable     5. Hyperlipidemia  - Resume high intensity statin     6.  Anxiety/Depression   - On buparone 15mg tiD     7. DISPO  - pending pre cert for SNF placement  - PT/OT    Aime Anna MD  4/24/2022  8:13 AM

## 2022-04-24 NOTE — CARE COORDINATION
Transitional planning.   NEED OT NOTES- Accepted at [facility]- waiting for pre-cert

## 2022-04-24 NOTE — PROGRESS NOTES
Physical Therapy        Physical Therapy Cancel Note      DATE: 2022    NAME: Graeme Guillermo  MRN: [id number]   : 1965      Patient not seen this date for Physical Therapy due to:    Patient Declined: Stating she just wants to rest today.       Electronically signed by Yung Cornejo PTA on 2022 at 11:55 AM

## 2022-04-24 NOTE — PLAN OF CARE
Problem: Falls - Risk of:  Goal: Will remain free from falls  Description: Will remain free from falls  Outcome: Progressing  Goal: Absence of physical injury  Description: Absence of physical injury  Outcome: Progressing     Problem: Pain:  Goal: Pain level will decrease  Description: Pain level will decrease  Outcome: Progressing  Goal: Control of acute pain  Description: Control of acute pain  Outcome: Progressing  Goal: Control of chronic pain  Description: Control of chronic pain  Outcome: Progressing     Problem: Discharge Planning  Goal: Discharge to home or other facility with appropriate resources  Outcome: Progressing     Problem: Chronic Conditions and Co-morbidities  Goal: Patient's chronic conditions and co-morbidity symptoms are monitored and maintained or improved  Outcome: Progressing     Problem: Pain  Goal: Verbalizes/displays adequate comfort level or baseline comfort level  Outcome: Progressing     Problem: ABCDS Injury Assessment  Goal: Absence of physical injury  Outcome: Progressing

## 2022-04-24 NOTE — PROGRESS NOTES
Occupational Therapy  Facility/Department: [facility] RENAL//MED SURG  Occupational Therapy Daily Treatment Note    Name: Brittnee Melendez  : 1965  MRN: [id number]  Date of Service: 2022    Discharge Recommendations:  Patient would benefit from continued therapy after discharge to increase endurance, balance, safety awareness and independence. Assessment   Performance deficits / Impairments: Decreased functional mobility ; Decreased ADL status; Decreased safe awareness;Decreased high-level IADLs;Decreased balance;Decreased endurance  Prognosis: Good  REQUIRES OT FOLLOW-UP: Yes  Activity Tolerance  Activity Tolerance: Patient Tolerated treatment well;Patient limited by fatigue  Activity Tolerance: increased impulsivity        Plan   Plan  Times per Week: 3-4x/wk  Current Treatment Recommendations: Self-Care / ADL,Functional mobility training,Safety education & training,Patient/Caregiver education & training,Equipment evaluation, education, & procurement,Strengthening,Balance training,Endurance training     Restrictions  Restrictions/Precautions  Restrictions/Precautions: Surgical protocol  Required Braces or Orthoses?: No  Position Activity Restriction  Other position/activity restrictions: Up with assist; CABG x3 on 4/10/22 for CP 2/2 NSTEMI    Subjective   General  Chart Reviewed: Yes  Patient assessed for rehabilitation services?: Yes  Family / Caregiver Present: No  General Comment  Comments: Pt and RN agreeable for therapy this day. Pt supine in bed start of session, retired supine in bed end of session with call light in reach and nurse notified and Jeet Enamor[address] with no bed alarm on.   Pre Treatment Pain Screening  Comments / Details: Pt did not c/o of pain       Objective              Safety Devices  Type of Devices: Nurse notified;Call light within reach;Gait belt;Left in bed (Nurse Jeet Enamor[address] Dr with no bed alarm on)  Restraints  Restraints Initially in Place: No  Balance  Sitting Balance: Stand by assistance (EOB approx 3 min and unsupported on shower chair for approx 25-30min)  Standing Balance: Contact guard assistance  Standing Balance  Time: approx 7-8 min  Activity: static/dynamic for ADLs, mobility  Comment: without AD, utilzing grab bars in shower  Functional Mobility  Functional - Mobility Device: No device (Declined any AD)  Activity: To/from bathroom  Assist Level: Contact guard assistance  Functional Mobility Comments: impulsive  Toilet Transfers  Toilet - Technique: Ambulating  Equipment Used: Standard toilet (grab bars)  Toilet Transfer: Minimal assistance  Toilet Transfers Comments: Assit for sternal precautions adherence  Shower Transfers  Shower - Transfer From: [place] - Transfer Type: To and From  Shower - Transfer To: Shower seat with back  Shower - Technique: Ambulating  Shower Transfers: Contact Guard  Shower Transfers Comments: v/c for utilizing grab bars     ADL  UE Bathing: Setup;Verbal cueing; Increased time to complete;Stand by assistance  LE Bathing: Verbal cueing; Increased time to complete;Setup;Contact guard assistance  UE Dressing: Setup;Minimal assistance; Increased time to complete (don/doff gown, don Sx bra)  LE Dressing: Contact guard assistance;Setup; Increased time to complete (don/doff socks, don underwear)  Toileting: Increased time to complete;Stand by assistance  Additional Comments: Pt Ok to shower pre Cardiology. Pt educated on Sx Soap, and sternal precautions at the start of the session. Toileting completed at SBA, Pt completed chava hyigene seated on toilet. UE bathing completed seated on shower chair with SBA and v/c for maintaning sternal percautions. LE bathing completed seated/standing, utilized figure 4 technique to reach BLE, CGA when standing for chava hygiene utilizing grab bars with v/c for sternal precautions. CGA when doffing gown standing, Min A for donning Sx bra. Pt donned heart hugger start of session, refusing to don heart hugger after shower.  Don/doffed socks seated on shower chair utilizing figure 4 technique, CGA when standing to pull underwear up. Throughout session Pt was impulsive and req Max v/c and encourgment to maintain Sx precautions. Pt was limited throughtout session d/t fatigue.         Bed mobility  Supine to Sit: Modified independent  Sit to Supine: Modified independent  Scooting: Modified independent  Comment: HOB elevated, utilized grab bars  Transfers  Sit to stand: Minimal assistance  Stand to sit: Minimal assistance  Transfer Comments: Max v/c for sternal precautions and using heart pillow, Pt displayed P carry-over     Cognition  Overall Cognitive Status: WFL                  Education Given To: Patient  Education Provided: Role of Therapy;Precautions;Transfer Training  Education Provided Comments: Pt educated on OT role, Sx Soap, Sternal precautions, heart hugger, Proper hand placement, Pt displayed P understanding with P carry-over                                                                     AM-PAC Score        AM-[facility] Inpatient Daily Activity Raw Score: 19 (04/24/22 1535)  AM-PAC Inpatient ADL T-Scale Score : 40.22 (04/24/22 1535)  ADL Inpatient CMS 0-100% Score: 42.8 (04/24/22 1535)  ADL Inpatient CMS G-Code Modifier : CK (04/24/22 1535)    Goals  Short Term Goals  Time Frame for Short term goals: Patient will, by discharge  Short Term Goal 1: demo UB ADLs independently  Short Term Goal 2: demo LB ADLs at SBA using AE PRN  Short Term Goal 3: demo adherence to sternal precautions during functional tasks with 0 VCs  Short Term Goal 4: demo functional transfers/mobility using LRD at Supervision  Short Term Goal 5: demo 25+ min of functional activity tolerance using EC/WS techs PRN  Short Term Goal 6: demo 10+ min of dynamic standing tolerance at Supervision, reaching outside MENDEZ in all planes, to engage in ADL/IADLs       Therapy Time   Individual Concurrent Group Co-treatment   Time In 1355         Time Out 1457         Minutes 62 Timed Code Treatment Minutes: Bob 66, S/MINNIE  Supervised by Tye DAMON/JILL

## 2022-04-25 ENCOUNTER — APPOINTMENT (OUTPATIENT)
Dept: GENERAL RADIOLOGY | Age: 57
DRG: 186 | End: 2022-04-25
Attending: INTERNAL MEDICINE
Payer: MEDICARE

## 2022-04-25 ENCOUNTER — APPOINTMENT (OUTPATIENT)
Dept: ULTRASOUND IMAGING | Age: 57
DRG: 186 | End: 2022-04-25
Attending: INTERNAL MEDICINE
Payer: MEDICARE

## 2022-04-25 LAB
ABSOLUTE EOS #: 0.34 K/UL (ref 0–0.44)
ABSOLUTE IMMATURE GRANULOCYTE: 0.06 K/UL (ref 0–0.3)
ABSOLUTE LYMPH #: 2.07 K/UL (ref 1.1–3.7)
ABSOLUTE MONO #: 1.34 K/UL (ref 0.1–1.2)
ALBUMIN SERPL-MCNC: 4.1 G/DL (ref 3.5–5.2)
ALBUMIN/GLOBULIN RATIO: 1.2 (ref 1–2.5)
ALP BLD-CCNC: 83 U/L (ref 35–104)
ALT SERPL-CCNC: 41 U/L (ref 5–33)
ANION GAP SERPL CALCULATED.3IONS-SCNC: 11 MMOL/L (ref 9–17)
AST SERPL-CCNC: 29 U/L
BASOPHILS # BLD: 1 % (ref 0–2)
BASOPHILS ABSOLUTE: 0.08 K/UL (ref 0–0.2)
BILIRUB SERPL-MCNC: 1.13 MG/DL (ref 0.3–1.2)
BUN BLDV-MCNC: 16 MG/DL (ref 6–20)
CALCIUM SERPL-MCNC: 9.2 MG/DL (ref 8.6–10.4)
CHLORIDE BLD-SCNC: 97 MMOL/L (ref 98–107)
CO2: 25 MMOL/L (ref 20–31)
CREAT SERPL-MCNC: 0.8 MG/DL (ref 0.5–0.9)
EOSINOPHILS RELATIVE PERCENT: 3 % (ref 1–4)
GFR AFRICAN AMERICAN: >60 ML/MIN
GFR NON-AFRICAN AMERICAN: >60 ML/MIN
GFR SERPL CREATININE-BSD FRML MDRD: ABNORMAL ML/MIN/{1.73_M2}
GLUCOSE BLD-MCNC: 117 MG/DL (ref 70–99)
HCT VFR BLD CALC: 35.3 % (ref 36.3–47.1)
HEMOGLOBIN: 11.5 G/DL (ref 11.9–15.1)
IMMATURE GRANULOCYTES: 1 %
LYMPHOCYTES # BLD: 16 % (ref 24–43)
MCH RBC QN AUTO: 29.4 PG (ref 25.2–33.5)
MCHC RBC AUTO-ENTMCNC: 32.6 G/DL (ref 28.4–34.8)
MCV RBC AUTO: 90.3 FL (ref 82.6–102.9)
MONOCYTES # BLD: 11 % (ref 3–12)
NRBC AUTOMATED: 0 PER 100 WBC
PDW BLD-RTO: 15 % (ref 11.8–14.4)
PLATELET # BLD: 758 K/UL (ref 138–453)
PMV BLD AUTO: 8.6 FL (ref 8.1–13.5)
POTASSIUM SERPL-SCNC: 3.5 MMOL/L (ref 3.7–5.3)
RBC # BLD: 3.91 M/UL (ref 3.95–5.11)
RBC # BLD: ABNORMAL 10*6/UL
SEG NEUTROPHILS: 68 % (ref 36–65)
SEGMENTED NEUTROPHILS ABSOLUTE COUNT: 8.91 K/UL (ref 1.5–8.1)
SODIUM BLD-SCNC: 133 MMOL/L (ref 135–144)
SURGICAL PATHOLOGY REPORT: NORMAL
TOTAL PROTEIN: 7.5 G/DL (ref 6.4–8.3)
WBC # BLD: 12.8 K/UL (ref 3.5–11.3)

## 2022-04-25 PROCEDURE — 6370000000 HC RX 637 (ALT 250 FOR IP): Performed by: FAMILY MEDICINE

## 2022-04-25 PROCEDURE — 97110 THERAPEUTIC EXERCISES: CPT

## 2022-04-25 PROCEDURE — 36415 COLL VENOUS BLD VENIPUNCTURE: CPT

## 2022-04-25 PROCEDURE — 97116 GAIT TRAINING THERAPY: CPT

## 2022-04-25 PROCEDURE — 85025 COMPLETE CBC W/AUTO DIFF WBC: CPT

## 2022-04-25 PROCEDURE — 2709999900 US THORACENTESIS

## 2022-04-25 PROCEDURE — 80053 COMPREHEN METABOLIC PANEL: CPT

## 2022-04-25 PROCEDURE — 6370000000 HC RX 637 (ALT 250 FOR IP): Performed by: INTERNAL MEDICINE

## 2022-04-25 PROCEDURE — 1200000000 HC SEMI PRIVATE

## 2022-04-25 PROCEDURE — 6370000000 HC RX 637 (ALT 250 FOR IP): Performed by: NURSE PRACTITIONER

## 2022-04-25 PROCEDURE — 94640 AIRWAY INHALATION TREATMENT: CPT

## 2022-04-25 PROCEDURE — 0W9B3ZZ DRAINAGE OF LEFT PLEURAL CAVITY, PERCUTANEOUS APPROACH: ICD-10-PCS | Performed by: PHYSICIAN ASSISTANT

## 2022-04-25 PROCEDURE — 99232 SBSQ HOSP IP/OBS MODERATE 35: CPT | Performed by: FAMILY MEDICINE

## 2022-04-25 PROCEDURE — 73030 X-RAY EXAM OF SHOULDER: CPT

## 2022-04-25 PROCEDURE — 99233 SBSQ HOSP IP/OBS HIGH 50: CPT | Performed by: INTERNAL MEDICINE

## 2022-04-25 RX ORDER — MENTHOL AND METHYL SALICYLATE 10; 30 G/100G; G/100G
CREAM TOPICAL 3 TIMES DAILY PRN
Status: DISCONTINUED | OUTPATIENT
Start: 2022-04-25 | End: 2022-04-27 | Stop reason: HOSPADM

## 2022-04-25 RX ORDER — POTASSIUM CHLORIDE 20 MEQ/1
40 TABLET, EXTENDED RELEASE ORAL ONCE
Status: COMPLETED | OUTPATIENT
Start: 2022-04-25 | End: 2022-04-25

## 2022-04-25 RX ORDER — AMIODARONE HYDROCHLORIDE 200 MG/1
100 TABLET ORAL 2 TIMES DAILY
Qty: 90 TABLET | Refills: 0 | Status: SHIPPED
Start: 2022-04-25

## 2022-04-25 RX ORDER — CYCLOBENZAPRINE HCL 5 MG
5 TABLET ORAL 3 TIMES DAILY PRN
Status: DISCONTINUED | OUTPATIENT
Start: 2022-04-25 | End: 2022-04-27 | Stop reason: HOSPADM

## 2022-04-25 RX ORDER — FUROSEMIDE 40 MG/1
40 TABLET ORAL DAILY
Status: DISCONTINUED | OUTPATIENT
Start: 2022-04-25 | End: 2022-04-27 | Stop reason: HOSPADM

## 2022-04-25 RX ADMIN — POTASSIUM CHLORIDE 40 MEQ: 20 TABLET, EXTENDED RELEASE ORAL at 15:01

## 2022-04-25 RX ADMIN — CYCLOBENZAPRINE HYDROCHLORIDE 5 MG: 5 TABLET, FILM COATED ORAL at 18:03

## 2022-04-25 RX ADMIN — AMLODIPINE BESYLATE 10 MG: 10 TABLET ORAL at 10:11

## 2022-04-25 RX ADMIN — ACETAMINOPHEN 650 MG: 325 TABLET ORAL at 15:01

## 2022-04-25 RX ADMIN — BUDESONIDE AND FORMOTEROL FUMARATE DIHYDRATE 2 PUFF: 80; 4.5 AEROSOL RESPIRATORY (INHALATION) at 08:29

## 2022-04-25 RX ADMIN — PANTOPRAZOLE SODIUM 40 MG: 40 TABLET, DELAYED RELEASE ORAL at 18:03

## 2022-04-25 RX ADMIN — PANTOPRAZOLE SODIUM 40 MG: 40 TABLET, DELAYED RELEASE ORAL at 05:11

## 2022-04-25 RX ADMIN — OXYCODONE HYDROCHLORIDE 5 MG: 5 TABLET ORAL at 02:27

## 2022-04-25 RX ADMIN — ACETAMINOPHEN 650 MG: 325 TABLET ORAL at 23:32

## 2022-04-25 RX ADMIN — Medication: at 10:11

## 2022-04-25 RX ADMIN — Medication: at 23:33

## 2022-04-25 RX ADMIN — BUDESONIDE AND FORMOTEROL FUMARATE DIHYDRATE 2 PUFF: 80; 4.5 AEROSOL RESPIRATORY (INHALATION) at 19:41

## 2022-04-25 RX ADMIN — ATORVASTATIN CALCIUM 20 MG: 20 TABLET, FILM COATED ORAL at 19:52

## 2022-04-25 RX ADMIN — METOPROLOL TARTRATE 25 MG: 25 TABLET ORAL at 10:10

## 2022-04-25 RX ADMIN — FUROSEMIDE 40 MG: 40 TABLET ORAL at 10:12

## 2022-04-25 RX ADMIN — AMIODARONE HYDROCHLORIDE 100 MG: 200 TABLET ORAL at 10:11

## 2022-04-25 RX ADMIN — OXYCODONE HYDROCHLORIDE 5 MG: 5 TABLET ORAL at 08:50

## 2022-04-25 RX ADMIN — METOPROLOL TARTRATE 25 MG: 25 TABLET ORAL at 19:52

## 2022-04-25 RX ADMIN — AMIODARONE HYDROCHLORIDE 100 MG: 200 TABLET ORAL at 19:52

## 2022-04-25 RX ADMIN — CYCLOBENZAPRINE HYDROCHLORIDE 5 MG: 5 TABLET, FILM COATED ORAL at 10:11

## 2022-04-25 RX ADMIN — Medication: at 18:03

## 2022-04-25 ASSESSMENT — PAIN SCALES - GENERAL
PAINLEVEL_OUTOF10: 6
PAINLEVEL_OUTOF10: 6
PAINLEVEL_OUTOF10: 3

## 2022-04-25 ASSESSMENT — PAIN SCALES - WONG BAKER: WONGBAKER_NUMERICALRESPONSE: 4

## 2022-04-25 NOTE — DISCHARGE INSTR - COC
Continuity of Care Form    Patient Name: Amparo Henderson   :  1965  MRN:  [id number]    Admit date:  2022  Discharge date:  2022    Code Status Order: Full Code   Advance Directives:      Admitting Physician:  No admitting provider for patient encounter. PCP: No primary care provider on file. Discharging Nurse: [facility] Unit/Room#: 0330/0330-01  Discharging Unit Phone Number: [phone]    Emergency Contact:   Extended Emergency Contact Information  Primary Emergency Contact: Indigo Torres  Address: [address]           [facility], [facility] Phone: [phone]  Relation: Parent  Secondary Emergency Contact: Milo Ruiz [address] Phone: [phone]  Relation: Brother/Sister    Past Surgical History:  Past Surgical History:   Procedure Laterality Date    APPENDECTOMY      CORONARY ANGIOPLASTY WITH STENT PLACEMENT      CORONARY ARTERY BYPASS GRAFT N/A 2022    CABG X 3 / EVH LEFT LEG / KIT performed by Ezequiel Painting MD at [address] [place] [address]         Immunization History:   Immunization History   Administered Date(s) Administered    COVID-19, Pfizer Purple top, DILUTE for use, 12+ yrs, 30mcg/0.3mL dose 2021    Pneumococcal Polysaccharide (Orafhekzr88) 2017    Tdap (Boostrix, Adacel) 2014       Active Problems:  Patient Active Problem List   Diagnosis Code    Stroke ([facility] I63.9    HTN (hypertension) I10    CAD (coronary artery disease) with multiple stents I25.10    Asthma J45.909    Depression F32. A    Smoker F17.200    Dementia ([facility] F03.90    Anxiety F41.9    Dyslipidemia E78.5    Essential hypertension I10    Neck pain M54.2    Memory problem R41.3    History of stroke Z86.73    DDD (degenerative disc disease), cervical M50.30    Grade III hemorrhoids K64.2    Mixed hyperlipidemia E78.2    Current mild episode of major depressive disorder without prior episode ([facility]) F32.0    Allergic sinusitis J30.9    Refusal of statin medication by patient Z53.29    Prediabetes R73.03    Sinus bradycardia R00.1    Thyroid nodule E04.1    NSTEMI (non-ST elevated myocardial infarction) ([facility]) I21.4    Multiple vessel coronary artery disease s/p CABG  I25.10    Pleural effusion J90    Recurrent pleural effusion J90       Isolation/Infection:   Isolation            No Isolation          Patient Infection Status       Infection Onset Added Last Indicated Last Indicated By Review Planned Expiration Resolved Resolved By    None active    Resolved    COVID-19 (Rule Out) 02/17/21 02/17/21 02/17/21 COVID-19 (Ordered)   02/18/21 Rule-Out Test Resulted            Nurse Assessment:  Last Vital Signs: /81   Pulse 92   Temp 97.9 °F (36.6 °C) (Oral)   Resp 18   Ht 5' 1\" (1.549 m)   Wt 156 lb (70.8 kg)   SpO2 96%   BMI 29.48 kg/m²     Last documented pain score (0-10 scale): Pain Level: 6  Last Weight:   Wt Readings from Last 1 Encounters:   04/23/22 156 lb (70.8 kg)     Mental Status:  oriented and alert    IV Access:  - None    Nursing Mobility/ADLs:  Walking   Independent  Transfer  Independent  Bathing  Independent  Dressing  Independent  Toileting  Independent  Feeding  Independent  Med Admin  Independent  Med Delivery   whole    Wound Care Documentation and Therapy:  Incision 04/08/22 Pretibial Left;Proximal;Medial (Active)   Dressing Status Clean;Dry; Intact 04/24/22 0953   Dressing Change Due 04/11/22 04/11/22 0000   Incision Cleansed Not Cleansed 04/24/22 0953   Dressing/Treatment Open to air 04/24/22 0953   Closure Surgical glue 04/24/22 0953   Margins Approximated 04/24/22 0953   Incision Assessment Dry 04/24/22 0953   Drainage Amount None 04/24/22 0953   Odor None 04/24/22 0953   Keila-incision Assessment Intact 04/24/22 0953   Number of days: 16       Incision 04/08/22 Sternum Mid (Active)   Dressing Status Clean;Dry; Intact 04/24/22 2020   Dressing Change Due 04/13/22 04/14/22 0800   Incision Cleansed Not Cleansed 04/24/22 0953   Dressing/Treatment Dry dressing 04/24/22 0953   Closure Other (Comment) 04/24/22 0953   Margins Other (Comment) 04/24/22 0953   Incision Assessment Other (Comment) 04/24/22 0953   Drainage Amount None 04/24/22 0953   Odor None 04/24/22 0953   Keila-incision Assessment Other (Comment) 04/24/22 0953   Number of days: 16        Elimination:  Continence: Bowel: Yes  Bladder: Yes  Urinary Catheter: None   Colostomy/Ileostomy/Ileal Conduit: No       Date of Last BM: ***  No intake or output data in the 24 hours ending 04/25/22 0723  No intake/output data recorded. Safety Concerns: At Risk for Falls    Impairments/Disabilities:      None    Nutrition Therapy:  Current Nutrition Therapy:   - Oral Diet:  General    Routes of Feeding: Oral  Liquids: No Restrictions  Daily Fluid Restriction: no  Last Modified Barium Swallow with Video (Video Swallowing Test): not done    Treatments at the Time of Hospital Discharge:   Respiratory Treatments: ***  Oxygen Therapy:  is not on home oxygen therapy.   Ventilator:    - No ventilator support    Rehab Therapies: Physical Therapy and Occupational Therapy  Weight Bearing Status/Restrictions: No weight bearing restrictions  Other Medical Equipment (for information only, NOT a DME order):  walker  Other Treatments: ***    Patient's personal belongings (please select all that are sent with patient):  None    RN SIGNATURE:  Electronically signed by Marcia Arriaga RN on 4/27/22 at 7:40 AM EDT    CASE MANAGEMENT/SOCIAL WORK SECTION    Inpatient Status Date: ***    Readmission Risk Assessment Score:  Readmission Risk              Risk of Unplanned Readmission:  22           Discharging to Facility/ Agency   Name: [facility]  Address:  [address] [place] [address]         Phone: [phone]       Fax: [phone]        Phone:  Fax:    Dialysis Facility (if applicable)   Name:  Address:  Dialysis Schedule:  Phone:  Fax:    / signature: Electronically signed by Sherry Andrew RN on 4/25/22 at 2:21 PM EDT    PHYSICIAN SECTION    Prognosis: Fair    Condition at Discharge: Stable    Rehab Potential (if transferring to Rehab): Fair    Recommended Labs or Other Treatments After Discharge:      Physician Certification: I certify the above information and transfer of Yanni Chen  is necessary for the continuing treatment of the diagnosis listed and that she requires [facility] for greater 30 days.      Update Admission H&P: No change in H&P    PHYSICIAN SIGNATURE:  Electronically signed by Jennifer Small MD on 4/25/22 at 7:23 AM EDT

## 2022-04-25 NOTE — PROGRESS NOTES
PULMONARY & CRITICAL CARE MEDICINE CONSULT NOTE     Patient:  Jannette Tyler  MRN: [id number]  Admit date: 4/20/2022  Primary Care Physician: No primary care provider on file. Consulting Physician: Hillary Puri MD  CODE Status: Full Code  LOS: 5     SUBJECTIVE     CHIEF COMPLAINT/REASON FOR CONSULT: Left pleural effusion    HISTORY OF PRESENT ILLNESS:  The patient is a 61 y.o. female patient was recently hospitalized with chest pain. She was found to have multivessel coronary artery disease and required CABG x3 (4/8/2022). Postop course was complicated with development of bleeding from chest tube, anemia and worsening respiratory failure. She required reintubation and bronchoscopic clearance of mucous plugging and had left lung atelectasis/effusion. She was discharged from the hospital on 4/16. She was readmitted after she was found to have in bathroom after a coughing spell. Initially admitted to [facility] but transferred here for further management. Chest x-ray shows left-sided effusion/atelectasis. Patient currently sitting in the bed comfortably. She is on room air and is hemodynamically stable. She has been given IV Lasix and is annoyed by need to go to the bathroom repeatedly. She denies cough, sputum, wheezing, pleuritic chest pain.   4/25/2022   Subjective      No acute events   On room air  Patient having shoulder pain  Being evaluated by primary service  No increasing shortness of breath or wheezing  No hemoptysis  No orthopnea, PND or increasing pedal edema    PAST MEDICAL HISTORY:        Diagnosis Date    Anemia     Asthma     CAD (coronary artery disease)     Depression 10/20/2014    High cholesterol     Hypertension     MI, old     Osteoarthritis     Pneumonia     Sleep apnea     Stroke ([facility] [address] 9/27/2014     PAST SURGICAL HISTORY:        Procedure Laterality Date    APPENDECTOMY      CORONARY ANGIOPLASTY WITH STENT PLACEMENT  2004    CORONARY ARTERY BYPASS GRAFT N/A 4/8/2022    CABG X 3 / EVH LEFT LEG / KIT performed by Farshad Acevedo MD at [address] [place] [address] HISTORY:       Problem Relation Age of Onset    High Blood Pressure Mother      SOCIAL HISTORY:   TOBACCO:   reports that she has been smoking cigarettes. She has a 12.50 pack-year smoking history. She has never used smokeless tobacco.  ETOH:  reports previous alcohol use. DRUGS: reports current drug use. Drug: Marijuana Laura Baez). ALLERGIES:    Allergies   Allergen Reactions    Lipitor [Atorvastatin] Other (See Comments)     Muscle cramping in legs    Prozac [Fluoxetine] Other (See Comments)     Mood swings    Zoloft [Sertraline Hcl] Other (See Comments)     Anger and mood swings          HOME MEDICATIONS:  Prior to Admission medications    Medication Sig Start Date End Date Taking?  Authorizing Provider   amiodarone (CORDARONE) 200 MG tablet Take 0.5 tablets by mouth 2 times daily 4/25/22  Yes Marcella Hsieh MD   aspirin 81 MG EC tablet Take 1 tablet by mouth daily 4/17/22   Farshad Acevedo MD   calcium carbonate (TUMS) 500 MG chewable tablet Take 1 tablet by mouth 3 times daily as needed for Heartburn 4/16/22 5/16/22  Farshad Acevedo MD   metoprolol tartrate (LOPRESSOR) 25 MG tablet Take 1 tablet by mouth 2 times daily 4/16/22   Farshad Acevedo MD   potassium chloride (KLOR-CON M) 20 MEQ extended release tablet Take 2 tablets by mouth as needed (Potassium Replacement) 4/16/22   Farshad Acevedo MD   amLODIPine (NORVASC) 10 MG tablet Take 1 tablet by mouth daily 4/17/22   Farshad Acevedo MD   albuterol sulfate  (90 Base) MCG/ACT inhaler INHALE 2 PUFFS BY MOUTH EVERY 6 HOURS AS NEEDED FOR WHEEZING 11/15/21   Zo Sierra MD   omeprazole (PRILOSEC) 20 MG delayed release capsule TAKE 1 CAPSULE BY MOUTH EVERY DAY 11/15/21   Zo Sierra MD   clopidogrel (PLAVIX) 75 MG tablet TAKE 1 TABLET BY MOUTH DAILY 10/7/21   Zo Sierra MD   lovastatin (MEVACOR) 10 MG tablet TAKE 1 TABLET BY MOUTH EVERY NIGHT 9/1/21   Zo Sierra MD   Ginkgo Biloba (GNP GINGKO BILOBA EXTRACT PO) Take by mouth daily    Historical Provider, MD   ezetimibe (ZETIA) 10 MG tablet Take 1 tablet by mouth daily 8/31/21   Zo Sierra MD   ibuprofen (ADVIL;MOTRIN) 800 MG tablet take 1 tablet by mouth every 8 hours if needed for pain 3/30/21   Zo Sierra MD   mometasone-formoterol [facility]) 100-5 MCG/ACT inhaler Inhale 2 puffs into the lungs 2 times daily 3/24/21   Zo Sierra MD   busPIRone (BUSPAR) 15 MG tablet take 1 tablet by mouth three times a day  Patient taking differently: 3 times daily as needed take 1 tablet by mouth three times a day 3/24/21   Zo Sierra MD   Omega-3 Fatty Acids (OMEGA-3 EPA FISH OIL) 1205 MG CAPS Take 1 tablet by mouth daily 3/24/21   Zo Sierra MD   clopidogrel (PLAVIX) 75 MG tablet TAKE 1 TABLET BY MOUTH DAILY 3/24/21   Zo Sierra MD   cetirizine (ZYRTEC) 10 MG tablet take 1 tablet by mouth once daily 3/16/21   Zo Sierra MD   diclofenac sodium 1 % GEL Apply 2 g topically 2 times daily 5/8/19   Zo Sierra MD   hydrocortisone 1 % cream apply topically twice a day 1/15/18   Zo Sierra MD   Heat Wraps (SOFTHEAT HEATING WRAP ULTRA) MISC Use daily to for neck and shoulder pain 12/6/17   Zo Sierra MD   lidocaine (LMX) 4 % cream Apply 2g topically as needed.  7/26/17   Zo Sierra MD   Blood Pressure Monitor MISC Use daily to take BP 3/15/17   Zo Sierra MD     IMMUNIZATIONS:  Most Recent Immunizations   Administered Date(s) Administered    COVID-19, Pfizer Purple top, DILUTE for use, 12+ yrs, 30mcg/0.3mL dose 05/01/2021    Pneumococcal Polysaccharide ([id number]) 05/04/2017    Tdap (Boostrix, Adacel) 01/01/2014     REVIEW OF SYSTEMS:  Review of Systems -   General ROS: Completed and except as mentioned above were negative   Psychological ROS:  Completed and except as mentioned above were negative  Ophthalmic ROS:  Completed and except as mentioned above were negative  ENT ROS:  Completed and except as mentioned above were negative  Allergy and Immunology ROS:  Completed and except as mentioned above were negative  Hematological and Lymphatic ROS:  Completed and except as mentioned above were negative  Endocrine ROS: Completed and except as mentioned above were negative  Breast ROS:  Completed and except as mentioned above were negative  Respiratory ROS:  Completed and except as mentioned above were negative  Cardiovascular ROS:  Completed and except as mentioned above were negative  Gastrointestinal ROS: Completed and except as mentioned above were negative  Genito-Urinary ROS:  Completed and except as mentioned above were negative  Musculoskeletal ROS:  Completed and except as mentioned above were negative  Neurological ROS:  Completed and except as mentioned above were negative  Dermatological ROS:  Completed and except as mentioned above were negative      OBJECTIVE     PaO2/FiO2 RATIO:  No results for input(s): POCPO2 in the last 72 hours. VITAL SIGNS:   LAST:  /68   Pulse 88   Temp 97.8 °F (36.6 °C) (Oral)   Resp 18   Ht 5' 1\" (1.549 m)   Wt 156 lb (70.8 kg)   SpO2 96%   BMI 29.48 kg/m²   8-24 HR RANGE:  TEMP Temp  Av.9 °F (36.6 °C)  Min: 97.8 °F (36.6 °C)  Max: 97.9 °F (02.7 °C)   BP Systolic (52JIB), JWJ:862 , Min:110 , ETD:049      Diastolic (11ALT), FXZ:06, Min:65, Max:81     PULSE Pulse  Av.7  Min: 88  Max: 95   RR No data recorded   O2 SAT SpO2  Av.2 %  Min: 95 %  Max: 96 %   OXYGEN DELIVERY No data recorded        Physical Exam  Constitutional:       General: She is not in acute distress. HENT:      Head: Normocephalic and atraumatic. Mouth/Throat:      Mouth: Mucous membranes are moist.   Eyes:      Pupils: Pupils are equal, round, and reactive to light. Cardiovascular:      Rate and Rhythm: Normal rate and regular rhythm. Heart sounds: No murmur heard.   Pulmonary:      Effort: No accessory muscle usage or respiratory distress. Breath sounds:clear right , dec left post base and dull   AP diameter of chest increased. Thoracic expansion and diaphragmatic excursion diminished. BS diminished and expiratory phase prolonged. Abdominal:      General: There is no distension. Palpations: Abdomen is soft. There is no mass. Tenderness: There is no abdominal tenderness. Musculoskeletal:      Cervical back: Neck supple. Right lower leg: No edema. Left lower leg: No edema. Lymphadenopathy:      Cervical: No cervical adenopathy. Skin:     Coloration: Skin is not pale. Findings: No rash. Neurological:      General: No focal deficit present. Mental Status: She is oriented to person, place, and time. DATA REVIEW     Medications: Current Inpatient  Scheduled Meds:   furosemide  40 mg Oral Daily    potassium chloride  40 mEq Oral Once    amiodarone  100 mg Oral BID    amLODIPine  10 mg Oral Daily    aspirin  81 mg Oral Daily    clopidogrel  75 mg Oral Daily    atorvastatin  20 mg Oral Nightly    metoprolol tartrate  25 mg Oral BID    budesonide-formoterol  2 puff Inhalation BID    pantoprazole  40 mg Oral BID AC    sodium chloride flush  5-40 mL IntraVENous 2 times per day    enoxaparin  40 mg SubCUTAneous Daily     Continuous Infusions:   sodium chloride      dextrose         INPUT/OUTPUT:  No intake/output data recorded. LABS:  ABGs:   No results for input(s): POCPH, POCPCO2, POCPO2, POCHCO3, [id number] in the last 72 hours. CBC:   Recent Labs     04/23/22  0600 04/25/22  0801   WBC 12.5* 12.8*   HGB 11.3* 11.5*   HCT 35.9* 35.3*   MCV 92.8 90.3   * 758*   LYMPHOPCT 20* 16*   RBC 3.87* 3.91*   MCH 29.2 29.4   MCHC 31.5 32.6   RDW 15.3* 15.0*     CRP:   No results for input(s): CRP in the last 72 hours. LDH:   No results for input(s): LDH in the last 72 hours.   BMP:   Recent Labs     04/23/22  0600 04/25/22  0801   * 133*   K 3. 3* 3.5*   CL 99 97*   CO2 25 25   BUN 15 16   CREATININE 0.82 0.80   GLUCOSE 98 117*     Liver Function Test:   Recent Labs     04/25/22  0801   PROT 7.5   LABALBU 4.1   ALT 41*   AST 29   ALKPHOS 83   BILITOT 1.13     Coagulation Profile:   No results for input(s): INR, PROTIME, APTT in the last 72 hours. D-Dimer:  No results for input(s): DDIMER in the last 72 hours. Lactic Acid:  No results for input(s): LACTA in the last 72 hours. Cardiac Enzymes:  No results for input(s): CKTOTAL, CKMB, CKMBINDEX, TROPONINI in the last 72 hours. Invalid input(s): TROPONIN, HSTROP  BNP/ProBNP:   No results for input(s): BNP, PROBNP in the last 72 hours. Triglycerides:  No results for input(s): TRIG in the last 72 hours. Microbiology:  Urine Culture:  No components found for: CURINE  Blood Culture:  No components found for: CBLOOD, CFUNGUSBL  Sputum Culture:  No components found for: CSPUTUM  No results for input(s): SPECDESC, SPECIAL, CULTURE, STATUS, ORG, CDIFFTOXPCR, CAMPYLOBPCR, SALMONELLAPC, SHIGAPCR, SHIGELLAPCR, MPNEUG, MPNEUM, LACTOQL in the last 72 hours. No results for input(s): SPUTUM, SPECIAL, CULTURE, STATUS, ORG, CDIFFTOXPCR, MPNEUM, MPNEUG in the last 72 hours. Invalid input(s): Yulia Esquivel, CFUNGUSBL,  [facility]       Radiology Reports:  XR CHEST (2 VW)   Final Result   Left pleural effusion with left basilar opacity that could represent   atelectasis or pneumonia, not significantly changed         XR CHEST PORTABLE   Final Result   Interval decrease in size of a left pleural effusion. Bibasilar airspace disease likely representing atelectasis. US THORACENTESIS Which side should the procedure be performed? Left   Final Result   Successful ultrasound guided thoracentesis. XR CHEST PORTABLE   Final Result   There is a moderate-sized left pleural effusion which is mildly increased. Trace right effusion. Bibasilar airspace disease likely representing atelectasis. XR CHEST PORTABLE   Final Result   Bibasilar opacities most confluent left lung base suggestive of effusions and   atelectasis         US THORACENTESIS Which side should the procedure be performed? Left    (Results Pending)   XR SHOULDER RIGHT (MIN 2 VIEWS)    (Results Pending)        Echocardiogram:   Results for orders placed during the hospital encounter of 04/03/22    ECHO Complete 2D W Doppler W Color    Summary  Normal LV size , mildly increased wall thickness. No obvious wall motion abnormality seen. Normal LV systolic function with LVEF >55%. Normal RV size and function. LA and RA appears normal in size. No obvious significant structural valvular abnormality noted. No significant valvular stenosis or regurgitation noted. Normal aortic root dimension. No significant pericardial effusion noted. No obvious intra-cardiac mass or shunt noted. IVC normal diameter and inspiratory variation indicating normal RA filling  pressure. ASSESSMENT AND PLAN     Assessment:    // Left pleural effusion- exudate - PCIS   //Left basal  atelectasis  // Recent CABG x3 (4/8/2022)  // Postoperative anemia  // Coronary artery disease  // Essential hypertension  // Hyperlipidemia  // Tobacco abuse  //Hypokalemia  //Hyponatremia  //Amiodarone use    Plan:    I personally interviewed/examined the patient; reviewed interval history, interpreted all available radiographic and laboratory data at the time of service. Xray chest reviewed - will arrange repeat therapeutic thoracentesis today with IR prior to discharge  IS and deep breathing   Continue bronchodilator   D/w pt  Recommend smoking cessation  If the pleural effusion continues to be recurrent and noninfectious, corticosteroids may be beneficial    We will continue to follow. I would like to thank you for allowing me to participate in the care of this patient. Please feel free to call with any further questions or concerns.       [facility], MD  Pulmonary and Critical Care Medicine           4/25/2022, 11:50 AM    Please note that this chart was generated using voice recognition Dragon dictation software. Although every effort was made to ensure the accuracy of this automated transcription, some errors in transcription may have occurred.

## 2022-04-25 NOTE — PROGRESS NOTES
Physical Therapy  Facility/Department: [facility] RENAL//MED SURG  Physical Therapy Daily Treatment Note    Name: Aneudy Padron  : 1965  MRN: [id number]  Date of Service: 2022    Discharge Recommendations:  Therapy recommended at [address] [place] [address] would benefit from continued therapy after discharge   PT Equipment Recommendations  Equipment Needed: Yes  Mobility Devices: Dala Hari: Rolling      Patient Diagnosis(es): There were no encounter diagnoses. Past Medical History:  has a past medical history of Anemia, Asthma, CAD (coronary artery disease), Depression, High cholesterol, Hypertension, MI, old, Osteoarthritis, Pneumonia, Sleep apnea, and Stroke ([facility] [address] Past Surgical History:  has a past surgical history that includes Hysterectomy; Coronary angioplasty with stent (); Tubal ligation; Tonsillectomy; Appendectomy; and Coronary artery bypass graft (N/A, 2022). Assessment   Body Structures, Functions, Activity Limitations Requiring Skilled Therapeutic Intervention: Decreased tolerance to work activity; Decreased endurance  Assessment: Pt ambulated 55ft with RW and CGA, requiring frequent rest breaks for endurance recovery. Pt with significant endurance and strength deficits. Would benefit from continued PT to address deficits and improve overall functional independence. Therapy Prognosis: Good  Requires PT Follow-Up: Yes  Activity Tolerance  Activity Tolerance: Patient limited by fatigue;Patient limited by pain; Patient limited by endurance     Plan   Plan  Plan: 5-7 times per week  Current Treatment Recommendations: Strengthening,Functional mobility training,Endurance training,Gait training,Stair training,Safety education & training,Therapeutic activities  Safety Devices  Type of Devices: Nurse notified,Call light within reach,Gait belt,Left in bed  Restraints  Restraints Initially in Place: No     Restrictions  Restrictions/Precautions  Restrictions/Precautions: Surgical protocol  Required Braces or Orthoses?: No  Position Activity Restriction  Other position/activity restrictions: Up with assist; CABG x3 on 4/10/22 for CP 2/2 NSTEMI     Subjective   General  Chart Reviewed: Yes  Patient assessed for rehabilitation services?: Yes  Response To Previous Treatment: Patient with no complaints from previous session. Family / Caregiver Present: No  Follows Commands: Within Functional Limits  General Comment  Comments: Pt returned to supine in bed at end of session with call light in reach. Subjective  Subjective: Pt and RN agreeable to PT this afternoon. Pt supine in bed upon arrival wtih no c/o pain at rest. Pt pleasant and cooperative throughout session. Cognition   Orientation  Overall Orientation Status: Within Functional Limits  Orientation Level: Oriented X4  Cognition  Overall Cognitive Status: WFL     Objective   Bed mobility  Supine to Sit: Modified independent  Sit to Supine: Modified independent  Scooting: Modified independent  Comment: HOB elevated, utilized bed rails. Transfers  Sit to Stand: Contact guard assistance  Stand to sit: Contact guard assistance  Comment: RW used, good hand placement throughout transfers. Ambulation  Surface: level tile  Device: Rolling Walker  Assistance: Contact guard assistance  Quality of Gait: Fatigues quickly requiring frequent standing rest breaks  Gait Deviations: Slow Erin;Decreased step length;Decreased step height  Distance: 55ft  Comments: Significant amount of time to complete task d/t frequent rest breaks. Pt with decreased endurance with noted SOB. Pt reporting being light headed near end of ambulation. Pt with poor safety awareness during ambulation leaning forwards on RW for support. More Ambulation?: No  Stairs/Curb  Stairs?: No     Balance  Posture: Good  Sitting - Static: Good  Sitting - Dynamic: Fair  Standing - Static: Good  Standing - Dynamic: Good  Comments: RW used to assess standing balance.   Exercise Treatment:  Seated LE exercise program: Long Arc Quads, hip abduction/adduction, heel/toe raises, and marches.  Reps: 10x each      AM-PAC Score  AM-PAC Inpatient Mobility Raw Score : 18 (04/25/22 1458)  AM-PAC Inpatient T-Scale Score : 43.63 (04/25/22 1458)  Mobility Inpatient CMS 0-100% Score: 46.58 (04/25/22 1458)  Mobility Inpatient CMS G-Code Modifier : CK (04/25/22 1458)          Goals  Long Term Goals  Time Frame for Long term goals : 10 visits  Long term goal 1: transfers with SBA  Long term goal 2: amb 150 ft with or without device x SBA  Long term goal 3: ascend/descend 4 steps with SBA  Long term goal 4: 20 min strenthening exercise program x SBA  Patient Goals   Patient goals : Return home       Therapy Time   Individual Concurrent Group Co-treatment   Time In 1430         Time Out 1453         Minutes 23         Timed Code Treatment Minutes: 23 Minutes       Pranay Padgett, PTA

## 2022-04-25 NOTE — DISCHARGE SUMMARY
Physician Discharge Summary     Patient ID:  Graeme Guillermo  [id number]  7 y.o.  1965    Admit date: 4/3/2022    Discharge date and time: 4/16/2022  2:30 PM     Admitting Physician: Anna Poon MD     Discharge Physician: Debbie Le PA-C    Admission Diagnoses: NSTEMI (non-ST elevated myocardial infarction) [facility]) [I21.4]    Discharge Diagnoses: Same    Admission Condition: good    Discharged Condition: good     Indication for Admission: Graeme Guillermo is a 61 y.o.  female who presented over the weekend with onset of chest pain while resting. Patient presented to the emergency department patient noted to have bumped troponins. Patient set up for cardiac catheterization yesterday. After cardiac catheterization patient noted to have multivessel CAD therefore cardiothoracic surgery was consulted. Hospital Course: Taken to the OR on 4/8/2022 for CABG x 3. Post operative course complicated by aspiration pneumonia and pleural effusion. Appropriate consults and treatment with abx. Issues resolved patient discharged home. Consults: cardiology and pulmonary/intensive care     Discharge Exam:  vital signs stable  Incisions clean  Small bilateral pleural effusions    Disposition: [facility] with [facility]    Patient Instructions:   Discharge Medication List as of 4/16/2022 11:09 AM      START taking these medications    Details   oxyCODONE-acetaminophen (PERCOCET) 5-325 MG per tablet Take 1 tablet by mouth every 8 hours as needed for Pain for up to 3 days. , Disp-25 tablet, R-0Print      calcium carbonate (TUMS) 500 MG chewable tablet Take 1 tablet by mouth 3 times daily as needed for Heartburn, Disp-90 tablet, R-1Normal      potassium chloride (KLOR-CON M) 20 MEQ extended release tablet Take 2 tablets by mouth as needed (Potassium Replacement), Disp-60 tablet, R-3Normal      amiodarone (CORDARONE) 200 MG tablet Take 0.5 tablets by mouth 3 times daily, Disp-90 tablet, R-0Normal         CONTINUE these medications which have CHANGED    Details   aspirin 81 MG EC tablet Take 1 tablet by mouth daily, Disp-30 tablet, R-3Normal      metoprolol tartrate (LOPRESSOR) 25 MG tablet Take 1 tablet by mouth 2 times daily, Disp-60 tablet, R-3Normal      amLODIPine (NORVASC) 10 MG tablet Take 1 tablet by mouth daily, Disp-30 tablet, R-3Normal         CONTINUE these medications which have NOT CHANGED    Details   albuterol sulfate  (90 Base) MCG/ACT inhaler INHALE 2 PUFFS BY MOUTH EVERY 6 HOURS AS NEEDED FOR WHEEZING, Disp-54 g, R-0Normal      omeprazole (PRILOSEC) 20 MG delayed release capsule TAKE 1 CAPSULE BY MOUTH EVERY DAY, Disp-90 capsule, R-1Normal      clopidogrel (PLAVIX) 75 MG tablet TAKE 1 TABLET BY MOUTH DAILY, Disp-90 tablet, R-0Normal      lovastatin (MEVACOR) 10 MG tablet TAKE 1 TABLET BY MOUTH EVERY NIGHT, Disp-90 tablet, R-1**Patient requests 90 days supply**Normal      Ginkgo Biloba (GNP GINGKO BILOBA EXTRACT PO) Take by mouth dailyHistorical Med      ezetimibe (ZETIA) 10 MG tablet Take 1 tablet by mouth daily, Disp-90 tablet, R-3Normal      clobetasol (TEMOVATE) 0.05 % ointment apply to affected area twice a day, Disp-15 g, R-2, Normal      mometasone-formoterol (DULERA) 100-5 MCG/ACT inhaler Inhale 2 puffs into the lungs 2 times daily, Disp-1 Inhaler, R-3Normal      busPIRone (BUSPAR) 15 MG tablet take 1 tablet by mouth three times a day, Disp-90 tablet, R-0Normal      Omega-3 Fatty Acids (OMEGA-3 EPA FISH OIL) 1205 MG CAPS Take 1 tablet by mouth daily, Disp-90 capsule, R-1Normal      cetirizine (ZYRTEC) 10 MG tablet take 1 tablet by mouth once daily, Disp-15 tablet, R-0Normal      diclofenac sodium 1 % GEL Apply 2 g topically 2 times daily, Topical, 2 TIMES DAILY Starting Wed 5/8/2019, Disp-1 Tube, R-3, Normal      hydrocortisone 1 % cream apply topically twice a day, Disp-28.4 g, R-1, Normal      Heat Wraps (SOFTHEAT HEATING WRAP ULTRA) MISC Disp-1 each, R-0, PrintUse daily to for neck and shoulder pain      lidocaine (LMX) 4 % cream Apply 2g topically as needed. , Disp-1 Tube, R-3, Normal      Blood Pressure Monitor MISC Disp-1 each, R-0, PrintUse daily to take BP         STOP taking these medications       lisinopril (PRINIVIL;ZESTRIL) 40 MG tablet Comments:   Reason for Stopping:         ibuprofen (ADVIL;MOTRIN) 800 MG tablet Comments:   Reason for Stopping:         hydroCHLOROthiazide (HYDRODIURIL) 25 MG tablet Comments:   Reason for Stopping:         nitroGLYCERIN (NITROSTAT) 0.4 MG SL tablet Comments:   Reason for Stopping:             Beta-Blocker: yes  ASA: yes  Plavix: yes  Statin: yes  Coumadin: no  ACE-I: no, EF > 40%    Activity: activity as tolerated  Diet: cardiac diet  Wound Care: keep wound clean and dry    Follow-up with clinic in 2 weeks. Signed:   SON Menjivar    4/25/2022  7:47 AM

## 2022-04-25 NOTE — PROGRESS NOTES
IV came out of patient, patient states she does not want to be restuck at this time. Educated on importance of having IV for emergency and the procedure today. States maybe later. Provider notified.

## 2022-04-25 NOTE — OR NURSING
PT HAD IN PT THORACENTESIS TODAY. 250 ML OF PLEURAL FLUID REMOVED. PT TOLERATED WELL WITH STABLE SA02 THROUGHOUT PROCEDURE. 2X2 GAUZE AND TEGADERM TO CATHETER SITE CLEAN AND DRY. REPORT TO FLOOR. RESP UNLABORED. READY FOR TRANSPORT.

## 2022-04-25 NOTE — BRIEF OP NOTE
Brief Postoperative Note for Thoracentesis    Amparo Henderson  YOB: 1965  [id number]    Pre-operative Diagnosis: left Pleural effusion      Post-operative Diagnosis: Same    Procedure: Ultrasound guided Thoracentesis     Anesthesia: 1% Lidocaine     Surgeons/Assistants: Susan Daly PA-C    Complications: none    EBL: Minimal    Specimens: were obtained    Ultrasound guided left thoracentesis performed. 250 ml bobby fluid obtained. Dressing applied.       Electronically signed by SON Ross on 4/25/2022 at 10:07 AM

## 2022-04-25 NOTE — PROGRESS NOTES
Physical Therapy         Physical Therapy Cancel Note      DATE: 2022    NAME: Kiran Hensley  MRN: [id number]   : 1965      Patient not seen this date for Physical Therapy due to:    Nurse cancel due to severe shoulder pain. Patient is to have an Xray today. Will check back for MD recommendations or any ortho consult.       Electronically signed by Wolf Bran PT on 2022 at 10:30 AM

## 2022-04-25 NOTE — PROGRESS NOTES
[facility]  Office: [address], DO, Kandi Patelland, DO, Alex Roman, DO, [place] [place] [facility], DO, Chelsey Moay MD, Elif Almanzar MD, Nirmal Figueroa MD, Leroy Jaffe MD, Levi Maier MD, Marina Bah MD, Austin Shaikh MD, Kimberley Ashley, DO, Canelo Ace, DO, Sirisha Enciso MD,  Saintclair Lank, DO, Mary Carmen Riley MD, Shivam Ramirez MD, Linh Julio MD, Shayne Saavedra, DO, Love Baker MD, Tobias Burkett MD, Daryle Springer, [facility], [facility], CNP, Elva Chan, CNP, Leandro Hayden, CNP, Ted Lai, CNP, Uriel Eisenberg, CNP, Lia Quiñonez PA-C, Tripp Alves, [facility], Deneen Rodriguez, CNP, Lucrecia Lazar, CNP, Rigo Hodges, CNP, Keith Florence, CNS, Neel Mcintyre, [facility], Norma Shanks, CNP, Jaciel Richter, CNP, Maicol Sam, CNP         [address]    Progress Note    4/25/2022    7:32 AM    Name:   Crow Riddle  MRN:     [id number]     Acct:      [de-identified]   Room:   [facility] Day:  5  Admit Date:  4/20/2022 12:07 AM    PCP:   No primary care provider on file. Code Status:  Full Code    Subjective:     C/C: No chief complaint on file. Interval History Status: not changed. Pt was seen and examined this morning  No acute events overnight  Complaints of severe right shoulder pain radiating into the back   Unable to lift her right arm to shoulder level due to pain   No known trauma to the site   No other complaints    Brief History:     61year old female patient presented with post CABG effusion. Treated with IR guided thoracentesis x 2. Currently awaiting SNF pre cert     Review of Systems:     12 poitn ROS performed and negative for anything other than what was stated in subjective     Medications: Allergies:     Allergies   Allergen Reactions    Lipitor [Atorvastatin] Other (See Comments)     Muscle cramping in legs    Prozac [Fluoxetine] Other (See Comments)     Mood swings    Zoloft [Sertraline Hcl] Other (See Comments)     Anger and mood swings        Current Meds:   Scheduled Meds:    furosemide  40 mg Oral Daily    amiodarone  100 mg Oral BID    amLODIPine  10 mg Oral Daily    aspirin  81 mg Oral Daily    clopidogrel  75 mg Oral Daily    atorvastatin  20 mg Oral Nightly    metoprolol tartrate  25 mg Oral BID    budesonide-formoterol  2 puff Inhalation BID    pantoprazole  40 mg Oral BID AC    sodium chloride flush  5-40 mL IntraVENous 2 times per day    enoxaparin  40 mg SubCUTAneous Daily     Continuous Infusions:    sodium chloride      dextrose       PRN Meds: calcium carbonate, sodium chloride flush, sodium chloride, ondansetron **OR** ondansetron, polyethylene glycol, acetaminophen **OR** acetaminophen, glucose, dextrose, glucagon (rDNA), dextrose, fentanNYL, oxyCODONE, albuterol sulfate HFA, busPIRone    Data:     Past Medical History:   has a past medical history of Anemia, Asthma, CAD (coronary artery disease), Depression, High cholesterol, Hypertension, MI, old, Osteoarthritis, Pneumonia, Sleep apnea, and Stroke ([facility] [address] Social History:   reports that she has been smoking cigarettes. She has a 12.50 pack-year smoking history. She has never used smokeless tobacco. She reports previous alcohol use. She reports current drug use. Drug: Marijuana Elfrieda Winter). Family History:   Family History   Problem Relation Age of Onset    High Blood Pressure Mother        Vitals:  /81   Pulse 92   Temp 97.9 °F (36.6 °C) (Oral)   Resp 18   Ht 5' 1\" (1.549 m)   Wt 156 lb (70.8 kg)   SpO2 96%   BMI 29.48 kg/m²   Temp (24hrs), Av.5 °F (36.4 °C), Min:97.1 °F (36.2 °C), Max:97.9 °F (36.6 °C)    No results for input(s): POCGLU in the last 72 hours. I/O (24Hr):   No intake or output data in the 24 hours ending 22 0732    Labs:  Hematology:  Recent Labs     22  0600   WBC 12.5*   RBC 3.87*   HGB 11.3*   HCT 35.9*   MCV 92.8   MCH 29.2   MCHC 31.5   RDW 15.3*   PLT 755*   MPV 8.6     Chemistry:  Recent Labs     04/23/22  0600   *   K 3.3*   CL 99   CO2 25   GLUCOSE 98   BUN 15   CREATININE 0.82   MG 2.0   ANIONGAP 10   LABGLOM >60   GFRAA >60   CALCIUM 8.9   No results for input(s): PROT, LABALBU, LABA1C, R0KKCZA, K9NLKYB, FT4, TSH, AST, ALT, LDH, GGT, ALKPHOS, LABGGT, BILITOT, BILIDIR, AMMONIA, AMYLASE, LIPASE, LACTATE, CHOL, HDL, LDLCHOLESTEROL, CHOLHDLRATIO, TRIG, VLDL, YOA90IA, PHENYTOIN, PHENYF, URICACID, POCGLU in the last 72 hours. ABG:  Lab Results   Component Value Date    POCPH 7.484 04/13/2022    POCPCO2 30.6 04/13/2022    POCPO2 77.1 04/13/2022    POCHCO3 23.0 04/13/2022    NBEA 4 04/11/2022    PBEA 0 04/13/2022    BHDC3PAB 96 04/13/2022    FIO2 60.0 04/12/2022     Lab Results   Component Value Date/Time    SPECIAL R AC 1ML 04/10/2022 02:49 AM     Lab Results   Component Value Date/Time    CULTURE NO GROWTH 3 DAYS 04/21/2022 03:40 PM       Radiology:  XR CHEST (2 VW)    Result Date: 4/24/2022  Left pleural effusion with left basilar opacity that could represent atelectasis or pneumonia, not significantly changed     XR CHEST PORTABLE    Result Date: 4/21/2022  Interval decrease in size of a left pleural effusion. Bibasilar airspace disease likely representing atelectasis. XR CHEST PORTABLE    Result Date: 4/21/2022  There is a moderate-sized left pleural effusion which is mildly increased. Trace right effusion. Bibasilar airspace disease likely representing atelectasis. XR CHEST PORTABLE    Result Date: 4/20/2022  Bibasilar opacities most confluent left lung base suggestive of effusions and atelectasis     US THORACENTESIS Which side should the procedure be performed? Left    Result Date: 4/21/2022  Successful ultrasound guided thoracentesis.        Physical Examination:        General appearance:  alert, cooperative and no distress  Mental Status:  oriented to person, place and time and normal affect  Lungs:  clear to auscultation bilaterally, normal effort  Heart:  regular rate and rhythm, no murmur  Abdomen:  soft, nontender, nondistended, normal bowel sounds   Extremities:  Right shoulder pain with palpation, unable to lift to shoulder level   Skin:  no gross lesions, rashes, induration    Assessment:        Hospital Problems           Last Modified POA    * (Principal) Pleural effusion 4/20/2022 Yes    Recurrent pleural effusion 4/20/2022 Yes          Plan:        1. Post CABG recurrent pleural effusion  - cardiology on board - cleared pt for d/c  - CTS on board - cleared pt for dc  - pulmonology on board - cleared pt for d/c  - change lasix to once daily  - pt to undergo repeat IR guided thoracentesis today for continued effusion  - monitor Is/Os   - echo done, normal LVEF  - repeat chest xray showed moderate effusion to the left thorax  - IR consult for thoracentesis, had 450 ml removed on 4/21     2. CAD   - cardiology on board   - telemetry   - stable at this time      3.  HTN  - v/s per unit protocol   - continue current anti hypertensive regimen      4. Anemia  - Hemoglobin 11.3     5. Hyperlipidemia  - Resume high intensity statin     6. Anxiety/Depression   - On buparone 15mg tiD     7. DISPO  - pending pre cert for SNF placement  - PT/OT    8.  Rt shoulder pain   - possible cause is rotator cuff tendinitis   - obtain XR   - muscle relaxant prn     Brnado Trevizo MD  4/25/2022  7:32 AM

## 2022-04-25 NOTE — CARE COORDINATION
[facility] Flow/Interdisciplinary Rounds Progress Note    Quality Flow Rounds held on April 25, 2022 at [address] Attending:  Bedside Nurse,  and Nursing Unit Leadership    Barriers to Discharge: SNF precert    Anticipated Discharge Date:  Expected Discharge Date: 04/25/22        Readmission Risk              Risk of Unplanned Readmission:  22           Discussed patient goal for the day, patient clinical progression, and barriers to discharge. The following Goal(s) of the Day/Commitment(s) have been identified:  Attempted to call [facility] no answer.    Faxed a request for a return call to [facility]    11:41 called [facility] spoke to ngozi brooks is not here today and will check on status of precert    54:72  Call back from ngozi with [facility] called left vm with c-pan to check on status of precert    18:42 gurmeet with [facility] called await precert     Josh Klein RN  April 25, 2022

## 2022-04-26 PROBLEM — Z95.1 STATUS POST CORONARY ARTERY BYPASS GRAFT: Status: ACTIVE | Noted: 2022-04-26

## 2022-04-26 LAB — GLUCOSE BLD-MCNC: 111 MG/DL (ref 65–105)

## 2022-04-26 PROCEDURE — 82947 ASSAY GLUCOSE BLOOD QUANT: CPT

## 2022-04-26 PROCEDURE — 99232 SBSQ HOSP IP/OBS MODERATE 35: CPT | Performed by: INTERNAL MEDICINE

## 2022-04-26 PROCEDURE — 6370000000 HC RX 637 (ALT 250 FOR IP): Performed by: NURSE PRACTITIONER

## 2022-04-26 PROCEDURE — 94640 AIRWAY INHALATION TREATMENT: CPT

## 2022-04-26 PROCEDURE — 6370000000 HC RX 637 (ALT 250 FOR IP): Performed by: INTERNAL MEDICINE

## 2022-04-26 PROCEDURE — 6360000002 HC RX W HCPCS: Performed by: INTERNAL MEDICINE

## 2022-04-26 PROCEDURE — 99232 SBSQ HOSP IP/OBS MODERATE 35: CPT | Performed by: STUDENT IN AN ORGANIZED HEALTH CARE EDUCATION/TRAINING PROGRAM

## 2022-04-26 PROCEDURE — 1200000000 HC SEMI PRIVATE

## 2022-04-26 PROCEDURE — 97530 THERAPEUTIC ACTIVITIES: CPT

## 2022-04-26 PROCEDURE — 97110 THERAPEUTIC EXERCISES: CPT

## 2022-04-26 PROCEDURE — 6370000000 HC RX 637 (ALT 250 FOR IP): Performed by: FAMILY MEDICINE

## 2022-04-26 PROCEDURE — 97116 GAIT TRAINING THERAPY: CPT

## 2022-04-26 RX ORDER — CYCLOBENZAPRINE HCL 5 MG
5 TABLET ORAL 3 TIMES DAILY PRN
Qty: 30 TABLET | Refills: 0 | Status: SHIPPED | OUTPATIENT
Start: 2022-04-26 | End: 2022-05-06

## 2022-04-26 RX ADMIN — CYCLOBENZAPRINE HYDROCHLORIDE 5 MG: 5 TABLET, FILM COATED ORAL at 16:31

## 2022-04-26 RX ADMIN — PANTOPRAZOLE SODIUM 40 MG: 40 TABLET, DELAYED RELEASE ORAL at 16:31

## 2022-04-26 RX ADMIN — ACETAMINOPHEN 650 MG: 325 TABLET ORAL at 15:21

## 2022-04-26 RX ADMIN — ATORVASTATIN CALCIUM 20 MG: 20 TABLET, FILM COATED ORAL at 20:19

## 2022-04-26 RX ADMIN — BUDESONIDE AND FORMOTEROL FUMARATE DIHYDRATE 2 PUFF: 80; 4.5 AEROSOL RESPIRATORY (INHALATION) at 07:51

## 2022-04-26 RX ADMIN — PANTOPRAZOLE SODIUM 40 MG: 40 TABLET, DELAYED RELEASE ORAL at 05:42

## 2022-04-26 RX ADMIN — ACETAMINOPHEN 650 MG: 325 TABLET ORAL at 05:42

## 2022-04-26 RX ADMIN — AMIODARONE HYDROCHLORIDE 100 MG: 200 TABLET ORAL at 20:19

## 2022-04-26 RX ADMIN — METOPROLOL TARTRATE 25 MG: 25 TABLET ORAL at 09:06

## 2022-04-26 RX ADMIN — ENOXAPARIN SODIUM 40 MG: 100 INJECTION SUBCUTANEOUS at 09:06

## 2022-04-26 RX ADMIN — METOPROLOL TARTRATE 25 MG: 25 TABLET ORAL at 20:19

## 2022-04-26 RX ADMIN — Medication 81 MG: at 09:06

## 2022-04-26 RX ADMIN — AMIODARONE HYDROCHLORIDE 100 MG: 200 TABLET ORAL at 09:06

## 2022-04-26 RX ADMIN — CLOPIDOGREL 75 MG: 75 TABLET, FILM COATED ORAL at 09:06

## 2022-04-26 RX ADMIN — CYCLOBENZAPRINE HYDROCHLORIDE 5 MG: 5 TABLET, FILM COATED ORAL at 05:43

## 2022-04-26 RX ADMIN — FUROSEMIDE 40 MG: 40 TABLET ORAL at 09:06

## 2022-04-26 RX ADMIN — AMLODIPINE BESYLATE 10 MG: 10 TABLET ORAL at 09:06

## 2022-04-26 ASSESSMENT — ENCOUNTER SYMPTOMS
ALLERGIC/IMMUNOLOGIC NEGATIVE: 1
ABDOMINAL PAIN: 0
SORE THROAT: 0
VOICE CHANGE: 0
VOMITING: 0
SINUS PAIN: 0
WHEEZING: 0
DIARRHEA: 0
SHORTNESS OF BREATH: 1
TROUBLE SWALLOWING: 0
COUGH: 0
PHOTOPHOBIA: 0
EYE REDNESS: 0

## 2022-04-26 ASSESSMENT — PAIN SCALES - GENERAL: PAINLEVEL_OUTOF10: 5

## 2022-04-26 NOTE — PROGRESS NOTES
PULMONARY & CRITICAL CARE MEDICINE CONSULT NOTE     Patient:  Yanni Chen  MRN: [id number]  Admit date: 4/20/2022  Primary Care Physician: No primary care provider on file. Consulting Physician: Genie Dakin, MD  CODE Status: Full Code  LOS: 6     SUBJECTIVE     CHIEF COMPLAINT/REASON FOR CONSULT: Left pleural effusion    HISTORY OF PRESENT ILLNESS:  The patient is a 61 y.o. female patient was recently hospitalized with chest pain. She was found to have multivessel coronary artery disease and required CABG x3 (4/8/2022). Postop course was complicated with development of bleeding from chest tube, anemia and worsening respiratory failure. She required reintubation and bronchoscopic clearance of mucous plugging and had left lung atelectasis/effusion. She was discharged from the hospital on 4/16. She was readmitted after she was found to have in bathroom after a coughing spell. Initially admitted to [facility] but transferred here for further management. Chest x-ray shows left-sided effusion/atelectasis. Patient currently sitting in the bed comfortably. She is on room air and is hemodynamically stable. She has been given IV Lasix and is annoyed by need to go to the bathroom repeatedly. She denies cough, sputum, wheezing, pleuritic chest pain. 4/26/2022   Subjective      No acute events reported overnight. Chart reviewed available labs and imaging studies seen. She remained on room air maintaining saturation 96% and above she is afebrile T-max of 99 last 24 hours she is hemodynamically stable heart rate is in 90s. Patient complaining of pain at the site of previous surgery she does not complain of much cough cough is occasional denies sputum production denies hemoptysis does not complain of wheezing. She does not complain of orthopnea PND or pedal edema. According to patient she is ambulating to the bathroom but gets shortness of breath on mild activity.   She had a thoracentesis done by IR yesterday only 250 mL of reddish fluid obtained      PAST MEDICAL HISTORY:        Diagnosis Date    Anemia     Asthma     CAD (coronary artery disease)     Depression 10/20/2014    High cholesterol     Hypertension     MI, old     Osteoarthritis     Pneumonia     Sleep apnea     Stroke ([address] 9/27/2014     PAST SURGICAL HISTORY:        Procedure Laterality Date    APPENDECTOMY      CORONARY ANGIOPLASTY WITH STENT PLACEMENT  2004    CORONARY ARTERY BYPASS GRAFT N/A 4/8/2022    CABG X 3 / EVH LEFT LEG / KIT performed by Camelia Retana MD at [address] [place] [address] HISTORY:       Problem Relation Age of Onset    High Blood Pressure Mother      SOCIAL HISTORY:   TOBACCO:   reports that she has been smoking cigarettes. She has a 12.50 pack-year smoking history. She has never used smokeless tobacco.  ETOH:  reports previous alcohol use. DRUGS: reports current drug use. Drug: Marijuana Paulephraime Gemma). ALLERGIES:    Allergies   Allergen Reactions    Lipitor [Atorvastatin] Other (See Comments)     Muscle cramping in legs    Prozac [Fluoxetine] Other (See Comments)     Mood swings    Zoloft [Sertraline Hcl] Other (See Comments)     Anger and mood swings          HOME MEDICATIONS:  Prior to Admission medications    Medication Sig Start Date End Date Taking?  Authorizing Provider   amiodarone (CORDARONE) 200 MG tablet Take 0.5 tablets by mouth 2 times daily 4/25/22  Yes Yuriy Rain MD   aspirin 81 MG EC tablet Take 1 tablet by mouth daily 4/17/22   Camelia Retana MD   calcium carbonate (TUMS) 500 MG chewable tablet Take 1 tablet by mouth 3 times daily as needed for Heartburn 4/16/22 5/16/22  Camelia Retana MD   metoprolol tartrate (LOPRESSOR) 25 MG tablet Take 1 tablet by mouth 2 times daily 4/16/22   Cmaelia Retana MD   potassium chloride (KLOR-CON M) 20 MEQ extended release tablet Take 2 tablets by mouth as needed (Potassium Replacement) 4/16/22   Camelia Retana MD amLODIPine (NORVASC) 10 MG tablet Take 1 tablet by mouth daily 4/17/22   Jeffrey Jackson MD   albuterol sulfate  (90 Base) MCG/ACT inhaler INHALE 2 PUFFS BY MOUTH EVERY 6 HOURS AS NEEDED FOR WHEEZING 11/15/21   Shakeel Barcenas MD   omeprazole (PRILOSEC) 20 MG delayed release capsule TAKE 1 CAPSULE BY MOUTH EVERY DAY 11/15/21   Shakeel Barcenas MD   clopidogrel (PLAVIX) 75 MG tablet TAKE 1 TABLET BY MOUTH DAILY 10/7/21   Shakeel Barcenas MD   lovastatin (MEVACOR) 10 MG tablet TAKE 1 TABLET BY MOUTH EVERY NIGHT 9/1/21   Shakeel Barcenas MD   Ginkgo Biloba (GNP GINGKO BILOBA EXTRACT PO) Take by mouth daily    Historical Provider, MD   ezetimibe (ZETIA) 10 MG tablet Take 1 tablet by mouth daily 8/31/21   Shakeel Barcenas MD   ibuprofen (ADVIL;MOTRIN) 800 MG tablet take 1 tablet by mouth every 8 hours if needed for pain 3/30/21   Shakeel Barcenas MD   mometasone-formoterol [facility]) 100-5 MCG/ACT inhaler Inhale 2 puffs into the lungs 2 times daily 3/24/21   Shakeel Barcenas MD   busPIRone (BUSPAR) 15 MG tablet take 1 tablet by mouth three times a day  Patient taking differently: 3 times daily as needed take 1 tablet by mouth three times a day 3/24/21   Shakeel Barcenas MD   Omega-3 Fatty Acids (OMEGA-3 EPA FISH OIL) 1205 MG CAPS Take 1 tablet by mouth daily 3/24/21   Shakeel Barcenas MD   clopidogrel (PLAVIX) 75 MG tablet TAKE 1 TABLET BY MOUTH DAILY 3/24/21   Shakeel Barcenas MD   cetirizine (ZYRTEC) 10 MG tablet take 1 tablet by mouth once daily 3/16/21   Shakeel Barcenas MD   diclofenac sodium 1 % GEL Apply 2 g topically 2 times daily 5/8/19   Shakeel Barcenas MD   hydrocortisone 1 % cream apply topically twice a day 1/15/18   Shakeel Barcenas MD   Heat Wraps (SOFTHEAT HEATING WRAP ULTRA) MISC Use daily to for neck and shoulder pain 12/6/17   Shakeel Barcenas MD   lidocaine (LMX) 4 % cream Apply 2g topically as needed.  7/26/17   Shakeel Barcenas MD   Blood Pressure Monitor MISC Use daily to take BP 3/15/17   Shakeel Barcenas MD IMMUNIZATIONS:  Most Recent Immunizations   Administered Date(s) Administered    COVID-19, Pfizer Purple top, DILUTE for use, 12+ yrs, 30mcg/0.3mL dose 2021    Pneumococcal Polysaccharide ([id number]) 2017    Tdap (Boostrix, Adacel) 2014     REVIEW OF SYSTEMS:  Review of Systems -   Review of Systems   Constitutional: Negative for activity change, appetite change and fever. HENT: Negative for postnasal drip, sinus pain, sore throat, trouble swallowing and voice change. Eyes: Negative for photophobia, redness and visual disturbance. Respiratory: Positive for shortness of breath. Negative for cough and wheezing. Cardiovascular: Positive for chest pain. Negative for palpitations and leg swelling. Gastrointestinal: Negative for abdominal pain, diarrhea and vomiting. Endocrine: Negative for polydipsia, polyphagia and polyuria. Genitourinary: Negative for difficulty urinating, dysuria and hematuria. Musculoskeletal: Negative. Allergic/Immunologic: Negative. Neurological: Negative for dizziness, syncope, speech difficulty and headaches. Hematological: Negative for adenopathy. Does not bruise/bleed easily. Psychiatric/Behavioral: Negative. OBJECTIVE     PaO2/FiO2 RATIO:  No results for input(s): POCPO2 in the last 72 hours. VITAL SIGNS:   LAST:  /78   Pulse 94   Temp 98.4 °F (36.9 °C)   Resp 16   Ht 5' 1\" (1.549 m)   Wt 156 lb (70.8 kg)   SpO2 100%   BMI 29.48 kg/m²   8-24 HR RANGE:  TEMP Temp  Av.7 °F (37.1 °C)  Min: 98.4 °F (36.9 °C)  Max: 99 °F (31.9 °C)   BP Systolic (43CGY), OCY:417 , Min:116 , OPK:510      Diastolic (71HYY), IKU:15, Min:78, Max:87     PULSE Pulse  Av.5  Min: 94  Max: 95   RR Resp  Av  Min: 16  Max: 16   O2 SAT SpO2  Av %  Min: 100 %  Max: 100 %   OXYGEN DELIVERY No data recorded        Physical Exam  Constitutional:       General: She is not in acute distress.   HENT:   General appearance - well appearing, overweight, comfortable and in no acute distress  Mental status - alert, oriented to person, place, and time  Eyes - pupils equal and reactive, extraocular eye movements intact, sclera anicteric  Mouth - mucous membranes moist, pharynx normal without lesions  Neck - supple, no significant adenopathy, carotids upstroke normal bilaterally, no bruits  Lymphatics - no palpable lymphadenopathy, no hepatosplenomegaly  Chest - no tachypnea, retractions or cyanosis, air entry is present bilaterally slightly decreased breath sounds at left base without dullness on percussion no expiratory wheezing rhonchi or crackles  Heart - normal rate, regular rhythm, normal S1, S2, no murmurs, rubs, clicks or gallops  Abdomen - soft, nontender, nondistended, no masses or organomegaly  Neurological - motor and sensory grossly normal bilaterally  Musculoskeletal - no joint tenderness, deformity or swelling  Extremities - peripheral pulses normal, no pedal edema, no clubbing or cyanosis  Skin - normal coloration and turgor, no rashes, no suspicious skin lesions noted      DATA REVIEW     Medications: Current Inpatient  Scheduled Meds:   furosemide  40 mg Oral Daily    amiodarone  100 mg Oral BID    amLODIPine  10 mg Oral Daily    aspirin  81 mg Oral Daily    clopidogrel  75 mg Oral Daily    atorvastatin  20 mg Oral Nightly    metoprolol tartrate  25 mg Oral BID    budesonide-formoterol  2 puff Inhalation BID    pantoprazole  40 mg Oral BID AC    sodium chloride flush  5-40 mL IntraVENous 2 times per day    enoxaparin  40 mg SubCUTAneous Daily     Continuous Infusions:   sodium chloride      dextrose         INPUT/OUTPUT:  No intake/output data recorded. LABS:  ABGs:   No results for input(s): POCPH, POCPCO2, POCPO2, POCHCO3, UGDZ2JWN in the last 72 hours.   CBC:   Recent Labs     04/25/22  0801   WBC 12.8*   HGB 11.5*   HCT 35.3*   MCV 90.3   *   LYMPHOPCT 16*   RBC 3.91*   MCH 29.4   MCHC 32.6   RDW 15.0*     CRP:   No results for input(s): CRP in the last 72 hours. LDH:   No results for input(s): LDH in the last 72 hours. BMP:   Recent Labs     04/25/22  0801   *   K 3.5*   CL 97*   CO2 25   BUN 16   CREATININE 0.80   GLUCOSE 117*     Liver Function Test:   Recent Labs     04/25/22  0801   PROT 7.5   LABALBU 4.1   ALT 41*   AST 29   ALKPHOS 83   BILITOT 1.13     Coagulation Profile:   No results for input(s): INR, PROTIME, APTT in the last 72 hours. D-Dimer:  No results for input(s): DDIMER in the last 72 hours. Lactic Acid:  No results for input(s): LACTA in the last 72 hours. Cardiac Enzymes:  No results for input(s): CKTOTAL, CKMB, CKMBINDEX, TROPONINI in the last 72 hours. Invalid input(s): TROPONIN, HSTROP  BNP/ProBNP:   No results for input(s): BNP, PROBNP in the last 72 hours. Triglycerides:  No results for input(s): TRIG in the last 72 hours. Microbiology:  Urine Culture:  No components found for: CURINE  Blood Culture:  No components found for: CBLOOD, CFUNGUSBL  Sputum Culture:  No components found for: CSPUTUM  No results for input(s): SPECDESC, SPECIAL, CULTURE, STATUS, ORG, CDIFFTOXPCR, CAMPYLOBPCR, SALMONELLAPC, SHIGAPCR, SHIGELLAPCR, MPNEUG, MPNEUM, LACTOQL in the last 72 hours. No results for input(s): SPUTUM, SPECIAL, CULTURE, STATUS, ORG, CDIFFTOXPCR, MPNEUM, MPNEUG in the last 72 hours. Invalid input(s): Linwood Presto, CFUNGUSBL,  [address] [facility]       Radiology Reports:  XR SHOULDER RIGHT (MIN 2 VIEWS)   Final Result   Mild degenerative joint disease without acute osseous or soft tissue   abnormality. US THORACENTESIS Which side should the procedure be performed? Left   Final Result   Successful ultrasound guided thoracentesis.          XR CHEST (2 VW)   Final Result   Left pleural effusion with left basilar opacity that could represent   atelectasis or pneumonia, not significantly changed         XR CHEST PORTABLE   Final Result   Interval decrease in size of a left pleural effusion. Bibasilar airspace disease likely representing atelectasis. US THORACENTESIS Which side should the procedure be performed? Left   Final Result   Successful ultrasound guided thoracentesis. XR CHEST PORTABLE   Final Result   There is a moderate-sized left pleural effusion which is mildly increased. Trace right effusion. Bibasilar airspace disease likely representing atelectasis. XR CHEST PORTABLE   Final Result   Bibasilar opacities most confluent left lung base suggestive of effusions and   atelectasis              Echocardiogram:   Results for orders placed during the hospital encounter of 04/03/22    ECHO Complete 2D W Doppler W Color    Summary  Normal LV size , mildly increased wall thickness. No obvious wall motion abnormality seen. Normal LV systolic function with LVEF >55%. Normal RV size and function. LA and RA appears normal in size. No obvious significant structural valvular abnormality noted. No significant valvular stenosis or regurgitation noted. Normal aortic root dimension. No significant pericardial effusion noted. No obvious intra-cardiac mass or shunt noted. IVC normal diameter and inspiratory variation indicating normal RA filling  pressure. ASSESSMENT AND PLAN     Assessment:    // Left pleural effusion- exudate - PCIS   // Left basal  atelectasis  // Recent CABG x3 (4/8/2022)  // Postoperative anemia  // Coronary artery disease  // Essential hypertension  // Hyperlipidemia  // Tobacco abuse  // Hypokalemia  // Hyponatremia  // Amiodarone use    Plan:    I personally interviewed/examined the patient; reviewed interval history, interpreted all available radiographic and laboratory data at the time of service. Patient has only small effusion she had repeat thoracentesis done yesterday on 04/25/2022 and only 250 mL fluid was withdrawn.   She is hemodynamically stable she is on room air no hypoxia reported maintaining saturation well look comfortable not in distress. Encourage incentive spirometry deep breathing and cough. Continue bronchodilators/Symbicort  Continue Lasix currently 40 mg daily. Monitor intake and output and keep negative fluid balance. Recommend smoking cessation  Lovenox for DVT prophylaxis. On aspirin, amiodarone, atorvastatin, metoprolol, amlodipine and Plavix  If the pleural effusion continues to be recurrent and noninfectious, corticosteroids may be beneficial  Discharge at discretion of primary service    We will continue to follow. I would like to thank you for allowing me to participate in the care of this patient. Please feel free to call with any further questions or concerns. Padmini Ford MD  Pulmonary and Critical Care Medicine           4/26/2022, 1:16 PM    Please note that this chart was generated using voice recognition Dragon dictation software. Although every effort was made to ensure the accuracy of this automated transcription, some errors in transcription may have occurred.

## 2022-04-26 NOTE — CARE COORDINATION
Transitional planning    Writer placed call to Krista to follow up on precert status, spoke with Karey and shes waiting on Vy from [place]. 1045 Received call from [facility] and [facility] denied auth, states she could receive a lower level of care, discussed with patient and she would like to appeal , will set up peer to peer. Call placed to Dr Zheng Grubbs, shes agreeable , email sent with clinical info. 1716 Received call from Dr Zheng Grubbs, peer to peer review completed and appeal won. Call to [facility], admissions gone for the day, asked for , left vm with request for call back.

## 2022-04-26 NOTE — PLAN OF CARE
Problem: Falls - Risk of:  Goal: Will remain free from falls  Description: Will remain free from falls  4/26/2022 1102 by Jelena Olvera  Outcome: Progressing  4/25/2022 2343 by Mel Pickens RN  Outcome: Progressing  Goal: Absence of physical injury  Description: Absence of physical injury  4/26/2022 1102 by Jelena Olvera  Outcome: Progressing  4/25/2022 2343 by Mel Pickens RN  Outcome: Progressing     Problem: Pain:  Goal: Pain level will decrease  Description: Pain level will decrease  4/26/2022 1102 by Jelena Olvera  Outcome: Progressing  4/25/2022 2343 by Mel Pickens RN  Outcome: Progressing  Goal: Control of acute pain  Description: Control of acute pain  4/26/2022 1102 by Jelena Olvera  Outcome: Progressing  4/25/2022 2343 by Mel Pickens RN  Outcome: Progressing  Goal: Control of chronic pain  Description: Control of chronic pain  4/26/2022 1102 by Jelena Olvera  Outcome: Progressing  4/25/2022 2343 by Mel Pickens RN  Outcome: Progressing     Problem: Discharge Planning  Goal: Discharge to home or other facility with appropriate resources  Outcome: Progressing     Problem: Chronic Conditions and Co-morbidities  Goal: Patient's chronic conditions and co-morbidity symptoms are monitored and maintained or improved  Outcome: Progressing     Problem: Pain  Goal: Verbalizes/displays adequate comfort level or baseline comfort level  4/26/2022 1102 by Jelena Olvera  Outcome: Progressing  4/25/2022 2343 by Mel Pickens RN  Outcome: Progressing     Problem: ABCDS Injury Assessment  Goal: Absence of physical injury  Outcome: Progressing

## 2022-04-26 NOTE — PROGRESS NOTES
[facility]  Office: [address], DO, Kimberley Vazquez, DO, Beau Sandoval, DO, Leonor Peña, DO, Lynsey Orantes MD, Meet Montoya MD, Lamar Rasmussen MD, Estefani Bone MD, Bin Rock MD, Daphnie Freeman MD, Marli Sandoval MD, Barbara Rose, DO, Herbert Real, DO, Rob Marinelli MD,  Abby Camargo, DO, Marcella Hsieh MD, Tuan Garcia MD, Man Madden MD, [facility], DO, Silvia Vanegas MD, Everardo Xiong MD, Román Santana [facility], [facility], CNP, Reji Aburto CNP, Wenceslao Nickerson, CNP, Nasima Lopez CNP, Jocy Sherman, CNP, Florin Rees PA-C, Esteban Power [facility], Shoshana Painting, CNP, Gabe Morel CNP, Lali Mercer, CNP, Anali Quinteros, CNS, Gini Govea [facility], Miranda Bertrand, CNP, Alireza Myers, CNP, Kalina Puentesbury, [address] [place] [address]    Progress Note    4/26/2022    4:18 PM    Name:   Kanchan Farrar  MRN:     [id number]     Acct:      [de-identified]   Room:   [address] [place] [address] Day:  6  Admit Date:  4/20/2022 12:07 AM    PCP:   No primary care provider on file. Code Status:  Full Code    Subjective:     C/C: Shortness of breath and cough  Interval History Status: improved. Patient cough has improved  No chest pain  Right shoulder pain has improved  Patient is upset about pre-CERT denial to rehab  States that she does not have much help at home  Vitals have been stable  Is saturating well on room air. Brief History:     48-year-old female presented to the hospital for shortness of breath. Patient has recent history of CABG on 4/10 for coronary artery disease. Post CABG patient had episode of hypoxic respiratory failure requiring reintubation and bronc with mucous plugging removal.  Patient was then extubated on 4/12. Patient was discharged however she continued to have dyspnea on exertion and leg swelling, initially went to [facility] and was transferred here due to recent hospitalization.   Patient was seen to have left-sided pleural effusion, thoracentesis was done on 4/21 with 450 mils fluid removed. Patient was evaluated by CT surgery, no acute interventions are planned and they wanted to follow-up outpatient. Patient had a repeat x-ray showing mild effusion on left side again, thoracentesis was done on 4/25 showing only 250 mils of fluid. Patient has been saturating well on room air. Review of Systems:     Constitutional:  negative for chills, fevers, sweats  Respiratory: Positive for cough and shortness of breath   cardiovascular:  negative for chest pain, chest pressure/discomfort, lower extremity edema, palpitations  Gastrointestinal:  negative for abdominal pain, constipation, diarrhea, nausea, vomiting  Neurological:  negative for dizziness, headache    Medications: Allergies:     Allergies   Allergen Reactions    Lipitor [Atorvastatin] Other (See Comments)     Muscle cramping in legs    Prozac [Fluoxetine] Other (See Comments)     Mood swings    Zoloft [Sertraline Hcl] Other (See Comments)     Anger and mood swings        Current Meds:   Scheduled Meds:    furosemide  40 mg Oral Daily    amiodarone  100 mg Oral BID    amLODIPine  10 mg Oral Daily    aspirin  81 mg Oral Daily    clopidogrel  75 mg Oral Daily    atorvastatin  20 mg Oral Nightly    metoprolol tartrate  25 mg Oral BID    budesonide-formoterol  2 puff Inhalation BID    pantoprazole  40 mg Oral BID AC    sodium chloride flush  5-40 mL IntraVENous 2 times per day    enoxaparin  40 mg SubCUTAneous Daily     Continuous Infusions:    sodium chloride      dextrose       PRN Meds: menthol-methyl salicylate, cyclobenzaprine, calcium carbonate, sodium chloride flush, sodium chloride, ondansetron **OR** ondansetron, polyethylene glycol, acetaminophen **OR** acetaminophen, glucose, dextrose, glucagon (rDNA), dextrose, fentanNYL, oxyCODONE, albuterol sulfate HFA, busPIRone    Data:     Past Medical History:   has a past medical history of Anemia, Asthma, CAD (coronary artery disease), Depression, High cholesterol, Hypertension, MI, old, Osteoarthritis, Pneumonia, Sleep apnea, and Stroke ([address] Social History:   reports that she has been smoking cigarettes. She has a 12.50 pack-year smoking history. She has never used smokeless tobacco. She reports previous alcohol use. She reports current drug use. Drug: Marijuana Dauna Other). Family History:   Family History   Problem Relation Age of Onset    High Blood Pressure Mother        Vitals:  /78   Pulse 94   Temp 98.4 °F (36.9 °C)   Resp 16   Ht 5' 1\" (1.549 m)   Wt 156 lb (70.8 kg)   SpO2 100%   BMI 29.48 kg/m²   Temp (24hrs), Av.7 °F (37.1 °C), Min:98.4 °F (36.9 °C), Max:99 °F (37.2 °C)    Recent Labs     22  0735   POCGLU 111*       I/O (24Hr):   No intake or output data in the 24 hours ending 22 1618    Labs:  Hematology:  Recent Labs     22  0801   WBC 12.8*   RBC 3.91*   HGB 11.5*   HCT 35.3*   MCV 90.3   MCH 29.4   MCHC 32.6   RDW 15.0*   *   MPV 8.6     Chemistry:  Recent Labs     22  0801   *   K 3.5*   CL 97*   CO2 25   GLUCOSE 117*   BUN 16   CREATININE 0.80   ANIONGAP 11   LABGLOM >60   GFRAA >60   CALCIUM 9.2     Recent Labs     22  0801 22  0735   PROT 7.5  --    LABALBU 4.1  --    AST 29  --    ALT 41*  --    ALKPHOS 83  --    BILITOT 1.13  --    POCGLU  --  111*     ABG:  Lab Results   Component Value Date    POCPH 7.484 2022    POCPCO2 30.6 2022    POCPO2 77.1 2022    POCHCO3 23.0 2022    NBEA 4 2022    PBEA 0 2022    OEJX3ADD 96 2022    FIO2 60.0 2022     Lab Results   Component Value Date/Time    SPECIAL R AC 1ML 04/10/2022 02:49 AM     Lab Results   Component Value Date/Time    CULTURE NO GROWTH 5 DAYS 2022 03:40 PM       Radiology:  XR CHEST (2 VW)    Result Date: 2022  Left pleural effusion with left basilar opacity that could represent atelectasis or pneumonia, not significantly changed     XR SHOULDER RIGHT (MIN 2 VIEWS)    Result Date: 4/25/2022  Mild degenerative joint disease without acute osseous or soft tissue abnormality. XR CHEST PORTABLE    Result Date: 4/21/2022  Interval decrease in size of a left pleural effusion. Bibasilar airspace disease likely representing atelectasis. XR CHEST PORTABLE    Result Date: 4/21/2022  There is a moderate-sized left pleural effusion which is mildly increased. Trace right effusion. Bibasilar airspace disease likely representing atelectasis. XR CHEST PORTABLE    Result Date: 4/20/2022  Bibasilar opacities most confluent left lung base suggestive of effusions and atelectasis     US THORACENTESIS Which side should the procedure be performed? Left    Result Date: 4/25/2022  Successful ultrasound guided thoracentesis. US THORACENTESIS Which side should the procedure be performed? Left    Result Date: 4/21/2022  Successful ultrasound guided thoracentesis. Physical Examination:        General appearance:  alert, cooperative and no distress  Mental Status:  oriented to person, place and time and normal affect  Lungs: Decreased breath sound on left base  Heart:  regular rate and rhythm, CABG incision site, dressing applied  Abdomen:  soft, nontender, nondistended, normal bowel sounds, no masses, hepatomegaly, splenomegaly  Extremities:  no edema, redness, tenderness in the calves  Skin:  no gross lesions, rashes, induration    Assessment:        Hospital Problems           Last Modified POA    * (Principal) Pleural effusion 4/20/2022 Yes    Recurrent pleural effusion 4/20/2022 Yes          Plan:        Recurrent left pleural effusion-patient had thoracentesis on 4/22, repeat thoracentesis on 4/25, only had 250 mL of fluid removed. Patient is on room air, saturating well. Continue Lasix, no plans for repeat thoracentesis, pulmonology following.   Coronary artery disease s/p recent CABG-continues on aspirin, Plavix, Lipitor and amiodarone  Postoperative anemia - stable  Right shoulder pain-improved with Flexeril, x-ray shows chronic changes, no acute fracture  Hypertension-on Norvasc, Lopressor  Hyperlipidemia on statin  Tobacco abuse-motivated to quit  Replace electrolytes    Patient was denied for rehab placement  Patient wants to appeal for her discharge and is not willing to go to a lower level of care  Peer to peer to be done by Dr. Chely Hogue, patient is medically stable for discharge.     Marilyn Ma MD  4/26/2022  4:18 PM

## 2022-04-26 NOTE — PROGRESS NOTES
Physical Therapy  Facility/Department: [facility] RENAL//MED SURG  Daily Treatment Note  NAME: Brianne Watson  : 1965  MRN: [id number]    Date of Service: 2022    Discharge Recommendations:  Therapy recommended at [address] [place] [address] would benefit from continued therapy after discharge   PT Equipment Recommendations  Equipment Needed: Yes  Mobility Devices: Yajaira Flash: Rolling    Patient Diagnosis(es): There were no encounter diagnoses. Assessment   Assessment: Tthe pt able to amb 30ft x2 and 10ft x2 with RW CGA-SBA for safety multiple standing rest breaks and 1 seated rest break. , limited by significant SOB , fatigue and back pain and decrease endurance, also making her a fall risk. . Recomending continue therapy to addres defificts  Activity Tolerance: Patient limited by fatigue;Patient limited by pain; Patient limited by endurance  Equipment Needed: Yes  Mobility Devices: [address]: 5-7 times per week  Current Treatment Recommendations: Strengthening; Functional mobility training; Endurance training;Gait training;Stair training; Safety education & training; Therapeutic activities  PT Plan of Care: Daily     Subjective    Subjective  Subjective: Pt supine in bed upon arrival . agreeable to PT. pleasant and cooperative t/o  Pain: 7/10 PAIN on did back. Orientation  Overall Orientation Status: Within Functional Limits  Orientation Level: Oriented X4  Cognition  Overall Cognitive Status: WFL     Objective      Bed Mobility Training  Bed Mobility Training: Yes  Supine to Sit: Minimum assistance;Assist X1 (for trunk progression.)  Sit to Supine:  (pt retired to chair)  Scooting: Stand-by assistance  Balance  Sitting: Intact  Standing: Impaired  Standing - Static: Fair (with SBA  for safety.)  Standing - Dynamic: Fair  Transfer Training  Transfer Training: Yes  Overall Level of Assistance: Contact-guard assistance  Interventions: Verbal cues; Safety awareness training  Sit to Stand: Contact-guard assistance  Stand to Sit: Stand-by assistance  Bed to Chair: Contact-guard assistance  Toilet Transfer: Contact-guard assistance;Stand-by assistance  Duration: 10 (on toilet seat SBA)  Gait Training  Gait Training: Yes  Right Side Weight Bearing: Full  Left Side Weight Bearing: Full  Gait  Overall Level of Assistance: Contact-guard assistance;Stand-by assistance  Interventions: Safety awareness training;Verbal cues  Base of Support: Narrowed  Speed/Erin: Slow  Gait Abnormalities: Step to gait; Other (comment) (extremely slow  with multiple standing rest breaks . Flexed pusture throught pt out of MENDEZ making he a fall risk.)  Distance (ft): 30 Feet (x2) and 10Feet x2  PT Exercises  Exercise Treatment: Seated LE exercise program: Long Arc Quads, hip abduction/adduction, heel/toe raises, and marches. Reps: 10  increase time to complete task.         Patient Education  Education Given To: Patient  Education Provided: Role of Therapy;Plan of Care;Energy Conservation;Home Exercise Program;Transfer Training  Education Method: Demonstration;Verbal  Barriers to Learning: None  Education Outcome: Verbalized understanding;Demonstrated understanding    AM-PAC Score  AM-PAC Inpatient Mobility Raw Score : 17 (04/26/22 1914)  Goals  Long Term Goals  Time Frame for Long term goals : 10 visits  Long term goal 1: transfers with SBA  Long term goal 2: amb 150 ft with or without device x SBA  Long term goal 3: ascend/descend 4 steps with SBA  Long term goal 4: 20 min strenthening exercise program x SBA  Patient Goals   Patient goals : Return home      Therapy Time   Individual Concurrent Group Co-treatment   Time In 1348         Time Out 1430         Minutes 42         Timed Code Treatment Minutes: [address], PTA

## 2022-04-26 NOTE — PLAN OF CARE
Problem: Falls - Risk of:  Goal: Will remain free from falls  Description: Will remain free from falls  Outcome: Progressing  Goal: Absence of physical injury  Description: Absence of physical injury  Outcome: Progressing     Problem: Pain:  Goal: Pain level will decrease  Description: Pain level will decrease  Outcome: Progressing  Goal: Control of acute pain  Description: Control of acute pain  Outcome: Progressing  Goal: Control of chronic pain  Description: Control of chronic pain  Outcome: Progressing     Problem: Pain  Goal: Verbalizes/displays adequate comfort level or baseline comfort level  Outcome: Progressing

## 2022-04-27 VITALS
HEART RATE: 98 BPM | HEIGHT: 61 IN | RESPIRATION RATE: 16 BRPM | TEMPERATURE: 96.7 F | SYSTOLIC BLOOD PRESSURE: 112 MMHG | OXYGEN SATURATION: 100 % | WEIGHT: 153.88 LBS | BODY MASS INDEX: 29.05 KG/M2 | DIASTOLIC BLOOD PRESSURE: 78 MMHG

## 2022-04-27 LAB
CULTURE: ABNORMAL
DIRECT EXAM: ABNORMAL
POTASSIUM SERPL-SCNC: 3.7 MMOL/L (ref 3.7–5.3)
SPECIMEN DESCRIPTION: ABNORMAL

## 2022-04-27 PROCEDURE — 6370000000 HC RX 637 (ALT 250 FOR IP): Performed by: FAMILY MEDICINE

## 2022-04-27 PROCEDURE — 84132 ASSAY OF SERUM POTASSIUM: CPT

## 2022-04-27 PROCEDURE — 6370000000 HC RX 637 (ALT 250 FOR IP): Performed by: NURSE PRACTITIONER

## 2022-04-27 PROCEDURE — 6370000000 HC RX 637 (ALT 250 FOR IP): Performed by: INTERNAL MEDICINE

## 2022-04-27 PROCEDURE — 6370000000 HC RX 637 (ALT 250 FOR IP): Performed by: STUDENT IN AN ORGANIZED HEALTH CARE EDUCATION/TRAINING PROGRAM

## 2022-04-27 PROCEDURE — 36415 COLL VENOUS BLD VENIPUNCTURE: CPT

## 2022-04-27 PROCEDURE — 99239 HOSP IP/OBS DSCHRG MGMT >30: CPT | Performed by: STUDENT IN AN ORGANIZED HEALTH CARE EDUCATION/TRAINING PROGRAM

## 2022-04-27 PROCEDURE — 6360000002 HC RX W HCPCS: Performed by: INTERNAL MEDICINE

## 2022-04-27 RX ORDER — POTASSIUM CHLORIDE 20 MEQ/1
20 TABLET, EXTENDED RELEASE ORAL 2 TIMES DAILY WITH MEALS
Status: DISCONTINUED | OUTPATIENT
Start: 2022-04-27 | End: 2022-04-27 | Stop reason: HOSPADM

## 2022-04-27 RX ORDER — POTASSIUM CHLORIDE 20 MEQ/1
20 TABLET, EXTENDED RELEASE ORAL
Status: DISCONTINUED | OUTPATIENT
Start: 2022-04-27 | End: 2022-04-27

## 2022-04-27 RX ORDER — POTASSIUM CHLORIDE 20 MEQ/1
20 TABLET, EXTENDED RELEASE ORAL 2 TIMES DAILY
Qty: 60 TABLET | Refills: 3 | Status: SHIPPED | OUTPATIENT
Start: 2022-04-27

## 2022-04-27 RX ADMIN — AMLODIPINE BESYLATE 10 MG: 10 TABLET ORAL at 09:16

## 2022-04-27 RX ADMIN — OXYCODONE HYDROCHLORIDE 5 MG: 5 TABLET ORAL at 12:00

## 2022-04-27 RX ADMIN — AMIODARONE HYDROCHLORIDE 100 MG: 200 TABLET ORAL at 09:17

## 2022-04-27 RX ADMIN — CYCLOBENZAPRINE HYDROCHLORIDE 5 MG: 5 TABLET, FILM COATED ORAL at 10:46

## 2022-04-27 RX ADMIN — ACETAMINOPHEN 650 MG: 325 TABLET ORAL at 00:10

## 2022-04-27 RX ADMIN — ACETAMINOPHEN 650 MG: 325 TABLET ORAL at 10:46

## 2022-04-27 RX ADMIN — Medication 81 MG: at 09:16

## 2022-04-27 RX ADMIN — ENOXAPARIN SODIUM 40 MG: 100 INJECTION SUBCUTANEOUS at 09:17

## 2022-04-27 RX ADMIN — FUROSEMIDE 40 MG: 40 TABLET ORAL at 09:16

## 2022-04-27 RX ADMIN — CLOPIDOGREL 75 MG: 75 TABLET, FILM COATED ORAL at 09:17

## 2022-04-27 RX ADMIN — POTASSIUM CHLORIDE 20 MEQ: 1500 TABLET, EXTENDED RELEASE ORAL at 09:42

## 2022-04-27 RX ADMIN — METOPROLOL TARTRATE 25 MG: 25 TABLET ORAL at 09:17

## 2022-04-27 RX ADMIN — PANTOPRAZOLE SODIUM 40 MG: 40 TABLET, DELAYED RELEASE ORAL at 09:16

## 2022-04-27 ASSESSMENT — PAIN DESCRIPTION - LOCATION: LOCATION: BACK;CHEST

## 2022-04-27 ASSESSMENT — PAIN SCALES - GENERAL: PAINLEVEL_OUTOF10: 8

## 2022-04-27 NOTE — PLAN OF CARE
Problem: Falls - Risk of:  Goal: Will remain free from falls  Description: Will remain free from falls  4/27/2022 1239 by Arnaldo Castillo RN  Outcome: Completed  4/27/2022 1133 by Crescencio Pitt  Outcome: Progressing  4/27/2022 0232 by Juan Thompson RN  Outcome: Progressing  Goal: Absence of physical injury  Description: Absence of physical injury  4/27/2022 1239 by Arnaldo Castillo RN  Outcome: Completed  4/27/2022 1133 by Crescencio Pitt  Outcome: Progressing  4/27/2022 0232 by Juan Thompson RN  Outcome: Progressing     Problem: Pain:  Description: Pain management should include both nonpharmacologic and pharmacologic interventions.   Goal: Pain level will decrease  Description: Pain level will decrease  4/27/2022 1239 by Arnaldo Castillo RN  Outcome: Completed  4/27/2022 1133 by Crescencio Pitt  Outcome: Progressing  4/27/2022 0232 by Juan Thompson RN  Outcome: Progressing  Goal: Control of acute pain  Description: Control of acute pain  4/27/2022 1239 by Arnaldo Castillo RN  Outcome: Completed  4/27/2022 1133 by Crescencio Pitt  Outcome: Progressing  4/27/2022 0232 by Juan Thompson RN  Outcome: Progressing  Goal: Control of chronic pain  Description: Control of chronic pain  4/27/2022 1239 by Arnaldo Castillo RN  Outcome: Completed  4/27/2022 1133 by Crescencio Pitt  Outcome: Progressing  4/27/2022 0232 by Juan Thompson RN  Outcome: Progressing     Problem: Discharge Planning  Goal: Discharge to home or other facility with appropriate resources  4/27/2022 1239 by Arnaldo Castillo RN  Outcome: Completed  4/27/2022 1133 by Crescencio Pitt  Outcome: Progressing     Problem: Chronic Conditions and Co-morbidities  Goal: Patient's chronic conditions and co-morbidity symptoms are monitored and maintained or improved  4/27/2022 1239 by Arnaldo Castillo RN  Outcome: Completed  4/27/2022 1133 by Crescencio Pitt  Outcome: Progressing     Problem: Pain  Goal: Verbalizes/displays adequate comfort level or baseline comfort level  4/27/2022 1239 by Pablo Resendez RN  Outcome: Completed  4/27/2022 1133 by Anthony Olivarez  Outcome: Progressing  4/27/2022 0232 by Juan Antonio Ortiz RN  Outcome: Progressing     Problem: ABCDS Injury Assessment  Goal: Absence of physical injury  4/27/2022 1239 by Pablo Resendez RN  Outcome: Completed  4/27/2022 1133 by Anthony Olivarez  Outcome: Progressing

## 2022-04-27 NOTE — PLAN OF CARE
Problem: Falls - Risk of:  Goal: Will remain free from falls  Description: Will remain free from falls  4/27/2022 1133 by Kaya Martin  Outcome: Progressing  4/27/2022 0232 by Roe Dandy, RN  Outcome: Progressing  Goal: Absence of physical injury  Description: Absence of physical injury  4/27/2022 1133 by Kaya Martin  Outcome: Progressing  4/27/2022 0232 by Roe Dandy, RN  Outcome: Progressing     Problem: Pain:  Goal: Pain level will decrease  Description: Pain level will decrease  4/27/2022 1133 by Kaya Martin  Outcome: Progressing  4/27/2022 0232 by Roe Dandy, RN  Outcome: Progressing  Goal: Control of acute pain  Description: Control of acute pain  4/27/2022 1133 by Kaya Martin  Outcome: Progressing  4/27/2022 0232 by Roe Dandy, RN  Outcome: Progressing  Goal: Control of chronic pain  Description: Control of chronic pain  4/27/2022 1133 by Kaya Martin  Outcome: Progressing  4/27/2022 0232 by Roe Dandy, RN  Outcome: Progressing     Problem: Discharge Planning  Goal: Discharge to home or other facility with appropriate resources  Outcome: Progressing     Problem: Chronic Conditions and Co-morbidities  Goal: Patient's chronic conditions and co-morbidity symptoms are monitored and maintained or improved  Outcome: Progressing     Problem: Pain  Goal: Verbalizes/displays adequate comfort level or baseline comfort level  4/27/2022 1133 by Kaya Martin  Outcome: Progressing  4/27/2022 0232 by Roe Dandy, RN  Outcome: Progressing     Problem: ABCDS Injury Assessment  Goal: Absence of physical injury  Outcome: Progressing

## 2022-04-27 NOTE — CARE COORDINATION
Received a call from Rosa Domínguez at [facility], she tells me that the P2P was overturned but tells me that she needs to check with administration to see if she can admit today.  Awaiting

## 2022-04-27 NOTE — CARE COORDINATION
Transitional planning    Several attempts to reach [facility] to confirm patient still has bed, will reattempt, transport request faxed to [facility] for will call via [facility]. Patient and Primary RN updated. [phone] Call to [facility] , left message on admissions line with request for return call to confirm patient still has bed. [phone]  Call to Karey at [facility] , confirmed patient has bed, ok to come today, report number given to primary RN Ahmet Vergara [phone], call to [facility] and spoke with Rajeev Gomez transport set for 200, Royce Longo and Primary RN updated, request to update patient and premedicate.  AVS faxed to [phone]

## 2022-04-27 NOTE — PROGRESS NOTES
Comprehensive Nutrition Assessment    Type and Reason for Visit:  Initial (LOS)    Nutrition Recommendations/Plan:   1. Continue current diet. Encourage/monitor PO intakes as tolerated. Monitor need for oral supplements. 2. Monitor labs, weights, and plan of care. Malnutrition Assessment:  Malnutrition Status: At risk for malnutrition (Comment) (04/27/22 1220)    Context:  Acute Illness     Findings of the 6 clinical characteristics of malnutrition:  Energy Intake:  Mild decrease in energy intake  Weight Loss:  No significant weight loss     Body Fat Loss:  No significant body fat loss   Muscle Mass Loss:  No significant muscle mass loss  Fluid Accumulation:  No significant fluid accumulation   Strength:  Not Performed    Nutrition Assessment:    Pt seen/chart reviewed for length of stay. Admitted for recurrent pleural effusion. PMH includes: CAD, h/o CABG. Pt s/p thoracentesis x multiple times during admission. Observed breakfast tray which pt ate 50% of meal including some Bengali toast, rojas, and fruit. Per chart review recorded PO intakes of 25-75% noted. Weight fluctuations noted per chart review. Labs/Meds reviewed. Nutrition Related Findings:    Labs/Meds reviewed. Last BM 4/26. Wound Type: Surgical Incision       Current Nutrition Intake & Therapies:    Average Meal Intake: 51-75% (Variable PO intakes)  Average Supplements Intake: None Ordered  ADULT DIET; Regular    Anthropometric Measures:  Height: 5' 1\" (154.9 cm)  Ideal Body Weight (IBW): 105 lbs (48 kg)    Admission Body Weight: 160 lb (72.6 kg)  Current Body Weight: 153 lb 14.1 oz (69.8 kg), 146.6 % IBW. Weight Source: Bed Scale  Current BMI (kg/m2): 29.1  Usual Body Weight: 160 lb 7.9 oz (72.8 kg) (4/3/22 bed scale per chart review)  % Weight Change (Calculated): -4.1                    BMI Categories: Overweight (BMI 25.0-29. 9)    Estimated Daily Nutrient Needs:  Energy Requirements Based On: Kcal/kg  Weight Used for Energy Requirements: Current  Energy (kcal/day): 8713-1576 kcals/day  Weight Used for Protein Requirements: Ideal  Protein (g/day): 70 gm pro/day  Method Used for Fluid Requirements: Other  Fluid (ml/day): per MD    Nutrition Diagnosis:   · Inadequate oral intake related to  (current condition) as evidenced by intake 26-50%,intake 51-75% (variable PO intakes)    Nutrition Interventions:   Food and/or Nutrient Delivery: Continue Current Diet (Monitor need for oral supplements with meals.)  Nutrition Education/Counseling: No recommendation at this time  Coordination of Nutrition Care: Continue to monitor while inpatient       Goals:  Previous Goal Met:  (Goal Set)  Goals: PO intake 75% or greater,within 7 days       Nutrition Monitoring and Evaluation:   Behavioral-Environmental Outcomes: None Identified  Food/Nutrient Intake Outcomes: Food and Nutrient Intake  Physical Signs/Symptoms Outcomes: Biochemical Data,GI Status,Fluid Status or Edema,Hemodynamic Status,Nutrition Focused Physical Findings,Skin,Weight    Discharge Planning:     Too soon to determine     Reather INOCENCIO De La Garza, LD  Contact: [phone]

## 2022-04-27 NOTE — DISCHARGE SUMMARY
[facility]  Office: [address], DO, Quintin Gear, DO, Annel Recinos, DO, Felicita Marbella Blood, DO, Ravinder Woods MD, Arely Baez MD, Nayan Rios MD, Ajay Bautista MD, Jessica Rust MD, Christy Fletcher MD, Mildred Garcia MD, Marlys Fragmin, DO, Bella Townsend, DO, Cony Izquierdo MD,  Priya Auguste, DO, Juliann Bautista MD, Marielos Kennedy MD, Yvrose Simon MD, Evan Shah, DO, Mickie Barcenas MD, Karyn Lewis MD, Inez Vicente, CNP, [facility], CNP, Home Whiting, CNP, Ayanna Dhaliwal, CNP, Jess Jarquin, CNP, Rigo Olivarez, CNP, Lendel Nissen, PA-C, Bhaskar Blood, DNP, Reina Ortiz, CNP, Buster Wilson, CNP, Urszula Fly, CNP, Chasity Mask, CNS, Cally Phan, DNP, Cristela Postal, CNP, Renetta Diaz, CNP, Nancy Rangel, CNP         Formerly [facility]    Discharge Summary     Patient ID: Kiran Hensley  :  1965   MRN: [id number]     ACCOUNT:  [de-identified]   Patient's PCP: No primary care provider on file. Admit Date: 2022   Discharge Date: 2022     Length of Stay: 7  Code Status:  Prior  Admitting Physician: No admitting provider for patient encounter. Discharge Physician: Marielos Kennedy MD     Active Discharge Diagnoses:     Hospital Problem Lists:  Principal Problem:    Pleural effusion  Active Problems:    Recurrent pleural effusion    Status post coronary artery bypass graft    CAD (coronary artery disease) with multiple stents    Smoker    Anxiety    Dyslipidemia    Essential hypertension    History of stroke    Mixed hyperlipidemia    Multiple vessel coronary artery disease s/p CABG   Resolved Problems:    * No resolved hospital problems. *      Admission Condition:  poor     Discharged Condition: good    Hospital Stay:     Hospital Course:  Kiran Hensley is a 61 y.o. female who was admitted for the management of   Pleural effusion , presented to ER with shortness of breath.   Patient has recent history of CABG on 4/10 for coronary artery disease. Post CABG patient had episode of hypoxic respiratory failure requiring reintubation and bronc with mucous plugging removal.  Patient was then extubated on 4/12. Patient was discharged however she continued to have dyspnea on exertion and leg swelling, initially went to [facility] and was transferred here due to recent hospitalization. Patient was seen to have left-sided pleural effusion, thoracentesis was done on 4/21 with 450 mils fluid removed. Patient was evaluated by CT surgery, no acute interventions are planned and they wanted to follow-up outpatient. Patient had a repeat x-ray showing mild effusion on left side again, thoracentesis was done on 4/25 showing only 250 mils of fluid. Patient has been saturating well on room air.     Significant therapeutic interventions:   Recurrent left pleural effusion-patient had thoracentesis on 4/22, repeat thoracentesis on 4/25, only had 250 mL of fluid removed. Patient is on room air, saturating well. Continue Lasix, no plans for repeat thoracentesis, pulmonology following.   Coronary artery disease s/p recent CABG-continues on aspirin, Plavix, Lipitor and amiodarone  Postoperative anemia - stable  Right shoulder pain-improved with Flexeril, x-ray shows chronic changes, no acute fracture  Hypertension-on Norvasc, Lopressor  Hyperlipidemia on statin  Tobacco abuse-motivated to quit    No acute events overnight  No issues at the time of discharge    Exam on discharge  Patient is alert and oriented  Clear to auscultation bilaterally, cabg site dressing  Regular rate and rhythm  No abdominal tenderness, no organomegaly  No pedal edema    Significant Diagnostic Studies:   Labs / Micro:  CBC:   Lab Results   Component Value Date    WBC 12.8 04/25/2022    RBC 3.91 04/25/2022    HGB 11.5 04/25/2022    HCT 35.3 04/25/2022    MCV 90.3 04/25/2022    MCH 29.4 04/25/2022    MCHC 32.6 04/25/2022    RDW 15.0 04/25/2022    PLT 758 04/25/2022     BMP:    Lab Results   Component Value Date    GLUCOSE 117 04/25/2022     04/25/2022    K 3.7 04/27/2022    CL 97 04/25/2022    CO2 25 04/25/2022    ANIONGAP 11 04/25/2022    BUN 16 04/25/2022    CREATININE 0.80 04/25/2022    BUNCRER 15 09/09/2021    CALCIUM 9.2 04/25/2022    LABGLOM >60 04/25/2022    GFRAA >60 04/25/2022    GFR      04/25/2022     HFP:    Lab Results   Component Value Date    PROT 7.5 04/25/2022     CMP:    Lab Results   Component Value Date    GLUCOSE 117 04/25/2022     04/25/2022    K 3.7 04/27/2022    CL 97 04/25/2022    CO2 25 04/25/2022    BUN 16 04/25/2022    CREATININE 0.80 04/25/2022    ANIONGAP 11 04/25/2022    ALKPHOS 83 04/25/2022    ALT 41 04/25/2022    AST 29 04/25/2022    BILITOT 1.13 04/25/2022    LABALBU 4.1 04/25/2022    ALBUMIN 1.2 04/25/2022    LABGLOM >60 04/25/2022    GFRAA >60 04/25/2022    GFR      04/25/2022    PROT 7.5 04/25/2022    CALCIUM 9.2 04/25/2022     PT/INR:    Lab Results   Component Value Date    PROTIME 11.6 04/16/2022    INR 1.1 04/16/2022        Radiology:  XR CHEST (2 VW)    Result Date: 4/24/2022  Left pleural effusion with left basilar opacity that could represent atelectasis or pneumonia, not significantly changed     XR SHOULDER RIGHT (MIN 2 VIEWS)    Result Date: 4/25/2022  Mild degenerative joint disease without acute osseous or soft tissue abnormality. XR CHEST PORTABLE    Result Date: 4/21/2022  Interval decrease in size of a left pleural effusion. Bibasilar airspace disease likely representing atelectasis. XR CHEST PORTABLE    Result Date: 4/21/2022  There is a moderate-sized left pleural effusion which is mildly increased. Trace right effusion. Bibasilar airspace disease likely representing atelectasis. US THORACENTESIS Which side should the procedure be performed? Left    Result Date: 4/25/2022  Successful ultrasound guided thoracentesis. US THORACENTESIS Which side should the procedure be performed? Left    Result Date: 4/21/2022  Successful ultrasound guided thoracentesis. Consultations:    Consults:     Final Specialist Recommendations/Findings:   IP CONSULT TO CARDIOLOGY  IP CONSULT TO PULMONOLOGY  IP CONSULT TO Orestes Luong      The patient was seen and examined on day of discharge and this discharge summary is in conjunction with any daily progress note from day of discharge. Discharge plan:     Disposition:  To a non-[facility] facility    Physician Follow Up:     [address] [address], [address]  [phone]    On 4/28/2022  please follow up at 11AM with a chest xray       Requiring Further Evaluation/Follow Up POST HOSPITALIZATION/Incidental Findings: follow up with ct surgery  continue lasix    Diet: cardiac diet    Activity: As tolerated    Instructions to Patient: take meds as prescribed    Discharge Medications:      Medication List      START taking these medications    cyclobenzaprine 5 MG tablet  Commonly known as: FLEXERIL  Take 1 tablet by mouth 3 times daily as needed for Muscle spasms        CHANGE how you take these medications    amiodarone 200 MG tablet  Commonly known as: CORDARONE  Take 0.5 tablets by mouth 2 times daily  What changed: when to take this     busPIRone 15 MG tablet  Commonly known as: BUSPAR  take 1 tablet by mouth three times a day  What changed:   when to take this  reasons to take this     potassium chloride 20 MEQ extended release tablet  Commonly known as: KLOR-CON M  Take 1 tablet by mouth 2 times daily  What changed:   how much to take  when to take this  reasons to take this        CONTINUE taking these medications    albuterol sulfate  (90 Base) MCG/ACT inhaler  INHALE 2 PUFFS BY MOUTH EVERY 6 HOURS AS NEEDED FOR WHEEZING     amLODIPine 10 MG tablet  Commonly known as: NORVASC  Take 1 tablet by mouth daily     aspirin 81 MG EC tablet  Take 1 tablet by mouth daily     Blood Pressure Monitor Misc  Use daily to take BP     calcium carbonate 500 MG chewable tablet  Commonly known as: TUMS  Take 1 tablet by mouth 3 times daily as needed for Heartburn     cetirizine 10 MG tablet  Commonly known as: ZYRTEC  take 1 tablet by mouth once daily     clopidogrel 75 MG tablet  Commonly known as: PLAVIX  TAKE 1 TABLET BY MOUTH DAILY     diclofenac sodium 1 % Gel  Commonly known as: VOLTAREN  Apply 2 g topically 2 times daily     ezetimibe 10 MG tablet  Commonly known as: Zetia  Take 1 tablet by mouth daily     GNP GINGKO BILOBA EXTRACT PO     hydrocortisone 1 % cream  apply topically twice a day     lidocaine 4 % cream  Commonly known as: LMX  Apply 2g topically as needed. lovastatin 10 MG tablet  Commonly known as: MEVACOR  TAKE 1 TABLET BY MOUTH EVERY NIGHT     metoprolol tartrate 25 MG tablet  Commonly known as: LOPRESSOR  Take 1 tablet by mouth 2 times daily     mometasone-formoterol 100-5 MCG/ACT inhaler  Commonly known as: Dulera  Inhale 2 puffs into the lungs 2 times daily     Omega-3 EPA Fish Oil 1205 MG Caps  Take 1 tablet by mouth daily     omeprazole 20 MG delayed release capsule  Commonly known as: PRILOSEC  TAKE 1 CAPSULE BY MOUTH EVERY DAY     SoftHeat Heating Wrap Ultra Misc  Use daily to for neck and shoulder pain        STOP taking these medications    clobetasol 0.05 % ointment  Commonly known as: TEMOVATE           Where to Get Your Medications      These medications were sent to [facility] [address] [facility], [address], [place] [address]    Phone: [phone]   cyclobenzaprine 5 MG tablet  potassium chloride 20 MEQ extended release tablet     Information about where to get these medications is not yet available    Ask your nurse or doctor about these medications  amiodarone 200 MG tablet         No discharge procedures on file.     Time Spent on discharge is 34 mins in patient examination, evaluation, counseling as well as medication reconciliation, prescriptions for required medications, discharge plan and follow up. Electronically signed by   Tameka Nicole MD  4/27/2022  3:47 PM      Thank you Dr. Vi Gasca primary care provider on file. for the opportunity to be involved in this patient's care.

## 2022-05-04 NOTE — ADT AUTH CERT
Utilization Reviews          4/24 by Nereida Meredith RN       Review Status Review Entered   In Primary 5/3/2022 14:27      Criteria Review   DATE: 4/24/2022           Pertinent Updates:  No acute events overnight  No new complaints      Vitals: No acute events overnight  No new complaints      /74   Pulse 87   Temp 97.2 °F (36.2 °C) (Temporal)   Resp 18         Physical Exam:     General appearance:  alert, cooperative and no distress  Mental Status:  oriented to person, place and time and normal affect  Lungs:  clear to auscultation bilaterally, normal effort  Heart:  regular rate and rhythm, no murmur  Abdomen:  soft, nontender, nondistended, normal bowel sounds   Extremities:  no edema, redness, tenderness in the calves  Skin:  no gross lesions, rashes, induration     MD Consults/Assessments & Plans:  Internal Medicine      Assessment:                                      Hospital Problems            Last Modified POA     * (Principal) Pleural effusion 4/20/2022 Yes     Recurrent pleural effusion 4/20/2022 Yes              Plan:         1. Post CABG recurrent pleural effusion  - cardiology on board - cleared pt for d/c  - CTS on board - cleared pt for dc  - pulmonology on board - cleared pt for d/c  - continue PO lasix   - monitor Is/Os   - echo done, normal LVEF  - repeat chest xray showed moderate effusion to the left thorax  - IR consult for thoracentesis, had 450 ml removed on 4/21     2. CAD   - cardiology on board   - telemetry   - stable at this time      3.  HTN  - v/s per unit protocol   - continue current anti hypertensive regimen      4. Anemia  - Hemoglobin 11.3 yday, remains stable     5. Hyperlipidemia  - Resume high intensity statin     6.  Anxiety/Depression   - On buparone 15mg tiD     7. DISPO  - pending pre cert for SNF placement  - PT/OT     Pulmonology      Assessment:     // Left pleural effusion- exudate - PCIS   Left basal  atelectasis     // Recent CABG x3 (4/8/2022)  // Postoperative anemia  // Coronary artery disease  // Essential hypertension  // Hyperlipidemia  // Tobacco abuse     Plan:     Xray chest reviewed - will arrange repeat therapeutic thoracentesis tomorrow with IR prior to dc   IS and deep breathing   Continue bronchodilator      Medications:   amiodarone  100 mg Oral BID    amLODIPine  10 mg Oral Daily    aspirin  81 mg Oral Daily    clopidogrel  75 mg Oral Daily    atorvastatin  20 mg Oral Nightly    metoprolol tartrate  25 mg Oral BID    budesonide-formoterol  2 puff Inhalation BID    pantoprazole  40 mg Oral BID AC    sodium chloride flush  5-40 mL IntraVENous 2 times per day    enoxaparin  40 mg SubCUTAneous Daily    furosemide  40 mg Oral BID         PRN  Fentanyl 25 mcg IV x 1  Oxycodone 5 mg po prn x 1     Orders:        PT/OT/SLP/CM Assessments or Notes:            4/23 by Daniel Goldberg RN       Review Status Review Entered   In Primary 5/3/2022 14:22      Criteria Review   DATE: 4/23/2022        Pertinent Updates:  She has been given IV Lasix and is annoyed by need to go to the bathroom repeatedly.  She denies cough, sputum, wheezing, pleuritic chest pain.      Vitals: /83   Pulse 70   Temp 98.2 °F (36.8 °C) (Oral)   Resp 16         Abnl/Pertinent Labs:     Sodium Latest Ref Range: 135 - 144 mmol/L 134 (L)   Potassium Latest Ref Range: 3.7 - 5.3 mmol/L 3.3 (L)   WBC Latest Ref Range: 3.5 - 11.3 k/uL 12.5 (H)   RBC Latest Ref Range: 3.95 - 5.11 m/uL 3.87 (L)   Hemoglobin Quant Latest Ref Range: 11.9 - 15.1 g/dL 11.3 (L)   Hematocrit Latest Ref Range: 36.3 - 47.1 % 35.9 (L)   RDW Latest Ref Range: 11.8 - 14.4 % 15.3 (H)   Platelet Count Latest Ref Range: 138 - 453 k/uL 755 (H)   Absolute Mono # Latest Ref Range: 0.10 - 1.20 k/uL 1.34 (H)   Lymphocytes Latest Ref Range: 24 - 43 % 20 (L)   Absolute Eos # Latest Ref Range: 0.00 - 0.44 k/uL 0.50 (H)   Immature Granulocytes Latest Ref Range: 0 % 1 (H)   RBC Morphology Unknown ANISOCYTOSIS PRESENT         Physical Exam:  Constitutional:       General: She is not in acute distress. HENT:      Head: Normocephalic and atraumatic.      Mouth/Throat:      Mouth: Mucous membranes are moist.   Eyes:      Extraocular Movements: Extraocular movements intact.      Pupils: Pupils are equal, round, and reactive to light. Cardiovascular:      Rate and Rhythm: Normal rate and regular rhythm.      Heart sounds: No murmur heard.       Pulmonary:      Effort: No accessory muscle usage or respiratory distress.      Breath sounds:clear right , dec left post base and dull   AP diameter of chest increased. Thoracic expansion and diaphragmatic excursion diminished. BS diminished and expiratory phase prolonged. Abdominal:      General: There is no distension.      Palpations: Abdomen is soft. There is no mass.      Tenderness: There is no abdominal tenderness. Musculoskeletal:      Cervical back: Neck supple.      Right lower leg: No edema.      Left lower leg: No edema. Lymphadenopathy:      Cervical: No cervical adenopathy. Skin:     Coloration: Skin is not pale.      Findings: No rash.    Neurological:      General: No focal deficit present.      Mental Status: She is oriented to person, place, and time.          MD Consults/Assessments & Plans:  Pulmonology      Assessment:     // Left pleural effusion- exudate - PCIS   Left basal  atelectasis     // Recent CABG x3 (4/8/2022)  // Postoperative anemia  // Coronary artery disease  // Essential hypertension  // Hyperlipidemia  // Tobacco abuse     Plan:           Xray chest in am   IS and deep breathing   Ok to dc and follow up with CTS as scheduled   Continue bronchodilator         Internal Medicine      Assessment:                                      Hospital Problems            Last Modified POA     * (Principal) Pleural effusion 4/20/2022 Yes     Recurrent pleural effusion 4/20/2022 Yes              Plan:         1. Post CABG recurrent pleural effusion  - cardiology on board - cleared pt for d/c  - CTS on board - cleared pt for dc  - pulmonology on board - cleared pt for d/c  - continue PO lasix   - monitor Is/Os   - echo done, normal LVEF  - repeat chest xray showed moderate effusion to the left thorax  - IR consult for thoracentesis, had 450 ml removed on 4/21     2. CAD   - cardiology on board   - telemetry   - stable at this time      3.  HTN  - v/s per unit protocol   - continue current anti hypertensive regimen      4. Anemia  - Hemoglobin 11.3     5. Hyperlipidemia  - Resume high intensity statin     6.  Anxiety/Depression   - On buparone 15mg tiD     7. DISPO  - pending pre cert for SNF placement  - PT/OT     Medications:   amiodarone  100 mg Oral BID    amLODIPine  10 mg Oral Daily    aspirin  81 mg Oral Daily    clopidogrel  75 mg Oral Daily    atorvastatin  20 mg Oral Nightly    metoprolol tartrate  25 mg Oral BID    budesonide-formoterol  2 puff Inhalation BID    pantoprazole  40 mg Oral BID AC    sodium chloride flush  5-40 mL IntraVENous 2 times per day    enoxaparin  40 mg SubCUTAneous Daily    furosemide  40 mg Oral BID         PRN Oxycodone 5 mg po x 3                     4/22 by Sari Dumont RN       Review Status Review Entered   In Primary 5/3/2022 14:15      Criteria Review   DATE: 4/22/2022        Pertinent Updates:  Sitting up in bedside chair      Vitals: Temp 98.9  RR 18  HR 95  /80        Abnl/Pertinent Labs:     Potassium Latest Ref Range: 3.7 - 5.3 mmol/L 3.4 (L)   GLUCOSE, FASTING,GF Latest Ref Range: 70 - 99 mg/dL 118 (H)   WBC Latest Ref Range: 3.5 - 11.3 k/uL 13.7 (H)   RBC Latest Ref Range: 3.95 - 5.11 m/uL 3.50 (L)   Hemoglobin Quant Latest Ref Range: 11.9 - 15.1 g/dL 10.4 (L)   Hematocrit Latest Ref Range: 36.3 - 47.1 % 32.1 (L)   RDW Latest Ref Range: 11.8 - 14.4 % 15.4 (H)   Platelet Count Latest Ref Range: 138 - 453 k/uL 731 (H)   Seg Neutrophils Latest Ref Range: 36 - 65 % 72 (H)   Segs Absolute Latest Ref Range: 1.50 - 8.10 k/uL 10.02 (H)   Lymphocytes Latest Ref Range: 24 - 43 % 15 (L)   Immature Granulocytes Latest Ref Range: 0 % 1 (H)   RBC Morphology Unknown ANISOCYTOSIS PRESENT         Physical Exam:  Constitutional:       General: She is not in acute distress. HENT:      Head: Normocephalic and atraumatic.      Mouth/Throat:      Mouth: Mucous membranes are moist.   Eyes:      Extraocular Movements: Extraocular movements intact.      Pupils: Pupils are equal, round, and reactive to light. Cardiovascular:      Rate and Rhythm: Normal rate and regular rhythm.      Heart sounds: No murmur heard.       Pulmonary:      Effort: No accessory muscle usage or respiratory distress.      Breath sounds:improved air entry left chest and dec bases and dull   AP diameter of chest increased. Thoracic expansion and diaphragmatic excursion diminished. BS diminished and expiratory phase prolonged.   Abdominal:      General: There is no distension.      Palpations: Abdomen is soft. There is no mass.      Tenderness: There is no abdominal tenderness. Musculoskeletal:      Cervical back: Neck supple.      Right lower leg: No edema.      Left lower leg: No edema. Lymphadenopathy:      Cervical: No cervical adenopathy. Skin:     Coloration: Skin is not pale.      Findings: No rash. Neurological:      General: No focal deficit present.      Mental Status: She is oriented to person, place, and time.      MD Consults/Assessments & Plans:  Cardiology      Plan of Treatment: 1. Stable from CV standpoint. Improved post thoracentesis. Clinically stable and on RA. Continue PO Lasix   2. Continue PO Amio, ASA, statin, BB, & Plavix. No plans for further intervention from CTS standpoint  3. Echo as above. 4. BP stable. Continue BB & Norvasc.   5. Will sign off  6.    Pulmonology         Assessment:     // Left pleural effusion- exudate   Left basal  atelectasis     // Recent CABG x3 (4/8/2022)  // Postoperative anemia  // Coronary artery disease  // Essential hypertension  // Hyperlipidemia  // Tobacco abuse     Plan:     Exudate pleural effusion - PCIS   Ok to dc and follow up with CTS as scheduled   Continue bronchodilator      Internal Medicine         Plan:         1. Post CABG recurrent pleural effusion  - cardiology on board   - CTS on board   - pulmonology on board   - continue PO lasix   - monitor Is/Os   - echo done, normal LVEF  - repeat chest xray showed moderate effusion to the left thorax  - IR consult for thoracentesis, had 450 ml removed on 4/21  - cleared for d/c by CTS and pulmonology      2. CAD   - cardiology on board   - trend cardiac enzymes  - telemetry   - stable at this time      3.  HTN  - v/s per unit protocol   - continue current anti hypertensive regimen      4. Anemia  - Hemoglobin 9.8, appears to be around baseline.       5. Hyperlipidemia  - Resume high intensity statin     6.  Anxiety/Depression   - On buparone 15mg tiD     7. DISPO  - pending pre cert for SNF placement  - PT/OT        Medications:    amiodarone  100 mg Oral BID    amLODIPine  10 mg Oral Daily    aspirin  81 mg Oral Daily    clopidogrel  75 mg Oral Daily    atorvastatin  20 mg Oral Nightly    metoprolol tartrate  25 mg Oral BID    budesonide-formoterol  2 puff Inhalation BID    pantoprazole  40 mg Oral BID AC    sodium chloride flush  5-40 mL IntraVENous 2 times per day    enoxaparin  40 mg SubCUTAneous Daily    furosemide  40 mg Oral BID         PRN  Oxycodone 5 mg po x 3  Glycolax 17 g po x 1

## 2022-05-26 ENCOUNTER — HOSPITAL ENCOUNTER (OUTPATIENT)
Age: 57
Discharge: HOME | End: 2022-05-28
Payer: MEDICARE

## 2022-05-26 ENCOUNTER — HOSPITAL ENCOUNTER (OUTPATIENT)
Dept: GENERAL RADIOLOGY | Age: 57
Discharge: HOME | End: 2022-05-28
Payer: MEDICARE

## 2022-05-26 ENCOUNTER — OFFICE VISIT (OUTPATIENT)
Dept: CARDIOTHORACIC SURGERY | Age: 57
End: 2022-05-26

## 2022-05-26 VITALS
DIASTOLIC BLOOD PRESSURE: 79 MMHG | HEIGHT: 62 IN | BODY MASS INDEX: 25.03 KG/M2 | OXYGEN SATURATION: 100 % | SYSTOLIC BLOOD PRESSURE: 101 MMHG | RESPIRATION RATE: 20 BRPM | TEMPERATURE: 98.2 F | HEART RATE: 77 BPM | WEIGHT: 136 LBS

## 2022-05-26 DIAGNOSIS — Z95.1 S/P CABG (CORONARY ARTERY BYPASS GRAFT): Primary | ICD-10-CM

## 2022-05-26 DIAGNOSIS — J90 PLEURAL EFFUSION: Primary | ICD-10-CM

## 2022-05-26 DIAGNOSIS — J90 PLEURAL EFFUSION: ICD-10-CM

## 2022-05-26 PROCEDURE — 71045 X-RAY EXAM CHEST 1 VIEW: CPT

## 2022-05-26 PROCEDURE — 99024 POSTOP FOLLOW-UP VISIT: CPT | Performed by: NURSE PRACTITIONER

## 2022-05-26 NOTE — PROGRESS NOTES
[id number] [facility]  Office Visit      Subjective:  Ms. Laury Champion is a 61 y.o. female s/p CABG x3 Dr. Stephen Rowe. Patient presents today because she had episodes of pleural effusion requiring thoracentesis. Patient is doing much better at home she does not require oxygen is able to take care of herself and perform activities of daily living with no complications. No shortness of breath chest pain nausea vomiting diarrhea fever chills. She does have random episodes of left lower abdominal pain where pacer wire was placed. On physical assessment I could not locate the pacer wire I told and explained the interventions to help alleviate pacer wire pain. Physical Exam  Vitals:  Vitals:    05/26/22 1207   BP: 101/79   Pulse: 77   Resp: 20   Temp: 98.2 °F (36.8 °C)   SpO2: 100%       General: Alert and Oriented x3. Ambulatory. No apparent distress. Chest:  No abnormality. Equal and symmetric expansion with respiration. Lungs:  Clear to auscultation. Cardiac:  Regular rate and rhythm without murmurs, rubs or gallops. Abdomen:  Soft, non-tender, normoactive bowel sounds. Extremities:  No edema. Intact pulses in all four extremities. Psychiatric: Mood and affect are appropriate.   Sternal incision is clean dry intact with no discharge or bleeding  Review chest x-ray which shows very small pleural effusion on left side  Current Medications:    Current Outpatient Medications:     potassium chloride (KLOR-CON M) 20 MEQ extended release tablet, Take 1 tablet by mouth 2 times daily, Disp: 60 tablet, Rfl: 3    amiodarone (CORDARONE) 200 MG tablet, Take 0.5 tablets by mouth 2 times daily, Disp: 90 tablet, Rfl: 0    aspirin 81 MG EC tablet, Take 1 tablet by mouth daily, Disp: 30 tablet, Rfl: 3    metoprolol tartrate (LOPRESSOR) 25 MG tablet, Take 1 tablet by mouth 2 times daily, Disp: 60 tablet, Rfl: 3    amLODIPine (NORVASC) 10 MG tablet, Take 1 tablet by mouth daily, Disp: 30 tablet, Rfl: 3    albuterol sulfate  (90 Base) MCG/ACT inhaler, INHALE 2 PUFFS BY MOUTH EVERY 6 HOURS AS NEEDED FOR WHEEZING, Disp: 54 g, Rfl: 0    omeprazole (PRILOSEC) 20 MG delayed release capsule, TAKE 1 CAPSULE BY MOUTH EVERY DAY, Disp: 90 capsule, Rfl: 1    clopidogrel (PLAVIX) 75 MG tablet, TAKE 1 TABLET BY MOUTH DAILY, Disp: 90 tablet, Rfl: 0    lovastatin (MEVACOR) 10 MG tablet, TAKE 1 TABLET BY MOUTH EVERY NIGHT, Disp: 90 tablet, Rfl: 1    Ginkgo Biloba (GNP GINGKO BILOBA EXTRACT PO), Take by mouth daily, Disp: , Rfl:     ezetimibe (ZETIA) 10 MG tablet, Take 1 tablet by mouth daily, Disp: 90 tablet, Rfl: 3    mometasone-formoterol (DULERA) 100-5 MCG/ACT inhaler, Inhale 2 puffs into the lungs 2 times daily, Disp: 1 Inhaler, Rfl: 3    busPIRone (BUSPAR) 15 MG tablet, take 1 tablet by mouth three times a day (Patient taking differently: 3 times daily as needed take 1 tablet by mouth three times a day), Disp: 90 tablet, Rfl: 0    Omega-3 Fatty Acids (OMEGA-3 EPA FISH OIL) 1205 MG CAPS, Take 1 tablet by mouth daily, Disp: 90 capsule, Rfl: 1    cetirizine (ZYRTEC) 10 MG tablet, take 1 tablet by mouth once daily, Disp: 15 tablet, Rfl: 0    diclofenac sodium 1 % GEL, Apply 2 g topically 2 times daily, Disp: 1 Tube, Rfl: 3    hydrocortisone 1 % cream, apply topically twice a day, Disp: 28.4 g, Rfl: 1    Heat Wraps (SOFTHEAT HEATING WRAP ULTRA) MISC, Use daily to for neck and shoulder pain, Disp: 1 each, Rfl: 0    lidocaine (LMX) 4 % cream, Apply 2g topically as needed. , Disp: 1 Tube, Rfl: 3    Blood Pressure Monitor MISC, Use daily to take BP, Disp: 1 each, Rfl: 0    Past Surgical History:   Procedure Laterality Date    APPENDECTOMY      CORONARY ANGIOPLASTY WITH STENT PLACEMENT  2004    CORONARY ARTERY BYPASS GRAFT N/A 4/8/2022    CABG X 3 / EVH LEFT LEG / KIT performed by Domenico Fuentes MD at [address] [place] [address] Hx: reports that she has been smoking cigarettes. She has a 12.50 pack-year smoking history. She has never used smokeless tobacco.    Assessment & Plan:   Reviewed chest x-ray which shows drastic improvement of the left pleural effusion. No effusions appreciated. I told her that if there is pain where the pacer wire was located still continues we can evaluate to try and remove. I cannot feel or locate the pacer wire. Physical assessment.     [address] [facility], APRN - NP,APRN CNP

## 2022-06-07 DIAGNOSIS — I25.119 CORONARY ARTERY DISEASE INVOLVING NATIVE CORONARY ARTERY OF NATIVE HEART WITH ANGINA PECTORIS (HCC): ICD-10-CM

## 2022-06-07 DIAGNOSIS — I10 ESSENTIAL HYPERTENSION: ICD-10-CM

## 2022-06-07 RX ORDER — CLOPIDOGREL BISULFATE 75 MG/1
TABLET ORAL
Qty: 90 TABLET | Refills: 0 | OUTPATIENT
Start: 2022-06-07

## 2022-08-10 ENCOUNTER — OFFICE VISIT (OUTPATIENT)
Dept: PRIMARY CARE CLINIC | Age: 57
End: 2022-08-10
Payer: MEDICARE

## 2022-08-10 VITALS
HEART RATE: 104 BPM | OXYGEN SATURATION: 96 % | SYSTOLIC BLOOD PRESSURE: 151 MMHG | DIASTOLIC BLOOD PRESSURE: 97 MMHG | WEIGHT: 142 LBS | TEMPERATURE: 97.3 F | BODY MASS INDEX: 25.97 KG/M2

## 2022-08-10 DIAGNOSIS — Z76.0 MEDICATION REFILL: ICD-10-CM

## 2022-08-10 DIAGNOSIS — Z71.6 ENCOUNTER FOR SMOKING CESSATION COUNSELING: ICD-10-CM

## 2022-08-10 DIAGNOSIS — Z12.11 SCREENING FOR MALIGNANT NEOPLASM OF COLON: ICD-10-CM

## 2022-08-10 DIAGNOSIS — R09.81 SINUS CONGESTION: Primary | ICD-10-CM

## 2022-08-10 DIAGNOSIS — Z12.31 ENCOUNTER FOR SCREENING MAMMOGRAM FOR BREAST CANCER: ICD-10-CM

## 2022-08-10 DIAGNOSIS — Z76.89 ENCOUNTER TO ESTABLISH CARE: ICD-10-CM

## 2022-08-10 PROCEDURE — 99214 OFFICE O/P EST MOD 30 MIN: CPT | Performed by: NURSE PRACTITIONER

## 2022-08-10 RX ORDER — CETIRIZINE HYDROCHLORIDE 10 MG/1
TABLET ORAL
Qty: 30 TABLET | Refills: 5 | Status: SHIPPED | OUTPATIENT
Start: 2022-08-10

## 2022-08-10 RX ORDER — CYCLOBENZAPRINE HCL 5 MG
TABLET ORAL
COMMUNITY
Start: 2022-07-21 | End: 2022-08-10 | Stop reason: SDUPTHER

## 2022-08-10 RX ORDER — NITROGLYCERIN 0.4 MG/1
TABLET SUBLINGUAL
COMMUNITY
Start: 2022-07-21

## 2022-08-10 RX ORDER — HYDROXYZINE HYDROCHLORIDE 25 MG/1
25 TABLET, FILM COATED ORAL 2 TIMES DAILY PRN
Qty: 20 TABLET | Refills: 0 | Status: SHIPPED | OUTPATIENT
Start: 2022-08-10 | End: 2022-08-20

## 2022-08-10 RX ORDER — CYCLOBENZAPRINE HCL 5 MG
TABLET ORAL
Qty: 30 TABLET | Refills: 5 | Status: SHIPPED | OUTPATIENT
Start: 2022-08-10

## 2022-08-10 RX ORDER — DIAPER,BRIEF,INFANT-TODD,DISP
EACH MISCELLANEOUS
Qty: 28.4 G | Refills: 1 | Status: SHIPPED | OUTPATIENT
Start: 2022-08-10

## 2022-08-10 SDOH — ECONOMIC STABILITY: FOOD INSECURITY: WITHIN THE PAST 12 MONTHS, YOU WORRIED THAT YOUR FOOD WOULD RUN OUT BEFORE YOU GOT MONEY TO BUY MORE.: NEVER TRUE

## 2022-08-10 SDOH — ECONOMIC STABILITY: FOOD INSECURITY: WITHIN THE PAST 12 MONTHS, THE FOOD YOU BOUGHT JUST DIDN'T LAST AND YOU DIDN'T HAVE MONEY TO GET MORE.: NEVER TRUE

## 2022-08-10 ASSESSMENT — ENCOUNTER SYMPTOMS
BLOOD IN STOOL: 0
CHEST TIGHTNESS: 0
CONSTIPATION: 0
COUGH: 0
DIARRHEA: 0
ABDOMINAL PAIN: 0
RHINORRHEA: 0
SINUS PRESSURE: 0
EYE DISCHARGE: 0
SHORTNESS OF BREATH: 0

## 2022-08-10 ASSESSMENT — PATIENT HEALTH QUESTIONNAIRE - PHQ9
SUM OF ALL RESPONSES TO PHQ9 QUESTIONS 1 & 2: 0
4. FEELING TIRED OR HAVING LITTLE ENERGY: 0
SUM OF ALL RESPONSES TO PHQ QUESTIONS 1-9: 0
10. IF YOU CHECKED OFF ANY PROBLEMS, HOW DIFFICULT HAVE THESE PROBLEMS MADE IT FOR YOU TO DO YOUR WORK, TAKE CARE OF THINGS AT HOME, OR GET ALONG WITH OTHER PEOPLE: 0
3. TROUBLE FALLING OR STAYING ASLEEP: 0
SUM OF ALL RESPONSES TO PHQ QUESTIONS 1-9: 0
5. POOR APPETITE OR OVEREATING: 0
SUM OF ALL RESPONSES TO PHQ QUESTIONS 1-9: 0
2. FEELING DOWN, DEPRESSED OR HOPELESS: 0
SUM OF ALL RESPONSES TO PHQ QUESTIONS 1-9: 0
7. TROUBLE CONCENTRATING ON THINGS, SUCH AS READING THE NEWSPAPER OR WATCHING TELEVISION: 0
9. THOUGHTS THAT YOU WOULD BE BETTER OFF DEAD, OR OF HURTING YOURSELF: 0
6. FEELING BAD ABOUT YOURSELF - OR THAT YOU ARE A FAILURE OR HAVE LET YOURSELF OR YOUR FAMILY DOWN: 0
1. LITTLE INTEREST OR PLEASURE IN DOING THINGS: 0
8. MOVING OR SPEAKING SO SLOWLY THAT OTHER PEOPLE COULD HAVE NOTICED. OR THE OPPOSITE, BEING SO FIGETY OR RESTLESS THAT YOU HAVE BEEN MOVING AROUND A LOT MORE THAN USUAL: 0

## 2022-08-10 ASSESSMENT — SOCIAL DETERMINANTS OF HEALTH (SDOH): HOW HARD IS IT FOR YOU TO PAY FOR THE VERY BASICS LIKE FOOD, HOUSING, MEDICAL CARE, AND HEATING?: NOT HARD AT ALL

## 2022-08-10 NOTE — PROGRESS NOTES
Ze Mancera (:  1965) is a 61 y.o. female,Established patient, here for evaluation of the following chief complaint(s):  New Patient (Headache and ear pain  for days pt wants motrin 800 ) and Discuss Medications         ASSESSMENT/PLAN:  1. Sinus congestion  -     cetirizine (ZYRTEC) 10 MG tablet; take 1 tablet by mouth once daily, Disp-30 tablet, R-5Normal  2. Encounter for screening mammogram for breast cancer  -     ALEJANDRA DIGITAL SCREEN W OR WO CAD BILATERAL; Future  3. Screening for malignant neoplasm of colon  -     Fecal DNA Colorectal cancer screening (Cologuard)  4. Encounter to establish care  5. Encounter for smoking cessation counseling  -     nicotine (NICODERM CQ) 7 MG/24HR; Place 1 patch onto the skin every 24 hours, Disp-30 patch, R-3Normal  6. Medication refill  -     cyclobenzaprine (FLEXERIL) 5 MG tablet; TAKE 1 TABLET BY MOUTH THREE TIMES DAILY AS NEEDED FOR MUSCLE SPASMS, Disp-30 tablet, R-5Normal  -     hydrocortisone 1 % cream; apply topically twice a day, Disp-28.4 g, R-1, Normal      No follow-ups on file. Subjective   SUBJECTIVE/OBJECTIVE:  HPI  Pt is here to establish. Just had cabg x3 in April- doing well, not feeling 100% but starting to feel better. Bp is high today, hasn't taken meds yet today. Checking bp at home and states numbers have been in the 115/80 range. Has followed up with cardio. Would like to quit smoking. Headache for four days. Getting drainage from right ear. Decreased hearing in right ear. Reminded about pap, never had an abnormal    Review of Systems   Constitutional:  Negative for activity change, appetite change, chills, fatigue and fever. HENT:  Negative for congestion, ear pain, rhinorrhea and sinus pressure. Eyes:  Negative for discharge and visual disturbance. Respiratory:  Negative for cough, chest tightness and shortness of breath. Cardiovascular:  Negative for chest pain, palpitations and leg swelling. Gastrointestinal:  Negative for abdominal pain, blood in stool, constipation and diarrhea. Endocrine: Negative for cold intolerance and heat intolerance. Genitourinary:  Negative for difficulty urinating and hematuria. Musculoskeletal:  Negative for arthralgias and myalgias. Skin:  Negative for rash. Neurological:  Negative for dizziness, light-headedness and headaches. Psychiatric/Behavioral:  Negative for dysphoric mood and self-injury. Objective   Vitals:    08/10/22 1102   BP: (!) 151/97   Pulse: (!) 104   Temp:    SpO2:      Wt Readings from Last 3 Encounters:   08/10/22 142 lb (64.4 kg)   05/26/22 136 lb (61.7 kg)   04/27/22 153 lb 14.1 oz (69.8 kg)       Physical Exam  Constitutional:       Appearance: She is well-developed. HENT:      Right Ear: External ear normal.      Left Ear: External ear normal.      Nose: Nose normal.   Cardiovascular:      Rate and Rhythm: Normal rate and regular rhythm. Heart sounds: Normal heart sounds, S1 normal and S2 normal.   Pulmonary:      Effort: Pulmonary effort is normal. No respiratory distress. Breath sounds: Normal breath sounds. Chest:       Musculoskeletal:         General: No deformity. Normal range of motion. Cervical back: Full passive range of motion without pain and normal range of motion. Skin:     General: Skin is warm and dry. Neurological:      Mental Status: She is alert and oriented to person, place, and time. An electronic signature was used to authenticate this note.     --Markus Arechiga, ELLY - CNP

## 2022-08-10 NOTE — PROGRESS NOTES
Visit Information    Have you changed or started any medications since your last visit including any over-the-counter medicines, vitamins, or herbal medicines? no   Are you having any side effects from any of your medications? -  no  Have you stopped taking any of your medications? Is so, why? -  no    Have you seen any other physician or provider since your last visit? No  Have you had any other diagnostic tests since your last visit? No  Have you been seen in the emergency room and/or had an admission to a hospital since we last saw you? No  Have you had your routine dental cleaning in the past 6 months? no    Have you activated your [employer] account? If not, what are your barriers?  Yes     Patient Care Team:  ELLY Rothman CNP as PCP - General (Family Nurse Practitioner)    Medical History Review  Past Medical, Family, and Social History reviewed and does not contribute to the patient presenting condition    Health Maintenance   Topic Date Due    Annual Wellness Visit (AWV)  Never done    Shingles vaccine (1 of 2) Never done    Breast cancer screen  03/30/2018    Pneumococcal 0-64 years Vaccine (2 - PCV) 05/04/2018    Colorectal Cancer Screen  05/24/2018    Cervical cancer screen  11/03/2018    COVID-19 Vaccine (2 - Pfizer series) 05/22/2021    Depression Monitoring  03/24/2022    Flu vaccine (1) 09/01/2022    Lipids  12/01/2022    A1C test (Diabetic or Prediabetic)  04/07/2023    DTaP/Tdap/Td vaccine (2 - Td or Tdap) 01/01/2024    Hepatitis C screen  Completed    HIV screen  Completed    Hepatitis A vaccine  Aged Out    Hepatitis B vaccine  Aged Out    Hib vaccine  Aged Out    Meningococcal (ACWY) vaccine  Aged Out

## 2022-10-02 LAB — NONINV COLON CA DNA+OCC BLD SCRN STL QL: NEGATIVE

## 2022-10-17 ENCOUNTER — HOSPITAL ENCOUNTER (OUTPATIENT)
Dept: WOMENS IMAGING | Age: 57
Discharge: HOME | End: 2022-10-19
Payer: MEDICARE

## 2022-10-17 DIAGNOSIS — Z12.31 ENCOUNTER FOR SCREENING MAMMOGRAM FOR BREAST CANCER: ICD-10-CM

## 2022-10-17 PROCEDURE — 77063 BREAST TOMOSYNTHESIS BI: CPT

## 2022-10-21 DIAGNOSIS — R92.8 ABNORMAL MAMMOGRAM: Primary | ICD-10-CM

## 2022-10-31 ENCOUNTER — HOSPITAL ENCOUNTER (OUTPATIENT)
Dept: WOMENS IMAGING | Age: 57
Discharge: HOME | End: 2022-11-02
Payer: MEDICARE

## 2022-10-31 DIAGNOSIS — N64.89 BREAST ASYMMETRY: ICD-10-CM

## 2022-10-31 DIAGNOSIS — N63.20 MASS OF LEFT BREAST, UNSPECIFIED QUADRANT: ICD-10-CM

## 2022-10-31 PROCEDURE — 76642 ULTRASOUND BREAST LIMITED: CPT

## 2022-10-31 PROCEDURE — 77065 DX MAMMO INCL CAD UNI: CPT

## 2023-01-26 DIAGNOSIS — J45.40 MODERATE PERSISTENT ASTHMA WITHOUT COMPLICATION: ICD-10-CM

## 2023-01-26 RX ORDER — ALBUTEROL SULFATE 90 UG/1
AEROSOL, METERED RESPIRATORY (INHALATION)
Qty: 54 G | Refills: 0 | OUTPATIENT
Start: 2023-01-26

## 2023-08-16 ENCOUNTER — OFFICE VISIT (OUTPATIENT)
Dept: PRIMARY CARE CLINIC | Age: 58
End: 2023-08-16
Payer: MEDICARE

## 2023-08-16 ENCOUNTER — HOSPITAL ENCOUNTER (OUTPATIENT)
Age: 58
Discharge: HOME | End: 2023-08-16
Payer: MEDICARE

## 2023-08-16 VITALS
BODY MASS INDEX: 26.87 KG/M2 | HEIGHT: 62 IN | DIASTOLIC BLOOD PRESSURE: 89 MMHG | WEIGHT: 146 LBS | SYSTOLIC BLOOD PRESSURE: 134 MMHG | HEART RATE: 76 BPM | OXYGEN SATURATION: 98 %

## 2023-08-16 DIAGNOSIS — Z71.6 ENCOUNTER FOR SMOKING CESSATION COUNSELING: ICD-10-CM

## 2023-08-16 DIAGNOSIS — R09.81 SINUS CONGESTION: ICD-10-CM

## 2023-08-16 DIAGNOSIS — E87.0 HYPERNATREMIA: Primary | ICD-10-CM

## 2023-08-16 DIAGNOSIS — E13.9 DIABETES MELLITUS OF OTHER TYPE WITHOUT COMPLICATION, UNSPECIFIED WHETHER LONG TERM INSULIN USE (HCC): Primary | ICD-10-CM

## 2023-08-16 DIAGNOSIS — E78.2 MIXED HYPERLIPIDEMIA: ICD-10-CM

## 2023-08-16 DIAGNOSIS — I10 ESSENTIAL HYPERTENSION: Chronic | ICD-10-CM

## 2023-08-16 DIAGNOSIS — I25.119 CORONARY ARTERY DISEASE INVOLVING NATIVE CORONARY ARTERY OF NATIVE HEART WITH ANGINA PECTORIS (HCC): ICD-10-CM

## 2023-08-16 LAB
ALBUMIN SERPL-MCNC: 4.6 G/DL (ref 3.5–5.2)
ALBUMIN/GLOB SERPL: 1.4 {RATIO} (ref 1–2.5)
ALP SERPL-CCNC: 68 U/L (ref 35–104)
ALT SERPL-CCNC: 14 U/L (ref 5–33)
ANION GAP SERPL CALCULATED.3IONS-SCNC: 17 MMOL/L (ref 9–17)
AST SERPL-CCNC: 17 U/L
BILIRUB SERPL-MCNC: 0.4 MG/DL (ref 0.3–1.2)
BUN SERPL-MCNC: 10 MG/DL (ref 6–20)
CALCIUM SERPL-MCNC: 9.6 MG/DL (ref 8.6–10.4)
CHLORIDE SERPL-SCNC: 109 MMOL/L (ref 98–107)
CHOLEST SERPL-MCNC: 243 MG/DL
CHOLESTEROL/HDL RATIO: 6.9
CO2 SERPL-SCNC: 21 MMOL/L (ref 20–31)
CREAT SERPL-MCNC: 1 MG/DL (ref 0.5–0.9)
ERYTHROCYTE [DISTWIDTH] IN BLOOD BY AUTOMATED COUNT: 15.4 % (ref 11.8–14.4)
GFR SERPL CREATININE-BSD FRML MDRD: >60 ML/MIN/1.73M2
GLUCOSE SERPL-MCNC: 82 MG/DL (ref 70–99)
HBA1C MFR BLD: 5.5 %
HCT VFR BLD AUTO: 42.7 % (ref 36.3–47.1)
HDLC SERPL-MCNC: 35 MG/DL
HGB BLD-MCNC: 14.1 G/DL (ref 11.9–15.1)
LDLC SERPL CALC-MCNC: 187 MG/DL (ref 0–130)
MCH RBC QN AUTO: 29.7 PG (ref 25.2–33.5)
MCHC RBC AUTO-ENTMCNC: 33 G/DL (ref 28.4–34.8)
MCV RBC AUTO: 89.9 FL (ref 82.6–102.9)
NRBC BLD-RTO: 0 PER 100 WBC
PLATELET # BLD AUTO: 333 K/UL (ref 138–453)
PMV BLD AUTO: 9.1 FL (ref 8.1–13.5)
POTASSIUM SERPL-SCNC: 4 MMOL/L (ref 3.7–5.3)
PROT SERPL-MCNC: 7.9 G/DL (ref 6.4–8.3)
RBC # BLD AUTO: 4.75 M/UL (ref 3.95–5.11)
SODIUM SERPL-SCNC: 147 MMOL/L (ref 135–144)
TRIGL SERPL-MCNC: 104 MG/DL
TSH SERPL DL<=0.05 MIU/L-ACNC: 1.24 UIU/ML (ref 0.3–5)
WBC OTHER # BLD: 6.9 K/UL (ref 3.5–11.3)

## 2023-08-16 PROCEDURE — 80053 COMPREHEN METABOLIC PANEL: CPT

## 2023-08-16 PROCEDURE — 99214 OFFICE O/P EST MOD 30 MIN: CPT | Performed by: NURSE PRACTITIONER

## 2023-08-16 PROCEDURE — 36415 COLL VENOUS BLD VENIPUNCTURE: CPT

## 2023-08-16 PROCEDURE — 3079F DIAST BP 80-89 MM HG: CPT | Performed by: NURSE PRACTITIONER

## 2023-08-16 PROCEDURE — 84443 ASSAY THYROID STIM HORMONE: CPT

## 2023-08-16 PROCEDURE — 85027 COMPLETE CBC AUTOMATED: CPT

## 2023-08-16 PROCEDURE — 80061 LIPID PANEL: CPT

## 2023-08-16 PROCEDURE — 3044F HG A1C LEVEL LT 7.0%: CPT | Performed by: NURSE PRACTITIONER

## 2023-08-16 PROCEDURE — 3075F SYST BP GE 130 - 139MM HG: CPT | Performed by: NURSE PRACTITIONER

## 2023-08-16 PROCEDURE — 83037 HB GLYCOSYLATED A1C HOME DEV: CPT | Performed by: NURSE PRACTITIONER

## 2023-08-16 RX ORDER — GUAIFENESIN 600 MG/1
600 TABLET, EXTENDED RELEASE ORAL 2 TIMES DAILY
Qty: 14 TABLET | Refills: 0 | Status: SHIPPED | OUTPATIENT
Start: 2023-08-16 | End: 2023-08-23

## 2023-08-16 RX ORDER — CLOBETASOL PROPIONATE 0.5 MG/G
CREAM TOPICAL 2 TIMES DAILY
COMMUNITY

## 2023-08-16 RX ORDER — ROSUVASTATIN CALCIUM 10 MG/1
10 TABLET, COATED ORAL DAILY
Qty: 90 TABLET | Refills: 1 | Status: SHIPPED | OUTPATIENT
Start: 2023-08-16

## 2023-08-16 RX ORDER — LOVASTATIN 10 MG/1
10 TABLET ORAL NIGHTLY
Qty: 90 TABLET | Refills: 1 | Status: SHIPPED | OUTPATIENT
Start: 2023-08-16 | End: 2023-08-16

## 2023-08-16 RX ORDER — CETIRIZINE HYDROCHLORIDE 10 MG/1
TABLET ORAL
Qty: 90 TABLET | Refills: 1 | Status: SHIPPED | OUTPATIENT
Start: 2023-08-16

## 2023-08-16 RX ORDER — LISINOPRIL 40 MG/1
40 TABLET ORAL DAILY
COMMUNITY
End: 2023-08-16 | Stop reason: SDUPTHER

## 2023-08-16 RX ORDER — LISINOPRIL 40 MG/1
40 TABLET ORAL DAILY
Qty: 90 TABLET | Refills: 1 | Status: SHIPPED | OUTPATIENT
Start: 2023-08-16

## 2023-08-16 RX ORDER — AMLODIPINE BESYLATE 10 MG/1
10 TABLET ORAL DAILY
Qty: 90 TABLET | Refills: 1 | Status: SHIPPED | OUTPATIENT
Start: 2023-08-16

## 2023-08-16 RX ORDER — CLOPIDOGREL BISULFATE 75 MG/1
75 TABLET ORAL DAILY
Qty: 90 TABLET | Refills: 1 | Status: SHIPPED | OUTPATIENT
Start: 2023-08-16

## 2023-08-16 SDOH — ECONOMIC STABILITY: HOUSING INSECURITY
IN THE LAST 12 MONTHS, WAS THERE A TIME WHEN YOU DID NOT HAVE A STEADY PLACE TO SLEEP OR SLEPT IN A SHELTER (INCLUDING NOW)?: NO

## 2023-08-16 SDOH — ECONOMIC STABILITY: FOOD INSECURITY: WITHIN THE PAST 12 MONTHS, THE FOOD YOU BOUGHT JUST DIDN'T LAST AND YOU DIDN'T HAVE MONEY TO GET MORE.: NEVER TRUE

## 2023-08-16 SDOH — ECONOMIC STABILITY: FOOD INSECURITY: WITHIN THE PAST 12 MONTHS, YOU WORRIED THAT YOUR FOOD WOULD RUN OUT BEFORE YOU GOT MONEY TO BUY MORE.: NEVER TRUE

## 2023-08-16 SDOH — ECONOMIC STABILITY: INCOME INSECURITY: HOW HARD IS IT FOR YOU TO PAY FOR THE VERY BASICS LIKE FOOD, HOUSING, MEDICAL CARE, AND HEATING?: NOT HARD AT ALL

## 2023-08-16 ASSESSMENT — PATIENT HEALTH QUESTIONNAIRE - PHQ9
2. FEELING DOWN, DEPRESSED OR HOPELESS: 1
4. FEELING TIRED OR HAVING LITTLE ENERGY: 1
SUM OF ALL RESPONSES TO PHQ QUESTIONS 1-9: 7
SUM OF ALL RESPONSES TO PHQ QUESTIONS 1-9: 7
SUM OF ALL RESPONSES TO PHQ9 QUESTIONS 1 & 2: 2
SUM OF ALL RESPONSES TO PHQ QUESTIONS 1-9: 7
7. TROUBLE CONCENTRATING ON THINGS, SUCH AS READING THE NEWSPAPER OR WATCHING TELEVISION: 1
6. FEELING BAD ABOUT YOURSELF - OR THAT YOU ARE A FAILURE OR HAVE LET YOURSELF OR YOUR FAMILY DOWN: 0
8. MOVING OR SPEAKING SO SLOWLY THAT OTHER PEOPLE COULD HAVE NOTICED. OR THE OPPOSITE, BEING SO FIGETY OR RESTLESS THAT YOU HAVE BEEN MOVING AROUND A LOT MORE THAN USUAL: 0
1. LITTLE INTEREST OR PLEASURE IN DOING THINGS: 1
3. TROUBLE FALLING OR STAYING ASLEEP: 1
9. THOUGHTS THAT YOU WOULD BE BETTER OFF DEAD, OR OF HURTING YOURSELF: 0
SUM OF ALL RESPONSES TO PHQ QUESTIONS 1-9: 7
10. IF YOU CHECKED OFF ANY PROBLEMS, HOW DIFFICULT HAVE THESE PROBLEMS MADE IT FOR YOU TO DO YOUR WORK, TAKE CARE OF THINGS AT HOME, OR GET ALONG WITH OTHER PEOPLE: 1
5. POOR APPETITE OR OVEREATING: 2

## 2023-08-16 ASSESSMENT — ENCOUNTER SYMPTOMS
SHORTNESS OF BREATH: 0
COUGH: 0

## 2023-08-16 NOTE — RESULT ENCOUNTER NOTE
Cholesterol levels still high, stop lovastatin and start rosuvastatin- sent to pharmacy. Sodium a little high, is she dehydrated at all? Repeat labs in 3 months, we can discuss this at her next visit but they are ordered.

## 2023-12-24 NOTE — PROGRESS NOTES
PULMONARY & CRITICAL CARE MEDICINE CONSULT NOTE     Patient:  Marissa Guajardo  MRN: [id number]  Admit date: 4/20/2022  Primary Care Physician: No primary care provider on file. Consulting Physician: Christy Aleman MD  CODE Status: Full Code  LOS: 4     SUBJECTIVE     CHIEF COMPLAINT/REASON FOR CONSULT: Left pleural effusion    HISTORY OF PRESENT ILLNESS:  The patient is a 61 y.o. female patient was recently hospitalized with chest pain. She was found to have multivessel coronary artery disease and required CABG x3 (4/8/2022). Postop course was complicated with development of bleeding from chest tube, anemia and worsening respiratory failure. She required reintubation and bronchoscopic clearance of mucous plugging and had left lung atelectasis/effusion. She was discharged from the hospital on 4/16. She was readmitted after she was found to have in bathroom after a coughing spell. Initially admitted to [facility] but transferred here for further management. Chest x-ray shows left-sided effusion/atelectasis. Patient currently sitting in the bed comfortably. She is on room air and is hemodynamically stable. She has been given IV Lasix and is annoyed by need to go to the bathroom repeatedly. She denies cough, sputum, wheezing, pleuritic chest pain.   4/24/2022   Subjective      No acute events   On ra     PAST MEDICAL HISTORY:        Diagnosis Date    Anemia     Asthma     CAD (coronary artery disease)     Depression 10/20/2014    High cholesterol     Hypertension     MI, old     Osteoarthritis     Pneumonia     Sleep apnea     Stroke ([facility] [address] 9/27/2014     PAST SURGICAL HISTORY:        Procedure Laterality Date    APPENDECTOMY      CORONARY ANGIOPLASTY WITH STENT PLACEMENT  2004    CORONARY ARTERY BYPASS GRAFT N/A 4/8/2022    CABG X 3 / EVH LEFT LEG / KIT performed by Kailyn Capone MD at [address] [place] [address] HISTORY:       Problem Relation Age of Onset    High Blood Pressure Mother      SOCIAL HISTORY:   TOBACCO:   reports that she has been smoking cigarettes. She has a 12.50 pack-year smoking history. She has never used smokeless tobacco.  ETOH:  reports previous alcohol use. DRUGS: reports current drug use. Drug: Marijuana Veryl Patiño). ALLERGIES:    Allergies   Allergen Reactions    Lipitor [Atorvastatin] Other (See Comments)     Muscle cramping in legs    Prozac [Fluoxetine] Other (See Comments)     Mood swings    Zoloft [Sertraline Hcl] Other (See Comments)     Anger and mood swings          HOME MEDICATIONS:  Prior to Admission medications    Medication Sig Start Date End Date Taking?  Authorizing Provider   aspirin 81 MG EC tablet Take 1 tablet by mouth daily 4/17/22   Gurmeet Avalos MD   calcium carbonate (TUMS) 500 MG chewable tablet Take 1 tablet by mouth 3 times daily as needed for Heartburn 4/16/22 5/16/22  Gurmeet Avalos MD   amiodarone (CORDARONE) 200 MG tablet Take 0.5 tablets by mouth 3 times daily 4/16/22   Gurmeet Avalos MD   metoprolol tartrate (LOPRESSOR) 25 MG tablet Take 1 tablet by mouth 2 times daily 4/16/22   Gurmeet Avalos MD   potassium chloride (KLOR-CON M) 20 MEQ extended release tablet Take 2 tablets by mouth as needed (Potassium Replacement) 4/16/22   Gurmeet Avalos MD   amLODIPine (NORVASC) 10 MG tablet Take 1 tablet by mouth daily 4/17/22   Gurmeet Avalos MD   albuterol sulfate  (90 Base) MCG/ACT inhaler INHALE 2 PUFFS BY MOUTH EVERY 6 HOURS AS NEEDED FOR WHEEZING 11/15/21   Elissa Moreno MD   omeprazole (PRILOSEC) 20 MG delayed release capsule TAKE 1 CAPSULE BY MOUTH EVERY DAY 11/15/21   Elissa Moreno MD   clopidogrel (PLAVIX) 75 MG tablet TAKE 1 TABLET BY MOUTH DAILY 10/7/21   Elissa Moreno MD   lovastatin (MEVACOR) 10 MG tablet TAKE 1 TABLET BY MOUTH EVERY NIGHT 9/1/21   Elissa Moreno MD   Ginkgo Biloba (GNP GINGKO BILOBA EXTRACT PO) Take by mouth daily    Historical Provider, MD   clobetasol (Lyn Enriquez) 0.05 % ointment apply to affected area twice a day 8/31/21   Oj Arteaga MD   ezetimibe (ZETIA) 10 MG tablet Take 1 tablet by mouth daily 8/31/21   Oj Arteaga MD   ibuprofen (ADVIL;MOTRIN) 800 MG tablet take 1 tablet by mouth every 8 hours if needed for pain 3/30/21   Oj Arteaga MD   mometasone-formoterol [facility]) 100-5 MCG/ACT inhaler Inhale 2 puffs into the lungs 2 times daily 3/24/21   Oj Arteaga MD   busPIRone (BUSPAR) 15 MG tablet take 1 tablet by mouth three times a day  Patient taking differently: 3 times daily as needed take 1 tablet by mouth three times a day 3/24/21   Oj Arteaga MD   Omega-3 Fatty Acids (OMEGA-3 EPA FISH OIL) 1205 MG CAPS Take 1 tablet by mouth daily 3/24/21   Oj Arteaga MD   clopidogrel (PLAVIX) 75 MG tablet TAKE 1 TABLET BY MOUTH DAILY 3/24/21   Oj Arteaga MD   cetirizine (ZYRTEC) 10 MG tablet take 1 tablet by mouth once daily 3/16/21   Oj Arteaga MD   diclofenac sodium 1 % GEL Apply 2 g topically 2 times daily 5/8/19   Oj Arteaga MD   hydrocortisone 1 % cream apply topically twice a day 1/15/18   Oj Arteaga MD   Heat Wraps (SOFTHEAT HEATING WRAP ULTRA) MISC Use daily to for neck and shoulder pain 12/6/17   Oj Arteaga MD   lidocaine (LMX) 4 % cream Apply 2g topically as needed. 7/26/17   Oj Arteaga MD   Blood Pressure Monitor MISC Use daily to take BP 3/15/17   Oj Arteaga MD     IMMUNIZATIONS:  Most Recent Immunizations   Administered Date(s) Administered    COVID-19, Pfizer Purple top, DILUTE for use, 12+ yrs, 30mcg/0.3mL dose 05/01/2021    Pneumococcal Polysaccharide (Mgycoeods81) 05/04/2017    Tdap (Boostrix, Adacel) 01/01/2014     REVIEW OF SYSTEMS:  Review of Systems   Constitutional: Negative for fever. HENT: Negative for voice change. Eyes: Negative for visual disturbance. Respiratory: Positive for cough. Gastrointestinal: Negative for abdominal pain, diarrhea and vomiting.    Genitourinary: . Negative for dysuria and urgency. Musculoskeletal: Negative for joint swelling. Allergic/Immunologic: Negative for environmental allergies and immunocompromised state. Hematological: Negative for adenopathy. Does not bruise/bleed easily. Psychiatric/Behavioral: Negative for behavioral problems. OBJECTIVE     PaO2/FiO2 RATIO:  No results for input(s): POCPO2 in the last 72 hours. VITAL SIGNS:   LAST:  /74   Pulse 87   Temp 97.2 °F (36.2 °C) (Temporal)   Resp 18   Ht 5' 1\" (1.549 m)   Wt 156 lb (70.8 kg)   SpO2 95%   BMI 29.48 kg/m²   8-24 HR RANGE:  TEMP Temp  Av.7 °F (36.5 °C)  Min: 97.2 °F (36.2 °C)  Max: 98.2 °F (50.5 °C)   BP Systolic (06IUP), CGR:978 , Min:109 , DXO:080      Diastolic (52RNQ), TWS:28, Min:74, Max:83     PULSE Pulse  Av.5  Min: 70  Max: 87   RR No data recorded   O2 SAT No data recorded   OXYGEN DELIVERY No data recorded        Physical Exam  Constitutional:       General: She is not in acute distress. HENT:      Head: Normocephalic and atraumatic. Mouth/Throat:      Mouth: Mucous membranes are moist.   Eyes:      Extraocular Movements: Extraocular movements intact. Pupils: Pupils are equal, round, and reactive to light. Cardiovascular:      Rate and Rhythm: Normal rate and regular rhythm. Heart sounds: No murmur heard. Pulmonary:      Effort: No accessory muscle usage or respiratory distress. Breath sounds:clear right , dec left post base and dull   AP diameter of chest increased. Thoracic expansion and diaphragmatic excursion diminished. BS diminished and expiratory phase prolonged. Abdominal:      General: There is no distension. Palpations: Abdomen is soft. There is no mass. Tenderness: There is no abdominal tenderness. Musculoskeletal:      Cervical back: Neck supple. Right lower leg: No edema. Left lower leg: No edema. Lymphadenopathy:      Cervical: No cervical adenopathy. Skin:     Coloration: Skin is not pale. Findings: No rash. Neurological:      General: No focal deficit present. Mental Status: She is oriented to person, place, and time. DATA REVIEW     Medications: Current Inpatient  Scheduled Meds:   amiodarone  100 mg Oral BID    amLODIPine  10 mg Oral Daily    aspirin  81 mg Oral Daily    clopidogrel  75 mg Oral Daily    atorvastatin  20 mg Oral Nightly    metoprolol tartrate  25 mg Oral BID    budesonide-formoterol  2 puff Inhalation BID    pantoprazole  40 mg Oral BID AC    sodium chloride flush  5-40 mL IntraVENous 2 times per day    enoxaparin  40 mg SubCUTAneous Daily    furosemide  40 mg Oral BID     Continuous Infusions:   sodium chloride      dextrose         INPUT/OUTPUT:  No intake/output data recorded. LABS:  ABGs:   No results for input(s): POCPH, POCPCO2, POCPO2, POCHCO3, RUPI4MZL in the last 72 hours. CBC:   Recent Labs     04/22/22  0443 04/23/22  0600   WBC 13.7* 12.5*   HGB 10.4* 11.3*   HCT 32.1* 35.9*   MCV 91.7 92.8   * 755*   LYMPHOPCT 15* 20*   RBC 3.50* 3.87*   MCH 29.7 29.2   MCHC 32.4 31.5   RDW 15.4* 15.3*     CRP:   No results for input(s): CRP in the last 72 hours. LDH:   No results for input(s): LDH in the last 72 hours. BMP:   Recent Labs     04/22/22  0443 04/23/22  0600    134*   K 3.4* 3.3*    99   CO2 23 25   BUN 11 15   CREATININE 0.70 0.82   GLUCOSE 118* 98     Liver Function Test:   No results for input(s): PROT, LABALBU, ALT, AST, GGT, ALKPHOS, BILITOT in the last 72 hours. Coagulation Profile:   No results for input(s): INR, PROTIME, APTT in the last 72 hours. D-Dimer:  No results for input(s): DDIMER in the last 72 hours. Lactic Acid:  No results for input(s): LACTA in the last 72 hours. Cardiac Enzymes:  No results for input(s): CKTOTAL, CKMB, CKMBINDEX, TROPONINI in the last 72 hours. Invalid input(s): TROPONIN, HSTROP  BNP/ProBNP:   No results for input(s): BNP, PROBNP in the last 72 hours.   Triglycerides:  No results for input(s): TRIG in the last 72 hours. Microbiology:  Urine Culture:  No components found for: CURINE  Blood Culture:  No components found for: CBLOOD, CFUNGUSBL  Sputum Culture:  No components found for: [place]     04/21/22  1540 04/21/22  1540 04/21/22  1543   SPECDESC . PLEURAL FLUID   < > . PLEURAL FLUID   CULTURE NO GROWTH 3 DAYS  --   --     < > = values in this interval not displayed. Recent Labs     04/21/22  1540   CULTURE NO GROWTH 3 DAYS          Radiology Reports:  XR CHEST PORTABLE   Final Result   Interval decrease in size of a left pleural effusion. Bibasilar airspace disease likely representing atelectasis. US THORACENTESIS Which side should the procedure be performed? Left   Final Result   Successful ultrasound guided thoracentesis. XR CHEST PORTABLE   Final Result   There is a moderate-sized left pleural effusion which is mildly increased. Trace right effusion. Bibasilar airspace disease likely representing atelectasis. XR CHEST PORTABLE   Final Result   Bibasilar opacities most confluent left lung base suggestive of effusions and   atelectasis         XR CHEST (2 VW)    (Results Pending)        Echocardiogram:   Results for orders placed during the hospital encounter of 04/03/22    ECHO Complete 2D W Doppler W Color    Summary  Normal LV size , mildly increased wall thickness. No obvious wall motion abnormality seen. Normal LV systolic function with LVEF >55%. Normal RV size and function. LA and RA appears normal in size. No obvious significant structural valvular abnormality noted. No significant valvular stenosis or regurgitation noted. Normal aortic root dimension. No significant pericardial effusion noted. No obvious intra-cardiac mass or shunt noted. IVC normal diameter and inspiratory variation indicating normal RA filling  pressure.        ASSESSMENT AND PLAN     Assessment:    // Left pleural effusion- exudate - PCIS   Left basal  atelectasis    // Recent CABG x3 (4/8/2022)  // Postoperative anemia  // Coronary artery disease  // Essential hypertension  // Hyperlipidemia  // Tobacco abuse    Plan:    I personally interviewed/examined the patient; reviewed interval history, interpreted all available radiographic and laboratory data at the time of service. Xray chest reviewed - will arrange repeat therapeutic thoracentesis tomorrow with IR prior to dc   IS and deep breathing   Continue bronchodilator     D/w pt   We will continue to follow. I would like to thank you for allowing me to participate in the care of this patient. Please feel free to call with any further questions or concerns. Julio Leslie MD  Pulmonary and Critical Care Medicine           4/24/2022, 8:08 AM    Please note that this chart was generated using voice recognition Dragon dictation software. Although every effort was made to ensure the accuracy of this automated transcription, some errors in transcription may have occurred. full weight-bearing

## 2024-01-16 DIAGNOSIS — I10 ESSENTIAL HYPERTENSION: Chronic | ICD-10-CM

## 2024-01-16 RX ORDER — AMLODIPINE BESYLATE 10 MG/1
10 TABLET ORAL DAILY
Qty: 90 TABLET | Refills: 1 | Status: SHIPPED | OUTPATIENT
Start: 2024-01-16

## 2024-01-17 DIAGNOSIS — I25.119 CORONARY ARTERY DISEASE INVOLVING NATIVE CORONARY ARTERY OF NATIVE HEART WITH ANGINA PECTORIS (HCC): ICD-10-CM

## 2024-01-17 DIAGNOSIS — I10 ESSENTIAL HYPERTENSION: Chronic | ICD-10-CM

## 2024-01-18 RX ORDER — CLOPIDOGREL BISULFATE 75 MG/1
75 TABLET ORAL DAILY
Qty: 90 TABLET | Refills: 0 | Status: SHIPPED | OUTPATIENT
Start: 2024-01-18

## 2024-01-25 RX ORDER — ROSUVASTATIN CALCIUM 10 MG/1
10 TABLET, COATED ORAL DAILY
Qty: 90 TABLET | Refills: 1 | Status: SHIPPED | OUTPATIENT
Start: 2024-01-25

## 2024-01-25 RX ORDER — LISINOPRIL 40 MG/1
40 TABLET ORAL DAILY
Qty: 90 TABLET | Refills: 1 | Status: SHIPPED | OUTPATIENT
Start: 2024-01-25

## 2024-03-11 DIAGNOSIS — I10 ESSENTIAL HYPERTENSION: Chronic | ICD-10-CM

## 2024-04-17 ENCOUNTER — HOSPITAL ENCOUNTER (OUTPATIENT)
Age: 59
Discharge: HOME | End: 2024-04-19
Payer: MEDICARE

## 2024-04-17 ENCOUNTER — OFFICE VISIT (OUTPATIENT)
Dept: PRIMARY CARE CLINIC | Age: 59
End: 2024-04-17
Payer: MEDICARE

## 2024-04-17 ENCOUNTER — HOSPITAL ENCOUNTER (OUTPATIENT)
Dept: GENERAL RADIOLOGY | Age: 59
Discharge: HOME | End: 2024-04-19
Payer: MEDICARE

## 2024-04-17 VITALS
TEMPERATURE: 97.5 F | HEART RATE: 72 BPM | BODY MASS INDEX: 26.89 KG/M2 | OXYGEN SATURATION: 99 % | SYSTOLIC BLOOD PRESSURE: 120 MMHG | WEIGHT: 147 LBS | DIASTOLIC BLOOD PRESSURE: 86 MMHG

## 2024-04-17 DIAGNOSIS — M79.642 BILATERAL HAND PAIN: ICD-10-CM

## 2024-04-17 DIAGNOSIS — Z71.6 ENCOUNTER FOR SMOKING CESSATION COUNSELING: ICD-10-CM

## 2024-04-17 DIAGNOSIS — M79.641 BILATERAL HAND PAIN: Primary | ICD-10-CM

## 2024-04-17 DIAGNOSIS — J98.8 CONGESTION OF UPPER AIRWAY: ICD-10-CM

## 2024-04-17 DIAGNOSIS — F41.9 ANXIETY: ICD-10-CM

## 2024-04-17 DIAGNOSIS — M79.641 BILATERAL HAND PAIN: ICD-10-CM

## 2024-04-17 DIAGNOSIS — Z76.0 MEDICATION REFILL: ICD-10-CM

## 2024-04-17 DIAGNOSIS — R06.2 WHEEZE: ICD-10-CM

## 2024-04-17 DIAGNOSIS — M79.642 BILATERAL HAND PAIN: Primary | ICD-10-CM

## 2024-04-17 DIAGNOSIS — I25.119 CORONARY ARTERY DISEASE INVOLVING NATIVE CORONARY ARTERY OF NATIVE HEART WITH ANGINA PECTORIS (HCC): ICD-10-CM

## 2024-04-17 DIAGNOSIS — I10 ESSENTIAL HYPERTENSION: Chronic | ICD-10-CM

## 2024-04-17 DIAGNOSIS — N64.4 BREAST PAIN: ICD-10-CM

## 2024-04-17 PROCEDURE — 73130 X-RAY EXAM OF HAND: CPT

## 2024-04-17 PROCEDURE — 99214 OFFICE O/P EST MOD 30 MIN: CPT | Performed by: NURSE PRACTITIONER

## 2024-04-17 PROCEDURE — 3074F SYST BP LT 130 MM HG: CPT | Performed by: NURSE PRACTITIONER

## 2024-04-17 PROCEDURE — 3079F DIAST BP 80-89 MM HG: CPT | Performed by: NURSE PRACTITIONER

## 2024-04-17 RX ORDER — GUAIFENESIN 600 MG/1
600 TABLET, EXTENDED RELEASE ORAL 2 TIMES DAILY
Qty: 14 TABLET | Refills: 0 | Status: SHIPPED | OUTPATIENT
Start: 2024-04-17 | End: 2024-04-24

## 2024-04-17 RX ORDER — CYCLOBENZAPRINE HCL 5 MG
TABLET ORAL
Qty: 30 TABLET | Refills: 5 | Status: SHIPPED | OUTPATIENT
Start: 2024-04-17

## 2024-04-17 RX ORDER — CLOPIDOGREL BISULFATE 75 MG/1
75 TABLET ORAL DAILY
Qty: 90 TABLET | Refills: 0 | Status: SHIPPED | OUTPATIENT
Start: 2024-04-17

## 2024-04-17 RX ORDER — FLUTICASONE PROPIONATE AND SALMETEROL 100; 50 UG/1; UG/1
1 POWDER RESPIRATORY (INHALATION) EVERY 12 HOURS
Qty: 1 EACH | Refills: 2 | Status: SHIPPED | OUTPATIENT
Start: 2024-04-17

## 2024-04-17 RX ORDER — PREDNISONE 20 MG/1
20 TABLET ORAL 2 TIMES DAILY
Qty: 10 TABLET | Refills: 0 | Status: SHIPPED | OUTPATIENT
Start: 2024-04-17 | End: 2024-04-22

## 2024-04-17 RX ORDER — FLUTICASONE PROPIONATE AND SALMETEROL 100; 50 UG/1; UG/1
POWDER RESPIRATORY (INHALATION)
Qty: 180 EACH | OUTPATIENT
Start: 2024-04-17

## 2024-04-17 RX ORDER — HYDROXYZINE HYDROCHLORIDE 25 MG/1
25 TABLET, FILM COATED ORAL 2 TIMES DAILY PRN
Qty: 30 TABLET | Refills: 2 | Status: SHIPPED | OUTPATIENT
Start: 2024-04-17 | End: 2024-06-01

## 2024-04-17 ASSESSMENT — PATIENT HEALTH QUESTIONNAIRE - PHQ9
SUM OF ALL RESPONSES TO PHQ9 QUESTIONS 1 & 2: 0
5. POOR APPETITE OR OVEREATING: NOT AT ALL
SUM OF ALL RESPONSES TO PHQ QUESTIONS 1-9: 6
8. MOVING OR SPEAKING SO SLOWLY THAT OTHER PEOPLE COULD HAVE NOTICED. OR THE OPPOSITE, BEING SO FIGETY OR RESTLESS THAT YOU HAVE BEEN MOVING AROUND A LOT MORE THAN USUAL: NOT AT ALL
3. TROUBLE FALLING OR STAYING ASLEEP: NEARLY EVERY DAY
1. LITTLE INTEREST OR PLEASURE IN DOING THINGS: NOT AT ALL
SUM OF ALL RESPONSES TO PHQ QUESTIONS 1-9: 6
6. FEELING BAD ABOUT YOURSELF - OR THAT YOU ARE A FAILURE OR HAVE LET YOURSELF OR YOUR FAMILY DOWN: NOT AT ALL
10. IF YOU CHECKED OFF ANY PROBLEMS, HOW DIFFICULT HAVE THESE PROBLEMS MADE IT FOR YOU TO DO YOUR WORK, TAKE CARE OF THINGS AT HOME, OR GET ALONG WITH OTHER PEOPLE: NOT DIFFICULT AT ALL
7. TROUBLE CONCENTRATING ON THINGS, SUCH AS READING THE NEWSPAPER OR WATCHING TELEVISION: NOT AT ALL
9. THOUGHTS THAT YOU WOULD BE BETTER OFF DEAD, OR OF HURTING YOURSELF: NOT AT ALL
SUM OF ALL RESPONSES TO PHQ QUESTIONS 1-9: 6
2. FEELING DOWN, DEPRESSED OR HOPELESS: NOT AT ALL
4. FEELING TIRED OR HAVING LITTLE ENERGY: NEARLY EVERY DAY
SUM OF ALL RESPONSES TO PHQ QUESTIONS 1-9: 6

## 2024-04-17 ASSESSMENT — ENCOUNTER SYMPTOMS
SHORTNESS OF BREATH: 0
COUGH: 1

## 2024-04-17 NOTE — PROGRESS NOTES
Hand pain for a few months.  Stiff and tingling.  States she has a dx of carpal tunnel, had injections.  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  The problem is back.  She has dropped things. She does not feel this is her carpal tunnel.   She states she did not have numbness with her carpal tunnel in the past. No swelling.  Has been trying to do little exercises, helps sometimes.  Has tried tylenol, helps sometimes.     Had a cold a month and a half ago.  Coughing has lingered.  Bringing up mucus, white in color.  Feels like she is wheezing.  Supposed to be using a maintenance inhaler, has been out. Has been needing rufina a few times a week.     Breast pain since cabg.  No discharge.  No skin color or texture change.     Mammogram Result (most recent):  [facility] DIGITAL DIAGNOSTIC UNILATERAL LEFT 10/31/2022    Narrative  EXAMINATION:  DIAGNOSTIC DIGITAL LEFT BREAST MAMMOGRAM WITH TOMOSYNTHESIS, 10/31/2022 10:23  am    TECHNIQUE:  Diagnostic mammography of the left breast was performed with tomosynthesis.  2D standard and 3D tomosynthesis combination imaging performed through the  left breast.  Computer aided detection was utilized in the interpretation of  this exam.    Views: Left CC Spot, MLO Spot, ML, alonso.    COMPARISON:  Mammographic imaging with the most recent prior dated October 17, 2022.    HISTORY:  ORDERING SYSTEM PROVIDED HISTORY: Mass of left breast, unspecified quadrant  TECHNOLOGIST PROVIDED HISTORY:  left    FINDINGS:  Again apparent on the mammographic images are focal masses in the upper-outer  quadrant of the left breast.  The one posterior is quite well defined.  The  one more anterior is less well marginated.    ULTRASOUND: At 1 o'clock and 5 cm from the nipple is an anechoic structure  compatible with a simple cyst measuring 3.5 x 6.3 mm.    At 3 o'clock and 7 cm from the nipple is an anechoic well-defined structure  compatible with a simple cyst measuring 4

## 2024-06-17 NOTE — PROGRESS NOTES
Medical Necessity Information: It is in your best interest to select a reason for this procedure from the list below. All of these items fulfill various CMS LCD requirements except lesion extends to a margin. Visit Information    Have you changed or started any medications since your last visit including any over-the-counter medicines, vitamins, or herbal medicines? no   Are you having any side effects from any of your medications? -  no  Have you stopped taking any of your medications? Is so, why? -  no    Have you seen any other physician or provider since your last visit? No  Have you had any other diagnostic tests since your last visit? Yes - Records Obtained  Have you been seen in the emergency room and/or had an admission to a hospital since we last saw you? No  Have you had your routine dental cleaning in the past 6 months? No    Have you activated your [employer] account? If not, what are your barriers?  Yes     Patient Care Team:  Jessica Suazo MD as PCP - General (Family Medicine)  Jessica Suazo MD as PCP - [facility] Attributed Provider    Medical History Review  Past Medical, Family, and Social History reviewed and does contribute to the patient presenting condition    Health Maintenance   Topic Date Due    Shingles Vaccine (1 of 2) 09/06/2014    Breast cancer screen  03/30/2018    Colon Cancer Screen FIT/FOBT  05/24/2018    Cervical cancer screen  11/03/2018    Potassium monitoring  12/06/2018    Creatinine monitoring  12/06/2018    Flu vaccine (Season Ended) 09/01/2019    Lipid screen  08/24/2022    DTaP/Tdap/Td vaccine (2 - Td) 01/01/2024    Pneumococcal 0-64 years Vaccine  Completed    Hepatitis C screen  Completed    HIV screen  Completed Include Z78.9 (Other Specified Conditions Influencing Health Status) As An Associated Diagnosis?: No Medical Necessity Clause: This procedure was medically necessary because the lesion that was treated was: Lab: [id number] Lab Facility: [id number] Previous Accession (Optional): [id number] Date Of Previous Biopsy (Optional): 4/17/24 Referring Physician (Optional): MILENA Hernandez Surgeon (Optional): Amadou Biggs DO Biopsy Photograph Reviewed: Yes Size Of Lesion In Cm: 0.8 X Size Of Lesion In Cm (Optional): 0.5 Size Of Margin In Cm: 0.3 Anesthesia Volume In Cc: 3 Eye Clamp Note Details: An eye clamp was used during the procedure. Excision Method: Elliptical Saucerization Depth: dermis and superficial adipose tissue Repair Type: Intermediate Intermediate / Complex Repair - Final Wound Length In Cm: 2.4 Suturegard Retention Suture: 2-0 Nylon Retention Suture Bite Size: 3 mm Length To Time In Minutes Device Was In Place: 10 Number Of Hemigard Strips Per Side: 1 Undermining Type: Entire Wound Debridement Text: The wound edges were debrided prior to proceeding with the closure to facilitate wound healing. Helical Rim Text: The closure involved the helical rim. Vermilion Border Text: The closure involved the vermilion border. Nostril Rim Text: The closure involved the nostril rim. Retention Suture Text: Retention sutures were placed to support the closure and prevent dehiscence. Primary Defect Length (In Cm): 0 Suture Removal: 10 days Epidermal Closure Graft Donor Site (Optional): simple interrupted Detail Level: Detailed Excision Depth: adipose tissue Scalpel Size: 15 blade Anesthesia Type: 1% lidocaine with epinephrine Hemostasis: Electrocautery Estimated Blood Loss (Cc): minimal Repair Depth: use same depth as excision depth Deep Sutures: 4-0 Monocryl Epidermal Sutures: 4-0 Ethilon Epidermal Closure: running Wound Care: Vaseline Dressing: pressure dressing with telfa Suturegard Intro: Intraoperative tissue expansion was performed, utilizing the SUTUREGARD device, in order to reduce wound tension. Suturegard Body: The suture ends were repeatedly re-tightened and re-clamped to achieve the desired tissue expansion. Hemigard Intro: Due to skin fragility and wound tension, it was decided to use HEMIGARD adhesive retention suture devices to permit a linear closure. The skin was cleaned and dried for a 6cm distance away from the wound. Excessive hair, if present, was removed to allow for adhesion. Hemigard Postcare Instructions: The HEMIGARD strips are to remain completely dry for at least 5-7 days. Positioning (Leave Blank If You Do Not Want): The patient was placed in a comfortable position exposing the surgical site. Complex Repair Preamble Text (Leave Blank If You Do Not Want): Extensive wide undermining was performed. Intermediate Repair Preamble Text (Leave Blank If You Do Not Want): Undermining was performed with blunt dissection. Fusiform Excision Additional Text (Leave Blank If You Do Not Want): The margin was drawn around the clinically apparent lesion.  A fusiform shape was then drawn on the skin incorporating the lesion and margins.  Incisions were then made along these lines to the appropriate tissue plane and the lesion was extirpated. Eliptical Excision Additional Text (Leave Blank If You Do Not Want): The margin was drawn around the clinically apparent lesion.  An elliptical shape was then drawn on the skin incorporating the lesion and margins.  Incisions were then made along these lines to the appropriate tissue plane and the lesion was extirpated. Saucerization Excision Additional Text (Leave Blank If You Do Not Want): The margin was drawn around the clinically apparent lesion.  Incisions were then made along these lines, in a tangential fashion, to the appropriate tissue plane and the lesion was extirpated. Slit Excision Additional Text (Leave Blank If You Do Not Want): A linear line was drawn on the skin overlying the lesion. An incision was made slowly until the lesion was visualized.  Once visualized, the lesion was removed with blunt dissection. Excisional Biopsy Additional Text (Leave Blank If You Do Not Want): The margin was drawn around the clinically apparent lesion. An elliptical shape was then drawn on the skin incorporating the lesion and margins.  Incisions were then made along these lines to the appropriate tissue plane and the lesion was extirpated. Perilesional Excision Additional Text (Leave Blank If You Do Not Want): The margin was drawn around the clinically apparent lesion. Incisions were then made along these lines to the appropriate tissue plane and the lesion was extirpated. Repair Performed By Another Provider Text (Leave Blank If You Do Not Want): After the tissue was excised the defect was repaired by another provider. No Repair - Repaired With Adjacent Surgical Defect Text (Leave Blank If You Do Not Want): After the excision the defect was repaired concurrently with another surgical defect which was in close approximation. Adjacent Tissue Transfer Text: The defect edges were debeveled with a #15 scalpel blade.  Given the location of the defect and the proximity to free margins an adjacent tissue transfer was deemed most appropriate.  Using a sterile surgical marker, an appropriate flap was drawn incorporating the defect and placing the expected incisions within the relaxed skin tension lines where possible.    The area thus outlined was incised deep to adipose tissue with a #15 scalpel blade.  The skin margins were undermined to an appropriate distance in all directions utilizing iris scissors. Advancement Flap (Single) Text: The defect edges were debeveled with a #15 scalpel blade.  Given the location of the defect and the proximity to free margins a single advancement flap was deemed most appropriate.  Using a sterile surgical marker, an appropriate advancement flap was drawn incorporating the defect and placing the expected incisions within the relaxed skin tension lines where possible.    The area thus outlined was incised deep to adipose tissue with a #15 scalpel blade.  The skin margins were undermined to an appropriate distance in all directions utilizing iris scissors. Advancement Flap (Double) Text: The defect edges were debeveled with a #15 scalpel blade.  Given the location of the defect and the proximity to free margins a double advancement flap was deemed most appropriate.  Using a sterile surgical marker, the appropriate advancement flaps were drawn incorporating the defect and placing the expected incisions within the relaxed skin tension lines where possible.    The area thus outlined was incised deep to adipose tissue with a #15 scalpel blade.  The skin margins were undermined to an appropriate distance in all directions utilizing iris scissors. Burow's Advancement Flap Text: The defect edges were debeveled with a #15 scalpel blade.  Given the location of the defect and the proximity to free margins a Burow's advancement flap was deemed most appropriate.  Using a sterile surgical marker, the appropriate advancement flap was drawn incorporating the defect and placing the expected incisions within the relaxed skin tension lines where possible.    The area thus outlined was incised deep to adipose tissue with a #15 scalpel blade.  The skin margins were undermined to an appropriate distance in all directions utilizing iris scissors. Chonodrocutaneous Helical Advancement Flap Text: The defect edges were debeveled with a #15 scalpel blade.  Given the location of the defect and the proximity to free margins a chondrocutaneous helical advancement flap was deemed most appropriate.  Using a sterile surgical marker, the appropriate advancement flap was drawn incorporating the defect and placing the expected incisions within the relaxed skin tension lines where possible.    The area thus outlined was incised deep to adipose tissue with a #15 scalpel blade.  The skin margins were undermined to an appropriate distance in all directions utilizing iris scissors. Crescentic Advancement Flap Text: The defect edges were debeveled with a #15 scalpel blade.  Given the location of the defect and the proximity to free margins a crescentic advancement flap was deemed most appropriate.  Using a sterile surgical marker, the appropriate advancement flap was drawn incorporating the defect and placing the expected incisions within the relaxed skin tension lines where possible.    The area thus outlined was incised deep to adipose tissue with a #15 scalpel blade.  The skin margins were undermined to an appropriate distance in all directions utilizing iris scissors. A-T Advancement Flap Text: The defect edges were debeveled with a #15 scalpel blade.  Given the location of the defect, shape of the defect and the proximity to free margins an A-T advancement flap was deemed most appropriate.  Using a sterile surgical marker, an appropriate advancement flap was drawn incorporating the defect and placing the expected incisions within the relaxed skin tension lines where possible.    The area thus outlined was incised deep to adipose tissue with a #15 scalpel blade.  The skin margins were undermined to an appropriate distance in all directions utilizing iris scissors. O-T Advancement Flap Text: The defect edges were debeveled with a #15 scalpel blade.  Given the location of the defect, shape of the defect and the proximity to free margins an O-T advancement flap was deemed most appropriate.  Using a sterile surgical marker, an appropriate advancement flap was drawn incorporating the defect and placing the expected incisions within the relaxed skin tension lines where possible.    The area thus outlined was incised deep to adipose tissue with a #15 scalpel blade.  The skin margins were undermined to an appropriate distance in all directions utilizing iris scissors. O-L Flap Text: The defect edges were debeveled with a #15 scalpel blade.  Given the location of the defect, shape of the defect and the proximity to free margins an O-L flap was deemed most appropriate.  Using a sterile surgical marker, an appropriate advancement flap was drawn incorporating the defect and placing the expected incisions within the relaxed skin tension lines where possible.    The area thus outlined was incised deep to adipose tissue with a #15 scalpel blade.  The skin margins were undermined to an appropriate distance in all directions utilizing iris scissors. O-Z Flap Text: The defect edges were debeveled with a #15 scalpel blade.  Given the location of the defect, shape of the defect and the proximity to free margins an O-Z flap was deemed most appropriate.  Using a sterile surgical marker, an appropriate transposition flap was drawn incorporating the defect and placing the expected incisions within the relaxed skin tension lines where possible. The area thus outlined was incised deep to adipose tissue with a #15 scalpel blade.  The skin margins were undermined to an appropriate distance in all directions utilizing iris scissors. Double O-Z Flap Text: The defect edges were debeveled with a #15 scalpel blade.  Given the location of the defect, shape of the defect and the proximity to free margins a Double O-Z flap was deemed most appropriate.  Using a sterile surgical marker, an appropriate transposition flap was drawn incorporating the defect and placing the expected incisions within the relaxed skin tension lines where possible. The area thus outlined was incised deep to adipose tissue with a #15 scalpel blade.  The skin margins were undermined to an appropriate distance in all directions utilizing iris scissors. V-Y Flap Text: The defect edges were debeveled with a #15 scalpel blade.  Given the location of the defect, shape of the defect and the proximity to free margins a V-Y flap was deemed most appropriate.  Using a sterile surgical marker, an appropriate advancement flap was drawn incorporating the defect and placing the expected incisions within the relaxed skin tension lines where possible.    The area thus outlined was incised deep to adipose tissue with a #15 scalpel blade.  The skin margins were undermined to an appropriate distance in all directions utilizing iris scissors. Advancement-Rotation Flap Text: The defect edges were debeveled with a #15 scalpel blade.  Given the location of the defect, shape of the defect and the proximity to free margins an advancement-rotation flap was deemed most appropriate.  Using a sterile surgical marker, an appropriate flap was drawn incorporating the defect and placing the expected incisions within the relaxed skin tension lines where possible. The area thus outlined was incised deep to adipose tissue with a #15 scalpel blade.  The skin margins were undermined to an appropriate distance in all directions utilizing iris scissors. Mercedes Flap Text: The defect edges were debeveled with a #15 scalpel blade.  Given the location of the defect, shape of the defect and the proximity to free margins a Mercedes flap was deemed most appropriate.  Using a sterile surgical marker, an appropriate advancement flap was drawn incorporating the defect and placing the expected incisions within the relaxed skin tension lines where possible. The area thus outlined was incised deep to adipose tissue with a #15 scalpel blade.  The skin margins were undermined to an appropriate distance in all directions utilizing iris scissors. Modified Advancement Flap Text: The defect edges were debeveled with a #15 scalpel blade.  Given the location of the defect, shape of the defect and the proximity to free margins a modified advancement flap was deemed most appropriate.  Using a sterile surgical marker, an appropriate advancement flap was drawn incorporating the defect and placing the expected incisions within the relaxed skin tension lines where possible.    The area thus outlined was incised deep to adipose tissue with a #15 scalpel blade.  The skin margins were undermined to an appropriate distance in all directions utilizing iris scissors. Mucosal Advancement Flap Text: Given the location of the defect, shape of the defect and the proximity to free margins a mucosal advancement flap was deemed most appropriate. Incisions were made with a 15 blade scalpel in the appropriate fashion along the cutaneous vermilion border and the mucosal lip. The remaining actinically damaged mucosal tissue was excised.  The mucosal advancement flap was then elevated to the gingival sulcus with care taken to preserve the neurovascular structures and advanced into the primary defect. Care was taken to ensure that precise realignment of the vermilion border was achieved. Peng Advancement Flap Text: The defect edges were debeveled with a #15 scalpel blade.  Given the location of the defect, shape of the defect and the proximity to free margins a Peng advancement flap was deemed most appropriate.  Using a sterile surgical marker, an appropriate advancement flap was drawn incorporating the defect and placing the expected incisions within the relaxed skin tension lines where possible. The area thus outlined was incised deep to adipose tissue with a #15 scalpel blade.  The skin margins were undermined to an appropriate distance in all directions utilizing iris scissors. Hatchet Flap Text: The defect edges were debeveled with a #15 scalpel blade.  Given the location of the defect, shape of the defect and the proximity to free margins a hatchet flap was deemed most appropriate.  Using a sterile surgical marker, an appropriate hatchet flap was drawn incorporating the defect and placing the expected incisions within the relaxed skin tension lines where possible.    The area thus outlined was incised deep to adipose tissue with a #15 scalpel blade.  The skin margins were undermined to an appropriate distance in all directions utilizing iris scissors. Rotation Flap Text: The defect edges were debeveled with a #15 scalpel blade.  Given the location of the defect, shape of the defect and the proximity to free margins a rotation flap was deemed most appropriate.  Using a sterile surgical marker, an appropriate rotation flap was drawn incorporating the defect and placing the expected incisions within the relaxed skin tension lines where possible.    The area thus outlined was incised deep to adipose tissue with a #15 scalpel blade.  The skin margins were undermined to an appropriate distance in all directions utilizing iris scissors. Bilateral Rotation Flap Text: The defect edges were debeveled with a #15 scalpel blade. Given the location of the defect, shape of the defect and the proximity to free margins a bilateral rotation flap was deemed most appropriate. Using a sterile surgical marker, an appropriate rotation flap was drawn incorporating the defect and placing the expected incisions within the relaxed skin tension lines where possible. The area thus outlined was incised deep to adipose tissue with a #15 scalpel blade. The skin margins were undermined to an appropriate distance in all directions utilizing iris scissors. Following this, the designed flap was carried over into the primary defect and sutured into place. Spiral Flap Text: The defect edges were debeveled with a #15 scalpel blade.  Given the location of the defect, shape of the defect and the proximity to free margins a spiral flap was deemed most appropriate.  Using a sterile surgical marker, an appropriate rotation flap was drawn incorporating the defect and placing the expected incisions within the relaxed skin tension lines where possible. The area thus outlined was incised deep to adipose tissue with a #15 scalpel blade.  The skin margins were undermined to an appropriate distance in all directions utilizing iris scissors. Staged Advancement Flap Text: The defect edges were debeveled with a #15 scalpel blade.  Given the location of the defect, shape of the defect and the proximity to free margins a staged advancement flap was deemed most appropriate.  Using a sterile surgical marker, an appropriate advancement flap was drawn incorporating the defect and placing the expected incisions within the relaxed skin tension lines where possible. The area thus outlined was incised deep to adipose tissue with a #15 scalpel blade.  The skin margins were undermined to an appropriate distance in all directions utilizing iris scissors. Star Wedge Flap Text: The defect edges were debeveled with a #15 scalpel blade.  Given the location of the defect, shape of the defect and the proximity to free margins a star wedge flap was deemed most appropriate.  Using a sterile surgical marker, an appropriate rotation flap was drawn incorporating the defect and placing the expected incisions within the relaxed skin tension lines where possible. The area thus outlined was incised deep to adipose tissue with a #15 scalpel blade.  The skin margins were undermined to an appropriate distance in all directions utilizing iris scissors. Transposition Flap Text: The defect edges were debeveled with a #15 scalpel blade.  Given the location of the defect and the proximity to free margins a transposition flap was deemed most appropriate.  Using a sterile surgical marker, an appropriate transposition flap was drawn incorporating the defect.    The area thus outlined was incised deep to adipose tissue with a #15 scalpel blade.  The skin margins were undermined to an appropriate distance in all directions utilizing iris scissors. Muscle Hinge Flap Text: The defect edges were debeveled with a #15 scalpel blade.  Given the size, depth and location of the defect and the proximity to free margins a muscle hinge flap was deemed most appropriate.  Using a sterile surgical marker, an appropriate hinge flap was drawn incorporating the defect. The area thus outlined was incised with a #15 scalpel blade.  The skin margins were undermined to an appropriate distance in all directions utilizing iris scissors. Mustarde Flap Text: The defect edges were debeveled with a #15 scalpel blade.  Given the size, depth and location of the defect and the proximity to free margins a Mustarde flap was deemed most appropriate.  Using a sterile surgical marker, an appropriate flap was drawn incorporating the defect. The area thus outlined was incised with a #15 scalpel blade.  The skin margins were undermined to an appropriate distance in all directions utilizing iris scissors. Nasal Turnover Hinge Flap Text: The defect edges were debeveled with a #15 scalpel blade.  Given the size, depth, location of the defect and the defect being full thickness a nasal turnover hinge flap was deemed most appropriate.  Using a sterile surgical marker, an appropriate hinge flap was drawn incorporating the defect. The area thus outlined was incised with a #15 scalpel blade. The flap was designed to recreate the nasal mucosal lining and the alar rim. The skin margins were undermined to an appropriate distance in all directions utilizing iris scissors. Nasalis-Muscle-Based Myocutaneous Island Pedicle Flap Text: Using a #15 blade, an incision was made around the donor flap to the level of the nasalis muscle. Wide lateral undermining was then performed in both the subcutaneous plane above the nasalis muscle, and in a submuscular plane just above periosteum. This allowed the formation of a free nasalis muscle axial pedicle (based on the angular artery) which was still attached to the actual cutaneous flap, increasing its mobility and vascular viability. Hemostasis was obtained with pinpoint electrocoagulation. The flap was mobilized into position and the pivotal anchor points positioned and stabilized with buried interrupted sutures. Subcutaneous and dermal tissues were closed in a multilayered fashion with sutures. Tissue redundancies were excised, and the epidermal edges were apposed without significant tension and sutured with sutures. Nasalis Myocutaneous Flap Text: Using a #15 blade, an incision was made around the donor flap to the level of the nasalis muscle. Wide lateral undermining was then performed in both the subcutaneous plane above the nasalis muscle, and in a submuscular plane just above periosteum. This allowed the formation of a free nasalis muscle axial pedicle which was still attached to the actual cutaneous flap, increasing its mobility and vascular viability. Hemostasis was obtained with pinpoint electrocoagulation. The flap was mobilized into position and the pivotal anchor points positioned and stabilized with buried interrupted sutures. Subcutaneous and dermal tissues were closed in a multilayered fashion with sutures. Tissue redundancies were excised, and the epidermal edges were apposed without significant tension and sutured with sutures. Nasolabial Transposition Flap Text: The defect edges were debeveled with a #15 scalpel blade.  Given the size, depth and location of the defect and the proximity to free margins a nasolabial transposition flap was deemed most appropriate. Using a sterile surgical marker, an appropriate flap was drawn incorporating the defect. The area thus outlined was incised with a #15 scalpel blade. The skin margins were undermined to an appropriate distance in all directions utilizing iris scissors. Following this, the designed flap was carried into the primary defect and sutured into place. Orbicularis Oris Muscle Flap Text: The defect edges were debeveled with a #15 scalpel blade.  Given that the defect affected the competency of the oral sphincter an orbicularis oris muscle flap was deemed most appropriate to restore this competency and normal muscle function.  Using a sterile surgical marker, an appropriate flap was drawn incorporating the defect. The area thus outlined was incised with a #15 scalpel blade. Melolabial Transposition Flap Text: The defect edges were debeveled with a #15 scalpel blade.  Given the location of the defect and the proximity to free margins a melolabial flap was deemed most appropriate.  Using a sterile surgical marker, an appropriate melolabial transposition flap was drawn incorporating the defect.    The area thus outlined was incised deep to adipose tissue with a #15 scalpel blade.  The skin margins were undermined to an appropriate distance in all directions utilizing iris scissors. Rectangular Flap Text: The defect edges were debeveled with a #15 scalpel blade. Given the location of the defect and the proximity to free margins a rectangular flap was deemed most appropriate. Using a sterile surgical marker, an appropriate rectangular flap was drawn incorporating the defect. The area thus outlined was incised deep to adipose tissue with a #15 scalpel blade. The skin margins were undermined to an appropriate distance in all directions utilizing iris scissors. Following this, the designed flap was carried over into the primary defect and sutured into place. Rhombic Flap Text: The defect edges were debeveled with a #15 scalpel blade.  Given the location of the defect and the proximity to free margins a rhombic flap was deemed most appropriate.  Using a sterile surgical marker, an appropriate rhombic flap was drawn incorporating the defect.    The area thus outlined was incised deep to adipose tissue with a #15 scalpel blade.  The skin margins were undermined to an appropriate distance in all directions utilizing iris scissors. Rhomboid Transposition Flap Text: The defect edges were debeveled with a #15 scalpel blade.  Given the location of the defect and the proximity to free margins a rhomboid transposition flap was deemed most appropriate.  Using a sterile surgical marker, an appropriate rhomboid flap was drawn incorporating the defect.    The area thus outlined was incised deep to adipose tissue with a #15 scalpel blade.  The skin margins were undermined to an appropriate distance in all directions utilizing iris scissors. Bi-Rhombic Flap Text: The defect edges were debeveled with a #15 scalpel blade.  Given the location of the defect and the proximity to free margins a bi-rhombic flap was deemed most appropriate.  Using a sterile surgical marker, an appropriate rhombic flap was drawn incorporating the defect. The area thus outlined was incised deep to adipose tissue with a #15 scalpel blade.  The skin margins were undermined to an appropriate distance in all directions utilizing iris scissors. Helical Rim Advancement Flap Text: The defect edges were debeveled with a #15 blade scalpel.  Given the location of the defect and the proximity to free margins (helical rim) a double helical rim advancement flap was deemed most appropriate.  Using a sterile surgical marker, the appropriate advancement flaps were drawn incorporating the defect and placing the expected incisions between the helical rim and antihelix where possible.  The area thus outlined was incised through and through with a #15 scalpel blade.  With a skin hook and iris scissors, the flaps were gently and sharply undermined and freed up. Bilateral Helical Rim Advancement Flap Text: The defect edges were debeveled with a #15 blade scalpel.  Given the location of the defect and the proximity to free margins (helical rim) a bilateral helical rim advancement flap was deemed most appropriate.  Using a sterile surgical marker, the appropriate advancement flaps were drawn incorporating the defect and placing the expected incisions between the helical rim and antihelix where possible.  The area thus outlined was incised through and through with a #15 scalpel blade.  With a skin hook and iris scissors, the flaps were gently and sharply undermined and freed up. Ear Star Wedge Flap Text: The defect edges were debeveled with a #15 blade scalpel.  Given the location of the defect and the proximity to free margins (helical rim) an ear star wedge flap was deemed most appropriate.  Using a sterile surgical marker, the appropriate flap was drawn incorporating the defect and placing the expected incisions between the helical rim and antihelix where possible.  The area thus outlined was incised through and through with a #15 scalpel blade. Banner Transposition Flap Text: The defect edges were debeveled with a #15 scalpel blade.  Given the location of the defect and the proximity to free margins a Banner transposition flap was deemed most appropriate.  Using a sterile surgical marker, an appropriate flap drawn around the defect. The area thus outlined was incised deep to adipose tissue with a #15 scalpel blade.  The skin margins were undermined to an appropriate distance in all directions utilizing iris scissors. Bilobed Flap Text: The defect edges were debeveled with a #15 scalpel blade.  Given the location of the defect and the proximity to free margins a bilobe flap was deemed most appropriate.  Using a sterile surgical marker, an appropriate bilobe flap drawn around the defect.    The area thus outlined was incised deep to adipose tissue with a #15 scalpel blade.  The skin margins were undermined to an appropriate distance in all directions utilizing iris scissors. Bilobed Transposition Flap Text: The defect edges were debeveled with a #15 scalpel blade.  Given the location of the defect and the proximity to free margins a bilobed transposition flap was deemed most appropriate.  Using a sterile surgical marker, an appropriate bilobe flap drawn around the defect.    The area thus outlined was incised deep to adipose tissue with a #15 scalpel blade.  The skin margins were undermined to an appropriate distance in all directions utilizing iris scissors. Trilobed Flap Text: The defect edges were debeveled with a #15 scalpel blade.  Given the location of the defect and the proximity to free margins a trilobed flap was deemed most appropriate.  Using a sterile surgical marker, an appropriate trilobed flap drawn around the defect.    The area thus outlined was incised deep to adipose tissue with a #15 scalpel blade.  The skin margins were undermined to an appropriate distance in all directions utilizing iris scissors. Dorsal Nasal Flap Text: The defect edges were debeveled with a #15 scalpel blade.  Given the location of the defect and the proximity to free margins a dorsal nasal flap was deemed most appropriate.  Using a sterile surgical marker, an appropriate dorsal nasal flap was drawn around the defect.    The area thus outlined was incised deep to adipose tissue with a #15 scalpel blade.  The skin margins were undermined to an appropriate distance in all directions utilizing iris scissors. Island Pedicle Flap Text: The defect edges were debeveled with a #15 scalpel blade.  Given the location of the defect, shape of the defect and the proximity to free margins an island pedicle advancement flap was deemed most appropriate.  Using a sterile surgical marker, an appropriate advancement flap was drawn incorporating the defect, outlining the appropriate donor tissue and placing the expected incisions within the relaxed skin tension lines where possible.    The area thus outlined was incised deep to adipose tissue with a #15 scalpel blade.  The skin margins were undermined to an appropriate distance in all directions around the primary defect and laterally outward around the island pedicle utilizing iris scissors.  There was minimal undermining beneath the pedicle flap. Island Pedicle Flap With Canthal Suspension Text: The defect edges were debeveled with a #15 scalpel blade.  Given the location of the defect, shape of the defect and the proximity to free margins an island pedicle advancement flap was deemed most appropriate.  Using a sterile surgical marker, an appropriate advancement flap was drawn incorporating the defect, outlining the appropriate donor tissue and placing the expected incisions within the relaxed skin tension lines where possible. The area thus outlined was incised deep to adipose tissue with a #15 scalpel blade.  The skin margins were undermined to an appropriate distance in all directions around the primary defect and laterally outward around the island pedicle utilizing iris scissors.  There was minimal undermining beneath the pedicle flap. A suspension suture was placed in the canthal tendon to prevent tension and prevent ectropion. Alar Island Pedicle Flap Text: The defect edges were debeveled with a #15 scalpel blade.  Given the location of the defect, shape of the defect and the proximity to the alar rim an island pedicle advancement flap was deemed most appropriate.  Using a sterile surgical marker, an appropriate advancement flap was drawn incorporating the defect, outlining the appropriate donor tissue and placing the expected incisions within the nasal ala running parallel to the alar rim. The area thus outlined was incised with a #15 scalpel blade.  The skin margins were undermined minimally to an appropriate distance in all directions around the primary defect and laterally outward around the island pedicle utilizing iris scissors.  There was minimal undermining beneath the pedicle flap. Double Island Pedicle Flap Text: The defect edges were debeveled with a #15 scalpel blade.  Given the location of the defect, shape of the defect and the proximity to free margins a double island pedicle advancement flap was deemed most appropriate.  Using a sterile surgical marker, an appropriate advancement flap was drawn incorporating the defect, outlining the appropriate donor tissue and placing the expected incisions within the relaxed skin tension lines where possible.    The area thus outlined was incised deep to adipose tissue with a #15 scalpel blade.  The skin margins were undermined to an appropriate distance in all directions around the primary defect and laterally outward around the island pedicle utilizing iris scissors.  There was minimal undermining beneath the pedicle flap. Island Pedicle Flap-Requiring Vessel Identification Text: The defect edges were debeveled with a #15 scalpel blade.  Given the location of the defect, shape of the defect and the proximity to free margins an island pedicle advancement flap was deemed most appropriate.  Using a sterile surgical marker, an appropriate advancement flap was drawn, based on the axial vessel mentioned above, incorporating the defect, outlining the appropriate donor tissue and placing the expected incisions within the relaxed skin tension lines where possible.    The area thus outlined was incised deep to adipose tissue with a #15 scalpel blade.  The skin margins were undermined to an appropriate distance in all directions around the primary defect and laterally outward around the island pedicle utilizing iris scissors.  There was minimal undermining beneath the pedicle flap. Keystone Flap Text: The defect edges were debeveled with a #15 scalpel blade.  Given the location of the defect, shape of the defect a keystone flap was deemed most appropriate.  Using a sterile surgical marker, an appropriate keystone flap was drawn incorporating the defect, outlining the appropriate donor tissue and placing the expected incisions within the relaxed skin tension lines where possible. The area thus outlined was incised deep to adipose tissue with a #15 scalpel blade.  The skin margins were undermined to an appropriate distance in all directions around the primary defect and laterally outward around the flap utilizing iris scissors. O-T Plasty Text: The defect edges were debeveled with a #15 scalpel blade.  Given the location of the defect, shape of the defect and the proximity to free margins an O-T plasty was deemed most appropriate.  Using a sterile surgical marker, an appropriate O-T plasty was drawn incorporating the defect and placing the expected incisions within the relaxed skin tension lines where possible.    The area thus outlined was incised deep to adipose tissue with a #15 scalpel blade.  The skin margins were undermined to an appropriate distance in all directions utilizing iris scissors. O-Z Plasty Text: The defect edges were debeveled with a #15 scalpel blade.  Given the location of the defect, shape of the defect and the proximity to free margins an O-Z plasty (double transposition flap) was deemed most appropriate.  Using a sterile surgical marker, the appropriate transposition flaps were drawn incorporating the defect and placing the expected incisions within the relaxed skin tension lines where possible.    The area thus outlined was incised deep to adipose tissue with a #15 scalpel blade.  The skin margins were undermined to an appropriate distance in all directions utilizing iris scissors.  Hemostasis was achieved with electrocautery.  The flaps were then transposed into place, one clockwise and the other counterclockwise, and anchored with interrupted buried subcutaneous sutures. Double O-Z Plasty Text: The defect edges were debeveled with a #15 scalpel blade.  Given the location of the defect, shape of the defect and the proximity to free margins a Double O-Z plasty (double transposition flap) was deemed most appropriate.  Using a sterile surgical marker, the appropriate transposition flaps were drawn incorporating the defect and placing the expected incisions within the relaxed skin tension lines where possible. The area thus outlined was incised deep to adipose tissue with a #15 scalpel blade.  The skin margins were undermined to an appropriate distance in all directions utilizing iris scissors.  Hemostasis was achieved with electrocautery.  The flaps were then transposed into place, one clockwise and the other counterclockwise, and anchored with interrupted buried subcutaneous sutures. V-Y Plasty Text: The defect edges were debeveled with a #15 scalpel blade.  Given the location of the defect, shape of the defect and the proximity to free margins an V-Y advancement flap was deemed most appropriate.  Using a sterile surgical marker, an appropriate advancement flap was drawn incorporating the defect and placing the expected incisions within the relaxed skin tension lines where possible.    The area thus outlined was incised deep to adipose tissue with a #15 scalpel blade.  The skin margins were undermined to an appropriate distance in all directions utilizing iris scissors. H Plasty Text: Given the location of the defect, shape of the defect and the proximity to free margins a H-plasty was deemed most appropriate for repair.  Using a sterile surgical marker, the appropriate advancement arms of the H-plasty were drawn incorporating the defect and placing the expected incisions within the relaxed skin tension lines where possible. The area thus outlined was incised deep to adipose tissue with a #15 scalpel blade. The skin margins were undermined to an appropriate distance in all directions utilizing iris scissors.  The opposing advancement arms were then advanced into place in opposite direction and anchored with interrupted buried subcutaneous sutures. W Plasty Text: The lesion was extirpated to the level of the fat with a #15 scalpel blade.  Given the location of the defect, shape of the defect and the proximity to free margins a W-plasty was deemed most appropriate for repair.  Using a sterile surgical marker, the appropriate transposition arms of the W-plasty were drawn incorporating the defect and placing the expected incisions within the relaxed skin tension lines where possible.    The area thus outlined was incised deep to adipose tissue with a #15 scalpel blade.  The skin margins were undermined to an appropriate distance in all directions utilizing iris scissors.  The opposing transposition arms were then transposed into place in opposite direction and anchored with interrupted buried subcutaneous sutures. Z Plasty Text: The lesion was extirpated to the level of the fat with a #15 scalpel blade.  Given the location of the defect, shape of the defect and the proximity to free margins a Z-plasty was deemed most appropriate for repair.  Using a sterile surgical marker, the appropriate transposition arms of the Z-plasty were drawn incorporating the defect and placing the expected incisions within the relaxed skin tension lines where possible.    The area thus outlined was incised deep to adipose tissue with a #15 scalpel blade.  The skin margins were undermined to an appropriate distance in all directions utilizing iris scissors.  The opposing transposition arms were then transposed into place in opposite direction and anchored with interrupted buried subcutaneous sutures. Double Z Plasty Text: The lesion was extirpated to the level of the fat with a #15 scalpel blade. Given the location of the defect, shape of the defect and the proximity to free margins a double Z-plasty was deemed most appropriate for repair. Using a sterile surgical marker, the appropriate transposition arms of the double Z-plasty were drawn incorporating the defect and placing the expected incisions within the relaxed skin tension lines where possible. The area thus outlined was incised deep to adipose tissue with a #15 scalpel blade. The skin margins were undermined to an appropriate distance in all directions utilizing iris scissors. The opposing transposition arms were then transposed and carried over into place in opposite direction and anchored with interrupted buried subcutaneous sutures. Zygomaticofacial Flap Text: Given the location of the defect, shape of the defect and the proximity to free margins a zygomaticofacial flap was deemed most appropriate for repair.  Using a sterile surgical marker, the appropriate flap was drawn incorporating the defect and placing the expected incisions within the relaxed skin tension lines where possible. The area thus outlined was incised deep to adipose tissue with a #15 scalpel blade with preservation of a vascular pedicle.  The skin margins were undermined to an appropriate distance in all directions utilizing iris scissors.  The flap was then placed into the defect and anchored with interrupted buried subcutaneous sutures. Cheek Interpolation Flap Text: A decision was made to reconstruct the defect utilizing an interpolation axial flap and a staged reconstruction.  A telfa template was made of the defect.  This telfa template was then used to outline the Cheek Interpolation flap.  The donor area for the pedicle flap was then injected with anesthesia.  The flap was excised through the skin and subcutaneous tissue down to the layer of the underlying musculature.  The interpolation flap was carefully excised within this deep plane to maintain its blood supply.  The edges of the donor site were undermined.   The donor site was closed in a primary fashion.  The pedicle was then rotated into position and sutured.  Once the tube was sutured into place, adequate blood supply was confirmed with blanching and refill.  The pedicle was then wrapped with xeroform gauze and dressed appropriately with a telfa and gauze bandage to ensure continued blood supply and protect the attached pedicle. Cheek-To-Nose Interpolation Flap Text: A decision was made to reconstruct the defect utilizing an interpolation axial flap and a staged reconstruction.  A telfa template was made of the defect.  This telfa template was then used to outline the Cheek-To-Nose Interpolation flap.  The donor area for the pedicle flap was then injected with anesthesia.  The flap was excised through the skin and subcutaneous tissue down to the layer of the underlying musculature.  The interpolation flap was carefully excised within this deep plane to maintain its blood supply.  The edges of the donor site were undermined.   The donor site was closed in a primary fashion.  The pedicle was then rotated into position and sutured.  Once the tube was sutured into place, adequate blood supply was confirmed with blanching and refill.  The pedicle was then wrapped with xeroform gauze and dressed appropriately with a telfa and gauze bandage to ensure continued blood supply and protect the attached pedicle. Interpolation Flap Text: A decision was made to reconstruct the defect utilizing an interpolation axial flap and a staged reconstruction.  A telfa template was made of the defect.  This telfa template was then used to outline the interpolation flap.  The donor area for the pedicle flap was then injected with anesthesia.  The flap was excised through the skin and subcutaneous tissue down to the layer of the underlying musculature.  The interpolation flap was carefully excised within this deep plane to maintain its blood supply.  The edges of the donor site were undermined.   The donor site was closed in a primary fashion.  The pedicle was then rotated into position and sutured.  Once the tube was sutured into place, adequate blood supply was confirmed with blanching and refill.  The pedicle was then wrapped with xeroform gauze and dressed appropriately with a telfa and gauze bandage to ensure continued blood supply and protect the attached pedicle. Melolabial Interpolation Flap Text: A decision was made to reconstruct the defect utilizing an interpolation axial flap and a staged reconstruction.  A telfa template was made of the defect.  This telfa template was then used to outline the melolabial interpolation flap.  The donor area for the pedicle flap was then injected with anesthesia.  The flap was excised through the skin and subcutaneous tissue down to the layer of the underlying musculature.  The pedicle flap was carefully excised within this deep plane to maintain its blood supply.  The edges of the donor site were undermined.   The donor site was closed in a primary fashion.  The pedicle was then rotated into position and sutured.  Once the tube was sutured into place, adequate blood supply was confirmed with blanching and refill.  The pedicle was then wrapped with xeroform gauze and dressed appropriately with a telfa and gauze bandage to ensure continued blood supply and protect the attached pedicle. Mastoid Interpolation Flap Text: A decision was made to reconstruct the defect utilizing an interpolation axial flap and a staged reconstruction.  A telfa template was made of the defect.  This telfa template was then used to outline the mastoid interpolation flap.  The donor area for the pedicle flap was then injected with anesthesia.  The flap was excised through the skin and subcutaneous tissue down to the layer of the underlying musculature.  The pedicle flap was carefully excised within this deep plane to maintain its blood supply.  The edges of the donor site were undermined.   The donor site was closed in a primary fashion.  The pedicle was then rotated into position and sutured.  Once the tube was sutured into place, adequate blood supply was confirmed with blanching and refill.  The pedicle was then wrapped with xeroform gauze and dressed appropriately with a telfa and gauze bandage to ensure continued blood supply and protect the attached pedicle. Posterior Auricular Interpolation Flap Text: A decision was made to reconstruct the defect utilizing an interpolation axial flap and a staged reconstruction.  A telfa template was made of the defect.  This telfa template was then used to outline the posterior auricular interpolation flap.  The donor area for the pedicle flap was then injected with anesthesia.  The flap was excised through the skin and subcutaneous tissue down to the layer of the underlying musculature.  The pedicle flap was carefully excised within this deep plane to maintain its blood supply.  The edges of the donor site were undermined.   The donor site was closed in a primary fashion.  The pedicle was then rotated into position and sutured.  Once the tube was sutured into place, adequate blood supply was confirmed with blanching and refill.  The pedicle was then wrapped with xeroform gauze and dressed appropriately with a telfa and gauze bandage to ensure continued blood supply and protect the attached pedicle. Paramedian Forehead Flap Text: A decision was made to reconstruct the defect utilizing an interpolation axial flap and a staged reconstruction.  A telfa template was made of the defect.  This telfa template was then used to outline the paramedian forehead pedicle flap.  The donor area for the pedicle flap was then injected with anesthesia.  The flap was excised through the skin and subcutaneous tissue down to the layer of the underlying musculature.  The pedicle flap was carefully excised within this deep plane to maintain its blood supply.  The edges of the donor site were undermined.   The donor site was closed in a primary fashion.  The pedicle was then rotated into position and sutured.  Once the tube was sutured into place, adequate blood supply was confirmed with blanching and refill.  The pedicle was then wrapped with xeroform gauze and dressed appropriately with a telfa and gauze bandage to ensure continued blood supply and protect the attached pedicle. Abbe Flap (Upper To Lower Lip) Text: The defect of the lower lip was assessed and measured.  Given the location and size of the defect, an Abbe flap was deemed most appropriate.  Using a sterile surgical marker, an appropriate Abbe flap was measured and drawn on the upper lip. Local anesthesia was then infiltrated.  A scalpel was then used to incise the upper lip through and through the skin, vermilion, muscle and mucosa, leaving the flap pedicled on the opposite side.  The flap was then rotated and transferred to the lower lip defect.  The flap was then sutured into place with a three layer technique, closing the orbicularis oris muscle layer with subcutaneous buried sutures, followed by a mucosal layer and an epidermal layer. Abbe Flap (Lower To Upper Lip) Text: The defect of the upper lip was assessed and measured.  Given the location and size of the defect, an Abbe flap was deemed most appropriate.  Using a sterile surgical marker, an appropriate Abbe flap was measured and drawn on the lower lip. Local anesthesia was then infiltrated. A scalpel was then used to incise the upper lip through and through the skin, vermilion, muscle and mucosa, leaving the flap pedicled on the opposite side.  The flap was then rotated and transferred to the lower lip defect.  The flap was then sutured into place with a three layer technique, closing the orbicularis oris muscle layer with subcutaneous buried sutures, followed by a mucosal layer and an epidermal layer. Estlander Flap (Upper To Lower Lip) Text: The defect of the lower lip was assessed and measured.  Given the location and size of the defect, an Estlander flap was deemed most appropriate.  Using a sterile surgical marker, an appropriate Estlander flap was measured and drawn on the upper lip. Local anesthesia was then infiltrated. A scalpel was then used to incise the lateral aspect of the flap, through skin, muscle and mucosa, leaving the flap pedicled medially.  The flap was then rotated and positioned to fill the lower lip defect.  The flap was then sutured into place with a three layer technique, closing the orbicularis oris muscle layer with subcutaneous buried sutures, followed by a mucosal layer and an epidermal layer. Lip Wedge Excision Repair Text: Given the location of the defect and the proximity to free margins a full thickness wedge repair was deemed most appropriate.  Using a sterile surgical marker, the appropriate repair was drawn incorporating the defect and placing the expected incisions perpendicular to the vermilion border.  The vermilion border was also meticulously outlined to ensure appropriate reapproximation during the repair.  The area thus outlined was incised through and through with a #15 scalpel blade.  The muscularis and dermis were reaproximated with deep sutures following hemostasis. Care was taken to realign the vermilion border before proceeding with the superficial closure.  Once the vermilion was realigned the superfical and mucosal closure was finished. Ftsg Text: The defect edges were debeveled with a #15 scalpel blade.  Given the location of the defect, shape of the defect and the proximity to free margins a full thickness skin graft was deemed most appropriate.  Using a sterile surgical marker, the primary defect shape was transferred to the donor site. The area thus outlined was incised deep to adipose tissue with a #15 scalpel blade.  The harvested graft was then trimmed of adipose tissue until only dermis and epidermis was left.  The skin margins of the secondary defect were undermined to an appropriate distance in all directions utilizing iris scissors.  The secondary defect was closed with interrupted buried subcutaneous sutures.  The skin edges were then re-apposed with running  sutures.  The skin graft was then placed in the primary defect and oriented appropriately. Split-Thickness Skin Graft Text: The defect edges were debeveled with a #15 scalpel blade.  Given the location of the defect, shape of the defect and the proximity to free margins a split thickness skin graft was deemed most appropriate.  Using a sterile surgical marker, the primary defect shape was transferred to the donor site. The split thickness graft was then harvested.  The skin graft was then placed in the primary defect and oriented appropriately. Pinch Graft Text: The defect edges were debeveled with a #15 scalpel blade. Given the location of the defect, shape of the defect and the proximity to free margins a pinch graft was deemed most appropriate. Using a sterile surgical marker, the primary defect shape was transferred to the donor site. The area thus outlined was incised deep to adipose tissue with a #15 scalpel blade.  The harvested graft was then trimmed of adipose tissue until only dermis and epidermis was left. The skin margins of the secondary defect were undermined to an appropriate distance in all directions utilizing iris scissors.  The secondary defect was closed with interrupted buried subcutaneous sutures.  The skin edges were then re-apposed with running  sutures.  The skin graft was then placed in the primary defect and oriented appropriately. Burow's Graft Text: The defect edges were debeveled with a #15 scalpel blade.  Given the location of the defect, shape of the defect, the proximity to free margins and the presence of a standing cone deformity a Burow's skin graft was deemed most appropriate. The standing cone was removed and this tissue was then trimmed to the shape of the primary defect. The adipose tissue was also removed until only dermis and epidermis were left.  The skin margins of the secondary defect were undermined to an appropriate distance in all directions utilizing iris scissors.  The secondary defect was closed with interrupted buried subcutaneous sutures.  The skin edges were then re-apposed with running  sutures.  The skin graft was then placed in the primary defect and oriented appropriately. Cartilage Graft Text: The defect edges were debeveled with a #15 scalpel blade.  Given the location of the defect, shape of the defect, the fact the defect involved a full thickness cartilage defect a cartilage graft was deemed most appropriate.  An appropriate donor site was identified, cleansed, and anesthetized. The cartilage graft was then harvested and transferred to the recipient site, oriented appropriately and then sutured into place.  The secondary defect was then repaired using a primary closure. Composite Graft Text: The defect edges were debeveled with a #15 scalpel blade.  Given the location of the defect, shape of the defect, the proximity to free margins and the fact the defect was full thickness a composite graft was deemed most appropriate.  The defect was outline and then transferred to the donor site.  A full thickness graft was then excised from the donor site. The graft was then placed in the primary defect, oriented appropriately and then sutured into place.  The secondary defect was then repaired using a primary closure. Epidermal Autograft Text: The defect edges were debeveled with a #15 scalpel blade.  Given the location of the defect, shape of the defect and the proximity to free margins an epidermal autograft was deemed most appropriate.  Using a sterile surgical marker, the primary defect shape was transferred to the donor site. The epidermal graft was then harvested.  The skin graft was then placed in the primary defect and oriented appropriately. Dermal Autograft Text: The defect edges were debeveled with a #15 scalpel blade.  Given the location of the defect, shape of the defect and the proximity to free margins a dermal autograft was deemed most appropriate.  Using a sterile surgical marker, the primary defect shape was transferred to the donor site. The area thus outlined was incised deep to adipose tissue with a #15 scalpel blade.  The harvested graft was then trimmed of adipose and epidermal tissue until only dermis was left.  The skin graft was then placed in the primary defect and oriented appropriately. Skin Substitute Text: The defect edges were debeveled with a #15 scalpel blade.  Given the location of the defect, shape of the defect and the proximity to free margins a skin substitute graft was deemed most appropriate.  The graft material was trimmed to fit the size of the defect. The graft was then placed in the primary defect and oriented appropriately. Tissue Cultured Epidermal Autograft Text: The defect edges were debeveled with a #15 scalpel blade.  Given the location of the defect, shape of the defect and the proximity to free margins a tissue cultured epidermal autograft was deemed most appropriate.  The graft was then trimmed to fit the size of the defect.  The graft was then placed in the primary defect and oriented appropriately. Xenograft Text: The defect edges were debeveled with a #15 scalpel blade.  Given the location of the defect, shape of the defect and the proximity to free margins a xenograft was deemed most appropriate.  The graft was then trimmed to fit the size of the defect.  The graft was then placed in the primary defect and oriented appropriately. Purse String (Intermediate) Text: Given the location of the defect and the characteristics of the surrounding skin a purse string intermediate closure was deemed most appropriate.  Undermining was performed circumferentially around the surgical defect.  A purse string suture was then placed and tightened. Purse String (Simple) Text: Given the location of the defect and the characteristics of the surrounding skin a purse string simple closure was deemed most appropriate.  Undermining was performed circumferentially around the surgical defect.  A purse string suture was then placed and tightened. Complex Repair And Single Advancement Flap Text: The defect edges were debeveled with a #15 scalpel blade.  The primary defect was closed partially with a complex linear closure.  Given the location of the remaining defect, shape of the defect and the proximity to free margins a single advancement flap was deemed most appropriate for complete closure of the defect.  Using a sterile surgical marker, an appropriate advancement flap was drawn incorporating the defect and placing the expected incisions within the relaxed skin tension lines where possible.    The area thus outlined was incised deep to adipose tissue with a #15 scalpel blade.  The skin margins were undermined to an appropriate distance in all directions utilizing iris scissors. Complex Repair And Double Advancement Flap Text: The defect edges were debeveled with a #15 scalpel blade.  The primary defect was closed partially with a complex linear closure.  Given the location of the remaining defect, shape of the defect and the proximity to free margins a double advancement flap was deemed most appropriate for complete closure of the defect.  Using a sterile surgical marker, an appropriate advancement flap was drawn incorporating the defect and placing the expected incisions within the relaxed skin tension lines where possible.    The area thus outlined was incised deep to adipose tissue with a #15 scalpel blade.  The skin margins were undermined to an appropriate distance in all directions utilizing iris scissors. Complex Repair And Modified Advancement Flap Text: The defect edges were debeveled with a #15 scalpel blade.  The primary defect was closed partially with a complex linear closure.  Given the location of the remaining defect, shape of the defect and the proximity to free margins a modified advancement flap was deemed most appropriate for complete closure of the defect.  Using a sterile surgical marker, an appropriate advancement flap was drawn incorporating the defect and placing the expected incisions within the relaxed skin tension lines where possible.    The area thus outlined was incised deep to adipose tissue with a #15 scalpel blade.  The skin margins were undermined to an appropriate distance in all directions utilizing iris scissors. Complex Repair And A-T Advancement Flap Text: The defect edges were debeveled with a #15 scalpel blade.  The primary defect was closed partially with a complex linear closure.  Given the location of the remaining defect, shape of the defect and the proximity to free margins an A-T advancement flap was deemed most appropriate for complete closure of the defect.  Using a sterile surgical marker, an appropriate advancement flap was drawn incorporating the defect and placing the expected incisions within the relaxed skin tension lines where possible.    The area thus outlined was incised deep to adipose tissue with a #15 scalpel blade.  The skin margins were undermined to an appropriate distance in all directions utilizing iris scissors. Complex Repair And O-T Advancement Flap Text: The defect edges were debeveled with a #15 scalpel blade.  The primary defect was closed partially with a complex linear closure.  Given the location of the remaining defect, shape of the defect and the proximity to free margins an O-T advancement flap was deemed most appropriate for complete closure of the defect.  Using a sterile surgical marker, an appropriate advancement flap was drawn incorporating the defect and placing the expected incisions within the relaxed skin tension lines where possible.    The area thus outlined was incised deep to adipose tissue with a #15 scalpel blade.  The skin margins were undermined to an appropriate distance in all directions utilizing iris scissors. Complex Repair And O-L Flap Text: The defect edges were debeveled with a #15 scalpel blade.  The primary defect was closed partially with a complex linear closure.  Given the location of the remaining defect, shape of the defect and the proximity to free margins an O-L flap was deemed most appropriate for complete closure of the defect.  Using a sterile surgical marker, an appropriate flap was drawn incorporating the defect and placing the expected incisions within the relaxed skin tension lines where possible.    The area thus outlined was incised deep to adipose tissue with a #15 scalpel blade.  The skin margins were undermined to an appropriate distance in all directions utilizing iris scissors. Complex Repair And Bilobe Flap Text: The defect edges were debeveled with a #15 scalpel blade.  The primary defect was closed partially with a complex linear closure.  Given the location of the remaining defect, shape of the defect and the proximity to free margins a bilobe flap was deemed most appropriate for complete closure of the defect.  Using a sterile surgical marker, an appropriate advancement flap was drawn incorporating the defect and placing the expected incisions within the relaxed skin tension lines where possible.    The area thus outlined was incised deep to adipose tissue with a #15 scalpel blade.  The skin margins were undermined to an appropriate distance in all directions utilizing iris scissors. Complex Repair And Melolabial Flap Text: The defect edges were debeveled with a #15 scalpel blade.  The primary defect was closed partially with a complex linear closure.  Given the location of the remaining defect, shape of the defect and the proximity to free margins a melolabial flap was deemed most appropriate for complete closure of the defect.  Using a sterile surgical marker, an appropriate advancement flap was drawn incorporating the defect and placing the expected incisions within the relaxed skin tension lines where possible.    The area thus outlined was incised deep to adipose tissue with a #15 scalpel blade.  The skin margins were undermined to an appropriate distance in all directions utilizing iris scissors. Complex Repair And Rotation Flap Text: The defect edges were debeveled with a #15 scalpel blade.  The primary defect was closed partially with a complex linear closure.  Given the location of the remaining defect, shape of the defect and the proximity to free margins a rotation flap was deemed most appropriate for complete closure of the defect.  Using a sterile surgical marker, an appropriate advancement flap was drawn incorporating the defect and placing the expected incisions within the relaxed skin tension lines where possible.    The area thus outlined was incised deep to adipose tissue with a #15 scalpel blade.  The skin margins were undermined to an appropriate distance in all directions utilizing iris scissors. Complex Repair And Rhombic Flap Text: The defect edges were debeveled with a #15 scalpel blade.  The primary defect was closed partially with a complex linear closure.  Given the location of the remaining defect, shape of the defect and the proximity to free margins a rhombic flap was deemed most appropriate for complete closure of the defect.  Using a sterile surgical marker, an appropriate advancement flap was drawn incorporating the defect and placing the expected incisions within the relaxed skin tension lines where possible.    The area thus outlined was incised deep to adipose tissue with a #15 scalpel blade.  The skin margins were undermined to an appropriate distance in all directions utilizing iris scissors. Complex Repair And Transposition Flap Text: The defect edges were debeveled with a #15 scalpel blade.  The primary defect was closed partially with a complex linear closure.  Given the location of the remaining defect, shape of the defect and the proximity to free margins a transposition flap was deemed most appropriate for complete closure of the defect.  Using a sterile surgical marker, an appropriate advancement flap was drawn incorporating the defect and placing the expected incisions within the relaxed skin tension lines where possible.    The area thus outlined was incised deep to adipose tissue with a #15 scalpel blade.  The skin margins were undermined to an appropriate distance in all directions utilizing iris scissors. Complex Repair And V-Y Plasty Text: The defect edges were debeveled with a #15 scalpel blade.  The primary defect was closed partially with a complex linear closure.  Given the location of the remaining defect, shape of the defect and the proximity to free margins a V-Y plasty was deemed most appropriate for complete closure of the defect.  Using a sterile surgical marker, an appropriate advancement flap was drawn incorporating the defect and placing the expected incisions within the relaxed skin tension lines where possible.    The area thus outlined was incised deep to adipose tissue with a #15 scalpel blade.  The skin margins were undermined to an appropriate distance in all directions utilizing iris scissors. Complex Repair And M Plasty Text: The defect edges were debeveled with a #15 scalpel blade.  The primary defect was closed partially with a complex linear closure.  Given the location of the remaining defect, shape of the defect and the proximity to free margins an M plasty was deemed most appropriate for complete closure of the defect.  Using a sterile surgical marker, an appropriate advancement flap was drawn incorporating the defect and placing the expected incisions within the relaxed skin tension lines where possible.    The area thus outlined was incised deep to adipose tissue with a #15 scalpel blade.  The skin margins were undermined to an appropriate distance in all directions utilizing iris scissors. Complex Repair And Double M Plasty Text: The defect edges were debeveled with a #15 scalpel blade.  The primary defect was closed partially with a complex linear closure.  Given the location of the remaining defect, shape of the defect and the proximity to free margins a double M plasty was deemed most appropriate for complete closure of the defect.  Using a sterile surgical marker, an appropriate advancement flap was drawn incorporating the defect and placing the expected incisions within the relaxed skin tension lines where possible.    The area thus outlined was incised deep to adipose tissue with a #15 scalpel blade.  The skin margins were undermined to an appropriate distance in all directions utilizing iris scissors. Complex Repair And W Plasty Text: The defect edges were debeveled with a #15 scalpel blade.  The primary defect was closed partially with a complex linear closure.  Given the location of the remaining defect, shape of the defect and the proximity to free margins a W plasty was deemed most appropriate for complete closure of the defect.  Using a sterile surgical marker, an appropriate advancement flap was drawn incorporating the defect and placing the expected incisions within the relaxed skin tension lines where possible.    The area thus outlined was incised deep to adipose tissue with a #15 scalpel blade.  The skin margins were undermined to an appropriate distance in all directions utilizing iris scissors. Complex Repair And Z Plasty Text: The defect edges were debeveled with a #15 scalpel blade.  The primary defect was closed partially with a complex linear closure.  Given the location of the remaining defect, shape of the defect and the proximity to free margins a Z plasty was deemed most appropriate for complete closure of the defect.  Using a sterile surgical marker, an appropriate advancement flap was drawn incorporating the defect and placing the expected incisions within the relaxed skin tension lines where possible.    The area thus outlined was incised deep to adipose tissue with a #15 scalpel blade.  The skin margins were undermined to an appropriate distance in all directions utilizing iris scissors. Complex Repair And Dorsal Nasal Flap Text: The defect edges were debeveled with a #15 scalpel blade.  The primary defect was closed partially with a complex linear closure.  Given the location of the remaining defect, shape of the defect and the proximity to free margins a dorsal nasal flap was deemed most appropriate for complete closure of the defect.  Using a sterile surgical marker, an appropriate flap was drawn incorporating the defect and placing the expected incisions within the relaxed skin tension lines where possible.    The area thus outlined was incised deep to adipose tissue with a #15 scalpel blade.  The skin margins were undermined to an appropriate distance in all directions utilizing iris scissors. Complex Repair And Ftsg Text: The defect edges were debeveled with a #15 scalpel blade.  The primary defect was closed partially with a complex linear closure.  Given the location of the defect, shape of the defect and the proximity to free margins a full thickness skin graft was deemed most appropriate to repair the remaining defect.  The graft was trimmed to fit the size of the remaining defect.  The graft was then placed in the primary defect, oriented appropriately, and sutured into place. Complex Repair And Burow's Graft Text: The defect edges were debeveled with a #15 scalpel blade.  The primary defect was closed partially with a complex linear closure.  Given the location of the defect, shape of the defect, the proximity to free margins and the presence of a standing cone deformity a Burow's graft was deemed most appropriate to repair the remaining defect.  The graft was trimmed to fit the size of the remaining defect.  The graft was then placed in the primary defect, oriented appropriately, and sutured into place. Complex Repair And Split-Thickness Skin Graft Text: The defect edges were debeveled with a #15 scalpel blade.  The primary defect was closed partially with a complex linear closure.  Given the location of the defect, shape of the defect and the proximity to free margins a split thickness skin graft was deemed most appropriate to repair the remaining defect.  The graft was trimmed to fit the size of the remaining defect.  The graft was then placed in the primary defect, oriented appropriately, and sutured into place. Complex Repair And Epidermal Autograft Text: The defect edges were debeveled with a #15 scalpel blade.  The primary defect was closed partially with a complex linear closure.  Given the location of the defect, shape of the defect and the proximity to free margins an epidermal autograft was deemed most appropriate to repair the remaining defect.  The graft was trimmed to fit the size of the remaining defect.  The graft was then placed in the primary defect, oriented appropriately, and sutured into place. Complex Repair And Dermal Autograft Text: The defect edges were debeveled with a #15 scalpel blade.  The primary defect was closed partially with a complex linear closure.  Given the location of the defect, shape of the defect and the proximity to free margins an dermal autograft was deemed most appropriate to repair the remaining defect.  The graft was trimmed to fit the size of the remaining defect.  The graft was then placed in the primary defect, oriented appropriately, and sutured into place. Complex Repair And Tissue Cultured Epidermal Autograft Text: The defect edges were debeveled with a #15 scalpel blade.  The primary defect was closed partially with a complex linear closure.  Given the location of the defect, shape of the defect and the proximity to free margins an tissue cultured epidermal autograft was deemed most appropriate to repair the remaining defect.  The graft was trimmed to fit the size of the remaining defect.  The graft was then placed in the primary defect, oriented appropriately, and sutured into place. Complex Repair And Xenograft Text: The defect edges were debeveled with a #15 scalpel blade.  The primary defect was closed partially with a complex linear closure.  Given the location of the defect, shape of the defect and the proximity to free margins a xenograft was deemed most appropriate to repair the remaining defect.  The graft was trimmed to fit the size of the remaining defect.  The graft was then placed in the primary defect, oriented appropriately, and sutured into place. Complex Repair And Skin Substitute Graft Text: The defect edges were debeveled with a #15 scalpel blade.  The primary defect was closed partially with a complex linear closure.  Given the location of the remaining defect, shape of the defect and the proximity to free margins a skin substitute graft was deemed most appropriate to repair the remaining defect.  The graft was trimmed to fit the size of the remaining defect.  The graft was then placed in the primary defect, oriented appropriately, and sutured into place. Path Notes (To The Dermatopathologist): Please check margins. Consent was obtained from the patient. The risks and benefits to therapy were discussed in detail. Specifically, the risks of infection, scarring, bleeding, prolonged wound healing, incomplete removal, allergy to anesthesia, nerve injury and recurrence were addressed. Prior to the procedure, the treatment site was clearly identified and confirmed by the patient. All components of Universal Protocol/PAUSE Rule completed. Post-Care Instructions: I reviewed with the patient in detail post-care instructions. Patient is not to engage in any heavy lifting, exercise, or swimming for the next 14 days. Should the patient develop any fevers, chills, bleeding, severe pain patient will contact the office immediately. Home Suture Removal Text: Patient was provided a home suture removal kit and will remove their sutures at home.  If they have any questions or difficulties they will call the office. Where Do You Want The Question To Include Opioid Counseling Located?: Case Summary Tab Billing Type: Third-Party Bill Information: Selecting Yes will display possible errors in your note based on the variables you have selected. This validation is only offered as a suggestion for you. PLEASE NOTE THAT THE VALIDATION TEXT WILL BE REMOVED WHEN YOU FINALIZE YOUR NOTE. IF YOU WANT TO FAX A PRELIMINARY NOTE YOU WILL NEED TO TOGGLE THIS TO 'NO' IF YOU DO NOT WANT IT IN YOUR FAXED NOTE.

## 2024-07-09 ENCOUNTER — TELEPHONE (OUTPATIENT)
Dept: PRIMARY CARE CLINIC | Age: 59
End: 2024-07-09

## 2024-10-29 DIAGNOSIS — I10 ESSENTIAL HYPERTENSION: Chronic | ICD-10-CM

## 2024-10-29 DIAGNOSIS — I25.119 CORONARY ARTERY DISEASE INVOLVING NATIVE CORONARY ARTERY OF NATIVE HEART WITH ANGINA PECTORIS (HCC): ICD-10-CM

## 2024-10-29 RX ORDER — LISINOPRIL 40 MG/1
40 TABLET ORAL DAILY
Qty: 90 TABLET | Refills: 1 | Status: SHIPPED | OUTPATIENT
Start: 2024-10-29

## 2024-10-29 RX ORDER — ROSUVASTATIN CALCIUM 10 MG/1
10 TABLET, COATED ORAL DAILY
Qty: 90 TABLET | Refills: 1 | Status: SHIPPED | OUTPATIENT
Start: 2024-10-29

## 2024-10-30 ENCOUNTER — OFFICE VISIT (OUTPATIENT)
Dept: PRIMARY CARE CLINIC | Age: 59
End: 2024-10-30

## 2024-10-30 ENCOUNTER — HOSPITAL ENCOUNTER (OUTPATIENT)
Age: 59
Setting detail: SPECIMEN
Discharge: HOME | End: 2024-10-30

## 2024-10-30 VITALS
WEIGHT: 153 LBS | TEMPERATURE: 97.1 F | HEART RATE: 98 BPM | HEIGHT: 63 IN | DIASTOLIC BLOOD PRESSURE: 94 MMHG | SYSTOLIC BLOOD PRESSURE: 130 MMHG | OXYGEN SATURATION: 99 % | BODY MASS INDEX: 27.11 KG/M2

## 2024-10-30 DIAGNOSIS — F33.1 MODERATE EPISODE OF RECURRENT MAJOR DEPRESSIVE DISORDER (HCC): ICD-10-CM

## 2024-10-30 DIAGNOSIS — Z12.4 ENCOUNTER FOR SCREENING FOR MALIGNANT NEOPLASM OF CERVIX: ICD-10-CM

## 2024-10-30 DIAGNOSIS — Z76.0 MEDICATION REFILL: ICD-10-CM

## 2024-10-30 DIAGNOSIS — R06.2 WHEEZE: ICD-10-CM

## 2024-10-30 DIAGNOSIS — Z12.31 ENCOUNTER FOR SCREENING MAMMOGRAM FOR BREAST CANCER: Primary | ICD-10-CM

## 2024-10-30 DIAGNOSIS — R20.0 HAND NUMBNESS: ICD-10-CM

## 2024-10-30 DIAGNOSIS — F41.9 ANXIETY: ICD-10-CM

## 2024-10-30 DIAGNOSIS — I10 ESSENTIAL HYPERTENSION: Chronic | ICD-10-CM

## 2024-10-30 DIAGNOSIS — I25.119 CORONARY ARTERY DISEASE INVOLVING NATIVE CORONARY ARTERY OF NATIVE HEART WITH ANGINA PECTORIS (HCC): ICD-10-CM

## 2024-10-30 DIAGNOSIS — Z00.00 WELL ADULT EXAM: ICD-10-CM

## 2024-10-30 RX ORDER — METOPROLOL TARTRATE 25 MG/1
25 TABLET, FILM COATED ORAL 2 TIMES DAILY
Qty: 180 TABLET | Refills: 1 | Status: SHIPPED | OUTPATIENT
Start: 2024-10-30

## 2024-10-30 RX ORDER — AMLODIPINE BESYLATE 10 MG/1
10 TABLET ORAL DAILY
Qty: 90 TABLET | Refills: 1 | Status: SHIPPED | OUTPATIENT
Start: 2024-10-30

## 2024-10-30 RX ORDER — ALPRAZOLAM 0.25 MG/1
0.25 TABLET ORAL 3 TIMES DAILY PRN
Qty: 10 TABLET | Refills: 0 | Status: SHIPPED | OUTPATIENT
Start: 2024-10-30 | End: 2024-11-29

## 2024-10-30 RX ORDER — AMLODIPINE BESYLATE 10 MG/1
10 TABLET ORAL DAILY
Qty: 90 TABLET | Refills: 1 | OUTPATIENT
Start: 2024-10-30

## 2024-10-30 RX ORDER — CLOPIDOGREL BISULFATE 75 MG/1
75 TABLET ORAL DAILY
Qty: 90 TABLET | Refills: 0 | OUTPATIENT
Start: 2024-10-30

## 2024-10-30 RX ORDER — CYCLOBENZAPRINE HCL 5 MG
TABLET ORAL
Qty: 30 TABLET | Refills: 5 | Status: SHIPPED | OUTPATIENT
Start: 2024-10-30

## 2024-10-30 RX ORDER — FLUTICASONE PROPIONATE AND SALMETEROL 100; 50 UG/1; UG/1
1 POWDER RESPIRATORY (INHALATION) EVERY 12 HOURS
Qty: 1 EACH | Refills: 2 | Status: SHIPPED | OUTPATIENT
Start: 2024-10-30

## 2024-10-30 RX ORDER — CLOPIDOGREL BISULFATE 75 MG/1
75 TABLET ORAL DAILY
Qty: 90 TABLET | Refills: 0 | Status: SHIPPED | OUTPATIENT
Start: 2024-10-30

## 2024-10-30 SDOH — ECONOMIC STABILITY: FOOD INSECURITY: WITHIN THE PAST 12 MONTHS, YOU WORRIED THAT YOUR FOOD WOULD RUN OUT BEFORE YOU GOT MONEY TO BUY MORE.: NEVER TRUE

## 2024-10-30 SDOH — ECONOMIC STABILITY: INCOME INSECURITY: HOW HARD IS IT FOR YOU TO PAY FOR THE VERY BASICS LIKE FOOD, HOUSING, MEDICAL CARE, AND HEATING?: NOT VERY HARD

## 2024-10-30 SDOH — ECONOMIC STABILITY: FOOD INSECURITY: WITHIN THE PAST 12 MONTHS, THE FOOD YOU BOUGHT JUST DIDN'T LAST AND YOU DIDN'T HAVE MONEY TO GET MORE.: NEVER TRUE

## 2024-10-30 NOTE — PROGRESS NOTES
Patient presents today for routine PAP smear and pelvic exam.  HPI:  Last Mitzy?she is due.   Last PAP? maybe last year.  Abnormal PAP's?no.  Sexually active?sometimes.  Smoker? yes.    Last period? Hysterectomy   G 5 / P 1 / SAB 4   Diet-pretty healthy  Exercise-started walking more.   Family History:      Family History   Problem Relation Age of Onset    High Blood Pressure Mother     Breast Cancer Mother 76    Breast Cancer Sister 48     Breast Ca? Mom and sister.  Colon Ca? no.  Uterine Ca? no.  Ovarian Ca? no.  Osteoporosis? no.  Cardiovascular disease? no.  Diabetes? no.  CVA? no.  Thromboembolic disease? no.        4/17/2024    10:09 AM 8/16/2023    11:14 AM 8/10/2022    10:53 AM 3/24/2021     9:13 AM 5/8/2019    10:57 AM 6/5/2018     3:08 PM   PHQ Scores   PHQ2 Score 0 2 0 2 2 2   PHQ9 Score 6 7 0 2 2 2     Interpretation of Total Score Depression Severity: 1-4 = Minimal depression, 5-9 = Mild depression, 10-14 = Moderate depression, 15-19 = Moderately severe depression, 20-27 = Severe depression    The following problems were discussed during today's preventative visit:  Hand numbness, anxiety.         Tingling in hands. Xray showed no acute abnormality. Per previous office note:  Hand pain for a few months. Stiff and tingling. States she has a dx of carpal tunnel, had injections. 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 The problem is back. She has dropped things. She does not feel this is her carpal tunnel. She states she did not have numbness with her carpal tunnel in the past. No swelling. Has been trying to do little exercises, helps sometimes. Has tried tylenol, helps sometimes.     Anxiety- she is going on a trip and the highways make her nervous.  She has used xanax in the past. Asking for a refill.    Objective:  Physical Exam   Vitals: Blood pressure (!) 130/94, pulse 98, temperature 97.1 °F (36.2 °C), height 1.6 m (5' 3\"), weight 69.4 kg (153 lb), SpO2 99%, not

## 2024-11-04 LAB — CYTOLOGY REPORT: NORMAL

## 2025-03-16 ENCOUNTER — APPOINTMENT (OUTPATIENT)
Dept: CT IMAGING | Age: 60
End: 2025-03-16
Payer: MEDICARE

## 2025-03-16 ENCOUNTER — HOSPITAL ENCOUNTER (EMERGENCY)
Age: 60
Discharge: HOME | End: 2025-03-16
Attending: EMERGENCY MEDICINE
Payer: MEDICARE

## 2025-03-16 VITALS
HEART RATE: 70 BPM | BODY MASS INDEX: 27.1 KG/M2 | RESPIRATION RATE: 18 BRPM | SYSTOLIC BLOOD PRESSURE: 109 MMHG | TEMPERATURE: 98.2 F | OXYGEN SATURATION: 99 % | DIASTOLIC BLOOD PRESSURE: 100 MMHG | WEIGHT: 153 LBS

## 2025-03-16 DIAGNOSIS — R07.89 OTHER CHEST PAIN: ICD-10-CM

## 2025-03-16 DIAGNOSIS — E04.1 THYROID NODULE: ICD-10-CM

## 2025-03-16 DIAGNOSIS — Z76.0 MEDICATION REFILL: ICD-10-CM

## 2025-03-16 DIAGNOSIS — S06.0X0A CONCUSSION WITHOUT LOSS OF CONSCIOUSNESS, INITIAL ENCOUNTER: ICD-10-CM

## 2025-03-16 DIAGNOSIS — S16.1XXA STRAIN OF NECK MUSCLE, INITIAL ENCOUNTER: ICD-10-CM
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
## 2025-03-16 DIAGNOSIS — S01.81XA LACERATION OF FOREHEAD, INITIAL ENCOUNTER: ICD-10-CM

## 2025-03-16 DIAGNOSIS — I65.02 STENOSIS OF LEFT VERTEBRAL ARTERY: ICD-10-CM

## 2025-03-16 LAB
ALBUMIN SERPL-MCNC: 4.4 G/DL (ref 3.5–5.2)
ALBUMIN/GLOB SERPL: 1.4 {RATIO} (ref 1–2.5)
ALP SERPL-CCNC: 60 U/L (ref 35–104)
ALT SERPL-CCNC: 14 U/L (ref 10–35)
ANION GAP SERPL CALCULATED.3IONS-SCNC: 11 MMOL/L (ref 9–16)
AST SERPL-CCNC: 19 U/L (ref 10–35)
BASOPHILS # BLD: 0.05 K/UL (ref 0–0.2)
BASOPHILS NFR BLD: 1 % (ref 0–2)
BILIRUB SERPL-MCNC: 0.6 MG/DL (ref 0–1.2)
BUN SERPL-MCNC: 10 MG/DL (ref 8–23)
CALCIUM SERPL-MCNC: 9.4 MG/DL (ref 8.8–10.2)
CHLORIDE SERPL-SCNC: 107 MMOL/L (ref 98–107)
CO2 SERPL-SCNC: 21 MMOL/L (ref 20–31)
CREAT SERPL-MCNC: 0.9 MG/DL (ref 0.5–0.9)
EKG ATRIAL RATE: 61 BPM
EKG P AXIS: 70 DEGREES
EKG P-R INTERVAL: 154 MS
EKG Q-T INTERVAL: 440 MS
EKG QRS DURATION: 64 MS
EKG QTC CALCULATION (BAZETT): 442 MS
EKG R AXIS: 70 DEGREES
EKG T AXIS: 65 DEGREES
EKG VENTRICULAR RATE: 61 BPM
EOSINOPHIL # BLD: 0.21 K/UL (ref 0–0.44)
EOSINOPHILS RELATIVE PERCENT: 4 % (ref 1–4)
ERYTHROCYTE [DISTWIDTH] IN BLOOD BY AUTOMATED COUNT: 15.8 % (ref 11.8–14.4)
GFR, ESTIMATED: 70 ML/MIN/1.73M2
GLUCOSE SERPL-MCNC: 98 MG/DL (ref 82–115)
HCT VFR BLD AUTO: 41.8 % (ref 36.3–47.1)
HGB BLD-MCNC: 14.1 G/DL (ref 11.9–15.1)
IMM GRANULOCYTES # BLD AUTO: 0.01 K/UL (ref 0–0.3)
IMM GRANULOCYTES NFR BLD: 0 %
LYMPHOCYTES NFR BLD: 2.83 K/UL (ref 1.1–3.7)
LYMPHOCYTES RELATIVE PERCENT: 48 % (ref 24–43)
MCH RBC QN AUTO: 29.6 PG (ref 25.2–33.5)
MCHC RBC AUTO-ENTMCNC: 33.7 G/DL (ref 28.4–34.8)
MCV RBC AUTO: 87.6 FL (ref 82.6–102.9)
MONOCYTES NFR BLD: 0.65 K/UL (ref 0.1–1.2)
MONOCYTES NFR BLD: 11 % (ref 3–12)
NEUTROPHILS NFR BLD: 36 % (ref 36–65)
NEUTS SEG NFR BLD: 2.08 K/UL (ref 1.5–8.1)
NRBC BLD-RTO: 0 PER 100 WBC
PLATELET # BLD AUTO: 285 K/UL (ref 138–453)
PMV BLD AUTO: 9 FL (ref 8.1–13.5)
POTASSIUM SERPL-SCNC: 4.4 MMOL/L (ref 3.7–5.3)
PROT SERPL-MCNC: 7.5 G/DL (ref 6.6–8.7)
RBC # BLD AUTO: 4.77 M/UL (ref 3.95–5.11)
RBC # BLD: ABNORMAL 10*6/UL
SODIUM SERPL-SCNC: 139 MMOL/L (ref 136–145)
TROPONIN I SERPL HS-MCNC: 13 NG/L (ref 0–14)
TROPONIN I SERPL HS-MCNC: 16 NG/L (ref 0–14)
WBC OTHER # BLD: 5.8 K/UL (ref 3.5–11.3)

## 2025-03-16 PROCEDURE — 99285 EMERGENCY DEPT VISIT HI MDM: CPT

## 2025-03-16 PROCEDURE — 6360000004 HC RX CONTRAST MEDICATION: Performed by: PHYSICIAN ASSISTANT

## 2025-03-16 PROCEDURE — 6360000002 HC RX W HCPCS: Performed by: PHYSICIAN ASSISTANT

## 2025-03-16 PROCEDURE — 85025 COMPLETE CBC W/AUTO DIFF WBC: CPT

## 2025-03-16 PROCEDURE — 80053 COMPREHEN METABOLIC PANEL: CPT

## 2025-03-16 PROCEDURE — 84484 ASSAY OF TROPONIN QUANT: CPT

## 2025-03-16 PROCEDURE — 72125 CT NECK SPINE W/O DYE: CPT

## 2025-03-16 PROCEDURE — 96375 TX/PRO/DX INJ NEW DRUG ADDON: CPT

## 2025-03-16 PROCEDURE — 2580000003 HC RX 258: Performed by: PHYSICIAN ASSISTANT

## 2025-03-16 PROCEDURE — 96374 THER/PROPH/DIAG INJ IV PUSH: CPT

## 2025-03-16 PROCEDURE — 90715 TDAP VACCINE 7 YRS/> IM: CPT | Performed by: PHYSICIAN ASSISTANT

## 2025-03-16 PROCEDURE — 71260 CT THORAX DX C+: CPT

## 2025-03-16 PROCEDURE — 93005 ELECTROCARDIOGRAM TRACING: CPT | Performed by: PHYSICIAN ASSISTANT

## 2025-03-16 PROCEDURE — 70450 CT HEAD/BRAIN W/O DYE: CPT

## 2025-03-16 PROCEDURE — 90471 IMMUNIZATION ADMIN: CPT | Performed by: PHYSICIAN ASSISTANT

## 2025-03-16 PROCEDURE — 70496 CT ANGIOGRAPHY HEAD: CPT

## 2025-03-16 PROCEDURE — 2500000003 HC RX 250 WO HCPCS: Performed by: PHYSICIAN ASSISTANT

## 2025-03-16 RX ORDER — KETOROLAC TROMETHAMINE 30 MG/ML
30 INJECTION, SOLUTION INTRAMUSCULAR; INTRAVENOUS ONCE
Status: COMPLETED | OUTPATIENT
Start: 2025-03-16 | End: 2025-03-16

## 2025-03-16 RX ORDER — 0.9 % SODIUM CHLORIDE 0.9 %
500 INTRAVENOUS SOLUTION INTRAVENOUS ONCE
Status: COMPLETED | OUTPATIENT
Start: 2025-03-16 | End: 2025-03-16

## 2025-03-16 RX ORDER — IBUPROFEN 800 MG/1
800 TABLET, FILM COATED ORAL EVERY 8 HOURS PRN
Qty: 20 TABLET | Refills: 0 | Status: SHIPPED | OUTPATIENT
Start: 2025-03-16

## 2025-03-16 RX ORDER — IOPAMIDOL 755 MG/ML
75 INJECTION, SOLUTION INTRAVASCULAR
Status: COMPLETED | OUTPATIENT
Start: 2025-03-16 | End: 2025-03-16

## 2025-03-16 RX ORDER — 0.9 % SODIUM CHLORIDE 0.9 %
80 INTRAVENOUS SOLUTION INTRAVENOUS ONCE
Status: DISCONTINUED | OUTPATIENT
Start: 2025-03-16 | End: 2025-03-16 | Stop reason: HOSPADM

## 2025-03-16 RX ORDER — ORPHENADRINE CITRATE 30 MG/ML
60 INJECTION INTRAMUSCULAR; INTRAVENOUS ONCE
Status: COMPLETED | OUTPATIENT
Start: 2025-03-16 | End: 2025-03-16

## 2025-03-16 RX ORDER — ONDANSETRON 4 MG/1
4 TABLET, ORALLY DISINTEGRATING ORAL 3 TIMES DAILY PRN
Qty: 6 TABLET | Refills: 0 | Status: SHIPPED | OUTPATIENT
Start: 2025-03-16 | End: 2025-03-18

## 2025-03-16 RX ORDER — CYCLOBENZAPRINE HCL 5 MG
TABLET ORAL
Qty: 30 TABLET | Refills: 5 | Status: SHIPPED | OUTPATIENT
Start: 2025-03-16

## 2025-03-16 RX ORDER — SODIUM CHLORIDE 0.9 % (FLUSH) 0.9 %
10 SYRINGE (ML) INJECTION PRN
Status: DISCONTINUED | OUTPATIENT
Start: 2025-03-16 | End: 2025-03-16 | Stop reason: HOSPADM

## 2025-03-16 RX ADMIN — SODIUM CHLORIDE 500 ML: 0.9 INJECTION, SOLUTION INTRAVENOUS at 15:45

## 2025-03-16 RX ADMIN — Medication 80 ML: at 14:38

## 2025-03-16 RX ADMIN — TETANUS TOXOID, REDUCED DIPHTHERIA TOXOID AND ACELLULAR PERTUSSIS VACCINE, ADSORBED 0.5 ML: 5; 2.5; 8; 8; 2.5 SUSPENSION INTRAMUSCULAR at 17:45

## 2025-03-16 RX ADMIN — ORPHENADRINE CITRATE 60 MG: 60 INJECTION INTRAMUSCULAR; INTRAVENOUS at 15:41

## 2025-03-16 RX ADMIN — SODIUM CHLORIDE, PRESERVATIVE FREE 10 ML: 5 INJECTION INTRAVENOUS at 14:38

## 2025-03-16 RX ADMIN — IOPAMIDOL 75 ML: 755 INJECTION, SOLUTION INTRAVENOUS at 14:38

## 2025-03-16 RX ADMIN — KETOROLAC TROMETHAMINE 30 MG: 30 INJECTION, SOLUTION INTRAMUSCULAR; INTRAVENOUS at 15:41

## 2025-03-16 ASSESSMENT — HEART SCORE: ECG: NORMAL

## 2025-03-16 NOTE — DISCHARGE INSTRUCTIONS
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  The Zofran is for nausea and can be taken every 8 hours as needed    The Motrin is for pain, can be taken every 8 hours as needed    The Flexeril is for muscle spasm and can they can every 8 hours as needed.  This medication will cause sleepiness so do not take while working or driving    Gentle heat to your neck may be helpful.  Do not on a heating pad as it may cause burns    It is very important that you have close follow-up for your concussion.  I would recommend following up with an ophthalmologist for the blurry vision in your eye.  Call your eye doctor in the morning to schedule follow-up    Using low lights at home, minimizing screen time and also using brain rest is helpful with concussions.  This means not doing things that require a lot of physical effort or mental effort.    Please return to the emergency department for any emergent concerns    Continue to monitor the area on your forehead, wash with soap and water daily, do not use hydrogen peroxide or alcohol.  Apply Vaseline or bacitracin and keep covered.  Watch for signs of infection such as increased redness swelling drainage or warmth

## 2025-03-16 NOTE — ED PROVIDER NOTES
INHALER    INHALE 2 PUFFS BY MOUTH EVERY 6 HOURS AS NEEDED FOR WHEEZING    AMIODARONE (CORDARONE) 200 MG TABLET    Take 0.5 tablets by mouth 2 times daily    AMLODIPINE (NORVASC) 10 MG TABLET    Take 1 tablet by mouth daily    ASPIRIN 81 MG EC TABLET    Take 1 tablet by mouth daily    BLOOD PRESSURE MONITOR MISC    Use daily to take BP    CETIRIZINE (ZYRTEC) 10 MG TABLET    take 1 tablet by mouth once daily    CLOBETASOL (TEMOVATE) 0.05 % CREAM    Apply topically 2 times daily Apply topically 2 times daily.    CLOPIDOGREL (PLAVIX) 75 MG TABLET    Take 1 tablet by mouth daily    FLUTICASONE-SALMETEROL (ADVAIR DISKUS) 100-50 MCG/ACT AEPB DISKUS INHALER    Inhale 1 puff into the lungs in the morning and 1 puff in the evening.    HEAT WRAPS (SOFTHEAT HEATING WRAP ULTRA) MISC    Use daily to for neck and shoulder pain    HYDROCORTISONE 1 % CREAM    apply topically twice a day    LIDOCAINE (LMX) 4 % CREAM    Apply 2g topically as needed.    LISINOPRIL (PRINIVIL;ZESTRIL) 40 MG TABLET    TAKE 1 TABLET BY MOUTH DAILY    METOPROLOL TARTRATE (LOPRESSOR) 25 MG TABLET    Take 1 tablet by mouth 2 times daily    MOMETASONE-FORMOTEROL (DULERA) 100-5 MCG/ACT INHALER    Inhale 2 puffs into the lungs 2 times daily    NICOTINE (NICODERM CQ) 7 MG/24HR    Place 1 patch onto the skin every 24 hours    NITROGLYCERIN (NITROSTAT) 0.4 MG SL TABLET    DISSOLVE 1 TABLET UNDER THE TONGUE EVERY 5 MINUTES AS NEEDED FOR CHEST PAIN. DO NOT EXCEED 3 DOSES. IF NO RELIEF CALL 911    OMEGA-3 FATTY ACIDS (OMEGA-3 EPA FISH OIL) 1205 MG CAPS    Take 1 tablet by mouth daily    OMEPRAZOLE (PRILOSEC) 20 MG DELAYED RELEASE CAPSULE    TAKE 1 CAPSULE BY MOUTH EVERY DAY    POTASSIUM CHLORIDE (KLOR-CON M) 20 MEQ EXTENDED RELEASE TABLET    Take 1 tablet by mouth 2 times daily    ROSUVASTATIN (CRESTOR) 10 MG TABLET    TAKE 1 TABLET BY MOUTH DAILY     ALLERGIES     is allergic to lipitor [atorvastatin], prozac [fluoxetine], and zoloft [sertraline hcl].  FAMILY

## 2025-03-19 DIAGNOSIS — I25.119 CORONARY ARTERY DISEASE INVOLVING NATIVE CORONARY ARTERY OF NATIVE HEART WITH ANGINA PECTORIS: ICD-10-CM

## 2025-03-19 DIAGNOSIS — I10 ESSENTIAL HYPERTENSION: Chronic | ICD-10-CM

## 2025-03-19 NOTE — TELEPHONE ENCOUNTER
Maritza Mahoney is calling to request a refill on the following medication(s):    Medication Request:  Requested Prescriptions     Pending Prescriptions Disp Refills    clopidogrel (PLAVIX) 75 MG tablet [Pharmacy Med Name: CLOPIDOGREL 75MG TABLETS] 90 tablet 0     Sig: TAKE 1 TABLET BY MOUTH DAILY       Last Visit Date (If Applicable):  10/30/2024    Next Visit Date:    Visit date not found

## 2025-03-20 RX ORDER — CLOPIDOGREL BISULFATE 75 MG/1
75 TABLET ORAL DAILY
Qty: 90 TABLET | Refills: 0 | Status: SHIPPED | OUTPATIENT
Start: 2025-03-20

## 2025-08-07 ENCOUNTER — HOSPITAL ENCOUNTER (OUTPATIENT)
Dept: LAB | Age: 60
Discharge: HOME | End: 2025-08-07
Payer: COMMERCIAL

## 2025-08-07 ENCOUNTER — OFFICE VISIT (OUTPATIENT)
Dept: PRIMARY CARE CLINIC | Age: 60
End: 2025-08-07
Payer: COMMERCIAL

## 2025-08-07 VITALS
SYSTOLIC BLOOD PRESSURE: 153 MMHG | DIASTOLIC BLOOD PRESSURE: 89 MMHG | HEART RATE: 63 BPM | BODY MASS INDEX: 25.34 KG/M2 | WEIGHT: 143 LBS | HEIGHT: 63 IN | OXYGEN SATURATION: 100 % | TEMPERATURE: 97 F

## 2025-08-07 DIAGNOSIS — R06.2 WHEEZE: ICD-10-CM

## 2025-08-07 DIAGNOSIS — M50.30 DDD (DEGENERATIVE DISC DISEASE), CERVICAL: ICD-10-CM

## 2025-08-07 DIAGNOSIS — Z72.0 NICOTINE ABUSE: ICD-10-CM

## 2025-08-07 DIAGNOSIS — E13.9 DIABETES MELLITUS OF OTHER TYPE WITHOUT COMPLICATION, UNSPECIFIED WHETHER LONG TERM INSULIN USE (HCC): ICD-10-CM

## 2025-08-07 DIAGNOSIS — Z95.1 HISTORY OF CORONARY ARTERY BYPASS GRAFT X 3: ICD-10-CM

## 2025-08-07 DIAGNOSIS — Z76.0 MEDICATION REFILL: ICD-10-CM

## 2025-08-07 DIAGNOSIS — R73.03 PREDIABETES: ICD-10-CM

## 2025-08-07 DIAGNOSIS — I10 ESSENTIAL HYPERTENSION: ICD-10-CM

## 2025-08-07 DIAGNOSIS — F33.1 MODERATE EPISODE OF RECURRENT MAJOR DEPRESSIVE DISORDER (HCC): Primary | ICD-10-CM

## 2025-08-07 DIAGNOSIS — R20.2 PARESTHESIA OF HAND: ICD-10-CM

## 2025-08-07 DIAGNOSIS — Z12.31 ENCOUNTER FOR SCREENING MAMMOGRAM FOR BREAST CANCER: ICD-10-CM

## 2025-08-07 DIAGNOSIS — Z87.891 PERSONAL HISTORY OF TOBACCO USE, PRESENTING HAZARDS TO HEALTH: ICD-10-CM

## 2025-08-07 DIAGNOSIS — J45.20 MILD INTERMITTENT ASTHMA WITHOUT COMPLICATION: ICD-10-CM

## 2025-08-07 DIAGNOSIS — E04.1 RIGHT THYROID NODULE: ICD-10-CM

## 2025-08-07 DIAGNOSIS — I25.119 CORONARY ARTERY DISEASE INVOLVING NATIVE CORONARY ARTERY OF NATIVE HEART WITH ANGINA PECTORIS: ICD-10-CM

## 2025-08-07 LAB
HBA1C MFR BLD: 5.5 %
TSH SERPL DL<=0.05 MIU/L-ACNC: 1.17 UIU/ML (ref 0.27–4.2)

## 2025-08-07 PROCEDURE — 84443 ASSAY THYROID STIM HORMONE: CPT

## 2025-08-07 PROCEDURE — 99406 BEHAV CHNG SMOKING 3-10 MIN: CPT | Performed by: NURSE PRACTITIONER

## 2025-08-07 PROCEDURE — 3044F HG A1C LEVEL LT 7.0%: CPT | Performed by: NURSE PRACTITIONER

## 2025-08-07 PROCEDURE — 83036 HEMOGLOBIN GLYCOSYLATED A1C: CPT | Performed by: NURSE PRACTITIONER

## 2025-08-07 PROCEDURE — 99214 OFFICE O/P EST MOD 30 MIN: CPT | Performed by: NURSE PRACTITIONER

## 2025-08-07 PROCEDURE — 3077F SYST BP >= 140 MM HG: CPT | Performed by: NURSE PRACTITIONER

## 2025-08-07 PROCEDURE — 36415 COLL VENOUS BLD VENIPUNCTURE: CPT

## 2025-08-07 PROCEDURE — 3079F DIAST BP 80-89 MM HG: CPT | Performed by: NURSE PRACTITIONER

## 2025-08-07 RX ORDER — LISINOPRIL 40 MG/1
40 TABLET ORAL DAILY
Qty: 90 TABLET | Refills: 1 | Status: SHIPPED | OUTPATIENT
Start: 2025-08-07

## 2025-08-07 RX ORDER — FLUTICASONE PROPIONATE AND SALMETEROL 100; 50 UG/1; UG/1
1 POWDER RESPIRATORY (INHALATION) EVERY 12 HOURS
Qty: 1 EACH | Refills: 2 | Status: SHIPPED | OUTPATIENT
Start: 2025-08-07

## 2025-08-07 RX ORDER — AMLODIPINE BESYLATE 10 MG/1
10 TABLET ORAL DAILY
Qty: 90 TABLET | Refills: 1 | Status: SHIPPED | OUTPATIENT
Start: 2025-08-07

## 2025-08-07 RX ORDER — CYCLOBENZAPRINE HCL 5 MG
TABLET ORAL
Qty: 30 TABLET | Refills: 1 | Status: SHIPPED | OUTPATIENT
Start: 2025-08-07

## 2025-08-07 RX ORDER — CLOPIDOGREL BISULFATE 75 MG/1
75 TABLET ORAL DAILY
Qty: 90 TABLET | Refills: 1 | Status: SHIPPED | OUTPATIENT
Start: 2025-08-07

## 2025-08-07 RX ORDER — VARENICLINE TARTRATE 0.5 MG/1
.5-1 TABLET, FILM COATED ORAL SEE ADMIN INSTRUCTIONS
Qty: 57 TABLET | Refills: 0 | Status: SHIPPED | OUTPATIENT
Start: 2025-08-07

## 2025-08-07 RX ORDER — ROSUVASTATIN CALCIUM 10 MG/1
10 TABLET, COATED ORAL DAILY
Qty: 90 TABLET | Refills: 1 | Status: SHIPPED | OUTPATIENT
Start: 2025-08-07

## 2025-08-07 RX ORDER — METOPROLOL TARTRATE 25 MG/1
25 TABLET, FILM COATED ORAL 2 TIMES DAILY
Qty: 180 TABLET | Refills: 1 | Status: SHIPPED | OUTPATIENT
Start: 2025-08-07

## 2025-08-07 SDOH — ECONOMIC STABILITY: FOOD INSECURITY: WITHIN THE PAST 12 MONTHS, THE FOOD YOU BOUGHT JUST DIDN'T LAST AND YOU DIDN'T HAVE MONEY TO GET MORE.: NEVER TRUE

## 2025-08-07 SDOH — ECONOMIC STABILITY: FOOD INSECURITY: WITHIN THE PAST 12 MONTHS, YOU WORRIED THAT YOUR FOOD WOULD RUN OUT BEFORE YOU GOT MONEY TO BUY MORE.: NEVER TRUE

## 2025-08-07 ASSESSMENT — PATIENT HEALTH QUESTIONNAIRE - PHQ9
1. LITTLE INTEREST OR PLEASURE IN DOING THINGS: NOT AT ALL
8. MOVING OR SPEAKING SO SLOWLY THAT OTHER PEOPLE COULD HAVE NOTICED. OR THE OPPOSITE, BEING SO FIGETY OR RESTLESS THAT YOU HAVE BEEN MOVING AROUND A LOT MORE THAN USUAL: NOT AT ALL
SUM OF ALL RESPONSES TO PHQ QUESTIONS 1-9: 0
2. FEELING DOWN, DEPRESSED OR HOPELESS: NOT AT ALL
3. TROUBLE FALLING OR STAYING ASLEEP: NOT AT ALL
9. THOUGHTS THAT YOU WOULD BE BETTER OFF DEAD, OR OF HURTING YOURSELF: NOT AT ALL
4. FEELING TIRED OR HAVING LITTLE ENERGY: NOT AT ALL
5. POOR APPETITE OR OVEREATING: NOT AT ALL
7. TROUBLE CONCENTRATING ON THINGS, SUCH AS READING THE NEWSPAPER OR WATCHING TELEVISION: NOT AT ALL
6. FEELING BAD ABOUT YOURSELF - OR THAT YOU ARE A FAILURE OR HAVE LET YOURSELF OR YOUR FAMILY DOWN: NOT AT ALL
10. IF YOU CHECKED OFF ANY PROBLEMS, HOW DIFFICULT HAVE THESE PROBLEMS MADE IT FOR YOU TO DO YOUR WORK, TAKE CARE OF THINGS AT HOME, OR GET ALONG WITH OTHER PEOPLE: NOT DIFFICULT AT ALL
SUM OF ALL RESPONSES TO PHQ QUESTIONS 1-9: 0

## 2025-08-08 ENCOUNTER — RESULTS FOLLOW-UP (OUTPATIENT)
Dept: PRIMARY CARE CLINIC | Age: 60
End: 2025-08-08

## (undated) DEVICE — TOWEL,OR,DSP,ST,BLUE,DLX,XR,4/PK,20PK/CS: Brand: MEDLINE

## (undated) DEVICE — BRA COMPR LG 36-38 IN POST SURG REMOVABLE STRP ADJ WHT LTX

## (undated) DEVICE — SUTURE VCRL SZ 0 L27IN ABSRB VLT L48MM CTX 1/2 CIR TAPR PNT J364H

## (undated) DEVICE — BNDG,ELSTC,MATRIX,STRL,6"X5YD,LF,HOOK&LP: Brand: MEDLINE

## (undated) DEVICE — SUTURE VCRL + SZ 4-0 L18IN ABSRB UD L19MM PS-2 3/8 CIR PRIM VCP496H

## (undated) DEVICE — CLIP INT M L GRN TI TRNSVRS GRV CHEVRON SHP W/ PRECIS TIP

## (undated) DEVICE — VASCULAR CLAMP: Brand: GREYHOUND

## (undated) DEVICE — GLOVE SURG SZ 8 CRM LTX FREE POLYISOPRENE POLYMER BEAD ANTI

## (undated) DEVICE — GLOVE SURG SZ 6 CRM LTX FREE POLYISOPRENE POLYMER BEAD ANTI

## (undated) DEVICE — SUTURE PROL SZ 7 0 L24IN NONABSORBABLE BLU BV175 6 L8MM 3 8 EP8735H

## (undated) DEVICE — COVER,LIGHT HANDLE,FLX,2/PK: Brand: MEDLINE INDUSTRIES, INC.

## (undated) DEVICE — TOWEL,OR,DSP,ST,NATURAL,DLX,4/PK,20PK/CS: Brand: MEDLINE

## (undated) DEVICE — WIRE TEMP PACE SZ 0 L24IN LIGHT/DARK BLU S STL

## (undated) DEVICE — AGENT HEMSTAT W2XL14IN OXIDIZED REGENERATED CELOS ABSRB FOR

## (undated) DEVICE — KIT BLWR MISTER 5P 15L W/ TBNG SET IRRIG MIST TO IMPROVE

## (undated) DEVICE — SUTURE PROL SZ 4-0 L36IN NONABSORBABLE BLU L26MM SH 1/2 CIR 8521H

## (undated) DEVICE — TUBING, SUCTION, 9/32" X 20', STRAIGHT: Brand: MEDLINE INDUSTRIES, INC.

## (undated) DEVICE — GLOVE SURG SZ 65 L12IN FNGR THK79MIL GRN LTX FREE

## (undated) DEVICE — 48" PROBE COVER W/GEL, ULTRASOUND, STERILE: Brand: SITE-RITE

## (undated) DEVICE — CLIP INT SM WIDE RED TI TRNSVRS GRV CHEVRON SHP W PRECIS

## (undated) DEVICE — SUTURE PROL SZ 6-0 L24IN NONABSORBABLE BLU L13MM C-1 3/8 8726H

## (undated) DEVICE — GLOVE SURG SZ 75 CRM LTX FREE POLYISOPRENE POLYMER BEAD ANTI

## (undated) DEVICE — CATHETER THOR 36FR L23IN PVC R ANG 5 EYE TAPR CONN TIP SFT

## (undated) DEVICE — CONNECTOR TBNG Y 6IN 1 PLAS LTWT

## (undated) DEVICE — FOGARTY - HYDRAGRIP SURGICAL - CLAMP INSERTS: Brand: FOGARTY HYDRAJAW

## (undated) DEVICE — CATHETER IV 24GA L0.56IN SM VEIN SHLD SFTY WNG AUTOGRD

## (undated) DEVICE — SUTURE S STL SZ 6 L18IN NONABSORBABLE SIL L48MM CCS 1/2 CIR M654G

## (undated) DEVICE — PROTECTOR ULN NRV PUR FOAM HK LOOP STRP ANATOMICALLY

## (undated) DEVICE — GLOVE SURG SZ 65 CRM LTX FREE POLYISOPRENE POLYMER BEAD ANTI

## (undated) DEVICE — INTENDED FOR TISSUE SEPARATION, AND OTHER PROCEDURES THAT REQUIRE A SHARP SURGICAL BLADE TO PUNCTURE OR CUT.: Brand: BARD-PARKER ® CARBON RIB-BACK BLADES

## (undated) DEVICE — CANNULA PERF L1.3IN TIP L2MM S STL POLYUR TB ARTOTMY BLB

## (undated) DEVICE — SUTURE VCRL + SZ 2-0 L27IN ABSRB CLR CT-1 1/2 CIR TAPERCUT VCP259H

## (undated) DEVICE — SUTURE NONABSORBABLE MONOFILAMENT 5-0 C-1 1X24 IN PROLENE 8725H

## (undated) DEVICE — GLOVE SURG SZ 7 L12IN FNGR THK79MIL GRN LTX FREE

## (undated) DEVICE — SPONGE LAP W18XL18IN WHT COT 4 PLY FLD STRUNG RADPQ DISP ST

## (undated) DEVICE — 1/4 FORCE SURGICAL SPRING CLIP: Brand: STEALTH® SPRING CLIP

## (undated) DEVICE — ADHESIVE SKIN CLOSURE TOP 36 CC HI VISC DERMBND MINI

## (undated) DEVICE — PACK PROCEDURE SURG OPN HRT

## (undated) DEVICE — NEPTUNE E-SEP SMOKE EVACUATION PENCIL, COATED, 70MM BLADE, PUSH BUTTON SWITCH: Brand: NEPTUNE E-SEP

## (undated) DEVICE — BLADE SAW W6.35XL32MM STRNM CUT STRNOTMY

## (undated) DEVICE — Z DISCONTINUED BY MEDLINE USE 2711682 TRAY SKIN PREP DRY W/ PREM GLV

## (undated) DEVICE — GLOVE SURG SZ 7 CRM LTX FREE POLYISOPRENE POLYMER BEAD ANTI

## (undated) DEVICE — POSITIONER,HEAD,MULTIRING,36CS: Brand: MEDLINE

## (undated) DEVICE — PLATELET CONCENTRATION PACK PROC 14-20 ML SMARTPREP 2

## (undated) DEVICE — SUTURE ETHIB EXCL BR GRN TAPR PT 2-0 30 X563H X563H

## (undated) DEVICE — GOWN,SIRUS,NONRNF,SETINSLV,XL,20/CS: Brand: MEDLINE

## (undated) DEVICE — 3M™ IOBAN™ 2 ANTIMICROBIAL INCISE DRAPE 6650EZ: Brand: IOBAN™ 2

## (undated) DEVICE — SUTURE VCRL + SZ 4-0 L27IN ABSRB UD L26MM SH 1/2 CIR VCP415H

## (undated) DEVICE — APPLICATOR MEDICATED 10.5 CC SOLUTION HI LT ORNG CHLORAPREP

## (undated) DEVICE — APPLICATOR MEDICATED 26 CC SOLUTION HI LT ORNG CHLORAPREP

## (undated) DEVICE — GOWN,AURORA,NONREINFORCED,LARGE: Brand: MEDLINE

## (undated) DEVICE — SUTURE PERMA-HAND SZ 0 L18IN NONABSORBABLE BLK CT-2 L26MM C027D

## (undated) DEVICE — SUTURE MCRYL SZ 4-0 L18IN ABSRB UD L19MM PS-2 3/8 CIR PRIM Y496G

## (undated) DEVICE — SUTURE PROL SZ 7-0 L24IN NONABSORBABLE BLU L8MM BV175-6 3/8 8735H

## (undated) DEVICE — CATHETER THOR 36FR L23CM DIA12MM POLYVI CHL TAPR CONN TIP

## (undated) DEVICE — WAX SURG 2.5GM HEMSTAT BNE BEESWAX PARAFFIN ISO PALMITATE

## (undated) DEVICE — CLIP LIG M BLU TI HRT SHP WIRE HORZ 180 PER BX

## (undated) DEVICE — DRAPE SLUSH DISC W44XL66IN ST FOR RND BSIN HUSH SLUSH SYS

## (undated) DEVICE — BLADE OPHTH D5MM 15DEG GRN W/ RND KNURLED HNDL MICRO-SHARP

## (undated) DEVICE — SUTURE NONABSORBABLE MONOFILAMENT 4-0 RB-1 36 IN BLU PROLENE 8557H

## (undated) DEVICE — .: Brand: PERFECTCUT AORTOTOMY SYSTEM

## (undated) DEVICE — Device: Brand: VIRTUOSAPH PLUS WITH RADIAL INDICATION

## (undated) DEVICE — BNDG,ELSTC,MATRIX,STRL,4"X5YD,LF,HOOK&LP: Brand: MEDLINE

## (undated) DEVICE — Z DISCONTINUED NO SUB IDED DRAIN SURG 2 COLL PT TB FOR ATS BG OASIS

## (undated) DEVICE — TTL1LYR 16FR10ML 100%SIL TMPST TR: Brand: MEDLINE

## (undated) DEVICE — GLOVE SURG SZ 8 L11.77IN FNGR THK9.8MIL STRW LTX POLYMER

## (undated) DEVICE — PACK PROCEDURE SURG OPN HRT ADD ON

## (undated) DEVICE — CATHETER,URETHRAL,REDRUBBER,STRL,18FR: Brand: MEDLINE

## (undated) DEVICE — CANNULA AORT L55IN OD9FR STD TIP FLOW GRD